# Patient Record
Sex: MALE | Race: WHITE | HISPANIC OR LATINO | Employment: OTHER | URBAN - METROPOLITAN AREA
[De-identification: names, ages, dates, MRNs, and addresses within clinical notes are randomized per-mention and may not be internally consistent; named-entity substitution may affect disease eponyms.]

---

## 2022-08-08 LAB — HBA1C MFR BLD HPLC: 14 %

## 2022-09-02 ENCOUNTER — CONSULT (OUTPATIENT)
Dept: UROLOGY | Facility: CLINIC | Age: 67
End: 2022-09-02
Payer: MEDICARE

## 2022-09-02 VITALS
HEIGHT: 72 IN | DIASTOLIC BLOOD PRESSURE: 80 MMHG | WEIGHT: 220 LBS | SYSTOLIC BLOOD PRESSURE: 140 MMHG | BODY MASS INDEX: 29.8 KG/M2

## 2022-09-02 DIAGNOSIS — N39.44 NOCTURNAL ENURESIS: ICD-10-CM

## 2022-09-02 DIAGNOSIS — N48.1 BALANITIS: ICD-10-CM

## 2022-09-02 DIAGNOSIS — R39.14 FEELING OF INCOMPLETE BLADDER EMPTYING: ICD-10-CM

## 2022-09-02 DIAGNOSIS — N39.41 URGE INCONTINENCE OF URINE: ICD-10-CM

## 2022-09-02 DIAGNOSIS — R31.29 MICROSCOPIC HEMATURIA: ICD-10-CM

## 2022-09-02 DIAGNOSIS — R35.0 URINARY FREQUENCY: Primary | ICD-10-CM

## 2022-09-02 DIAGNOSIS — Z12.5 PROSTATE CANCER SCREENING: ICD-10-CM

## 2022-09-02 PROBLEM — E11.9 TYPE 2 DIABETES MELLITUS (HCC): Status: ACTIVE | Noted: 2022-09-02

## 2022-09-02 LAB
BACTERIA UR QL AUTO: ABNORMAL /HPF
BILIRUB UR QL STRIP: NEGATIVE
CLARITY UR: CLEAR
COLOR UR: ABNORMAL
GLUCOSE UR STRIP-MCNC: ABNORMAL MG/DL
HGB UR QL STRIP.AUTO: ABNORMAL
KETONES UR STRIP-MCNC: ABNORMAL MG/DL
LEUKOCYTE ESTERASE UR QL STRIP: ABNORMAL
NITRITE UR QL STRIP: NEGATIVE
NON-SQ EPI CELLS URNS QL MICRO: ABNORMAL /HPF
PH UR STRIP.AUTO: 5.5 [PH]
PROT UR STRIP-MCNC: NEGATIVE MG/DL
RBC #/AREA URNS AUTO: ABNORMAL /HPF
SL AMB  POCT GLUCOSE, UA: ABNORMAL
SL AMB LEUKOCYTE ESTERASE,UA: ABNORMAL
SL AMB POCT BILIRUBIN,UA: ABNORMAL
SL AMB POCT BLOOD,UA: ABNORMAL
SL AMB POCT CLARITY,UA: CLEAR
SL AMB POCT COLOR,UA: YELLOW
SL AMB POCT KETONES,UA: ABNORMAL
SL AMB POCT NITRITE,UA: ABNORMAL
SL AMB POCT PH,UA: 5
SL AMB POCT SPECIFIC GRAVITY,UA: 1.02
SL AMB POCT URINE PROTEIN: ABNORMAL
SL AMB POCT UROBILINOGEN: ABNORMAL
SP GR UR STRIP.AUTO: 1.02 (ref 1–1.03)
UROBILINOGEN UR STRIP-ACNC: <2 MG/DL
WBC #/AREA URNS AUTO: ABNORMAL /HPF

## 2022-09-02 PROCEDURE — 87186 SC STD MICRODIL/AGAR DIL: CPT | Performed by: NURSE PRACTITIONER

## 2022-09-02 PROCEDURE — 81002 URINALYSIS NONAUTO W/O SCOPE: CPT | Performed by: NURSE PRACTITIONER

## 2022-09-02 PROCEDURE — 81001 URINALYSIS AUTO W/SCOPE: CPT | Performed by: NURSE PRACTITIONER

## 2022-09-02 PROCEDURE — 99204 OFFICE O/P NEW MOD 45 MIN: CPT | Performed by: NURSE PRACTITIONER

## 2022-09-02 PROCEDURE — 87086 URINE CULTURE/COLONY COUNT: CPT | Performed by: NURSE PRACTITIONER

## 2022-09-02 RX ORDER — UNDERPADS 23" X 36"
EACH MISCELLANEOUS EVERY 4 HOURS PRN
Qty: 36 EACH | Refills: 12 | Status: SHIPPED | OUTPATIENT
Start: 2022-09-02

## 2022-09-02 RX ORDER — CLOTRIMAZOLE AND BETAMETHASONE DIPROPIONATE 10; .64 MG/G; MG/G
CREAM TOPICAL 2 TIMES DAILY
Qty: 30 G | Refills: 3 | Status: SHIPPED | OUTPATIENT
Start: 2022-09-02 | End: 2022-09-02

## 2022-09-02 RX ORDER — CLOTRIMAZOLE AND BETAMETHASONE DIPROPIONATE 10; .64 MG/G; MG/G
CREAM TOPICAL 2 TIMES DAILY
Qty: 30 G | Refills: 3 | Status: SHIPPED | OUTPATIENT
Start: 2022-09-02 | End: 2022-09-20

## 2022-09-02 RX ORDER — INSULIN LISPRO 100 [IU]/ML
INJECTION, SOLUTION SUBCUTANEOUS
COMMUNITY
End: 2022-09-20

## 2022-09-02 RX ORDER — NYSTATIN 100000 [USP'U]/G
POWDER TOPICAL 3 TIMES DAILY
Qty: 15 G | Refills: 3 | Status: SHIPPED | OUTPATIENT
Start: 2022-09-02 | End: 2022-09-20

## 2022-09-02 NOTE — PATIENT INSTRUCTIONS
BLADDER HEALTH    WHAT IS CONSIDERED NORMAL? The average bladder can hold about 2 cups of urine before it needs to be emptied  The normal range of voiding urine is 6 to 8 times during a 24 hour period  As we get older, our bladder capacity can get smaller and we may need to pass urine more frequently but usually not more than every 2 hours  Urine should flow easily without discomfort in a good, steady stream until the bladder is empty  No pushing or straining is necessary to empty the bladder  An urge is a signal that you feel as the bladder stretches to fill with urine  Urges can be felt even if the bladder is not full  Urges are not commands to go to the toilet, merely a signal and can be controlled  WHAT ARE GOOD BLADDER HABITS? Take your time when emptying your bladder  Dont strain or push to empty your bladder  Make sure you empty your bladder completely each time you pass urine  Do not rush the process  Consistently ignoring the urge to go (waiting more than 4 hours between toileting) or urinating too infrequently may be convenient but not healthy for your bladder  Avoid going to the toilet just in case or more often than every 2 hours  It is usually not necessary to go when you feel the first urge  Try to go only when your bladder is full  Urgency and frequency of urination can be improved by retraining the bladder and spacing your fluid intake throughout the day  Practice good toilet habits  Dont let your bladder control your life  TIPS TO MAINTAIN GOOD BLADDER HABITS  Maintain a good fluid intake  Depending on your body size and environment, drink 6 -8 cups (8 oz each) of fluid per day unless otherwise advised by your doctor  Not enough fluid creates a foul odor and dark color of the urine  Limit the amount of caffeine (coffee, cola, chocolate or tea) and citrus foods that you consume as these foods can be associated with increased sensation of urinary urgency and frequency  Limit the amount of alcohol you drink  Alcohol increases urine production and makes it difficult for the brain to coordinate bladder control  Avoid constipation by maintaining a balanced diet of dietary fiber  Cigarette smoking is also irritating to the bladder surface and is associated with bladder cancer  In addition, the coughing associated with smoking may lead to increased incontinent episodes because of the increased pressure  HOW DIET CAN AFFECT YOUR BLADDER  Although there is no particular "diet" that can cure bladder control, there are certain dietary suggestions you can use to help control the problem  There are 2 points to consider when evaluating how your habits and diet may affect your bladder:    Foods and fluids can irritate the bladder  Some foods and beverages are thought to contribute to bladder leakage and irritability  However their effect on the bladder is not completely understood and you may want to see if eliminating one or all of these items improves your bladder control  If you are unable to give them up completely, it is recommended that you use the following items in moderation:  Acidic beverages and foods (orange juice, grapefruit juice, lemonade etc)  Alcoholic beverages  Vinegar  Coffee (regular and decaf)  Tea (regular and decaf)  Caffeinated beverages  Carbonated beverages          Drinking enough and the right kinds of fluids  Many people with bladder control issues decrease their intake of liquids in hope that they will need to urinate less frequently or have less urinary leakage  You should not restrict fluids to control your bladder  While a decrease in liquid intake does result in a decrease in the volume of urine, the smaller amount of urine may be more highly concentrated  Highly concentrated, dark yellow urine is irritating to the bladder surface and may actually cause you to go to the bathroom more frequently        It also encourages the growth of bacteria, which may lead to infections resulting in incontinence  Substitutions for Bladder Irritants: water is always the best beverage choice  Grape and apple juice are thirst quenchers are good selections and are not as irritating to the bladder  Low acid fruits:  Pears, apricots, papaya, watermelon  For coffee drinkers: KAVA®, Postum®, Ollie®, Kaffree Stacie®  For tea drinkers:  non-citrus or herbal and sun brewed tea  Enlarged Prostate (BPH)   WHAT YOU NEED TO KNOW:   What do I need to know about an enlarged prostate (BPH)? An enlarged prostate (BPH) is a common condition in older adults  BPH develops because the number of prostate cells increases (hyperplasia) or the cells get bigger (hypertrophy)  The prostate wraps around the urethra  An enlarged prostate can press on the urethra  This may cause problems with storing urine or emptying your bladder completely  What are the signs and symptoms of BPH? Urinating 8 or more times each day    A feeling of not fully emptying your bladder when you urinate    An urgent need to urinate that you could not put off, or urinating again within 2 hours    Being woken from sleep because you needed to urinate    Trouble starting your urine flow, or a need to push or strain to get it to start    Urine that stops and starts several times when you urinate    A weak urine stream, or dribbling after you urinate    How is BPH diagnosed? A digital rectal exam  is used to check the size of your prostate  Your healthcare provider will insert a gloved finger into your rectum  The provider will be able to feel your prostate  The size of your prostate may be checked with ultrasound pictures  Urine tests  may show infection, or strength and amount of urine flow  You may need to record how often and how much you urinate for 24 hours  Blood tests  may show kidney problems or your PSA level  PSA is a substance produced by your prostate   PSA levels increase when you have BPH  A postvoid residual volume test  is used to measure the amount of urine left in your bladder after you urinate  How is BPH treated? Watchful waiting  means you do not receive treatment right away  Your signs and symptoms will be monitored over time to see if they get worse  You may be asked to keep a record and bring it to follow-up visits  This record may include when you urinate, how easy or difficult it was, and any changes in urination  Medicines  may be used to relax the muscles in your prostate and bladder  This may help you urinate more easily  You may also need medicine that helps shrink, or slow the growth of your prostate  A procedure  may be used to place a stent into your urethra to hold it open  A stent is a short, tiny mesh tube  A prostatic urethral lift, or UroLift, may be used to hold the prostate away from the urethra  This makes the urethra wider so urine can pass through more easily  Surgery  may be used to relieve your symptoms if other treatments do not work  Extra tissue that is causing your symptoms may be destroyed  All or part of your prostate may be removed during another type of surgery  What can I do to manage my symptoms? Urinate on a regular schedule  This will train your bladder to hold urine longer  A larger amount of urine may make it easier to urinate  Drink less liquid during the day  Do not have liquid for several hours before you go to bed at night  Do not drink large amounts of any liquid at one time  Limit alcohol and caffeine  These can irritate your bladder and make your symptoms worse  Eat less salt  Salt can cause fluid buildup and make it harder to urinate  Examples of salty foods are chips, cured meats, and canned soups  Do not use table salt  Elevate your legs if you have swelling  Elevate (raise) your legs above the level of your heart  This can relieve swelling caused by fluid buildup   You may not have to get up in the night to urinate  Exercise regularly  Exercise can help improve your symptoms  Ask your healthcare provider what a healthy weight for you is  Aim to get at least 30 minutes of exercise on most days of the week  When should I call my doctor? You see blood in your urine  You are not able to urinate  Your bladder feels very full and painful  You have new or worsening symptoms  You have a fever  You have questions or concerns about your condition or care  CARE AGREEMENT:   You have the right to help plan your care  Learn about your health condition and how it may be treated  Discuss treatment options with your healthcare providers to decide what care you want to receive  You always have the right to refuse treatment  The above information is an  only  It is not intended as medical advice for individual conditions or treatments  Talk to your doctor, nurse or pharmacist before following any medical regimen to see if it is safe and effective for you  © Copyright [employer] 2022 Information is for End User's use only and may not be sold, redistributed or otherwise used for commercial purposes   All illustrations and images included in CareNotes® are the copyrighted property of A D A M [employer]  or [address] [place] [address]

## 2022-09-02 NOTE — PROGRESS NOTES
Assessment and plan:     Urinary frequency  · Likely secondary to uncontrolled diabetes, A1c > 14  · AUA 26  · Positive ketones in urine  · Recommend improved glucose control, has follow-up with endocrinology scheduled    Prostate cancer screening  · Denies family hx of prostate cancer  · PSA 2 18  · Follow up 1 year    Microscopic hematuria  · 3-10 rbc/hpf  · Schedule us of kidney and bladder  · Refuses cystoscopy unless under anesthesia    Urge incontinence of urine  · Referral to pelvic floor physical therapy  · Avoid bladder irritants  · Recheck 4 weeks    Balanitis  · Reviewed proper cleaning techniques with plain water and patting dry  · Apply Lotrisone cream b i d  x5 days and p r n  · Nystatin powder ordered for scrotal sac due to irritation  · Recheck 4 weeks    Feeling of incomplete bladder emptying  · Avoid bladder irritants  · Schedule ultrasound kidney and bladder with [facility]  · Follow-up 4 weeks for recheck    Nocturnal enuresis  · Likely secondary to uncontrolled diabetes with A1c > 14  · Incontinence supplies, change large brief 6 times per day          LEENANA Piedra    History of Present Illness     Oracio Richardson Sylvia Urbina  is a 75 y o  new patient who presents for urinary frequency, intermittent urinary stream, urgency, weak stream, straining to urinate, nocturia x5 or more for over year  He feels symptoms started after weight loss  He lost 180 lbs, no on purpose  He was told this could be caused by his diabetes  He has not taken medication for over a  Year  He reports that his diabetic medications caused him to "fall on the floor"  He was told that he lost muscle mass and that is the cause of his current falls and inability to get up  He reports urinary incontinence for 8 months at night  During the day he has post void dribbling  He starts to leak and will go back in the bathroom and "force it out"  He drinks about 2 gallons of water a day      He saw his pcp 2 weeks ago but he is in [facility], he plans to transfer care to this area  PSA 2 18  A1c > 14    Urine microscopic with 3-10 RBCs/hpf, denies gross hematuria, non smoking history, exposure to asbestos but no chemical exposures  Denies pelvic radiation  Denies family history of kidney cancer bladder cancer  CREAT 0 91    Laboratory     No results found for: BUN, CREATININE    No components found for: GFR    No results found for: GLUCOSE, CALCIUM, NA, K, CO2, CL    No results found for: WBC, HGB, HCT, MCV, PLT    No results found for: PSA    No results found for this or any previous visit (from the past 1 hour(s))  @RESULT(URINEMICROSCOPIC)@    @RESULT(URINECULTURE)@    Radiology       Review of Systems     Review of Systems   Constitutional: Positive for unexpected weight change  Negative for activity change, appetite change, chills, fatigue and fever  Loss of muscle mass   HENT: Negative for facial swelling  Eyes: Negative for discharge  Respiratory: Negative  Negative for cough and shortness of breath  Cardiovascular: Positive for leg swelling  Negative for chest pain  Gastrointestinal: Negative  Negative for abdominal distention, abdominal pain, constipation, diarrhea, nausea and vomiting  Endocrine: Positive for polyphagia  Genitourinary: Positive for difficulty urinating, enuresis, frequency and urgency  Negative for decreased urine volume, dysuria, flank pain, genital sores, hematuria, scrotal swelling and testicular pain  Sensation of incomplete bladder emptying,intermittent stream,nocturia x 5, straining to urinate, weak stream   Musculoskeletal: Negative for back pain and myalgias  Skin: Negative for pallor and rash  Allergic/Immunologic: Negative  Negative for immunocompromised state  Neurological: Negative for facial asymmetry and speech difficulty  Psychiatric/Behavioral: Negative for agitation and confusion           AUA SYMPTOM SCORE    Flowsheet Row Most Recent Value   AUA SYMPTOM SCORE How often have you had a sensation of not emptying your bladder completely after you finished urinating? 0   How often have you had to urinate again less than two hours after you finished urinating? 5   How often have you found you stopped and started again several times when you urinate? 5   How often have you found it difficult to postpone urination? 5   How often have you had a weak urinary stream? 3   How often have you had to push or strain to begin urination? 3   How many times did you most typically get up to urinate from the time you went to bed at night until the time you got up in the morning? 5   Quality of Life: If you were to spend the rest of your life with your urinary condition just the way it is now, how would you feel about that? 4   AUA SYMPTOM SCORE 26                Allergies     No Known Allergies    Physical Exam     Physical Exam  Vitals reviewed  Constitutional:       General: He is not in acute distress  Appearance: Normal appearance  He is obese  He is not ill-appearing, toxic-appearing or diaphoretic  HENT:      Head: Normocephalic and atraumatic  Eyes:      General: No scleral icterus  Cardiovascular:      Rate and Rhythm: Normal rate  Pulmonary:      Effort: Pulmonary effort is normal  No respiratory distress  Abdominal:      General: Abdomen is flat  There is no distension  Palpations: Abdomen is soft  Tenderness: There is no abdominal tenderness  There is no right CVA tenderness, left CVA tenderness, guarding or rebound  Genitourinary:     Penis: Circumcised  Erythema present  No hypospadias, tenderness, discharge, swelling or lesions  Testes:         Right: Mass, tenderness or swelling not present  Right testis is descended  Left: Mass, tenderness or swelling not present  Left testis is descended  Epididymis:      Right: Not inflamed  No tenderness  Left: Not inflamed  No tenderness            Comments: Prostate smooth nontender without appreciable nodules or indurations  Musculoskeletal:         General: No swelling  Cervical back: Normal range of motion  Right lower leg: Edema present  Left lower leg: Edema present  Skin:     General: Skin is warm and dry  Coloration: Skin is not jaundiced or pale  Findings: No rash  Neurological:      General: No focal deficit present  Mental Status: He is alert and oriented to person, place, and time  Gait: Gait normal    Psychiatric:         Mood and Affect: Mood normal          Behavior: Behavior normal          Thought Content: Thought content normal          Judgment: Judgment normal          Vital Signs     Vitals:    09/02/22 1011   BP: 140/80   Weight: 99 8 kg (220 lb)   Height: 6' (1 829 m)       Current Medications       Current Outpatient Medications:     clotrimazole-betamethasone (LOTRISONE) 1-0 05 % cream, Apply topically 2 (two) times a day Apply twice a day for 5 days then as needed    Cleanse head of penis with water and pat try twice a day, Disp: 30 g, Rfl: 3    Incontinence Supply Disposable (Incontinence Brief Large) MISC, Use every 4 (four) hours as needed (Urinary incontinence), Disp: 36 each, Rfl: 12    Insulin Glargine (BASAGLAR KWIKPEN SC), Inject under the skin, Disp: , Rfl:     insulin lispro (HumaLOG Felix KwikPen) 100 units/mL injection pen, Inject under the skin 3 (three) times a day with meals, Disp: , Rfl:     nystatin (MYCOSTATIN) powder, Apply topically 3 (three) times a day Apply to testicles as needed for redness, Disp: 15 g, Rfl: 3    Active Problems     Patient Active Problem List   Diagnosis    Urinary frequency    Type 2 diabetes mellitus ([facility] )    Prostate cancer screening    Microscopic hematuria    Urge incontinence of urine    Nocturnal enuresis    Feeling of incomplete bladder emptying    Balanitis       Past Medical History     Past Medical History:   Diagnosis Date    [facility]        Surgical History     Past Surgical History:   Procedure Laterality Date    KNEE CARTILAGE SURGERY         Family History     Family History   Problem Relation Age of Onset    Diabetes Father     Diabetes Mother        Social History     Social History     Social History     Tobacco Use   Smoking Status Never Smoker   Smokeless Tobacco Never Used       Past Surgical History:   Procedure Laterality Date    KNEE CARTILAGE SURGERY           The following portions of the patient's history were reviewed and updated as appropriate: allergies, current medications, past family history, past medical history, past social history, past surgical history and problem list    Please note :  Voice dictation software has been used to create this document  There may be inadvertent transcription errors      [id number] [address]

## 2022-09-02 NOTE — ASSESSMENT & PLAN NOTE
· Avoid bladder irritants  · Schedule ultrasound kidney and bladder with [facility]  · Follow-up 4 weeks for recheck

## 2022-09-02 NOTE — ASSESSMENT & PLAN NOTE
· Reviewed proper cleaning techniques with plain water and patting dry  · Apply Lotrisone cream b i d  x5 days and p r n    · Nystatin powder ordered for scrotal sac due to irritation  · Recheck 4 weeks

## 2022-09-02 NOTE — ASSESSMENT & PLAN NOTE
· Likely secondary to uncontrolled diabetes, A1c > 14  · AUA 26  · Positive ketones in urine  · Recommend improved glucose control, has follow-up with endocrinology scheduled

## 2022-09-04 ENCOUNTER — APPOINTMENT (INPATIENT)
Dept: CT IMAGING | Facility: HOSPITAL | Age: 67
DRG: 871 | End: 2022-09-04
Payer: MEDICARE

## 2022-09-04 ENCOUNTER — HOSPITAL ENCOUNTER (INPATIENT)
Facility: HOSPITAL | Age: 67
LOS: 6 days | DRG: 871 | End: 2022-09-10
Attending: EMERGENCY MEDICINE | Admitting: ANESTHESIOLOGY
Payer: MEDICARE

## 2022-09-04 ENCOUNTER — APPOINTMENT (OUTPATIENT)
Dept: ULTRASOUND IMAGING | Facility: HOSPITAL | Age: 67
DRG: 871 | End: 2022-09-04
Payer: MEDICARE

## 2022-09-04 DIAGNOSIS — K85.90 PANCREATITIS: ICD-10-CM

## 2022-09-04 DIAGNOSIS — R41.82 ALTERED MENTAL STATUS, UNSPECIFIED ALTERED MENTAL STATUS TYPE: ICD-10-CM

## 2022-09-04 DIAGNOSIS — N20.9 URINARY CALCULUS: ICD-10-CM

## 2022-09-04 DIAGNOSIS — E11.10 DKA (DIABETIC KETOACIDOSIS) (HCC): Primary | ICD-10-CM

## 2022-09-04 DIAGNOSIS — E11.9 TYPE 2 DIABETES MELLITUS (HCC): ICD-10-CM

## 2022-09-04 DIAGNOSIS — A41.9 SEPSIS (HCC): ICD-10-CM

## 2022-09-04 PROBLEM — E87.2 LACTIC ACIDOSIS: Status: ACTIVE | Noted: 2022-09-04

## 2022-09-04 PROBLEM — N17.9 AKI (ACUTE KIDNEY INJURY) (HCC): Status: ACTIVE | Noted: 2022-09-04

## 2022-09-04 PROBLEM — R74.8 ELEVATED LIPASE: Status: ACTIVE | Noted: 2022-09-04

## 2022-09-04 PROBLEM — E87.1 HYPONATREMIA: Status: ACTIVE | Noted: 2022-09-04

## 2022-09-04 PROBLEM — T68.XXXA HYPOTHERMIA: Status: ACTIVE | Noted: 2022-09-04

## 2022-09-04 PROBLEM — G93.41 ACUTE METABOLIC ENCEPHALOPATHY: Status: ACTIVE | Noted: 2022-09-04

## 2022-09-04 PROBLEM — R74.01 TRANSAMINITIS: Status: ACTIVE | Noted: 2022-09-04

## 2022-09-04 PROBLEM — I48.91 NEW ONSET ATRIAL FIBRILLATION (HCC): Status: ACTIVE | Noted: 2022-09-04

## 2022-09-04 PROBLEM — E87.29 INCREASED ANION GAP METABOLIC ACIDOSIS: Status: ACTIVE | Noted: 2022-09-04

## 2022-09-04 PROBLEM — E87.2 INCREASED ANION GAP METABOLIC ACIDOSIS: Status: ACTIVE | Noted: 2022-09-04

## 2022-09-04 PROBLEM — E87.20 LACTIC ACIDOSIS: Status: ACTIVE | Noted: 2022-09-04

## 2022-09-04 LAB
2HR DELTA HS TROPONIN: -1 NG/L
4HR DELTA HS TROPONIN: 0 NG/L
ALBUMIN SERPL BCP-MCNC: 3.4 G/DL (ref 3.5–5)
ALP SERPL-CCNC: 167 U/L (ref 46–116)
ALT SERPL W P-5'-P-CCNC: 24 U/L (ref 12–78)
ANION GAP SERPL CALCULATED.3IONS-SCNC: 15 MMOL/L (ref 4–13)
ANION GAP SERPL CALCULATED.3IONS-SCNC: 21 MMOL/L (ref 4–13)
ANION GAP SERPL CALCULATED.3IONS-SCNC: 32 MMOL/L (ref 4–13)
ANION GAP SERPL CALCULATED.3IONS-SCNC: 33 MMOL/L (ref 4–13)
ANION GAP SERPL CALCULATED.3IONS-SCNC: 35 MMOL/L (ref 4–13)
ARTERIAL PATENCY WRIST A: NO
AST SERPL W P-5'-P-CCNC: 20 U/L (ref 5–45)
ATRIAL RATE: 101 BPM
ATRIAL RATE: 102 BPM
BACTERIA UR CULT: ABNORMAL
BACTERIA UR QL AUTO: ABNORMAL /HPF
BASE EX.OXY STD BLDV CALC-SCNC: 71 % (ref 60–80)
BASE EX.OXY STD BLDV CALC-SCNC: 74.5 % (ref 60–80)
BASE EX.OXY STD BLDV CALC-SCNC: 90.5 % (ref 60–80)
BASE EXCESS BLDA CALC-SCNC: -5.2 MMOL/L
BASE EXCESS BLDA CALC-SCNC: -5.7 MMOL/L
BASE EXCESS BLDV CALC-SCNC: -18.3 MMOL/L
BASE EXCESS BLDV CALC-SCNC: -24.3 MMOL/L
BASE EXCESS BLDV CALC-SCNC: -29.4 MMOL/L
BETA-HYDROXYBUTYRATE: 5.2 MMOL/L
BILIRUB SERPL-MCNC: 0.39 MG/DL (ref 0.2–1)
BILIRUB UR QL STRIP: NEGATIVE
BODY TEMPERATURE: 98.6 DEGREES FEHRENHEIT
BUN SERPL-MCNC: 31 MG/DL (ref 5–25)
BUN SERPL-MCNC: 33 MG/DL (ref 5–25)
BUN SERPL-MCNC: 37 MG/DL (ref 5–25)
BUN SERPL-MCNC: 38 MG/DL (ref 5–25)
BUN SERPL-MCNC: 39 MG/DL (ref 5–25)
BUN SERPL-MCNC: 41 MG/DL (ref 5–25)
CALCIUM ALBUM COR SERPL-MCNC: 9.9 MG/DL (ref 8.3–10.1)
CALCIUM SERPL-MCNC: 7.8 MG/DL (ref 8.3–10.1)
CALCIUM SERPL-MCNC: 8.4 MG/DL (ref 8.3–10.1)
CALCIUM SERPL-MCNC: 8.5 MG/DL (ref 8.3–10.1)
CALCIUM SERPL-MCNC: 8.8 MG/DL (ref 8.3–10.1)
CALCIUM SERPL-MCNC: 9.4 MG/DL (ref 8.3–10.1)
CALCIUM SERPL-MCNC: 9.4 MG/DL (ref 8.3–10.1)
CARDIAC TROPONIN I PNL SERPL HS: 11 NG/L
CARDIAC TROPONIN I PNL SERPL HS: 12 NG/L
CARDIAC TROPONIN I PNL SERPL HS: 12 NG/L
CHLORIDE SERPL-SCNC: 102 MMOL/L (ref 96–108)
CHLORIDE SERPL-SCNC: 107 MMOL/L (ref 96–108)
CHLORIDE SERPL-SCNC: 88 MMOL/L (ref 96–108)
CHLORIDE SERPL-SCNC: 89 MMOL/L (ref 96–108)
CHLORIDE SERPL-SCNC: 94 MMOL/L (ref 96–108)
CHLORIDE SERPL-SCNC: 95 MMOL/L (ref 96–108)
CHOLEST SERPL-MCNC: 153 MG/DL
CLARITY UR: CLEAR
CO2 SERPL-SCNC: 13 MMOL/L (ref 21–32)
CO2 SERPL-SCNC: 19 MMOL/L (ref 21–32)
CO2 SERPL-SCNC: 5 MMOL/L (ref 21–32)
CO2 SERPL-SCNC: 6 MMOL/L (ref 21–32)
CO2 SERPL-SCNC: 9 MMOL/L (ref 21–32)
CO2 SERPL-SCNC: <5 MMOL/L (ref 21–32)
COLOR UR: YELLOW
CREAT SERPL-MCNC: 1.34 MG/DL (ref 0.6–1.3)
CREAT SERPL-MCNC: 1.48 MG/DL (ref 0.6–1.3)
CREAT SERPL-MCNC: 1.75 MG/DL (ref 0.6–1.3)
CREAT SERPL-MCNC: 1.81 MG/DL (ref 0.6–1.3)
CREAT SERPL-MCNC: 1.85 MG/DL (ref 0.6–1.3)
CREAT SERPL-MCNC: 2.29 MG/DL (ref 0.6–1.3)
ERYTHROCYTE [DISTWIDTH] IN BLOOD BY AUTOMATED COUNT: 12.8 % (ref 11.6–15.1)
GFR SERPL CREATININE-BSD FRML MDRD: 28 ML/MIN/1.73SQ M
GFR SERPL CREATININE-BSD FRML MDRD: 36 ML/MIN/1.73SQ M
GFR SERPL CREATININE-BSD FRML MDRD: 37 ML/MIN/1.73SQ M
GFR SERPL CREATININE-BSD FRML MDRD: 39 ML/MIN/1.73SQ M
GFR SERPL CREATININE-BSD FRML MDRD: 47 ML/MIN/1.73SQ M
GFR SERPL CREATININE-BSD FRML MDRD: 54 ML/MIN/1.73SQ M
GLUCOSE SERPL-MCNC: 118 MG/DL (ref 65–140)
GLUCOSE SERPL-MCNC: 121 MG/DL (ref 65–140)
GLUCOSE SERPL-MCNC: 151 MG/DL (ref 65–140)
GLUCOSE SERPL-MCNC: 165 MG/DL (ref 65–140)
GLUCOSE SERPL-MCNC: 179 MG/DL (ref 65–140)
GLUCOSE SERPL-MCNC: 187 MG/DL (ref 65–140)
GLUCOSE SERPL-MCNC: 192 MG/DL (ref 65–140)
GLUCOSE SERPL-MCNC: 207 MG/DL (ref 65–140)
GLUCOSE SERPL-MCNC: 222 MG/DL (ref 65–140)
GLUCOSE SERPL-MCNC: 240 MG/DL (ref 65–140)
GLUCOSE SERPL-MCNC: 302 MG/DL (ref 65–140)
GLUCOSE SERPL-MCNC: 315 MG/DL (ref 65–140)
GLUCOSE SERPL-MCNC: 322 MG/DL (ref 65–140)
GLUCOSE SERPL-MCNC: 346 MG/DL (ref 65–140)
GLUCOSE SERPL-MCNC: 374 MG/DL (ref 65–140)
GLUCOSE SERPL-MCNC: 394 MG/DL (ref 65–140)
GLUCOSE SERPL-MCNC: 396 MG/DL (ref 65–140)
GLUCOSE SERPL-MCNC: 417 MG/DL (ref 65–140)
GLUCOSE SERPL-MCNC: 504 MG/DL (ref 65–140)
GLUCOSE SERPL-MCNC: 504 MG/DL (ref 65–140)
GLUCOSE SERPL-MCNC: 571 MG/DL (ref 65–140)
GLUCOSE SERPL-MCNC: 626 MG/DL (ref 65–140)
GLUCOSE SERPL-MCNC: 727 MG/DL (ref 65–140)
GLUCOSE SERPL-MCNC: 800 MG/DL (ref 65–140)
GLUCOSE SERPL-MCNC: >500 MG/DL (ref 65–140)
GLUCOSE UR STRIP-MCNC: ABNORMAL MG/DL
HCO3 BLDA-SCNC: 18.4 MMOL/L (ref 22–28)
HCO3 BLDA-SCNC: 19.1 MMOL/L (ref 22–28)
HCO3 BLDV-SCNC: 3.5 MMOL/L (ref 24–30)
HCO3 BLDV-SCNC: 5.6 MMOL/L (ref 24–30)
HCO3 BLDV-SCNC: 9 MMOL/L (ref 24–30)
HCT VFR BLD AUTO: 49.8 % (ref 36.5–49.3)
HDLC SERPL-MCNC: 54 MG/DL
HGB BLD-MCNC: 15.3 G/DL (ref 12–17)
HGB UR QL STRIP.AUTO: ABNORMAL
KETONES UR STRIP-MCNC: ABNORMAL MG/DL
LACTATE SERPL-SCNC: 0.8 MMOL/L (ref 0.5–2)
LACTATE SERPL-SCNC: 2.8 MMOL/L (ref 0.5–2)
LACTATE SERPL-SCNC: 4.5 MMOL/L (ref 0.5–2)
LDLC SERPL CALC-MCNC: 81 MG/DL (ref 0–100)
LEUKOCYTE ESTERASE UR QL STRIP: ABNORMAL
LIPASE SERPL-CCNC: 1091 U/L (ref 73–393)
MAGNESIUM SERPL-MCNC: 1.9 MG/DL (ref 1.6–2.6)
MAGNESIUM SERPL-MCNC: 2.1 MG/DL (ref 1.6–2.6)
MAGNESIUM SERPL-MCNC: 2.1 MG/DL (ref 1.6–2.6)
MAGNESIUM SERPL-MCNC: 2.3 MG/DL (ref 1.6–2.6)
MAGNESIUM SERPL-MCNC: 2.4 MG/DL (ref 1.6–2.6)
MCH RBC QN AUTO: 29.3 PG (ref 26.8–34.3)
MCHC RBC AUTO-ENTMCNC: 30.7 G/DL (ref 31.4–37.4)
MCV RBC AUTO: 95 FL (ref 82–98)
NITRITE UR QL STRIP: NEGATIVE
NON VENT ROOM AIR: 24 %
NON-SQ EPI CELLS URNS QL MICRO: ABNORMAL /HPF
O2 CT BLDA-SCNC: 19.8 ML/DL (ref 16–23)
O2 CT BLDA-SCNC: 20.3 ML/DL (ref 16–23)
O2 CT BLDV-SCNC: 16 ML/DL
O2 CT BLDV-SCNC: 16.5 ML/DL
O2 CT BLDV-SCNC: 20.8 ML/DL
OXYHGB MFR BLDA: 97.3 % (ref 94–97)
OXYHGB MFR BLDA: 97.3 % (ref 94–97)
P AXIS: 69 DEGREES
PCO2 BLDA: 32.3 MM HG (ref 36–44)
PCO2 BLDA: 33.7 MM HG (ref 36–44)
PCO2 BLDV: 19.9 MM HG (ref 42–50)
PCO2 BLDV: 23 MM HG (ref 42–50)
PCO2 BLDV: 26.9 MM HG (ref 42–50)
PH BLDA: 7.37 [PH] (ref 7.35–7.45)
PH BLDA: 7.37 [PH] (ref 7.35–7.45)
PH BLDV: 6.86 [PH] (ref 7.3–7.4)
PH BLDV: 7 [PH] (ref 7.3–7.4)
PH BLDV: 7.14 [PH] (ref 7.3–7.4)
PH UR STRIP.AUTO: 5.5 [PH]
PHOSPHATE SERPL-MCNC: 1.1 MG/DL (ref 2.3–4.1)
PHOSPHATE SERPL-MCNC: 1.3 MG/DL (ref 2.3–4.1)
PHOSPHATE SERPL-MCNC: 3.8 MG/DL (ref 2.3–4.1)
PHOSPHATE SERPL-MCNC: 4.4 MG/DL (ref 2.3–4.1)
PHOSPHATE SERPL-MCNC: 6.4 MG/DL (ref 2.3–4.1)
PLATELET # BLD AUTO: 372 THOUSANDS/UL (ref 149–390)
PMV BLD AUTO: 11.6 FL (ref 8.9–12.7)
PO2 BLDA: 89.4 MM HG (ref 75–129)
PO2 BLDA: 90.5 MM HG (ref 75–129)
PO2 BLDV: 38.6 MM HG (ref 35–45)
PO2 BLDV: 39.2 MM HG (ref 35–45)
PO2 BLDV: 81.9 MM HG (ref 35–45)
POTASSIUM SERPL-SCNC: 3.3 MMOL/L (ref 3.5–5.3)
POTASSIUM SERPL-SCNC: 3.5 MMOL/L (ref 3.5–5.3)
POTASSIUM SERPL-SCNC: 4 MMOL/L (ref 3.5–5.3)
POTASSIUM SERPL-SCNC: 4.6 MMOL/L (ref 3.5–5.3)
POTASSIUM SERPL-SCNC: 5.6 MMOL/L (ref 3.5–5.3)
POTASSIUM SERPL-SCNC: 5.9 MMOL/L (ref 3.5–5.3)
PR INTERVAL: 170 MS
PROCALCITONIN SERPL-MCNC: 0.16 NG/ML
PROT SERPL-MCNC: 9.1 G/DL (ref 6.4–8.4)
PROT UR STRIP-MCNC: ABNORMAL MG/DL
QRS AXIS: -62 DEGREES
QRS AXIS: -66 DEGREES
QRSD INTERVAL: 112 MS
QRSD INTERVAL: 98 MS
QT INTERVAL: 382 MS
QT INTERVAL: 434 MS
QTC INTERVAL: 539 MS
QTC INTERVAL: 565 MS
RBC # BLD AUTO: 5.23 MILLION/UL (ref 3.88–5.62)
RBC #/AREA URNS AUTO: ABNORMAL /HPF
SODIUM SERPL-SCNC: 127 MMOL/L (ref 135–147)
SODIUM SERPL-SCNC: 128 MMOL/L (ref 135–147)
SODIUM SERPL-SCNC: 134 MMOL/L (ref 135–147)
SODIUM SERPL-SCNC: 135 MMOL/L (ref 135–147)
SODIUM SERPL-SCNC: 136 MMOL/L (ref 135–147)
SODIUM SERPL-SCNC: 141 MMOL/L (ref 135–147)
SP GR UR STRIP.AUTO: 1.02 (ref 1–1.03)
SPECIMEN SOURCE: ABNORMAL
SPECIMEN SOURCE: ABNORMAL
T WAVE AXIS: 106 DEGREES
T WAVE AXIS: 21 DEGREES
TRIGL SERPL-MCNC: 92 MG/DL
UROBILINOGEN UR QL STRIP.AUTO: 0.2 E.U./DL
VENTRICULAR RATE: 102 BPM
VENTRICULAR RATE: 120 BPM
WBC # BLD AUTO: 19.78 THOUSAND/UL (ref 4.31–10.16)
WBC #/AREA URNS AUTO: ABNORMAL /HPF

## 2022-09-04 PROCEDURE — 82805 BLOOD GASES W/O2 SATURATION: CPT | Performed by: SURGERY

## 2022-09-04 PROCEDURE — C9113 INJ PANTOPRAZOLE SODIUM, VIA: HCPCS

## 2022-09-04 PROCEDURE — 83690 ASSAY OF LIPASE: CPT | Performed by: SURGERY

## 2022-09-04 PROCEDURE — 80061 LIPID PANEL: CPT

## 2022-09-04 PROCEDURE — 83605 ASSAY OF LACTIC ACID: CPT

## 2022-09-04 PROCEDURE — 81001 URINALYSIS AUTO W/SCOPE: CPT

## 2022-09-04 PROCEDURE — 84100 ASSAY OF PHOSPHORUS: CPT

## 2022-09-04 PROCEDURE — 96365 THER/PROPH/DIAG IV INF INIT: CPT

## 2022-09-04 PROCEDURE — 74176 CT ABD & PELVIS W/O CONTRAST: CPT

## 2022-09-04 PROCEDURE — 99285 EMERGENCY DEPT VISIT HI MDM: CPT

## 2022-09-04 PROCEDURE — 80053 COMPREHEN METABOLIC PANEL: CPT | Performed by: SURGERY

## 2022-09-04 PROCEDURE — 82010 KETONE BODYS QUAN: CPT | Performed by: SURGERY

## 2022-09-04 PROCEDURE — 82947 ASSAY GLUCOSE BLOOD QUANT: CPT

## 2022-09-04 PROCEDURE — NC001 PR NO CHARGE

## 2022-09-04 PROCEDURE — 99285 EMERGENCY DEPT VISIT HI MDM: CPT | Performed by: SURGERY

## 2022-09-04 PROCEDURE — 84484 ASSAY OF TROPONIN QUANT: CPT | Performed by: SURGERY

## 2022-09-04 PROCEDURE — 36415 COLL VENOUS BLD VENIPUNCTURE: CPT | Performed by: SURGERY

## 2022-09-04 PROCEDURE — 93005 ELECTROCARDIOGRAM TRACING: CPT

## 2022-09-04 PROCEDURE — 36620 INSERTION CATHETER ARTERY: CPT

## 2022-09-04 PROCEDURE — 99291 CRITICAL CARE FIRST HOUR: CPT

## 2022-09-04 PROCEDURE — 83735 ASSAY OF MAGNESIUM: CPT

## 2022-09-04 PROCEDURE — 85027 COMPLETE CBC AUTOMATED: CPT | Performed by: SURGERY

## 2022-09-04 PROCEDURE — 96360 HYDRATION IV INFUSION INIT: CPT

## 2022-09-04 PROCEDURE — G1004 CDSM NDSC: HCPCS

## 2022-09-04 PROCEDURE — 76705 ECHO EXAM OF ABDOMEN: CPT

## 2022-09-04 PROCEDURE — 99222 1ST HOSP IP/OBS MODERATE 55: CPT | Performed by: PHYSICIAN ASSISTANT

## 2022-09-04 PROCEDURE — 83605 ASSAY OF LACTIC ACID: CPT | Performed by: SURGERY

## 2022-09-04 PROCEDURE — 87040 BLOOD CULTURE FOR BACTERIA: CPT | Performed by: SURGERY

## 2022-09-04 PROCEDURE — 93010 ELECTROCARDIOGRAM REPORT: CPT | Performed by: INTERNAL MEDICINE

## 2022-09-04 PROCEDURE — 99222 1ST HOSP IP/OBS MODERATE 55: CPT | Performed by: NURSE PRACTITIONER

## 2022-09-04 PROCEDURE — 82948 REAGENT STRIP/BLOOD GLUCOSE: CPT

## 2022-09-04 PROCEDURE — 80048 BASIC METABOLIC PNL TOTAL CA: CPT

## 2022-09-04 PROCEDURE — 84145 PROCALCITONIN (PCT): CPT | Performed by: SURGERY

## 2022-09-04 PROCEDURE — 84484 ASSAY OF TROPONIN QUANT: CPT

## 2022-09-04 PROCEDURE — 82805 BLOOD GASES W/O2 SATURATION: CPT

## 2022-09-04 RX ORDER — PANTOPRAZOLE SODIUM 40 MG/10ML
40 INJECTION, POWDER, LYOPHILIZED, FOR SOLUTION INTRAVENOUS
Status: DISCONTINUED | OUTPATIENT
Start: 2022-09-04 | End: 2022-09-10 | Stop reason: HOSPADM

## 2022-09-04 RX ORDER — SODIUM CHLORIDE, SODIUM GLUCONATE, SODIUM ACETATE, POTASSIUM CHLORIDE, MAGNESIUM CHLORIDE, SODIUM PHOSPHATE, DIBASIC, AND POTASSIUM PHOSPHATE .53; .5; .37; .037; .03; .012; .00082 G/100ML; G/100ML; G/100ML; G/100ML; G/100ML; G/100ML; G/100ML
250 INJECTION, SOLUTION INTRAVENOUS CONTINUOUS
Status: DISPENSED | OUTPATIENT
Start: 2022-09-04 | End: 2022-09-04

## 2022-09-04 RX ORDER — CEFTRIAXONE 1 G/50ML
1000 INJECTION, SOLUTION INTRAVENOUS EVERY 24 HOURS
Status: DISCONTINUED | OUTPATIENT
Start: 2022-09-05 | End: 2022-09-06

## 2022-09-04 RX ORDER — SODIUM CHLORIDE, SODIUM GLUCONATE, SODIUM ACETATE, POTASSIUM CHLORIDE, MAGNESIUM CHLORIDE, SODIUM PHOSPHATE, DIBASIC, AND POTASSIUM PHOSPHATE .53; .5; .37; .037; .03; .012; .00082 G/100ML; G/100ML; G/100ML; G/100ML; G/100ML; G/100ML; G/100ML
1000 INJECTION, SOLUTION INTRAVENOUS ONCE
Status: COMPLETED | OUTPATIENT
Start: 2022-09-04 | End: 2022-09-04

## 2022-09-04 RX ORDER — CEFTRIAXONE 2 G/50ML
2000 INJECTION, SOLUTION INTRAVENOUS ONCE
Status: COMPLETED | OUTPATIENT
Start: 2022-09-04 | End: 2022-09-04

## 2022-09-04 RX ORDER — SODIUM CHLORIDE 9 MG/ML
3 INJECTION INTRAVENOUS
Status: DISCONTINUED | OUTPATIENT
Start: 2022-09-04 | End: 2022-09-10 | Stop reason: HOSPADM

## 2022-09-04 RX ORDER — POTASSIUM CHLORIDE 14.9 MG/ML
20 INJECTION INTRAVENOUS ONCE
Status: COMPLETED | OUTPATIENT
Start: 2022-09-04 | End: 2022-09-04

## 2022-09-04 RX ORDER — POTASSIUM CHLORIDE 14.9 MG/ML
20 INJECTION INTRAVENOUS
Status: COMPLETED | OUTPATIENT
Start: 2022-09-04 | End: 2022-09-05

## 2022-09-04 RX ORDER — MAGNESIUM SULFATE HEPTAHYDRATE 40 MG/ML
2 INJECTION, SOLUTION INTRAVENOUS ONCE
Status: COMPLETED | OUTPATIENT
Start: 2022-09-04 | End: 2022-09-05

## 2022-09-04 RX ORDER — DEXTROSE, SODIUM CHLORIDE, SODIUM LACTATE, POTASSIUM CHLORIDE, AND CALCIUM CHLORIDE 5; .6; .31; .03; .02 G/100ML; G/100ML; G/100ML; G/100ML; G/100ML
75 INJECTION, SOLUTION INTRAVENOUS CONTINUOUS
Status: DISCONTINUED | OUTPATIENT
Start: 2022-09-04 | End: 2022-09-07

## 2022-09-04 RX ORDER — HEPARIN SODIUM 5000 [USP'U]/ML
5000 INJECTION, SOLUTION INTRAVENOUS; SUBCUTANEOUS EVERY 8 HOURS SCHEDULED
Status: DISCONTINUED | OUTPATIENT
Start: 2022-09-04 | End: 2022-09-10 | Stop reason: HOSPADM

## 2022-09-04 RX ORDER — SODIUM CHLORIDE, SODIUM GLUCONATE, SODIUM ACETATE, POTASSIUM CHLORIDE, MAGNESIUM CHLORIDE, SODIUM PHOSPHATE, DIBASIC, AND POTASSIUM PHOSPHATE .53; .5; .37; .037; .03; .012; .00082 G/100ML; G/100ML; G/100ML; G/100ML; G/100ML; G/100ML; G/100ML
500 INJECTION, SOLUTION INTRAVENOUS CONTINUOUS
Status: DISPENSED | OUTPATIENT
Start: 2022-09-04 | End: 2022-09-04

## 2022-09-04 RX ORDER — LIDOCAINE HYDROCHLORIDE 10 MG/ML
1 INJECTION, SOLUTION EPIDURAL; INFILTRATION; INTRACAUDAL; PERINEURAL ONCE
Status: COMPLETED | OUTPATIENT
Start: 2022-09-04 | End: 2022-09-04

## 2022-09-04 RX ADMIN — HEPARIN SODIUM 5000 UNITS: 5000 INJECTION INTRAVENOUS; SUBCUTANEOUS at 22:02

## 2022-09-04 RX ADMIN — SODIUM CHLORIDE 20 UNITS/HR: 9 INJECTION, SOLUTION INTRAVENOUS at 22:30

## 2022-09-04 RX ADMIN — SODIUM CHLORIDE 10 UNITS/HR: 9 INJECTION, SOLUTION INTRAVENOUS at 08:37

## 2022-09-04 RX ADMIN — PANTOPRAZOLE SODIUM 40 MG: 40 INJECTION, POWDER, FOR SOLUTION INTRAVENOUS at 11:20

## 2022-09-04 RX ADMIN — SODIUM BICARBONATE 100 ML/HR: 84 INJECTION, SOLUTION INTRAVENOUS at 08:51

## 2022-09-04 RX ADMIN — POTASSIUM CHLORIDE 20 MEQ: 14.9 INJECTION, SOLUTION INTRAVENOUS at 17:52

## 2022-09-04 RX ADMIN — SODIUM CHLORIDE 20 UNITS/HR: 9 INJECTION, SOLUTION INTRAVENOUS at 17:49

## 2022-09-04 RX ADMIN — INSULIN HUMAN 10 UNITS: 100 INJECTION, SOLUTION PARENTERAL at 08:32

## 2022-09-04 RX ADMIN — POTASSIUM PHOSPHATE, MONOBASIC AND POTASSIUM PHOSPHATE, DIBASIC 30 MMOL: 224; 236 INJECTION, SOLUTION, CONCENTRATE INTRAVENOUS at 19:17

## 2022-09-04 RX ADMIN — DEXTROSE, SODIUM CHLORIDE, SODIUM LACTATE, POTASSIUM CHLORIDE, AND CALCIUM CHLORIDE 250 ML/HR: 5; .6; .31; .03; .02 INJECTION, SOLUTION INTRAVENOUS at 18:21

## 2022-09-04 RX ADMIN — CEFTRIAXONE 2000 MG: 2 INJECTION, SOLUTION INTRAVENOUS at 06:14

## 2022-09-04 RX ADMIN — SODIUM CHLORIDE, SODIUM GLUCONATE, SODIUM ACETATE, POTASSIUM CHLORIDE, MAGNESIUM CHLORIDE, SODIUM PHOSPHATE, DIBASIC, AND POTASSIUM PHOSPHATE 250 ML/HR: .53; .5; .37; .037; .03; .012; .00082 INJECTION, SOLUTION INTRAVENOUS at 13:03

## 2022-09-04 RX ADMIN — SODIUM CHLORIDE 1000 ML: 0.9 INJECTION, SOLUTION INTRAVENOUS at 05:12

## 2022-09-04 RX ADMIN — SODIUM CHLORIDE, SODIUM GLUCONATE, SODIUM ACETATE, POTASSIUM CHLORIDE, MAGNESIUM CHLORIDE, SODIUM PHOSPHATE, DIBASIC, AND POTASSIUM PHOSPHATE 250 ML/HR: .53; .5; .37; .037; .03; .012; .00082 INJECTION, SOLUTION INTRAVENOUS at 17:45

## 2022-09-04 RX ADMIN — HEPARIN SODIUM 5000 UNITS: 5000 INJECTION INTRAVENOUS; SUBCUTANEOUS at 07:42

## 2022-09-04 RX ADMIN — SODIUM BICARBONATE 50 MEQ: 84 INJECTION INTRAVENOUS at 07:41

## 2022-09-04 RX ADMIN — LIDOCAINE HYDROCHLORIDE 1 ML: 10 INJECTION, SOLUTION EPIDURAL; INFILTRATION; INTRACAUDAL; PERINEURAL at 12:30

## 2022-09-04 RX ADMIN — SODIUM CHLORIDE, SODIUM GLUCONATE, SODIUM ACETATE, POTASSIUM CHLORIDE, MAGNESIUM CHLORIDE, SODIUM PHOSPHATE, DIBASIC, AND POTASSIUM PHOSPHATE 500 ML/HR: .53; .5; .37; .037; .03; .012; .00082 INJECTION, SOLUTION INTRAVENOUS at 11:25

## 2022-09-04 RX ADMIN — MAGNESIUM SULFATE HEPTAHYDRATE 2 G: 40 INJECTION, SOLUTION INTRAVENOUS at 22:02

## 2022-09-04 RX ADMIN — HEPARIN SODIUM 5000 UNITS: 5000 INJECTION INTRAVENOUS; SUBCUTANEOUS at 14:05

## 2022-09-04 RX ADMIN — SODIUM CHLORIDE, SODIUM GLUCONATE, SODIUM ACETATE, POTASSIUM CHLORIDE, MAGNESIUM CHLORIDE, SODIUM PHOSPHATE, DIBASIC, AND POTASSIUM PHOSPHATE 1000 ML: .53; .5; .37; .037; .03; .012; .00082 INJECTION, SOLUTION INTRAVENOUS at 07:21

## 2022-09-04 RX ADMIN — POTASSIUM CHLORIDE 20 MEQ: 14.9 INJECTION, SOLUTION INTRAVENOUS at 23:36

## 2022-09-04 RX ADMIN — POTASSIUM CHLORIDE 20 MEQ: 14.9 INJECTION, SOLUTION INTRAVENOUS at 22:02

## 2022-09-04 RX ADMIN — DEXTROSE, SODIUM CHLORIDE, SODIUM LACTATE, POTASSIUM CHLORIDE, AND CALCIUM CHLORIDE 250 ML/HR: 5; .6; .31; .03; .02 INJECTION, SOLUTION INTRAVENOUS at 22:30

## 2022-09-04 RX ADMIN — SODIUM CHLORIDE, SODIUM GLUCONATE, SODIUM ACETATE, POTASSIUM CHLORIDE, MAGNESIUM CHLORIDE, SODIUM PHOSPHATE, DIBASIC, AND POTASSIUM PHOSPHATE 500 ML/HR: .53; .5; .37; .037; .03; .012; .00082 INJECTION, SOLUTION INTRAVENOUS at 09:03

## 2022-09-04 NOTE — PROCEDURES
Arterial Line Insertion    Date/Time: 9/4/2022 12:30 PM  Performed by: LEEANNA Brennan  Authorized by: LEEANNA Brennan     Patient location:  Bedside  Other Assisting Provider: No    Consent:     Consent obtained:  Written    Consent given by:  Patient    Risks discussed:  Bleeding, ischemia, repeat procedure, infection and pain  Universal protocol:     Procedure explained and questions answered to patient or proxy's satisfaction: yes      Site/side marked: yes      Immediately prior to procedure a time out was called: yes      Patient identity confirmed:  Arm band  Indications:     Indications: frequent labs / infusion    Pre-procedure details:     Skin preparation:  Chlorhexidine    Preparation: Patient was prepped and draped in sterile fashion    Anesthesia (see MAR for exact dosages): Anesthesia method:  Local infiltration    Local anesthetic:  Lidocaine 1% w/o epi  Procedure details:     Location / Tip of Catheter:  Radial    Laterality:  Right    Allan's test performed: yes      Allan's test abnormal: no      Needle gauge:  20 G    Placement technique:  Seldinger    Number of attempts:  1    Successful placement: yes      Transducer: waveform confirmed    Post-procedure details:     Post-procedure:  Sutured and sterile dressing applied    CMS:  Normal and unchanged    Patient tolerance of procedure:   Tolerated well, no immediate complications

## 2022-09-04 NOTE — H&P
[address] [facility]  H&P- [address] Marcio Reza  1955, 75 y o  male MRN: [id number]  Unit/Bed#:  Encounter: [id number]  Primary Care Provider: Maris Loza MD   Date and time admitted to hospital: 9/4/2022  4:43 AM    * DKA (diabetic ketoacidosis) [facility])  Assessment & Plan  Lab Results   Component Value Date    HGBA1C 14 08/08/2022       Recent Labs     09/04/22  0448 09/04/22  0829   POCGLU >500* >500*       Blood Sugar Average: Last 72 hrs:  · Patient has not been taking insulin for several months after losing weight  · Hgb A1C 14  · Glucose 800, anion gap >35, pH 6 858, base deficit 29 4, beta hydroxybutyrate 5 2  · Give insulin bolus followed by DKA insulin gtt  · Accucheck Q1h  · Fluids per DKA protocol  · Monitor BMP, mag, phos, VBG Q4h  · Replete electrolytes as needed  · NPO  · Consult endocrinology    Pancreatitis  Assessment & Plan  · Lipase elevated on arrival 1091  · CT revealed "Mild peripancreatic inflammatory stranding with associated  thickening of the left anterior pararenal fascia, correlate with the lipase levels for pancreatitis   No acute fluid collection seen "  · Patient denies abdominal pain now or PTA  · Abdominal exam benign, monitor   · Fluids per DKA protocol  · NPO  · Monitor off antibitoics  · Consult GI  · Check lipid panel  · Check RUQ ultrasound     Sepsis ([facility] [address] )  Assessment & Plan  · Met sepsis criteria in the ED as evidenced by tachycardia, tachypnea, and leukocytosis  · Unclear if true sepsis vs sirs criteria in the setting of DKA and multiple metabolic derangements  · Recent UA from 9/2 showed large leukocytes and occasional bacteria, culture pending  · Send repeat UA today  · Received rocephin in the ED  · Continue rocpehin Q24h  · Trend procalcitonin  · Follow up on BC and urine culture results  · Trend WBC/temperature curve    New onset atrial fibrillation ([address] )  Assessment & Plan  · Noted to be in atrial fibrillation with RVR on ED EKG  · Patient spontaneously converted to sinus tachycardia prior to arrival to ICU  · Likely 2/2 severe metabolic derangements  · Monitor telemetry    Hyponatremia  Assessment & Plan  · Corrected sodium 139  · Monitor BMP    CARLTON (acute kidney injury) ([address] )  Assessment & Plan  · CARLTON POA with initial creatinine of 2 28  · Patient previously followed with health system in [place], now resides here but do not have old records to determine baseline  · Insert lemons  · Monitor I&O  · Avoid nephrotoxins  · Monitor renal indices on Q4h BMP    Hypothermia  Assessment & Plan  · Patient's temperature 93 3 on arrival  · Unclear etiology  · Continue silvia hugger  · Insert temperature lemons  · Monitor temperature curve    Transaminitis  Assessment & Plan  · Alk phos elevated at 167 on arrival  · AST/ALT WNL  · Unclear etiology  · Abdominal exam benign  · Check RUQ ultrasound    Acute metabolic encephalopathy  Assessment & Plan  · Patient presents with altered mental status  · Currently patient is lethargic but easily arouses by voice  · Likely will improve with improvement in metabolic derangements  · Monitor CAM ICU  · Monitor neuro exams    Increased anion gap metabolic acidosis  Assessment & Plan  · Secondary to DKA +/- Lactic acidosis  · Monitor BMP Q4h  · DKA protocol as above    Lactic acidosis  Assessment & Plan  · Lactate 4 5 on arrival  · Trend until cleared  · Fluids per DKA protocol    Nocturnal enuresis  Assessment & Plan  · Patient had recent OP appointment with urology with compliant of nocturnal incontinence for about 8 months  · Also reports dribbling after voiding during the day  · Likely due to uncontrolled DM  · Lemons in place  · Monitor I&O    Type 2 diabetes mellitus [facility])  Assessment & Plan  Lab Results   Component Value Date    HGBA1C 14 08/08/2022       Recent Labs     09/04/22  0448 09/04/22  0829 09/04/22  0933 09/04/22  1107   POCGLU >500* >500* >500* >500*       Blood Sugar Average: Last 72 hrs:  · See plan under DKA  · Patient has been without insulin for several months after self discontinuing     -------------------------------------------------------------------------------------------------------------  Chief Complaint: altered mental status, nausea, vomiting, generalized weakness    History of Present Illness   HX and PE limited by: Altered mental status  Oracio Narayan  is a 75 y o  male with PMH DM2 who presents with altered mental status as well as nausea, vomiting and generalized weakness  Per patient's spouse she states she was at work until 6 pm last night and when she arrived home she found the patient to be altered with nausea and vomiting  Also reports generalized weakness recently as well as polyuria  Patient is a poor historian so unclear of exact period of time he was in this condition  Per spouse the patient had lost weight several months ago and subsequently had stopped taking his insulin as he felt he no longer needed it  On arrival patient's glucose was 800 on chemistry, anion gap >35, pH 6 858, base deficit of 29 4, beta hydroxybutyrate 5 2, creatinine 2 29, lactate 4 5 and lipase 1091  Patient and spouse recently moved from [place] to the local area about 7 months ago and have not yet established care in this area  Patient did recently see [facility] u[facility] for dysuria including incontinence and polyuria  Hemoglobin A1C drawn on 8/8/2022 was 14  Patient is being admitted to critical care for further evaluation and management  History obtained from spouse and chart review  -------------------------------------------------------------------------------------------------------------  Dispo: Admit to Critical Care     Code Status: Level 1 - Full Code  --------------------------------------------------------------------------------------------------------------  Review of Systems   Unable to perform ROS: Other (patient lethargic and poor historian)   Genitourinary: Positive for urgency   Negative for dysuria  Nocturnal incontinence       Review of systems was unable to be performed secondary to encephalopathy  Physical Exam  Vitals reviewed  Constitutional:       Appearance: He is overweight  He is ill-appearing  HENT:      Head: Normocephalic and atraumatic  Eyes:      Pupils: Pupils are equal, round, and reactive to light  Cardiovascular:      Rate and Rhythm: Regular rhythm  Tachycardia present  Pulses: Normal pulses  Heart sounds: Normal heart sounds  Pulmonary:      Effort: Tachypnea present  No respiratory distress  Breath sounds: Normal breath sounds  Abdominal:      General: Bowel sounds are decreased  There is no distension  Palpations: Abdomen is soft  Tenderness: There is no abdominal tenderness  There is no guarding or rebound  Musculoskeletal:      Cervical back: Normal range of motion  Right lower leg: No edema  Left lower leg: No edema  Neurological:      General: No focal deficit present  Mental Status: He is oriented to person, place, and time and easily aroused  He is lethargic  GCS: GCS eye subscore is 3  GCS verbal subscore is 5  GCS motor subscore is 6        --------------------------------------------------------------------------------------------------------------  Vitals:   Vitals:    09/04/22 0900 09/04/22 0930 09/04/22 0956 09/04/22 1500   BP: 119/67 117/67  (!) 158/43   BP Location:    Right arm   Pulse: (!) 116 (!) 120 98 91   Resp: 22  (!) 37 (!) 26   Temp: (!) 95 2 °F (35 1 °C) (!) 95 54 °F (35 3 °C) (!) 95 54 °F (35 3 °C) 98 4 °F (36 9 °C)   TempSrc:   Bladder Bladder   SpO2: 99% 99% 100% 99%   Weight:   97 9 kg (215 lb 13 3 oz)    Height:   6' (1 829 m)      Temp  Min: 93 3 °F (34 1 °C)  Max: 98 4 °F (36 9 °C)  IBW (Ideal Body Weight): 77 6 kg  Height: 6' (182 9 cm)  Body mass index is 29 27 kg/m²      Laboratory and Diagnostics:  Results from last 7 days   Lab Units 09/04/22  0502   WBC Thousand/uL 19 78* HEMOGLOBIN g/dL 15 3   HEMATOCRIT % 49 8*   PLATELETS Thousands/uL 372     Results from last 7 days   Lab Units 09/04/22  1231 09/04/22  1110 09/04/22  0950 09/04/22  0850 09/04/22  0502   SODIUM mmol/L 135  --  134* 127* 128*   POTASSIUM mmol/L 4 6  --  4 0 5 9* 5 6*   CHLORIDE mmol/L 94*  --  95* 89* 88*   CO2 mmol/L 9*  --  6* <5* 5*   ANION GAP mmol/L 32*  --  33*  --  35*   BUN mg/dL 39*  --  37* 41* 38*   CREATININE mg/dL 1 81*  --  1 75* 1 85* 2 29*   CALCIUM mg/dL 9 4  --  7 8* 8 8 9 4   GLUCOSE RANDOM mg/dL 504*  504* 187* 571*  626* 727* 800*   ALT U/L  --   --   --   --  24   AST U/L  --   --   --   --  20   ALK PHOS U/L  --   --   --   --  167*   ALBUMIN g/dL  --   --   --   --  3 4*   TOTAL BILIRUBIN mg/dL  --   --   --   --  0 39     Results from last 7 days   Lab Units 09/04/22  1231 09/04/22  0950 09/04/22  0850   MAGNESIUM mg/dL 2 3 2 1 2 4   PHOSPHORUS mg/dL 3 8 4 4* 6 4*               Results from last 7 days   Lab Units 09/04/22  1110 09/04/22  0850 09/04/22  0502   LACTIC ACID mmol/L 0 8 2 8* 4 5*     ABG:    VBG:  Results from last 7 days   Lab Units 09/04/22  1231   PH NAJMA  7 144*   PCO2 NAJMA mm Hg 26 9*   PO2 NAJMA mm Hg 38 6   HCO3 NAJMA mmol/L 9 0*   BASE EXC NAJMA mmol/L -18 3     Results from last 7 days   Lab Units 09/04/22  0604   PROCALCITONIN ng/ml 0 16       Micro:  Results from last 7 days   Lab Units 09/04/22  0604   BLOOD CULTURE  Received in Microbiology Lab  Culture in Progress  Received in Microbiology Lab  Culture in Progress  EKG: Atrial fibrillation with RVR, rate 120  Imaging: I have personally reviewed pertinent reports          Historical Information   Past Medical History:   Diagnosis Date    Diabetes mellitus ([facility] [address] )      Past Surgical History:   Procedure Laterality Date    KNEE CARTILAGE SURGERY       Social History   Social History     Substance and Sexual Activity   Alcohol Use Never     Social History     Substance and Sexual Activity   Drug Use Never Social History     Tobacco Use   Smoking Status Never Smoker   Smokeless Tobacco Never Used     Exercise History: Unknown  Family History:   Family History   Problem Relation Age of Onset    Diabetes Father     Diabetes Mother      I have reviewed this patient's family history and commented on sigificant items within the HPI      Medications:  Current Facility-Administered Medications   Medication Dose Route Frequency    [START ON 9/5/2022] cefTRIAXone (ROCEPHIN) IVPB (premix in dextrose) 1,000 mg 50 mL  1,000 mg Intravenous Q24H    dextrose 5 % in lactated Ringer's infusion  250 mL/hr Intravenous Continuous    heparin (porcine) subcutaneous injection 5,000 Units  5,000 Units Subcutaneous Q8H [address]    insulin regular (HumuLIN R,NovoLIN R) 1 Units/mL in sodium chloride 0 9 % 100 mL infusion  0 1-30 Units/hr Intravenous Continuous    multi-electrolyte (PLASMALYTE-A/ISOLYTE-S PH 7 4) IV solution  250 mL/hr Intravenous Continuous    pantoprazole (PROTONIX) injection 40 mg  40 mg Intravenous Q24H [address]    sodium chloride (PF) 0 9 % injection 3 mL  3 mL Intravenous Q1H PRN     Home medications:  Prior to Admission Medications   Prescriptions Last Dose Informant Patient Reported? Taking? Incontinence Supply Disposable (Incontinence Brief Large) MISC   No No   Sig: Use every 4 (four) hours as needed (Urinary incontinence)   Insulin Glargine (BASAGLAR KWIKPEN SC)   Yes No   Sig: Inject under the skin   clotrimazole-betamethasone (LOTRISONE) 1-0 05 % cream   No No   Sig: Apply topically 2 (two) times a day Apply twice a day for 5 days then as needed    Cleanse head of penis with water and pat try twice a day   insulin lispro (HumaLOG Felix KwikPen) 100 units/mL injection pen   Yes No   Sig: Inject under the skin 3 (three) times a day with meals   nystatin (MYCOSTATIN) powder   No No   Sig: Apply topically 3 (three) times a day Apply to testicles as needed for redness      Facility-Administered Medications: None Allergies:  No Known Allergies    ------------------------------------------------------------------------------------------------------------  Advance Directive and Living Will:      Power of :    POLST:    ------------------------------------------------------------------------------------------------------------  Anticipated Length of Stay is > 2 midnights    Care Time Delivered:   Upon my evaluation, this patient had a high probability of imminent or life-threatening deterioration due to DKA, pancreatitis, sepsis, CARLTON, and encephalopathy, which required my direct attention, intervention, and personal management  I have personally provided 31 minutes (0620 to 0830) of critical care time, exclusive of procedures, teaching, family meetings, and any prior time recorded by providers other than myself  LEEANNA Velazquez        Portions of the record may have been created with voice recognition software  Occasional wrong word or "sound a like" substitutions may have occurred due to the inherent limitations of voice recognition software    Read the chart carefully and recognize, using context, where substitutions have occurred

## 2022-09-04 NOTE — ASSESSMENT & PLAN NOTE
· Alk phos elevated at 167 on arrival  · AST/ALT WNL  · Unclear etiology  · Abdominal exam benign  · Check RUQ ultrasound

## 2022-09-04 NOTE — ED PROVIDER NOTES
History  Chief Complaint   Patient presents with    Altered Mental Status     Pt arrives via EMS altered, hx of diabetes, QU=[id number]     Oracio Kelley  is a 75 y o  male with a pertinent past medical history of diabetes mellitus for which he was prescribed insulin  Patient wife at bedside reports that the patient has not been taking insulin for the past few months  Beginning a few hours ago, the patient began to experience nausea, malaise at and severe thirst prompting the ED visit  He is alert and oriented x4  Blood glucose was found to be 507  Patient with no complaints of chest pain, abdominal pain  Mild shortness of breath  Patient with no numbness/tingling/weakness in the extremities  No further complaints at this time  Prior to Admission Medications   Prescriptions Last Dose Informant Patient Reported? Taking? Incontinence Supply Disposable (Incontinence Brief Large) MISC   No No   Sig: Use every 4 (four) hours as needed (Urinary incontinence)   Insulin Glargine (BASAGLAR KWIKPEN SC)   Yes No   Sig: Inject under the skin   clotrimazole-betamethasone (LOTRISONE) 1-0 05 % cream   No No   Sig: Apply topically 2 (two) times a day Apply twice a day for 5 days then as needed  Cleanse head of penis with water and pat try twice a day   insulin lispro (HumaLOG Felix KwikPen) 100 units/mL injection pen   Yes No   Sig: Inject under the skin 3 (three) times a day with meals   nystatin (MYCOSTATIN) powder   No No   Sig: Apply topically 3 (three) times a day Apply to testicles as needed for redness      Facility-Administered Medications: None       Past Medical History:   Diagnosis Date    Diabetes mellitus ([facility] [address] )        Past Surgical History:   Procedure Laterality Date    KNEE CARTILAGE SURGERY         Family History   Problem Relation Age of Onset    Diabetes Father     Diabetes Mother      I have reviewed and agree with the history as documented      E-Cigarette/Vaping     E-Cigarette/Vaping Substances     Social History     Tobacco Use    Smoking status: Never Smoker    Smokeless tobacco: Never Used   Substance Use Topics    Alcohol use: Never    Drug use: Never       Review of Systems    Physical Exam  Physical Exam    Vital Signs  ED Triage Vitals [09/04/22 0445]   Temp Pulse Respirations Blood Pressure SpO2   -- (!) 111 (!) 30 155/87 99 %      Temp src Heart Rate Source Patient Position - Orthostatic VS BP Location FiO2 (%)   -- Monitor Lying Right arm --      Pain Score       --           Vitals:    09/04/22 0445   BP: 155/87   Pulse: (!) 111   Patient Position - Orthostatic VS: Lying         Visual Acuity      ED Medications  Medications   sodium chloride (PF) 0 9 % injection 3 mL (has no administration in time range)   sodium chloride 0 9 % bolus 1,000 mL (has no administration in time range)       Diagnostic Studies  Results Reviewed     Procedure Component Value Units Date/Time    CBC [[id number]]     Lab Status: No result Specimen: Blood     Comprehensive metabolic panel [[id number]]     Lab Status: No result Specimen: Blood     Beta Hydroxybutyrate [[id number]]     Lab Status: No result Specimen: Blood     Blood gas, venous [[id number]]     Lab Status: No result Specimen: Blood     Lipase [[id number]]     Lab Status: No result Specimen: Blood     Lactic acid, plasma [[id number]]     Lab Status: No result Specimen: Blood     HS Troponin 0hr (reflex protocol) [[id number]]     Lab Status: No result Specimen: Blood     UA w Reflex to Microscopic w Reflex to Culture [[id number]]     Lab Status: No result Specimen: Urine, Clean Catch     Fingerstick Glucose (POCT) [[id number]]  (Abnormal) Collected: 09/04/22 0448    Lab Status: Final result Updated: 09/04/22 0449     POC Glucose >500 mg/dl                  No orders to display              Procedures  Procedures         ED Course  ED Course as of 09/04/22 0635   [facility] Sep 04, 2022   0533 Noted patient temperature to be 93 3 F, instructed nursing staff to place the patient with a bear hugger and change to warm saline  8572 TT to ICU doctor  They report that they will evaluate the patient  6741 Call to lab to check ETA of CMP results   0617 Lab reports that they are checking the status of the labwork and will call me back  5511 Lab reports that the labwork requires a "glucose dilution" and will result shortly  SBIRT 22yo+    Flowsheet Row Most Recent Value   SBIRT (25 yo +)    In order to provide better care to our patients, we are screening all of our patients for alcohol and drug use  Would it be okay to ask you these screening questions? Yes Filed at: 09/04/2022 0447   Initial Alcohol Screen: US AUDIT-C     1  How often do you have a drink containing alcohol? 0 Filed at: 09/04/2022 0447   2  How many drinks containing alcohol do you have on a typical day you are drinking? 0 Filed at: 09/04/2022 0447   3b  FEMALE Any Age, or MALE 65+: How often do you have 4 or more drinks on one occassion? 0 Filed at: 09/04/2022 0447   Audit-C Score 0 Filed at: 09/04/2022 3464   RANDOLPH: How many times in the past year have you    Used an illegal drug or used a prescription medication for non-medical reasons? Never Filed at: 09/04/2022 0447                    MDM  Number of Diagnoses or Management Options  Diagnosis management comments: 75year old male w pertinent PMHx of T2DM, recently evaluated by urology with UTI symptoms on 9/2/2022, not placed on any abx presenting today with malaise, SOB, nausea  In new onset atrial fibrillation  BG as per ems 507  pH here is 6 858, white count 19 78, lactic of 4 5  Temperature of 93 3F, tachycardic at 111 and respirations of 30  99% on RA with no respiratory distress  Awaiting potassium to start insulin drip  Patient with bear hugger and receiving warm fluids    Awaiting potassium to determine if patient should receive supplemental potassium with insulin   Discussion with ICU who will evaluate the patient  Disposition  Final diagnoses:   None     ED Disposition     None      Follow-up Information    None         Patient's Medications   Discharge Prescriptions    No medications on file       No discharge procedures on file      PDMP Review     None          ED Provider  Electronically Signed by Absolute 13 90 Thousands/µL      Immature Grans Absolute 0 09 Thousand/uL      Lymphocytes Absolute 1 28 Thousands/µL      Monocytes Absolute 1 10 Thousand/µL      Eosinophils Absolute 0 00 Thousand/µL      Basophils Absolute 0 01 Thousands/µL     Comprehensive metabolic panel [[id number]]  (Abnormal) Collected: 09/05/22 0438    Lab Status: Final result Specimen: Blood from Arm, Left Updated: 09/05/22 0551     Sodium 141 mmol/L      Potassium 3 8 mmol/L      Chloride 108 mmol/L      CO2 22 mmol/L      ANION GAP 11 mmol/L      BUN 28 mg/dL      Creatinine 1 19 mg/dL      Glucose 96 mg/dL      Calcium 8 7 mg/dL      Corrected Calcium 9 8 mg/dL      AST 20 U/L      ALT 16 U/L      Alkaline Phosphatase 109 U/L      Total Protein 6 9 g/dL      Albumin 2 6 g/dL      Total Bilirubin 0 19 mg/dL      eGFR 62 ml/min/1 73sq m     Narrative:      [place] guidelines for Chronic Kidney Disease (CKD):     Stage 1 with normal or high GFR (GFR > 90 mL/min/1 73 square meters)    Stage 2 Mild CKD (GFR = 60-89 mL/min/1 73 square meters)    Stage 3A Moderate CKD (GFR = 45-59 mL/min/1 73 square meters)    Stage 3B Moderate CKD (GFR = 30-44 mL/min/1 73 square meters)    Stage 4 Severe CKD (GFR = 15-29 mL/min/1 73 square meters)    Stage 5 End Stage CKD (GFR <15 mL/min/1 73 square meters)  Note: GFR calculation is accurate only with a steady state creatinine    Phosphorus [[id number]]  (Abnormal) Collected: 09/05/22 0438    Lab Status: Final result Specimen: Blood from Arm, Left Updated: 09/05/22 0551     Phosphorus 2 2 mg/dL     Magnesium [[id number]]  (Normal) Collected: 09/05/22 0438    Lab Status: Final result Specimen: Blood from Arm, Left Updated: 09/05/22 0551     Magnesium 2 3 mg/dL     Procalcitonin [[id number]]  (Abnormal) Collected: 09/05/22 0438    Lab Status: Final result Specimen: Blood from Arm, Left Updated: 09/05/22 0531     Procalcitonin 0 37 ng/ml     Calcium, ionized [[id number]]  (Normal) Collected: 09/05/22 0438    Lab Status: Final result Specimen: Blood from Arm, Left Updated: 09/05/22 0506     Calcium, Ionized 1 19 mmol/L     Blood gas, venous [[id number]]  (Abnormal) Collected: 09/04/22 1231    Lab Status: Final result Specimen: Blood from Arm, Left Updated: 09/04/22 1241     pH, Obie 7 144     pCO2, Obie 26 9 mm Hg      pO2, Obie 38 6 mm Hg      HCO3, Obie 9 0 mmol/L      Base Excess, Obie -18 3 mmol/L      O2 Content, Obie 16 5 ml/dL      O2 HGB, VENOUS 74 5 %     HS Troponin I 4hr [[id number]]  (Normal) Collected: 09/04/22 0850    Lab Status: Final result Specimen: Blood from Arm, Left Updated: 09/04/22 1034     hs TnI 4hr 12 ng/L      Delta 4hr hsTnI 0 ng/L     Glucose, random [[id number]]  (Abnormal) Collected: 09/04/22 0950    Lab Status: Final result Specimen: Blood from Arm, Left Updated: 09/04/22 1028     Glucose 626 mg/dL     Blood gas, venous [[id number]]  (Abnormal) Collected: 09/04/22 0950    Lab Status: Final result Specimen: Blood from Arm, Left Updated: 09/04/22 0956     pH, Obie 7 005     pCO2, Obie 23 0 mm Hg      pO2, Obie 39 2 mm Hg      HCO3, Obie 5 6 mmol/L      Base Excess, Obie -24 3 mmol/L      O2 Content, Obie 16 0 ml/dL      O2 HGB, VENOUS 71 0 %     Lactic acid 2 Hours [[id number]]  (Abnormal) Collected: 09/04/22 0850    Lab Status: Final result Specimen: Blood from Arm, Left Updated: 09/04/22 0951     LACTIC ACID 2 8 mmol/L     Narrative:      Result may be elevated if tourniquet was used during collection      Basic metabolic panel [[id number]]  (Abnormal) Collected: 09/04/22 0850    Lab Status: Final result Specimen: Blood from Arm, Left Updated: 09/04/22 0932     Sodium 127 mmol/L      Potassium 5 9 mmol/L      Chloride 89 mmol/L      CO2 <5 mmol/L      ANION GAP --     BUN 41 mg/dL      Creatinine 1 85 mg/dL      Glucose 727 mg/dL      Calcium 8 8 mg/dL      eGFR 36 ml/min/1 73sq m     Narrative:      [place] guidelines for Chronic Kidney Disease (CKD):   Stage 1 with normal or high GFR (GFR > 90 mL/min/1 73 square meters)    Stage 2 Mild CKD (GFR = 60-89 mL/min/1 73 square meters)    Stage 3A Moderate CKD (GFR = 45-59 mL/min/1 73 square meters)    Stage 3B Moderate CKD (GFR = 30-44 mL/min/1 73 square meters)    Stage 4 Severe CKD (GFR = 15-29 mL/min/1 73 square meters)    Stage 5 End Stage CKD (GFR <15 mL/min/1 73 square meters)  Note: GFR calculation is accurate only with a steady state creatinine    Magnesium [[id number]]  (Normal) Collected: 09/04/22 0850    Lab Status: Final result Specimen: Blood from Arm, Left Updated: 09/04/22 0927     Magnesium 2 4 mg/dL     Phosphorus [[id number]]  (Abnormal) Collected: 09/04/22 0850    Lab Status: Final result Specimen: Blood from Arm, Left Updated: 09/04/22 0927     Phosphorus 6 4 mg/dL     Urine Microscopic [[id number]]  (Abnormal) Collected: 09/04/22 0715    Lab Status: Final result Specimen: Urine, Other Updated: 09/04/22 0846     RBC, UA 2-4 /hpf      WBC, UA 4-10 /hpf      Epithelial Cells Occasional /hpf      Bacteria, UA Occasional /hpf     Fingerstick Glucose (POCT) [[id number]]  (Abnormal) Collected: 09/04/22 0829    Lab Status: Final result Updated: 09/04/22 0830     POC Glucose >500 mg/dl     UA w Reflex to Microscopic w Reflex to Culture [[id number]]  (Abnormal) Collected: 09/04/22 0715    Lab Status: Final result Specimen: Urine, Other Updated: 09/04/22 0818     Color, UA Yellow     Clarity, UA Clear     Specific [place], UA 1 020     pH, UA 5 5     Leukocytes, UA Trace     Nitrite, UA Negative     Protein, UA 30 (1+) mg/dl      Glucose, UA >=1000 (1%) mg/dl      Ketones, UA >=80 (3+) mg/dl      Urobilinogen, UA 0 2 E U /dl      Bilirubin, UA Negative     Occult Blood, UA Moderate    HS Troponin I 2hr [[id number]]  (Normal) Collected: 09/04/22 0715    Lab Status: Final result Specimen: Blood from Arm, Left Updated: 09/04/22 0802     hs TnI 2hr 11 ng/L      Delta 2hr hsTnI -1 ng/L     Beta Hydroxybutyrate [[id number]]  (Abnormal) Collected: 09/04/22 0502    Lab Status: Final result Specimen: Blood from Arm, Left Updated: 09/04/22 0705     BETA-HYDROXYBUTYRATE 5 2 mmol/L     Procalcitonin [[id number]]  (Normal) Collected: 09/04/22 0604    Lab Status: Final result Specimen: Blood from Arm, Right Updated: 09/04/22 0704     Procalcitonin 0 16 ng/ml     Comprehensive metabolic panel [[id number]]  (Abnormal) Collected: 09/04/22 0502    Lab Status: Final result Specimen: Blood from Arm, Left Updated: 09/04/22 0655     Sodium 128 mmol/L      Potassium 5 6 mmol/L      Chloride 88 mmol/L      CO2 5 mmol/L      ANION GAP 35 mmol/L      BUN 38 mg/dL      Creatinine 2 29 mg/dL      Glucose 800 mg/dL      Calcium 9 4 mg/dL      Corrected Calcium 9 9 mg/dL      AST 20 U/L      ALT 24 U/L      Alkaline Phosphatase 167 U/L      Total Protein 9 1 g/dL      Albumin 3 4 g/dL      Total Bilirubin 0 39 mg/dL      eGFR 28 ml/min/1 73sq m     Narrative:      [employer] guidelines for Chronic Kidney Disease (CKD):     Stage 1 with normal or high GFR (GFR > 90 mL/min/1 73 square meters)    Stage 2 Mild CKD (GFR = 60-89 mL/min/1 73 square meters)    Stage 3A Moderate CKD (GFR = 45-59 mL/min/1 73 square meters)    Stage 3B Moderate CKD (GFR = 30-44 mL/min/1 73 square meters)    Stage 4 Severe CKD (GFR = 15-29 mL/min/1 73 square meters)    Stage 5 End Stage CKD (GFR <15 mL/min/1 73 square meters)  Note: GFR calculation is accurate only with a steady state creatinine    Lipase [[id number]]  (Abnormal) Collected: 09/04/22 0502    Lab Status: Final result Specimen: Blood from Arm, Left Updated: 09/04/22 0647     Lipase 1,091 u/L     Lactic acid, plasma [[id number]]  (Abnormal) Collected: 09/04/22 0502    Lab Status: Final result Specimen: Blood from Arm, Left Updated: 09/04/22 0559     LACTIC ACID 4 5 mmol/L     Narrative:      Result may be elevated if tourniquet was used during collection      HS Troponin 0hr (reflex protocol) [[id number]]  (Normal) Collected: 09/04/22 0502    Lab Status: Final result Specimen: Blood from Arm, Left Updated: 09/04/22 0543     hs TnI 0hr 12 ng/L     CBC [[id number]]  (Abnormal) Collected: 09/04/22 0502    Lab Status: Final result Specimen: Blood from Arm, Left Updated: 09/04/22 0521     WBC 19 78 Thousand/uL      RBC 5 23 Million/uL      Hemoglobin 15 3 g/dL      Hematocrit 49 8 %      MCV 95 fL      MCH 29 3 pg      MCHC 30 7 g/dL      RDW 12 8 %      Platelets 312 Thousands/uL      MPV 11 6 fL     Blood gas, venous [[id number]]  (Abnormal) Collected: 09/04/22 0502    Lab Status: Final result Specimen: Blood from Arm, Left Updated: 09/04/22 0514     pH, Obie 6 858     pCO2, Obie 19 9 mm Hg      pO2, Obie 81 9 mm Hg      HCO3, Obie 3 5 mmol/L      Base Excess, Obie -29 4 mmol/L      O2 Content, Obie 20 8 ml/dL      O2 HGB, VENOUS 90 5 %     Fingerstick Glucose (POCT) [[id number]]  (Abnormal) Collected: 09/04/22 0448    Lab Status: Final result Updated: 09/04/22 0449     POC Glucose >500 mg/dl                  US kidney and bladder   Final Result by Funmi Zavaleta MD (09/06 1118)      1  Status post bladder decompression with improved bilateral hydronephrosis  2   Stable right upper pole calculus  Workstation performed: [id number]         US right upper quadrant   Final Result by Rachelle Bingham MD (09/04 1448)      1  Findings of reported acute pancreatitis are not well appreciated on the current study  2   Nonobstructing right renal calculus           Workstation performed: [id number]         CT abdomen pelvis wo contrast   Final Result by Wanda Freed MD (09/04 8188)      Mild peripancreatic inflammatory stranding with associated  thickening of the left anterior pararenal fascia, correlate with the lipase levels for pancreatitis         No acute fluid collection seen      Markedly distended stomach, correlate clinically , may be due to gastroparesis      Periampullary diverticulum along with a diverticulum in relation to the 3rd part of the duodenum      Bilateral mild to moderate hydronephrosis and hydroureter with dilated ureter extending to the level of the distended urinary bladder , correlate to exclude urinary retention/ bladder outlet obstruction      No biliary dilation seen      Nonobstructing the mid right renal calculus measuring 8 mm   A periumbilical hernia containing fat   The study was marked in EPIC for immediate notification  Workstation performed: [id number]                    Procedures  Procedures         ED Course  ED Course as of 10/01/22 0709   [facility] Sep 04, 2022   9790 Noted patient temperature to be 93 3 F, instructed nursing staff to place the patient with a bear hugger and change to warm saline  3123 TT to ICU doctor  They report that they will evaluate the patient  2934 Call to lab to check ETA of CMP results   0617 Lab reports that they are checking the status of the labwork and will call me back  2854 Lab reports that the labwork requires a "glucose dilution" and will result shortly  1590 Call to lab again to obtain potassium  They report that it will be another 15-20 minutes for results of potassium/CMP     7710 Discussed with ICU who will accept the patient for admission  Initial Sepsis Screening     Row Name 10/01/22 5034                Is the patient's history suggestive of a new or worsening infection? No  Elevated lactate was due to DKA  Sirs criteria likely from DKA    -DC        Suspected source of infection --        Are two or more of the following signs & symptoms of infection both present and new to the patient? --        Indicate SIRS criteria --        If the answer is yes to both questions, suspicion of sepsis is present --        If severe sepsis is present AND tissue hypoperfusion perists in the hour after fluid resuscitation or lactate > 4, the patient meets criteria for SEPTIC SHOCK -- Are any of the following organ dysfunction criteria present within 6 hours of suspected infection and SIRS criteria that are NOT considered to be chronic conditions? --        Organ dysfunction --        Date of presentation of severe sepsis --        Time of presentation of severe sepsis --        Tissue hypoperfusion persists in the hour after crystalloid fluid administration, evidenced, by either: --        Was hypotension present within one hour of the conclusion of crystalloid fluid administration? --        Date of presentation of septic shock --        Time of presentation of septic shock --              User Key  (r) = Recorded By, (t) = Taken By, (c) = Cosigned By    [address] Name Provider Via Mavis Randle PA-C Physician Assistant                SBIRT 22yo+    Sylvester Winn Most Recent Value   SBIRT (25 yo +)    In order to provide better care to our patients, we are screening all of our patients for alcohol and drug use  Would it be okay to ask you these screening questions? Yes Filed at: 09/04/2022 0447   Initial Alcohol Screen: US AUDIT-C     1  How often do you have a drink containing alcohol? 0 Filed at: 09/04/2022 0447   2  How many drinks containing alcohol do you have on a typical day you are drinking? 0 Filed at: 09/04/2022 0447   3b  FEMALE Any Age, or MALE 65+: How often do you have 4 or more drinks on one occassion? 0 Filed at: 09/04/2022 0447   Audit-C Score 0 Filed at: 09/04/2022 7984   RANDOLPH: How many times in the past year have you    Used an illegal drug or used a prescription medication for non-medical reasons?  Never Filed at: 09/04/2022 0447                    MDM  Number of Diagnoses or Management Options  Altered mental status, unspecified altered mental status type  DKA (diabetic ketoacidosis) ([facility] [address] )  Type 2 diabetes mellitus ([facility] [address] )  Diagnosis management comments: 75year old male w pertinent PMHx of T2DM, recently evaluated by urology with UTI symptoms on 9/2/2022, not placed on any abx presenting today with malaise, SOB, nausea  In new onset atrial fibrillation  BG as per ems 507  pH here is 6 858, white count 19 78, lactic of 4 5  Temperature of 93 3F, tachycardic at 111 and respirations of 30  99% on RA with no respiratory distress  Awaiting potassium to start insulin drip  Patient with bear hugger and receiving warm fluids    Awaiting potassium to determine if patient should receive supplemental potassium with insulin  Discussion with ICU who will evaluate the patient  Patient's SIRS criteria/lactate is secondary to DKA  Sepsis at time of evaluation was not present  Adequate fluid resuscitation was given for DKA          Amount and/or Complexity of Data Reviewed  Clinical lab tests: ordered and reviewed  Tests in the radiology section of CPT®: ordered and reviewed  Tests in the medicine section of CPT®: ordered and reviewed  Review and summarize past medical records: yes  Discuss the patient with other providers: yes  Independent visualization of images, tracings, or specimens: yes    Risk of Complications, Morbidity, and/or Mortality  Presenting problems: high  Diagnostic procedures: moderate  Management options: moderate    Patient Progress  Patient progress: stable      Disposition  Final diagnoses:   DKA (diabetic ketoacidosis) ([place] [address] )   Type 2 diabetes mellitus ([place] [address] )   Altered mental status, unspecified altered mental status type     Time reflects when diagnosis was documented in both MDM as applicable and the Disposition within this note     Time User Action Codes Description Comment    9/4/2022  6:54 AM Steve Sarah Add [E11 10] DKA (diabetic ketoacidosis) ([place] [address] )     9/4/2022  6:54 AM Steve Sarah Add [E11 9] Type 2 diabetes mellitus ([place] [address] )     9/4/2022  6:54 AM Steve Bejou Add [R41 82] Altered mental status, unspecified altered mental status type     9/4/2022  9:49 AM Luca Pile Add [K85 90] Pancreatitis     9/4/2022  9:55 AM Luca Andujar Add [N20 9] Urinary calculus     9/7/2022  7:11 AM Lou Naranjo Add [A41 9] Sepsis [facility])       ED Disposition     ED Disposition   Admit    Condition   Stable    Date/Time   Sun Sep 4, 2022  6:54 AM    Comment   Case was discussed with Abimael Eduardo and the patient's admission status was agreed to be Admission Status: inpatient status to the service of Dr Ady Navarro MD Documentation    [address] Name, [address] by (Company and Unit #) [address] Name, [address] (Company and Unit #) [address] [facility]      Follow-up Information    None         Discharge Medication List as of 9/10/2022  1:13 PM      START taking these medications    Details   sulfamethoxazole-trimethoprim (BACTRIM DS) 800-160 mg per tablet Take 1 tablet by mouth every 12 (twelve) hours for 5 days, Starting Tue 9/6/2022, Until Sun 9/11/2022, Normal         CONTINUE these medications which have NOT CHANGED    Details   Incontinence Supply Disposable (Incontinence Brief Large) MISC Use every 4 (four) hours as needed (Urinary incontinence), Starting Fri 9/2/2022, Normal      clotrimazole-betamethasone (LOTRISONE) 1-0 05 % cream Apply topically 2 (two) times a day Apply twice a day for 5 days then as needed  Cleanse head of penis with water and pat try twice a day, Starting Fri 9/2/2022, Normal      Insulin Glargine (BASAGLAR KWIKPEN SC) Inject under the skin, Historical Med      insulin lispro (HumaLOG Felix KwikPen) 100 units/mL injection pen Inject under the skin 3 (three) times a day with meals, Historical Med      nystatin (MYCOSTATIN) powder Apply topically 3 (three) times a day Apply to testicles as needed for redness, Starting Fri 9/2/2022, Normal             No discharge procedures on file      PDMP Review       Value Time User    PDMP Reviewed  Yes 9/19/2022 11:43 AM Ramon Alvares PA-C          ED Provider  Electronically Signed by           Burgess Taqueria PA-C  10/01/22 502 Keke Reza PA-C  10/01/22 6300

## 2022-09-04 NOTE — ASSESSMENT & PLAN NOTE
· Noted to be in atrial fibrillation with RVR on ED EKG  · Patient spontaneously converted to sinus tachycardia prior to arrival to ICU  · Likely 2/2 severe metabolic derangements  · Monitor telemetry

## 2022-09-04 NOTE — ASSESSMENT & PLAN NOTE
· Patient had recent OP appointment with urology with compliant of nocturnal incontinence for about 8 months  · Also reports dribbling after voiding during the day  · Likely due to uncontrolled DM  · Lane in place  · Monitor I&O

## 2022-09-04 NOTE — ASSESSMENT & PLAN NOTE
· Patient presents with altered mental status  · Currently patient is lethargic but easily arouses by voice  · Likely will improve with improvement in metabolic derangements  · Monitor CAM ICU  · Monitor neuro exams

## 2022-09-04 NOTE — ASSESSMENT & PLAN NOTE
· Met sepsis criteria in the ED as evidenced by tachycardia, tachypnea, and leukocytosis  · Unclear if true sepsis vs sirs criteria in the setting of DKA and multiple metabolic derangements  · Recent UA from 9/2 showed large leukocytes and occasional bacteria, culture pending  · Send repeat UA today  · Received rocephin in the ED  · Continue rocpehin Q24h  · Trend procalcitonin  · Follow up on BC and urine culture results  · Trend WBC/temperature curve

## 2022-09-04 NOTE — ASSESSMENT & PLAN NOTE
Lab Results   Component Value Date    HGBA1C 14 08/08/2022       Recent Labs     09/04/22  0448 09/04/22  0829   POCGLU >500* >500*       Blood Sugar Average: Last 72 hrs:  · Patient has not been taking insulin for several months after losing weight  · Hgb A1C 14  · Glucose 800, anion gap >35, pH 6 858, base deficit 29 4, beta hydroxybutyrate 5 2  · Give insulin bolus followed by DKA insulin gtt  · Accucheck Q1h  · Fluids per DKA protocol  · Monitor BMP, mag, phos, VBG Q4h  · Replete electrolytes as needed  · NPO  · Consult endocrinology

## 2022-09-04 NOTE — PLAN OF CARE
Problem: Prexisting or High Potential for Compromised Skin Integrity  Goal: Skin integrity is maintained or improved  Description: INTERVENTIONS:  - Identify patients at risk for skin breakdown  - Assess and monitor skin integrity  - Assess and monitor nutrition and hydration status  - Monitor labs   - Assess for incontinence   - Turn and reposition patient  - Assist with mobility/ambulation  - Relieve pressure over bony prominences  - Avoid friction and shearing  - Provide appropriate hygiene as needed including keeping skin clean and dry  - Evaluate need for skin moisturizer/barrier cream  - Collaborate with interdisciplinary team   - Patient teaching  Outcome: Progressing

## 2022-09-04 NOTE — ASSESSMENT & PLAN NOTE
· Patient's temperature 93 3 on arrival  · Unclear etiology  · Continue silvia hugger  · Insert temperature lemons  · Monitor temperature curve

## 2022-09-04 NOTE — ASSESSMENT & PLAN NOTE
Lab Results   Component Value Date    HGBA1C 14 08/08/2022       Recent Labs     09/04/22  0448 09/04/22  0829 09/04/22  0933 09/04/22  1107   POCGLU >500* >500* >500* >500*       Blood Sugar Average: Last 72 hrs:  · See plan under DKA  · Patient has been without insulin for several months after self discontinuing

## 2022-09-04 NOTE — CONSULTS
Consultation - [facility]  Javed Adamlaura  75 y o  male MRN: [id number]  Unit/Bed#:  Encounter: [id number]        Consults    Reason for Consult / Principal Problem: Pancreatitis, Gastric Distention    HPI: Colletta Byes is a 76 yo M with a PMH of DM2, presenting due to AMS, nausea and vomiting  His wife came home from work and found him in this state  He apparently stopped taking his insulin several months ago  Currently he is drowsy but oriented  He denies any current abdominal pain and denies abdominal pain prior to admission  He denies alcohol use or prior history of pancreatitis  He denies any hematemesis, melena, or hematochezia recently to his knowledge  Denies NSAID use or peptic symptoms prior to admission      REVIEW OF SYSTEMS: Negative except for as stated above    Historical Information   Past Medical History:   Diagnosis Date    [facility]      Past Surgical History:   Procedure Laterality Date    KNEE CARTILAGE SURGERY       Social History   Social History     Substance and Sexual Activity   Alcohol Use Never     Social History     Substance and Sexual Activity   Drug Use Never     Social History     Tobacco Use   Smoking Status Never Smoker   Smokeless Tobacco Never Used     Family History   Problem Relation Age of Onset    Diabetes Father     Diabetes Mother        Meds/Allergies     Medications Prior to Admission   Medication    clotrimazole-betamethasone (LOTRISONE) 1-0 05 % cream    Incontinence Supply Disposable (Incontinence Brief Large) MISC    Insulin Glargine (BASAGLAR KWIKPEN SC)    insulin lispro (HumaLOG Felix KwikPen) 100 units/mL injection pen    nystatin (MYCOSTATIN) powder     Current Facility-Administered Medications   Medication Dose Route Frequency    [START ON 9/5/2022] cefTRIAXone (ROCEPHIN) IVPB (premix in dextrose) 1,000 mg 50 mL  1,000 mg Intravenous Q24H    dextrose 5 % in lactated Ringer's infusion  250 mL/hr Intravenous Continuous    heparin (porcine) subcutaneous injection 5,000 Units  5,000 Units Subcutaneous Q8H [facility]    insulin regular (HumuLIN R,NovoLIN R) 1 Units/mL in sodium chloride 0 9 % 100 mL infusion  0 1-30 Units/hr Intravenous Continuous    multi-electrolyte (PLASMALYTE-A/ISOLYTE-S PH 7 4) IV solution  500 mL/hr Intravenous Continuous    Followed by   Kelvin Harrison multi-electrolyte (PLASMALYTE-A/ISOLYTE-S PH 7 4) IV solution  250 mL/hr Intravenous Continuous    pantoprazole (PROTONIX) injection 40 mg  40 mg Intravenous Q24H ILYA    sodium chloride (PF) 0 9 % injection 3 mL  3 mL Intravenous Q1H PRN       No Known Allergies        Objective     Blood pressure 117/67, pulse 98, temperature (!) 95 54 °F (35 3 °C), temperature source Bladder, resp  rate (!) 37, height 6' (1 829 m), weight 97 9 kg (215 lb 13 3 oz), SpO2 100 %  Intake/Output Summary (Last 24 hours) at 9/4/2022 1130  Last data filed at 9/4/2022 1000  Gross per 24 hour   Intake 2050 ml   Output 3550 ml   Net -1500 ml         PHYSICAL EXAM:      General Appearance:   Alert, oriented x 3   HEENT:   Normocephalic, atraumatic, anicteric      Neck:  Supple, symmetrical, trachea midline   Lungs:   Clear to auscultation bilaterally; no rales, rhonchi or wheezing; respirations unlabored    Heart[de-identified]   S1 and S2 normal; regular rate and rhythm; no murmur, rub, or gallop     Abdomen:   Soft, non-tender, non-distended; normal bowel sounds; no masses, no organomegaly    Rectal:   Deferred    Extremities:  No cyanosis, clubbing or edema    Pulses:  2+ and symmetric all extremities    Skin:  Skin color, texture, turgor normal, no rashes or lesions      Lab Results:   Results from last 7 days   Lab Units 09/04/22  0502   WBC Thousand/uL 19 78*   HEMOGLOBIN g/dL 15 3   HEMATOCRIT % 49 8*   PLATELETS Thousands/uL 372     Results from last 7 days   Lab Units 09/04/22  0950 09/04/22  0850 09/04/22  0502   POTASSIUM mmol/L 4 0   < > 5 6*   CHLORIDE mmol/L 95*   < > 88*   CO2 mmol/L 6* < > 5*   BUN mg/dL 37*   < > 38*   CREATININE mg/dL 1 75*   < > 2 29*   CALCIUM mg/dL 7 8*   < > 9 4   ALK PHOS U/L  --   --  167*   ALT U/L  --   --  24   AST U/L  --   --  20    < > = values in this interval not displayed  Results from last 7 days   Lab Units 09/04/22  0502   LIPASE u/L 1,091*       Imaging Studies: I have personally reviewed pertinent imaging studies  CT abdomen pelvis wo contrast  Result Date: 9/4/2022  Impression: Mild peripancreatic inflammatory stranding with associated  thickening of the left anterior pararenal fascia, correlate with the lipase levels for pancreatitis No acute fluid collection seen Markedly distended stomach, correlate clinically , may be due to gastroparesis Periampullary diverticulum along with a diverticulum in relation to the 3rd part of the duodenum Bilateral mild to moderate hydronephrosis and hydroureter with dilated ureter extending to the level of the distended urinary bladder , correlate to exclude urinary retention/ bladder outlet obstruction No biliary dilation seen Nonobstructing the mid right renal calculus measuring 8 mm A periumbilical hernia containing fat The study was marked in EPIC for immediate notification   Workstation performed: [id number]       ASSESSMENT and PLAN:      Elevated Lipase  Peripancreatic Stranding  - He presented with AMS and N/V, found to be in DKA and with CT findings of marked stomach distention, mild peripancreatic stranding, hydronephrosis with R renal calculus  - Lipase elevated to 1091 with mild peripancreatic stranding on admission could qualify for pancreatitis criteria but abdominal exam is completely benign so other etiologies of lipase elevation and peripancreatic stranding includes duodenal ulceration  - TG normal, pt denies alcohol use; agree with RUQ US to evaluate for biliary abnormalities  - Recommend starting PPI IV daily  - Serial abdominal exams  - IVF preferably with LR at 250 ml/hr, treatment of DKA per ICU  - NPO  - DVT prophylaxis and incentive spirometry    Gastric Distention  - CT on admission showed markedly distended stomach which could be related to diabetic gastroparesis v possible duodenal ulcer  - Agree with NG tube for now  - Consideration for EGD at some point when is more medically stable if concern remains for duodenal ulcer or gastric outlet obstruction pending course      The patient will be seen by Dr Chon Cano

## 2022-09-04 NOTE — ASSESSMENT & PLAN NOTE
· CARLTON POA with initial creatinine of 2 28  · Patient previously followed with health system in [place], now resides here but do not have old records to determine baseline  · Insert lemons  · Monitor I&O  · Avoid nephrotoxins  · Monitor renal indices on Q4h BMP

## 2022-09-04 NOTE — ASSESSMENT & PLAN NOTE
· Lipase elevated on arrival 1091  · CT revealed "Mild peripancreatic inflammatory stranding with associated  thickening of the left anterior pararenal fascia, correlate with the lipase levels for pancreatitis   No acute fluid collection seen "  · Patient denies abdominal pain now or PTA  · Abdominal exam benign, monitor   · Fluids per DKA protocol  · NPO  · Monitor off antibitoics  · Consult GI  · Check lipid panel  · Check RUQ ultrasound

## 2022-09-05 PROBLEM — E87.20 LACTIC ACIDOSIS: Status: RESOLVED | Noted: 2022-09-04 | Resolved: 2022-09-05

## 2022-09-05 PROBLEM — E87.2 INCREASED ANION GAP METABOLIC ACIDOSIS: Status: RESOLVED | Noted: 2022-09-04 | Resolved: 2022-09-05

## 2022-09-05 PROBLEM — R74.01 TRANSAMINITIS: Status: RESOLVED | Noted: 2022-09-04 | Resolved: 2022-09-05

## 2022-09-05 PROBLEM — E87.2 LACTIC ACIDOSIS: Status: RESOLVED | Noted: 2022-09-04 | Resolved: 2022-09-05

## 2022-09-05 PROBLEM — E87.1 HYPONATREMIA: Status: RESOLVED | Noted: 2022-09-04 | Resolved: 2022-09-05

## 2022-09-05 PROBLEM — E87.29 INCREASED ANION GAP METABOLIC ACIDOSIS: Status: RESOLVED | Noted: 2022-09-04 | Resolved: 2022-09-05

## 2022-09-05 PROBLEM — T68.XXXA HYPOTHERMIA: Status: RESOLVED | Noted: 2022-09-04 | Resolved: 2022-09-05

## 2022-09-05 LAB
ALBUMIN SERPL BCP-MCNC: 2.6 G/DL (ref 3.5–5)
ALP SERPL-CCNC: 109 U/L (ref 46–116)
ALT SERPL W P-5'-P-CCNC: 16 U/L (ref 12–78)
ANION GAP SERPL CALCULATED.3IONS-SCNC: 11 MMOL/L (ref 4–13)
ANION GAP SERPL CALCULATED.3IONS-SCNC: 11 MMOL/L (ref 4–13)
ANION GAP SERPL CALCULATED.3IONS-SCNC: 15 MMOL/L (ref 4–13)
ARTERIAL PATENCY WRIST A: NO
AST SERPL W P-5'-P-CCNC: 20 U/L (ref 5–45)
BACTERIA UR CULT: ABNORMAL
BASE EX.OXY STD BLDV CALC-SCNC: 84 % (ref 60–80)
BASE EX.OXY STD BLDV CALC-SCNC: 85.9 % (ref 60–80)
BASE EX.OXY STD BLDV CALC-SCNC: 87.2 % (ref 60–80)
BASE EXCESS BLDV CALC-SCNC: -18.2 MMOL/L
BASE EXCESS BLDV CALC-SCNC: -3.3 MMOL/L
BASE EXCESS BLDV CALC-SCNC: -4 MMOL/L
BASOPHILS # BLD AUTO: 0.01 THOUSANDS/ΜL (ref 0–0.1)
BASOPHILS NFR BLD AUTO: 0 % (ref 0–1)
BILIRUB SERPL-MCNC: 0.19 MG/DL (ref 0.2–1)
BODY TEMPERATURE: 98.1 DEGREES FEHRENHEIT
BUN SERPL-MCNC: 25 MG/DL (ref 5–25)
BUN SERPL-MCNC: 28 MG/DL (ref 5–25)
BUN SERPL-MCNC: 30 MG/DL (ref 5–25)
CA-I BLD-SCNC: 1.19 MMOL/L (ref 1.12–1.32)
CALCIUM ALBUM COR SERPL-MCNC: 9.8 MG/DL (ref 8.3–10.1)
CALCIUM SERPL-MCNC: 8.6 MG/DL (ref 8.3–10.1)
CALCIUM SERPL-MCNC: 8.7 MG/DL (ref 8.3–10.1)
CALCIUM SERPL-MCNC: 9.2 MG/DL (ref 8.3–10.1)
CHLORIDE SERPL-SCNC: 108 MMOL/L (ref 96–108)
CHLORIDE SERPL-SCNC: 108 MMOL/L (ref 96–108)
CHLORIDE SERPL-SCNC: 109 MMOL/L (ref 96–108)
CO2 SERPL-SCNC: 20 MMOL/L (ref 21–32)
CO2 SERPL-SCNC: 21 MMOL/L (ref 21–32)
CO2 SERPL-SCNC: 22 MMOL/L (ref 21–32)
CREAT SERPL-MCNC: 1.04 MG/DL (ref 0.6–1.3)
CREAT SERPL-MCNC: 1.19 MG/DL (ref 0.6–1.3)
CREAT SERPL-MCNC: 1.31 MG/DL (ref 0.6–1.3)
EOSINOPHIL # BLD AUTO: 0 THOUSAND/ΜL (ref 0–0.61)
EOSINOPHIL NFR BLD AUTO: 0 % (ref 0–6)
ERYTHROCYTE [DISTWIDTH] IN BLOOD BY AUTOMATED COUNT: 12.9 % (ref 11.6–15.1)
EST. AVERAGE GLUCOSE BLD GHB EST-MCNC: >355 MG/DL
GFR SERPL CREATININE-BSD FRML MDRD: 55 ML/MIN/1.73SQ M
GFR SERPL CREATININE-BSD FRML MDRD: 62 ML/MIN/1.73SQ M
GFR SERPL CREATININE-BSD FRML MDRD: 73 ML/MIN/1.73SQ M
GLUCOSE SERPL-MCNC: 107 MG/DL (ref 65–140)
GLUCOSE SERPL-MCNC: 108 MG/DL (ref 65–140)
GLUCOSE SERPL-MCNC: 109 MG/DL (ref 65–140)
GLUCOSE SERPL-MCNC: 110 MG/DL (ref 65–140)
GLUCOSE SERPL-MCNC: 111 MG/DL (ref 65–140)
GLUCOSE SERPL-MCNC: 117 MG/DL (ref 65–140)
GLUCOSE SERPL-MCNC: 121 MG/DL (ref 65–140)
GLUCOSE SERPL-MCNC: 122 MG/DL (ref 65–140)
GLUCOSE SERPL-MCNC: 130 MG/DL (ref 65–140)
GLUCOSE SERPL-MCNC: 139 MG/DL (ref 65–140)
GLUCOSE SERPL-MCNC: 146 MG/DL (ref 65–140)
GLUCOSE SERPL-MCNC: 147 MG/DL (ref 65–140)
GLUCOSE SERPL-MCNC: 169 MG/DL (ref 65–140)
GLUCOSE SERPL-MCNC: 93 MG/DL (ref 65–140)
GLUCOSE SERPL-MCNC: 96 MG/DL (ref 65–140)
GLUCOSE SERPL-MCNC: 99 MG/DL (ref 65–140)
HBA1C MFR BLD: >14 %
HCO3 BLDV-SCNC: 13.5 MMOL/L (ref 24–30)
HCO3 BLDV-SCNC: 21.3 MMOL/L (ref 24–30)
HCO3 BLDV-SCNC: 21.4 MMOL/L (ref 24–30)
HCT VFR BLD AUTO: 40.6 % (ref 36.5–49.3)
HGB BLD-MCNC: 14.1 G/DL (ref 12–17)
IMM GRANULOCYTES # BLD AUTO: 0.09 THOUSAND/UL (ref 0–0.2)
IMM GRANULOCYTES NFR BLD AUTO: 1 % (ref 0–2)
LYMPHOCYTES # BLD AUTO: 1.28 THOUSANDS/ΜL (ref 0.6–4.47)
LYMPHOCYTES NFR BLD AUTO: 8 % (ref 14–44)
MAGNESIUM SERPL-MCNC: 2.2 MG/DL (ref 1.6–2.6)
MAGNESIUM SERPL-MCNC: 2.3 MG/DL (ref 1.6–2.6)
MAGNESIUM SERPL-MCNC: 2.6 MG/DL (ref 1.6–2.6)
MCH RBC QN AUTO: 29.7 PG (ref 26.8–34.3)
MCHC RBC AUTO-ENTMCNC: 34.7 G/DL (ref 31.4–37.4)
MCV RBC AUTO: 86 FL (ref 82–98)
MONOCYTES # BLD AUTO: 1.1 THOUSAND/ΜL (ref 0.17–1.22)
MONOCYTES NFR BLD AUTO: 7 % (ref 4–12)
NEUTROPHILS # BLD AUTO: 13.9 THOUSANDS/ΜL (ref 1.85–7.62)
NEUTS SEG NFR BLD AUTO: 84 % (ref 43–75)
NON VENT ROOM AIR: 24 %
NRBC BLD AUTO-RTO: 0 /100 WBCS
O2 CT BLDV-SCNC: 14.4 ML/DL
O2 CT BLDV-SCNC: 17.7 ML/DL
O2 CT BLDV-SCNC: 18.1 ML/DL
PCO2 BLDV: 37.4 MM HG (ref 42–50)
PCO2 BLDV: 39.8 MM HG (ref 42–50)
PCO2 BLDV: 59.3 MM HG (ref 42–50)
PH BLDV: 6.98 [PH] (ref 7.3–7.4)
PH BLDV: 7.35 [PH] (ref 7.3–7.4)
PH BLDV: 7.38 [PH] (ref 7.3–7.4)
PHOSPHATE SERPL-MCNC: 1.9 MG/DL (ref 2.3–4.1)
PHOSPHATE SERPL-MCNC: 2.2 MG/DL (ref 2.3–4.1)
PHOSPHATE SERPL-MCNC: 2.7 MG/DL (ref 2.3–4.1)
PLATELET # BLD AUTO: 261 THOUSANDS/UL (ref 149–390)
PMV BLD AUTO: 11.2 FL (ref 8.9–12.7)
PO2 BLDV: 48.7 MM HG (ref 35–45)
PO2 BLDV: 49.6 MM HG (ref 35–45)
PO2 BLDV: 58.7 MM HG (ref 35–45)
POTASSIUM SERPL-SCNC: 3.7 MMOL/L (ref 3.5–5.3)
POTASSIUM SERPL-SCNC: 3.8 MMOL/L (ref 3.5–5.3)
POTASSIUM SERPL-SCNC: 3.9 MMOL/L (ref 3.5–5.3)
PROCALCITONIN SERPL-MCNC: 0.37 NG/ML
PROT SERPL-MCNC: 6.9 G/DL (ref 6.4–8.4)
RBC # BLD AUTO: 4.74 MILLION/UL (ref 3.88–5.62)
SODIUM SERPL-SCNC: 141 MMOL/L (ref 135–147)
SODIUM SERPL-SCNC: 141 MMOL/L (ref 135–147)
SODIUM SERPL-SCNC: 143 MMOL/L (ref 135–147)
WBC # BLD AUTO: 16.38 THOUSAND/UL (ref 4.31–10.16)

## 2022-09-05 PROCEDURE — 80048 BASIC METABOLIC PNL TOTAL CA: CPT

## 2022-09-05 PROCEDURE — 80053 COMPREHEN METABOLIC PANEL: CPT

## 2022-09-05 PROCEDURE — 83036 HEMOGLOBIN GLYCOSYLATED A1C: CPT

## 2022-09-05 PROCEDURE — 83735 ASSAY OF MAGNESIUM: CPT

## 2022-09-05 PROCEDURE — 85025 COMPLETE CBC W/AUTO DIFF WBC: CPT

## 2022-09-05 PROCEDURE — 99233 SBSQ HOSP IP/OBS HIGH 50: CPT | Performed by: STUDENT IN AN ORGANIZED HEALTH CARE EDUCATION/TRAINING PROGRAM

## 2022-09-05 PROCEDURE — 82948 REAGENT STRIP/BLOOD GLUCOSE: CPT

## 2022-09-05 PROCEDURE — C9113 INJ PANTOPRAZOLE SODIUM, VIA: HCPCS

## 2022-09-05 PROCEDURE — 84145 PROCALCITONIN (PCT): CPT

## 2022-09-05 PROCEDURE — 82805 BLOOD GASES W/O2 SATURATION: CPT | Performed by: NURSE PRACTITIONER

## 2022-09-05 PROCEDURE — 84100 ASSAY OF PHOSPHORUS: CPT

## 2022-09-05 PROCEDURE — 82330 ASSAY OF CALCIUM: CPT

## 2022-09-05 RX ORDER — MAGNESIUM SULFATE HEPTAHYDRATE 40 MG/ML
2 INJECTION, SOLUTION INTRAVENOUS ONCE
Status: COMPLETED | OUTPATIENT
Start: 2022-09-05 | End: 2022-09-05

## 2022-09-05 RX ORDER — POTASSIUM CHLORIDE 14.9 MG/ML
20 INJECTION INTRAVENOUS ONCE
Status: COMPLETED | OUTPATIENT
Start: 2022-09-05 | End: 2022-09-05

## 2022-09-05 RX ORDER — POTASSIUM CHLORIDE 14.9 MG/ML
20 INJECTION INTRAVENOUS
Status: COMPLETED | OUTPATIENT
Start: 2022-09-05 | End: 2022-09-05

## 2022-09-05 RX ADMIN — HEPARIN SODIUM 5000 UNITS: 5000 INJECTION INTRAVENOUS; SUBCUTANEOUS at 05:53

## 2022-09-05 RX ADMIN — POTASSIUM CHLORIDE 20 MEQ: 14.9 INJECTION, SOLUTION INTRAVENOUS at 00:08

## 2022-09-05 RX ADMIN — DEXTROSE, SODIUM CHLORIDE, SODIUM LACTATE, POTASSIUM CHLORIDE, AND CALCIUM CHLORIDE 250 ML/HR: 5; .6; .31; .03; .02 INJECTION, SOLUTION INTRAVENOUS at 02:42

## 2022-09-05 RX ADMIN — SODIUM PHOSPHATE, MONOBASIC, MONOHYDRATE 21 MMOL: 276; 142 INJECTION, SOLUTION INTRAVENOUS at 05:53

## 2022-09-05 RX ADMIN — MAGNESIUM SULFATE HEPTAHYDRATE 2 G: 40 INJECTION, SOLUTION INTRAVENOUS at 20:00

## 2022-09-05 RX ADMIN — DEXTROSE, SODIUM CHLORIDE, SODIUM LACTATE, POTASSIUM CHLORIDE, AND CALCIUM CHLORIDE 125 ML/HR: 5; .6; .31; .03; .02 INJECTION, SOLUTION INTRAVENOUS at 12:22

## 2022-09-05 RX ADMIN — HEPARIN SODIUM 5000 UNITS: 5000 INJECTION INTRAVENOUS; SUBCUTANEOUS at 22:05

## 2022-09-05 RX ADMIN — POTASSIUM CHLORIDE 20 MEQ: 14.9 INJECTION, SOLUTION INTRAVENOUS at 12:12

## 2022-09-05 RX ADMIN — PANTOPRAZOLE SODIUM 40 MG: 40 INJECTION, POWDER, FOR SOLUTION INTRAVENOUS at 08:40

## 2022-09-05 RX ADMIN — CEFTRIAXONE 1000 MG: 1 INJECTION, SOLUTION INTRAVENOUS at 05:51

## 2022-09-05 RX ADMIN — DEXTROSE, SODIUM CHLORIDE, SODIUM LACTATE, POTASSIUM CHLORIDE, AND CALCIUM CHLORIDE 125 ML/HR: 5; .6; .31; .03; .02 INJECTION, SOLUTION INTRAVENOUS at 20:15

## 2022-09-05 RX ADMIN — POTASSIUM CHLORIDE 20 MEQ: 14.9 INJECTION, SOLUTION INTRAVENOUS at 02:43

## 2022-09-05 RX ADMIN — DEXTROSE, SODIUM CHLORIDE, SODIUM LACTATE, POTASSIUM CHLORIDE, AND CALCIUM CHLORIDE 250 ML/HR: 5; .6; .31; .03; .02 INJECTION, SOLUTION INTRAVENOUS at 06:33

## 2022-09-05 RX ADMIN — SODIUM PHOSPHATE, MONOBASIC, MONOHYDRATE 30 MMOL: 276; 142 INJECTION, SOLUTION INTRAVENOUS at 01:34

## 2022-09-05 RX ADMIN — HEPARIN SODIUM 5000 UNITS: 5000 INJECTION INTRAVENOUS; SUBCUTANEOUS at 16:04

## 2022-09-05 RX ADMIN — POTASSIUM CHLORIDE 20 MEQ: 14.9 INJECTION, SOLUTION INTRAVENOUS at 04:03

## 2022-09-05 RX ADMIN — SODIUM CHLORIDE 4 UNITS/HR: 9 INJECTION, SOLUTION INTRAVENOUS at 12:13

## 2022-09-05 RX ADMIN — SODIUM CHLORIDE 10 UNITS/HR: 9 INJECTION, SOLUTION INTRAVENOUS at 05:54

## 2022-09-05 RX ADMIN — SODIUM CHLORIDE 4 UNITS/HR: 9 INJECTION, SOLUTION INTRAVENOUS at 20:14

## 2022-09-05 NOTE — ASSESSMENT & PLAN NOTE
· Noted to be in atrial fibrillation with RVR on ED EKG  · Patient spontaneously converted to sinus tachycardia prior to arrival to ICU  · Likely 2/2 severe metabolic derangements  · Has maintained SR since  · Monitor telemetry

## 2022-09-05 NOTE — ASSESSMENT & PLAN NOTE
· CARLTON POA with initial creatinine of 2 28  · Patient previously followed with health system in [place], now resides here but do not have old records to determine baseline  · Continue lemons  · Monitor I&O  · Avoid nephrotoxins  · Monitor renal indices

## 2022-09-05 NOTE — CONSULTS
CONSULT    Patient Name: Bárbara Cadena  Patient MRN: [id number]  Date of Service: 9/5/2022   Date / Time Note Created: 9/5/2022 9:27 AM   Referring Provider: Carolin Conklin,Chasidy    Provider Creating Note: LEEANNA Barrientos    PCP: Marian Lozano  Attending Provider:  Carolin Conklin,*    Reason for Consult: Bladder Outlet Obstruction & Hydronephrosis     HPI:  Oracio Torres  is a 58-year-old male with uncontrolled diabetes and hemoglobin A1c exceeding 14, seen in our office by my colleague Evelyne Spurling, [facility] on 09/02/2022 for chief urologic complaint of severe lower urinary tract symptoms including urinary urgency, urinary frequency, urge incontinence, nocturia and balanitis  He has a history of microscopic hematuria for which patient has declined cystoscopic examination unless with anesthesia  Ultrasound of kidneys and bladder were pending as well as referral to pelvic floor therapy  Unfortunately, patient has demonstrated non adherence to medical regimen and self discontinued insulin  He presents to [facility], directly admitted to the ICU for sepsis related to pancreatitis, DKA with admission glucose exceeding 800, new onset atrial fibrillation, hyponatremia, lactic and metabolic acidosis, in addition to acute kidney injury  Renal function has since improved  Urine culture showed low levels of coag-negative staph  Blood cultures pending  Our service was consulted against background of incidental finding on CT of mild to moderate bilateral hydronephrosis extending to the level of distal ureters and distended urinary bladder without evidence of obstructing nephroureteral lithiasis, perinephric stranding, perinephric bleeding, abscess, phlegmon or discrete fluid collection, bladder calculi, bladder hematoma  There is mild prostatomegaly with prostate calcification is of little clinical significance  Of note, there was 1 8 mm right intrarenal calculus         Active Problems: Patient Active Problem List   Diagnosis    Urinary frequency    Type 2 diabetes mellitus ([facility])    Prostate cancer screening    Microscopic hematuria    Urge incontinence of urine    Nocturnal enuresis    Feeling of incomplete bladder emptying    Balanitis    DKA [facility]diabetic ketoacidosis) ([facility])    Lactic acidosis    Increased anion gap metabolic acidosis    Pancreatitis    Acute metabolic encephalopathy    Transaminitis    Hypothermia    Sepsis ([place] [address] )    CARLTON (acute kidney injury) ([place] [address] )    Hyponatremia    New onset atrial fibrillation ([place] [address] )             Impressions  · Uncontrolled diabetes--underlying source for multiple medical problems including 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  · High volume urinary retention--1 3 L urine output at time of catheter insertion  · Bilateral hydronephrosis--secondary to reflux  · Right intrarenal calculus--unrelated to current pathology  · Lower urinary tract symptoms--secondary to incomplete bladder emptying and chronic hyperglycemia  · Acute kidney injury--mixed etiology  · History of balanitis--likely yeast due to elevated blood sugar  Recommendations  1  Continue medical optimization and treatment for multiple serious medical concerns  2  Maintain Lane catheter to straight drainage  Do not remove  3  Wash genitalia b i d    4  Follow-up with non urgent repeat renal ultrasound to re-evaluate hydronephrosis Tuesday or Wednesday  5  Patient will likely require Lane catheter for an interval     6  There is high suspicion for diabetic vesicopathy and resultant neurogenic bladder  7  He will require Lane catheter at time of discharge to proceed with urodynamic studies which will evaluate detrusor function        Past Medical History:   Diagnosis Date    Diabetes mellitus ([place] [address] )        Past Surgical History:   Procedure Laterality Date    KNEE CARTILAGE SURGERY         Family History   Problem Relation Age of Onset    Diabetes Father     Diabetes Mother        Social History     Socioeconomic History    Marital status: /Civil Union     Spouse name: Not on file    Number of children: Not on file    Years of education: Not on file    Highest education level: Not on file   Occupational History    Not on file   Tobacco Use    Smoking status: Never Smoker    Smokeless tobacco: Never Used   Substance and Sexual Activity    Alcohol use: Never    Drug use: Never    Sexual activity: Not on file   Other Topics Concern    Not on file   Social History Narrative    Not on file     Social Determinants of Health     Financial Resource Strain: Not on file   Food Insecurity: Not on file   Transportation Needs: Not on file   Physical Activity: Not on file   Stress: Not on file   Social Connections: Not on file   Intimate Partner Violence: Not on file   Housing Stability: Not on file       No Known Allergies    Review of Systems  10 point review of systems negative except as noted in HPI  Constitutional:   negative for - chills or fever    Positive for fatigue   Hematological and Lymphatic:   negative  Cardiovascular:   no chest pain or dyspnea on exertion  Gastrointestinal:   positive for - nausea/vomiting  Genito-Urinary:   positive for - change in urinary stream, incontinence, nocturia and urinary frequency/urgency  negative for - dysuria, hematuria, pelvic pain or scrotal mass/pain  Neurological:   no TIA or stroke symptoms     Chart Review   Allergies, current medications, history, problem list    Vital Signs  /59 (BP Location: Right arm)   Pulse 96   Temp 98 4 °F (36 9 °C) (Bladder)   Resp 20   Ht 6' (1 829 m)   Wt 97 9 kg (215 lb 13 3 oz)   SpO2 97%   BMI 29 27 kg/m²     Physical Exam  General appearance: alert and oriented, in no acute distress, appears stated age, cooperative and no distress  Head: Normocephalic, without obvious abnormality, atraumatic  Neck: no adenopathy, no carotid bruit, no JVD, supple, symmetrical, trachea midline and thyroid not enlarged, symmetric, no tenderness/mass/nodules  Lungs: diminished breath sounds  Heart: regular rate and rhythm, S1, S2 normal, no murmur, click, rub or gallop  Abdomen: abnormal findings:  mild tenderness in the upper abdomen and NG tube  Extremities: extremities normal, warm and well-perfused; no cyanosis, clubbing, or edema  Pulses: 2+ and symmetric  Neurologic: Grossly normal  Lane--cloudy yellow     Laboratory Studies  Lab Results   Component Value Date    HGBA1C 14 08/08/2022    K 3 9 09/05/2022     (H) 09/05/2022    CO2 21 09/05/2022    CREATININE 1 04 09/05/2022    BUN 25 09/05/2022    MG 2 2 09/05/2022    PHOS 2 7 09/05/2022     Lab Results   Component Value Date    WBC 16 38 (H) 09/05/2022    RBC 4 74 09/05/2022    HGB 14 1 09/05/2022    HCT 40 6 09/05/2022    MCV 86 09/05/2022    MCH 29 7 09/05/2022    RDW 12 9 09/05/2022     09/05/2022       Imaging and Other Studies  )  CT abdomen pelvis wo contrast    Result Date: 9/4/2022  Narrative: CT ABDOMEN AND PELVIS WITHOUT IV CONTRAST INDICATION:   Pancreatitis suspected elevated lipase  COMPARISON:  None  TECHNIQUE:  CT examination of the abdomen and pelvis was performed without intravenous contrast  Axial, sagittal, and coronal 2D reformatted images were created from the source data and submitted for interpretation  Radiation dose length product (DLP) for this visit:  1083 mGy-cm   This examination, like all CT scans performed in the [facility], was performed utilizing techniques to minimize radiation dose exposure, including the use of iterative reconstruction and automated exposure control  Enteric contrast was administered  FINDINGS: ABDOMEN LOWER CHEST:  No clinically significant abnormality identified in the visualized lower chest  LIVER/BILIARY TREE:  Unremarkable  GALLBLADDER:  No calcified gallstones  No pericholecystic inflammatory change  The common bile duct measures 4 5 mm SPLEEN:  Unremarkable  PANCREAS:  Peripancreatic inflammatory stranding seen  There is thickening of the left anterior pararenal fascia ADRENAL GLANDS:  Unremarkable  KIDNEYS/URETERS:  Dilation of the both renal pelvises and ureter seen extending to the level of the urinary bladder  MID right renal calculus seen measuring about 8 mm STOMACH AND BOWEL:  Moderate amount of fecal debris in the rectum seen No abnormal dilation of the small bowel loops seen The stomach is markedly distended A periampullary diverticulum seen  A duodenum diverticulum in relation of the 3rd part of the duodenum also noted APPENDIX:  No findings to suggest appendicitis  ABDOMINOPELVIC CAVITY:  No ascites  No pneumoperitoneum  No lymphadenopathy  VESSELS:  No abdominal aortic aneurysm seen PELVIS REPRODUCTIVE ORGANS:  Prostate calcification seen within the prostate measures 3 8 x 6 x 4 2 cm URINARY BLADDER:  There is marked distention of the urinary bladder ABDOMINAL WALL/INGUINAL REGIONS:  Umbilical hernia containing fat seen OSSEOUS STRUCTURES:  No acute compression collapse of the vertebra Degenerative changes seen within the lumbar spine     Impression: Mild peripancreatic inflammatory stranding with associated  thickening of the left anterior pararenal fascia, correlate with the lipase levels for pancreatitis No acute fluid collection seen Markedly distended stomach, correlate clinically , may be due to gastroparesis Periampullary diverticulum along with a diverticulum in relation to the 3rd part of the duodenum Bilateral mild to moderate hydronephrosis and hydroureter with dilated ureter extending to the level of the distended urinary bladder , correlate to exclude urinary retention/ bladder outlet obstruction No biliary dilation seen Nonobstructing the mid right renal calculus measuring 8 mm A periumbilical hernia containing fat The study was marked in EPIC for immediate notification   Workstation performed: [id number]     US right upper quadrant    Result Date: 9/4/2022  Narrative: RIGHT UPPER QUADRANT ULTRASOUND INDICATION:     pancreatitis  COMPARISON:  CT abdomen pelvis 9/4/2022 TECHNIQUE:   Real-time ultrasound of the right upper quadrant was performed with a curvilinear transducer with both volumetric sweeps and still imaging techniques  FINDINGS: PANCREAS:  Visualized portions of the pancreas are unremarkable  Findings of reported acute pancreatitis are not well appreciated on the current study  AORTA AND IVC:  Visualized portions are normal for patient age  LIVER: Size:  Within normal range  The liver measures 16 4 cm in the midclavicular line  Contour:  Surface contour is smooth  Parenchyma:  Echogenicity and echotexture are within normal limits  No liver mass identified  Limited imaging of the main portal vein shows it to be patent and hepatopetal   BILIARY: The gallbladder is normal in caliber  No wall thickening or pericholecystic fluid  No stones or sludge identified  No sonographic Hidalgo's sign  No intrahepatic biliary dilatation  CBD measures 5 0 mm  No choledocholithiasis  KIDNEY: Right kidney measures 11 1 x 6 2 x 7 0 cm  Volume 252 0 mL Mild fullness of the right renal pelvis  Shadowing echogenic focus in the upper pole measuring approximately 5 mm with twinkle artifact in keeping with nonobstructing calculus  ASCITES:   None  Impression: 1  Findings of reported acute pancreatitis are not well appreciated on the current study  2   Nonobstructing right renal calculus   Workstation performed: [id number]       Medications   Current Facility-Administered Medications   Medication Dose Route Frequency Provider Last Rate    cefTRIAXone  1,000 mg Intravenous Q24H LEEANNA Mcconnell 1,000 mg (09/05/22 5274)    dextrose 5% lactated ringer's  250 mL/hr Intravenous Continuous LEEANNA Mcconnell 250 mL/hr (09/05/22 6170)    heparin (porcine)  5,000 Units Subcutaneous [facility] LEEANNA Mcconnell      insulin regular (HumuLIN R,NovoLIN R) infusion  0 1-30 Units/hr Intravenous Continuous Ihsan LEEANNA Schafer 10 Units/hr (09/05/22 0554)    pantoprazole  40 mg Intravenous Q24H [facility] Ihsan LEEANNA Schafer      potassium-sodium phosphateS  2 tablet Oral Once Sb Soriano MD      sodium phosphate  21 mmol Intravenous Once Sb Soriano MD 21 mmol (09/05/22 0553)    Followed by   Rachele Vuong potassium-sodium phosphateS  2 tablet Oral Once Sb Soriano MD      sodium chloride (PF)  3 mL Intravenous Q1H PRN Ihsan LEEANNA Schafer, [address]

## 2022-09-05 NOTE — ASSESSMENT & PLAN NOTE
· Patient's temperature 93 3 on arrival  · Unclear etiology  · Rickie hugger overnight  · Now normothermic

## 2022-09-05 NOTE — ASSESSMENT & PLAN NOTE
· Met sepsis criteria in the ED as evidenced by tachycardia, tachypnea, and leukocytosis  · Unclear if true sepsis vs sirs criteria in the setting of DKA and multiple metabolic derangements  · Recent UA from 9/2 showed large leukocytes and occasional bacteria, culture pending  · Urine culture from 9/2 + coag negative staph  · Received rocephin in the ED  · Continue rocpehin Q24h  · Trend procalcitonin  · Follow up on BC and urine culture results  · Trend WBC/temperature curve

## 2022-09-05 NOTE — PLAN OF CARE
Problem: Potential for Falls  Goal: Patient will remain free of falls  Description: INTERVENTIONS:  - Educate patient/family on patient safety including physical limitations  - Instruct patient to call for assistance with activity   - Consult OT/PT to assist with strengthening/mobility   - Keep Call bell within reach  - Keep bed low and locked with side rails adjusted as appropriate  - Keep care items and personal belongings within reach  - Initiate and maintain comfort rounds  - Make Fall Risk Sign visible to staff  Outcome: Progressing     Problem: Prexisting or High Potential for Compromised Skin Integrity  Goal: Skin integrity is maintained or improved  Description: INTERVENTIONS:  - Identify patients at risk for skin breakdown  - Assess and monitor skin integrity  - Assess and monitor nutrition and hydration status  - Monitor labs   - Assess for incontinence   - Turn and reposition patient  - Relieve pressure over bony prominences  - Avoid friction and shearing  - Provide appropriate hygiene as needed including keeping skin clean and dry  Outcome: Progressing     Problem: PAIN - ADULT  Goal: Verbalizes/displays adequate comfort level or baseline comfort level  Description: Interventions:  - Encourage patient to monitor pain and request assistance  - Assess pain using appropriate pain scale  - Administer analgesics based on type and severity of pain and evaluate response  - Implement non-pharmacological measures as appropriate and evaluate response  - Notify physician/advanced practitioner if interventions unsuccessful or patient reports new pain  Outcome: Progressing     Problem: INFECTION - ADULT  Goal: Absence or prevention of progression during hospitalization  Description: INTERVENTIONS:  - Assess and monitor for signs and symptoms of infection  - Monitor lab/diagnostic results  - [place] appropriate cooling/warming therapies per order  - Administer medications as ordered  Outcome: Progressing Problem: SAFETY ADULT  Goal: Patient will remain free of falls  Description: INTERVENTIONS:  - Educate patient/family on patient safety including physical limitations  - Instruct patient to call for assistance with activity   - Consult OT/PT to assist with strengthening/mobility   - Keep Call bell within reach  - Keep bed low and locked with side rails adjusted as appropriate  - Keep care items and personal belongings within reach  - Initiate and maintain comfort rounds  - Make Fall Risk Sign visible to staff  - Apply yellow socks and bracelet for high fall risk patients  - Consider moving patient to room near nurses station  Outcome: Progressing     Problem: NEUROSENSORY - ADULT  Goal: Achieves stable or improved neurological status  Description: INTERVENTIONS  - Monitor and report changes in neurological status  - Monitor vital signs such as temperature, blood pressure, glucose, and any other labs ordered   Outcome: Progressing     Problem: CARDIOVASCULAR - ADULT  Goal: Maintains optimal cardiac output and hemodynamic stability  Description: INTERVENTIONS:  - Monitor I/O, vital signs and rhythm  - Monitor for S/S and trends of decreased cardiac output  - Assess quality of pulses, skin color and temperature  - Assess for signs of decreased coronary artery perfusion  - Instruct patient to report change in severity of symptoms  Outcome: Progressing  Goal: Absence of cardiac dysrhythmias or at baseline rhythm  Description: INTERVENTIONS:  - Continuous cardiac monitoring, vital signs, obtain 12 lead EKG if ordered  - Administer antiarrhythmic and heart rate control medications as ordered  - Monitor electrolytes and administer replacement therapy as ordered  Outcome: Progressing     Problem: GASTROINTESTINAL - ADULT  Goal: Oral mucous membranes remain intact  Description: INTERVENTIONS  - Assess oral mucosa and hygiene practices  - Implement preventative oral hygiene regimen  - Initiate Nutrition services referral as needed  Outcome: Progressing     Problem: GENITOURINARY - ADULT  Goal: Urinary catheter remains patent  Description: INTERVENTIONS:  - Assess patency of urinary catheter  Outcome: Progressing     Problem: METABOLIC, FLUID AND ELECTROLYTES - ADULT  Goal: Fluid balance maintained  Description: INTERVENTIONS:  - Monitor labs   - Monitor I/O and WT  - Instruct patient on fluid and nutrition as appropriate  - Assess for signs & symptoms of volume excess or deficit  Outcome: Progressing  Goal: Glucose maintained within target range  Description: INTERVENTIONS:  - Monitor Blood Glucose as ordered  - Assess for signs and symptoms of hyperglycemia and hypoglycemia  - Administer ordered medications to maintain glucose within target range  - Assess nutritional intake and initiate nutrition service referral as needed  Outcome: Progressing     Problem: SKIN/TISSUE INTEGRITY - ADULT  Goal: Skin Integrity remains intact(Skin Breakdown Prevention)  Description: Assess:  -Inspect skin when repositioning, toileting, and assisting with ADLS  -Assess extremities for adequate circulation and sensation     Bed Management:  -Have minimal linens on bed & keep smooth, unwrinkled  -Change linens as needed when moist or perspiring    Activity:  -Encourage or provide ROM exercises   -Use appropriate equipment to lift or move patient in bed    Skin Care:  -Avoid use of baby powder, tape, friction and shearing, hot water or constrictive clothing  Outcome: Progressing

## 2022-09-05 NOTE — ASSESSMENT & PLAN NOTE
· Patient presents with altered mental status  · Currently patient is lethargic but easily arouses by voice  · Improved  · Monitor CAM ICU  · Monitor neuro exams

## 2022-09-05 NOTE — PROGRESS NOTES
[address]  Progress Note - [address]  1955, 75 y o  male MRN: [id number]  Unit/Bed#:  Encounter: [id number]  Primary Care Provider: Yuko Parekh MD   Date and time admitted to hospital: 9/4/2022  4:43 AM    * DKA (diabetic ketoacidosis) [facility])  Assessment & Plan  Lab Results   Component Value Date    HGBA1C >14 0 (H) 09/05/2022       Recent Labs     09/05/22  0904 09/05/22  1039 09/05/22  1215 09/05/22  1410   POCGLU 108 117 130 146*       Blood Sugar Average: Last 72 hrs:  · Patient has not been taking insulin for several months after losing weight  · Hgb A1C 14  · Glucose 800, anion gap >35, pH 6 858, base deficit 29 4, beta hydroxybutyrate 5 2  · Received insulin bolus followed by DKA insulin gtt  · Change to non DKA insulin gtt  · Continue D5LR while NPO  · Replete electrolytes as needed  · NPO  · Consult endocrinology    Pancreatitis  Assessment & Plan  · Lipase elevated on arrival 1091  · CT revealed "Mild peripancreatic inflammatory stranding with associated  thickening of the left anterior pararenal fascia, correlate with the lipase levels for pancreatitis   No acute fluid collection seen "  · Lipid panel and RUQ benign  · Wife and daughter deny patient abuses alcohol  · Patient denies abdominal pain now or PTA  · Abdominal exam benign, monitor   · Fluids  · NPO  · Monitor off antibitoics  · Consult GI    Sepsis ([facility] [address] )  Assessment & Plan  · Met sepsis criteria in the ED as evidenced by tachycardia, tachypnea, and leukocytosis  · Unclear if true sepsis vs sirs criteria in the setting of DKA and multiple metabolic derangements  · Recent UA from 9/2 showed large leukocytes and occasional bacteria, culture pending  · Urine culture from 9/2 + coag negative staph  · Received rocephin in the ED  · Continue rocpehin Q24h  · Trend procalcitonin  · Follow up on BC and urine culture results  · Trend WBC/temperature curve    New onset atrial fibrillation [facility])  Assessment & Plan  · Noted to be in atrial fibrillation with RVR on ED EKG  · Patient spontaneously converted to sinus tachycardia prior to arrival to ICU  · Likely 2/2 severe metabolic derangements  · Has maintained SR since  · Monitor telemetry    CARLTON (acute kidney injury) ([address] )  Assessment & Plan  · CARLTON POA with initial creatinine of 2 28  · Patient previously followed with health system in [place], now resides here but do not have old records to determine baseline  · Continue lemons  · Monitor I&O  · Avoid nephrotoxins  · Monitor renal indices    Hypothermia  Assessment & Plan  · Patient's temperature 93 3 on arrival  · Unclear etiology  · Rickie hugger overnight  · Now normothermic    Acute metabolic encephalopathy  Assessment & Plan  · Patient presents with altered mental status  · Currently patient is lethargic but easily arouses by voice  · Improved  · Monitor CAM ICU  · Monitor neuro exams    Nocturnal enuresis  Assessment & Plan  · Patient had recent OP appointment with urology with compliant of nocturnal incontinence for about 8 months  · Also reports dribbling after voiding during the day  · Likely due to uncontrolled DM  · Lemons in place  · Monitor I&O    Type 2 diabetes mellitus [facility])  Assessment & Plan  Lab Results   Component Value Date    HGBA1C 14 08/08/2022       Recent Labs     09/05/22  0819 09/05/22  0904 09/05/22  1039 09/05/22  1215   POCGLU 111 108 117 130       Blood Sugar Average: Last 72 hrs:  · (P) 203  48See plan under DKA  · Patient has been without insulin for almost a year after self discontinuing     Hyponatremia-resolved as of 9/5/2022  Assessment & Plan  · Corrected sodium 139  · Resolved    Transaminitis-resolved as of 9/5/2022  Assessment & Plan  · Alk phos elevated at 167 on arrival  · AST/ALT WNL  · Unclear etiology  · Abdominal exam benign  · RUQ ultrasound without evidence of steatosis  · Now normalized    Increased anion gap metabolic acidosis-resolved as of 9/5/2022  Assessment & Plan  · Secondary to DKA +/- Lactic acidosis  · Now resolved    Lactic acidosis-resolved as of 2022  Assessment & Plan  · Lactate 4 5 on arrival  · Now resolved      ----------------------------------------------------------------------------------------  HPI/24hr events: Patient admitted to critical care for DKA protocol  Patient's anion gap has now cleared and he is on non critical care insulin gtt  Will attempt clamp trial and possibly discontinue NGT  Patient appropriate for transfer out of the ICU today?: No  Disposition: Transfer to Stepdown Level 1   Code Status: Level 1 - Full Code  ---------------------------------------------------------------------------------------  SUBJECTIVE  "I'm okay "    Review of Systems   Constitutional: Positive for fatigue  Respiratory: Negative for shortness of breath  Gastrointestinal: Negative for abdominal pain, nausea and vomiting  Review of systems was reviewed and negative unless stated above in HPI/24-hour events   ---------------------------------------------------------------------------------------  OBJECTIVE    Vitals   Vitals:    22 0400 22 0600 22 0830 22 0831   BP: 115/60   113/59   BP Location: Right arm   Right arm   Pulse: 88   96   Resp:    20   Temp: 98 42 °F (36 9 °C)   98 4 °F (36 9 °C)   TempSrc: Bladder  Bladder Bladder   SpO2: 98%   97%   Weight:  97 9 kg (215 lb 13 3 oz)     Height:         Temp (24hrs), Av 4 °F (36 9 °C), Min:98 24 °F (36 8 °C), Max:98 6 °F (37 °C)  Current: Temperature: 98 4 °F (36 9 °C)  Arterial Line BP: 107/51  Arterial Line MAP (mmHg): 68 mmHg    Respiratory:  SpO2: SpO2: 97 %       Invasive/non-invasive ventilation settings   Respiratory  Report   Lab Data (Last 4 hours)    None         O2/Vent Data (Last 4 hours)    None                Physical Exam  Vitals reviewed  Constitutional:       Appearance: He is overweight        Comments: drowsy   HENT:      Head: Normocephalic and atraumatic  Cardiovascular:      Rate and Rhythm: Normal rate and regular rhythm  Pulses: Normal pulses  Heart sounds: Normal heart sounds  Pulmonary:      Effort: Pulmonary effort is normal  No respiratory distress  Breath sounds: Normal breath sounds  Abdominal:      General: Bowel sounds are decreased  There is no distension  Palpations: Abdomen is soft  Tenderness: There is no abdominal tenderness  There is no guarding  Musculoskeletal:      Right lower leg: No edema  Left lower leg: No edema  Skin:     General: Skin is warm and dry  Neurological:      General: No focal deficit present  Mental Status: He is oriented to person, place, and time and easily aroused               Laboratory and Diagnostics:  Results from last 7 days   Lab Units 09/05/22  0438 09/04/22  0502   WBC Thousand/uL 16 38* 19 78*   HEMOGLOBIN g/dL 14 1 15 3   HEMATOCRIT % 40 6 49 8*   PLATELETS Thousands/uL 261 372   NEUTROS PCT % 84*  --    MONOS PCT % 7  --      Results from last 7 days   Lab Units 09/05/22  0828 09/05/22  0438 09/05/22  0009 09/04/22  2034 09/04/22  1626 09/04/22  1231 09/04/22  1110 09/04/22  0950 09/04/22  0850 09/04/22  0502   SODIUM mmol/L 141 141 143 141 136 135  --  134*   < > 128*   POTASSIUM mmol/L 3 9 3 8 3 7 3 3* 3 5 4 6  --  4 0   < > 5 6*   CHLORIDE mmol/L 109* 108 108 107 102 94*  --  95*   < > 88*   CO2 mmol/L 21 22 20* 19* 13* 9*  --  6*   < > 5*   ANION GAP mmol/L 11 11 15* 15* 21* 32*  --  33*  --  35*   BUN mg/dL 25 28* 30* 31* 33* 39*  --  37*   < > 38*   CREATININE mg/dL 1 04 1 19 1 31* 1 34* 1 48* 1 81*  --  1 75*   < > 2 29*   CALCIUM mg/dL 8 6 8 7 9 2 8 5 8 4 9 4  --  7 8*   < > 9 4   GLUCOSE RANDOM mg/dL 99 96 122 179* 302* 504*  504* 187* 571*  626*   < > 800*   ALT U/L  --  16  --   --   --   --   --   --   --  24   AST U/L  --  20  --   --   --   --   --   --   --  20   ALK PHOS U/L  --  109  --   --   --   --   --   --   --  167*   ALBUMIN g/dL --  2 6*  --   --   --   --   --   --   --  3 4*   TOTAL BILIRUBIN mg/dL  --  0 19*  --   --   --   --   --   --   --  0 39    < > = values in this interval not displayed  Results from last 7 days   Lab Units 09/05/22  0828 09/05/22  0438 09/05/22  0009 09/04/22  2034 09/04/22  1626 09/04/22  1231 09/04/22  0950   MAGNESIUM mg/dL 2 2 2 3 2 6 1 9 2 1 2 3 2 1   PHOSPHORUS mg/dL 2 7 2 2* 1 9* 1 1* 1 3* 3 8 4 4*               Results from last 7 days   Lab Units 09/04/22  1110 09/04/22  0850 09/04/22  0502   LACTIC ACID mmol/L 0 8 2 8* 4 5*     ABG:  Results from last 7 days   Lab Units 09/04/22 2034   PH ART  7 373   PCO2 ART mm Hg 32 3*   PO2 ART mm Hg 90 5   HCO3 ART mmol/L 18 4*   BASE EXC ART mmol/L -5 7   ABG SOURCE  Line, Arterial     VBG:  Results from last 7 days   Lab Units 09/05/22  0828 09/05/22  0044 09/04/22 2034   PH NAJMA  7 375   < >  --    PCO2 NAJMA mm Hg 37 4*   < >  --    PO2 NAJMA mm Hg 48 7*   < >  --    HCO3 NAJMA mmol/L 21 4*   < >  --    BASE EXC NAJMA mmol/L -3 3   < >  --    ABG SOURCE   --   --  Line, Arterial    < > = values in this interval not displayed  Results from last 7 days   Lab Units 09/05/22  0438 09/04/22  0604   PROCALCITONIN ng/ml 0 37* 0 16       Micro  Results from last 7 days   Lab Units 09/04/22  0604 09/02/22  1055   BLOOD CULTURE  No Growth at 24 hrs  No Growth at 24 hrs   --    URINE CULTURE   --  20,000-29,000 cfu/ml Staphylococcus coagulase negative*       EKG: Sinus rhythm in the 90s on tele  Imaging: I have personally reviewed pertinent reports        Intake and Output  I/O       09/03 0701 09/04 0700 09/04 0701 09/05 0700 09/05 0701 09/06 0700    I V  (mL/kg)  7701 8 (78 7)     IV Piggyback 1050 850     Total Intake(mL/kg) 1050 8551 8 (87 4)     Urine (mL/kg/hr)  6300 (2 7)     Emesis/NG output  450     Stool  0     Total Output  6750     Net +1050 +1801 8            Unmeasured Stool Occurrence  1 x           Height and Weights   Height: 6' (182 9 cm)  IBW (Ideal Body Weight): 77 6 kg  Body mass index is 29 27 kg/m²  Weight (last 2 days)     Date/Time Weight    09/05/22 0600 97 9 (215 83)    09/04/22 0956 97 9 (215 83)            Nutrition       Diet Orders   (From admission, onward)             Start     Ordered    09/05/22 1103  Diet NPO; Sips of clear liquids  Diet effective now        References:    Nutrtion Support Algorithm Enteral vs  Parenteral   Question Answer Comment   Diet Type NPO    NPO Except: Sips of clear liquids    RD to adjust diet per protocol?  No        09/05/22 1102                  Active Medications  Scheduled Meds:  Current Facility-Administered Medications   Medication Dose Route Frequency Provider Last Rate    cefTRIAXone  1,000 mg Intravenous Q24H Alphonsus Carter, CRNP 1,000 mg (09/05/22 0551)    dextrose 5% lactated ringer's  125 mL/hr Intravenous Continuous Alphonsus Carter, CRNP 125 mL/hr (09/05/22 1222)    heparin (porcine)  5,000 Units Subcutaneous [facility] Alphonsus Carter, CRNP      insulin regular (HumuLIN R,NovoLIN R) infusion  0 3-21 Units/hr Intravenous Titrated Alphonsus Carter, CRNP 8 Units/hr (09/05/22 1412)    pantoprazole  40 mg Intravenous Q24H [address] Alphonsus Carter, CRNP      sodium chloride (PF)  3 mL Intravenous Q1H PRN Alphonsus Carter, CRNP       Continuous Infusions:  dextrose 5% lactated ringer's, 125 mL/hr, Last Rate: 125 mL/hr (09/05/22 1222)  insulin regular (HumuLIN R,NovoLIN R) infusion, 0 3-21 Units/hr, Last Rate: 8 Units/hr (09/05/22 1412)      PRN Meds:   sodium chloride (PF), 3 mL, Q1H PRN        Invasive Devices Review  Invasive Devices  Report    Peripheral Intravenous Line  Duration           Peripheral IV 09/04/22 Left Antecubital 1 day    Peripheral IV 09/04/22 Right Antecubital 1 day    Peripheral IV 09/04/22 Right Wrist 1 day          Drain  Duration           NG/OG/Enteral Tube Nasogastric 16 Fr Right nare 1 day    Urethral Catheter 16 Fr  1 day                Rationale for remaining devices: NGT: bowel decompression, clamp trial  Lane: accurate I&O  ---------------------------------------------------------------------------------------  Advance Directive and Living Will:      Power of :    POLST:    ---------------------------------------------------------------------------------------  Care Time Delivered:   No Critical Care time spent       LEEANNA Velazquez      Portions of the record may have been created with voice recognition software  Occasional wrong word or "sound a like" substitutions may have occurred due to the inherent limitations of voice recognition software    Read the chart carefully and recognize, using context, where substitutions have occurred

## 2022-09-05 NOTE — ASSESSMENT & PLAN NOTE
Lab Results   Component Value Date    HGBA1C >14 0 (H) 09/05/2022       Recent Labs     09/05/22  0904 09/05/22  1039 09/05/22  1215 09/05/22  1410   POCGLU 108 117 130 146*       Blood Sugar Average: Last 72 hrs:  · Patient has not been taking insulin for several months after losing weight  · Hgb A1C 14  · Glucose 800, anion gap >35, pH 6 858, base deficit 29 4, beta hydroxybutyrate 5 2  · Received insulin bolus followed by DKA insulin gtt  · Change to non DKA insulin gtt  · Continue D5LR while NPO  · Replete electrolytes as needed  · NPO  · Consult endocrinology

## 2022-09-05 NOTE — ASSESSMENT & PLAN NOTE
· Lipase elevated on arrival 1091  · CT revealed "Mild peripancreatic inflammatory stranding with associated  thickening of the left anterior pararenal fascia, correlate with the lipase levels for pancreatitis   No acute fluid collection seen "  · Lipid panel and RUQ benign  · Wife and daughter deny patient abuses alcohol  · Patient denies abdominal pain now or PTA  · Abdominal exam benign, monitor   · Fluids  · NPO  · Monitor off antibitoics  · Consult GI

## 2022-09-05 NOTE — MALNUTRITION/BMI
This medical record reflects one or more clinical indicators suggestive of malnutrition  Malnutrition Findings:   Adult Malnutrition type: Acute illness  Adult Degree of Malnutrition: Malnutrition of moderate degree  Malnutrition Characteristics: Inadequate energy, Weight loss, Fat loss, Muscle loss                  360 Statement: related to inadequate energy intake as evidenced by consuming < 50% of energy intake compared to estimated needs for 5 days,  2 3% (5#) weight loss in 3 days, moderate orbital hollowing, and clavicle protrusion  Treatment pending diet advancement  See Nutrition note dated 9/5/2022 for additional details  Completed nutrition assessment is viewable in the nutrition documentation

## 2022-09-05 NOTE — ASSESSMENT & PLAN NOTE
Lab Results   Component Value Date    HGBA1C 14 08/08/2022       Recent Labs     09/05/22  0819 09/05/22  0904 09/05/22  1039 09/05/22  1215   POCGLU 111 108 117 130       Blood Sugar Average: Last 72 hrs:  · (P) 203  48See plan under DKA  · Patient has been without insulin for almost a year after self discontinuing

## 2022-09-05 NOTE — ASSESSMENT & PLAN NOTE
· Alk phos elevated at 167 on arrival  · AST/ALT WNL  · Unclear etiology  · Abdominal exam benign  · RUQ ultrasound without evidence of steatosis  · Now normalized

## 2022-09-06 ENCOUNTER — APPOINTMENT (OUTPATIENT)
Dept: ULTRASOUND IMAGING | Facility: HOSPITAL | Age: 67
DRG: 871 | End: 2022-09-06
Payer: MEDICARE

## 2022-09-06 ENCOUNTER — TELEPHONE (OUTPATIENT)
Dept: UROLOGY | Facility: AMBULATORY SURGERY CENTER | Age: 67
End: 2022-09-06

## 2022-09-06 DIAGNOSIS — N30.00 ACUTE CYSTITIS WITHOUT HEMATURIA: Primary | ICD-10-CM

## 2022-09-06 LAB
ALBUMIN SERPL BCP-MCNC: 2.5 G/DL (ref 3.5–5)
ALP SERPL-CCNC: 106 U/L (ref 46–116)
ALT SERPL W P-5'-P-CCNC: 13 U/L (ref 12–78)
ANION GAP SERPL CALCULATED.3IONS-SCNC: 10 MMOL/L (ref 4–13)
ANION GAP SERPL CALCULATED.3IONS-SCNC: 11 MMOL/L (ref 4–13)
AST SERPL W P-5'-P-CCNC: 23 U/L (ref 5–45)
BASOPHILS # BLD AUTO: 0.01 THOUSANDS/ΜL (ref 0–0.1)
BASOPHILS NFR BLD AUTO: 0 % (ref 0–1)
BILIRUB SERPL-MCNC: 0.27 MG/DL (ref 0.2–1)
BUN SERPL-MCNC: 10 MG/DL (ref 5–25)
BUN SERPL-MCNC: 14 MG/DL (ref 5–25)
CA-I BLD-SCNC: 1.18 MMOL/L (ref 1.12–1.32)
CALCIUM ALBUM COR SERPL-MCNC: 9.9 MG/DL (ref 8.3–10.1)
CALCIUM SERPL-MCNC: 8.4 MG/DL (ref 8.3–10.1)
CALCIUM SERPL-MCNC: 8.7 MG/DL (ref 8.3–10.1)
CHLORIDE SERPL-SCNC: 110 MMOL/L (ref 96–108)
CHLORIDE SERPL-SCNC: 110 MMOL/L (ref 96–108)
CO2 SERPL-SCNC: 23 MMOL/L (ref 21–32)
CO2 SERPL-SCNC: 24 MMOL/L (ref 21–32)
CREAT SERPL-MCNC: 0.73 MG/DL (ref 0.6–1.3)
CREAT SERPL-MCNC: 0.87 MG/DL (ref 0.6–1.3)
EOSINOPHIL # BLD AUTO: 0 THOUSAND/ΜL (ref 0–0.61)
EOSINOPHIL NFR BLD AUTO: 0 % (ref 0–6)
ERYTHROCYTE [DISTWIDTH] IN BLOOD BY AUTOMATED COUNT: 13.5 % (ref 11.6–15.1)
GFR SERPL CREATININE-BSD FRML MDRD: 88 ML/MIN/1.73SQ M
GFR SERPL CREATININE-BSD FRML MDRD: 95 ML/MIN/1.73SQ M
GLUCOSE SERPL-MCNC: 105 MG/DL (ref 65–140)
GLUCOSE SERPL-MCNC: 114 MG/DL (ref 65–140)
GLUCOSE SERPL-MCNC: 122 MG/DL (ref 65–140)
GLUCOSE SERPL-MCNC: 132 MG/DL (ref 65–140)
GLUCOSE SERPL-MCNC: 141 MG/DL (ref 65–140)
GLUCOSE SERPL-MCNC: 146 MG/DL (ref 65–140)
GLUCOSE SERPL-MCNC: 154 MG/DL (ref 65–140)
GLUCOSE SERPL-MCNC: 156 MG/DL (ref 65–140)
GLUCOSE SERPL-MCNC: 161 MG/DL (ref 65–140)
GLUCOSE SERPL-MCNC: 187 MG/DL (ref 65–140)
GLUCOSE SERPL-MCNC: 196 MG/DL (ref 65–140)
GLUCOSE SERPL-MCNC: 199 MG/DL (ref 65–140)
GLUCOSE SERPL-MCNC: 205 MG/DL (ref 65–140)
GLUCOSE SERPL-MCNC: 215 MG/DL (ref 65–140)
HCT VFR BLD AUTO: 32.5 % (ref 36.5–49.3)
HGB BLD-MCNC: 11 G/DL (ref 12–17)
IMM GRANULOCYTES # BLD AUTO: 0.04 THOUSAND/UL (ref 0–0.2)
IMM GRANULOCYTES NFR BLD AUTO: 0 % (ref 0–2)
LIPASE SERPL-CCNC: 233 U/L (ref 73–393)
LYMPHOCYTES # BLD AUTO: 1.63 THOUSANDS/ΜL (ref 0.6–4.47)
LYMPHOCYTES NFR BLD AUTO: 15 % (ref 14–44)
MAGNESIUM SERPL-MCNC: 2.1 MG/DL (ref 1.6–2.6)
MAGNESIUM SERPL-MCNC: 2.3 MG/DL (ref 1.6–2.6)
MCH RBC QN AUTO: 29.7 PG (ref 26.8–34.3)
MCHC RBC AUTO-ENTMCNC: 33.8 G/DL (ref 31.4–37.4)
MCV RBC AUTO: 88 FL (ref 82–98)
MONOCYTES # BLD AUTO: 0.56 THOUSAND/ΜL (ref 0.17–1.22)
MONOCYTES NFR BLD AUTO: 5 % (ref 4–12)
NEUTROPHILS # BLD AUTO: 8.68 THOUSANDS/ΜL (ref 1.85–7.62)
NEUTS SEG NFR BLD AUTO: 80 % (ref 43–75)
NRBC BLD AUTO-RTO: 0 /100 WBCS
PHOSPHATE SERPL-MCNC: 1.7 MG/DL (ref 2.3–4.1)
PLATELET # BLD AUTO: 203 THOUSANDS/UL (ref 149–390)
PMV BLD AUTO: 10.9 FL (ref 8.9–12.7)
POTASSIUM SERPL-SCNC: 2.7 MMOL/L (ref 3.5–5.3)
POTASSIUM SERPL-SCNC: 3.1 MMOL/L (ref 3.5–5.3)
PROT SERPL-MCNC: 6.4 G/DL (ref 6.4–8.4)
RBC # BLD AUTO: 3.7 MILLION/UL (ref 3.88–5.62)
SODIUM SERPL-SCNC: 143 MMOL/L (ref 135–147)
SODIUM SERPL-SCNC: 145 MMOL/L (ref 135–147)
WBC # BLD AUTO: 10.92 THOUSAND/UL (ref 4.31–10.16)

## 2022-09-06 PROCEDURE — 80048 BASIC METABOLIC PNL TOTAL CA: CPT | Performed by: NURSE PRACTITIONER

## 2022-09-06 PROCEDURE — 76770 US EXAM ABDO BACK WALL COMP: CPT

## 2022-09-06 PROCEDURE — 80053 COMPREHEN METABOLIC PANEL: CPT

## 2022-09-06 PROCEDURE — 85025 COMPLETE CBC W/AUTO DIFF WBC: CPT

## 2022-09-06 PROCEDURE — 99232 SBSQ HOSP IP/OBS MODERATE 35: CPT | Performed by: NURSE PRACTITIONER

## 2022-09-06 PROCEDURE — 99222 1ST HOSP IP/OBS MODERATE 55: CPT | Performed by: INTERNAL MEDICINE

## 2022-09-06 PROCEDURE — 84100 ASSAY OF PHOSPHORUS: CPT

## 2022-09-06 PROCEDURE — 82948 REAGENT STRIP/BLOOD GLUCOSE: CPT

## 2022-09-06 PROCEDURE — 83735 ASSAY OF MAGNESIUM: CPT

## 2022-09-06 PROCEDURE — C9113 INJ PANTOPRAZOLE SODIUM, VIA: HCPCS

## 2022-09-06 PROCEDURE — 99232 SBSQ HOSP IP/OBS MODERATE 35: CPT | Performed by: INTERNAL MEDICINE

## 2022-09-06 PROCEDURE — 99233 SBSQ HOSP IP/OBS HIGH 50: CPT | Performed by: STUDENT IN AN ORGANIZED HEALTH CARE EDUCATION/TRAINING PROGRAM

## 2022-09-06 PROCEDURE — 83690 ASSAY OF LIPASE: CPT | Performed by: NURSE PRACTITIONER

## 2022-09-06 PROCEDURE — 83735 ASSAY OF MAGNESIUM: CPT | Performed by: NURSE PRACTITIONER

## 2022-09-06 PROCEDURE — 82330 ASSAY OF CALCIUM: CPT

## 2022-09-06 RX ORDER — SULFAMETHOXAZOLE AND TRIMETHOPRIM 800; 160 MG/1; MG/1
1 TABLET ORAL EVERY 12 HOURS SCHEDULED
Qty: 10 TABLET | Refills: 0 | Status: SHIPPED | OUTPATIENT
Start: 2022-09-06 | End: 2022-09-20

## 2022-09-06 RX ORDER — INSULIN GLARGINE 100 [IU]/ML
40 INJECTION, SOLUTION SUBCUTANEOUS EVERY MORNING
Status: DISCONTINUED | OUTPATIENT
Start: 2022-09-06 | End: 2022-09-08

## 2022-09-06 RX ORDER — INSULIN LISPRO 100 [IU]/ML
2-12 INJECTION, SOLUTION INTRAVENOUS; SUBCUTANEOUS
Status: DISCONTINUED | OUTPATIENT
Start: 2022-09-06 | End: 2022-09-10 | Stop reason: HOSPADM

## 2022-09-06 RX ORDER — MAGNESIUM SULFATE HEPTAHYDRATE 40 MG/ML
2 INJECTION, SOLUTION INTRAVENOUS ONCE
Status: COMPLETED | OUTPATIENT
Start: 2022-09-06 | End: 2022-09-06

## 2022-09-06 RX ORDER — INSULIN LISPRO 100 [IU]/ML
8 INJECTION, SOLUTION INTRAVENOUS; SUBCUTANEOUS
Status: DISCONTINUED | OUTPATIENT
Start: 2022-09-06 | End: 2022-09-08

## 2022-09-06 RX ORDER — POTASSIUM CHLORIDE 14.9 MG/ML
20 INJECTION INTRAVENOUS
Status: COMPLETED | OUTPATIENT
Start: 2022-09-06 | End: 2022-09-06

## 2022-09-06 RX ORDER — POTASSIUM CHLORIDE 14.9 MG/ML
20 INJECTION INTRAVENOUS ONCE
Status: COMPLETED | OUTPATIENT
Start: 2022-09-06 | End: 2022-09-07

## 2022-09-06 RX ORDER — INSULIN LISPRO 100 [IU]/ML
1-5 INJECTION, SOLUTION INTRAVENOUS; SUBCUTANEOUS
Status: DISCONTINUED | OUTPATIENT
Start: 2022-09-06 | End: 2022-09-10 | Stop reason: HOSPADM

## 2022-09-06 RX ADMIN — DEXTROSE, SODIUM CHLORIDE, SODIUM LACTATE, POTASSIUM CHLORIDE, AND CALCIUM CHLORIDE 125 ML/HR: 5; .6; .31; .03; .02 INJECTION, SOLUTION INTRAVENOUS at 03:58

## 2022-09-06 RX ADMIN — HEPARIN SODIUM 5000 UNITS: 5000 INJECTION INTRAVENOUS; SUBCUTANEOUS at 21:22

## 2022-09-06 RX ADMIN — POTASSIUM CHLORIDE 20 MEQ: 14.9 INJECTION, SOLUTION INTRAVENOUS at 09:37

## 2022-09-06 RX ADMIN — Medication 1500 MG: at 21:13

## 2022-09-06 RX ADMIN — INSULIN GLARGINE 40 UNITS: 100 INJECTION, SOLUTION SUBCUTANEOUS at 14:26

## 2022-09-06 RX ADMIN — DEXTROSE, SODIUM CHLORIDE, SODIUM LACTATE, POTASSIUM CHLORIDE, AND CALCIUM CHLORIDE 75 ML/HR: 5; .6; .31; .03; .02 INJECTION, SOLUTION INTRAVENOUS at 14:26

## 2022-09-06 RX ADMIN — INSULIN LISPRO 8 UNITS: 100 INJECTION, SOLUTION INTRAVENOUS; SUBCUTANEOUS at 17:54

## 2022-09-06 RX ADMIN — PANTOPRAZOLE SODIUM 40 MG: 40 INJECTION, POWDER, FOR SOLUTION INTRAVENOUS at 08:06

## 2022-09-06 RX ADMIN — INSULIN LISPRO 1 UNITS: 100 INJECTION, SOLUTION INTRAVENOUS; SUBCUTANEOUS at 21:27

## 2022-09-06 RX ADMIN — CEFTRIAXONE 1000 MG: 1 INJECTION, SOLUTION INTRAVENOUS at 06:10

## 2022-09-06 RX ADMIN — HEPARIN SODIUM 5000 UNITS: 5000 INJECTION INTRAVENOUS; SUBCUTANEOUS at 06:20

## 2022-09-06 RX ADMIN — MAGNESIUM SULFATE HEPTAHYDRATE 2 G: 40 INJECTION, SOLUTION INTRAVENOUS at 08:41

## 2022-09-06 RX ADMIN — POTASSIUM CHLORIDE 20 MEQ: 14.9 INJECTION, SOLUTION INTRAVENOUS at 08:06

## 2022-09-06 RX ADMIN — Medication 1500 MG: at 12:20

## 2022-09-06 RX ADMIN — HEPARIN SODIUM 5000 UNITS: 5000 INJECTION INTRAVENOUS; SUBCUTANEOUS at 14:26

## 2022-09-06 RX ADMIN — POTASSIUM CHLORIDE 20 MEQ: 14.9 INJECTION, SOLUTION INTRAVENOUS at 22:32

## 2022-09-06 RX ADMIN — POTASSIUM CHLORIDE 20 MEQ: 14.9 INJECTION, SOLUTION INTRAVENOUS at 11:27

## 2022-09-06 NOTE — ASSESSMENT & PLAN NOTE
Lab Results   Component Value Date    HGBA1C >14 0 (H) 09/05/2022       Recent Labs     09/05/22  1410 09/05/22  1605 09/05/22  1811 09/05/22 2012   POCGLU 146* 147* 121 139       Blood Sugar Average: Last 72 hrs:  · (P) 194 [id number] plan under DKA  · Patient has been without insulin for almost a year after self discontinuing

## 2022-09-06 NOTE — PROGRESS NOTES
[address] [facility]  Progress Note - [address]  1955, 75 y o  male MRN: [id number]  Unit/Bed#:  Encounter: [id number]  Primary Care Provider: Jennie Powell MD   Date and time admitted to hospital: 9/4/2022  4:43 AM    * DKA (diabetic ketoacidosis) [facility])  Assessment & Plan  Lab Results   Component Value Date    HGBA1C >14 0 (H) 09/05/2022       Recent Labs     09/05/22  1410 09/05/22  1605 09/05/22  1811 09/05/22 2012   POCGLU 146* 147* 121 139       Blood Sugar Average: Last 72 hrs:  · Patient has not been taking insulin for several months after losing weight  · Hgb A1C 14  · Glucose 800, anion gap >35, pH 6 858, base deficit 29 4, beta hydroxybutyrate 5 2  · Received insulin bolus followed by DKA insulin gtt  · Gap now closed and glucose controlled  · Changed to non DKA insulin gtt  · Continue D5LR while NPO  · Replete electrolytes as needed  · NPO  · Consult endocrinology    Pancreatitis  Assessment & Plan  · Lipase elevated on arrival 1091  · CT revealed "Mild peripancreatic inflammatory stranding with associated  thickening of the left anterior pararenal fascia, correlate with the lipase levels for pancreatitis   No acute fluid collection seen "  · Lipid panel and RUQ benign  · Wife and daughter deny patient abuses alcohol  · Patient denies abdominal pain now or PTA  · Abdominal exam benign, monitor   · Fluids  · NPO  · Monitor off antibitoics  · Consult GI    Sepsis ([address] )  Assessment & Plan  · Met sepsis criteria in the ED as evidenced by tachycardia, tachypnea, and leukocytosis  · Unclear if true sepsis vs sirs criteria in the setting of DKA and multiple metabolic derangements  · Recent UA from 9/2 showed large leukocytes and occasional bacteria, culture pending  · Urine culture from 9/2 + coag negative staph  · Received rocephin in the ED  · Continue rocpehin Q24h  · Trend procalcitonin  · Follow up on BC and urine culture results  · Trend WBC/temperature curve    New onset atrial fibrillation (HCC)  Assessment & Plan  · Noted to be in atrial fibrillation with RVR on ED EKG  · Patient spontaneously converted to sinus tachycardia prior to arrival to ICU  · Likely 2/2 severe metabolic derangements  · Has maintained SR since  · Monitor telemetry    CARLTON (acute kidney injury) ([address] )  Assessment & Plan  · CARLTON POA with initial creatinine of 2 28  · Patient previously followed with health system in [place], now resides here but do not have old records to determine baseline  · Continue lemons  · Monitor I&O  · Avoid nephrotoxins  · Monitor renal indices    Acute metabolic encephalopathy  Assessment & Plan  · Patient presents with altered mental status  · Improved to baseline  · Monitor CAM ICU  · Monitor neuro exams    Nocturnal enuresis  Assessment & Plan  · Patient had recent OP appointment with urology with compliant of nocturnal incontinence for about 8 months  · Also reports dribbling after voiding during the day  · Likely due to uncontrolled DM  · Lemons in place  · Monitor I&O  · Urology following    Type 2 diabetes mellitus [facility])  Assessment & Plan  Lab Results   Component Value Date    HGBA1C >14 0 (H) 09/05/2022       Recent Labs     09/05/22  1410 09/05/22  1605 09/05/22  1811 09/05/22 2012   POCGLU 146* 147* 121 139       Blood Sugar Average: Last 72 hrs:  · (P) 194 [id number] plan under DKA  · Patient has been without insulin for almost a year after self discontinuing     ----------------------------------------------------------------------------------------  HPI/24hr events:  Hemodynamically stable overnight  Continues to be nauseous, NG tube remains to low wall suction with bilious drainage      Patient appropriate for transfer out of the ICU today?: No  Disposition: Continue Stepdown Level 1 level of care   Code Status: Level 1 - Full Code  ---------------------------------------------------------------------------------------  SUBJECTIVE  No complaints offered overnight    Review of Systems  Review of systems was reviewed and negative unless stated above in HPI/24-hour events   ---------------------------------------------------------------------------------------  OBJECTIVE    Vitals   Vitals:    22 1900 22 22022 0000   BP: 122/57 125/68 121/65 116/67   BP Location:  Right arm     Pulse: 93 97 92 94   Resp:  16     Temp: 98 96 °F (37 2 °C) 98 96 °F (37 2 °C) 99 °F (37 2 °C) 98 78 °F (37 1 °C)   TempSrc:  Bladder     SpO2: 99% 99% 98% 99%   Weight:       Height:         Temp (24hrs), Av 8 °F (37 1 °C), Min:98 4 °F (36 9 °C), Max:99 °F (37 2 °C)  Current: Temperature: 98 78 °F (37 1 °C)  Arterial Line BP: 107/51  Arterial Line MAP (mmHg): 68 mmHg    Respiratory:  SpO2: SpO2: 99 %       Invasive/non-invasive ventilation settings   Respiratory  Report   Lab Data (Last 4 hours)    None         O2/Vent Data (Last 4 hours)    None                Physical Exam  GEN:  Ill-appearing  HEENT:  Sclera anicteric, mucous membranes pink and moist, conjunctiva pink, no santos/rhinorrhea  Neck:  No lymphadenopathy, normal ROM, supple, no bruits  CV :  S1S2, no murmurs, rubs or gallops  Intact distal pulses  No JVD  Resp:  Lungs CTA =B/L  No subq air or crepitus  Symmetrical expansion  No cough noted    GI :  Abd soft, nontender, no guarding/rebound, mildly distended, hypoactive bowel sounds X4 quads  Neuro:  CN II-XII grossly intact, nonfocal exam, speech clear, GCS 15        Laboratory and Diagnostics:  Results from last 7 days   Lab Units 22  0438 22  0502   WBC Thousand/uL 16 38* 19 78*   HEMOGLOBIN g/dL 14 1 15 3   HEMATOCRIT % 40 6 49 8*   PLATELETS Thousands/uL 261 372   NEUTROS PCT % 84*  --    MONOS PCT % 7  --      Results from last 7 days   Lab Units 22  0828 22  0438 22  0009 22  2034 22  1626 22  1231 22  1110 22  0950 22  0850 22  0502   SODIUM mmol/L 141 141 143 141 136 135  --  134*   < > 128*   POTASSIUM mmol/L 3 9 3 8 3 7 3 3* 3 5 4 6  --  4 0   < > 5 6*   CHLORIDE mmol/L 109* 108 108 107 102 94*  --  95*   < > 88*   CO2 mmol/L 21 22 20* 19* 13* 9*  --  6*   < > 5*   ANION GAP mmol/L 11 11 15* 15* 21* 32*  --  33*  --  35*   BUN mg/dL 25 28* 30* 31* 33* 39*  --  37*   < > 38*   CREATININE mg/dL 1 04 1 19 1 31* 1 34* 1 48* 1 81*  --  1 75*   < > 2 29*   CALCIUM mg/dL 8 6 8 7 9 2 8 5 8 4 9 4  --  7 8*   < > 9 4   GLUCOSE RANDOM mg/dL 99 96 122 179* 302* 504*  504* 187* 571*  626*   < > 800*   ALT U/L  --  16  --   --   --   --   --   --   --  24   AST U/L  --  20  --   --   --   --   --   --   --  20   ALK PHOS U/L  --  109  --   --   --   --   --   --   --  167*   ALBUMIN g/dL  --  2 6*  --   --   --   --   --   --   --  3 4*   TOTAL BILIRUBIN mg/dL  --  0 19*  --   --   --   --   --   --   --  0 39    < > = values in this interval not displayed  Results from last 7 days   Lab Units 09/05/22  0828 09/05/22  0438 09/05/22  0009 09/04/22 2034 09/04/22  1626 09/04/22  1231 09/04/22  0950   MAGNESIUM mg/dL 2 2 2 3 2 6 1 9 2 1 2 3 2 1   PHOSPHORUS mg/dL 2 7 2 2* 1 9* 1 1* 1 3* 3 8 4 4*               Results from last 7 days   Lab Units 09/04/22  1110 09/04/22  0850 09/04/22  0502   LACTIC ACID mmol/L 0 8 2 8* 4 5*     ABG:  Results from last 7 days   Lab Units 09/04/22 2034   PH ART  7 373   PCO2 ART mm Hg 32 3*   PO2 ART mm Hg 90 5   HCO3 ART mmol/L 18 4*   BASE EXC ART mmol/L -5 7   ABG SOURCE  Line, Arterial     VBG:  Results from last 7 days   Lab Units 09/05/22  0828 09/05/22  0044 09/04/22 2034   PH NAJMA  7 375   < >  --    PCO2 NAJMA mm Hg 37 4*   < >  --    PO2 NAJMA mm Hg 48 7*   < >  --    HCO3 NAJMA mmol/L 21 4*   < >  --    BASE EXC NAJMA mmol/L -3 3   < >  --    ABG SOURCE   --   --  Line, Arterial    < > = values in this interval not displayed       Results from last 7 days   Lab Units 09/05/22  0438 09/04/22  0604   PROCALCITONIN ng/ml 0 37* 0 16       Micro  Results from last 7 days   Lab Units 09/04/22  0604 09/02/22  1055   BLOOD CULTURE  No Growth at 24 hrs  No Growth at 24 hrs   --    URINE CULTURE   --  20,000-29,000 cfu/ml Staphylococcus coagulase negative*       EKG:  Sinus rhythm  Imaging: I have personally reviewed pertinent reports  and I have personally reviewed pertinent films in PACS    Intake and Output  I/O       09/04 0701 09/05 0700 09/05 0701 09/06 0700    P  O   0    I V  (mL/kg) 7701 8 (78 7) 3091 6 (31 6)    IV Piggyback 850 150    Total Intake(mL/kg) 8551 8 (87 4) 3241 6 (33 1)    Urine (mL/kg/hr) 6300 (2 7) 1975 (0 8)    Emesis/NG output 450 300    Stool 0 0    Total Output 6750 2275    Net +1801 8 +966 6          Unmeasured Stool Occurrence 1 x 1 x          Height and Weights   Height: 6' (182 9 cm)  IBW (Ideal Body Weight): 77 6 kg  Body mass index is 29 27 kg/m²  Weight (last 2 days)     Date/Time Weight    09/05/22 0600 97 9 (215 83)    09/04/22 0956 97 9 (215 83)            Nutrition       Diet Orders   (From admission, onward)             Start     Ordered    09/05/22 1814  Diet NPO  Diet effective now        References:    Nutrtion Support Algorithm Enteral vs  Parenteral   Question Answer Comment   Diet Type NPO    RD to adjust diet per protocol?  No        09/05/22 1813                  Active Medications  Scheduled Meds:  Current Facility-Administered Medications   Medication Dose Route Frequency Provider Last Rate    cefTRIAXone  1,000 mg Intravenous Q24H Basil Oas, CRNP 1,000 mg (09/05/22 0551)    dextrose 5% lactated ringer's  125 mL/hr Intravenous Continuous Basil Oas, CRNP 125 mL/hr (09/06/22 0358)    heparin (porcine)  5,000 Units Subcutaneous [facility] Basil Oas, CRNP      insulin regular (HumuLIN R,NovoLIN R) infusion  0 3-21 Units/hr Intravenous Titrated Basil Oas, CRNP 6 Units/hr (09/06/22 0356)    pantoprazole  40 mg Intravenous Q24H [address] Basil Oas, CRNP      sodium chloride (PF)  3 mL Intravenous Q1H PRN LEEANNA Otero       Continuous Infusions:  dextrose 5% lactated ringer's, 125 mL/hr, Last Rate: 125 mL/hr (09/06/22 0358)  insulin regular (HumuLIN R,NovoLIN R) infusion, 0 3-21 Units/hr, Last Rate: 6 Units/hr (09/06/22 0356)      PRN Meds:   sodium chloride (PF), 3 mL, Q1H PRN        Invasive Devices Review  Invasive Devices  Report    Peripheral Intravenous Line  Duration           Peripheral IV 09/04/22 Left Antecubital 2 days    Peripheral IV 09/04/22 Right Antecubital 2 days    Peripheral IV 09/04/22 Right Wrist 2 days          Drain  Duration           NG/OG/Enteral Tube Nasogastric 16 Fr Right nare 1 day    Urethral Catheter 16 Fr  1 day                Rationale for remaining devices:  Pancreatitis  ---------------------------------------------------------------------------------------  Advance Directive and Living Will:      Power of :    POLST:    ---------------------------------------------------------------------------------------  Care Time Delivered:   No Critical Care time spent       [facility]      Portions of the record may have been created with voice recognition software  Occasional wrong word or "sound a like" substitutions may have occurred due to the inherent limitations of voice recognition software    Read the chart carefully and recognize, using context, where substitutions have occurred

## 2022-09-06 NOTE — OCCUPATIONAL THERAPY NOTE
Occupational Therapy Cancellation Note        Patient Name: Wojciech Pulliam    Today's Date: 9/6/2022

## 2022-09-06 NOTE — PLAN OF CARE
Problem: Potential for Falls  Goal: Patient will remain free of falls  Description: INTERVENTIONS:  - Educate patient/family on patient safety including physical limitations  - Instruct patient to call for assistance with activity   - Consult OT/PT to assist with strengthening/mobility   - Keep Call bell within reach  - Keep bed low and locked with side rails adjusted as appropriate  - Keep care items and personal belongings within reach  - Initiate and maintain comfort rounds  - Make Fall Risk Sign visible to staff  - Offer Toileting every 2 Hours, in advance of need  - Initiate/Maintain bed alarm  - Apply yellow socks and bracelet for high fall risk patients  - Consider moving patient to room near nurses station  Outcome: Progressing     Problem: MOBILITY - ADULT  Goal: Maintain or return to baseline ADL function  Description: INTERVENTIONS:  -  Assess patient's ability to carry out ADLs; assess patient's baseline for ADL function and identify physical deficits which impact ability to perform ADLs (bathing, care of mouth/teeth, toileting, grooming, dressing, etc )  - Assess/evaluate cause of self-care deficits   - Assess range of motion  - Assess patient's mobility; develop plan if impaired  - Assess patient's need for assistive devices and provide as appropriate  - Encourage maximum independence but intervene and supervise when necessary  - Involve family in performance of ADLs  - Assess for home care needs following discharge   - Consider OT consult to assist with ADL evaluation and planning for discharge  - Provide patient education as appropriate  Outcome: Progressing     Problem: Prexisting or High Potential for Compromised Skin Integrity  Goal: Skin integrity is maintained or improved  Description: INTERVENTIONS:  - Identify patients at risk for skin breakdown  - Assess and monitor skin integrity  - Assess and monitor nutrition and hydration status  - Monitor labs   - Assess for incontinence   - Turn and reposition patient  - Assist with mobility/ambulation  - Relieve pressure over bony prominences  - Avoid friction and shearing  - Provide appropriate hygiene as needed including keeping skin clean and dry  - Evaluate need for skin moisturizer/barrier cream  - Collaborate with interdisciplinary team   - Patient/family teaching  - Consider wound care consult   Outcome: Progressing     Problem: PAIN - ADULT  Goal: Verbalizes/displays adequate comfort level or baseline comfort level  Description: Interventions:  - Encourage patient to monitor pain and request assistance  - Assess pain using appropriate pain scale  - Administer analgesics based on type and severity of pain and evaluate response  - Implement non-pharmacological measures as appropriate and evaluate response  - Consider cultural and social influences on pain and pain management  - Notify physician/advanced practitioner if interventions unsuccessful or patient reports new pain  Outcome: Progressing     Problem: INFECTION - ADULT  Goal: Absence or prevention of progression during hospitalization  Description: INTERVENTIONS:  - Assess and monitor for signs and symptoms of infection  - Monitor lab/diagnostic results  - Monitor all insertion sites, i e  indwelling lines, tubes, and drains  - Monitor endotracheal if appropriate and nasal secretions for changes in amount and color  - Coleman appropriate cooling/warming therapies per order  - Administer medications as ordered  - Instruct and encourage patient and family to use good hand hygiene technique  - Identify and instruct in appropriate isolation precautions for identified infection/condition  Outcome: Progressing     Problem: SAFETY ADULT  Goal: Patient will remain free of falls  Description: INTERVENTIONS:  - Educate patient/family on patient safety including physical limitations  - Instruct patient to call for assistance with activity   - Consult OT/PT to assist with strengthening/mobility   - Keep Call bell within reach  - Keep bed low and locked with side rails adjusted as appropriate  - Keep care items and personal belongings within reach  - Initiate and maintain comfort rounds  - Make Fall Risk Sign visible to staff  - Offer Toileting every 2 Hours, in advance of need  - Initiate/Maintain bed alarm  - Apply yellow socks and bracelet for high fall risk patients  - Consider moving patient to room near nurses station  Outcome: Progressing     Problem: DISCHARGE PLANNING  Goal: Discharge to home or other facility with appropriate resources  Description: INTERVENTIONS:  - Identify barriers to discharge w/patient and caregiver  - Arrange for needed discharge resources and transportation as appropriate  - Identify discharge learning needs (meds, wound care, etc )  - Arrange for interpretive services to assist at discharge as needed  - Refer to Case Management Department for coordinating discharge planning if the patient needs post-hospital services based on physician/advanced practitioner order or complex needs related to functional status, cognitive ability, or social support system  Outcome: Progressing     Problem: Knowledge Deficit  Goal: Patient/family/caregiver demonstrates understanding of disease process, treatment plan, medications, and discharge instructions  Description: Complete learning assessment and assess knowledge base    Interventions:  - Provide teaching at level of understanding  - Provide teaching via preferred learning methods  Outcome: Progressing     Problem: NEUROSENSORY - ADULT  Goal: Achieves stable or improved neurological status  Description: INTERVENTIONS  - Monitor and report changes in neurological status  - Monitor vital signs such as temperature, blood pressure, glucose, and any other labs ordered   - Initiate measures to prevent increased intracranial pressure  - Monitor for seizure activity and implement precautions if appropriate      Outcome: Progressing     Problem: CARDIOVASCULAR - ADULT  Goal: Maintains optimal cardiac output and hemodynamic stability  Description: INTERVENTIONS:  - Monitor I/O, vital signs and rhythm  - Monitor for S/S and trends of decreased cardiac output  - Administer and titrate ordered vasoactive medications to optimize hemodynamic stability  - Assess quality of pulses, skin color and temperature  - Assess for signs of decreased coronary artery perfusion  - Instruct patient to report change in severity of symptoms  Outcome: Progressing  Goal: Absence of cardiac dysrhythmias or at baseline rhythm  Description: INTERVENTIONS:  - Continuous cardiac monitoring, vital signs, obtain 12 lead EKG if ordered  - Administer antiarrhythmic and heart rate control medications as ordered  - Monitor electrolytes and administer replacement therapy as ordered  Outcome: Progressing     Problem: GASTROINTESTINAL - ADULT  Goal: Minimal or absence of nausea and/or vomiting  Description: INTERVENTIONS:  - Administer IV fluids if ordered to ensure adequate hydration  - Maintain NPO status until nausea and vomiting are resolved  - Nasogastric tube if ordered  - Administer ordered antiemetic medications as needed  - Provide nonpharmacologic comfort measures as appropriate  - Advance diet as tolerated, if ordered  - Consider nutrition services referral to assist patient with adequate nutrition and appropriate food choices  Outcome: Progressing  Goal: Maintains or returns to baseline bowel function  Description: INTERVENTIONS:  - Assess bowel function  - Encourage oral fluids to ensure adequate hydration  - Administer IV fluids if ordered to ensure adequate hydration  - Administer ordered medications as needed  - Encourage mobilization and activity  - Consider nutritional services referral to assist patient with adequate nutrition and appropriate food choices  Outcome: Progressing  Goal: Oral mucous membranes remain intact  Description: INTERVENTIONS  - Assess oral mucosa and hygiene practices  - Implement preventative oral hygiene regimen  - Implement oral medicated treatments as ordered  - Initiate Nutrition services referral as needed  Outcome: Progressing     Problem: GENITOURINARY - ADULT  Goal: Urinary catheter remains patent  Description: INTERVENTIONS:  - Assess patency of urinary catheter  - If patient has a chronic lemons, consider changing catheter if non-functioning  - Follow guidelines for intermittent irrigation of non-functioning urinary catheter  Outcome: Progressing     Problem: METABOLIC, FLUID AND ELECTROLYTES - ADULT  Goal: Electrolytes maintained within normal limits  Description: INTERVENTIONS:  - Monitor labs and assess patient for signs and symptoms of electrolyte imbalances  - Administer electrolyte replacement as ordered  - Monitor response to electrolyte replacements, including repeat lab results as appropriate  - Instruct patient on fluid and nutrition as appropriate  Outcome: Progressing  Goal: Fluid balance maintained  Description: INTERVENTIONS:  - Monitor labs   - Monitor I/O and WT  - Instruct patient on fluid and nutrition as appropriate  - Assess for signs & symptoms of volume excess or deficit  Outcome: Progressing  Goal: Glucose maintained within target range  Description: INTERVENTIONS:  - Monitor Blood Glucose as ordered  - Assess for signs and symptoms of hyperglycemia and hypoglycemia  - Administer ordered medications to maintain glucose within target range  - Assess nutritional intake and initiate nutrition service referral as needed  Outcome: Progressing     Problem: Nutrition/Hydration-ADULT  Goal: Nutrient/Hydration intake appropriate for improving, restoring or maintaining nutritional needs  Description: Monitor and assess patient's nutrition/hydration status for malnutrition  Collaborate with interdisciplinary team and initiate plan and interventions as ordered  Monitor patient's weight and dietary intake as ordered or per policy   Utilize nutrition screening tool and intervene as necessary  Determine patient's food preferences and provide high-protein, high-caloric foods as appropriate       INTERVENTIONS:  - Monitor oral intake, urinary output, labs, and treatment plans  - Assess nutrition and hydration status and recommend course of action  - Evaluate amount of meals eaten  - Assist patient with eating if necessary   - Allow adequate time for meals  - Recommend/ encourage appropriate diets, oral nutritional supplements, and vitamin/mineral supplements  - Order, calculate, and assess calorie counts as needed  - Recommend, monitor, and adjust tube feedings and TPN/PPN based on assessed needs  - Assess need for intravenous fluids  - Provide specific nutrition/hydration education as appropriate  - Include patient/family/caregiver in decisions related to nutrition  Outcome: Progressing

## 2022-09-06 NOTE — CONSULTS
Consultation - Olu Mark  75 y o  male MRN: [id number]    Unit/Bed#:  Encounter: [id number]    REQUIRED DOCUMENTATION:     1  This service was provided via Telemedicine  2  Provider located at [address] [place] [address]   3  TeleMed provider: Spencer Kitchen MD   4  Identify all parties in room with patient during tele consult:  NA   5  Patient was then informed that this was a Telemedicine visit and that the exam was being conducted confidentially over secure lines  My office door was closed  No one else was in the room  Patient acknowledged consent and understanding of privacy and security of the Telemedicine visit, and gave us permission to have the assistant stay in the room in order to assist with the history and to conduct the exam   I informed the patient that I have reviewed their record in Epic and presented the opportunity for them to ask any questions regarding the visit today  The patient agreed to participate  Assessment/Plan     Assessment: This is a 75y o -year-old male with type 1 diabetes presented with diabetic ketoacidosis and pancreatitis  Plan:  -anion gap is closed, as per most recent blood work from September 6, 2022, will switch to basal bolus insulin regimen   -start Lantus 40 units now, continue Lantus daily in the morning    After giving Lantus stop insulin infusion 2 hours later  -start Humalog 8 units with meals, hold if patient is not eating or NPO  -start Humalog sliding scale with meals and at bedtime   -will order nutrition consult while patient is inpatient   -patient will need basal bolus insulin regimen for discharge, discussed explain to patient and importance of follow-up with endocrinology on outpatient basis, as he is interested in continues glucose monitor sensor as well as insulin pump   -patient will need insulin pen teaching, prescription for insulin pens, glucometer and supplies on discharge      Upon discharge, if Lantus is not the preferred basal insulin for the patient's insurance company, we can use Tresiba, Basaglar, Levemir, Toujeo at the same dose instead      Upon discharge, if Humalog is not the preferred mealtime insulin for the patient's insurance, we can use NovoLog, Fiasp, or Apidra at the same dose instead      If Novolin 70/30 is more affordable given work and insurance/affordability concerns, total daily dose of insulin would be the same split into twice a day with breakfast and dinner    2  Elevated lipase/peripancreatic stranding-Lab is elevated up to 1091, was evaluated by Gastroenterology, patient was started on NG tube, for gastric distention  GI following    Thank you for consulting Endocrinology  Please do not hesitate to call if he any questions    CC: Diabetes Consult    History of Present Illness     HPI: Isaiah Mallory  is a 75y o  year old male with type 1 diabetes, insulin dependent was admitted to the hospital for severe urology complaints such as urinary urgency, frequency urge incontinence, nocturia and balanitis  On admission he was found to have elevated blood sugar in 800 range, with anion gap of 36, bicarb 5, and lipase in 1000 range, suggestive of diabetic ketoacidosis  Patient was started on insulin infusion, as per DKA protocol, currently patient is getting 3-6 units/hour of insulin infusion  Blood sugars have improved in 200 mg/dL range  Patient gives history of diabetes for last 3-4 years and was started on insulin however for past 1 year he has not been taking any insulin as suggested  He has not seen PCP for follow-up  He complains of excess urination excessive thirst, weight loss/  Is currently NPO    CT scan of abdomen on September 4 showed peripancreatic inflammatory stranding seen    There is thickening of left anterior pararenal facia      Inpatient consult to Endocrinology  Consult performed by: Solo Kennedy MD  Consult ordered by: Nolvia Lyons LEEANNA          Review of Systems   Constitutional: Positive for activity change and fatigue  Negative for diaphoresis, fever and unexpected weight change  HENT: Negative  Eyes: Negative for visual disturbance  Respiratory: Negative for cough, chest tightness and shortness of breath  Cardiovascular: Negative for chest pain, palpitations and leg swelling  Gastrointestinal: Positive for abdominal pain  Negative for blood in stool, constipation, diarrhea, nausea and vomiting  Endocrine: Positive for polydipsia, polyphagia and polyuria  Negative for cold intolerance and heat intolerance  Genitourinary: Positive for enuresis, frequency and urgency  Negative for dysuria  Musculoskeletal: Negative for arthralgias and myalgias  Skin: Negative for pallor, rash and wound  Allergic/Immunologic: Negative  Neurological: Negative for dizziness, tremors, weakness and numbness  Hematological: Negative  Psychiatric/Behavioral: The patient is hyperactive          Historical Information   Past Medical History:   Diagnosis Date    Diabetes mellitus ([facility] [address] )      Past Surgical History:   Procedure Laterality Date    KNEE CARTILAGE SURGERY       Social History   Social History     Substance and Sexual Activity   Alcohol Use Never     Social History     Substance and Sexual Activity   Drug Use Never     Social History     Tobacco Use   Smoking Status Never Smoker   Smokeless Tobacco Never Used     Family History:   Family History   Problem Relation Age of Onset    Diabetes Father     Diabetes Mother        Meds/Allergies   Current Facility-Administered Medications   Medication Dose Route Frequency Provider Last Rate Last Admin    dextrose 5 % in lactated Ringer's infusion  75 mL/hr Intravenous Continuous LEEANNA Benito 75 mL/hr at 09/06/22 1044 75 mL/hr at 09/06/22 1044    heparin (porcine) subcutaneous injection 5,000 Units  5,000 Units Subcutaneous [facility] LEEANNA Benito   5,000 Units at 09/06/22 0620    insulin glargine (LANTUS) subcutaneous injection 40 Units 0 4 mL  40 Units Subcutaneous QAM Ynes Connell MD        insulin lispro (HumaLOG) 100 units/mL subcutaneous injection 1-5 Units  1-5 Units Subcutaneous HS Ynes Connell MD        insulin lispro (HumaLOG) 100 units/mL subcutaneous injection 2-12 Units  2-12 Units Subcutaneous TID AC Ynes Connell MD        insulin lispro (HumaLOG) 100 units/mL subcutaneous injection 8 Units  8 Units Subcutaneous TID With Meals Ynes Connell MD        insulin regular (HumuLIN R,NovoLIN R) 1 Units/mL in sodium chloride 0 9 % 100 mL infusion  0 3-21 Units/hr Intravenous Titrated Ynes Connell MD        pantoprazole (PROTONIX) injection 40 mg  40 mg Intravenous Q24H [facility] Susan Like, CRNP   40 mg at 09/06/22 0806    potassium chloride 20 mEq IVPB (premix)  20 mEq Intravenous Q2H Susan Like, CRNP 50 mL/hr at 09/06/22 1127 20 mEq at 09/06/22 1127    sodium chloride (PF) 0 9 % injection 3 mL  3 mL Intravenous Q1H PRN Susan Like, CRNP        vancomycin (VANCOCIN) 1500 mg in sodium chloride 0 9% 250 mL IVPB  15 mg/kg Intravenous Q12H Susan Like, CRNP   1,500 mg at 09/06/22 1220     No Known Allergies    Objective   Vitals: Blood pressure 136/80, pulse 97, temperature 98 6 °F (37 °C), resp  rate 16, height 6' (1 829 m), weight 102 kg (225 lb 12 oz), SpO2 98 %  Intake/Output Summary (Last 24 hours) at 9/6/2022 1252  Last data filed at 9/6/2022 1048  Gross per 24 hour   Intake 2648 51 ml   Output 2695 ml   Net -46 49 ml     Invasive Devices  Report    Peripheral Intravenous Line  Duration           Peripheral IV 09/04/22 Right Wrist 2 days    Peripheral IV 09/06/22 Left;Ventral (anterior) Forearm <1 day          Drain  Duration           NG/OG/Enteral Tube Nasogastric 16 Fr Right nare 2 days    Urethral Catheter 16 Fr  2 days                Physical Exam  Constitutional:       General: He is not in acute distress  Appearance: Normal appearance  He is not ill-appearing  HENT:      Head: Normocephalic and atraumatic  Nose: Nose normal    Eyes:      Extraocular Movements: Extraocular movements intact  Conjunctiva/sclera: Conjunctivae normal    Pulmonary:      Effort: No respiratory distress  Musculoskeletal:      Cervical back: Normal range of motion  Neurological:      General: No focal deficit present  Mental Status: He is alert and oriented to person, place, and time  Psychiatric:         Mood and Affect: Mood normal          Behavior: Behavior normal          The history was obtained from the review of the chart, patient  Lab Results:   Results from last 7 days   Lab Units 09/05/22  0828   HEMOGLOBIN A1C % >14 0*     Lab Results   Component Value Date    WBC 10 92 (H) 09/06/2022    HGB 11 0 (L) 09/06/2022    HCT 32 5 (L) 09/06/2022    MCV 88 09/06/2022     09/06/2022     Lab Results   Component Value Date/Time    BUN 14 09/06/2022 06:45 AM    K 2 7 (LL) 09/06/2022 06:45 AM     (H) 09/06/2022 06:45 AM    CO2 24 09/06/2022 06:45 AM    CREATININE 0 87 09/06/2022 06:45 AM    AST 23 09/06/2022 06:45 AM    ALT 13 09/06/2022 06:45 AM    ALB 2 5 (L) 09/06/2022 06:45 AM     Recent Labs     09/04/22  0950   HDL 54   TRIG 92     No results found for: MICROALBUR, [id number]  POC Glucose (mg/dl)   Date Value   09/06/2022 132   09/06/2022 122   09/06/2022 161 (H)   09/06/2022 196 (H)   09/06/2022 156 (H)   09/06/2022 114   09/06/2022 105   09/06/2022 146 (H)   09/05/2022 169 (H)   09/05/2022 139       Imaging Studies: I have personally reviewed pertinent reports  Portions of the record may have been created with voice recognition software

## 2022-09-06 NOTE — PHYSICAL THERAPY NOTE
Physical Therapy Cancellation Note    PT order received  Chart review performed  At this time, PT evaluation cancelled secondary to patient with critically low potassium, 2 7  PT will follow and evaluate as appropriate  09/06/22 1115   PT Last Visit   PT Visit Date 09/06/22   Note Type   Note type Evaluation; Cancelled Session   Cancel Reasons Medical status     Margarette Bumpers, PT, DPT

## 2022-09-06 NOTE — PLAN OF CARE
Problem: Potential for Falls  Goal: Patient will remain free of falls  Description: INTERVENTIONS:  - Educate patient/family on patient safety including physical limitations  - Instruct patient to call for assistance with activity   - Consult OT/PT to assist with strengthening/mobility   - Keep Call bell within reach  - Keep bed low and locked with side rails adjusted as appropriate  - Keep care items and personal belongings within reach  - Initiate and maintain comfort rounds  - Make Fall Risk Sign visible to staff    - Apply yellow socks and bracelet for high fall risk patients  - Consider moving patient to room near nurses station  Outcome: Progressing     Problem: MOBILITY - ADULT  Goal: Maintain or return to baseline ADL function  Description: INTERVENTIONS:  -  Assess patient's ability to carry out ADLs; assess patient's baseline for ADL function and identify physical deficits which impact ability to perform ADLs (bathing, care of mouth/teeth, toileting, grooming, dressing, etc )  - Assess/evaluate cause of self-care deficits   - Assess range of motion  - Assess patient's mobility; develop plan if impaired  - Assess patient's need for assistive devices and provide as appropriate  - Encourage maximum independence but intervene and supervise when necessary  - Involve family in performance of ADLs  - Assess for home care needs following discharge   - Consider OT consult to assist with ADL evaluation and planning for discharge  - Provide patient education as appropriate  Outcome: Progressing  Goal: Maintains/Returns to pre admission functional level  Description: INTERVENTIONS:  - Perform BMAT or MOVE assessment daily    - Set and communicate daily mobility goal to care team and patient/family/caregiver     - Collaborate with rehabilitation services on mobility goals if consulted  - - Out of bed for toileting  - Record patient progress and toleration of activity level   Outcome: Progressing     Problem: Prexisting or High Potential for Compromised Skin Integrity  Goal: Skin integrity is maintained or improved  Description: INTERVENTIONS:  - Identify patients at risk for skin breakdown  - Assess and monitor skin integrity  - Assess and monitor nutrition and hydration status  - Monitor labs   - Assess for incontinence   - Turn and reposition patient  - Assist with mobility/ambulation  - Relieve pressure over bony prominences  - Avoid friction and shearing  - Provide appropriate hygiene as needed including keeping skin clean and dry  - Evaluate need for skin moisturizer/barrier cream  - Collaborate with interdisciplinary team   - Patient/family teaching  - Consider wound care consult   Outcome: Progressing     Problem: PAIN - ADULT  Goal: Verbalizes/displays adequate comfort level or baseline comfort level  Description: Interventions:  - Encourage patient to monitor pain and request assistance  - Assess pain using appropriate pain scale  - Administer analgesics based on type and severity of pain and evaluate response  - Implement non-pharmacological measures as appropriate and evaluate response  - Consider cultural and social influences on pain and pain management  - Notify physician/advanced practitioner if interventions unsuccessful or patient reports new pain  Outcome: Progressing     Problem: INFECTION - ADULT  Goal: Absence or prevention of progression during hospitalization  Description: INTERVENTIONS:  - Assess and monitor for signs and symptoms of infection  - Monitor lab/diagnostic results  - Monitor all insertion sites, i e  indwelling lines, tubes, and drains  - Monitor endotracheal if appropriate and nasal secretions for changes in amount and color  - [place] appropriate cooling/warming therapies per order  - Administer medications as ordered  - Instruct and encourage patient and family to use good hand hygiene technique  - Identify and instruct in appropriate isolation precautions for identified infection/condition  Outcome: Progressing     Problem: DISCHARGE PLANNING  Goal: Discharge to home or other facility with appropriate resources  Description: INTERVENTIONS:  - Identify barriers to discharge w/patient and caregiver  - Arrange for needed discharge resources and transportation as appropriate  - Identify discharge learning needs (meds, wound care, etc )  - Arrange for interpretive services to assist at discharge as needed  - Refer to Case Management Department for coordinating discharge planning if the patient needs post-hospital services based on physician/advanced practitioner order or complex needs related to functional status, cognitive ability, or social support system  Outcome: Progressing     Problem: SAFETY ADULT  Goal: Patient will remain free of falls  Description: INTERVENTIONS:  - Educate patient/family on patient safety including physical limitations  - Instruct patient to call for assistance with activity   - Consult OT/PT to assist with strengthening/mobility   - Keep Call bell within reach  - Keep bed low and locked with side rails adjusted as appropriate  - Keep care items and personal belongings within reach  - Initiate and maintain comfort rounds  - Make Fall Risk Sign visible to staff  - Offer Toileting every 4 Hours, in advance of need  -- Apply yellow socks and bracelet for high fall risk patients  - Consider moving patient to room near nurses station  Outcome: Progressing  Goal: Maintain or return to baseline ADL function  Description: INTERVENTIONS:  -  Assess patient's ability to carry out ADLs; assess patient's baseline for ADL function and identify physical deficits which impact ability to perform ADLs (bathing, care of mouth/teeth, toileting, grooming, dressing, etc )  - Assess/evaluate cause of self-care deficits   - Assess range of motion  - Assess patient's mobility; develop plan if impaired  - Assess patient's need for assistive devices and provide as appropriate  - Encourage maximum independence but intervene and supervise when necessary  - Involve family in performance of ADLs  - Assess for home care needs following discharge   - Consider OT consult to assist with ADL evaluation and planning for discharge  - Provide patient education as appropriate  Outcome: Progressing  Goal: Maintains/Returns to pre admission functional level  Description: INTERVENTIONS:  - Perform BMAT or MOVE assessment daily    - Set and communicate daily mobility goal to care team and patient/family/caregiver  - Collaborate with rehabilitation services on mobility goals if consulted  - Record patient progress and toleration of activity level   Outcome: Progressing     Problem: Knowledge Deficit  Goal: Patient/family/caregiver demonstrates understanding of disease process, treatment plan, medications, and discharge instructions  Description: Complete learning assessment and assess knowledge base    Interventions:  - Provide teaching at level of understanding  - Provide teaching via preferred learning methods  Outcome: Progressing     Problem: NEUROSENSORY - ADULT  Goal: Achieves stable or improved neurological status  Description: INTERVENTIONS  - Monitor and report changes in neurological status  - Monitor vital signs such as temperature, blood pressure, glucose, and any other labs ordered   - Initiate measures to prevent increased intracranial pressure  - Monitor for seizure activity and implement precautions if appropriate      Outcome: Progressing     Problem: SKIN/TISSUE INTEGRITY - ADULT  Goal: Skin Integrity remains intact(Skin Breakdown Prevention)  Description: Assess:  -Inspect skin when repositioning, toileting, and assisting with ADLS  -Assess extremities for adequate circulation and sensation     Bed Management:  -Have minimal linens on bed & keep smooth, unwrinkled  -Change linens as needed when moist or perspiring  -Avoid sitting or lying in one position for more than 2 hours while in bed  -Keep HOB at 45 degrees     Toileting:  -Offer bedside commode      ASkin Care:  -Avoid use of baby powder, tape, friction and shearing, hot water or constrictive clothing  -Relieve pressure over bony prominences using alevyn  -Do not massage red bony areas

## 2022-09-06 NOTE — ASSESSMENT & PLAN NOTE
· Patient had recent OP appointment with urology with compliant of nocturnal incontinence for about 8 months  · Also reports dribbling after voiding during the day  · Likely due to uncontrolled DM  · Lane in place  · Monitor I&O  · Urology following

## 2022-09-06 NOTE — ASSESSMENT & PLAN NOTE
· Patient presents with altered mental status  · Improved to baseline  · Monitor CAM ICU  · Monitor neuro exams

## 2022-09-06 NOTE — TELEPHONE ENCOUNTER
----- Message from [address] [place] [address] sent at 9/6/2022 12:41 PM EDT -----  Please let patient know his urine culture came back positive    I sent Bactrim to his pharmacy

## 2022-09-06 NOTE — QUICK NOTE
Clinical update given to patient and daughter at the bedside  All questions answered to their satisfaction

## 2022-09-06 NOTE — PROGRESS NOTES
Progress Note - Oracio Richardson Jr  75 y o  male MRN: [id number]    Unit/Bed#:  Encounter: [id number]        Subjective:     NG tube in place, but being clamped today  He has small amount of bilious drainage in the canister  Patient reports he has no abdominal pain at this time  Patient reports that he regrets stopping his diabetes medication prior to being admitted to the hospital     ROS: As noted in the HPI, otherwise all others negative  Objective:     Vitals: Blood pressure 136/80, pulse 97, temperature 98 6 °F (37 °C), resp  rate 16, height 6' (1 829 m), weight 102 kg (225 lb 12 oz), SpO2 98 %  ,Body mass index is 30 62 kg/m²  Intake/Output Summary (Last 24 hours) at 9/6/2022 1221  Last data filed at 9/6/2022 1048  Gross per 24 hour   Intake 2648 51 ml   Output 2695 ml   Net -46 49 ml       Physical Exam:     General Appearance: Alert and oriented x 3   In no respiratory distress  Lungs: Clear to auscultation bilaterally, no rales or rhonchi  Heart: Regular rate and rhythm, S1, S2 normal, no murmur, click, rub or gallop  Abdomen: Soft, non-tender, non-distended; bowel sounds normal; no masses or no organomegaly  Extremities: No cyanosis, edema    Invasive Devices  Report    Peripheral Intravenous Line  Duration           Peripheral IV 09/04/22 Right Wrist 2 days    Peripheral IV 09/06/22 Left;Ventral (anterior) Forearm <1 day          Drain  Duration           NG/OG/Enteral Tube Nasogastric 16 Fr Right nare 2 days    Urethral Catheter 16 Fr  2 days                Lab Results:  Results from last 7 days   Lab Units 09/06/22  0645   WBC Thousand/uL 10 92*   HEMOGLOBIN g/dL 11 0*   HEMATOCRIT % 32 5*   PLATELETS Thousands/uL 203   NEUTROS PCT % 80*   LYMPHS PCT % 15   MONOS PCT % 5   EOS PCT % 0     Results from last 7 days   Lab Units 09/06/22  0645   POTASSIUM mmol/L 2 7*   CHLORIDE mmol/L 110*   CO2 mmol/L 24   BUN mg/dL 14   CREATININE mg/dL 0 87   CALCIUM mg/dL 8 7   ALK PHOS U/L 106   ALT U/L 13   AST U/L 23         Results from last 7 days   Lab Units 09/06/22  0645   LIPASE u/L 233       Imaging Studies: I have personally reviewed pertinent imaging studies  CT abdomen pelvis wo contrast    Result Date: 9/4/2022  Impression: Mild peripancreatic inflammatory stranding with associated  thickening of the left anterior pararenal fascia, correlate with the lipase levels for pancreatitis No acute fluid collection seen Markedly distended stomach, correlate clinically , may be due to gastroparesis Periampullary diverticulum along with a diverticulum in relation to the 3rd part of the duodenum Bilateral mild to moderate hydronephrosis and hydroureter with dilated ureter extending to the level of the distended urinary bladder , correlate to exclude urinary retention/ bladder outlet obstruction No biliary dilation seen Nonobstructing the mid right renal calculus measuring 8 mm A periumbilical hernia containing fat The study was marked in EPIC for immediate notification  Workstation performed: [id number]     US kidney and bladder    Result Date: 9/6/2022  Impression: 1  Status post bladder decompression with improved bilateral hydronephrosis  2   Stable right upper pole calculus  Workstation performed: [id number]     US right upper quadrant    Result Date: 9/4/2022  Impression: 1  Findings of reported acute pancreatitis are not well appreciated on the current study  2   Nonobstructing right renal calculus  Workstation performed: [id number]         Assessment and Plan:     1) Nausea and vomiting complicated by gastric distension and peripancreatic stranding on CT - Patient presented to the emergency room on 09/04 with altered mental status, nausea and vomiting  Lipase a 1091 on admission, which can be elevated in both DKA and pancreatitis  Patient denies alcohol or tobacco abuse  Triglycerides drawn during admission is 92  CT revealing mild peripancreatic inflammatory stranding    Markedly distended stomach noted as well  There is a periampullary diverticulum near the 3rd portion of the duodenum  Right upper quadrant ultrasound did not show cholelithiasis or acute cholecystitis  His abdominal exam is benign   - Continue supportive care with antiemetics  - NG tube is being clamped today  - Depending on clinical course, patient will require an EGD   - Will discuss possible MRI/MRCP with attending   - Myself and the patient discussed the possibility of patient having underlying gastroparesis    2) DKA - Patient reports that he stopped taking his insulin several months ago  Appreciate ICU recommendations  He continues on insulin drip

## 2022-09-06 NOTE — PROGRESS NOTES
Progress Note - Oracio Richardson Jr  75 y o  male MRN: [id number]    Unit/Bed#:  Encounter: [id number]      Assessment:  Oracio Narayan  is a 74 y/o medically complex male with history of poorly controlled DM, self discontinuation of insulin presenting with DKA with metabolic derangements, known to our service for recent request to establish urologic care against complaints for LUTs  He was pending additional OP testing when he presented to [facility] ER for the aforementioned and admitted to ICU with glucose of 800mg/dL  CT of the A/P was obtained and showed large distended bladder with presumed reflux hydronephrosis  He is s/p lemons catheter insertion  Urine output I the past 24 hours exceeded 2 5L    Afebrile, tachycardic  But is in no apparent distress  Remains in ICU on insulin infusion  Urine culture demonstrated low levels of coag negative Staph  Blood cultures are negative  Plan:  · Continue medical stabilization  · Maintain lemons catheter to straight drainage  Do not remove  · Obtain US to re-evaluate hydronephrosis non urgently today or tomorrow contingent timing of other pending procedures  · Outpatient it is prudent to evaluate diabetic vesicopathy and rule out for potential resultant neurogenic bladder given severe chronic hyperglycemia with urodynamic studies  Subjective:   "I wanna know about this!" (points to NGT)    Objective:     Vitals: Blood pressure 136/80, pulse 89, temperature 98 78 °F (37 1 °C), resp  rate 16, height 6' (1 829 m), weight 102 kg (225 lb 12 oz), SpO2 98 %  ,Body mass index is 30 62 kg/m²        Intake/Output Summary (Last 24 hours) at 9/6/2022 0815  Last data filed at 9/6/2022 0700  Gross per 24 hour   Intake 3698 51 ml   Output 2620 ml   Net 1078 51 ml       Physical Exam: General appearance: alert and oriented, in no acute distress, appears stated age, cooperative, no distress and mildly obese  Head: Normocephalic, without obvious abnormality, atraumatic  Neck: no adenopathy, no carotid bruit, no JVD, supple, symmetrical, trachea midline and thyroid not enlarged, symmetric, no tenderness/mass/nodules  Lungs: diminished breath sounds  Heart: regular rate and rhythm, S1, S2 normal, no murmur, click, rub or gallop  Abdomen: soft, non-tender; bowel sounds normal; no masses,  no organomegaly and NGT  Extremities: extremities normal, warm and well-perfused; no cyanosis, clubbing, or edema  Pulses: 2+ and symmetric  Neurologic: Grossly normal  Lane--clear yellow/loida urine     Invasive Devices  Report    Peripheral Intravenous Line  Duration           Peripheral IV 09/04/22 Right Wrist 2 days    Peripheral IV 09/06/22 Left;Ventral (anterior) Forearm <1 day          Drain  Duration           NG/OG/Enteral Tube Nasogastric 16 Fr Right nare 1 day    Urethral Catheter 16 Fr  1 day              Lab Results   Component Value Date    WBC 10 92 (H) 09/06/2022    HGB 11 0 (L) 09/06/2022    HCT 32 5 (L) 09/06/2022    MCV 88 09/06/2022     09/06/2022     Lab Results   Component Value Date    SODIUM 145 09/06/2022    K 2 7 (LL) 09/06/2022     (H) 09/06/2022    CO2 24 09/06/2022    BUN 14 09/06/2022    CREATININE 0 87 09/06/2022    GLUC 187 (H) 09/06/2022    CALCIUM 8 7 09/06/2022       Lab, Imaging and other studies: I have personally reviewed pertinent reports

## 2022-09-06 NOTE — ASSESSMENT & PLAN NOTE
Lab Results   Component Value Date    HGBA1C >14 0 (H) 09/05/2022       Recent Labs     09/05/22  1410 09/05/22  1605 09/05/22  1811 09/05/22 2012   POCGLU 146* 147* 121 139       Blood Sugar Average: Last 72 hrs:  · Patient has not been taking insulin for several months after losing weight  · Hgb A1C 14  · Glucose 800, anion gap >35, pH 6 858, base deficit 29 4, beta hydroxybutyrate 5 2  · Received insulin bolus followed by DKA insulin gtt  · Gap now closed and glucose controlled  · Changed to non DKA insulin gtt  · Continue D5LR while NPO  · Replete electrolytes as needed  · NPO  · Consult endocrinology

## 2022-09-06 NOTE — OCCUPATIONAL THERAPY NOTE
Occupational Therapy Cancellation Note        Patient Name: Tree Call  Today's Date: 9/6/2022 09/06/22 1403   OT Last Visit   OT Visit Date 09/06/22   Note Type   Note type Evaluation; Cancelled Session   Cancel Reasons Medical status   Additional Comments At this time, OT evaluation cancelled secondary to patient with critically low potassium, 2 7  OT will follow and evaluate as appropriate

## 2022-09-07 ENCOUNTER — TELEPHONE (OUTPATIENT)
Dept: OTHER | Facility: HOSPITAL | Age: 67
End: 2022-09-07

## 2022-09-07 ENCOUNTER — APPOINTMENT (INPATIENT)
Dept: NON INVASIVE DIAGNOSTICS | Facility: HOSPITAL | Age: 67
DRG: 871 | End: 2022-09-07
Payer: MEDICARE

## 2022-09-07 PROBLEM — N17.9 AKI (ACUTE KIDNEY INJURY) (HCC): Status: RESOLVED | Noted: 2022-09-04 | Resolved: 2022-09-07

## 2022-09-07 PROBLEM — R33.9 URINARY RETENTION: Status: ACTIVE | Noted: 2022-09-07

## 2022-09-07 PROBLEM — G93.41 ACUTE METABOLIC ENCEPHALOPATHY: Status: RESOLVED | Noted: 2022-09-04 | Resolved: 2022-09-07

## 2022-09-07 LAB
ANION GAP SERPL CALCULATED.3IONS-SCNC: 10 MMOL/L (ref 4–13)
AORTIC ROOT: 3.5 CM
APICAL FOUR CHAMBER EJECTION FRACTION: 58 %
ASCENDING AORTA: 3.5 CM
BASE EXCESS BLDA CALC-SCNC: -1 MMOL/L (ref -2–3)
BASOPHILS # BLD AUTO: 0.01 THOUSANDS/ΜL (ref 0–0.1)
BASOPHILS NFR BLD AUTO: 0 % (ref 0–1)
BUN SERPL-MCNC: 9 MG/DL (ref 5–25)
CA-I BLD-SCNC: 1.17 MMOL/L (ref 1.12–1.32)
CALCIUM SERPL-MCNC: 8.6 MG/DL (ref 8.3–10.1)
CHLORIDE SERPL-SCNC: 108 MMOL/L (ref 96–108)
CO2 SERPL-SCNC: 23 MMOL/L (ref 21–32)
CREAT SERPL-MCNC: 0.77 MG/DL (ref 0.6–1.3)
E WAVE DECELERATION TIME: 246 MS
EOSINOPHIL # BLD AUTO: 0.04 THOUSAND/ΜL (ref 0–0.61)
EOSINOPHIL NFR BLD AUTO: 1 % (ref 0–6)
ERYTHROCYTE [DISTWIDTH] IN BLOOD BY AUTOMATED COUNT: 13.6 % (ref 11.6–15.1)
FRACTIONAL SHORTENING: 37 % (ref 28–44)
GFR SERPL CREATININE-BSD FRML MDRD: 93 ML/MIN/1.73SQ M
GLUCOSE SERPL-MCNC: 162 MG/DL (ref 65–140)
GLUCOSE SERPL-MCNC: 168 MG/DL (ref 65–140)
GLUCOSE SERPL-MCNC: 238 MG/DL (ref 65–140)
GLUCOSE SERPL-MCNC: 241 MG/DL (ref 65–140)
GLUCOSE SERPL-MCNC: 267 MG/DL (ref 65–140)
GLUCOSE SERPL-MCNC: 316 MG/DL (ref 65–140)
HCO3 BLDA-SCNC: 24 MMOL/L (ref 22–28)
HCT VFR BLD AUTO: 39.3 % (ref 36.5–49.3)
HCT VFR BLD CALC: 37 % (ref 36.5–49.3)
HGB BLD-MCNC: 13.1 G/DL (ref 12–17)
HGB BLDA-MCNC: 12.6 G/DL (ref 12–17)
IMM GRANULOCYTES # BLD AUTO: 0.02 THOUSAND/UL (ref 0–0.2)
IMM GRANULOCYTES NFR BLD AUTO: 0 % (ref 0–2)
INTERVENTRICULAR SEPTUM IN DIASTOLE (PARASTERNAL SHORT AXIS VIEW): 1 CM
INTERVENTRICULAR SEPTUM: 1 CM (ref 0.6–1.1)
LAAS-AP2: 25.6 CM2
LAAS-AP4: 18.4 CM2
LEFT ATRIUM AREA SYSTOLE SINGLE PLANE A4C: 18.1 CM2
LEFT ATRIUM SIZE: 3.4 CM
LEFT INTERNAL DIMENSION IN SYSTOLE: 3.2 CM (ref 2.1–4)
LEFT VENTRICULAR INTERNAL DIMENSION IN DIASTOLE: 5.1 CM (ref 3.5–6)
LEFT VENTRICULAR POSTERIOR WALL IN END DIASTOLE: 1 CM
LEFT VENTRICULAR STROKE VOLUME: 85 ML
LVSV (TEICH): 85 ML
LYMPHOCYTES # BLD AUTO: 1.49 THOUSANDS/ΜL (ref 0.6–4.47)
LYMPHOCYTES NFR BLD AUTO: 23 % (ref 14–44)
MAGNESIUM SERPL-MCNC: 2.1 MG/DL (ref 1.6–2.6)
MCH RBC QN AUTO: 29.1 PG (ref 26.8–34.3)
MCHC RBC AUTO-ENTMCNC: 33.3 G/DL (ref 31.4–37.4)
MCV RBC AUTO: 87 FL (ref 82–98)
MONOCYTES # BLD AUTO: 0.41 THOUSAND/ΜL (ref 0.17–1.22)
MONOCYTES NFR BLD AUTO: 6 % (ref 4–12)
MV E'TISSUE VEL-SEP: 11 CM/S
MV PEAK A VEL: 0.7 M/S
MV PEAK E VEL: 75 CM/S
MV STENOSIS PRESSURE HALF TIME: 71 MS
MV VALVE AREA P 1/2 METHOD: 3.1 CM2
NEUTROPHILS # BLD AUTO: 4.57 THOUSANDS/ΜL (ref 1.85–7.62)
NEUTS SEG NFR BLD AUTO: 70 % (ref 43–75)
NRBC BLD AUTO-RTO: 0 /100 WBCS
PCO2 BLD: 25 MMOL/L (ref 21–32)
PCO2 BLD: 41.8 MM HG (ref 36–44)
PH BLD: 7.37 [PH] (ref 7.35–7.45)
PLATELET # BLD AUTO: 158 THOUSANDS/UL (ref 149–390)
PMV BLD AUTO: 10.7 FL (ref 8.9–12.7)
PO2 BLD: 53 MM HG (ref 75–129)
POTASSIUM BLD-SCNC: 3.4 MMOL/L (ref 3.5–5.3)
POTASSIUM SERPL-SCNC: 3.3 MMOL/L (ref 3.5–5.3)
RBC # BLD AUTO: 4.5 MILLION/UL (ref 3.88–5.62)
RIGHT ATRIUM AREA SYSTOLE A4C: 16.1 CM2
RIGHT VENTRICLE ID DIMENSION: 3.9 CM
SAO2 % BLD FROM PO2: 86 % (ref 60–85)
SL CV LEFT ATRIUM LENGTH A2C: 6.4 CM
SL CV LV EF: 60
SL CV PED ECHO LEFT VENTRICLE DIASTOLIC VOLUME (MOD BIPLANE) 2D: 125 ML
SL CV PED ECHO LEFT VENTRICLE SYSTOLIC VOLUME (MOD BIPLANE) 2D: 40 ML
SODIUM BLD-SCNC: 144 MMOL/L (ref 136–145)
SODIUM SERPL-SCNC: 141 MMOL/L (ref 135–147)
SPECIMEN SOURCE: ABNORMAL
WBC # BLD AUTO: 6.54 THOUSAND/UL (ref 4.31–10.16)

## 2022-09-07 PROCEDURE — 83735 ASSAY OF MAGNESIUM: CPT | Performed by: NURSE PRACTITIONER

## 2022-09-07 PROCEDURE — 82948 REAGENT STRIP/BLOOD GLUCOSE: CPT

## 2022-09-07 PROCEDURE — 85025 COMPLETE CBC W/AUTO DIFF WBC: CPT | Performed by: NURSE PRACTITIONER

## 2022-09-07 PROCEDURE — C9113 INJ PANTOPRAZOLE SODIUM, VIA: HCPCS | Performed by: NURSE PRACTITIONER

## 2022-09-07 PROCEDURE — 97163 PT EVAL HIGH COMPLEX 45 MIN: CPT

## 2022-09-07 PROCEDURE — 99232 SBSQ HOSP IP/OBS MODERATE 35: CPT | Performed by: UROLOGY

## 2022-09-07 PROCEDURE — 82947 ASSAY GLUCOSE BLOOD QUANT: CPT

## 2022-09-07 PROCEDURE — 99232 SBSQ HOSP IP/OBS MODERATE 35: CPT | Performed by: INTERNAL MEDICINE

## 2022-09-07 PROCEDURE — 93306 TTE W/DOPPLER COMPLETE: CPT | Performed by: INTERNAL MEDICINE

## 2022-09-07 PROCEDURE — 80048 BASIC METABOLIC PNL TOTAL CA: CPT | Performed by: NURSE PRACTITIONER

## 2022-09-07 PROCEDURE — 99233 SBSQ HOSP IP/OBS HIGH 50: CPT | Performed by: STUDENT IN AN ORGANIZED HEALTH CARE EDUCATION/TRAINING PROGRAM

## 2022-09-07 PROCEDURE — C8929 TTE W OR WO FOL WCON,DOPPLER: HCPCS

## 2022-09-07 PROCEDURE — 85014 HEMATOCRIT: CPT

## 2022-09-07 PROCEDURE — 97110 THERAPEUTIC EXERCISES: CPT

## 2022-09-07 PROCEDURE — 82803 BLOOD GASES ANY COMBINATION: CPT

## 2022-09-07 PROCEDURE — 82330 ASSAY OF CALCIUM: CPT

## 2022-09-07 PROCEDURE — 97167 OT EVAL HIGH COMPLEX 60 MIN: CPT

## 2022-09-07 PROCEDURE — 84132 ASSAY OF SERUM POTASSIUM: CPT

## 2022-09-07 PROCEDURE — 84295 ASSAY OF SERUM SODIUM: CPT

## 2022-09-07 RX ORDER — POTASSIUM CHLORIDE 20 MEQ/1
40 TABLET, EXTENDED RELEASE ORAL ONCE
Status: COMPLETED | OUTPATIENT
Start: 2022-09-07 | End: 2022-09-07

## 2022-09-07 RX ORDER — POTASSIUM CHLORIDE 20 MEQ/1
40 TABLET, EXTENDED RELEASE ORAL
Status: DISCONTINUED | OUTPATIENT
Start: 2022-09-07 | End: 2022-09-08

## 2022-09-07 RX ADMIN — HEPARIN SODIUM 5000 UNITS: 5000 INJECTION INTRAVENOUS; SUBCUTANEOUS at 05:09

## 2022-09-07 RX ADMIN — HEPARIN SODIUM 5000 UNITS: 5000 INJECTION INTRAVENOUS; SUBCUTANEOUS at 21:16

## 2022-09-07 RX ADMIN — POTASSIUM CHLORIDE 40 MEQ: 1500 TABLET, EXTENDED RELEASE ORAL at 17:10

## 2022-09-07 RX ADMIN — Medication 1500 MG: at 11:13

## 2022-09-07 RX ADMIN — INSULIN GLARGINE 40 UNITS: 100 INJECTION, SOLUTION SUBCUTANEOUS at 08:32

## 2022-09-07 RX ADMIN — INSULIN LISPRO 8 UNITS: 100 INJECTION, SOLUTION INTRAVENOUS; SUBCUTANEOUS at 11:13

## 2022-09-07 RX ADMIN — PERFLUTREN 0.6 ML/MIN: 6.52 INJECTION, SUSPENSION INTRAVENOUS at 09:00

## 2022-09-07 RX ADMIN — INSULIN LISPRO 8 UNITS: 100 INJECTION, SOLUTION INTRAVENOUS; SUBCUTANEOUS at 08:28

## 2022-09-07 RX ADMIN — Medication 1500 MG: at 21:16

## 2022-09-07 RX ADMIN — HEPARIN SODIUM 5000 UNITS: 5000 INJECTION INTRAVENOUS; SUBCUTANEOUS at 13:42

## 2022-09-07 RX ADMIN — INSULIN LISPRO 2 UNITS: 100 INJECTION, SOLUTION INTRAVENOUS; SUBCUTANEOUS at 17:11

## 2022-09-07 RX ADMIN — PANTOPRAZOLE SODIUM 40 MG: 40 INJECTION, POWDER, FOR SOLUTION INTRAVENOUS at 08:32

## 2022-09-07 RX ADMIN — INSULIN LISPRO 1 UNITS: 100 INJECTION, SOLUTION INTRAVENOUS; SUBCUTANEOUS at 21:16

## 2022-09-07 RX ADMIN — INSULIN LISPRO 8 UNITS: 100 INJECTION, SOLUTION INTRAVENOUS; SUBCUTANEOUS at 17:12

## 2022-09-07 RX ADMIN — POTASSIUM CHLORIDE 40 MEQ: 1500 TABLET, EXTENDED RELEASE ORAL at 12:35

## 2022-09-07 RX ADMIN — INSULIN LISPRO 6 UNITS: 100 INJECTION, SOLUTION INTRAVENOUS; SUBCUTANEOUS at 08:27

## 2022-09-07 RX ADMIN — DEXTROSE, SODIUM CHLORIDE, SODIUM LACTATE, POTASSIUM CHLORIDE, AND CALCIUM CHLORIDE 75 ML/HR: 5; .6; .31; .03; .02 INJECTION, SOLUTION INTRAVENOUS at 02:43

## 2022-09-07 RX ADMIN — POTASSIUM CHLORIDE 40 MEQ: 1500 TABLET, EXTENDED RELEASE ORAL at 11:13

## 2022-09-07 RX ADMIN — INSULIN LISPRO 8 UNITS: 100 INJECTION, SOLUTION INTRAVENOUS; SUBCUTANEOUS at 12:36

## 2022-09-07 NOTE — ASSESSMENT & PLAN NOTE
· Likely in setting of neurogenic bladder from uncontrolled diabetes  · Lane inserted, patient had bilateral hydronephrosis from retention, now improving after decompression, noted on US  · Monitor urine output, patient will likely need outpatient urology follow up

## 2022-09-07 NOTE — PLAN OF CARE
Problem: PHYSICAL THERAPY ADULT  Goal: Performs mobility at highest level of function for planned discharge setting  See evaluation for individualized goals  Description: Treatment/Interventions: Functional transfer training, LE strengthening/ROM, Therapeutic exercise, Endurance training, Patient/family training, Bed mobility, Gait training, Spoke to nursing, OT          See flowsheet documentation for full assessment, interventions and recommendations  Note: Prognosis: Good  Problem List: Decreased strength, Decreased endurance, Impaired balance, Decreased mobility, Pain  Assessment: Pt is 75year old male seen for PT evaluation s/p admit to [facility] on 9/4/2022 with DKA (diabetic ketoacidosis) ([facility] [address] )  PT consulted to assess pt's functional mobility and d/c needs  Order placed for PT eval and tx, with up with assist order  Comorbidities affecting pt's physical performance at time of assessment include type 2 DM, nocturnal enuresis, pancreatitis, sepsis, new onset atrial fibrillation, and urinary retention  PTA, pt was independent with all functional mobility without an AD for household distances and uses a cane for community distances  Pt resides with his spouse in a multi-level house with two steps to enter and a flight of stairs between the levels  Personal factors affecting pt at time of IE include lives in a multi-level house, ambulating with an assistive device, stairs to enter home, inability to ambulate household distances, inability to navigate community distances, inability to navigate level surfaces without external assistance, unable to perform dynamic tasks in community, limited home support, positive fall history, inability to perform IADLs, and difficulty performing ADLs   Please find objective findings from PT assessment regarding body systems outlined above with impairments and limitations including weakness, impaired balance, decreased endurance, gait deviations, pain, decreased activity tolerance, decreased functional mobility tolerance, and fall risk  The following objective measures performed on IE also reveal limitations: Barthel Index: 30/100, Modified Lott: 4 (moderate/severe disability) and AM-PAC 6-Clicks: 3/18  Pt's clinical presentation is currently unstable/unpredictable seen in pt's presentation of need for ongoing medical management/monitoring, pt is a fall risk, pt is currently requiring use of RW for safety with short ambulation distances, and pt requires cues and assist of two for safety with functional mobility  Pt to benefit from continued PT tx to address deficits as defined above and maximize level of functional independent mobility and consistency  From PT/mobility standpoint, recommendation at time of d/c would be STR pending progress in order to facilitate return to PLOF  Barriers to Discharge: Inaccessible home environment, Decreased caregiver support     PT Discharge Recommendation: Post acute rehabilitation services    See flowsheet documentation for full assessment

## 2022-09-07 NOTE — PROGRESS NOTES
[address] [facility]  Progress Note - [address]  1955, 75 y o  male MRN: [id number]  Unit/Bed#:  Encounter: [id number]  Primary Care Provider: Craig Keen MD   Date and time admitted to hospital: 9/4/2022  4:43 AM    * DKA (diabetic ketoacidosis) [facility])  Assessment & Plan  Lab Results   Component Value Date    HGBA1C >14 0 (H) 09/05/2022       Recent Labs     09/06/22  1707 09/06/22 1959 09/06/22 2124 09/07/22  0734   POCGLU 141* 199* 205* 267*       Blood Sugar Average: Last 72 hrs:  (P) 183 [id number]   · Present on admission  · Status post insulin drip, now transitioned to basal insulin with sliding scale  · Escalated diet to diabetic diet  · Monitor glucose levels  · Titrate insulin accordingly  · DKA resolved  · Monitor lytes, replete as needed    CARLTON (acute kidney injury) (HCC)-resolved as of 9/7/2022  Assessment & Plan  · Likely pre renal in setting of hypovolemia from polyuria  · Now resolved     Urinary retention  Assessment & Plan  · Likely in setting of neurogenic bladder from uncontrolled diabetes  · Lane inserted, patient had bilateral hydronephrosis from retention, now improving after decompression, noted on US  · Monitor urine output, patient will likely need outpatient urology follow up    New onset atrial fibrillation ([facility] [address] )  Assessment & Plan  · Noted to be in atrial fibrillation with RVR on ED EKG  · Patient spontaneously converted to sinus tachycardia prior to arrival to ICU  · Likely 2/2 severe metabolic derangements  · Has maintained SR since  · Monitor telemetry    Pancreatitis  Assessment & Plan  · Incidental finding on CT imaging  · Tolerating diet, pain controled  · Stable     Acute metabolic encephalopathy-resolved as of 9/7/2022  Assessment & Plan  · Likely in setting of DKA with multiple electrolyte derangements  · Now resolved, back to baseline     Sepsis ([facility] [address] )  Assessment & Plan  · Met sepsis criteria in the ED as evidenced by tachycardia, tachypnea, and leukocytosis  · Unclear if true sepsis vs sirs criteria in the setting of DKA and multiple metabolic derangements  · Recent UA from  showed large leukocytes and occasional bacteria  · Urine culture from  + coag negative staph  · Received rocephin in the ED  · Continue rocpehin x 5 day course, to be completed today         VTE Pharmacologic Prophylaxis: VTE Score: 3 Moderate Risk (Score 3-4) - Pharmacological DVT Prophylaxis Ordered: heparin  Patient Centered Rounds: I performed bedside rounds with nursing staff today  Discussions with Specialists or Other Care Team Provider: kamini    Education and Discussions with Family / Patient: Patient declined call to   Time Spent for Care: 30 minutes  More than 50% of total time spent on counseling and coordination of care as described above  Current Length of Stay: 3 day(s)  Current Patient Status: Inpatient   Certification Statement: The patient will continue to require additional inpatient hospital stay due to electrolye derangements, urinary retention plan   Discharge Plan: Anticipate discharge in 48 hrs to home  Code Status: Level 1 - Full Code    Subjective:   Patient feels better today  Objective:     Vitals:   Temp (24hrs), Av 1 °F (37 3 °C), Min:98 6 °F (37 °C), Max:99 5 °F (37 5 °C)    Temp:  [98 6 °F (37 °C)-99 5 °F (37 5 °C)] 99 °F (37 2 °C)  HR:  [81-97] 81  Resp:  [12-17] 12  BP: (112-136)/(70-80) 114/74  SpO2:  [98 %-99 %] 99 %  Body mass index is 30 77 kg/m²  Input and Output Summary (last 24 hours): Intake/Output Summary (Last 24 hours) at 2022 0948  Last data filed at 2022 0835  Gross per 24 hour   Intake 2894 02 ml   Output 2261 ml   Net 633 02 ml       Physical Exam:   Physical Exam  Constitutional:       General: He is not in acute distress  Appearance: Normal appearance  He is not toxic-appearing  Cardiovascular:      Rate and Rhythm: Normal rate and regular rhythm        Heart sounds: Normal heart sounds  No murmur heard  Pulmonary:      Effort: Pulmonary effort is normal  No respiratory distress  Breath sounds: Normal breath sounds  No wheezing  Abdominal:      General: Abdomen is flat  There is no distension  Palpations: Abdomen is soft  Tenderness: There is no abdominal tenderness  Neurological:      General: No focal deficit present  Mental Status: He is alert and oriented to person, place, and time  Mental status is at baseline  Motor: No weakness  Additional Data:     Labs:  Results from last 7 days   Lab Units 09/07/22  0533 09/07/22  0504   WBC Thousand/uL  --  6 54   HEMOGLOBIN g/dL  --  13 1   I STAT HEMOGLOBIN g/dl 12 6  --    HEMATOCRIT %  --  39 3   HEMATOCRIT, ISTAT % 37  --    PLATELETS Thousands/uL  --  158   NEUTROS PCT %  --  70   LYMPHS PCT %  --  23   MONOS PCT %  --  6   EOS PCT %  --  1     Results from last 7 days   Lab Units 09/07/22  0533 09/07/22  0504 09/06/22  1946 09/06/22  0645   SODIUM mmol/L  --  141   < > 145   POTASSIUM mmol/L  --  3 3*   < > 2 7*   CHLORIDE mmol/L  --  108   < > 110*   CO2 mmol/L  --  23   < > 24   CO2, I-STAT mmol/L 25  --   --   --    BUN mg/dL  --  9   < > 14   CREATININE mg/dL  --  0 77   < > 0 87   ANION GAP mmol/L  --  10   < > 11   CALCIUM mg/dL  --  8 6   < > 8 7   ALBUMIN g/dL  --   --   --  2 5*   TOTAL BILIRUBIN mg/dL  --   --   --  0 27   ALK PHOS U/L  --   --   --  106   ALT U/L  --   --   --  13   AST U/L  --   --   --  23   GLUCOSE RANDOM mg/dL  --  238*   < > 187*    < > = values in this interval not displayed           Results from last 7 days   Lab Units 09/07/22  0734 09/06/22  2124 09/06/22  1959 09/06/22  1707 09/06/22  1427 09/06/22  1222 09/06/22  1043 09/06/22  8936 09/06/22  0557 09/06/22  0355 09/06/22  0302 09/06/22  0151   POC GLUCOSE mg/dl 267* 205* 199* 141* 154* 132 122 161* 196* 156* 114 105     Results from last 7 days   Lab Units 09/05/22  0828   HEMOGLOBIN A1C % >14 0*     Results from last 7 days   Lab Units 09/05/22  0438 09/04/22  1110 09/04/22  0850 09/04/22  0604 09/04/22  0502   LACTIC ACID mmol/L  --  0 8 2 8*  --  4 5*   PROCALCITONIN ng/ml 0 37*  --   --  0 16  --        Lines/Drains:  Invasive Devices  Report    Peripheral Intravenous Line  Duration           Peripheral IV 09/04/22 Right Wrist 3 days    Peripheral IV 09/06/22 Left;Ventral (anterior) Forearm 1 day          Drain  Duration           Urethral Catheter 16 Fr  3 days              Urinary Catheter:  Goal for removal: N/A- Discharging with Lane               Imaging: No pertinent imaging reviewed  Recent Cultures (last 7 days):   Results from last 7 days   Lab Units 09/04/22  0604 09/02/22  1055   BLOOD CULTURE  No Growth at 48 hrs  No Growth at 48 hrs  --    URINE CULTURE   --  20,000-29,000 cfu/ml Staphylococcus coagulase negative*       Last 24 Hours Medication List:   Current Facility-Administered Medications   Medication Dose Route Frequency Provider Last Rate    heparin (porcine)  5,000 Units Subcutaneous [facility] LEEANNA Kaplan      insulin glargine  40 Units Subcutaneous QAM LEEANNA Kaplan      insulin lispro  1-5 Units Subcutaneous HS LEEANNA Kaplan      insulin lispro  2-12 Units Subcutaneous TID AC [place]      insulin lispro  8 Units Subcutaneous TID With Meals LEEANNA Kaplan      pantoprazole  40 mg Intravenous Q24H [address] [place]      potassium chloride  40 mEq Oral TID With Meals Sigrid Damon MD      potassium chloride  40 mEq Oral Once Sigrid Damon MD      sodium chloride (PF)  3 mL Intravenous Q1H PRN LEEANNA Kaplan      vancomycin  15 mg/kg Intravenous Q12H Fabio Kaplan (09/06/22 3514)        Today, Patient Was Seen By: Henry Zhang MD    **Please Note: This note may have been constructed using a voice recognition system  **

## 2022-09-07 NOTE — TELEPHONE ENCOUNTER
76 y/o with urinary retention and resultant bilateral hydro  Lane inserted  Uriah improved  No CARLTON  Inpatient voiding trial will be attempted prior to patient's anticipated discharge  If fails will need outpatient voiding trial  Regardless will need visit to discuss possible work-up for diabetic cystopathy/neurogenic bladder  Please schedule next available  May need sooner if outpatient voidiing trial is needed

## 2022-09-07 NOTE — PLAN OF CARE
Problem: Potential for Falls  Goal: Patient will remain free of falls  Description: INTERVENTIONS:  - Educate patient/family on patient safety including physical limitations  - Instruct patient to call for assistance with activity   - Consult OT/PT to assist with strengthening/mobility   - Keep Call bell within reach  - Keep bed low and locked with side rails adjusted as appropriate  - Keep care items and personal belongings within reach  - Initiate and maintain comfort rounds  - Make Fall Risk Sign visible to staff  - Offer Toileting every 2 Hours, in advance of need  - Initiate/Maintain bed alarm  - Apply yellow socks and bracelet for high fall risk patients  - Consider moving patient to room near nurses station  Outcome: Progressing     Problem: Prexisting or High Potential for Compromised Skin Integrity  Goal: Skin integrity is maintained or improved  Description: INTERVENTIONS:  - Identify patients at risk for skin breakdown  - Assess and monitor skin integrity  - Assess and monitor nutrition and hydration status  - Monitor labs   - Assess for incontinence   - Turn and reposition patient  - Assist with mobility/ambulation  - Relieve pressure over bony prominences  - Avoid friction and shearing  - Provide appropriate hygiene as needed including keeping skin clean and dry  - Evaluate need for skin moisturizer/barrier cream  - Collaborate with interdisciplinary team   - Patient/family teaching  - Consider wound care consult   Outcome: Progressing     Problem: PAIN - ADULT  Goal: Verbalizes/displays adequate comfort level or baseline comfort level  Description: Interventions:  - Encourage patient to monitor pain and request assistance  - Assess pain using appropriate pain scale  - Administer analgesics based on type and severity of pain and evaluate response  - Implement non-pharmacological measures as appropriate and evaluate response  - Consider cultural and social influences on pain and pain management  - Notify physician/advanced practitioner if interventions unsuccessful or patient reports new pain  Outcome: Progressing     Problem: INFECTION - ADULT  Goal: Absence or prevention of progression during hospitalization  Description: INTERVENTIONS:  - Assess and monitor for signs and symptoms of infection  - Monitor lab/diagnostic results  - Monitor all insertion sites, i e  indwelling lines, tubes, and drains  - Monitor endotracheal if appropriate and nasal secretions for changes in amount and color  - [place] appropriate cooling/warming therapies per order  - Administer medications as ordered  - Instruct and encourage patient and family to use good hand hygiene technique  - Identify and instruct in appropriate isolation precautions for identified infection/condition  Outcome: Progressing     Problem: DISCHARGE PLANNING  Goal: Discharge to home or other facility with appropriate resources  Description: INTERVENTIONS:  - Identify barriers to discharge w/patient and caregiver  - Arrange for needed discharge resources and transportation as appropriate  - Identify discharge learning needs (meds, wound care, etc )  - Arrange for interpretive services to assist at discharge as needed  - Refer to Case Management Department for coordinating discharge planning if the patient needs post-hospital services based on physician/advanced practitioner order or complex needs related to functional status, cognitive ability, or social support system  Outcome: Progressing     Problem: Knowledge Deficit  Goal: Patient/family/caregiver demonstrates understanding of disease process, treatment plan, medications, and discharge instructions  Description: Complete learning assessment and assess knowledge base    Interventions:  - Provide teaching at level of understanding  - Provide teaching via preferred learning methods  Outcome: Progressing     Problem: NEUROSENSORY - ADULT  Goal: Achieves stable or improved neurological status  Description: INTERVENTIONS  - Monitor and report changes in neurological status  - Monitor vital signs such as temperature, blood pressure, glucose, and any other labs ordered   - Initiate measures to prevent increased intracranial pressure  - Monitor for seizure activity and implement precautions if appropriate      Outcome: Progressing     Problem: CARDIOVASCULAR - ADULT  Goal: Maintains optimal cardiac output and hemodynamic stability  Description: INTERVENTIONS:  - Monitor I/O, vital signs and rhythm  - Monitor for S/S and trends of decreased cardiac output  - Administer and titrate ordered vasoactive medications to optimize hemodynamic stability  - Assess quality of pulses, skin color and temperature  - Assess for signs of decreased coronary artery perfusion  - Instruct patient to report change in severity of symptoms  Outcome: Progressing  Goal: Absence of cardiac dysrhythmias or at baseline rhythm  Description: INTERVENTIONS:  - Continuous cardiac monitoring, vital signs, obtain 12 lead EKG if ordered  - Administer antiarrhythmic and heart rate control medications as ordered  - Monitor electrolytes and administer replacement therapy as ordered  Outcome: Progressing     Problem: GASTROINTESTINAL - ADULT  Goal: Minimal or absence of nausea and/or vomiting  Description: INTERVENTIONS:  - Administer IV fluids if ordered to ensure adequate hydration  - Maintain NPO status until nausea and vomiting are resolved  - Nasogastric tube if ordered  - Administer ordered antiemetic medications as needed  - Provide nonpharmacologic comfort measures as appropriate  - Advance diet as tolerated, if ordered  - Consider nutrition services referral to assist patient with adequate nutrition and appropriate food choices  Outcome: Progressing  Goal: Maintains or returns to baseline bowel function  Description: INTERVENTIONS:  - Assess bowel function  - Encourage oral fluids to ensure adequate hydration  - Administer IV fluids if ordered to ensure adequate hydration  - Administer ordered medications as needed  - Encourage mobilization and activity  - Consider nutritional services referral to assist patient with adequate nutrition and appropriate food choices  Outcome: Progressing  Goal: Oral mucous membranes remain intact  Description: INTERVENTIONS  - Assess oral mucosa and hygiene practices  - Implement preventative oral hygiene regimen  - Implement oral medicated treatments as ordered  - Initiate Nutrition services referral as needed  Outcome: Progressing     Problem: GENITOURINARY - ADULT  Goal: Urinary catheter remains patent  Description: INTERVENTIONS:  - Assess patency of urinary catheter  - If patient has a chronic lemons, consider changing catheter if non-functioning  - Follow guidelines for intermittent irrigation of non-functioning urinary catheter  Outcome: Progressing     Problem: METABOLIC, FLUID AND ELECTROLYTES - ADULT  Goal: Electrolytes maintained within normal limits  Description: INTERVENTIONS:  - Monitor labs and assess patient for signs and symptoms of electrolyte imbalances  - Administer electrolyte replacement as ordered  - Monitor response to electrolyte replacements, including repeat lab results as appropriate  - Instruct patient on fluid and nutrition as appropriate  Outcome: Progressing  Goal: Fluid balance maintained  Description: INTERVENTIONS:  - Monitor labs   - Monitor I/O and WT  - Instruct patient on fluid and nutrition as appropriate  - Assess for signs & symptoms of volume excess or deficit  Outcome: Progressing  Goal: Glucose maintained within target range  Description: INTERVENTIONS:  - Monitor Blood Glucose as ordered  - Assess for signs and symptoms of hyperglycemia and hypoglycemia  - Administer ordered medications to maintain glucose within target range  - Assess nutritional intake and initiate nutrition service referral as needed  Outcome: Progressing     Problem: Nutrition/Hydration-ADULT  Goal: Nutrient/Hydration intake appropriate for improving, restoring or maintaining nutritional needs  Description: Monitor and assess patient's nutrition/hydration status for malnutrition  Collaborate with interdisciplinary team and initiate plan and interventions as ordered  Monitor patient's weight and dietary intake as ordered or per policy  Utilize nutrition screening tool and intervene as necessary  Determine patient's food preferences and provide high-protein, high-caloric foods as appropriate       INTERVENTIONS:  - Monitor oral intake, urinary output, labs, and treatment plans  - Assess nutrition and hydration status and recommend course of action  - Evaluate amount of meals eaten  - Assist patient with eating if necessary   - Allow adequate time for meals  - Recommend/ encourage appropriate diets, oral nutritional supplements, and vitamin/mineral supplements  - Order, calculate, and assess calorie counts as needed  - Recommend, monitor, and adjust tube feedings and TPN/PPN based on assessed needs  - Assess need for intravenous fluids  - Provide specific nutrition/hydration education as appropriate  - Include patient/family/caregiver in decisions related to nutrition  Outcome: Progressing

## 2022-09-07 NOTE — PROGRESS NOTES
Progress Note - Urology  Oracio Colon Deborrah Cea  1955, 75 y o  male MRN: [id number]    Unit/Bed#:  Encounter: [id number]      Assessment & Plan:  1  Urine retention with reflux hydronephrosis s/p lemons catheter placement  - Continue medical optimization  - Maintain lemons   - Renal US yesterday - improved bilateral hydronephrosis  - Can attempt inpatient voiding trial at the end of patient's hospitalization with serial PVRs  If catheter reinsertion is necessary, discharge with lemons and recommend outpatient voiding trial    - Will need outpatient work-up to evaluate for diabetic vesiculpathy/neurogenic bladder with UDS  - Will sign off at this time  If reinsertion of lemons is needed once inpatient voiding trial is initiated, urology can be contacted to arrange for outpatient voiding trial if needed       Subjective:   HPI: No acute events overnight  Afebrile and VSS  No CARLTON  Leukocytosis resolved  No complaints other than some discomfort with the lemons  Review of Systems   Constitutional: Negative for chills and fever  Respiratory: Negative for shortness of breath  Cardiovascular: Negative for chest pain  Gastrointestinal: Negative for abdominal pain  Genitourinary: Negative for dysuria, flank pain and hematuria  Neurological: Negative for dizziness  Objective:  Vitals: Blood pressure 114/74, pulse 81, temperature 99 °F (37 2 °C), temperature source Bladder, resp  rate 12, height 6' (1 829 m), weight 103 kg (226 lb 13 7 oz), SpO2 99 %  ,Body mass index is 30 77 kg/m²  Physical Exam  Constitutional:       Appearance: Normal appearance  HENT:      Head: Normocephalic and atraumatic  Right Ear: External ear normal       Left Ear: External ear normal    Eyes:      General: No scleral icterus  Conjunctiva/sclera: Conjunctivae normal    Cardiovascular:      Pulses: Normal pulses     Pulmonary:      Effort: Pulmonary effort is normal    Genitourinary:     Comments: Lemons patent for clear yellow urine  Musculoskeletal:      Cervical back: Normal range of motion  Skin:     General: Skin is warm and dry  Neurological:      General: No focal deficit present  Mental Status: He is alert and oriented to person, place, and time  Psychiatric:         Mood and Affect: Mood normal          Behavior: Behavior normal          Thought Content:  Thought content normal          Judgment: Judgment normal          Labs:  Recent Labs     09/05/22  0438 09/06/22  0645 09/07/22  0504   WBC 16 38* 10 92* 6 54     Recent Labs     09/05/22  0438 09/06/22  0645 09/07/22  0504 09/07/22  0533   HGB 14 1 11 0* 13 1 12 6     Recent Labs     09/05/22  0438 09/06/22  0645 09/07/22  0504 09/07/22  0533   HCT 40 6 32 5* 39 3 37     Recent Labs     09/04/22  0950 09/04/22  1231 09/04/22  1626 09/04/22  2034 09/05/22  0009 09/05/22  0438 09/05/22  0828 09/06/22  0645 09/06/22  1946 09/07/22  0504   CREATININE 1 75* 1 81* 1 48* 1 34* 1 31* 1 19 1 04 0 87 0 73 0 77       History:  Past Medical History:   Diagnosis Date    Diabetes mellitus ([facility] )      Social History     Socioeconomic History    Marital status: /Civil Union     Spouse name: None    Number of children: None    Years of education: None    Highest education level: None   Occupational History    None   Tobacco Use    Smoking status: Never Smoker    Smokeless tobacco: Never Used   Substance and Sexual Activity    Alcohol use: Never    Drug use: Never    Sexual activity: None   Other Topics Concern    None   Social History Narrative    None     Social Determinants of Health     Financial Resource Strain: Not on file   Food Insecurity: Not on file   Transportation Needs: Not on file   Physical Activity: Not on file   Stress: Not on file   Social Connections: Not on file   Intimate Partner Violence: Not on file   Housing Stability: Not on file     Past Surgical History:   Procedure Laterality Date    [address] History   Problem Relation Age of Onset    Diabetes Father     Diabetes Mother        Kim Harris  Date: 9/7/2022 Time: 8:03 AM

## 2022-09-07 NOTE — ASSESSMENT & PLAN NOTE
Lab Results   Component Value Date    HGBA1C >14 0 (H) 09/05/2022       Recent Labs     09/06/22  1707 09/06/22 1959 09/06/22 2124 09/07/22  0734   POCGLU 141* 199* 205* 267*       Blood Sugar Average: Last 72 hrs:  (P) 183 [id number]   · Present on admission  · Status post insulin drip, now transitioned to basal insulin with sliding scale  · Escalated diet to diabetic diet  · Monitor glucose levels  · Titrate insulin accordingly  · DKA resolved  · Monitor lytes, replete as needed

## 2022-09-07 NOTE — PLAN OF CARE
Problem: OCCUPATIONAL THERAPY ADULT  Goal: Performs self-care activities at highest level of function for planned discharge setting  See evaluation for individualized goals  Description: Treatment Interventions: ADL retraining, Functional transfer training, UE strengthening/ROM, Endurance training, Patient/family training, Equipment evaluation/education, Compensatory technique education, Continued evaluation, Energy conservation, Activityengagement          See flowsheet documentation for full assessment, interventions and recommendations  Note: Limitation: Decreased ADL status, Decreased Safe judgement during ADL, Decreased endurance, Decreased self-care trans, Decreased high-level ADLs  Prognosis: Good  Assessment: Patient is a 75 y o  male seen for OT evaluation s/p admit to [id number] [facility] on 9/4/2022 w/DKA (diabetic ketoacidosis) ([facility] [address] )  Commorbidities affecting patient's functional performance at time of assessment include:Pancreatitis, Sepsis, new onset of atrial fibrillation,   Hyponatremia, CARLTON, Acute metabolic encephalopathy, Type 2 DM,patient resented to ED withaltered mental status, nausea, vomiting, generalized weakness  Orders placed for OT evaluation and treatment  Performed at least two patient identifiers during session including name and wristband  Prior to admission, Patient reported ambulatory with [facility] as needed  Patient lives in a multi level house, 2 [address]; pt reports "there will be a railing"; flight of stairs between levels  Patient was independent with ADLs/ IADLs, lives with spouse and family  Personal factors affecting patient at time of initial evaluation include: limited caregiver support, steps to enter, limited insight into deficits, decreased initiation and engagement, difficulty performing ADLs and difficulty performing IADLs  Upon evaluation, patient requires moderate assist for UB ADLs, maximal assist for LB ADLs    Occupational performance is affected by the following deficits: anxiety, degenerative arthritic joint changes, impaired gross motor coordination, dynamic sit/ stand balance deficit with poor standing tolerance time for self care and functional mobility, decreased activity tolerance, decreased safety awareness, increased pain and postural control and postural alignment deficit, requiring external assistance to complete transitional movements  Patient to benefit from continued Occupational Therapy treatment while in the hospital to address deficits as defined above and maximize level of functional independence with ADLs and functional mobility  Occupational Performance areas to address include: grooming , bathing/ shower, dressing, toilet hygiene, transfer to all surfaces, functional mobility, emergency response, health maintenance, IADLs: safety procedures and Leisure Participation  From OT standpoint, recommendation at time of d/c would be Short Term Rehab       OT Discharge Recommendation: Post acute rehabilitation services

## 2022-09-07 NOTE — PROGRESS NOTES
Vancomycin IV Pharmacy-to-Dose Consultation    Yue Lawrence  is a 75 y o  male who is currently receiving Vancomycin IV with management by the Pharmacy Consult service  Assessment/Plan:  The patient was reviewed  Renal function is stable and no signs or symptoms of nephrotoxicity and/or infusion reactions were documented in the chart  Based on todays assessment, continue current vancomycin (day # 2) dosing of vancomycin 1500 mg IV q12h, with a plan for trough to be drawn on 9/8/22 at 0915  We will continue to follow the patients culture results and clinical progress daily      Julissa Steele, Pharmacist

## 2022-09-07 NOTE — PHYSICAL THERAPY NOTE
Physical Therapy Evaluation     Patient's Name: Vani Sheehan  Admitting Diagnosis  Altered mental status [R41 82]  DKA (diabetic ketoacidosis) ([place] [address] ) [E11 10]  Type 2 diabetes mellitus ([place] [address] ) [E11 9]  Altered mental status, unspecified altered mental status type [R41 82]    Problem List  Patient Active Problem List   Diagnosis    Urinary frequency    Type 2 diabetes mellitus ([place] [address] )    Prostate cancer screening    Microscopic hematuria    Urge incontinence of urine    Nocturnal enuresis    Feeling of incomplete bladder emptying    Balanitis    DKA (diabetic ketoacidosis) ([place] [address] )    Pancreatitis    Sepsis ([place] [address] )    New onset atrial fibrillation [facility])    Urinary retention     Past Medical History  Past Medical History:   Diagnosis Date    Acute metabolic encephalopathy 9/0/7794    CARLTON (acute kidney injury) ([place] [address] ) 9/4/2022    Diabetes mellitus ([place] [address] )      Past Surgical History  Past Surgical History:   Procedure Laterality Date    KNEE CARTILAGE SURGERY        09/07/22 0915   PT Last Visit   PT Visit Date 09/07/22   Note Type   Note type Evaluation and Treatment   Pain Assessment   Pain Assessment Tool 0-10   Pain Score 8   Pain Location/Orientation Location: Groin  (site of catheter)   Hospital Pain Intervention(s)   (RN aware)   Restrictions/Precautions   Weight Bearing Precautions Per Order No   Other Precautions Chair Alarm; Bed Alarm;Multiple lines;Telemetry; Fall Risk;Pain   Home Living   Type of Home House   Home Layout Multi-level; Able to live on main level with bedroom/bathroom;Stairs to enter without rails  (2 JIMI; pt reports "there will be a railing"; flight of stairs between levels)   Bathroom Shower/Tub Walk-in shower   Bathroom Toilet Raised   Bathroom Equipment Other (Comment)  (none per patient)   [address]   Additional Comments Pt ambulates household distanecs without an AD; uses a cane for community distances     Prior Function   Level of [place] Independent with ADLs and functional mobility   Lives With Spouse   Receives Help From Family   ADL Assistance Independent   IADLs Independent   Falls in the last 6 months 1 to 4  (1 fall)   Vocational Retired   General   Family/Caregiver Present No   Cognition   Overall Cognitive Status WFL   Arousal/Participation Alert   Attention Within functional limits   Orientation Level Oriented X4   Memory Within functional limits   Following Commands Follows all commands and directions without difficulty   Comments Pt agreeable to PT  Subjective   Subjective "I'll move "   RUE Assessment   RUE Assessment   (defer to OT assessment)   LUE Assessment   LUE Assessment   (defer to OT assessment)   RLE Assessment   RLE Assessment X   Strength RLE   RLE Overall Strength 3+/5   LLE Assessment   LLE Assessment X   Strength LLE   LLE Overall Strength 3+/5   Light Touch   RLE Light Touch Grossly intact   LLE Light Touch Grossly intact   Bed Mobility   Supine to Sit 3  Moderate assistance   Additional items Assist x 2;HOB elevated; Bedrails; Increased time required;Verbal cues;LE management   Transfers   Sit to Stand 2  Maximal assistance   Additional items Assist x 2; Increased time required;Verbal cues   Stand to Sit 2  Maximal assistance   Additional items Assist x 2;Armrests; Increased time required;Verbal cues   Ambulation/Elevation   Gait pattern Decreased toe off;Decreased heel strike;Decreased hip extension; Excessively slow; Step to;Short stride; Shuffling   Gait Assistance 3  Moderate assist   Additional items Assist x 2;Verbal cues   Assistive Device Rolling walker   Distance 3 feet, bed to chair   Stair Management Assistance Not tested   Balance   Static Sitting Fair +   Dynamic Sitting Fair   Static Standing Poor   Dynamic Standing Poor -   Ambulatory Poor -   Endurance Deficit   Endurance Deficit Yes   Endurance Deficit Description decreased activity tolerance   Activity Tolerance   Activity Tolerance Patient tolerated treatment well;Patient limited by fatigue   Medical Staff Made Aware POOL [place]   Nurse Made Aware Discussed case with RN Lynette Castano   Assessment   Prognosis Good   Problem List Decreased strength;Decreased endurance; Impaired balance;Decreased mobility;Pain   Assessment Pt is 75year old male seen for PT evaluation s/p admit to [facility] on 9/4/2022 with DKA (diabetic ketoacidosis) ([address] )  PT consulted to assess pt's functional mobility and d/c needs  Order placed for PT eval and tx, with up with assist order  Comorbidities affecting pt's physical performance at time of assessment include type 2 DM, nocturnal enuresis, pancreatitis, sepsis, new onset atrial fibrillation, and urinary retention  PTA, pt was independent with all functional mobility without an AD for household distances and uses a cane for community distances  Pt resides with his spouse in a multi-level house with two steps to enter and a flight of stairs between the levels  Personal factors affecting pt at time of IE include lives in a multi-level house, ambulating with an assistive device, stairs to enter home, inability to ambulate household distances, inability to navigate community distances, inability to navigate level surfaces without external assistance, unable to perform dynamic tasks in community, limited home support, positive fall history, inability to perform IADLs, and difficulty performing ADLs  Please find objective findings from PT assessment regarding body systems outlined above with impairments and limitations including weakness, impaired balance, decreased endurance, gait deviations, pain, decreased activity tolerance, decreased functional mobility tolerance, and fall risk  The following objective measures performed on IE also reveal limitations: Barthel Index: 30/100, Modified [place]: 4 (moderate/severe disability) and AM-PAC 6-Clicks: 5/44   Pt's clinical presentation is currently unstable/unpredictable seen in pt's presentation of need for ongoing medical management/monitoring, pt is a fall risk, pt is currently requiring use of RW for safety with short ambulation distances, and pt requires cues and assist of two for safety with functional mobility  Pt to benefit from continued PT tx to address deficits as defined above and maximize level of functional independent mobility and consistency  From PT/mobility standpoint, recommendation at time of d/c would be STR pending progress in order to facilitate return to PLOF  Barriers to Discharge Inaccessible home environment;Decreased caregiver support   Goals   STG Expiration Date 09/17/22   Short Term Goal #1 In 10 days: Increase bilateral LE strength 1/2 grade to facilitate independent mobility, Perform all bed mobility tasks with min A of 1 to decrease caregiver burden, Perform all transfers with min A of 1 to improve independence, Ambulate > 50 ft  with RW with min A of 1 w/o LOB and w/ normalized gait pattern 100% of the time, Increase all balance 1/2 grade to decrease risk for falls and PT to see and establish goals for stairs when appropriate   Plan   Treatment/Interventions Functional transfer training;LE strengthening/ROM; Therapeutic exercise; Endurance training;Patient/family training;Bed mobility;Gait training;Spoke to nursing;OT   PT Frequency 3-5x/wk   Recommendation   PT Discharge Recommendation Post acute rehabilitation services   AM-PAC Basic Mobility Inpatient   Turning in Bed Without Bedrails 2   Lying on Back to Sitting on Edge of Flat Bed 1   Moving Bed to Chair 1   Standing Up From Chair 1   Walk in Room 1   Climb 3-5 Stairs 1   Basic Mobility Inpatient Raw Score 7   Turning Head Towards Sound 4   Follow Simple Instructions 4   Low Function Basic Mobility Raw Score 15   Low Function Basic Mobility Standardized Score 23 9   Highest Level Of Mobility   -HLM Goal 2: Bed activities/Dependent transfer   -HL Achieved 4: Move to chair/commode   Modified [place] Scale   Modified [place] Scale 4   Barthel Index   Feeding 10   Bathing 0   Grooming Score 0   Dressing Score 5   Bladder Score 0   Bowels Score 5   Toilet Use Score 5   Transfers (Bed/Chair) Score 5   Mobility (Level Surface) Score 0   Stairs Score 0   Barthel Index Score 30   Additional Treatment Session   Start Time 8851   End Time 4581   Treatment Assessment Pt agreeable to PT treatment session following PT evaluation  Pt performed seated therapeutic exercise as indicated below  Pt required verbal cues for correct technique and form  Pt tolerated therapeutic exercise well without complaints of pain  Pt continues to exhibit decreased lower extremity strength, impaired balance, decreased endurance, gait deviations, and decreased functional mobility  PT to continue to recommend STR  PT to continue to follow and treat as appropriate  Exercises   Hip Flexion Sitting;20 reps;AROM; Bilateral   Hip Abduction Sitting;10 reps;AROM; Bilateral   Hip Adduction Sitting;20 reps;AROM; Bilateral   Knee AROM Long Arc Quad Sitting;20 reps;AROM; Bilateral   Ankle Pumps Sitting;20 reps;AROM; Bilateral   End of Consult   Patient Position at End of Consult Bedside chair;Bed/Chair alarm activated; All needs within reach  (RN aware)     PT Evaluation Time: 5570-5874    PT Treatment Time: 0493-3978  11 minutes    Quique Zavaleta, PT, DPT

## 2022-09-07 NOTE — OCCUPATIONAL THERAPY NOTE
Occupational Therapy Evaluation        Patient Name: Satnam Upton  Today's Date: 9/7/2022 09/07/22 0946   OT Last Visit   OT Visit Date 09/07/22   Note Type   Note type Evaluation   Restrictions/Precautions   Weight Bearing Precautions Per Order No   Other Precautions Chair Alarm; Bed Alarm;Multiple lines;Telemetry; Fall Risk;Pain   Pain Assessment   Pain Assessment Tool 0-10   Pain Score 8   Pain Location/Orientation Location: Groin  (catheter site)   Home Living   Type of Home House   Home Layout Multi-level; Able to live on main level with bedroom/bathroom;Stairs to enter without rails  (2 JIMI; pt reports "there will be a railing"; flight of stairs between levels)   Bathroom Shower/Tub Walk-in shower   Bathroom Toilet Raised   Bathroom Equipment Other (Comment)  (none reported)   [address] [place] [address]   Additional Comments Pt ambulates household distanecs without an AD; uses a cane for community distances  Prior Function   Level of [place] Independent with ADLs and functional mobility   Lives With Spouse   Receives Help From Family   ADL Assistance Independent   IADLs Independent   Falls in the last 6 months 1 to 4  (1 fall)   Vocational Retired   Comments Patient drives   Lifestyle   Autonomy Patient reported ambulatory with SPC as needed  Patient lives in a multi level house, 2 JIMI; pt reports "there will be a railing"; flight of stairs between levels  Patient was independent with ADLs/ IADLs, lives with spouse and family     Reciprocal Relationships Supportive Family   Psychosocial   Psychosocial (WDL) X   Patient Behaviors/Mood Anxious;Irritable   Ability to Express Feelings Able to express   Ability to Express Needs Able to express   Ability to Express Thoughts Able to express   Ability to Understand Others Understands   ADL   Eating Assistance 7  Independent   Grooming Assistance 5  Supervision/Setup   UB Bathing Assistance 3  Moderate Assistance LB Bathing Assistance 2  Maximal Assistance   LB Bathing Deficit Other (Comment)  (total assist for lower leg and foot)   UB Dressing Assistance 3  Moderate Assistance   LB Dressing Assistance 2  Maximal Assistance   Toileting Assistance  2  Maximal Assistance   Functional Assistance 2  Maximal Assistance   Bed Mobility   Supine to Sit 3  Moderate assistance   Additional items Assist x 2; Increased time required;Verbal cues;LE management   Transfers   Sit to Stand 2  Maximal assistance   Additional items Assist x 2; Increased time required;Verbal cues   Stand to Sit 2  Maximal assistance   Additional items Assist x 2; Increased time required;Verbal cues   Functional Mobility   Functional Mobility 2  Maximal assistance   Additional Comments assist of 2   Additional items Rolling walker   Balance   Static Sitting Fair +   Dynamic Sitting Fair   Static Standing Fair -   Dynamic Standing Poor +   Activity Tolerance   Activity Tolerance Patient limited by fatigue;Patient limited by pain   Nurse Made Aware RN made aware of therapy outcome   RUE Assessment   RUE Assessment WFL   LUE Assessment   LUE Assessment WFL   Hand Function   Gross Motor Coordination Impaired   Fine Motor Coordination Impaired   Sensation   Light Touch No apparent deficits  (BUEs)   Vision-Basic Assessment   Current Vision Does not wear glasses   Cognition   Overall Cognitive Status WFL   Arousal/Participation Alert; Responsive; Cooperative   Attention Within functional limits   Orientation Level Oriented X4   Memory Within functional limits   Following Commands Follows all commands and directions without difficulty   Assessment   Limitation Decreased ADL status; Decreased Safe judgement during ADL;Decreased endurance;Decreased self-care trans;Decreased high-level ADLs   Prognosis Good   Assessment Patient is a 75 y o  male seen for OT evaluation s/p admit to [address] [facility] on 9/4/2022 w/DKA (diabetic ketoacidosis) ([facility] [address] )   Commorbidities affecting patient's functional performance at time of assessment include:Pancreatitis, Sepsis, new onset of atrial fibrillation,   Hyponatremia, CARLTON, Acute metabolic encephalopathy, Type 2 DM,patient resented to ED withaltered mental status, nausea, vomiting, generalized weakness  Orders placed for OT evaluation and treatment  Performed at least two patient identifiers during session including name and wristband  Prior to admission, Patient reported ambulatory with [facility] as needed  Patient lives in a multi level house, 2 JIMI; pt reports "there will be a railing"; flight of stairs between levels  Patient was independent with ADLs/ IADLs, lives with spouse and family  Personal factors affecting patient at time of initial evaluation include: limited caregiver support, steps to enter, limited insight into deficits, decreased initiation and engagement, difficulty performing ADLs and difficulty performing IADLs  Upon evaluation, patient requires moderate assist for UB ADLs, maximal assist for LB ADLs  Occupational performance is affected by the following deficits: anxiety, degenerative arthritic joint changes, impaired gross motor coordination, dynamic sit/ stand balance deficit with poor standing tolerance time for self care and functional mobility, decreased activity tolerance, decreased safety awareness, increased pain and postural control and postural alignment deficit, requiring external assistance to complete transitional movements  Patient to benefit from continued Occupational Therapy treatment while in the hospital to address deficits as defined above and maximize level of functional independence with ADLs and functional mobility  Occupational Performance areas to address include: grooming , bathing/ shower, dressing, toilet hygiene, transfer to all surfaces, functional mobility, emergency response, health maintenance, IADLs: safety procedures and Leisure Participation   From OT standpoint, recommendation at time of d/c would be Short Term Rehab  Plan   Treatment Interventions ADL retraining;Functional transfer training;UE strengthening/ROM; Endurance training;Patient/family training;Equipment evaluation/education; Compensatory technique education;Continued evaluation; Energy conservation; Activityengagement   Goal Expiration Date 09/21/22   OT Frequency 3-5x/wk   Recommendation   OT Discharge Recommendation Post acute rehabilitation services   AM-PAC Daily Activity Inpatient   Lower Body Dressing 1   Bathing 2   Toileting 2   Upper Body Dressing 2   Grooming 3   Eating 4   Daily Activity Raw Score 14   Daily Activity Standardized Score (Calc for Raw Score >=11) 33 39   AM-PAC Applied Cognition Inpatient   Following a Speech/Presentation 4   Understanding Ordinary Conversation 4   Taking Medications 3   Remembering Where Things Are Placed or Put Away 4   Remembering List of 4-5 Errands 4   Taking Care of Complicated Tasks 4   Applied Cognition Raw Score 23   Applied Cognition Standardized Score 53 08   Barthel Index   Feeding 10   Bathing 0   Grooming Score 0   Dressing Score 0   Bladder Score 0   Bowels Score 5   Toilet Use Score 0   Transfers (Bed/Chair) Score 5   Mobility (Level Surface) Score 0   Stairs Score 0   Barthel Index Score 20         1 - Patient will verbalize and demonstrate use of energy conservation/ deep breathing technique and work simplification skills during functional activity with no verbal cues  2 - Patient will verbalize and demonstrate good body mechanics and joint protection techniques during  ADLs/ IADLs with no verbal cues  3 - Patient will increase OOB/ sitting tolerance to 2-4 hours per day for increased participation in self care and leisure tasks with no s/s of exertion  4 - Patient will increase standing tolerance time to 5  minutes with unilateral UE support to complete sink level ADLs@ mod I level      5 - Patient will increase sitting tolerance at edge of bed to 20 minutes to complete UB ADLs @ set up assist level  6 - Patient will transfer bed to Chair / toilet at Set up assist level with AD as indicated  7 - Patient will complete UB ADLs with set up assist      8 - Patient will complete LB ADLs with min assist with the use of adaptive equipment       9 - Patient will complete toileting hygiene with set up assist/ supervision for thoroughness    10 - Patient/ Family  will demonstrate competency with UE Home Exercise Program

## 2022-09-07 NOTE — PROGRESS NOTES
Progress Note - Oracio Richardson Jr  75 y o  male MRN: [id number]    Unit/Bed#:  Encounter: [id number]        Subjective:     NGT is out, and patient was able to tolerate some clear liquids this AM  He is working with PT  He continues to deny abdominal pain  ROS: As noted in the HPI, otherwise all others negative  Objective:     Vitals: Blood pressure 102/66, pulse 90, temperature 99 1 °F (37 3 °C), temperature source Bladder, resp  rate 20, height 6' (1 829 m), weight 103 kg (226 lb), SpO2 99 %  ,Body mass index is 30 65 kg/m²  Intake/Output Summary (Last 24 hours) at 9/7/2022 1213  Last data filed at 9/7/2022 1113  Gross per 24 hour   Intake 3285 27 ml   Output 2161 ml   Net 1124 27 ml       Physical Exam:     General Appearance: Alert and oriented x 3   In no respiratory distress  Lungs: Clear to auscultation bilaterally, no rales or rhonchi  Heart: Regular rate and rhythm, S1, S2 normal, no murmur, click, rub or gallop  Abdomen: Soft, non-tender, non-distended; bowel sounds normal; no masses or no organomegaly  Extremities: No cyanosis, edema    Invasive Devices  Report    Peripheral Intravenous Line  Duration           Peripheral IV 09/04/22 Right Wrist 3 days    Peripheral IV 09/06/22 Left;Ventral (anterior) Forearm 1 day          Drain  Duration           Urethral Catheter 16 Fr  3 days                Lab Results:  Results from last 7 days   Lab Units 09/07/22  0533 09/07/22  0504   WBC Thousand/uL  --  6 54   HEMOGLOBIN g/dL  --  13 1   I STAT HEMOGLOBIN g/dl 12 6  --    HEMATOCRIT %  --  39 3   HEMATOCRIT, ISTAT % 37  --    PLATELETS Thousands/uL  --  158   NEUTROS PCT %  --  70   LYMPHS PCT %  --  23   MONOS PCT %  --  6   EOS PCT %  --  1     Results from last 7 days   Lab Units 09/07/22  0533 09/07/22  0504 09/06/22  1946 09/06/22  0645   POTASSIUM mmol/L  --  3 3*   < > 2 7*   CHLORIDE mmol/L  --  108   < > 110*   CO2 mmol/L  --  23   < > 24   CO2, I-STAT mmol/L 25  --   --   --    BUN mg/dL  --  9   < > 14   CREATININE mg/dL  --  0 77   < > 0 87   CALCIUM mg/dL  --  8 6   < > 8 7   ALK PHOS U/L  --   --   --  106   ALT U/L  --   --   --  13   AST U/L  --   --   --  23   GLUCOSE, ISTAT mg/dl 241*  --   --   --     < > = values in this interval not displayed  Results from last 7 days   Lab Units 09/06/22  0645   LIPASE u/L 233       Imaging Studies: I have personally reviewed pertinent imaging studies  CT abdomen pelvis wo contrast    Result Date: 9/4/2022  Impression: Mild peripancreatic inflammatory stranding with associated  thickening of the left anterior pararenal fascia, correlate with the lipase levels for pancreatitis No acute fluid collection seen Markedly distended stomach, correlate clinically , may be due to gastroparesis Periampullary diverticulum along with a diverticulum in relation to the 3rd part of the duodenum Bilateral mild to moderate hydronephrosis and hydroureter with dilated ureter extending to the level of the distended urinary bladder , correlate to exclude urinary retention/ bladder outlet obstruction No biliary dilation seen Nonobstructing the mid right renal calculus measuring 8 mm A periumbilical hernia containing fat The study was marked in EPIC for immediate notification  Workstation performed: [id number]     US kidney and bladder    Result Date: 9/6/2022  Impression: 1  Status post bladder decompression with improved bilateral hydronephrosis  2   Stable right upper pole calculus  Workstation performed: [id number]     US right upper quadrant    Result Date: 9/4/2022  Impression: 1  Findings of reported acute pancreatitis are not well appreciated on the current study  2   Nonobstructing right renal calculus   Workstation performed: [id number]         Assessment and Plan:     1) Nausea and vomiting complicated by gastric distension and peripancreatic stranding on CT - Patient presented to the emergency room on 09/04 with altered mental status, nausea and vomiting  Lipase a 1091 on admission, which can be elevated in both DKA and pancreatitis  Patient denies alcohol or tobacco abuse  Triglycerides drawn during admission is 92  CT revealing mild peripancreatic inflammatory stranding  Markedly distended stomach noted as well  There is a periampullary diverticulum near the 3rd portion of the duodenum  Right upper quadrant ultrasound did not show cholelithiasis or acute cholecystitis  His abdominal exam is benign   - Continue supportive care with antiemetics  - CLD as tolerated   - Depending on clinical course, patient will require an EGD if he begins to have nausea and vomiting reoccurrence   - Will discuss possible MRI/MRCP with attending   - Myself and the patient discussed the possibility of patient having underlying gastroparesis     2) DKA - Patient reports that he stopped taking his insulin several months ago  Appreciate ICU recommendations    He continues on insulin drip

## 2022-09-07 NOTE — PLAN OF CARE
Problem: Potential for Falls  Goal: Patient will remain free of falls  Description: INTERVENTIONS:  - Educate patient/family on patient safety including physical limitations  - Instruct patient to call for assistance with activity   - Consult OT/PT to assist with strengthening/mobility   - Keep Call bell within reach  - Keep bed low and locked with side rails adjusted as appropriate  - Keep care items and personal belongings within reach  - Initiate and maintain comfort rounds  - Make Fall Risk Sign visible to staff  - - Apply yellow socks and bracelet for high fall risk patients  - Consider moving patient to room near nurses station  Outcome: Progressing     Problem: MOBILITY - ADULT  Goal: Maintain or return to baseline ADL function  Description: INTERVENTIONS:  -  Assess patient's ability to carry out ADLs; assess patient's baseline for ADL function and identify physical deficits which impact ability to perform ADLs (bathing, care of mouth/teeth, toileting, grooming, dressing, etc )  - Assess/evaluate cause of self-care deficits   - Assess range of motion  - Assess patient's mobility; develop plan if impaired  - Assess patient's need for assistive devices and provide as appropriate  - Encourage maximum independence but intervene and supervise when necessary  - Involve family in performance of ADLs  - Assess for home care needs following discharge   - Consider OT consult to assist with ADL evaluation and planning for discharge  - Provide patient education as appropriate  Outcome: Progressing  Goal: Maintains/Returns to pre admission functional level  Description: INTERVENTIONS:  - Perform BMAT or MOVE assessment daily    - Set and communicate daily mobility goal to care team and patient/family/caregiver     - Collaborate with rehabilitation services on mobility goals if consulted  -- Out of bed for toileting  - Record patient progress and toleration of activity level   Outcome: Progressing     Problem: Prexisting or High Potential for Compromised Skin Integrity  Goal: Skin integrity is maintained or improved  Description: INTERVENTIONS:  - Identify patients at risk for skin breakdown  - Assess and monitor skin integrity  - Assess and monitor nutrition and hydration status  - Monitor labs   - Assess for incontinence   - Turn and reposition patient  - Assist with mobility/ambulation  - Relieve pressure over bony prominences  - Avoid friction and shearing  - Provide appropriate hygiene as needed including keeping skin clean and dry  - Evaluate need for skin moisturizer/barrier cream  - Collaborate with interdisciplinary team   - Patient/family teaching  - Consider wound care consult   Outcome: Progressing     Problem: PAIN - ADULT  Goal: Verbalizes/displays adequate comfort level or baseline comfort level  Description: Interventions:  - Encourage patient to monitor pain and request assistance  - Assess pain using appropriate pain scale  - Administer analgesics based on type and severity of pain and evaluate response  - Implement non-pharmacological measures as appropriate and evaluate response  - Consider cultural and social influences on pain and pain management  - Notify physician/advanced practitioner if interventions unsuccessful or patient reports new pain  Outcome: Progressing     Problem: INFECTION - ADULT  Goal: Absence or prevention of progression during hospitalization  Description: INTERVENTIONS:  - Assess and monitor for signs and symptoms of infection  - Monitor lab/diagnostic results  - Monitor all insertion sites, i e  indwelling lines, tubes, and drains  - Monitor endotracheal if appropriate and nasal secretions for changes in amount and color  - [place] appropriate cooling/warming therapies per order  - Administer medications as ordered  - Instruct and encourage patient and family to use good hand hygiene technique  - Identify and instruct in appropriate isolation precautions for identified infection/condition  Outcome: Progressing     Problem: SAFETY ADULT  Goal: Patient will remain free of falls  Description: INTERVENTIONS:  - Educate patient/family on patient safety including physical limitations  - Instruct patient to call for assistance with activity   - Consult OT/PT to assist with strengthening/mobility   - Keep Call bell within reach  - Keep bed low and locked with side rails adjusted as appropriate  - Keep care items and personal belongings within reach  - Initiate and maintain comfort rounds  - Make Fall Risk Sign visible to staff  - Apply yellow socks and bracelet for high fall risk patients  - Consider moving patient to room near nurses station  Outcome: Progressing  Goal: Maintain or return to baseline ADL function  Description: INTERVENTIONS:  -  Assess patient's ability to carry out ADLs; assess patient's baseline for ADL function and identify physical deficits which impact ability to perform ADLs (bathing, care of mouth/teeth, toileting, grooming, dressing, etc )  - Assess/evaluate cause of self-care deficits   - Assess range of motion  - Assess patient's mobility; develop plan if impaired  - Assess patient's need for assistive devices and provide as appropriate  - Encourage maximum independence but intervene and supervise when necessary  - Involve family in performance of ADLs  - Assess for home care needs following discharge   - Consider OT consult to assist with ADL evaluation and planning for discharge  - Provide patient education as appropriate  Outcome: Progressing  Goal: Maintains/Returns to pre admission functional level  Description: INTERVENTIONS:  - Perform BMAT or MOVE assessment daily    - Set and communicate daily mobility goal to care team and patient/family/caregiver     - Collaborate with rehabilitation services on mobility goals if consulted  -- Out of bed for toileting  - Record patient progress and toleration of activity level   Outcome: Progressing     Problem: DISCHARGE PLANNING  Goal: Discharge to home or other facility with appropriate resources  Description: INTERVENTIONS:  - Identify barriers to discharge w/patient and caregiver  - Arrange for needed discharge resources and transportation as appropriate  - Identify discharge learning needs (meds, wound care, etc )  - Arrange for interpretive services to assist at discharge as needed  - Refer to Case Management Department for coordinating discharge planning if the patient needs post-hospital services based on physician/advanced practitioner order or complex needs related to functional status, cognitive ability, or social support system  Outcome: Progressing     Problem: Knowledge Deficit  Goal: Patient/family/caregiver demonstrates understanding of disease process, treatment plan, medications, and discharge instructions  Description: Complete learning assessment and assess knowledge base    Interventions:  - Provide teaching at level of understanding  - Provide teaching via preferred learning methods  Outcome: Progressing     Problem: NEUROSENSORY - ADULT  Goal: Achieves stable or improved neurological status  Description: INTERVENTIONS  - Monitor and report changes in neurological status  - Monitor vital signs such as temperature, blood pressure, glucose, and any other labs ordered   - Initiate measures to prevent increased intracranial pressure  - Monitor for seizure activity and implement precautions if appropriate      Outcome: Progressing

## 2022-09-07 NOTE — ASSESSMENT & PLAN NOTE
· Met sepsis criteria in the ED as evidenced by tachycardia, tachypnea, and leukocytosis  · Unclear if true sepsis vs sirs criteria in the setting of DKA and multiple metabolic derangements  · Recent UA from 9/2 showed large leukocytes and occasional bacteria  · Urine culture from 9/2 + coag negative staph  · Received rocephin in the ED  · Continue rocpehin x 5 day course, to be completed today

## 2022-09-08 PROBLEM — K85.90 PANCREATITIS: Status: RESOLVED | Noted: 2022-09-04 | Resolved: 2022-09-08

## 2022-09-08 LAB
ANION GAP SERPL CALCULATED.3IONS-SCNC: 11 MMOL/L (ref 4–13)
BASOPHILS # BLD AUTO: 0.01 THOUSANDS/ΜL (ref 0–0.1)
BASOPHILS NFR BLD AUTO: 0 % (ref 0–1)
BUN SERPL-MCNC: 9 MG/DL (ref 5–25)
CALCIUM SERPL-MCNC: 8.6 MG/DL (ref 8.3–10.1)
CHLORIDE SERPL-SCNC: 105 MMOL/L (ref 96–108)
CO2 SERPL-SCNC: 24 MMOL/L (ref 21–32)
CREAT SERPL-MCNC: 0.82 MG/DL (ref 0.6–1.3)
EOSINOPHIL # BLD AUTO: 0.15 THOUSAND/ΜL (ref 0–0.61)
EOSINOPHIL NFR BLD AUTO: 2 % (ref 0–6)
ERYTHROCYTE [DISTWIDTH] IN BLOOD BY AUTOMATED COUNT: 13.5 % (ref 11.6–15.1)
GFR SERPL CREATININE-BSD FRML MDRD: 90 ML/MIN/1.73SQ M
GLUCOSE SERPL-MCNC: 167 MG/DL (ref 65–140)
GLUCOSE SERPL-MCNC: 193 MG/DL (ref 65–140)
GLUCOSE SERPL-MCNC: 215 MG/DL (ref 65–140)
GLUCOSE SERPL-MCNC: 245 MG/DL (ref 65–140)
GLUCOSE SERPL-MCNC: 283 MG/DL (ref 65–140)
HCT VFR BLD AUTO: 36.1 % (ref 36.5–49.3)
HGB BLD-MCNC: 12.3 G/DL (ref 12–17)
IMM GRANULOCYTES # BLD AUTO: 0.03 THOUSAND/UL (ref 0–0.2)
IMM GRANULOCYTES NFR BLD AUTO: 0 % (ref 0–2)
LYMPHOCYTES # BLD AUTO: 2.13 THOUSANDS/ΜL (ref 0.6–4.47)
LYMPHOCYTES NFR BLD AUTO: 29 % (ref 14–44)
MCH RBC QN AUTO: 29.6 PG (ref 26.8–34.3)
MCHC RBC AUTO-ENTMCNC: 34.1 G/DL (ref 31.4–37.4)
MCV RBC AUTO: 87 FL (ref 82–98)
MONOCYTES # BLD AUTO: 0.54 THOUSAND/ΜL (ref 0.17–1.22)
MONOCYTES NFR BLD AUTO: 7 % (ref 4–12)
NEUTROPHILS # BLD AUTO: 4.46 THOUSANDS/ΜL (ref 1.85–7.62)
NEUTS SEG NFR BLD AUTO: 62 % (ref 43–75)
NRBC BLD AUTO-RTO: 0 /100 WBCS
PLATELET # BLD AUTO: 166 THOUSANDS/UL (ref 149–390)
PMV BLD AUTO: 10.9 FL (ref 8.9–12.7)
POTASSIUM SERPL-SCNC: 4.2 MMOL/L (ref 3.5–5.3)
RBC # BLD AUTO: 4.16 MILLION/UL (ref 3.88–5.62)
SODIUM SERPL-SCNC: 140 MMOL/L (ref 135–147)
VANCOMYCIN TROUGH SERPL-MCNC: 14.8 UG/ML (ref 10–20)
WBC # BLD AUTO: 7.32 THOUSAND/UL (ref 4.31–10.16)

## 2022-09-08 PROCEDURE — 80048 BASIC METABOLIC PNL TOTAL CA: CPT

## 2022-09-08 PROCEDURE — NC001 PR NO CHARGE: Performed by: INTERNAL MEDICINE

## 2022-09-08 PROCEDURE — C9113 INJ PANTOPRAZOLE SODIUM, VIA: HCPCS | Performed by: NURSE PRACTITIONER

## 2022-09-08 PROCEDURE — 80202 ASSAY OF VANCOMYCIN: CPT | Performed by: NURSE PRACTITIONER

## 2022-09-08 PROCEDURE — 82948 REAGENT STRIP/BLOOD GLUCOSE: CPT

## 2022-09-08 PROCEDURE — 85025 COMPLETE CBC W/AUTO DIFF WBC: CPT

## 2022-09-08 PROCEDURE — 99233 SBSQ HOSP IP/OBS HIGH 50: CPT | Performed by: STUDENT IN AN ORGANIZED HEALTH CARE EDUCATION/TRAINING PROGRAM

## 2022-09-08 RX ORDER — INSULIN LISPRO 100 [IU]/ML
10 INJECTION, SOLUTION INTRAVENOUS; SUBCUTANEOUS
Status: DISCONTINUED | OUTPATIENT
Start: 2022-09-08 | End: 2022-09-09

## 2022-09-08 RX ORDER — INSULIN GLARGINE 100 [IU]/ML
44 INJECTION, SOLUTION SUBCUTANEOUS EVERY MORNING
Status: DISCONTINUED | OUTPATIENT
Start: 2022-09-09 | End: 2022-09-10 | Stop reason: HOSPADM

## 2022-09-08 RX ORDER — GABAPENTIN 100 MG/1
100 CAPSULE ORAL 3 TIMES DAILY
Status: DISCONTINUED | OUTPATIENT
Start: 2022-09-08 | End: 2022-09-10 | Stop reason: HOSPADM

## 2022-09-08 RX ORDER — NYSTATIN 100000 [USP'U]/G
POWDER TOPICAL 2 TIMES DAILY
Status: DISCONTINUED | OUTPATIENT
Start: 2022-09-08 | End: 2022-09-10 | Stop reason: HOSPADM

## 2022-09-08 RX ADMIN — INSULIN LISPRO 4 UNITS: 100 INJECTION, SOLUTION INTRAVENOUS; SUBCUTANEOUS at 17:09

## 2022-09-08 RX ADMIN — POTASSIUM CHLORIDE 40 MEQ: 1500 TABLET, EXTENDED RELEASE ORAL at 08:30

## 2022-09-08 RX ADMIN — VANCOMYCIN HYDROCHLORIDE 1750 MG: 1 INJECTION, POWDER, LYOPHILIZED, FOR SOLUTION INTRAVENOUS at 22:30

## 2022-09-08 RX ADMIN — INSULIN LISPRO 10 UNITS: 100 INJECTION, SOLUTION INTRAVENOUS; SUBCUTANEOUS at 17:58

## 2022-09-08 RX ADMIN — INSULIN LISPRO 8 UNITS: 100 INJECTION, SOLUTION INTRAVENOUS; SUBCUTANEOUS at 08:31

## 2022-09-08 RX ADMIN — VANCOMYCIN HYDROCHLORIDE 1750 MG: 1 INJECTION, POWDER, LYOPHILIZED, FOR SOLUTION INTRAVENOUS at 11:10

## 2022-09-08 RX ADMIN — HEPARIN SODIUM 5000 UNITS: 5000 INJECTION INTRAVENOUS; SUBCUTANEOUS at 22:29

## 2022-09-08 RX ADMIN — INSULIN LISPRO 2 UNITS: 100 INJECTION, SOLUTION INTRAVENOUS; SUBCUTANEOUS at 22:29

## 2022-09-08 RX ADMIN — HEPARIN SODIUM 5000 UNITS: 5000 INJECTION INTRAVENOUS; SUBCUTANEOUS at 17:09

## 2022-09-08 RX ADMIN — INSULIN LISPRO 8 UNITS: 100 INJECTION, SOLUTION INTRAVENOUS; SUBCUTANEOUS at 12:18

## 2022-09-08 RX ADMIN — PANTOPRAZOLE SODIUM 40 MG: 40 INJECTION, POWDER, FOR SOLUTION INTRAVENOUS at 08:30

## 2022-09-08 RX ADMIN — INSULIN LISPRO 2 UNITS: 100 INJECTION, SOLUTION INTRAVENOUS; SUBCUTANEOUS at 08:30

## 2022-09-08 RX ADMIN — GABAPENTIN 100 MG: 100 CAPSULE ORAL at 11:10

## 2022-09-08 RX ADMIN — HEPARIN SODIUM 5000 UNITS: 5000 INJECTION INTRAVENOUS; SUBCUTANEOUS at 06:32

## 2022-09-08 RX ADMIN — GABAPENTIN 100 MG: 100 CAPSULE ORAL at 22:28

## 2022-09-08 RX ADMIN — INSULIN LISPRO 6 UNITS: 100 INJECTION, SOLUTION INTRAVENOUS; SUBCUTANEOUS at 12:18

## 2022-09-08 RX ADMIN — NYSTATIN: 100000 POWDER TOPICAL at 10:00

## 2022-09-08 RX ADMIN — NYSTATIN: 100000 POWDER TOPICAL at 17:11

## 2022-09-08 RX ADMIN — GABAPENTIN 100 MG: 100 CAPSULE ORAL at 17:09

## 2022-09-08 RX ADMIN — INSULIN GLARGINE 40 UNITS: 100 INJECTION, SOLUTION SUBCUTANEOUS at 08:30

## 2022-09-08 NOTE — CASE MANAGEMENT
Case Management Discharge Planning Note    Patient name Jessica Pod  Location / MRN [id number]  : 1955 Date 2022       Current Admission Date: 2022  Current Admission Diagnosis:DKA (diabetic ketoacidosis) [facility])   Patient Active Problem List    Diagnosis Date Noted    Urinary retention 2022    DKA (diabetic ketoacidosis) ([facility] [address] ) 2022    S[facility] 2022    New onset atrial fibrillation [facility] ) 2022    Urinary frequency 2022    Type 2 diabetes mellitus ([facility] ) 2022    Prostate cancer screening 2022    Microscopic hematuria 2022    Urge incontinence of urine 2022    Nocturnal enuresis 2022    Feeling of incomplete bladder emptying 2022    Balanitis 2022      LOS (days): 4  Geometric Mean LOS (GMLOS) (days): 4 80  Days to GMLOS:0 5     OBJECTIVE:  Risk of Unplanned Readmission Score: 9 47         Current admission status: Inpatient   Preferred Pharmacy:   [id number]- [place], [place] - [address]  Phone: [phone] Fax: [phone]    Primary Care Provider: Craig Keen MD    Primary Insurance: MEDICARE  Secondary Insurance: [address] [place] [address],[address] DETAILS:    Discharge planning discussed with[de-identified] Patient  Freedom of Choice: Yes  Comments - Freedom of Choice: CM met with patient at bedside to review accepting facilities  Patient reported that he prefers [facility] vs  [facility]  CM sat with patient at bedside as he spoke to Gt Nazario, liaison from [facility]   Gt Nazario informed both CM and patient that she will review with the team   CM contacted family/caregiver?: No- see comments (Patient declined phone call )  Were Treatment Team discharge recommendations reviewed with patient/caregiver?: Yes  Did patient/caregiver verbalize understanding of patient care needs?: Yes  Were patient/caregiver advised of the risks associated with not following Treatment Team discharge recommendations?: Yes    [address]         Is the patient interested in [facility] at discharge?: No    Would you like to participate in our [facility] service program?  : No - Declined    Treatment Team Recommendation: Short Term Rehab  Discharge Destination Plan[de-identified] Short Term Rehab

## 2022-09-08 NOTE — DISCHARGE INSTR - OTHER ORDERS
Skin care plans:  1-Calazime to coccyx and intergluteal crease three times a day and as needed  2-Hydraguard to bilateral heels and buttock twice a day and as needed  3-Elevate heels to offload pressure  4-Waffle cushion to chair  5-Turn/repoisiton every two hours for pressure re-distribution on skin  6-Moisturize skin daily with skin nourishing cream  7- Scrotum and bilateral medial thighs- Cleanse skin with soap and water, pat dry  Apply dusting of nystatin powder over affected areas so that a thin layer of powder is applied to skin  Complete twice a day and with chayo-care

## 2022-09-08 NOTE — PROGRESS NOTES
Vancomycin Assessment    Oracio Vital  is a 75 y o  male who is currently receiving vancomycin 1500mg IV q12h for MRSA confirmed     Relevant clinical data and objective history reviewed:  Creatinine   Date Value Ref Range Status   09/08/2022 0 82 0 60 - 1 30 mg/dL Final     Comment:     Standardized to IDMS reference method   09/07/2022 0 77 0 60 - 1 30 mg/dL Final     Comment:     Standardized to IDMS reference method   09/06/2022 0 73 0 60 - 1 30 mg/dL Final     Comment:     Standardized to IDMS reference method     /59 (BP Location: Left arm)   Pulse 76   Temp 98 4 °F (36 9 °C) (Bladder)   Resp 12   Ht 6' (1 829 m)   Wt 104 kg (229 lb 15 oz)   SpO2 100%   BMI 31 19 kg/m²   I/O last 3 completed shifts: In: 2583 8 [P O :240; I V :1493 8; IV Piggyback:850]  Out: 7349 [Urine:2480; Stool:1]  Lab Results   Component Value Date/Time    BUN 9 09/08/2022 06:03 AM    WBC 7 32 09/08/2022 06:03 AM    HGB 12 3 09/08/2022 06:03 AM    HCT 36 1 (L) 09/08/2022 06:03 AM    MCV 87 09/08/2022 06:03 AM     09/08/2022 06:03 AM     Temp Readings from Last 3 Encounters:   09/08/22 98 4 °F (36 9 °C) (Bladder)     Vancomycin Days of Therapy: 3    Assessment/Plan  The patient is currently on vancomycin utilizing scheduled dosing  Baseline risks associated with therapy include: advanced age and dehydration  The patient is receiving 1500mg IV q12h with the most recent vancomycin level being at steady-state and sub-therapeutic based on a goal of 15-20 (appropriate for most indications) ; therefore, after clinical evaluation will be changed to 1750mg IV q12h   Pharmacy will continue to follow closely for s/sx of nephrotoxicity, infusion reactions, and appropriateness of therapy  BMP and CBC will be ordered per protocol  Plan for trough as patient approaches steady state, prior to the 5th  dose at approximately 1100 on 9/10/22   Pharmacy will continue to follow the patients culture results and clinical progress daily      Dionte Magaña, Pharmacist

## 2022-09-08 NOTE — PROGRESS NOTES
Reviewed blood sugars, blood sugars are improved in 150-220 mg/dL range  Patient is on carb controlled diet    Will increase Lantus to 44 units in the morning and Humalog 10 units with meals  Patient will need basal bolus insulin regimen on discharge  He will need insulin pen teaching, prescription for insulin pens, glucometer and supplies, as well as pen needles     Gely Goel MD

## 2022-09-08 NOTE — PLAN OF CARE
Problem: Potential for Falls  Goal: Patient will remain free of falls  Description: INTERVENTIONS:  - Educate patient/family on patient safety including physical limitations  - Instruct patient to call for assistance with activity   - Consult OT/PT to assist with strengthening/mobility   - Keep Call bell within reach  - Keep bed low and locked with side rails adjusted as appropriate  - Keep care items and personal belongings within reach  - Initiate and maintain comfort rounds  - Make Fall Risk Sign visible to staff  - - Apply yellow socks and bracelet for high fall risk patients  - Consider moving patient to room near nurses station  Outcome: Progressing     Problem: MOBILITY - ADULT  Goal: Maintain or return to baseline ADL function  Description: INTERVENTIONS:  -  Assess patient's ability to carry out ADLs; assess patient's baseline for ADL function and identify physical deficits which impact ability to perform ADLs (bathing, care of mouth/teeth, toileting, grooming, dressing, etc )  - Assess/evaluate cause of self-care deficits   - Assess range of motion  - Assess patient's mobility; develop plan if impaired  - Assess patient's need for assistive devices and provide as appropriate  - Encourage maximum independence but intervene and supervise when necessary  - Involve family in performance of ADLs  - Assess for home care needs following discharge   - Consider OT consult to assist with ADL evaluation and planning for discharge  - Provide patient education as appropriate  Outcome: Progressing  Goal: Maintains/Returns to pre admission functional level  Description: INTERVENTIONS:  - Perform BMAT or MOVE assessment daily    - Set and communicate daily mobility goal to care team and patient/family/caregiver     - Collaborate with rehabilitation services on mobility goals if consulted  -   - Out of bed for toileting  - Record patient progress and toleration of activity level   Outcome: Progressing     Problem: Prexisting or High Potential for Compromised Skin Integrity  Goal: Skin integrity is maintained or improved  Description: INTERVENTIONS:  - Identify patients at risk for skin breakdown  - Assess and monitor skin integrity  - Assess and monitor nutrition and hydration status  - Monitor labs   - Assess for incontinence   - Turn and reposition patient  - Assist with mobility/ambulation  - Relieve pressure over bony prominences  - Avoid friction and shearing  - Provide appropriate hygiene as needed including keeping skin clean and dry  - Evaluate need for skin moisturizer/barrier cream  - Collaborate with interdisciplinary team   - Patient/family teaching  - Consider wound care consult   Outcome: Progressing     Problem: PAIN - ADULT  Goal: Verbalizes/displays adequate comfort level or baseline comfort level  Description: Interventions:  - Encourage patient to monitor pain and request assistance  - Assess pain using appropriate pain scale  - Administer analgesics based on type and severity of pain and evaluate response  - Implement non-pharmacological measures as appropriate and evaluate response  - Consider cultural and social influences on pain and pain management  - Notify physician/advanced practitioner if interventions unsuccessful or patient reports new pain  Outcome: Progressing     Problem: INFECTION - ADULT  Goal: Absence or prevention of progression during hospitalization  Description: INTERVENTIONS:  - Assess and monitor for signs and symptoms of infection  - Monitor lab/diagnostic results  - Monitor all insertion sites, i e  indwelling lines, tubes, and drains  - Monitor endotracheal if appropriate and nasal secretions for changes in amount and color  - [place] appropriate cooling/warming therapies per order  - Administer medications as ordered  - Instruct and encourage patient and family to use good hand hygiene technique  - Identify and instruct in appropriate isolation precautions for identified infection/condition  Outcome: Progressing     Problem: SAFETY ADULT  Goal: Patient will remain free of falls  Description: INTERVENTIONS:  - Educate patient/family on patient safety including physical limitations  - Instruct patient to call for assistance with activity   - Consult OT/PT to assist with strengthening/mobility   - Keep Call bell within reach  - Keep bed low and locked with side rails adjusted as appropriate  - Keep care items and personal belongings within reach  - Initiate and maintain comfort rounds  - Make Fall Risk Sign visible to staff  - - Apply yellow socks and bracelet for high fall risk patients  - Consider moving patient to room near nurses station  Outcome: Progressing  Goal: Maintain or return to baseline ADL function  Description: INTERVENTIONS:  -  Assess patient's ability to carry out ADLs; assess patient's baseline for ADL function and identify physical deficits which impact ability to perform ADLs (bathing, care of mouth/teeth, toileting, grooming, dressing, etc )  - Assess/evaluate cause of self-care deficits   - Assess range of motion  - Assess patient's mobility; develop plan if impaired  - Assess patient's need for assistive devices and provide as appropriate  - Encourage maximum independence but intervene and supervise when necessary  - Involve family in performance of ADLs  - Assess for home care needs following discharge   - Consider OT consult to assist with ADL evaluation and planning for discharge  - Provide patient education as appropriate  Outcome: Progressing  Goal: Maintains/Returns to pre admission functional level  Description: INTERVENTIONS:  - Perform BMAT or MOVE assessment daily    - Set and communicate daily mobility goal to care team and patient/family/caregiver     - Collaborate with rehabilitation services on mobility goals if consulted  - Out of bed for toileting  - Record patient progress and toleration of activity level   Outcome: Progressing     Problem: DISCHARGE PLANNING  Goal: Discharge to home or other facility with appropriate resources  Description: INTERVENTIONS:  - Identify barriers to discharge w/patient and caregiver  - Arrange for needed discharge resources and transportation as appropriate  - Identify discharge learning needs (meds, wound care, etc )  - Arrange for interpretive services to assist at discharge as needed  - Refer to Case Management Department for coordinating discharge planning if the patient needs post-hospital services based on physician/advanced practitioner order or complex needs related to functional status, cognitive ability, or social support system  Outcome: Progressing     Problem: Knowledge Deficit  Goal: Patient/family/caregiver demonstrates understanding of disease process, treatment plan, medications, and discharge instructions  Description: Complete learning assessment and assess knowledge base    Interventions:  - Provide teaching at level of understanding  - Provide teaching via preferred learning methods  Outcome: Progressing     Problem: CARDIOVASCULAR - ADULT  Goal: Maintains optimal cardiac output and hemodynamic stability  Description: INTERVENTIONS:  - Monitor I/O, vital signs and rhythm  - Monitor for S/S and trends of decreased cardiac output  - Administer and titrate ordered vasoactive medications to optimize hemodynamic stability  - Assess quality of pulses, skin color and temperature  - Assess for signs of decreased coronary artery perfusion  - Instruct patient to report change in severity of symptoms  Outcome: Progressing  Goal: Absence of cardiac dysrhythmias or at baseline rhythm  Description: INTERVENTIONS:  - Continuous cardiac monitoring, vital signs, obtain 12 lead EKG if ordered  - Administer antiarrhythmic and heart rate control medications as ordered  - Monitor electrolytes and administer replacement therapy as ordered  Outcome: Progressing     Problem: NEUROSENSORY - ADULT  Goal: Achieves stable or improved neurological status  Description: INTERVENTIONS  - Monitor and report changes in neurological status  - Monitor vital signs such as temperature, blood pressure, glucose, and any other labs ordered   - Initiate measures to prevent increased intracranial pressure  - Monitor for seizure activity and implement precautions if appropriate      Outcome: Progressing     Problem: GASTROINTESTINAL - ADULT  Goal: Minimal or absence of nausea and/or vomiting  Description: INTERVENTIONS:  - Administer IV fluids if ordered to ensure adequate hydration  - Maintain NPO status until nausea and vomiting are resolved  - Nasogastric tube if ordered  - Administer ordered antiemetic medications as needed  - Provide nonpharmacologic comfort measures as appropriate  - Advance diet as tolerated, if ordered  - Consider nutrition services referral to assist patient with adequate nutrition and appropriate food choices  Outcome: Progressing  Goal: Maintains or returns to baseline bowel function  Description: INTERVENTIONS:  - Assess bowel function  - Encourage oral fluids to ensure adequate hydration  - Administer IV fluids if ordered to ensure adequate hydration  - Administer ordered medications as needed  - Encourage mobilization and activity  - Consider nutritional services referral to assist patient with adequate nutrition and appropriate food choices  Outcome: Progressing  Goal: Oral mucous membranes remain intact  Description: INTERVENTIONS  - Assess oral mucosa and hygiene practices  - Implement preventative oral hygiene regimen  - Implement oral medicated treatments as ordered  - Initiate Nutrition services referral as needed  Outcome: Progressing     Problem: GENITOURINARY - ADULT  Goal: Urinary catheter remains patent  Description: INTERVENTIONS:  - Assess patency of urinary catheter  - If patient has a chronic lemons, consider changing catheter if non-functioning  - Follow guidelines for intermittent irrigation of non-functioning urinary catheter  Outcome: Progressing     Problem: METABOLIC, FLUID AND ELECTROLYTES - ADULT  Goal: Electrolytes maintained within normal limits  Description: INTERVENTIONS:  - Monitor labs and assess patient for signs and symptoms of electrolyte imbalances  - Administer electrolyte replacement as ordered  - Monitor response to electrolyte replacements, including repeat lab results as appropriate  - Instruct patient on fluid and nutrition as appropriate  Outcome: Progressing  Goal: Fluid balance maintained  Description: INTERVENTIONS:  - Monitor labs   - Monitor I/O and WT  - Instruct patient on fluid and nutrition as appropriate  - Assess for signs & symptoms of volume excess or deficit  Outcome: Progressing  Goal: Glucose maintained within target range  Description: INTERVENTIONS:  - Monitor Blood Glucose as ordered  - Assess for signs and symptoms of hyperglycemia and hypoglycemia  - Administer ordered medications to maintain glucose within target range  - Assess nutritional intake and initiate nutrition service referral as needed  Outcome: Progressing     Problem: SKIN/TISSUE INTEGRITY - ADULT  Goal: Skin Integrity remains intact(Skin Breakdown Prevention)  Description: Assess:  -Perform José Miguel assessment every   -Clean and moisturize skin every   -Inspect skin when repositioning, toileting, and assisting with ADLS  -Assess extremities for adequate circulation and sensation     Bed Management:  -Have minimal linens on bed & keep smooth, unwrinkled  -Change linens as needed when moist or perspiring  Toileting:  -Offer bedside commode  -Assess for incon    Activity:  -Skin Care:  -Avoid use of baby powder, tape, friction and shearing, hot water or constrictive clothing  -Relieve pressure over bony prominences using   -Do not massage red bony areas    Next Steps:  -Teach patient strategies to minimize risks such    Problem: Nutrition/Hydration-ADULT  Goal: Nutrient/Hydration intake appropriate for improving, restoring or maintaining nutritional needs  Description: Monitor and assess patient's nutrition/hydration status for malnutrition  Collaborate with interdisciplinary team and initiate plan and interventions as ordered  Monitor patient's weight and dietary intake as ordered or per policy  Utilize nutrition screening tool and intervene as necessary  Determine patient's food preferences and provide high-protein, high-caloric foods as appropriate       INTERVENTIONS:  - Monitor oral intake, urinary output, labs, and treatment plans  - Assess nutrition and hydration status and recommend course of action  - Evaluate amount of meals eaten  - Assist patient with eating if necessary   - Allow adequate time for meals  - Recommend/ encourage appropriate diets, oral nutritional supplements, and vitamin/mineral supplements  - Order, calculate, and assess calorie counts as needed  - Recommend, monitor, and adjust tube feedings and TPN/PPN based on assessed needs  - Assess need for intravenous fluids  - Provide specific nutrition/hydration education as appropriate  - Include patient/family/caregiver in decisions related to nutrition  Outcome: Progressing

## 2022-09-08 NOTE — PROGRESS NOTES
[facility]  Progress Note - Tree Call  1955, 75 y o  male MRN: [id number]  Unit/Bed#:  Encounter: [id number]  Primary Care Provider: Sis James MD   Date and time admitted to hospital: 9/4/2022  4:43 AM    * DKA (diabetic ketoacidosis) [facility])  Assessment & Plan  Lab Results   Component Value Date    HGBA1C >14 0 (H) 09/05/2022       Recent Labs     09/07/22  1110 09/07/22  1711 09/07/22 2053 09/08/22  0709   POCGLU 316* 162* 168* 193*       Blood Sugar Average: Last 72 hrs:  (P) 148 9375   · Present on admission  · Status post insulin drip, now transitioned to basal insulin with sliding scale  · Escalated diet to diabetic diet  · DKA resolved  · Glucose levels adequately controlled  · Monitor   · Patient is experiencing peripheral neuropathy with difficulty with ambulation, open to rehab, coordinate with care manager    Urinary retention  Assessment & Plan  · Likely in setting of neurogenic bladder from uncontrolled diabetes  · Lane inserted, patient had bilateral hydronephrosis from retention, now improving after decompression, noted on US  · Monitor urine output, patient will need outpatient urology follow up    New onset atrial fibrillation ([address] )  Assessment & Plan  · Noted to be in atrial fibrillation with RVR on ED EKG  · Patient spontaneously converted to sinus tachycardia prior to arrival to ICU  · Likely 2/2 severe metabolic derangements  · Has maintained SR since  · Monitor telemetry    Pancreatitis-resolved as of 9/8/2022  Assessment & Plan  · Incidental finding on CT imaging  · Tolerating diet, pain controled  · Resolved           VTE Pharmacologic Prophylaxis: VTE Score: 3 Moderate Risk (Score 3-4) - Pharmacological DVT Prophylaxis Ordered: heparin  Patient Centered Rounds: I performed bedside rounds with nursing staff today    Discussions with Specialists or Other Care Team Provider: kamini    Education and Discussions with Family / Patient: Patient declined call to   Time Spent for Care: 30 minutes  More than 50% of total time spent on counseling and coordination of care as described above  Current Length of Stay: 4 day(s)  Current Patient Status: Inpatient   Certification Statement: The patient will continue to require additional inpatient hospital stay due to placement to rehab  Discharge Plan: day to day pending rehab placement    Code Status: Level 1 - Full Code    Subjective:   Patient feels well today     Objective:     Vitals:   Temp (24hrs), Av 3 °F (37 4 °C), Min:98 4 °F (36 9 °C), Max:99 9 °F (37 7 °C)    Temp:  [98 4 °F (36 9 °C)-99 9 °F (37 7 °C)] 98 4 °F (36 9 °C)  HR:  [76-93] 76  Resp:  [12-20] 12  BP: (102-106)/(59-66) 105/59  SpO2:  [98 %-100 %] 100 %  Body mass index is 31 19 kg/m²  Input and Output Summary (last 24 hours): Intake/Output Summary (Last 24 hours) at 2022 0943  Last data filed at 2022 0200  Gross per 24 hour   Intake 921 25 ml   Output 1245 ml   Net -323 75 ml       Physical Exam:   Physical Exam  Constitutional:       General: He is not in acute distress  Appearance: Normal appearance  He is not toxic-appearing  Cardiovascular:      Rate and Rhythm: Normal rate and regular rhythm  Heart sounds: Normal heart sounds  No murmur heard  Pulmonary:      [place]: Pulmonary effort is normal  No respiratory distress  Breath sounds: Normal breath sounds  No wheezing  Abdominal:      General: Abdomen is flat  There is no distension  Palpations: Abdomen is soft  Tenderness: There is no abdominal tenderness  Neurological:      General: No focal deficit present  Mental Status: He is alert and oriented to person, place, and time  Mental status is at baseline  Motor: No weakness            Additional Data:     Labs:  Results from last 7 days   Lab Units 22  0603   WBC Thousand/uL 7 32   HEMOGLOBIN g/dL 12 3   HEMATOCRIT % 36 1*   PLATELETS Thousands/uL 166   NEUTROS PCT % 62   LYMPHS PCT % 29   MONOS PCT % 7   EOS PCT % 2     Results from last 7 days   Lab Units 09/08/22  0603 09/06/22  1946 09/06/22  0645   SODIUM mmol/L 140   < > 145   POTASSIUM mmol/L 4 2   < > 2 7*   CHLORIDE mmol/L 105   < > 110*   CO2 mmol/L 24   < > 24   CO2, I-STAT   --    < >  --    BUN mg/dL 9   < > 14   CREATININE mg/dL 0 82   < > 0 87   ANION GAP mmol/L 11   < > 11   CALCIUM mg/dL 8 6   < > 8 7   ALBUMIN g/dL  --   --  2 5*   TOTAL BILIRUBIN mg/dL  --   --  0 27   ALK PHOS U/L  --   --  106   ALT U/L  --   --  13   AST U/L  --   --  23   GLUCOSE RANDOM mg/dL 167*   < > 187*    < > = values in this interval not displayed  Results from last 7 days   Lab Units 09/08/22  0709 09/07/22  2053 09/07/22  1711 09/07/22  1110 09/07/22  0734 09/06/22  2124 09/06/22  1959 09/06/22  1707 09/06/22  1427 09/06/22  1222 09/06/22  1043 09/06/22  0838   POC GLUCOSE mg/dl 193* 168* 162* 316* 267* 205* 199* 141* 154* 132 122 161*     Results from last 7 days   Lab Units 09/05/22  0828   HEMOGLOBIN A1C % >14 0*     Results from last 7 days   Lab Units 09/05/22  0438 09/04/22  1110 09/04/22  0850 09/04/22  0604 09/04/22  0502   LACTIC ACID mmol/L  --  0 8 2 8*  --  4 5*   PROCALCITONIN ng/ml 0 37*  --   --  0 16  --        Lines/Drains:  Invasive Devices  Report    Peripheral Intravenous Line  Duration           Peripheral IV 09/06/22 Left;Ventral (anterior) Forearm 2 days          Drain  Duration           Urethral Catheter 16 Fr  4 days              Urinary Catheter:  Goal for removal: N/A - Chronic Lane               Imaging: No pertinent imaging reviewed  Recent Cultures (last 7 days):   Results from last 7 days   Lab Units 09/04/22  0604 09/02/22  1055   BLOOD CULTURE  No Growth at 72 hrs    No Growth at 72 hrs   --    URINE CULTURE   --  20,000-29,000 cfu/ml Staphylococcus coagulase negative*       Last 24 Hours Medication List:   Current Facility-Administered Medications   Medication Dose Route Frequency Provider Last Rate    gabapentin  100 mg Oral TID Susan Nolen MD      heparin (porcine)  5,000 Units Subcutaneous [facility] [address] [place], [address]      insulin glargine  40 Units Subcutaneous QAM Lane Lawanda, LEEANNA      insulin lispro  1-5 Units Subcutaneous HS Lane Congress, LEEANNA      insulin lispro  2-12 Units Subcutaneous TID AC Lane Lawanda, LEEANNA      insulin lispro  8 Units Subcutaneous TID With Meals LaneLEEANNA Orosco      nystatin   Topical BID Susan Nolen MD      pantoprazole  40 mg Intravenous Q24H [address] [place], LEEANNA      sodium chloride (PF)  3 mL Intravenous Q1H PRN Lane Congress, CRNP      vancomycin  15 mg/kg Intravenous Q12H Lane Congress, LEEANNA Stopped (09/08/22 0200)        Today, Patient Was Seen By: Shelli Combs MD    **Please Note: This note may have been constructed using a voice recognition system  **

## 2022-09-08 NOTE — CASE MANAGEMENT
Case Management Discharge Planning Note    Patient name Vani Sheehan  Location / MRN [id number]  : 1955 Date 2022       Current Admission Date: 2022  Current Admission Diagnosis:DKA (diabetic ketoacidosis) [facility])   Patient Active Problem List    Diagnosis Date Noted    Urinary retention 2022    D[facility]di[facility]osis) [facility] ) 2022    S[facility] ) 2022    New onset atrial fibrillation ([facility] ) 2022    Urinary frequency 2022    Type 2 diabetes mellitus ([facility] ) 2022    Prostate cancer screening 2022    Microscopic hematuria 2022    Urge incontinence of urine 2022    Nocturnal enuresis 2022    Feeling of incomplete bladder emptying 2022    Balanitis 2022      LOS (days): 4  Geometric Mean LOS (GMLOS) (days): 4 80  Days to GMLOS:0 7     OBJECTIVE:  Risk of Unplanned Readmission Score: 9 34         Current admission status: Inpatient   Preferred Pharmacy:   [id number]- [place], [place] - [address]  Phone: [phone] Fax: [phone]    Primary Care Provider: Jessica Masters MD    Primary Insurance: MEDICARE  Secondary Insurance: [address]:    Discharge planning discussed with[de-identified] Patient  Freedom of Choice: Yes  Comments - Freedom of Choice: CM met with patient at bedside to discuss STR recommendation  Patient reported that he originally refused rehab yesterday, but he is now agreeable to going and feels as though it is in his best interest  Patient denied any preferences as to where he wishes to go for STR and was agreeable with CM sending a blanket referral to both acute and subacute facilities  Patient confirmed that he has four doses of the Covid vaccine, and he does not remember the date of the last vaccine, but his wife does have his vaccination card at home    CM contacted family/caregiver?: No- see comments (Patient declined phone call )  Were Treatment Team discharge recommendations reviewed with patient/caregiver?: Yes  Did patient/caregiver verbalize understanding of patient care needs?: Yes  Were patient/caregiver advised of the risks associated with not following Treatment Team discharge recommendations?: Yes    [address] [place] [address]         Is the patient interested in [place] [address] at discharge?: No    Other Referral/Resources/Interventions Provided:  Interventions: Acute Rehab, Short Term Rehab  Referral Comments: CM sent a blanket referral to both acute and subacute facilities via [employer]  Responses pending at this time  Would you like to participate in our [address] service program?  : No - Declined    Treatment Team Recommendation: Short Term Rehab  Discharge Destination Plan[de-identified] Short Term Rehab, Acute Rehab  Transport at Discharge : Family  Dispatcher Contacted:  No

## 2022-09-08 NOTE — ARC ADMISSION
Referral received for Deon Sims and [employer]  Pending review with [facility] physicians

## 2022-09-08 NOTE — ASSESSMENT & PLAN NOTE
· Likely in setting of neurogenic bladder from uncontrolled diabetes  · Lane inserted, patient had bilateral hydronephrosis from retention, now improving after decompression, noted on US  · Monitor urine output, patient will need outpatient urology follow up

## 2022-09-08 NOTE — ASSESSMENT & PLAN NOTE
Lab Results   Component Value Date    HGBA1C >14 0 (H) 09/05/2022       Recent Labs     09/07/22  1110 09/07/22  1711 09/07/22 2053 09/08/22  0709   POCGLU 316* 162* 168* 193*       Blood Sugar Average: Last 72 hrs:  (P) 148 9375   · Present on admission  · Status post insulin drip, now transitioned to basal insulin with sliding scale  · Escalated diet to diabetic diet  · DKA resolved  · Glucose levels adequately controlled  · Monitor   · Patient is experiencing peripheral neuropathy with difficulty with ambulation, open to rehab, coordinate with care manager

## 2022-09-08 NOTE — PLAN OF CARE
Problem: Potential for Falls  Goal: Patient will remain free of falls  Description: INTERVENTIONS:  - Educate patient/family on patient safety including physical limitations  - Instruct patient to call for assistance with activity   - Consult OT/PT to assist with strengthening/mobility   - Keep Call bell within reach  - Keep bed low and locked with side rails adjusted as appropriate  - Keep care items and personal belongings within reach  - Initiate and maintain comfort rounds  - Make Fall Risk Sign visible to staff  - Offer Toileting every 2 Hours, in advance of need  - Initiate/Maintain bed and chair alarm  - Obtain necessary fall risk management equipment: yes   - Apply yellow socks and bracelet for high fall risk patients  - Consider moving patient to room near nurses station  Outcome: Progressing     Problem: MOBILITY - ADULT  Goal: Maintain or return to baseline ADL function  Description: INTERVENTIONS:  -  Assess patient's ability to carry out ADLs; assess patient's baseline for ADL function and identify physical deficits which impact ability to perform ADLs (bathing, care of mouth/teeth, toileting, grooming, dressing, etc )  - Assess/evaluate cause of self-care deficits   - Assess range of motion  - Assess patient's mobility; develop plan if impaired  - Assess patient's need for assistive devices and provide as appropriate  - Encourage maximum independence but intervene and supervise when necessary  - Involve family in performance of ADLs  - Assess for home care needs following discharge   - Consider OT consult to assist with ADL evaluation and planning for discharge  - Provide patient education as appropriate  Outcome: Progressing     Problem: Prexisting or High Potential for Compromised Skin Integrity  Goal: Skin integrity is maintained or improved  Description: INTERVENTIONS:  - Identify patients at risk for skin breakdown  - Assess and monitor skin integrity  - Assess and monitor nutrition and hydration status  - Monitor labs   - Assess for incontinence   - Turn and reposition patient  - Assist with mobility/ambulation  - Relieve pressure over bony prominences  - Avoid friction and shearing  - Provide appropriate hygiene as needed including keeping skin clean and dry  - Evaluate need for skin moisturizer/barrier cream  - Collaborate with interdisciplinary team   - Patient/family teaching  - Consider wound care consult   Outcome: Progressing     Problem: PAIN - ADULT  Goal: Verbalizes/displays adequate comfort level or baseline comfort level  Description: Interventions:  - Encourage patient to monitor pain and request assistance  - Assess pain using appropriate pain scale  - Administer analgesics based on type and severity of pain and evaluate response  - Implement non-pharmacological measures as appropriate and evaluate response  - Consider cultural and social influences on pain and pain management  - Notify physician/advanced practitioner if interventions unsuccessful or patient reports new pain  Outcome: Progressing     Problem: INFECTION - ADULT  Goal: Absence or prevention of progression during hospitalization  Description: INTERVENTIONS:  - Assess and monitor for signs and symptoms of infection  - Monitor lab/diagnostic results  - Monitor all insertion sites, i e  indwelling lines, tubes, and drains  - Monitor endotracheal if appropriate and nasal secretions for changes in amount and color  - [place] appropriate cooling/warming therapies per order  - Administer medications as ordered  - Instruct and encourage patient and family to use good hand hygiene technique  - Identify and instruct in appropriate isolation precautions for identified infection/condition  Outcome: Progressing     Problem: DISCHARGE PLANNING  Goal: Discharge to home or other facility with appropriate resources  Description: INTERVENTIONS:  - Identify barriers to discharge w/patient and caregiver  - Arrange for needed discharge resources and transportation as appropriate  - Identify discharge learning needs (meds, wound care, etc )  - Arrange for interpretive services to assist at discharge as needed  - Refer to Case Management Department for coordinating discharge planning if the patient needs post-hospital services based on physician/advanced practitioner order or complex needs related to functional status, cognitive ability, or social support system  Outcome: Progressing     Problem: Knowledge Deficit  Goal: Patient/family/caregiver demonstrates understanding of disease process, treatment plan, medications, and discharge instructions  Description: Complete learning assessment and assess knowledge base    Interventions:  - Provide teaching at level of understanding  - Provide teaching via preferred learning methods  Outcome: Progressing     Problem: NEUROSENSORY - ADULT  Goal: Achieves stable or improved neurological status  Description: INTERVENTIONS  - Monitor and report changes in neurological status  - Monitor vital signs such as temperature, blood pressure, glucose, and any other labs ordered   - Initiate measures to prevent increased intracranial pressure  - Monitor for seizure activity and implement precautions if appropriate      Outcome: Progressing     Problem: CARDIOVASCULAR - ADULT  Goal: Maintains optimal cardiac output and hemodynamic stability  Description: INTERVENTIONS:  - Monitor I/O, vital signs and rhythm  - Monitor for S/S and trends of decreased cardiac output  - Administer and titrate ordered vasoactive medications to optimize hemodynamic stability  - Assess quality of pulses, skin color and temperature  - Assess for signs of decreased coronary artery perfusion  - Instruct patient to report change in severity of symptoms  Outcome: Progressing  Goal: Absence of cardiac dysrhythmias or at baseline rhythm  Description: INTERVENTIONS:  - Continuous cardiac monitoring, vital signs, obtain 12 lead EKG if ordered  - Administer antiarrhythmic and heart rate control medications as ordered  - Monitor electrolytes and administer replacement therapy as ordered  Outcome: Progressing     Problem: GASTROINTESTINAL - ADULT  Goal: Minimal or absence of nausea and/or vomiting  Description: INTERVENTIONS:  - Administer IV fluids if ordered to ensure adequate hydration  - Maintain NPO status until nausea and vomiting are resolved  - Nasogastric tube if ordered  - Administer ordered antiemetic medications as needed  - Provide nonpharmacologic comfort measures as appropriate  - Advance diet as tolerated, if ordered  - Consider nutrition services referral to assist patient with adequate nutrition and appropriate food choices  Outcome: Progressing  Goal: Maintains or returns to baseline bowel function  Description: INTERVENTIONS:  - Assess bowel function  - Encourage oral fluids to ensure adequate hydration  - Administer IV fluids if ordered to ensure adequate hydration  - Administer ordered medications as needed  - Encourage mobilization and activity  - Consider nutritional services referral to assist patient with adequate nutrition and appropriate food choices  Outcome: Progressing  Goal: Oral mucous membranes remain intact  Description: INTERVENTIONS  - Assess oral mucosa and hygiene practices  - Implement preventative oral hygiene regimen  - Implement oral medicated treatments as ordered  - Initiate Nutrition services referral as needed  Outcome: Progressing     Problem: GENITOURINARY - ADULT  Goal: Urinary catheter remains patent  Description: INTERVENTIONS:  - Assess patency of urinary catheter  - If patient has a chronic lemons, consider changing catheter if non-functioning  - Follow guidelines for intermittent irrigation of non-functioning urinary catheter  Outcome: Progressing     Problem: METABOLIC, FLUID AND ELECTROLYTES - ADULT  Goal: Electrolytes maintained within normal limits  Description: INTERVENTIONS:  - Monitor labs and assess patient for signs and symptoms of electrolyte imbalances  - Administer electrolyte replacement as ordered  - Monitor response to electrolyte replacements, including repeat lab results as appropriate  - Instruct patient on fluid and nutrition as appropriate  Outcome: Progressing  Goal: Fluid balance maintained  Description: INTERVENTIONS:  - Monitor labs   - Monitor I/O and WT  - Instruct patient on fluid and nutrition as appropriate  - Assess for signs & symptoms of volume excess or deficit  Outcome: Progressing  Goal: Glucose maintained within target range  Description: INTERVENTIONS:  - Monitor Blood Glucose as ordered  - Assess for signs and symptoms of hyperglycemia and hypoglycemia  - Administer ordered medications to maintain glucose within target range  - Assess nutritional intake and initiate nutrition service referral as needed  Outcome: Progressing     Problem: Nutrition/Hydration-ADULT  Goal: Nutrient/Hydration intake appropriate for improving, restoring or maintaining nutritional needs  Description: Monitor and assess patient's nutrition/hydration status for malnutrition  Collaborate with interdisciplinary team and initiate plan and interventions as ordered  Monitor patient's weight and dietary intake as ordered or per policy  Utilize nutrition screening tool and intervene as necessary  Determine patient's food preferences and provide high-protein, high-caloric foods as appropriate       INTERVENTIONS:  - Monitor oral intake, urinary output, labs, and treatment plans  - Assess nutrition and hydration status and recommend course of action  - Evaluate amount of meals eaten  - Assist patient with eating if necessary   - Allow adequate time for meals  - Recommend/ encourage appropriate diets, oral nutritional supplements, and vitamin/mineral supplements  - Order, calculate, and assess calorie counts as needed  - Recommend, monitor, and adjust tube feedings and TPN/PPN based on assessed needs  - Assess need for intravenous fluids  - Provide specific nutrition/hydration education as appropriate  - Include patient/family/caregiver in decisions related to nutrition  Outcome: Progressing

## 2022-09-08 NOTE — WOUND OSTOMY CARE
Progress Note - Wound   Oracio Colon Jr  75 y o  male MRN: [id number]  Unit/Bed#:  Encounter: [id number]      Assessment:   Patient admitted due tp DKA  History of diabetes  Wound care consulted for scrotal fungal rash and skin breakdown  Patient agreeable to assessment, alert and oriented x4, lemons catheter in place for urine management, can be incontinent of bowel at times due to urgency, independently turns for assessment, heels elevated off of mattress, is currently in ICU on a critical care low air loss mattress, is a light assist with care  Primary RN made aware of assessment findings  1  MASD mid coccyx- Linear slit noted with partial thickness skin breakdown that is 100% blanchable pink tissue, with scant amount of serous drainage noted  [place]-wound is slightly macerated skin and blanchable pink skin  2  Bilateral buttock- skin is dry, intact, blanchable pink  3  Scrotal and bilateral medial thigh fungal rash- No skin breakdown noted on assessment  Skin is intact, erythematous, with defined borders and satellite lesions and malodor suggesting candida rash  Recommend treatment with Nystatin powder at this time  4  Right medial and lateral ankle noted with blanchable purple discoloration that patient states has been present for about a year, patient also states this discoloration began after having a clot in the right leg in the past     5  Bilateral heels- skin is dry, intact, blanchable  Educated patient on importance of frequent offloading of pressure via turning, repositioning, and weight-shifting  Verbalized understanding of plan of care  No induration, fluctuance, odor, warmth, redness, or purulence noted to the above noted wound  Patient tolerated well, reports tenderness to the coccyx wound  Primary nurse aware of plan of care  See flow sheets for more detailed assessment findings  Will follow along      Skin care plans:  1-Calazime to coccyx and intergluteal crease TID and PRN  2-Hydraguard to bilateral heels and buttock BID and PRN  3-Elevate heels to offload pressure  4-Ehob cushion when out of bed  5-Turn/repoisiton q2h for pressure re-distribution on skin  6-Moisturize skin daily with skin nourishing cream  7- Scrotum and bilateral medial thighs- Cleanse skin with soap and water, pat dry  Apply dusting of nystatin powder over affected areas so that a thin layer of powder is applied to skin  Complete BID and with chayo-care  Wound 09/08/22 MASD Coccyx (Active)   Wound Image   09/08/22 1040   Wound Description Pink; Other (Comment) 09/08/22 1040   Chayo-wound Assessment Intact; Maceration;Pink 09/08/22 1040   Wound Length (cm) 1 cm 09/08/22 1040   Wound Width (cm) 0 5 cm 09/08/22 1040   Wound Depth (cm) 0 1 cm 09/08/22 1040   Wound Surface Area (cm^2) 0 5 cm^2 09/08/22 1040   Wound Volume (cm^3) 0 05 cm^3 09/08/22 1040   Calculated Wound Volume (cm^3) 0 05 cm^3 09/08/22 1040   Tunneling 0 cm 09/08/22 1040   Undermining 0 09/08/22 1040   Drainage Amount Scant 09/08/22 1040   Drainage Description Serous 09/08/22 1040   Non-staged Wound Description Partial thickness 09/08/22 1040   Treatments Cleansed;Site care 09/08/22 1040   Dressing Protective barrier 09/08/22 1040   Wound packed?  No 09/08/22 1040   Dressing Changed New 09/08/22 1040   Patient Tolerance Tolerated well 09/08/22 1040       Call or tigertext with any questions  Wound Care will continue to follow    Cisco Fink BSN RN CWON  Wound and Ostomy care

## 2022-09-09 PROBLEM — B36.9 FUNGAL INFECTION OF SKIN: Status: ACTIVE | Noted: 2022-09-09

## 2022-09-09 PROBLEM — A41.9 SEPSIS (HCC): Status: RESOLVED | Noted: 2022-09-04 | Resolved: 2022-09-09

## 2022-09-09 LAB
BACTERIA BLD CULT: NORMAL
BACTERIA BLD CULT: NORMAL
FLUAV RNA RESP QL NAA+PROBE: NEGATIVE
FLUBV RNA RESP QL NAA+PROBE: NEGATIVE
GLUCOSE SERPL-MCNC: 141 MG/DL (ref 65–140)
GLUCOSE SERPL-MCNC: 164 MG/DL (ref 65–140)
GLUCOSE SERPL-MCNC: 235 MG/DL (ref 65–140)
GLUCOSE SERPL-MCNC: 331 MG/DL (ref 65–140)
RSV RNA RESP QL NAA+PROBE: NEGATIVE
SARS-COV-2 RNA RESP QL NAA+PROBE: NEGATIVE

## 2022-09-09 PROCEDURE — 99233 SBSQ HOSP IP/OBS HIGH 50: CPT | Performed by: STUDENT IN AN ORGANIZED HEALTH CARE EDUCATION/TRAINING PROGRAM

## 2022-09-09 PROCEDURE — NC001 PR NO CHARGE

## 2022-09-09 PROCEDURE — 82948 REAGENT STRIP/BLOOD GLUCOSE: CPT

## 2022-09-09 PROCEDURE — C9113 INJ PANTOPRAZOLE SODIUM, VIA: HCPCS | Performed by: NURSE PRACTITIONER

## 2022-09-09 PROCEDURE — 97116 GAIT TRAINING THERAPY: CPT

## 2022-09-09 PROCEDURE — 97110 THERAPEUTIC EXERCISES: CPT

## 2022-09-09 PROCEDURE — 97535 SELF CARE MNGMENT TRAINING: CPT

## 2022-09-09 PROCEDURE — 0241U HB NFCT DS VIR RESP RNA 4 TRGT: CPT | Performed by: STUDENT IN AN ORGANIZED HEALTH CARE EDUCATION/TRAINING PROGRAM

## 2022-09-09 RX ORDER — INSULIN LISPRO 100 [IU]/ML
2-12 INJECTION, SOLUTION INTRAVENOUS; SUBCUTANEOUS
Status: CANCELLED | OUTPATIENT
Start: 2022-09-09

## 2022-09-09 RX ORDER — GABAPENTIN 100 MG/1
100 CAPSULE ORAL 3 TIMES DAILY
Status: CANCELLED | OUTPATIENT
Start: 2022-09-09

## 2022-09-09 RX ORDER — NYSTATIN 100000 [USP'U]/G
POWDER TOPICAL 2 TIMES DAILY
Status: CANCELLED | OUTPATIENT
Start: 2022-09-09

## 2022-09-09 RX ORDER — INSULIN LISPRO 100 [IU]/ML
14 INJECTION, SOLUTION INTRAVENOUS; SUBCUTANEOUS
Status: DISCONTINUED | OUTPATIENT
Start: 2022-09-09 | End: 2022-09-10 | Stop reason: HOSPADM

## 2022-09-09 RX ORDER — SODIUM CHLORIDE 9 MG/ML
3 INJECTION INTRAVENOUS
Status: CANCELLED | OUTPATIENT
Start: 2022-09-09

## 2022-09-09 RX ORDER — HEPARIN SODIUM 5000 [USP'U]/ML
5000 INJECTION, SOLUTION INTRAVENOUS; SUBCUTANEOUS EVERY 8 HOURS SCHEDULED
Status: CANCELLED | OUTPATIENT
Start: 2022-09-09

## 2022-09-09 RX ORDER — INSULIN LISPRO 100 [IU]/ML
1-5 INJECTION, SOLUTION INTRAVENOUS; SUBCUTANEOUS
Status: CANCELLED | OUTPATIENT
Start: 2022-09-09

## 2022-09-09 RX ORDER — INSULIN GLARGINE 100 [IU]/ML
44 INJECTION, SOLUTION SUBCUTANEOUS EVERY MORNING
Status: CANCELLED | OUTPATIENT
Start: 2022-09-10

## 2022-09-09 RX ORDER — INSULIN LISPRO 100 [IU]/ML
10 INJECTION, SOLUTION INTRAVENOUS; SUBCUTANEOUS
Status: CANCELLED | OUTPATIENT
Start: 2022-09-09

## 2022-09-09 RX ORDER — PANTOPRAZOLE SODIUM 40 MG/10ML
40 INJECTION, POWDER, LYOPHILIZED, FOR SOLUTION INTRAVENOUS
Status: CANCELLED | OUTPATIENT
Start: 2022-09-10

## 2022-09-09 RX ADMIN — HEPARIN SODIUM 5000 UNITS: 5000 INJECTION INTRAVENOUS; SUBCUTANEOUS at 16:00

## 2022-09-09 RX ADMIN — INSULIN LISPRO 2 UNITS: 100 INJECTION, SOLUTION INTRAVENOUS; SUBCUTANEOUS at 17:31

## 2022-09-09 RX ADMIN — INSULIN LISPRO 10 UNITS: 100 INJECTION, SOLUTION INTRAVENOUS; SUBCUTANEOUS at 12:11

## 2022-09-09 RX ADMIN — HEPARIN SODIUM 5000 UNITS: 5000 INJECTION INTRAVENOUS; SUBCUTANEOUS at 22:04

## 2022-09-09 RX ADMIN — NYSTATIN 1 APPLICATION: 100000 POWDER TOPICAL at 09:09

## 2022-09-09 RX ADMIN — HEPARIN SODIUM 5000 UNITS: 5000 INJECTION INTRAVENOUS; SUBCUTANEOUS at 05:40

## 2022-09-09 RX ADMIN — NYSTATIN: 100000 POWDER TOPICAL at 16:03

## 2022-09-09 RX ADMIN — GABAPENTIN 100 MG: 100 CAPSULE ORAL at 09:08

## 2022-09-09 RX ADMIN — INSULIN LISPRO 10 UNITS: 100 INJECTION, SOLUTION INTRAVENOUS; SUBCUTANEOUS at 09:06

## 2022-09-09 RX ADMIN — INSULIN LISPRO 8 UNITS: 100 INJECTION, SOLUTION INTRAVENOUS; SUBCUTANEOUS at 12:11

## 2022-09-09 RX ADMIN — INSULIN GLARGINE 44 UNITS: 100 INJECTION, SOLUTION SUBCUTANEOUS at 09:05

## 2022-09-09 RX ADMIN — GABAPENTIN 100 MG: 100 CAPSULE ORAL at 16:00

## 2022-09-09 RX ADMIN — GABAPENTIN 100 MG: 100 CAPSULE ORAL at 22:03

## 2022-09-09 RX ADMIN — INSULIN LISPRO 14 UNITS: 100 INJECTION, SOLUTION INTRAVENOUS; SUBCUTANEOUS at 17:32

## 2022-09-09 RX ADMIN — PANTOPRAZOLE SODIUM 40 MG: 40 INJECTION, POWDER, FOR SOLUTION INTRAVENOUS at 09:08

## 2022-09-09 RX ADMIN — INSULIN LISPRO 4 UNITS: 100 INJECTION, SOLUTION INTRAVENOUS; SUBCUTANEOUS at 09:06

## 2022-09-09 NOTE — PLAN OF CARE
Problem: OCCUPATIONAL THERAPY ADULT  Goal: Performs self-care activities at highest level of function for planned discharge setting  See evaluation for individualized goals  Description: Treatment Interventions: ADL retraining, Functional transfer training, UE strengthening/ROM, Endurance training, Patient/family training          See flowsheet documentation for full assessment, interventions and recommendations  Outcome: Progressing  Note: Limitation: Decreased ADL status, Decreased Safe judgement during ADL, Decreased endurance, Decreased self-care trans, Decreased high-level ADLs  Prognosis: Good  Assessment: Pt seen this date for skilled OT session focused on ADLs, functional transfers and mobility, safety education  The patient was received supine in bed, NAD, on RA, PIV access, tele, lemons catheter  He participated in functional mobility and transfers, functional ambulation in room, BUE exercises, edema reduction techniques, and ADL UB/LB dressing simulation this date  Pt required at least Minimal Assistance x1 with RW for functional ambulation and transfers, Moderate Assistance for LB dressing, and Minimal Assistance for UB dressing this date  Pt expressed understanding of edema reduction techniques for RUE, and demonstrated BUE exercises  Overall, the patient has demonstrated good functional progress towards therapy goals  At end of session the patient was located seated in bed side chair with call bell in reach and all needs met  Chair alarm activated  Overall the patient remains below his functional baseline, and is primarily limited at this time due to generalized deconditioning, impaired balance, decreased safety awareness, and decreased activity tolerance  OT will continue to follow while acute to address POC   At this time, recommend inpatient rehab upon d/c      OT Discharge Recommendation: Post acute rehabilitation services        Isaac Early OTR/L

## 2022-09-09 NOTE — OCCUPATIONAL THERAPY NOTE
Occupational Therapy Progress Note     Patient Name: Constance Hoang  Today's Date: 9/9/2022    Problem List  Principal Problem:    DKA (diabetic ketoacidosis) ([address] )  Active Problems:    Type 2 diabetes mellitus (HCC)    Nocturnal enuresis    New onset atrial fibrillation [facility])    Urinary retention    Fungal infection of skin        09/09/22 0856   OT Last Visit   OT Visit Date 09/09/22   Note Type   Note Type Treatment   Restrictions/Precautions   Weight Bearing Precautions Per Order No   Other Precautions Chair Alarm; Bed Alarm;Multiple lines;Telemetry; Fall Risk   Pain Assessment   Pain Assessment Tool 0-10   Pain Score 8   Pain Location/Orientation Location: Groin  (pt reports 2/2 lemons catheter)   Hospital Pain Intervention(s) Repositioned; Ambulation/increased activity   ADL   Eating Assistance 7  Independent   Eating Deficit Setup   Grooming Assistance 5  Supervision/Setup   Grooming Deficit Setup; Increased time to complete;Wash/dry face   UB Bathing Assistance 5  Supervision/Setup   UB Bathing Deficit Setup;Supervision/safety; Increased time to complete   LB Bathing Assistance 3  Moderate Assistance   LB Bathing Deficit Setup; Increased time to complete;Right lower leg including foot; Left lower leg including foot   UB Dressing Assistance 4  Minimal Assistance   UB Dressing Deficit Setup;Supervision/safety; Increased time to complete   LB Dressing Assistance 3  Moderate Assistance   LB Dressing Deficit Setup;Supervision/safety; Increased time to complete; Don/doff R sock; Don/doff L sock  (pt unable to reach feet at this time 2/2 discomfort at lemons site and limited R knee ROM)   Toileting Assistance  2  Maximal Assistance   Toileting Deficit   (+ lemons catheter, decreased ability to perform thorough chayo hygiene)   Functional Standing Tolerance   Time ~5 minutes   Activity static standing and functional ambulation in room   Comments Min A x1 with RW   Bed Mobility   Supine to Sit 4  Minimal assistance Additional items Assist x 1;HOB elevated; Bedrails; Increased time required  (significantly slow)   Transfers   Sit to Stand 3  Moderate assistance   Additional items Assist x 1; Increased time required;Verbal cues  (with RW)   Stand to Sit 4  Minimal assistance   Additional items Assist x 1; Armrests; Increased time required   Stand pivot 4  Minimal assistance   Additional items Assist x 1;Verbal cues; Increased time required  (with RW)   Toilet transfer   (pt declined to perform this date)   Functional Mobility   Functional Mobility 4  Minimal assistance   Additional Comments assist x1   Additional items Rolling walker   Therapeutic Excerise-Strength   UE Strength Yes   Right Upper Extremity- Strength   R Shoulder Flexion; Horizontal ABduction   R Elbow Elbow flexion;Elbow extension   R Wrist Wrist flexion;Wrist extension   R Hand   (composite  and extension)   R Position Seated   R Weight/Reps/Sets 10 reps x1 set   RUE Strength Comment B shoulder rolls   Left Upper Extremity-Strength   L Shoulder Flexion; Horizontal ABduction   L Elbow Elbow flexion;Elbow extension   L Wrist Wrist flexion;Wrist extension   L Hand   (composite  and extension)   L Position Seated   L Weights/Reps/Sets 10 reps x1 set   LUE Strength Comment B shoulder rolls   Cognition   Overall Cognitive Status WFL   Arousal/Participation Alert; Cooperative   Attention Attends with cues to redirect   Orientation Level Oriented X4   Memory Within functional limits   Following Commands Follows all commands and directions without difficulty   Comments Pt pleasant and cooperative  Distractible, but attends with cues and redirection  Motivated to participate with therapy and regain independence  Poor safety awareness noted at times   Additional Activities   Additional Activities   (Discussed POC and appropriate level of activity and activity progression   Discussed expectations of inpatient rehab after d/c)   Additional Activities Comments Pt expressed understanding   Activity Tolerance   Activity Tolerance Patient tolerated treatment well   Medical Staff Made Aware Matilda PT, Nikkie RN  Portion of treatment occurred with Physical Therapist due to pt's medical complexity, functional limitations, limited activity tolerance, and previous requirement of 2 person assist for basic mobility  Remainder of treatment occurred with only this writer given pt demonstrated one person assist this date  Assessment   Assessment Pt seen this date for skilled OT session focused on ADLs, functional transfers and mobility, safety education  The patient was received supine in bed, NAD, on RA, PIV access, tele, lemons catheter  He participated in functional mobility and transfers, functional ambulation in room, BUE exercises, edema reduction techniques, and ADL UB/LB dressing simulation this date  Pt required at least Minimal Assistance x1 with RW for functional ambulation and transfers, Moderate Assistance for LB dressing, and Minimal Assistance for UB dressing this date  Pt expressed understanding of edema reduction techniques for RUE, and demonstrated BUE exercises  Overall, the patient has demonstrated good functional progress towards therapy goals  At end of session the patient was located seated in bed side chair with call bell in reach and all needs met  Chair alarm activated  Overall the patient remains below his functional baseline, and is primarily limited at this time due to generalized deconditioning, impaired balance, decreased safety awareness, and decreased activity tolerance  OT will continue to follow while acute to address POC  At this time, recommend inpatient rehab upon d/c    Plan   Treatment Interventions ADL retraining;Functional transfer training;UE strengthening/ROM; Endurance training;Patient/family training   Goal Expiration Date 09/21/22   OT Treatment Day 1   OT Frequency 3-5x/wk   Recommendation   OT Discharge Recommendation Post acute rehabilitation services Additional Comments  The patient's raw score on the AM-PAC Daily Activity inpatient short form is 16, standardized score is 35 96, less than 39 4  Patients at this level are likely to benefit from discharge to post-acute rehabilitation services  Please refer to the recommendation of the Occupational Therapist for safe discharge planning     AM-PAC Daily Activity Inpatient   Lower Body Dressing 2   Bathing 2   Toileting 2   Upper Body Dressing 3   Grooming 3   Eating 4   Daily Activity Raw Score 16   Daily Activity Standardized Score (Calc for Raw Score >=11) 35 96   AM-[facility] Applied Cognition Inpatient   Following a Speech/Presentation 4   Understanding Ordinary Conversation 4   Taking Medications 3   Remembering Where Things Are Placed or Put Away 4   Remembering List of 4-5 Errands 4   Taking Care of Complicated Tasks 3   Applied Cognition Raw Score 22   Applied Cognition Standardized Score 47 83       Vaibhav Coffman, OTR/L

## 2022-09-09 NOTE — PLAN OF CARE
Problem: Potential for Falls  Goal: Patient will remain free of falls  Description: INTERVENTIONS:  - Educate patient/family on patient safety including physical limitations  - Instruct patient to call for assistance with activity   - Consult OT/PT to assist with strengthening/mobility   - Keep Call bell within reach  - Keep bed low and locked with side rails adjusted as appropriate  - Keep care items and personal belongings within reach  - Initiate and maintain comfort rounds  - Make Fall Risk Sign visible to staff  - Offer Toileting every  Hours, in advance of need  - Initiate/Maintainalarm  - Obtain necessary fall risk management equipment:   - Apply yellow socks and bracelet for high fall risk patients  - Consider moving patient to room near nurses station  Outcome: Progressing     Problem: MOBILITY - ADULT  Goal: Maintain or return to baseline ADL function  Description: INTERVENTIONS:  -  Assess patient's ability to carry out ADLs; assess patient's baseline for ADL function and identify physical deficits which impact ability to perform ADLs (bathing, care of mouth/teeth, toileting, grooming, dressing, etc )  - Assess/evaluate cause of self-care deficits   - Assess range of motion  - Assess patient's mobility; develop plan if impaired  - Assess patient's need for assistive devices and provide as appropriate  - Encourage maximum independence but intervene and supervise when necessary  - Involve family in performance of ADLs  - Assess for home care needs following discharge   - Consider OT consult to assist with ADL evaluation and planning for discharge  - Provide patient education as appropriate  Outcome: Progressing  Goal: Maintains/Returns to pre admission functional level  Description: INTERVENTIONS:  - Perform BMAT or MOVE assessment daily    - Set and communicate daily mobility goal to care team and patient/family/caregiver     - Collaborate with rehabilitation services on mobility goals if consulted  - Perform Range of Motion  times a day  - Reposition patient every  hours    - Dangle patient  times a day  - Stand patient  times a day  - Ambulate patient  times a day  - Out of bed to chair  times a day   - Out of bed for meals  times a day  - Out of bed for toileting  - Record patient progress and toleration of activity level   Outcome: Progressing     Problem: Prexisting or High Potential for Compromised Skin Integrity  Goal: Skin integrity is maintained or improved  Description: INTERVENTIONS:  - Identify patients at risk for skin breakdown  - Assess and monitor skin integrity  - Assess and monitor nutrition and hydration status  - Monitor labs   - Assess for incontinence   - Turn and reposition patient  - Assist with mobility/ambulation  - Relieve pressure over bony prominences  - Avoid friction and shearing  - Provide appropriate hygiene as needed including keeping skin clean and dry  - Evaluate need for skin moisturizer/barrier cream  - Collaborate with interdisciplinary team   - Patient/family teaching  - Consider wound care consult   Outcome: Progressing     Problem: PAIN - ADULT  Goal: Verbalizes/displays adequate comfort level or baseline comfort level  Description: Interventions:  - Encourage patient to monitor pain and request assistance  - Assess pain using appropriate pain scale  - Administer analgesics based on type and severity of pain and evaluate response  - Implement non-pharmacological measures as appropriate and evaluate response  - Consider cultural and social influences on pain and pain management  - Notify physician/advanced practitioner if interventions unsuccessful or patient reports new pain  Outcome: Progressing     Problem: INFECTION - ADULT  Goal: Absence or prevention of progression during hospitalization  Description: INTERVENTIONS:  - Assess and monitor for signs and symptoms of infection  - Monitor lab/diagnostic results  - Monitor all insertion sites, i e  indwelling lines, tubes, and drains  - Monitor endotracheal if appropriate and nasal secretions for changes in amount and color  - [place] appropriate cooling/warming therapies per order  - Administer medications as ordered  - Instruct and encourage patient and family to use good hand hygiene technique  - Identify and instruct in appropriate isolation precautions for identified infection/condition  Outcome: Progressing     Problem: SAFETY ADULT  Goal: Patient will remain free of falls  Description: INTERVENTIONS:  - Educate patient/family on patient safety including physical limitations  - Instruct patient to call for assistance with activity   - Consult OT/PT to assist with strengthening/mobility   - Keep Call bell within reach  - Keep bed low and locked with side rails adjusted as appropriate  - Keep care items and personal belongings within reach  - Initiate and maintain comfort rounds  - Make Fall Risk Sign visible to staff  - Offer Toileting every  Hours, in advance of need  - Initiate/Maintain alarm  - Obtain necessary fall risk management equipment:   - Apply yellow socks and bracelet for high fall risk patients  - Consider moving patient to room near nurses station  Outcome: Progressing  Goal: Maintain or return to baseline ADL function  Description: INTERVENTIONS:  -  Assess patient's ability to carry out ADLs; assess patient's baseline for ADL function and identify physical deficits which impact ability to perform ADLs (bathing, care of mouth/teeth, toileting, grooming, dressing, etc )  - Assess/evaluate cause of self-care deficits   - Assess range of motion  - Assess patient's mobility; develop plan if impaired  - Assess patient's need for assistive devices and provide as appropriate  - Encourage maximum independence but intervene and supervise when necessary  - Involve family in performance of ADLs  - Assess for home care needs following discharge   - Consider OT consult to assist with ADL evaluation and planning for discharge  - Provide patient education as appropriate  Outcome: Progressing  Goal: Maintains/Returns to pre admission functional level  Description: INTERVENTIONS:  - Perform BMAT or MOVE assessment daily    - Set and communicate daily mobility goal to care team and patient/family/caregiver  - Collaborate with rehabilitation services on mobility goals if consulted  - Perform Range of Motion  times a day  - Reposition patient every  hours  - Dangle patient  times a day  - Stand patient times a day  - Ambulate patient  times a day  - Out of bed to chair times a day   - Out of bed for meals  times a day  - Out of bed for toileting  - Record patient progress and toleration of activity level   Outcome: Progressing     Problem: DISCHARGE PLANNING  Goal: Discharge to home or other facility with appropriate resources  Description: INTERVENTIONS:  - Identify barriers to discharge w/patient and caregiver  - Arrange for needed discharge resources and transportation as appropriate  - Identify discharge learning needs (meds, wound care, etc )  - Arrange for interpretive services to assist at discharge as needed  - Refer to Case Management Department for coordinating discharge planning if the patient needs post-hospital services based on physician/advanced practitioner order or complex needs related to functional status, cognitive ability, or social support system  Outcome: Progressing     Problem: Knowledge Deficit  Goal: Patient/family/caregiver demonstrates understanding of disease process, treatment plan, medications, and discharge instructions  Description: Complete learning assessment and assess knowledge base    Interventions:  - Provide teaching at level of understanding  - Provide teaching via preferred learning methods  Outcome: Progressing     Problem: NEUROSENSORY - ADULT  Goal: Achieves stable or improved neurological status  Description: INTERVENTIONS  - Monitor and report changes in neurological status  - Monitor vital signs such as temperature, blood pressure, glucose, and any other labs ordered   - Initiate measures to prevent increased intracranial pressure  - Monitor for seizure activity and implement precautions if appropriate      Outcome: Progressing     Problem: CARDIOVASCULAR - ADULT  Goal: Maintains optimal cardiac output and hemodynamic stability  Description: INTERVENTIONS:  - Monitor I/O, vital signs and rhythm  - Monitor for S/S and trends of decreased cardiac output  - Administer and titrate ordered vasoactive medications to optimize hemodynamic stability  - Assess quality of pulses, skin color and temperature  - Assess for signs of decreased coronary artery perfusion  - Instruct patient to report change in severity of symptoms  Outcome: Progressing  Goal: Absence of cardiac dysrhythmias or at baseline rhythm  Description: INTERVENTIONS:  - Continuous cardiac monitoring, vital signs, obtain 12 lead EKG if ordered  - Administer antiarrhythmic and heart rate control medications as ordered  - Monitor electrolytes and administer replacement therapy as ordered  Outcome: Progressing     Problem: GASTROINTESTINAL - ADULT  Goal: Minimal or absence of nausea and/or vomiting  Description: INTERVENTIONS:  - Administer IV fluids if ordered to ensure adequate hydration  - Maintain NPO status until nausea and vomiting are resolved  - Nasogastric tube if ordered  - Administer ordered antiemetic medications as needed  - Provide nonpharmacologic comfort measures as appropriate  - Advance diet as tolerated, if ordered  - Consider nutrition services referral to assist patient with adequate nutrition and appropriate food choices  Outcome: Progressing  Goal: Maintains or returns to baseline bowel function  Description: INTERVENTIONS:  - Assess bowel function  - Encourage oral fluids to ensure adequate hydration  - Administer IV fluids if ordered to ensure adequate hydration  - Administer ordered medications as needed  - Encourage mobilization and activity  - Consider nutritional services referral to assist patient with adequate nutrition and appropriate food choices  Outcome: Progressing  Goal: Oral mucous membranes remain intact  Description: INTERVENTIONS  - Assess oral mucosa and hygiene practices  - Implement preventative oral hygiene regimen  - Implement oral medicated treatments as ordered  - Initiate Nutrition services referral as needed  Outcome: Progressing     Problem: GENITOURINARY - ADULT  Goal: Urinary catheter remains patent  Description: INTERVENTIONS:  - Assess patency of urinary catheter  - If patient has a chronic lemons, consider changing catheter if non-functioning  - Follow guidelines for intermittent irrigation of non-functioning urinary catheter  Outcome: Progressing     Problem: METABOLIC, FLUID AND ELECTROLYTES - ADULT  Goal: Electrolytes maintained within normal limits  Description: INTERVENTIONS:  - Monitor labs and assess patient for signs and symptoms of electrolyte imbalances  - Administer electrolyte replacement as ordered  - Monitor response to electrolyte replacements, including repeat lab results as appropriate  - Instruct patient on fluid and nutrition as appropriate  Outcome: Progressing  Goal: Fluid balance maintained  Description: INTERVENTIONS:  - Monitor labs   - Monitor I/O and WT  - Instruct patient on fluid and nutrition as appropriate  - Assess for signs & symptoms of volume excess or deficit  Outcome: Progressing  Goal: Glucose maintained within target range  Description: INTERVENTIONS:  - Monitor Blood Glucose as ordered  - Assess for signs and symptoms of hyperglycemia and hypoglycemia  - Administer ordered medications to maintain glucose within target range  - Assess nutritional intake and initiate nutrition service referral as needed  Outcome: Progressing     Problem: SKIN/TISSUE INTEGRITY - ADULT  Goal: Skin Integrity remains intact(Skin Breakdown Prevention)  Description: Assess:  -Perform José Miguel assessment every  -Clean and moisturize skin every   -Inspect skin when repositioning, toileting, and assisting with ADLS  -Assess under medical devices such as  every   -Assess extremities for adequate circulation and sensation     Bed Management:  -Have minimal linens on bed & keep smooth, unwrinkled  -Change linens as needed when moist or perspiring  -Avoid sitting or lying in one position for more than  hours while in bed  -Keep HOB at degrees     Toileting:  -Offer bedside commode  -Assess for incontinence every   -Use incontinent care products after each incontinent episode such as     Activity:  -Mobilize patient  times a day  -Encourage activity and walks on unit  -Encourage or provide ROM exercises   -Turn and reposition patient every  Hours  -Use appropriate equipment to lift or move patient in bed  -Instruct/ Assist with weight shifting every  when out of bed in chair  -Consider limitation of chair time  hour intervals    Skin Care:  -Avoid use of baby powder, tape, friction and shearing, hot water or constrictive clothing  -Relieve pressure over bony prominences using   -Do not massage red bony areas    Next Steps:  -Teach patient strategies to minimize risks such as    -Consider consults to  interdisciplinary teams such as  Outcome: Progressing     Problem: Nutrition/Hydration-ADULT  Goal: Nutrient/Hydration intake appropriate for improving, restoring or maintaining nutritional needs  Description: Monitor and assess patient's nutrition/hydration status for malnutrition  Collaborate with interdisciplinary team and initiate plan and interventions as ordered  Monitor patient's weight and dietary intake as ordered or per policy  Utilize nutrition screening tool and intervene as necessary  Determine patient's food preferences and provide high-protein, high-caloric foods as appropriate       INTERVENTIONS:  - Monitor oral intake, urinary output, labs, and treatment plans  - Assess nutrition and hydration status and recommend course of action  - Evaluate amount of meals eaten  - Assist patient with eating if necessary   - Allow adequate time for meals  - Recommend/ encourage appropriate diets, oral nutritional supplements, and vitamin/mineral supplements  - Order, calculate, and assess calorie counts as needed  - Recommend, monitor, and adjust tube feedings and TPN/PPN based on assessed needs  - Assess need for intravenous fluids  - Provide specific nutrition/hydration education as appropriate  - Include patient/family/caregiver in decisions related to nutrition  Outcome: Progressing

## 2022-09-09 NOTE — PLAN OF CARE
Problem: PHYSICAL THERAPY ADULT  Goal: Performs mobility at highest level of function for planned discharge setting  See evaluation for individualized goals  Description: Treatment/Interventions: Functional transfer training, LE strengthening/ROM, Therapeutic exercise, Endurance training, Patient/family training, Bed mobility, Gait training, Spoke to nursing, OT          See flowsheet documentation for full assessment, interventions and recommendations  Outcome: Progressing  Note: Prognosis: Good  Problem List: Decreased strength, Decreased endurance, Impaired balance, Decreased mobility, Pain  Assessment: Chart reviewed  Patient was received supine in bed in NAD and agreeable to PT session  Today's PT treatment session consisted of therapeutic activity for facilitation of transitional movements and safe performance of correct technique for bed mobility and sit to stand transfers and gait training to promote safe and functional ambulation on level surfaces  In comparison to the previous session the patient has made progress as evident by requiring decreased assistance with all functional mobility  Pt able to perform all functional mobility with assist of one  Pt able to ambulate an increased distance with use of walker  When ambulating pt exhibits decreased fanny, decreased stride length, and decreased heel strike  Overall, patient tolerated today's session well and continues to be making progress towards achieving his STG's  Patient's prognosis for achieving their STG's is good as evident by pt's motivation  Pt's STG's were updated this session  PT intervention continues to be appropriate as the patient continues to be limited by pain, decreased lower extremity strength, impaired balance, decreased endurance, gait deviations, and decreased functional mobility  Continue to recommend STR   PT to continue to see patient in order to address the deficits listed above and provide interventions consistent with the POC in order to achieve STG's and optimize the patient's independence with functional mobility  Barriers to Discharge: Inaccessible home environment     PT Discharge Recommendation: Post acute rehabilitation services    See flowsheet documentation for full assessment

## 2022-09-09 NOTE — CASE MANAGEMENT
Case Management Discharge Planning Note    Patient name Alec Gardner  Location / MRN [id number]  : 1955 Date 2022       Current Admission Date: 2022  Current Admission Diagnosis:DKA (diabetic ketoacidosis) [facility])   Patient Active Problem List    Diagnosis Date Noted    Fungal infection of skin 2022    Urinary retention 2022    DKA (diabetic ketoacidosis) ([facility] [address] ) 2022    New onset atrial fibrillation ([facility] ) 2022    Urinary frequency 2022    Type 2 diabetes mellitus ([facility] ) 2022    Prostate cancer screening 2022    Microscopic hematuria 2022    Urge incontinence of urine 2022    Nocturnal enuresis 2022    Feeling of incomplete bladder emptying 2022    Balanitis 2022      LOS (days): 5  Geometric Mean LOS (GMLOS) (days): 4 80  Days to GMLOS:-0 4     OBJECTIVE:  Risk of Unplanned Readmission Score: 8 7         Current admission status: Inpatient   Preferred Pharmacy:   [id number]- [place], [place] - [address] [facility] [address]  Phone: [phone] Fax: [phone]    Primary Care Provider: Meghan Phillips MD    Primary Insurance: MEDICARE  Secondary Insurance: [address] [place] [address],[address] DETAILS:    Discharge planning discussed with[de-identified] Patient  Freedom of Choice: Yes  Comments - Freedom of Choice: CM met with patient at bedside to report that [facility] has accepted  Patient agreeable with this placement  CM contacted family/caregiver?: Yes (CM attempted to call patient's wife, but she did not answer   CM left a message at [phone] )  Were Treatment Team discharge recommendations reviewed with patient/caregiver?: Yes  Did patient/caregiver verbalize understanding of patient care needs?: Yes  Were patient/caregiver advised of the risks associated with not following Treatment Team discharge recommendations?: Yes    [address] [place] [address]         Is the patient interested in HHC at discharge?: No    Other Referral/Resources/Interventions Provided:  Interventions: Acute Rehab  Referral Comments: CM received a TT from Gema Aguilera, [facility] liaison, reporting that patient has been accepted, but he will need a Covid test and to be on site by 4:00 PM today  Would you like to participate in our [address] service program?  : No - Declined    Treatment Team Recommendation: Acute Rehab  Discharge Destination Plan[de-identified] Acute Rehab  Transport at Discharge : Wheelchair van  Dispatcher Contacted: Yes  Number/Name of Dispatcher: CM sent a referral to [facility] requesting wheelchair [facility] transport for 12:15 PM  CM noted that patient will need to be on site no later than 4:00 PM  Round trip to schedule and contact CM with confirmed transportation time  IMM Given (Date):: 09/09/22  IMM Given to[de-identified] Patient    Additional Comments: CM reviewed IMM with patient at bedside  Patient reported understanding of these rights and is agreeable with discharge determination  CM provided patient a copy of the IMM and placed the original copy in medical records

## 2022-09-09 NOTE — PHYSICAL THERAPY NOTE
Physical Therapy Treatment Note    Patient Name: Leidy Monsivais  Diagnosis: Altered mental status [R41 82]  DKA (diabetic ketoacidosis) ([facility] ) [E11 10]  Type 2 diabetes mellitus ([facility] [address] ) [E11 9]  Altered mental status, unspecified altered mental status type [R41 82]     09/09/22 0824   PT Last Visit   PT Visit Date 09/09/22   Note Type   Note Type Treatment   Pain Assessment   Pain Assessment Tool 0-10   Pain Score 8   Pain Location/Orientation Location: Groin  (site of catheter)   Hospital Pain Intervention(s) Repositioned; Ambulation/increased activity   Multiple Pain Sites Yes   Pain 2   Pain Score 2 Worst Possible Pain   Pain Location/Orientation 2 Orientation: Left; Location: Leg   Hospital Pain Intervention(s) 2 Repositioned; Ambulation/increased activity   Restrictions/Precautions   Weight Bearing Precautions Per Order No   Other Precautions Chair Alarm; Bed Alarm;Multiple lines;Telemetry; Fall Risk;Pain   General   Chart Reviewed Yes   Response to Previous Treatment Patient with no complaints from previous session  Family/Caregiver Present No   Cognition   Overall Cognitive Status WFL   Arousal/Participation Alert; Responsive; Cooperative   Attention Attends with cues to redirect   Orientation Level Oriented X4   Memory Within functional limits   Following Commands Follows all commands and directions without difficulty   Comments Pt agreeable to PT  Subjective   Subjective "I've been doing my exercises "   Bed Mobility   Rolling R 4  Minimal assistance   Additional items Assist x 1;Bedrails; Increased time required;Verbal cues;HOB elevated   Supine to Sit 4  Minimal assistance   Additional items Assist x 1;HOB elevated; Bedrails; Increased time required;Verbal cues;LE management   Transfers   Sit to Stand 4  Minimal assistance   Additional items Assist x 1; Increased time required;Verbal cues   Stand to Sit 4  Minimal assistance   Additional items Assist x 1; Increased time required;Verbal cues;Armrests Ambulation/Elevation   Gait pattern Decreased toe off;Decreased heel strike;Decreased hip extension; Short stride; Shuffling   Gait Assistance 4  Minimal assist   Additional items Assist x 1;Verbal cues   Assistive Device Standard walker   Distance 15 feet   Stair Management Assistance Not tested   Balance   Static Sitting Fair +   Dynamic Sitting Fair   Static Standing Fair -   Dynamic Standing Poor +   Ambulatory Poor +   Endurance Deficit   Endurance Deficit Yes   Endurance Deficit Description decreased activity tolerance   Activity Tolerance   Activity Tolerance Patient tolerated treatment well   Nurse Made Aware Discussed case with MANASA Lundy   Assessment   Prognosis Good   Problem List Decreased strength;Decreased endurance; Impaired balance;Decreased mobility;Pain   Assessment Chart reviewed  Patient was received supine in bed in NAD and agreeable to PT session  Today's PT treatment session consisted of therapeutic activity for facilitation of transitional movements and safe performance of correct technique for bed mobility and sit to stand transfers and gait training to promote safe and functional ambulation on level surfaces  In comparison to the previous session the patient has made progress as evident by requiring decreased assistance with all functional mobility  Pt able to perform all functional mobility with assist of one  Pt able to ambulate an increased distance with use of walker  When ambulating pt exhibits decreased fanny, decreased stride length, and decreased heel strike  Overall, patient tolerated today's session well and continues to be making progress towards achieving his STG's  Patient's prognosis for achieving their STG's is good as evident by pt's motivation  Pt's STG's were updated this session   PT intervention continues to be appropriate as the patient continues to be limited by pain, decreased lower extremity strength, impaired balance, decreased endurance, gait deviations, and decreased functional mobility  Continue to recommend STR  PT to continue to see patient in order to address the deficits listed above and provide interventions consistent with the POC in order to achieve STG's and optimize the patient's independence with functional mobility  Barriers to Discharge Inaccessible home environment   Goals   STG Expiration Date 09/19/22   Short Term Goal #1 In 10 days: Increase bilateral LE strength 1/2 grade to facilitate independent mobility, Perform all bed mobility tasks modified independent to decrease caregiver burden, Perform all transfers modified independent to improve independence, Ambulate > 150 ft  with least restrictive assistive device modified independent w/o LOB and w/ normalized gait pattern 100% of the time, Navigate 12 stairs modified independent with unilateral handrail to facilitate return to previous living environment and Increase all balance 1/2 grade to decrease risk for falls   Plan   Treatment/Interventions Functional transfer training;LE strengthening/ROM; Elevations; Therapeutic exercise; Endurance training;Patient/family training;Bed mobility;Gait training;Spoke to nursing;OT   Progress Progressing toward goals   PT Frequency 3-5x/wk   Recommendation   PT Discharge Recommendation Post acute rehabilitation services   AM-PAC Basic Mobility Inpatient   Turning in Bed Without Bedrails 3   Lying on Back to Sitting on Edge of Flat Bed 3   Moving Bed to Chair 3   Standing Up From Chair 3   Walk in Room 3   Climb 3-5 Stairs 2   Basic Mobility Inpatient Raw Score 17   Basic Mobility Standardized Score 39 67   Highest Level Of Mobility   JH-HLM Goal 5: Stand one or more mins   JH-HLM Achieved 6: Walk 10 steps or more   Education   Education Provided Mobility training;Home exercise program;Assistive device   Patient Reinforcement needed;Demonstrates acceptance/verbal understanding   End of Consult   Patient Position at End of Consult Bedside chair;Bed/Chair alarm activated; All needs within reach     Martin Pittman PT, DPT    Time of PT treatment session: [id number]  16 minutes

## 2022-09-09 NOTE — ASSESSMENT & PLAN NOTE
Lab Results   Component Value Date    HGBA1C >14 0 (H) 09/05/2022       Recent Labs     09/08/22  0709 09/08/22  1128 09/08/22  1621 09/08/22  2104   POCGLU 193* 283* 215* 245*       Blood Sugar Average: Last 72 hrs:  (P) 184   · Present on admission  · Status post insulin drip, now transitioned to basal insulin with sliding scale  · On diabetic diet, endocrine following   · Glucose levels adequately controlled for outpatient follow up   · Patient is experiencing peripheral neuropathy with difficulty with ambulation, open to rehab, coordinate with care manager  · DKA resolved, monitor lytes

## 2022-09-09 NOTE — PROGRESS NOTES
[facility]  Progress Note - [address]  1955, 75 y o  male MRN: [id number]  Unit/Bed#:  Encounter: [id number]  Primary Care Provider: Pankaj Bassett MD   Date and time admitted to hospital: 9/4/2022  4:43 AM    * DKA (diabetic ketoacidosis) [facility])  Assessment & Plan  Lab Results   Component Value Date    HGBA1C >14 0 (H) 09/05/2022       Recent Labs     09/08/22  0709 09/08/22  1128 09/08/22  1621 09/08/22  2104   POCGLU 193* 283* 215* 245*       Blood Sugar Average: Last 72 hrs:  (P) 184   · Present on admission  · Status post insulin drip, now transitioned to basal insulin with sliding scale  · On diabetic diet, endocrine following   · Glucose levels adequately controlled for outpatient follow up   · Patient is experiencing peripheral neuropathy with difficulty with ambulation, open to rehab, coordinate with care manager  · DKA resolved, monitor lytes    Fungal infection of skin  Assessment & Plan  · Noted to have fungal rash in groin and scrotum  · Wound care consulted, see recommendations  · Continue supportive care with nystatin powder, lotrisone    Urinary retention  Assessment & Plan  · Likely in setting of neurogenic bladder from uncontrolled diabetes  · Lane inserted, patient had bilateral hydronephrosis from retention, now improving after decompression, noted on US  · Monitor urine output, patient will need outpatient urology follow up    New onset atrial fibrillation [facility])  Assessment & Plan  · Noted to be in atrial fibrillation with RVR on ED EKG  · Patient spontaneously converted to sinus tachycardia prior to arrival to ICU  · Likely 2/2 severe metabolic derangements  · Has maintained SR since  · Monitor telemetry    Sepsis (HCC)-resolved as of 9/9/2022  Assessment & Plan  · Met sepsis criteria in the ED as evidenced by tachycardia, tachypnea, and leukocytosis  · Unclear if true sepsis vs sirs criteria in the setting of DKA and multiple metabolic derangements  · Recent UA from  showed large leukocytes and occasional bacteria  · Urine culture from  + coag negative staph  · Received rocephin in the ED  · Completed 5 day course of antibiotics  · Sepsis resolved          VTE Pharmacologic Prophylaxis: VTE Score: 3 Moderate Risk (Score 3-4) - Pharmacological DVT Prophylaxis Ordered: heparin  Patient Centered Rounds: I performed bedside rounds with nursing staff today  Discussions with Specialists or Other Care Team Provider: kamini    Education and Discussions with Family / Patient: Updated  (daughter) via phone  Time Spent for Care: 30 minutes  More than 50% of total time spent on counseling and coordination of care as described above  Current Length of Stay: 5 day(s)  Current Patient Status: Inpatient   Certification Statement: The patient will continue to require additional inpatient hospital stay due to placement to rehab  Discharge Plan: day to day pending placement    Code Status: Level 1 - Full Code    Subjective:   Patient feels well today     Objective:     Vitals:   Temp (24hrs), Av 8 °F (37 1 °C), Min:98 24 °F (36 8 °C), Max:99 7 °F (37 6 °C)    Temp:  [98 24 °F (36 8 °C)-99 7 °F (37 6 °C)] 98 24 °F (36 8 °C)  HR:  [65-83] 65  Resp:  [16-20] 16  BP: (107-112)/(64-66) 111/66  SpO2:  [96 %-99 %] 96 %  Body mass index is 31 19 kg/m²  Input and Output Summary (last 24 hours): Intake/Output Summary (Last 24 hours) at 2022 0906  Last data filed at 2022 0400  Gross per 24 hour   Intake 960 ml   Output 3301 ml   Net -2341 ml       Physical Exam:   Physical Exam  Constitutional:       General: He is not in acute distress  Appearance: Normal appearance  He is not toxic-appearing  Cardiovascular:      Rate and Rhythm: Normal rate and regular rhythm  Heart sounds: Normal heart sounds  No murmur heard  Pulmonary:      [place]ffort: Pulmonary effort is normal  No respiratory distress        Breath sounds: Normal breath sounds  No wheezing  Abdominal:      General: Abdomen is flat  There is no distension  Palpations: Abdomen is soft  Tenderness: There is no abdominal tenderness  Neurological:      General: No focal deficit present  Mental Status: He is alert and oriented to person, place, and time  Mental status is at baseline  Motor: No weakness  Additional Data:     Labs:  Results from last 7 days   Lab Units 09/08/22  0603   WBC Thousand/uL 7 32   HEMOGLOBIN g/dL 12 3   HEMATOCRIT % 36 1*   PLATELETS Thousands/uL 166   NEUTROS PCT % 62   LYMPHS PCT % 29   MONOS PCT % 7   EOS PCT % 2     Results from last 7 days   Lab Units 09/08/22  0603 09/06/22  1946 09/06/22  0645   SODIUM mmol/L 140   < > 145   POTASSIUM mmol/L 4 2   < > 2 7*   CHLORIDE mmol/L 105   < > 110*   CO2 mmol/L 24   < > 24   CO2, I-STAT   --    < >  --    BUN mg/dL 9   < > 14   CREATININE mg/dL 0 82   < > 0 87   ANION GAP mmol/L 11   < > 11   CALCIUM mg/dL 8 6   < > 8 7   ALBUMIN g/dL  --   --  2 5*   TOTAL BILIRUBIN mg/dL  --   --  0 27   ALK PHOS U/L  --   --  106   ALT U/L  --   --  13   AST U/L  --   --  23   GLUCOSE RANDOM mg/dL 167*   < > 187*    < > = values in this interval not displayed           Results from last 7 days   Lab Units 09/08/22  2104 09/08/22  1621 09/08/22  1128 09/08/22  0709 09/07/22  2053 09/07/22  1711 09/07/22  1110 09/07/22  0734 09/06/22  2124 09/06/22  1959 09/06/22  1707 09/06/22  1427   POC GLUCOSE mg/dl 245* 215* 283* 193* 168* 162* 316* 267* 205* 199* 141* 154*     Results from last 7 days   Lab Units 09/05/22  0828   HEMOGLOBIN A1C % >14 0*     Results from last 7 days   Lab Units 09/05/22  0438 09/04/22  1110 09/04/22  0850 09/04/22  0604 09/04/22  0502   LACTIC ACID mmol/L  --  0 8 2 8*  --  4 5*   PROCALCITONIN ng/ml 0 37*  --   --  0 16  --        Lines/Drains:  Invasive Devices  Report    Peripheral Intravenous Line  Duration           Peripheral IV 09/08/22 Left;Proximal;Ventral (anterior) Forearm <1 day          Drain  Duration           Urethral Catheter 16 Fr  5 days              Urinary Catheter:  Goal for removal: N/A- Discharging with Lane               Imaging: No pertinent imaging reviewed  Recent Cultures (last 7 days):   Results from last 7 days   Lab Units 09/04/22  0604 09/02/22  1055   BLOOD CULTURE  No Growth After 4 Days  No Growth After 4 Days  --    URINE CULTURE   --  20,000-29,000 cfu/ml Staphylococcus coagulase negative*       Last 24 Hours Medication List:   Current Facility-Administered Medications   Medication Dose Route Frequency Provider Last Rate    gabapentin  100 mg Oral TID Jaylene Eduardo MD      heparin (porcine)  5,000 Units Subcutaneous [facility] LEEANNA Rice      insulin glargine  44 Units Subcutaneous QAM Bryan Kulkarni MD      insulin lispro  1-5 Units Subcutaneous HS LEEANNA Rice      insulin lispro  10 Units Subcutaneous TID With Meals Bryan Kulkarni MD      insulin lispro  2-12 Units Subcutaneous TID AC LEEANNA Rice      nystatin   Topical BID Jaylene Eduardo MD      pantoprazole  40 mg Intravenous Q24H LEEANNA Milligan      sodium chloride (PF)  3 mL Intravenous Q1H PRN LEEANNA Rice          Today, Patient Was Seen By: Mike Crespo MD    **Please Note: This note may have been constructed using a voice recognition system  **

## 2022-09-09 NOTE — CASE MANAGEMENT
Case Management Discharge Planning Note    Patient name Bárbara Cadena  Location / MRN [id number]  : 1955 Date 2022       Current Admission Date: 2022  Current Admission Diagnosis:DKA (diabetic ketoacidosis) [facility])   Patient Active Problem List    Diagnosis Date Noted    Fungal infection of skin 2022    Urinary retention 2022    DKA (diabetic ketoacidosis) ([facility] [address] ) 2022    New onset atrial fibrillation ([facility] ) 2022    Urinary frequency 2022    Type 2 diabetes mellitus ([facility] ) 2022    Prostate cancer screening 2022    Microscopic hematuria 2022    Urge incontinence of urine 2022    Nocturnal enuresis 2022    Feeling of incomplete bladder emptying 2022    Balanitis 2022      LOS (days): 5  Geometric Mean LOS (GMLOS) (days): 4 80  Days to GMLOS:-0 5     OBJECTIVE:  Risk of Unplanned Readmission Score: 8 62         Current admission status: Inpatient   Preferred Pharmacy:   [id number] [place], [place] - [address]  Phone: [phone] Fax: [phone]    Primary Care Provider: Marian Lozano MD    Primary Insurance: MEDICARE  Secondary Insurance: [address] [place] [address],[address] DETAILS:    Other Referral/Resources/Interventions Provided:  Interventions: Transportation  Referral Comments: CM recceived a TT from [employer] reporting that patient will be transported at 2:30 PM today by [facility] and [facility] parties were made aware of this time  Would you like to participate in our [address] service program?  : No - Declined    Treatment Team Recommendation: Acute Rehab  Discharge Destination Plan[de-identified] Acute Rehab  Transport at Discharge : Wheelchair Matt Schaffer  Dispatcher Contacted: Yes  Number/Name of Dispatcher: [facility] [employer]  Transported by [employer] and Unit #):  [employer] and [address] (Date): 22  ETA of Transport (Time): Claribel 28 Name, Catherine 41 : [address]  Receiving Facility/Agency Phone Number: [phone]  Facility/Agency Fax Number: [phone]

## 2022-09-09 NOTE — PROGRESS NOTES
PHYSICAL MEDICINE AND REHABILITATION   PREADMISSION ASSESSMENT     Projected [facility] Diagnoses:  Impairment of mobility, safety and Activities of Daily Living (ADLs) due to Debility:  16  Debility (Non-cardiac/Non-pulmonary)  Etiologic: Diabetic Ketoacidosis  Date of Onset: 9/4/22   Date of surgery: n/a    PATIENT INFORMATION  Name: Lindsay Emerson  Phone #: [phone] (home)   Address: [address] [place] [address]  YOB: 1955 Age: 75 y o  SS#   Marital Status: /Civil Union  Ethnicity: White  Employment Status: retired  Extended Emergency Contact Information  Primary Emergency Contact: Vinspi Phone: [phone]  Relation: Spouse  Secondary Emergency Contact: desi bingham  Mobile Phone: [phone]  Relation: Brother  Advance Directive: Level 1 Full Code (no ACP docs)    INSURANCE/COVERAGE:     Primary Payor: MEDICARE / Plan: MEDICARE A AND B / Product Type: Medicare A & B Fee for Service /   Secondary Payer: [place] [address]  #[id number]    [facility] Payor: Jodee Argueta #[id number]   Payer Contact:  Payer Contact:   Contact Phone:  Contact Phone:     Authorization #: n/a  Coverage Dates: n/a  LCD: n/a  MEDICARE #: [id number]  Medicare Days: 60/30/60  Medical Record #: [id number]    REFERRAL SOURCE:   Referring provider: Laura Ng MD  Referring facility: [address] [place] [address]  Room: /  PCP: Laxmi Rodriguez MD PCP phone number: [phone]    MEDICAL INFORMATION  HPI: This is a 70-year-old male who presented to [facility] on 9/4/22 with altered mental status, nausea, vomiting and generalized weakness  Per patient's spouse, she was at work until 6 pm on 9/3 and when she arrived home she found the patient to be altered with nausea and vomiting  She also reported that he has had generalized weakness and polyuria recently    Per spouse, the patient had lost weight several months ago and subsequently stopped taking his insulin as he felt he no longer needed it  On arrival patient's glucose was 800  Patient diagnosed with DKA and initiated on an insulin gtt  DKA now resolved  Suspect episode of DKA likely precipitated by mild pancreatitis in setting of poor baseline control; Hgb A1c of 14  Endocrinology was consulted and recommended Lantus 44 units every morning, sliding scale insulin and 10 units Humalog TID with meals  He was also incidentally found to have pancreatitis  GI was consulted and recommended fluids/aggressive hydration and to keep him NPO  NG tube was placed and has now been removed  Diet was advanced and he is now tolerating a SHREYA/CHO diet  Patient also met SIRS criteria, however, it was unclear how much of presentation symptoms were related to DKA vs  pancreatitis vs infection  He was on antibiotics but they have now been discontinued  Patient noted to be in a-fib with RVR on ED EKG  He spontaneously converted to sinus tachycardia prior to arrival to ICU  A-fib likely secondary to metabolic derangements  He has maintained SR since conversion  Patient with urinary retention for which urology was consulted  Per urology consult, retention is likely in the setting of neurologic bladder from uncontrolled diabetes  Lane was placed and will be maintained until patient can follow up as an outpatient  Patient also diagnosed with CARLTON, hypothermia (temp 93 3-etiology unknown), hyponatremia, transaminitis and acute metabolic encephalopathy during his hospitalization  He worked with PT and OT and was recommended rehab following his hospital stay  He has demonstrated that he can tolerate three hours of therapy, five days a week and is now medically stable for discharge to the [facility]  Patient was independent with his ADLs PTA  He ambulated without an AD in his home and used a SPC when in the community  +     He is now functioning below his baseline, requiring supervision to maximum assistance for his ADLs  With PT, he is requiring min x 1 assistance for rolling right and supine to sit bed mobility and min x 1 assistance for his sit to stand and stand to sit transfers  He ambulated 15 feet with standard walker and min x 1 assistance  Gait pattern: decreased toe off, decreased heel strike, decreased hip extension, short stride and shuffling  Past Medical History:   Past Surgical History:    Allergies:     Past Medical History:   Diagnosis Date    Acute metabolic encephalopathy 6/9/6134    CARLTON (acute kidney injury) ([place] [address] ) 9/4/2022    Diabetes mellitus ([place] [address] )     Pancreatitis 9/4/2022    Sepsis ([place] [address] ) 9/4/2022    Past Surgical History:   Procedure Laterality Date    KNEE CARTILAGE SURGERY       No Known Allergies      Medical/functional conditions requiring inpatient rehabilitation:   DKA, nausea, vomiting, pancreatitis, sepsis, a-fib, hyponatremia, CARLTON, hypothermia (temp 93 3-etiology unknown), transaminitis, acute metabolic encephalopathy, increased anion gap, lactic acidosis, nocturnal enuresis, urinary retention with hydronephrosis, type 2 DM, decreased ADLs, ambulatory dysfunction    Risk for medical/clinical complications: risk for falls and injury related to falls, risk for uncontrolled blood sugars, risk for skin breakdown, risk for complications related to lemons, risk for UTI     Comorbidities/Surgeries in the last 100 days: Debility/Physical Deconditioning and Diabetes: Insulin-dependent diabetes mellitus (IDDM)     CURRENT VITAL SIGNS:   Temp:  [98 24 °F (36 8 °C)-99 7 °F (37 6 °C)] 98 24 °F (36 8 °C)  HR:  [65-83] 65  Resp:  [16-20] 16  BP: (107-118)/(64-69) 118/69   Intake/Output Summary (Last 24 hours) at 9/9/2022 1255  Last data filed at 9/9/2022 1221  Gross per 24 hour   Intake 460 ml   Output 3400 ml   Net -2940 ml        LABORATORY RESULTS:      Lab Results   Component Value Date    HGB 12 3 09/08/2022    HCT 36 1 (L) 09/08/2022    WBC 7 32 09/08/2022     Lab Results Component Value Date    BUN 9 09/08/2022    K 4 2 09/08/2022     09/08/2022    GLUCOSE 241 (H) 09/07/2022    CREATININE 0 82 09/08/2022     No results found for: PROTIME, INR     DIAGNOSTIC STUDIES:  CT abdomen pelvis wo contrast    Result Date: 9/4/2022  Impression: Mild peripancreatic inflammatory stranding with associated  thickening of the left anterior pararenal fascia, correlate with the lipase levels for pancreatitis No acute fluid collection seen Markedly distended stomach, correlate clinically , may be due to gastroparesis Periampullary diverticulum along with a diverticulum in relation to the 3rd part of the duodenum Bilateral mild to moderate hydronephrosis and hydroureter with dilated ureter extending to the level of the distended urinary bladder , correlate to exclude urinary retention/ bladder outlet obstruction No biliary dilation seen Nonobstructing the mid right renal calculus measuring 8 mm A periumbilical hernia containing fat The study was marked in EPIC for immediate notification  Workstation performed: [id number]     US kidney and bladder    Result Date: 9/6/2022  Impression: 1  Status post bladder decompression with improved bilateral hydronephrosis  2   Stable right upper pole calculus  Workstation performed: [id number]     US right upper quadrant    Result Date: 9/4/2022  Impression: 1  Findings of reported acute pancreatitis are not well appreciated on the current study  2   Nonobstructing right renal calculus   Workstation performed: [id number]       PRECAUTIONS/SPECIAL NEEDS:  Blood Sugar Management: as per MD orders, Pain Management, Bowel Incontinence: # of accidents 6, Dietary Restrictions: SHREYA/CHO diet, Language Preference: English and fall risk, lemons care    MEDICATIONS:     Current Facility-Administered Medications:     gabapentin (NEURONTIN) capsule 100 mg, 100 mg, Oral, TID, Laura Ng MD, 100 mg at 09/09/22 0908    heparin (porcine) subcutaneous injection 5,000 Units, 5,000 Units, Subcutaneous, Q8H [address], LEEANNA Mcleod, 5,000 Units at 09/09/22 0540    insulin glargine (LANTUS) subcutaneous injection 44 Units 0 44 mL, 44 Units, Subcutaneous, QAM, Narcisa Dolan MD, 44 Units at 09/09/22 0905    insulin lispro (HumaLOG) 100 units/mL subcutaneous injection 1-5 Units, 1-5 Units, Subcutaneous, HS, LEEANNA Mcleod, 2 Units at 09/08/22 2229    insulin lispro (HumaLOG) 100 units/mL subcutaneous injection 10 Units, 10 Units, Subcutaneous, TID With Meals, Rivera Yan MD, 10 Units at 09/09/22 1211    insulin lispro (HumaLOG) 100 units/mL subcutaneous injection 2-12 Units, 2-12 Units, Subcutaneous, TID AC, 8 Units at 09/09/22 1211 **AND** Fingerstick Glucose (POCT), , , TID AC, LEEANNA Mcleod    nystatin (MYCOSTATIN) powder, , Topical, BID, Marsha Lenz MD, 1 application at 38/13/22 0909    pantoprazole (PROTONIX) injection 40 mg, 40 mg, Intravenous, Q24H [address], LEEANNA Mcleod, 40 mg at 09/09/22 0908    Insert peripheral IV, , , Once **AND** sodium chloride (PF) 0 9 % injection 3 mL, 3 mL, Intravenous, Q1H PRN, LEEANNA Mcleod    SKIN INTEGRITY:   MASD on coccyx  Scrotal/groin fungal rash    PRIOR LEVEL OF FUNCTION:  He lives in [facility] single family home  [address]   [address]  and lives with their spouse  Self Care: Independent, Indoor Mobility: ambulated independently with an AD in his home and used a SPC when ambulating in the community and Stairs (in/outdoor): Independent    FALLS IN THE LAST 6 MONTHS: 1    HOME ENVIRONMENT:  The living area: Patient lives in a 2 story home and can have a 1st floor set up  There are 2 steps to enter the home  The patient will not have 24 hour supervision/physical assistance available upon discharge      PREVIOUS DME:  Equipment in home (previous DME): Single [employer], raised toilet seat    FUNCTIONAL STATUS:  Physical Therapy Occupational Therapy Speech Therapy   09/09/22 9832    PT Last Visit   PT Visit Date 09/09/22   Note Type   Note Type Treatment   Pain Assessment   Pain Assessment Tool 0-10   Pain Score 8   Pain Location/Orientation Location: Groin  (site of catheter)   Hospital Pain Intervention(s) Repositioned; Ambulation/increased activity   Multiple Pain Sites Yes   Pain 2   Pain Score 2 Worst Possible Pain   Pain Location/Orientation 2 Orientation: Left; Location: Leg   Hospital Pain Intervention(s) 2 Repositioned; Ambulation/increased activity   Restrictions/Precautions   Weight Bearing Precautions Per Order No   Other Precautions Chair Alarm; Bed Alarm;Multiple lines;Telemetry; Fall Risk;Pain   General   Chart Reviewed Yes   Response to Previous Treatment Patient with no complaints from previous session  Family/Caregiver Present No   Cognition   Overall Cognitive Status WFL   Arousal/Participation Alert; Responsive; Cooperative   Attention Attends with cues to redirect   Orientation Level Oriented X4   Memory Within functional limits   Following Commands Follows all commands and directions without difficulty   Comments Pt agreeable to PT  Subjective   Subjective "I've been doing my exercises "   Bed Mobility   Rolling R 4  Minimal assistance   Additional items Assist x 1;Bedrails; Increased time required;Verbal cues;HOB elevated   Supine to Sit 4  Minimal assistance   Additional items Assist x 1;HOB elevated; Bedrails; Increased time required;Verbal cues;LE management   Transfers   Sit to Stand 4  Minimal assistance   Additional items Assist x 1; Increased time required;Verbal cues   Stand to Sit 4  Minimal assistance   Additional items Assist x 1; Increased time required;Verbal cues;Armrests   Ambulation/Elevation   Gait pattern Decreased toe off;Decreased heel strike;Decreased hip extension; Short stride; Shuffling   Gait Assistance 4  Minimal assist   Additional items Assist x 1;Verbal cues   Assistive Device Standard walker   Distance 15 feet   Stair Management Assistance Not tested   Balance   Static Sitting Fair +   Dynamic Sitting Fair   Static Standing Fair -   Dynamic Standing Poor +   Ambulatory Poor +   Endurance Deficit   Endurance Deficit Yes   Endurance Deficit Description decreased activity tolerance   Activity Tolerance   Activity Tolerance Patient tolerated treatment well   Nurse Made Aware Discussed case with MANASA Lundy   Assessment   Prognosis Good   Problem List Decreased strength;Decreased endurance; Impaired balance;Decreased mobility;Pain   Assessment Chart reviewed  Patient was received supine in bed in NAD and agreeable to PT session  Today's PT treatment session consisted of therapeutic activity for facilitation of transitional movements and safe performance of correct technique for bed mobility and sit to stand transfers and gait training to promote safe and functional ambulation on level surfaces  In comparison to the previous session the patient has made progress as evident by requiring decreased assistance with all functional mobility  Pt able to perform all functional mobility with assist of one  Pt able to ambulate an increased distance with use of walker  When ambulating pt exhibits decreased fanny, decreased stride length, and decreased heel strike  Overall, patient tolerated today's session well and continues to be making progress towards achieving his STG's  Patient's prognosis for achieving their STG's is good as evident by pt's motivation  Pt's STG's were updated this session  PT intervention continues to be appropriate as the patient continues to be limited by pain, decreased lower extremity strength, impaired balance, decreased endurance, gait deviations, and decreased functional mobility  Continue to recommend STR  PT to continue to see patient in order to address the deficits listed above and provide interventions consistent with the POC in order to achieve STG's and optimize the patient's independence with functional mobility      09/09/22 0856    OT Last Visit   OT Visit Date 09/09/22   Note Type   Note Type Treatment   Restrictions/Precautions   Weight Bearing Precautions Per Order No   Other Precautions Chair Alarm; Bed Alarm;Multiple lines;Telemetry; Fall Risk   Pain Assessment   Pain Assessment Tool 0-10   Pain Score 8   Pain Location/Orientation Location: Groin  (pt reports 2/2 lemons catheter)   Hospital Pain Intervention(s) Repositioned; Ambulation/increased activity   ADL   Eating Assistance 7  Independent   Eating Deficit Setup   Grooming Assistance 5  Supervision/Setup   Grooming Deficit Setup; Increased time to complete;Wash/dry face   UB Bathing Assistance 5  Supervision/Setup   UB Bathing Deficit Setup;Supervision/safety; Increased time to complete   LB Bathing Assistance 3  Moderate Assistance   LB Bathing Deficit Setup; Increased time to complete;Right lower leg including foot; Left lower leg including foot   UB Dressing Assistance 4  Minimal Assistance   UB Dressing Deficit Setup;Supervision/safety; Increased time to complete   LB Dressing Assistance 3  Moderate Assistance   LB Dressing Deficit Setup;Supervision/safety; Increased time to complete; Don/doff R sock; Don/doff L sock  (pt unable to reach feet at this time 2/2 discomfort at lemons site and limited R knee ROM)   Toileting Assistance  2  Maximal Assistance   Toileting Deficit    (+ lemons catheter, decreased ability to perform thorough chayo hygiene)   Functional Standing Tolerance   Time ~5 minutes   Activity static standing and functional ambulation in room   Comments Min A x1 with RW   Bed Mobility   Supine to Sit 4  Minimal assistance   Additional items Assist x 1;HOB elevated; Bedrails; Increased time required  (significantly slow)   Transfers   Sit to Stand 3  Moderate assistance   Additional items Assist x 1; Increased time required;Verbal cues  (with RW)   Stand to Sit 4  Minimal assistance   Additional items Assist x 1; Armrests; Increased time required   Stand pivot 4  Minimal assistance Additional items Assist x 1;Verbal cues; Increased time required  (with RW)   Toilet transfer    (pt declined to perform this date)   Functional Mobility   Functional Mobility 4  Minimal assistance   Additional Comments assist x1   Additional items Rolling walker   Therapeutic Excerise-Strength   UE Strength Yes   Right Upper Extremity- Strength   R Shoulder Flexion; Horizontal ABduction   R Elbow Elbow flexion;Elbow extension   R Wrist Wrist flexion;Wrist extension   R Hand    (composite  and extension)   R Position Seated   R Weight/Reps/Sets 10 reps x1 set   RUE Strength Comment B shoulder rolls   Left Upper Extremity-Strength   L Shoulder Flexion; Horizontal ABduction   L Elbow Elbow flexion;Elbow extension   L Wrist Wrist flexion;Wrist extension   L Hand    (composite  and extension)   L Position Seated   L Weights/Reps/Sets 10 reps x1 set   LUE Strength Comment B shoulder rolls   Cognition   Overall Cognitive Status WFL   Arousal/Participation Alert; Cooperative   Attention Attends with cues to redirect   Orientation Level Oriented X4   Memory Within functional limits   Following Commands Follows all commands and directions without difficulty   Comments Pt pleasant and cooperative  Distractible, but attends with cues and redirection  Motivated to participate with therapy and regain independence  Poor safety awareness noted at times   Additional Activities   Additional Activities    (Discussed POC and appropriate level of activity and activity progression  Discussed expectations of inpatient rehab after d/c)   Additional Activities Comments Pt expressed understanding   Activity Tolerance   Activity Tolerance Patient tolerated treatment well   Medical Staff Made Aware Matilda PT, Nikkie RN  Portion of treatment occurred with Physical Therapist due to pt's medical complexity, functional limitations, limited activity tolerance, and previous requirement of 2 person assist for basic mobility   Remainder of treatment occurred with only this writer given pt demonstrated one person assist this date  Assessment   Assessment Pt seen this date for skilled OT session focused on ADLs, functional transfers and mobility, safety education  The patient was received supine in bed, NAD, on RA, PIV access, tele, lemons catheter  He participated in functional mobility and transfers, functional ambulation in room, BUE exercises, edema reduction techniques, and ADL UB/LB dressing simulation this date  Pt required at least Minimal Assistance x1 with RW for functional ambulation and transfers, Moderate Assistance for LB dressing, and Minimal Assistance for UB dressing this date  Pt expressed understanding of edema reduction techniques for RUE, and demonstrated BUE exercises  Overall, the patient has demonstrated good functional progress towards therapy goals  At end of session the patient was located seated in bed side chair with call bell in reach and all needs met  Chair alarm activated  Overall the patient remains below his functional baseline, and is primarily limited at this time due to generalized deconditioning, impaired balance, decreased safety awareness, and decreased activity tolerance  OT will continue to follow while acute to address POC  At this time, recommend inpatient rehab upon d/c  CARE SCORES:  Self Care:  Eatin: Independent  Oral hygiene: 04: Supervision or touching  assistance  Toilet hygiene: 02: Substantial/maximal assistance  Shower/bathing self: 03: Partial/moderate assistance  Upper body dressin: Partial/moderate assistance  Lower body dressin: Partial/moderate assistance  Putting on/taking off footwear: 03: Partial/moderate assistance  Transfers:  Roll left and right: 03: Partial/moderate assistance  Sit to lyin: Not applicable  Lying to sitting on side of bed: 03: Partial/moderate assistance  Sit to stand: 03: Partial/moderate assistance  Chair/bed to chair transfer: 09:  Not applicable  Toilet transfer: 09: Not applicable  Mobility:  Walk 10 ft: 03: Partial/moderate assistance  Walk 50 ft with two turns: 09: Not applicable  Walk [id number]: 09: Not applicable    CURRENT GAP IN FUNCTION  Prior to Admission: Functional Status: Patient was independent with his ADLs PTA  He ambulated without an AD in his home and used a SPC when in the community  +     Estimated length of stay: 7 to 10 days    Anticipated Post-Discharge Disposition/Treatment  Disposition: Return to previous home/apartment  Outpatient Services: Physical Therapy (PT) and Occupational Therapy (OT)    BARRIERS TO DISCHARGE  Home Accessibility and Caregiver Accessibility,  Decreased strength, Decreased endurance, Impaired balance, Decreased mobility, Pain, Decreased ADL status, Decreased Safe judgement during ADLs, Decreased self-care trans, Decreased high-level ADLs    INTERVENTIONS FOR DISCHARGE  ADL retraining, Functional transfer training, UE strengthening/ROM, Endurance training, Patient/family training, Equipment evaluation/education, Compensatory technique education, Continued evaluation, Energy conservation, Activity engagement, LE strengthening/ROM, Elevations, Therapeutic exercise, Bed mobility, Gait training    REQUIRED THERAPY:  Patient will require PT and OT 90 minutes each per day, five days per week to achieve rehab goals  REQUIRED FUNCTIONAL AND MEDICAL MANAGEMENT FOR INPATIENT REHABILITATION:  Skin:  Patient requires close monitoring of skin for any signs of further breakdown secondary to decreased mobility and/or bowel incontinence    He also requires close monitoring of MASD on coccyx and scrotal/groin rash for worsening, Pain Management: Overall pain is moderately controlled, Deep Vein Thrombosis (DVT) Prophylaxis:  heparin, Diabetes Management: continue sliding scale insulin, patient to do finger sticks as ordered, SLIM to continue to manage diabetes, and other medical conditions, PT and OT interventions, any necessary labs, consults, imaging studies and medication adjustments needed to manage patient's case while on ARC    RECOMMENDED LEVEL OF CARE:   This is a 58-year-old male who presented to [facility] ER on 9/4/22 with altered mental status, nausea, vomiting and generalized weakness  Per patient's spouse, she was at work until 6 pm on 9/3 and when she arrived home she found the patient to be altered with nausea and vomiting  She also reported that he has had generalized weakness and polyuria recently  Per spouse, the patient had lost weight several months ago and subsequently stopped taking his insulin as he felt he no longer needed it  On arrival patient's glucose was 800  Patient diagnosed with DKA and initiated on an insulin gtt  DKA now resolved  Suspect episode of DKA likely precipitated by mild pancreatitis in setting of poor baseline control; Hgb A1c of 14  Endocrinology was consulted and recommended Lantus 44 units every morning, sliding scale insulin and 10 units Humalog TID with meals  He was also incidentally found to have pancreatitis  GI was consulted and recommended fluids/aggressive hydration and to keep him NPO  NG tube was placed and has now been removed  Diet was advanced and he is now tolerating a SHREYA/CHO diet  Patient also met SIRS criteria, however, it was unclear how much of presentation symptoms were related to DKA vs  pancreatitis vs infection  He was on antibiotics but they have now been discontinued  Patient noted to be in a-fib with RVR on ED EKG  He spontaneously converted to sinus tachycardia prior to arrival to ICU  A-fib likely secondary to metabolic derangements  He has maintained SR since conversion  Patient with urinary retention for which urology was consulted  Per urology consult, retention is likely in the setting of neurologic bladder from uncontrolled diabetes    Lane was placed and will be maintained until patient can follow up as an outpatient  Patient also diagnosed with CARLTON, hypothermia (temp 93 3-etiology unknown), hyponatremia, transaminitis and acute metabolic encephalopathy during his hospitalization  He worked with PT and OT and was recommended rehab following his hospital stay  He has demonstrated that he can tolerate three hours of therapy, five days a week and is now medically stable for discharge to the [facility]  Patient was independent with his ADLs PTA  He ambulated without an AD in his home and used a SPC when in the community  +   He is now functioning below his baseline, requiring supervision to maximum assistance for his ADLs  With PT, he is requiring min x 1 assistance for rolling right and supine to sit bed mobility and min x 1 assistance for his sit to stand and stand to sit transfers  He ambulated 15 feet with standard walker and min x 1 assistance  Gait pattern: decreased toe off, decreased heel strike, decreased hip extension, short stride and shuffling  Patient requires close oversight by a physician, PM&R management, 24/7 rehabilitation nursing care and specialized interdisciplinary therapy services in preparation for discharge which can only be provided in the inpatient acute rehabilitation setting  Patient requires lemons catheter care and close monitoring of his VS, I/Os, skin, pain level, blood sugars and his overall condition  He will benefit from a bowel schedule to help reduce episodes on incontinence and education regarding insulin and diabetes  Inpatient acute rehabilitation is recommended for this patient to maximize his overall strength, endurance, self care and mobility upon discharge home with the support of his wife

## 2022-09-09 NOTE — ASSESSMENT & PLAN NOTE
· Met sepsis criteria in the ED as evidenced by tachycardia, tachypnea, and leukocytosis  · Unclear if true sepsis vs sirs criteria in the setting of DKA and multiple metabolic derangements  · Recent UA from 9/2 showed large leukocytes and occasional bacteria  · Urine culture from 9/2 + coag negative staph  · Received rocephin in the ED  · Completed 5 day course of antibiotics  · Sepsis resolved

## 2022-09-09 NOTE — CASE MANAGEMENT
Case Management Discharge Planning Note    Patient name Vani Sheehan  Location / MRN [id number]  : 1955 Date 2022       Current Admission Date: 2022  Current Admission Diagnosis:DKA (diabetic ketoacidosis) [facility])   Patient Active Problem List    Diagnosis Date Noted    Fungal infection of skin 2022    Urinary retention 2022    [facility]    New onset atrial fibrillation ([facility] ) 2022    Urinary frequency 2022    Type 2 diabetes mellitus ([place] [address] ) 2022    Prostate cancer screening 2022    Microscopic hematuria 2022    Urge incontinence of urine 2022    Nocturnal enuresis 2022    Feeling of incomplete bladder emptying 2022    Balanitis 2022      LOS (days): 5  Geometric Mean LOS (GMLOS) (days): 4 80  Days to GMLOS:-0 6     OBJECTIVE:  Risk of Unplanned Readmission Score: 8 62         Current admission status: Inpatient   Preferred Pharmacy:   [facility]/pharmacy #[id number]- [place], [place] - [address]  Phone: [phone] Fax: [phone]    Primary Care Provider: Jessica Masters MD    Primary Insurance: MEDICARE  Secondary Insurance: [employer]    DISCHARGE DETAILS:    Other Referral/Resources/Interventions Provided:  Interventions: Transportation  Referral Comments: CM received a TT from the RN reporting that patient was never picked up at 2:30 today  CM called [employer] and spoke to Kee Sweet reported that she will cancel the request for today and asked CM to submit for tomorrow  Kee also reported that she is going to reach out to the company and see what happened with the transport

## 2022-09-10 ENCOUNTER — HOSPITAL ENCOUNTER (INPATIENT)
Facility: HOSPITAL | Age: 67
LOS: 10 days | Discharge: HOME HEALTH | DRG: 949 | End: 2022-09-20
Attending: FAMILY MEDICINE | Admitting: FAMILY MEDICINE
Payer: MEDICARE

## 2022-09-10 VITALS
DIASTOLIC BLOOD PRESSURE: 69 MMHG | SYSTOLIC BLOOD PRESSURE: 119 MMHG | WEIGHT: 229.94 LBS | OXYGEN SATURATION: 98 % | HEIGHT: 72 IN | RESPIRATION RATE: 18 BRPM | TEMPERATURE: 98 F | BODY MASS INDEX: 31.14 KG/M2 | HEART RATE: 71 BPM

## 2022-09-10 DIAGNOSIS — F32.A DEPRESSED: ICD-10-CM

## 2022-09-10 DIAGNOSIS — N13.30 HYDROURETERONEPHROSIS: ICD-10-CM

## 2022-09-10 DIAGNOSIS — E11.10 DKA (DIABETIC KETOACIDOSIS) (HCC): ICD-10-CM

## 2022-09-10 DIAGNOSIS — R33.9 URINARY RETENTION: Primary | ICD-10-CM

## 2022-09-10 DIAGNOSIS — K46.9 ABDOMINAL HERNIA: ICD-10-CM

## 2022-09-10 DIAGNOSIS — M79.605 PAIN IN BOTH LOWER EXTREMITIES: ICD-10-CM

## 2022-09-10 DIAGNOSIS — M79.604 PAIN IN BOTH LOWER EXTREMITIES: ICD-10-CM

## 2022-09-10 DIAGNOSIS — I48.91 NEW ONSET ATRIAL FIBRILLATION (HCC): ICD-10-CM

## 2022-09-10 DIAGNOSIS — R35.89 POLYURIA: ICD-10-CM

## 2022-09-10 DIAGNOSIS — R45.89 DEPRESSED MOOD: ICD-10-CM

## 2022-09-10 LAB
GLUCOSE SERPL-MCNC: 109 MG/DL (ref 65–140)
GLUCOSE SERPL-MCNC: 126 MG/DL (ref 65–140)
GLUCOSE SERPL-MCNC: 277 MG/DL (ref 65–140)
GLUCOSE SERPL-MCNC: 346 MG/DL (ref 65–140)

## 2022-09-10 PROCEDURE — 82948 REAGENT STRIP/BLOOD GLUCOSE: CPT

## 2022-09-10 PROCEDURE — 99239 HOSP IP/OBS DSCHRG MGMT >30: CPT | Performed by: STUDENT IN AN ORGANIZED HEALTH CARE EDUCATION/TRAINING PROGRAM

## 2022-09-10 RX ORDER — POLYETHYLENE GLYCOL 3350 17 G/17G
17 POWDER, FOR SOLUTION ORAL DAILY PRN
Status: DISCONTINUED | OUTPATIENT
Start: 2022-09-10 | End: 2022-09-20 | Stop reason: HOSPADM

## 2022-09-10 RX ORDER — INSULIN LISPRO 100 [IU]/ML
14 INJECTION, SOLUTION INTRAVENOUS; SUBCUTANEOUS
Status: DISCONTINUED | OUTPATIENT
Start: 2022-09-10 | End: 2022-09-16

## 2022-09-10 RX ORDER — PANTOPRAZOLE SODIUM 40 MG/1
40 TABLET, DELAYED RELEASE ORAL
Status: DISCONTINUED | OUTPATIENT
Start: 2022-09-11 | End: 2022-09-20 | Stop reason: HOSPADM

## 2022-09-10 RX ORDER — HEPARIN SODIUM 5000 [USP'U]/ML
5000 INJECTION, SOLUTION INTRAVENOUS; SUBCUTANEOUS EVERY 8 HOURS SCHEDULED
Status: DISCONTINUED | OUTPATIENT
Start: 2022-09-10 | End: 2022-09-20 | Stop reason: HOSPADM

## 2022-09-10 RX ORDER — NYSTATIN 100000 [USP'U]/G
POWDER TOPICAL 2 TIMES DAILY
Status: DISCONTINUED | OUTPATIENT
Start: 2022-09-10 | End: 2022-09-13

## 2022-09-10 RX ORDER — ACETAMINOPHEN 325 MG/1
650 TABLET ORAL EVERY 6 HOURS PRN
Status: DISCONTINUED | OUTPATIENT
Start: 2022-09-10 | End: 2022-09-20 | Stop reason: HOSPADM

## 2022-09-10 RX ORDER — INSULIN LISPRO 100 [IU]/ML
14 INJECTION, SOLUTION INTRAVENOUS; SUBCUTANEOUS
Status: CANCELLED | OUTPATIENT
Start: 2022-09-10

## 2022-09-10 RX ORDER — INSULIN GLARGINE 100 [IU]/ML
44 INJECTION, SOLUTION SUBCUTANEOUS EVERY MORNING
Status: CANCELLED | OUTPATIENT
Start: 2022-09-11

## 2022-09-10 RX ORDER — INSULIN LISPRO 100 [IU]/ML
1-5 INJECTION, SOLUTION INTRAVENOUS; SUBCUTANEOUS
Status: DISCONTINUED | OUTPATIENT
Start: 2022-09-10 | End: 2022-09-20 | Stop reason: HOSPADM

## 2022-09-10 RX ORDER — INSULIN LISPRO 100 [IU]/ML
2-12 INJECTION, SOLUTION INTRAVENOUS; SUBCUTANEOUS
Status: DISCONTINUED | OUTPATIENT
Start: 2022-09-10 | End: 2022-09-20 | Stop reason: HOSPADM

## 2022-09-10 RX ORDER — INSULIN GLARGINE 100 [IU]/ML
44 INJECTION, SOLUTION SUBCUTANEOUS EVERY MORNING
Status: DISCONTINUED | OUTPATIENT
Start: 2022-09-11 | End: 2022-09-14

## 2022-09-10 RX ORDER — SENNOSIDES 8.6 MG
2 TABLET ORAL DAILY
Status: DISCONTINUED | OUTPATIENT
Start: 2022-09-10 | End: 2022-09-20 | Stop reason: HOSPADM

## 2022-09-10 RX ORDER — ONDANSETRON 4 MG/1
4 TABLET, ORALLY DISINTEGRATING ORAL EVERY 6 HOURS PRN
Status: DISCONTINUED | OUTPATIENT
Start: 2022-09-10 | End: 2022-09-20 | Stop reason: HOSPADM

## 2022-09-10 RX ORDER — GABAPENTIN 100 MG/1
100 CAPSULE ORAL 3 TIMES DAILY
Status: DISCONTINUED | OUTPATIENT
Start: 2022-09-10 | End: 2022-09-20 | Stop reason: HOSPADM

## 2022-09-10 RX ADMIN — NYSTATIN: 100000 POWDER TOPICAL at 17:00

## 2022-09-10 RX ADMIN — INSULIN LISPRO 14 UNITS: 100 INJECTION, SOLUTION INTRAVENOUS; SUBCUTANEOUS at 16:50

## 2022-09-10 RX ADMIN — HEPARIN SODIUM 5000 UNITS: 5000 INJECTION INTRAVENOUS; SUBCUTANEOUS at 21:12

## 2022-09-10 RX ADMIN — HEPARIN SODIUM 5000 UNITS: 5000 INJECTION INTRAVENOUS; SUBCUTANEOUS at 06:22

## 2022-09-10 RX ADMIN — NYSTATIN: 100000 POWDER TOPICAL at 11:05

## 2022-09-10 RX ADMIN — HEPARIN SODIUM 5000 UNITS: 5000 INJECTION INTRAVENOUS; SUBCUTANEOUS at 15:29

## 2022-09-10 RX ADMIN — INSULIN LISPRO 8 UNITS: 100 INJECTION, SOLUTION INTRAVENOUS; SUBCUTANEOUS at 11:33

## 2022-09-10 RX ADMIN — GABAPENTIN 100 MG: 100 CAPSULE ORAL at 16:49

## 2022-09-10 RX ADMIN — INSULIN GLARGINE 44 UNITS: 100 INJECTION, SOLUTION SUBCUTANEOUS at 12:27

## 2022-09-10 RX ADMIN — INSULIN LISPRO 14 UNITS: 100 INJECTION, SOLUTION INTRAVENOUS; SUBCUTANEOUS at 12:28

## 2022-09-10 RX ADMIN — GABAPENTIN 100 MG: 100 CAPSULE ORAL at 21:12

## 2022-09-10 RX ADMIN — SENNOSIDES 17.2 MG: 8.6 TABLET, FILM COATED ORAL at 15:29

## 2022-09-10 RX ADMIN — INSULIN LISPRO 6 UNITS: 100 INJECTION, SOLUTION INTRAVENOUS; SUBCUTANEOUS at 16:49

## 2022-09-10 RX ADMIN — GABAPENTIN 100 MG: 100 CAPSULE ORAL at 11:03

## 2022-09-10 NOTE — CASE MANAGEMENT
Case Management Discharge Planning Note    Patient name Madi Mejia  Location / MRN [id number]  : 1955 Date 9/10/2022       Current Admission Date: 2022  Current Admission Diagnosis:DKA (diabetic ketoacidosis) [facility])   Patient Active Problem List    Diagnosis Date Noted    Fungal infection of skin 2022    Urinary retention 2022    [facility]acidosis) [facility] ) 2022    [facility] ) 2022    Urinary frequency 2022    Type 2 diabetes mellitus ([facility] ) 2022    Prostate cancer screening 2022    Microscopic hematuria 2022    Urge incontinence of urine 2022    Nocturnal enuresis 2022    Feeling of incomplete bladder emptying 2022    Balanitis 2022      LOS (days): 6  Geometric Mean LOS (GMLOS) (days): 4 80  Days to GMLOS:-1 4     OBJECTIVE:  Risk of Unplanned Readmission Score: 8 74         Current admission status: Inpatient   Preferred Pharmacy:   [facility]/pharmacy #[id number]- [place], [place] - [address] [employer]  Phone: [phone] Fax: [phone]    Primary Care Provider: Melinda Limon MD    Primary Insurance: MEDICARE  Secondary Insurance: [address] [place] [address],[address] DETAILS:                                          Other Referral/Resources/Interventions Provided:  Interventions: Acute Rehab  Referral Comments: CM contacted [facility] and was informed that a w/c Dominik Milligan was arranged via [place] [facility] at 13:00  Nursing and Dr Julio Nieves informed  CM contacted Ger Voss from [facility] who oks this transport time  Would you like to participate in our [address] service program?  : No - Declined    Treatment Team Recommendation: Acute Rehab  Discharge Destination Plan[de-identified] Acute Rehab  Transport at Discharge : Wheelchair van  Dispatcher Contacted: Yes     Transported by [place] and Unit #):  [place]  ETA of Transport (Date): 22  ETA of Transport (Time): 1300

## 2022-09-10 NOTE — ASSESSMENT & PLAN NOTE
· Noted to have fungal rash in groin and scrotum  · Wound care consulted, see recommendations  · Continue supportive care with nystatin powder, lotrisone

## 2022-09-10 NOTE — ASSESSMENT & PLAN NOTE
Lab Results   Component Value Date    HGBA1C >14 0 (H) 09/05/2022       Recent Labs     09/09/22  1602 09/09/22  2102 09/10/22  0810 09/10/22  1124   POCGLU 164* 141* 126 346*       Blood Sugar Average: Last 72 hrs:  (P) 228   · Present on admission  · Status post insulin drip, now transitioned to basal insulin with sliding scale  · On diabetic diet, endocrine following   · Glucose levels adequately controlled for outpatient follow up   · Patient is experiencing peripheral neuropathy with difficulty with ambulation, open to rehab, coordinate with care manager  · DKA resolved, monitor lytes

## 2022-09-10 NOTE — ASSESSMENT & PLAN NOTE
· Noted to be in atrial fibrillation with RVR on ED EKG  · Patient spontaneously converted to sinus tachycardia prior to arrival to ICU  · Likely 2/2 severe metabolic derangements  · Has maintained SR since  · Monitor as at rehab

## 2022-09-10 NOTE — PLAN OF CARE
Problem: Potential for Falls  Goal: Patient will remain free of falls  Outcome: Adequate for Discharge     Problem: MOBILITY - ADULT  Goal: Maintain or return to baseline ADL function  Outcome: Adequate for Discharge  Goal: Maintains/Returns to pre admission functional level  Outcome: Adequate for Discharge     Problem: Prexisting or High Potential for Compromised Skin Integrity  Goal: Skin integrity is maintained or improved  Outcome: Adequate for Discharge     Problem: PAIN - ADULT  Goal: Verbalizes/displays adequate comfort level or baseline comfort level  Outcome: Adequate for Discharge     Problem: INFECTION - ADULT  Goal: Absence or prevention of progression during hospitalization  Outcome: Adequate for Discharge     Problem: SAFETY ADULT  Goal: Patient will remain free of falls  Outcome: Adequate for Discharge  Goal: Maintain or return to baseline ADL function  Outcome: Adequate for Discharge  Goal: Maintains/Returns to pre admission functional level  Outcome: Adequate for Discharge     Problem: DISCHARGE PLANNING  Goal: Discharge to home or other facility with appropriate resources  Outcome: Adequate for Discharge     Problem: Knowledge Deficit  Goal: Patient/family/caregiver demonstrates understanding of disease process, treatment plan, medications, and discharge instructions  Outcome: Adequate for Discharge     Problem: NEUROSENSORY - ADULT  Goal: Achieves stable or improved neurological status  Outcome: Adequate for Discharge     Problem: CARDIOVASCULAR - ADULT  Goal: Maintains optimal cardiac output and hemodynamic stability  Outcome: Adequate for Discharge  Goal: Absence of cardiac dysrhythmias or at baseline rhythm  Outcome: Adequate for Discharge     Problem: GASTROINTESTINAL - ADULT  Goal: Minimal or absence of nausea and/or vomiting  Outcome: Adequate for Discharge  Goal: Maintains or returns to baseline bowel function  Outcome: Adequate for Discharge  Goal: Oral mucous membranes remain intact  Outcome: Adequate for Discharge     Problem: GENITOURINARY - ADULT  Goal: Urinary catheter remains patent  Outcome: Adequate for Discharge     Problem: METABOLIC, FLUID AND ELECTROLYTES - ADULT  Goal: Electrolytes maintained within normal limits  Outcome: Adequate for Discharge  Goal: Fluid balance maintained  Outcome: Adequate for Discharge  Goal: Glucose maintained within target range  Outcome: Adequate for Discharge     Problem: SKIN/TISSUE INTEGRITY - ADULT  Goal: Skin Integrity remains intact(Skin Breakdown Prevention)  Outcome: Adequate for Discharge     Problem: Nutrition/Hydration-ADULT  Goal: Nutrient/Hydration intake appropriate for improving, restoring or maintaining nutritional needs  Outcome: Adequate for Discharge

## 2022-09-10 NOTE — PLAN OF CARE
Problem: CARDIOVASCULAR - ADULT  Goal: Maintains optimal cardiac output and hemodynamic stability  Description: INTERVENTIONS:  - Monitor I/O, vital signs and rhythm  - Monitor for S/S and trends of decreased cardiac output  - Administer and titrate ordered vasoactive medications to optimize hemodynamic stability  - Assess quality of pulses, skin color and temperature  - Assess for signs of decreased coronary artery perfusion  - Instruct patient to report change in severity of symptoms  Outcome: Progressing  Goal: Absence of cardiac dysrhythmias or at baseline rhythm  Description: INTERVENTIONS:  - Continuous cardiac monitoring, vital signs, obtain 12 lead EKG if ordered  - Administer antiarrhythmic and heart rate control medications as ordered  - Monitor electrolytes and administer replacement therapy as ordered  Outcome: Progressing     Problem: NEUROSENSORY - ADULT  Goal: Achieves stable or improved neurological status  Description: INTERVENTIONS  - Monitor and report changes in neurological status  - Monitor vital signs such as temperature, blood pressure, glucose, and any other labs ordered   - Initiate measures to prevent increased intracranial pressure  - Monitor for seizure activity and implement precautions if appropriate      Outcome: Progressing     Problem: METABOLIC, FLUID AND ELECTROLYTES - ADULT  Goal: Electrolytes maintained within normal limits  Description: INTERVENTIONS:  - Monitor labs and assess patient for signs and symptoms of electrolyte imbalances  - Administer electrolyte replacement as ordered  - Monitor response to electrolyte replacements, including repeat lab results as appropriate  - Instruct patient on fluid and nutrition as appropriate  Outcome: Progressing  Goal: Fluid balance maintained  Description: INTERVENTIONS:  - Monitor labs   - Monitor I/O and WT  - Instruct patient on fluid and nutrition as appropriate  - Assess for signs & symptoms of volume excess or deficit  Outcome: Progressing  Goal: Glucose maintained within target range  Description: INTERVENTIONS:  - Monitor Blood Glucose as ordered  - Assess for signs and symptoms of hyperglycemia and hypoglycemia  - Administer ordered medications to maintain glucose within target range  - Assess nutritional intake and initiate nutrition service referral as needed  Outcome: Progressing     Problem: Nutrition/Hydration-ADULT  Goal: Nutrient/Hydration intake appropriate for improving, restoring or maintaining nutritional needs  Description: Monitor and assess patient's nutrition/hydration status for malnutrition  Collaborate with interdisciplinary team and initiate plan and interventions as ordered  Monitor patient's weight and dietary intake as ordered or per policy  Utilize nutrition screening tool and intervene as necessary  Determine patient's food preferences and provide high-protein, high-caloric foods as appropriate       INTERVENTIONS:  - Monitor oral intake, urinary output, labs, and treatment plans  - Assess nutrition and hydration status and recommend course of action  - Evaluate amount of meals eaten  - Assist patient with eating if necessary   - Allow adequate time for meals  - Recommend/ encourage appropriate diets, oral nutritional supplements, and vitamin/mineral supplements  - Order, calculate, and assess calorie counts as needed  - Recommend, monitor, and adjust tube feedings and TPN/PPN based on assessed needs  - Assess need for intravenous fluids  - Provide specific nutrition/hydration education as appropriate  - Include patient/family/caregiver in decisions related to nutrition  Outcome: Progressing

## 2022-09-10 NOTE — DISCHARGE SUMMARY
[address] [facility]  [facility]  1955, 75 y o  male MRN: [id number]  Unit/Bed#:  Encounter: [id number]  Primary Care Provider: Arleth Madrid MD   Date and time admitted to hospital: 9/4/2022  4:43 AM    * DKA (diabetic ketoacidosis) [facility])  Assessment & Plan  Lab Results   Component Value Date    HGBA1C >14 0 (H) 09/05/2022       Recent Labs     09/09/22  1602 09/09/22  2102 09/10/22  0810 09/10/22  1124   POCGLU 164* 141* 126 346*       Blood Sugar Average: Last 72 hrs:  (P) 228   · Present on admission  · Status post insulin drip, now transitioned to basal insulin with sliding scale  · On diabetic diet, endocrine following   · Glucose levels adequately controlled for outpatient follow up   · Patient is experiencing peripheral neuropathy with difficulty with ambulation, open to rehab, coordinate with care manager  · DKA resolved, monitor lytes    CARLTON (acute kidney injury) (HCC)-resolved as of 9/7/2022  Assessment & Plan  · Likely pre renal in setting of hypovolemia from polyuria  · Now resolved     Fungal infection of skin  Assessment & Plan  · Noted to have fungal rash in groin and scrotum  · Wound care consulted, see recommendations  · Continue supportive care with nystatin powder, lotrisone    Urinary retention  Assessment & Plan  · Likely in setting of neurogenic bladder from uncontrolled diabetes  · Lane inserted, patient had bilateral hydronephrosis from retention, now improving after decompression, noted on US  · Monitor urine output, patient will need outpatient urology follow up    New onset atrial fibrillation [facility])  Assessment & Plan  · Noted to be in atrial fibrillation with RVR on ED EKG  · Patient spontaneously converted to sinus tachycardia prior to arrival to ICU  · Likely 2/2 severe metabolic derangements  · Has maintained SR since  · Monitor as at rehab    Acute metabolic encephalopathy-resolved as of 9/7/2022  Assessment & Plan  · Likely in setting of DKA with multiple electrolyte derangements  · Now resolved, back to baseline         Medical Problems             Resolved Problems  Date Reviewed: 9/6/2022          Resolved    Lactic acidosis 9/5/2022     Resolved by  LEEANNA Fowler    Increased anion gap metabolic acidosis 7/0/0711     Resolved by  LEEANNA Fowler    Pancreatitis 9/8/2022     Resolved by  Rock Bismark MD    Acute metabolic encephalopathy 9/5/6472     Resolved by  Rock Bismark MD    Transaminitis 9/5/2022     Resolved by  LEEANNA Fowler    Hypothermia 9/5/2022     Resolved by  LEEANNA Fowler    Sepsis ([facility] ) 9/9/2022     Resolved by  Rock Bismark MD    CARLTON (acute kidney injury) ([facility] ) 9/7/2022     Resolved by  Rock Bismark MD    Hyponatremia 9/5/2022     Resolved by  Reji Gonzalez, Tolu Rush              Discharging Physician / Practitioner: Brandon Mckeon MD  PCP: Shereen Arevalo MD  Admission Date:   Admission Orders (From admission, onward)     Ordered        09/04/22 0654  INPATIENT ADMISSION  Once                      Discharge Date: 09/10/22    Consultations During Hospital Stay:  IP CONSULT TO CASE MANAGEMENT  IP CONSULT TO ENDOCRINOLOGY  IP CONSULT TO GASTROENTEROLOGY  IP CONSULT TO UROLOGY  IP CONSULT TO NUTRITION SERVICES  · IP CONSULT TO PHARMACY  ·     Procedures Performed:   · imaging    Significant Findings / Test Results:   Principal Problem:    DKA (diabetic ketoacidosis) ([facility] )  Active Problems:    New onset atrial fibrillation ([facility] )    Urinary retention    Fungal infection of skin    Type 2 diabetes mellitus ([facility] )    Nocturnal enuresis  Resolved Problems:    CARLTON (acute kidney injury) ([facility] )    Pancreatitis    Acute metabolic encephalopathy    Lactic acidosis    Increased anion gap metabolic acidosis    Transaminitis    Hypothermia    Sepsis ([facility] )    Hyponatremia  ·   ·     Incidental Findings:   · See above      Test Results Pending at Discharge (will require follow up): · Na      Outpatient Tests Requested:  · Follow up with PCP, endocrine, urology     Complications:  See above     Reason for Admission: altered mental status     Hospital Course:   Leonardo Yoon  is a 75 y o  male patient who originally presented to the hospital on 9/4/2022 due to altered mental status secondary to uncontrolled diabetes  Patient eventually stabilized, now being discharged to acute rehab  Condition at Discharge: good    Discharge Day Visit / Exam:   * Please refer to separate progress note for these details *    Discussion with Family: Patient declined call to   Discharge instructions/Information to patient and family:   See after visit summary for information provided to patient and family  Provisions for Follow-Up Care:  See after visit summary for information related to follow-up care and any pertinent home health orders  Disposition:   Other: acute rehab    Planned Readmission: acute rehab     Discharge Statement:  I spent 30+ minutes discharging the patient  This time was spent on the day of discharge  I had direct contact with the patient on the day of discharge  Greater than 50% of the total time was spent examining patient, answering all patient questions, arranging and discussing plan of care with patient as well as directly providing post-discharge instructions  Additional time then spent on discharge activities  Discharge Medications:  See after visit summary for reconciled discharge medications provided to patient and/or family        **Please Note: This note may have been constructed using a voice recognition system**

## 2022-09-10 NOTE — PLAN OF CARE
Problem: PAIN - ADULT  Goal: Verbalizes/displays adequate comfort level or baseline comfort level  Description: Interventions:  - Encourage patient to monitor pain and request assistance  - Assess pain using appropriate pain scale  - Administer analgesics based on type and severity of pain and evaluate response  - Implement non-pharmacological measures as appropriate and evaluate response  - Consider cultural and social influences on pain and pain management  - Notify physician/advanced practitioner if interventions unsuccessful or patient reports new pain  Outcome: Progressing     Problem: INFECTION - ADULT  Goal: Absence or prevention of progression during hospitalization  Description: INTERVENTIONS:  - Assess and monitor for signs and symptoms of infection  - Monitor lab/diagnostic results  - Monitor all insertion sites, i e  indwelling lines, tubes, and drains  - Monitor endotracheal if appropriate and nasal secretions for changes in amount and color  - [place] appropriate cooling/warming therapies per order  - Administer medications as ordered  - Instruct and encourage patient and family to use good hand hygiene technique  - Identify and instruct in appropriate isolation precautions for identified infection/condition  Outcome: Progressing

## 2022-09-10 NOTE — NURSING NOTE
Patient admitted to our unit via wheelchair from [facility]  Patient pleasant and cooperative  Noted with periods of forgetfulness at times  Likes to talk to staff and some ideas scattered  Before patient sat in wheelchair he wanted to take walk in hallways  Patient ambulated up and down hallway twice with contact guard with walker  Voices no pain accept in penis due to catheter  Patient with urinary retention and hydronephrosis  Patient states he had problems urinating prior to hospitalization  Urinary consult ordered  Urine in back clear and yellow  Fungal rash noted to groin area  Full skin assessment completed and waffle mattress applied to wheelchair for comfort  Per patient he states he feels down at times  Did get in report from prior hospital patient stopped taking insulin bc he gave up on himself  Patient states his family support at home is not the best  He states his wife has called him "fat" and "pig" in the past prior to him losing weight  Patient agrees he is unsure if talking to someone will help him  Mo Oyster PA made aware of patients thoughts and will assess need for psych consult when she sees him  Patient oriented to unit, call bell usage, and therapy schedule  Siting in wheelchair at this time  Call bell in reach

## 2022-09-11 PROBLEM — M79.605 PAIN IN BOTH LOWER EXTREMITIES: Status: ACTIVE | Noted: 2022-09-11

## 2022-09-11 PROBLEM — R45.89 DEPRESSED MOOD: Status: ACTIVE | Noted: 2022-09-11

## 2022-09-11 PROBLEM — M79.604 PAIN IN BOTH LOWER EXTREMITIES: Status: ACTIVE | Noted: 2022-09-11

## 2022-09-11 LAB
ALBUMIN SERPL BCP-MCNC: 2.8 G/DL (ref 3.5–5)
ALP SERPL-CCNC: 92 U/L (ref 34–104)
ALT SERPL W P-5'-P-CCNC: 11 U/L (ref 7–52)
ANION GAP SERPL CALCULATED.3IONS-SCNC: 9 MMOL/L (ref 4–13)
AST SERPL W P-5'-P-CCNC: 22 U/L (ref 13–39)
BASOPHILS # BLD AUTO: 0.01 THOUSANDS/ΜL (ref 0–0.1)
BASOPHILS NFR BLD AUTO: 0 % (ref 0–1)
BILIRUB SERPL-MCNC: 0.55 MG/DL (ref 0.2–1)
BUN SERPL-MCNC: 11 MG/DL (ref 5–25)
CALCIUM ALBUM COR SERPL-MCNC: 9.5 MG/DL (ref 8.3–10.1)
CALCIUM SERPL-MCNC: 8.5 MG/DL (ref 8.4–10.2)
CHLORIDE SERPL-SCNC: 101 MMOL/L (ref 96–108)
CO2 SERPL-SCNC: 28 MMOL/L (ref 21–32)
CREAT SERPL-MCNC: 0.75 MG/DL (ref 0.6–1.3)
EOSINOPHIL # BLD AUTO: 0.16 THOUSAND/ΜL (ref 0–0.61)
EOSINOPHIL NFR BLD AUTO: 3 % (ref 0–6)
ERYTHROCYTE [DISTWIDTH] IN BLOOD BY AUTOMATED COUNT: 13 % (ref 11.6–15.1)
GFR SERPL CREATININE-BSD FRML MDRD: 94 ML/MIN/1.73SQ M
GLUCOSE P FAST SERPL-MCNC: 103 MG/DL (ref 65–99)
GLUCOSE SERPL-MCNC: 103 MG/DL (ref 65–140)
GLUCOSE SERPL-MCNC: 108 MG/DL (ref 65–140)
GLUCOSE SERPL-MCNC: 128 MG/DL (ref 65–140)
GLUCOSE SERPL-MCNC: 140 MG/DL (ref 65–140)
GLUCOSE SERPL-MCNC: 192 MG/DL (ref 65–140)
HCT VFR BLD AUTO: 33.7 % (ref 36.5–49.3)
HGB BLD-MCNC: 10.9 G/DL (ref 12–17)
IMM GRANULOCYTES # BLD AUTO: 0.02 THOUSAND/UL (ref 0–0.2)
IMM GRANULOCYTES NFR BLD AUTO: 0 % (ref 0–2)
LYMPHOCYTES # BLD AUTO: 2.37 THOUSANDS/ΜL (ref 0.6–4.47)
LYMPHOCYTES NFR BLD AUTO: 44 % (ref 14–44)
MCH RBC QN AUTO: 29.4 PG (ref 26.8–34.3)
MCHC RBC AUTO-ENTMCNC: 32.3 G/DL (ref 31.4–37.4)
MCV RBC AUTO: 91 FL (ref 82–98)
MONOCYTES # BLD AUTO: 0.73 THOUSAND/ΜL (ref 0.17–1.22)
MONOCYTES NFR BLD AUTO: 13 % (ref 4–12)
NEUTROPHILS # BLD AUTO: 2.16 THOUSANDS/ΜL (ref 1.85–7.62)
NEUTS SEG NFR BLD AUTO: 40 % (ref 43–75)
NRBC BLD AUTO-RTO: 0 /100 WBCS
PLATELET # BLD AUTO: 169 THOUSANDS/UL (ref 149–390)
PMV BLD AUTO: 11.2 FL (ref 8.9–12.7)
POTASSIUM SERPL-SCNC: 3.6 MMOL/L (ref 3.5–5.3)
PROT SERPL-MCNC: 5.7 G/DL (ref 6.4–8.4)
RBC # BLD AUTO: 3.71 MILLION/UL (ref 3.88–5.62)
SODIUM SERPL-SCNC: 138 MMOL/L (ref 135–147)
WBC # BLD AUTO: 5.45 THOUSAND/UL (ref 4.31–10.16)

## 2022-09-11 PROCEDURE — 82948 REAGENT STRIP/BLOOD GLUCOSE: CPT

## 2022-09-11 PROCEDURE — 99223 1ST HOSP IP/OBS HIGH 75: CPT | Performed by: PHYSICAL MEDICINE & REHABILITATION

## 2022-09-11 PROCEDURE — 80053 COMPREHEN METABOLIC PANEL: CPT | Performed by: PHYSICAL MEDICINE & REHABILITATION

## 2022-09-11 PROCEDURE — 85025 COMPLETE CBC W/AUTO DIFF WBC: CPT | Performed by: PHYSICAL MEDICINE & REHABILITATION

## 2022-09-11 PROCEDURE — 97110 THERAPEUTIC EXERCISES: CPT

## 2022-09-11 PROCEDURE — 97530 THERAPEUTIC ACTIVITIES: CPT

## 2022-09-11 PROCEDURE — 97535 SELF CARE MNGMENT TRAINING: CPT

## 2022-09-11 PROCEDURE — 97167 OT EVAL HIGH COMPLEX 60 MIN: CPT

## 2022-09-11 RX ORDER — TAMSULOSIN HYDROCHLORIDE 0.4 MG/1
0.4 CAPSULE ORAL
Status: DISCONTINUED | OUTPATIENT
Start: 2022-09-11 | End: 2022-09-20 | Stop reason: HOSPADM

## 2022-09-11 RX ADMIN — INSULIN GLARGINE 44 UNITS: 100 INJECTION, SOLUTION SUBCUTANEOUS at 08:53

## 2022-09-11 RX ADMIN — SENNOSIDES 17.2 MG: 8.6 TABLET, FILM COATED ORAL at 08:53

## 2022-09-11 RX ADMIN — INSULIN LISPRO 14 UNITS: 100 INJECTION, SOLUTION INTRAVENOUS; SUBCUTANEOUS at 12:20

## 2022-09-11 RX ADMIN — DICLOFENAC SODIUM 2 G: 10 GEL TOPICAL at 17:01

## 2022-09-11 RX ADMIN — HEPARIN SODIUM 5000 UNITS: 5000 INJECTION INTRAVENOUS; SUBCUTANEOUS at 05:34

## 2022-09-11 RX ADMIN — DICLOFENAC SODIUM 2 G: 10 GEL TOPICAL at 13:28

## 2022-09-11 RX ADMIN — HEPARIN SODIUM 5000 UNITS: 5000 INJECTION INTRAVENOUS; SUBCUTANEOUS at 21:05

## 2022-09-11 RX ADMIN — NYSTATIN: 100000 POWDER TOPICAL at 17:01

## 2022-09-11 RX ADMIN — TAMSULOSIN HYDROCHLORIDE 0.4 MG: 0.4 CAPSULE ORAL at 16:58

## 2022-09-11 RX ADMIN — INSULIN LISPRO 2 UNITS: 100 INJECTION, SOLUTION INTRAVENOUS; SUBCUTANEOUS at 12:20

## 2022-09-11 RX ADMIN — GABAPENTIN 100 MG: 100 CAPSULE ORAL at 16:58

## 2022-09-11 RX ADMIN — GABAPENTIN 100 MG: 100 CAPSULE ORAL at 21:05

## 2022-09-11 RX ADMIN — INSULIN LISPRO 14 UNITS: 100 INJECTION, SOLUTION INTRAVENOUS; SUBCUTANEOUS at 08:20

## 2022-09-11 RX ADMIN — GABAPENTIN 100 MG: 100 CAPSULE ORAL at 08:53

## 2022-09-11 RX ADMIN — HEPARIN SODIUM 5000 UNITS: 5000 INJECTION INTRAVENOUS; SUBCUTANEOUS at 13:26

## 2022-09-11 RX ADMIN — INSULIN LISPRO 14 UNITS: 100 INJECTION, SOLUTION INTRAVENOUS; SUBCUTANEOUS at 16:58

## 2022-09-11 RX ADMIN — NYSTATIN: 100000 POWDER TOPICAL at 08:54

## 2022-09-11 RX ADMIN — PANTOPRAZOLE SODIUM 40 MG: 40 TABLET, DELAYED RELEASE ORAL at 05:34

## 2022-09-11 NOTE — ASSESSMENT & PLAN NOTE
Lab Results   Component Value Date    HGBA1C >14 0 (H) 09/05/2022       Recent Labs     09/19/22  1615 09/19/22 2033 09/20/22  0720 09/20/22  1140   POCGLU 134 107 120 93       Blood Sugar Average: Last 72 hrs:  (P) 106 25   · S/p insulin drip, endocrinology consulted and transitioned to basal insulin and SSI  · Continue Lantus 40 units in AM, Humalog 10 units TID with meals, SSI   · Goals for basal/bolus regimen at discharge   Patient provided diabetic education while in [facility]  · insulin pens ordered, glucometer, supplies and pen needles ordered at discharge  · Ambulatory referrals made to endocrinology and diabetic educator   · New onset neuropathy to BLE- Continue gabapentin  · Acute chomprehensive interdisciplinary inpatient rehabilitation to include intensive skilled therapies as outlines with oversight and management by a rehabilitation Physician Assistant overseen by rehabilitation physician as well as inpatient rehabilitation nursing, case management and weekly interdisciplinary team meeting

## 2022-09-11 NOTE — ASSESSMENT & PLAN NOTE
· Francois cream to groin BID  · To continue at home  · [facility]  · Calazime to coccyx and intergluteal crease TID/PRN

## 2022-09-11 NOTE — NURSING NOTE
Patient contact guard supervision to move in room and ambulate  Patient ambulated up and down hallway twice and then around entire unit contact guard with walker with this nurse after family left  Patient pleasant today  No signs of depression noted  Appears highly motivated to get better and follow diet to help with his DM  Complains of pain in penis area but does not wish to take anything for it  Patient with hopes lemons will be removed for a trial soon  Started on flomax this shift  Lemons care completed and urine draining clear yellow  Siting in recliner at this time  Will continue to monitor and follow plan of care

## 2022-09-11 NOTE — H&P
PHYSICAL MEDICINE AND REHABILITATION H&P/ADMISSION NOTE  Oracio Richardson Jr  75 y o  male MRN: [id number]  Unit/Bed#: [facility] 212-01 Encounter: [id number]     Rehab Diagnosis: Impairment of mobility, safety and Activities of Daily Living (ADLs) due to Debility:  16  Debility (Non-cardiac/Non-pulmonary)   Etiologic: DKA    History of Present Illness:   Oracio Amato  is a 75 y o  male with a PMH significant for T2DM,  who presented to the [address] [place] [address] with altered mental status, N/V and generalized weakness  Patients spouse found patient this way after work and reported patient had lost weight several months ago resulting in patient not taking insulin due to feeling he no longer needed it  Glucose was elevated to 800 upon arrival  He recently moved here from [address] [place] [address] and does not have established care in the area  He was seen by [address] [place] [address] Urology for dysuria on 9/2 however  He was noted to have severe anion gap metabolic acidosis and lactatemia due to acute pancreatitis, DKA, obstructive uropathy and non-obstructing 8 mm right renal calculus  Also with new onset Afib and CARLTON  IV insulin given for DKA which resolved and patient was transitioned to basal/bolus regimen  Endocrinology consulted and adjusted regimen to Lantus/Humalog  GI consulted for acute pancreatitis and initiated on IV fluids and NG tube  Once resolved, NG tube d/c on 9/6 and pt has been tolerating solids/liquids since  Urology consulted for urinary retention and a lemons catheter was placed with plans for urodynamic studies once lemons removed  Also initiated on Ceftriaxone with urine culture positive for coag neg staph  Urology does not recommend removal while inpatient  Wound care nursing consulted for scrotal/groin fungal rash requiring topical antifungal treatment  Evaluated by PT/OT and deemed appropriate for post-acute rehabilitation services  He was accepted to [facility] and arrived on 9/10/22         Plan: Rehabilitation   Functional deficits: impaired mobility, self care, RUE weakness, BLE weakness, Neuropathy to BLE   Begin PT/OT/SLP  Rehabilitation goals are to achieve a modified independent level with mobility and self care  Prognosis is good  ELOS is 10-14 days  Estimated discharge is home  DVT prophylaxis   Heparin 5000 units SQ Q8H [facility]    Pain   Neurontin 100 mg TID for neuroapthy   Tylenol 650 mg Q6H PRN for mild pain    Bladder plan   Urinary retention   Lane catheter in place   Monitor I&O Qshift    Initiated Flomax 0 4 mg daily per Urology request   Urology consult placed     Bowel plan   Continent   Senna Daily   Miralax PRN daily     Skin care  · Calazime to coccyx and intergluteal creased TID/PRN  · Hydraguard bilateral heels/buttock BID/PRN  · Elevate heels to offload pressure  · EHOB cushion when OOB  · Turn/reposition q2h for pressure re-distribution on skin  · Moisturize skin daily with skin nourishing cream  · Scrotum/bilateral medial thighs- cleanse skin with soap and water, pat dry, apply dusting of nystatin powder over affected areas so that a thin layer of powder is applied to skin   BID and with chayo-care    Code Status   Full Code- confirmed with patient      Pain in both lower extremities  Assessment & Plan  · Tenderness to palpation of left calf  · Edematous BLE  · Non-erythematous or hot to touch  · Order doppler BLE to rule out DVT   · Voltaren gel to bilateral knee 4x daily    Depressed mood  Assessment & Plan  · Reported at time of admission by nursing  · Patient recently lost son to OD and commented on wife with negative comments towards patients appearance  · Pt request to speak with Psych  · Consult placed, call to [address] [place] [address] to be made to set virtual appointment     Fungal infection of skin  Assessment & Plan  · Calazime to coccyx/intergluteal crease TID/PRN  · Wound care to follow    Urinary retention  Assessment & Plan  · Thought to be due to neurogenic bladder in setting of uncontrolled diabetes  · Lane in place  · Had bilateral hydronephrosis from retention, improvement noted after decompression on US  · Monitor urine OP  · Urology consulted to follow in [facility]  · Flomax 0 4 mg daily with dinner initiated per urology request      New onset atrial fibrillation ([address] )  Assessment & Plan  · Noted with RVR in ED on EKG  · Spontaneously converted to sinus tachycardia prior to arrival to ICU  · Thought to be d/t severe metabolic derangements  · Monitor   · SR since        * DKA (diabetic ketoacidosis) [facility])  Assessment & Plan  Lab Results   Component Value Date    HGBA1C >14 0 (H) 09/05/2022       Recent Labs     09/10/22  1559 09/10/22  2055 09/11/22  0727 09/11/22  1107   POCGLU 277* 109 128 192*       Blood Sugar Average: Last 72 hrs:  (P) 176 5   · Arrived with glucose of 800  · S/p insulin drip, endocrinology consulted and transitioned to basal insulin and SSI  · Continue Lantus 44 units in AM, Humalog 14 units TID with meals, SSI  · Goals for basal/bolus regimen at discharge  · Will need pen teaching, insulin pens ordered, glucometer, supplies and pen needles at discharge  · Monitor accu cheks  · IM to follow  · With peripheral neuropathy causing difficulty with ambulation- PT/OT  · Acute chomprehensive interdisciplinary inpatient rehabilitation to include intensive skilled therapies as outlines with oversight and management by a rehabilitation Physician Assistant overseen by rehabilitation physician as well as inpatient rehabilitation nursing, case management and weekly interdisciplinary team meeting              Subjective: This is a pleasant 75year old male presenting s/p DKA and IV insulin treatment  Patient is motivated to get better and requested education for dietary lifestyle modifications   Patient reports weakness to BLE and determined to build strength stating, "I don't want to have a fall at home and be unable to get back up without assistance " He reports neuropathy to BLE making ambulation difficult as well  The neuropathy started with recent admission to [facility] and DKA diagnosis  He reports RUE weakness  RUE with IV site inflammation, enlarged lymph node to antecubital region and medial wrist  Tenderness to palpation noted  He is agreeable to try heat to the area  Patient reported pain to bilateral knees R>L and is agreeable to try voltaren gel  He would like PT to help assist with stretching to help loosen the Right knee  He admits to previous surgery to the right knee and has decreased ROM/mobility to the RLE due to this surgery and not properly rehabilitation after surgery to the right knee  Patient also reports depressed mood at time due to weight and comments spouse has made previously in regards to his weight  Nursing also reported patient lost a son to overdose recently which has been causing a depressed mood  He is hoping to speak with someone about coping mechanisms and is agreeable to a psych consult as he has has suicidal thoughts in the past   Patient is extremely motivated to improve multiple aspects of his life and is looking forward to working with therapist        Review of Systems   Constitutional: Negative  Negative for appetite change, fatigue and fever  HENT: Negative  Negative for trouble swallowing  Eyes: Negative  Negative for visual disturbance  Respiratory: Negative  Negative for shortness of breath  Cardiovascular: Negative  Negative for chest pain  Gastrointestinal: Negative  Negative for abdominal pain, constipation and diarrhea  Endocrine: Negative  Genitourinary: Positive for difficulty urinating  Lemons in place; discomfort reported at lemons site   Musculoskeletal: Positive for arthralgias and joint swelling  Bilateral knee pain; R >L  BLE swelling; new since admission to [facility]   Neurological: Positive for weakness and numbness          BLE weakness, RUE weakness   Neuropathy to BLE Psychiatric/Behavioral: Negative for suicidal ideas  Depressed mood at times; no current suicidal ideations but has had them in the past          Function:  Prior level of function and living situation:  Patient resides in a 2-story home  Able to have 1st floor set up  2 JIMI home  Will NOT have 24 hour supervision/physical assistance available upon discharge     DME: Single point cane, raised toilet seat         Current level of function:  Physical Therapy: Bed mobility, transfers and ambulation min assist   Occupational Therapy:Eating independent, UB bathing and grooming supervision s/u, LB bathing/dressing mod assist, UB dressing min assist, toileting assistance max assist, functional mobility min assist  Speech Therapy: N/a    Physical Exam:  /55 (BP Location: Left arm)   Pulse 74   Temp 98 8 °F (37 1 °C) (Temporal)   Resp 15   Ht 6' (1 829 m)   Wt 103 kg (227 lb 4 7 oz)   SpO2 95%   BMI 30 83 kg/m²        Intake/Output Summary (Last 24 hours) at 9/11/2022 1235  Last data filed at 9/11/2022 1009  Gross per 24 hour   Intake 840 ml   Output 2350 ml   Net -1510 ml       Body mass index is 30 83 kg/m²  Physical Exam  Vitals and nursing note reviewed  Constitutional:       General: He is not in acute distress  Appearance: He is not ill-appearing  HENT:      Head: Normocephalic and atraumatic  Mouth/Throat:      Mouth: Mucous membranes are moist       Pharynx: Oropharynx is clear  Eyes:      Extraocular Movements: Extraocular movements intact  Pupils: Pupils are equal, round, and reactive to light  Cardiovascular:      Rate and Rhythm: Normal rate and regular rhythm  Pulses: Normal pulses  Heart sounds: Normal heart sounds  No murmur heard  Pulmonary:      Effort: Pulmonary effort is normal  No respiratory distress  Breath sounds: Normal breath sounds  Abdominal:      General: Bowel sounds are normal  There is no distension        Palpations: Abdomen is soft       Tenderness: There is no abdominal tenderness  Musculoskeletal:         General: Tenderness present  Right lower leg: Edema present  Left lower leg: Edema present  Comments: Decreased ROM to RLE  Tender right forearm with areas of enlarged lymph notes to antecubital and wrist   Tenderness to Left calf if squeezed    Skin:     General: Skin is warm and dry  Neurological:      General: No focal deficit present  Mental Status: He is alert and oriented to person, place, and time  Motor: Weakness present  Psychiatric:         Mood and Affect: Mood normal          Behavior: Behavior normal             Labs, medications, and imaging personally reviewed      Laboratory:    Lab Results   Component Value Date    SODIUM 138 09/11/2022    K 3 6 09/11/2022     09/11/2022    CO2 28 09/11/2022    BUN 11 09/11/2022    CREATININE 0 75 09/11/2022    GLUC 103 09/11/2022    CALCIUM 8 5 09/11/2022     Lab Results   Component Value Date    WBC 5 45 09/11/2022    HGB 10 9 (L) 09/11/2022    HCT 33 7 (L) 09/11/2022    MCV 91 09/11/2022     09/11/2022     No results found for: INR, PROTIME      Current Facility-Administered Medications:     acetaminophen (TYLENOL) tablet 650 mg, 650 mg, Oral, Q6H PRN, Agnes Nagel PA-C    Diclofenac Sodium (VOLTAREN) 1 % topical gel 2 g, 2 g, Topical, 4x Daily, Amy Sapp PA-C    gabapentin (NEURONTIN) capsule 100 mg, 100 mg, Oral, TID, Agnes Nagel PA-C, 100 mg at 09/11/22 0853    heparin (porcine) subcutaneous injection 5,000 Units, 5,000 Units, Subcutaneous, Q8H [address], Amy Sapp PA-C, 5,000 Units at 09/11/22 0534    insulin glargine (LANTUS) subcutaneous injection 44 Units 0 44 mL, 44 Units, Subcutaneous, QAM, Amy Sapp PA-C, 44 Units at 09/11/22 0853    insulin lispro (HumaLOG) 100 units/mL subcutaneous injection 1-5 Units, 1-5 Units, Subcutaneous, HS, Amy Sapp PA-C    insulin lispro (HumaLOG) 100 units/mL subcutaneous injection 14 Units, 14 Units, Subcutaneous, TID With Meals, Maria A Valdivia PA-C, 14 Units at 09/11/22 1220    insulin lispro (HumaLOG) 100 units/mL subcutaneous injection 2-12 Units, 2-12 Units, Subcutaneous, TID AC, 2 Units at 09/11/22 1220 **AND** Fingerstick Glucose (POCT), , , TID AC, Maria A Valdivia PA-C    nystatin (MYCOSTATIN) powder, , Topical, BID, Maria A Valdivia PA-C, Given at 09/11/22 0854    ondansetron (ZOFRAN-ODT) dispersible tablet 4 mg, 4 mg, Oral, Q6H PRN, Maria A Valdivia PA-C    pantoprazole (PROTONIX) EC tablet 40 mg, 40 mg, Oral, Early Morning, Amy Sapp PA-C, 40 mg at 09/11/22 0534    polyethylene glycol (MIRALAX) packet 17 g, 17 g, Oral, Daily PRN, Maria A Valdivia PA-C    senna (SENOKOT) tablet 17 2 mg, 2 tablet, Oral, Daily, Amy Sapp PA-C, 17 2 mg at 09/11/22 0853    tamsulosin (FLOMAX) capsule 0 4 mg, 0 4 mg, Oral, Daily With Gearldmaru Hunter PA-C  No Known Allergies   Patient Active Problem List    Diagnosis Date Noted    Depressed mood 09/11/2022    Pain in both lower extremities 09/11/2022    Fungal infection of skin 09/09/2022    Urinary retention 09/07/2022    DKA (diabetic ketoacidosis) ([facility] ) 09/04/2022    New onset atrial fibrillation ([facility] ) 09/04/2022    Urinary frequency 09/02/2022    Type [facility] diabetes mellitus [facility] ) 09/02/2022    Prostate cancer screening 09/02/2022    Microscopic hematuria 09/02/2022    Urge incontinence of urine 09/02/2022    Nocturnal enuresis 09/02/2022    Feeling of incomplete bladder emptying 09/02/2022    Balanitis 09/02/2022     Past Medical History:   Diagnosis Date    A-fib [facility])     Acute metabolic encephalopathy 96/19/6761    CARLTON (acute kidney injury) ([facility] ) 09/04/2022    Diabetes mellitus ([place] [address] )     Pancreatitis 09/04/2022    Sepsis ([place] [address] ) 09/04/2022     Past Surgical History:   Procedure Laterality Date    KNEE CARTILAGE SURGERY      KNEE SURGERY Right      Social History Socioeconomic History    Marital status: /Civil Union     Spouse name: Not on file    Number of children: Not on file    Years of education: Not on file    Highest education level: Not on file   Occupational History    Not on file   Tobacco Use    Smoking status: Never Smoker    Smokeless tobacco: Never Used   Substance and Sexual Activity    Alcohol use: Never    Drug use: Never    Sexual activity: Not on file   Other Topics Concern    Not on file   Social History Narrative    Not on file     Social Determinants of Health     Financial Resource Strain: Not on file   Food Insecurity: No Food Insecurity    Worried About Running Out of Food in the Last Year: Never true    [address] in the Last Year: Never true   Transportation Needs: No Transportation Needs    Lack of Transportation (Medical): No    Lack of Transportation (Non-Medical): No   Physical Activity: Not on file   Stress: Not on file   Social Connections: Not on file   Intimate Partner Violence: Not on file   Housing Stability: Low Risk     Unable to Pay for Housing in the Last Year: No    Number of Places Lived in the Last Year: 1    Unstable Housing in the Last Year: No     Social History     Tobacco Use   Smoking Status Never Smoker   Smokeless Tobacco Never Used     Social History     Substance and Sexual Activity   Alcohol Use Never     Family History   Problem Relation Age of Onset    Diabetes Father     Diabetes Mother          Medical Necessity Criteria for [facility] Admission: Bowel/Bladder Management, Diabetes requiring close blood glucose monitoring, Incision/Wound care and Urinary retention  In addition, the preadmission screen, post-admission physical evaluation, overall plan of care and admissions order demonstrate a reasonable expectation that the following criteria were met at the time of admission to the [facility]    1  The patient requires active and ongoing therapeutic intervention of multiple therapy disciplines (physical therapy, occupational therapy, speech-language pathology, or prosthetics/orthotics), one of which is physical or occupational therapy  2  Patient requires an intensive rehabilitation therapy program, as defined in Chapter 1, section 110 2 2 of the CMS Medicare Policy Manual  This intensive rehabilitation therapy program will consist of at least 3 hours of therapy per day at least 5 days per week or at least 15 hours of intensive rehabilitation therapy within a 7 consecutive day period, beginning with the date of admission to the [facility]  3  The patient is reasonably expected to actively participate in, and benefit significantly from, the intensive rehabilitation therapy program as defined in Chapter 1, section 110 2 2 of the CMS Medicare Policy Manual at this time of admission to the [facility]  He can reasonably be expected to make measurable improvement (that will be of practical value to improve the patients functional capacity or adaptation to impairments) as a result of the rehabilitation treatment, as defined in section 110 3, and such improvement can be expected to be made within the prescribed period of time  As noted in the CMS Medicare Policy Manual, the patient need not be expected to achieve complete independence in the domain of self-care nor be expected to return to his or her prior level of functioning in order to meet this standard  4  The patient must require physician supervision by a rehabilitation physician  As such, a rehabilitation physician will conduct face-to-face visits with the patient at least 3 days per week throughout the patients stay in the [facility] to assess the patient both medically and functionally, as well as to modify the course of treatment as needed to maximize the patients capacity to benefit from the rehabilitation process    5  The patient requires an intensive and coordinated interdisciplinary approach to providing rehabilitation, as defined in Chapter 1, section 110 2 5 of the CMS Medicare Policy Manual  This will be achieved through periodic team conferences, conducted at least once in a 7-day period, and comprising of an interdisciplinary team of medical professionals consisting of: a rehabilitation physician, registered nurse,  and/or , and a licensed/certified therapist from each therapy discipline involved in treating the patient  Changes Since Pre-admission Assessment: None -This patient's participation in rehab continues to be reasonable, necessary and appropriate  CMS Required Post-Admission Physician Evaluation Elements  History and Physical, including medical history, functional history and active comorbidities as in above text  Post-Admission Physician Evaluation:  The patient has the potential to make improvement and is in need of physical, occupational, and/or therapy services  The patient may also need nutritional services  Given the patient's complex medical condition and risk of further medical complications, rehabilitative services cannot be safely provided at a lower level of care, such as a skilled nursing facility  I have reviewed the patient's functional and medical status at the time of the preadmission screening and they are the same as on the day of this admission  I acknowledge that I have personally performed a full physical examination on this patient within 24 hours of admission  The patient and/or family demonstrated understanding the rehabilitation program and the discharge process after we discussed them  Agree in entirety: yes  Minor adaptions: none    Major changes: none    Elijah Madden PA-C    ** Please Note: Fluency Direct voice to text software may have been used in the creation of this document  **      Total time spent:  75 minutes with more than 50% spent counseling/coordinating care    Counseling includes extended discussion with patient (+/- family/relevant historian)  re: history, symptoms, medications, functional issues, mood, medical and rehabilitation management plan, and disposition  Additional time spent with thorough chart review in EMR, reviewing recent medications, labs, imaging, and management plan  This was followed by formulating a comprehensive inpatient medical/rehabilitation management plan, and coordinating with pertinent specialists as indicated

## 2022-09-11 NOTE — PROGRESS NOTES
09/11/22 0710   Patient Data   Rehab Impairment Debility, encephalopathy   Etiologic Diagnosis DKA (diabetic ketoacidosis, CARLTON, urinary retention, encephalopathy   Date of Onset 09/03/22   Support System   Name Inna Chapman Wife   Home Setup   Type of Home Multi Level   Method of Entry Stairs   Number of Stairs 3   First Floor Bathroom Full   First Floor Bathroom Accessibility Raised toilet seat; Shower chair   First Floor Setup Available Yes  (Currently stays on the first floor)   Available Equipment Single [facility]   Baseline Information   Transportation    Prior Device(s) Used Single [employer]  (outdoors)   Prior IADL Participation   Money Management Identify Money;Estimate Costs;Estimate Change;Combine Bills;Manage Checkbook   Meal Preparation Partial Participation  (shares with wife)   Laundry Partial Participation  (shares with wife)   Home Cleaning Partial Participation  (shares with wife)   Prior Level of Function   Self-Care 3  Independent - Patient completed the activities by him/herself, with or without an assistive device, with no assistance from a helper  Functional Cognition 3  Independent - Patient completed the activities by him/herself, with or without an assistive device, with no assistance from a helper  Prior Device Used Z  None of the above  (SPC outdoors)   Falls in the Last Year   Number of falls in the past 12 months 1   Type of Injury Associated with Fall No injury  (reason for current admission)   Patient Preference   Nickname (Patient Preference) AC   Restrictions/Precautions   Precautions Fall Risk; [place];Pain   Weight Bearing Restrictions No   ROM Restrictions No   Pain Assessment   Pain Assessment Tool 0-10   Pain Score 8   Pain Location/Orientation Location: Groin  (lemons site)   Eating Assessment   Food To Mouth Yes   Able To Cut Yes   Positioning Upright;Out of Bed   Meal Assessed Breakfast   Opens Packages Yes   Type of Assistance Needed Independent   Eating CARE Score 6   Oral Hygiene   Type of Assistance Needed Set-up / clean-up   Oral Hygiene CARE Score 5   Grooming   Able To Initiate Tasks; Wash/Dry Hands; Wash/Dry Face   Limitation Noted In Strength   Findings setup seated   Tub/Shower Transfer   Reason Not Assessed Sponge Bath   Shower/Bathe Self   Type of Assistance Needed Physical assistance   Physical Assistance Level 26%-50%   Shower/Bathe Self CARE Score 3   Bathing   Assessed Bath Style Sponge Bath   Anticipated D/C Bath Style Shower;Sponge Bath   Able to [place] No   Able to [employer] Temperature No   Able to Wash/Rinse/Dry (body part) Left Arm;Right Arm;L Upper Leg;R Upper Leg;Chest;Abdomen;Perineal Area   Limitations Noted in Balance; Endurance;Problem Solving;ROM;Safety;Strength   Positioning Seated;Standing   Findings  lemons completed by patient with directions for correct process   Dressing/Undressing Clothing   Remove UB Clothes Pullover Shirt   Don UB Clothes Pullover Shirt   Type of Assistance Needed Supervision   Upper Body Dressing CARE Score 4   Remove LB [address] Lab[address]   Comment delcines donning pants/ underwear due to catheter   Limitations Noted In Balance; Endurance;Problem Solving; Safety;Strength;ROM   Positioning Sit Edge Of Bed   Putting On/Taking Off Footwear   Type of Assistance Needed Physical assistance   Physical Assistance Level 76% or more   Putting On/Taking Off Footwear CARE Score 2   Toileting Hygiene   Type of Assistance Needed Incidental touching   Toileting Hygiene CARE Score 4   Toilet Transfer   Surface Assessed Raised Toilet   Transfer Technique Standard   Limitations Noted In Balance; Endurance;Problem Solving;ROM;Safety;LE Strength   Adaptive Equipment Walker;Grab Bar   Type of Assistance Needed Incidental touching   Toilet Transfer CARE Score 4   Toileting   Able to Pull Clothing down yes, up yes  Manage Hygiene Bladder; Bowel   Limitations Noted In Balance;Problem Solving;ROM;Safety;LE Strength   Adaptive Equipment Grab Bar   Transfer Bed/Chair/Wheelchair   Type of Assistance Needed Incidental touching   Chair/Bed-to-Chair Transfer CARE Score 4   Lying to Sitting on Side of Bed   Type of Assistance Needed Supervision   Lying to Sitting on Side of Bed CARE Score 4   Sit to Stand   Type of Assistance Needed Incidental touching   Sit to Stand CARE Score 4   Picking Up Object   Reason if not Attempted Safety concerns   Picking Up Object CARE Score 88   Comprehension   Comprehension (FIM) 6 - Understands complex/abstract but requires more time   Expression   Expression (FIM) 6 - Expresses complex/abstract but requires:  more time   Social Interaction   Social Interaction (FIM) 6 - Interacts appropriately with others BUT requires extra  time   Problem Solving   Problem solving (FIM) 5 - Solves basic problems 90% of time   Memory   Memory (FIM) 6 - Recognizes with extra time   RUE Assessment   RUE Assessment WFL  (MMT 4-/5 grossly sh to hand)   LUE Assessment   LUE Assessment WFL  (MMT 4-/5 grossly sh to hand)   Coordination   Movements are Fluid and Coordinated 1  (edema noted RUE from multiple blood draw sticks)   Sensation   Light Touch No apparent deficits   Propioception No apparent deficits   Cognition   Overall Cognitive Status WFL   Arousal/Participation Alert; Responsive; Cooperative   Attention Within functional limits   Orientation Level Oriented X4   Memory Within functional limits   Following Commands Follows all commands and directions without difficulty   Discharge Information   Vocational Plan Retired/not working  (staying busy aorund the house repairing things)   Patient's Discharge Plan home with wife   Patient's Rehab Expectations "I want to be 110% of well being and not depend on anyone "   Impressions Pt presents following hospitalization due to DKA (diabetic ketoacidosis, CARLTON, urinary retention, encephalopathy with debility and decreased strength   Pt requires assist due to decreased balance, safety, endurance and generalized strength/ ROM  Pt will benefit from skilled OT services to increase independence with daily tasks     OT Therapy Minutes   OT Time In 0710   OT Time Out 0818   OT Total Time (minutes) 68   OT Mode of treatment - Individual (minutes) 68

## 2022-09-11 NOTE — PLAN OF CARE
Problem: PAIN - ADULT  Goal: Verbalizes/displays adequate comfort level or baseline comfort level  Description: Interventions:  - Encourage patient to monitor pain and request assistance  - Assess pain using appropriate pain scale  - Administer analgesics based on type and severity of pain and evaluate response  - Implement non-pharmacological measures as appropriate and evaluate response  - Consider cultural and social influences on pain and pain management  - Notify physician/advanced practitioner if interventions unsuccessful or patient reports new pain  Outcome: Progressing     Problem: INFECTION - ADULT  Goal: Absence or prevention of progression during hospitalization  Description: INTERVENTIONS:  - Assess and monitor for signs and symptoms of infection  - Monitor lab/diagnostic results  - Monitor all insertion sites, i e  indwelling lines, tubes, and drains  - Monitor endotracheal if appropriate and nasal secretions for changes in amount and color  - [place] appropriate cooling/warming therapies per order  - Administer medications as ordered  - Instruct and encourage patient and family to use good hand hygiene technique  - Identify and instruct in appropriate isolation precautions for identified infection/condition  Outcome: Progressing     Problem: DISCHARGE PLANNING  Goal: Discharge to home or other facility with appropriate resources  Description: INTERVENTIONS:  - Identify barriers to discharge w/patient and caregiver  - Arrange for needed discharge resources and transportation as appropriate  - Identify discharge learning needs (meds, wound care, etc )  - Arrange for interpretive services to assist at discharge as needed  - Refer to Case Management Department for coordinating discharge planning if the patient needs post-hospital services based on physician/advanced practitioner order or complex needs related to functional status, cognitive ability, or social support system  Outcome: Progressing Problem: Knowledge Deficit  Goal: Patient/family/caregiver demonstrates understanding of disease process, treatment plan, medications, and discharge instructions  Description: Complete learning assessment and assess knowledge base    Interventions:  - Provide teaching at level of understanding  - Provide teaching via preferred learning methods  Outcome: Progressing

## 2022-09-11 NOTE — PROGRESS NOTES
09/11/22 0910   Pain Assessment   Pain Assessment Tool 0-10   Pain Score 8   Pain Location/Orientation Location: Groin   Restrictions/Precautions   Precautions Fall Risk; Lane;Pain   Weight Bearing Restrictions No   ROM Restrictions No   Sit to Stand   Type of Assistance Needed Incidental touching   Sit to Stand CARE Score 4   Bed-Chair Transfer   Type of Assistance Needed Incidental touching   Chair/Bed-to-Chair Transfer CARE Score 4   Toileting Hygiene   Type of Assistance Needed Incidental touching   Toileting Hygiene CARE Score 4   Toileting   Limitations Noted In Balance;Problem Solving;ROM;Safety;LE Strength   Adaptive Equipment Grab Bar   Findings to get into the BR with nursing to assist out   Toilet Transfer   Type of Assistance Needed Incidental touching   Toilet Transfer CARE Score 4   Toilet Transfer   Surface Assessed Raised Toilet   Transfer Technique Standard   Limitations Noted In Balance; Endurance;Problem Solving;ROM;Safety;LE Strength   Adaptive Equipment Grab Bar;Walker   Exercise Tools   Exercise Tools Yes   UE Ergometer 5 minutes forward and backwards BUE moderate resistance   Hand Gripper 7# digiflex 2 sets 15 B hands   Other Exercise Tool 1 yellow therabar 2 sets 15 up and down BUE   Other Comments   Assessment Pt participates in 25 minutes concurrent treatment focusing on BUE therex with similar goals as another to increase strength and activity tolerance   Assessment   Treatment Assessment Pt presents sitting in armchair agreeable to OT session including transfers/ mobility and BUE therex  Pt reports feeling more strain to RUE with therex however does have increased edema throughout RUE  Pt requires assist and supervision due to decreased balance, safety, endurance and generalized strength/ ROM  Pt will benefit from continued skilled OT servcies to increase independence with daily tasks  Problem List Decreased strength;Decreased range of motion;Decreased endurance; Impaired balance;Decreased safety awareness;Pain   Plan   Treatment/Interventions ADL retraining;Functional transfer training; Therapeutic exercise; Endurance training;Patient/family training;Equipment eval/education; Compensatory technique education   Progress Progressing toward goals   OT Therapy Minutes   OT Time In 0910   OT Time Out 0940   OT Total Time (minutes) 30   OT Mode of treatment - Individual (minutes) 5   OT Mode of treatment - Concurrent (minutes) 25   Therapy Time missed   Time missed?  No

## 2022-09-11 NOTE — ASSESSMENT & PLAN NOTE
· Reported at time of admission by nursing  · Patient recently lost son to OD and commented on wife with negative comments towards patients appearance  · Psych consult appreciated  · Lamictal-discontinued in [facility]  · Pastoral care consult completed 9/13/22  · OP f/u with Psychiatry; ambulatory referral made at time of discharge

## 2022-09-11 NOTE — ASSESSMENT & PLAN NOTE
· Noted with RVR in ED on EKG  · Spontaneously converted to sinus tachycardia prior to arrival to ICU  · Thought to be d/t severe metabolic derangements  · Converted to Normal sinus while in acute care  · No complications in [facility]  · Ambulatory referral made to Cardiology at time of discharge; discussed with patient

## 2022-09-11 NOTE — TREATMENT PLAN
Individualized Plan of [address]  75 y o  male MRN: [id number]  Unit/Bed#: -01 Encounter: [id number]     PATIENT INFORMATION  ADMISSION DATE: 9/10/2022  2:26 PM ADOLPH CATEGORY:Debility:  15  Debility (Non-cardiac/Non-pulmonary)   ADMISSION DIAGNOSIS: Debility [R53 81]  EXPECTED LOS: 10-14 days     MEDICAL/FUNCTIONAL PROGNOSIS  Based on my assessment of the patient's medical conditions and current functional status, the prognosis for attaining medical and functional goals or the IRF stay is:  Good      Cardiopulmonary function: Ensure cardiopulmonary stability and optimize cardiopulmonary function not only at rest but with activity as patient's activity level significantly increases in acute rehab compared with prior to transfer in preparation for safe discharge from [facility]  Must closely and frequently monitor blood pressure and HR to ensure adequate cardiac output during ADLs and ambulation as patient is at increased risk for orthostatic hypotension/syncope and potential injury if not monitored for and managed adequately  Blood pressure management:    Frequent monitoring of blood pressure with appropriate adjustments in blood pressure medication management to optimize blood pressure control and prevent/limit renal complications  Monitoring impact of blood pressure and side-effects of blood pressure medications at rest and with activity  DM: Patient will improve/maintain blood sugar control to ensure optimal healing and decrease risks of complications associated with DM through medication monitoring, adjustments as indicated, and optimal dietary intake and education    Pain management:  Pain will improve with frequent evaluation of pain, careful adjustments in medications, frequent re-evaluation of patient's pain and medical/neurologic status to ensure optimal pain control, avoidance of potential serious and even life-threatening side-effects and drug interactions, as well as weaning pain medications as soon as possible to decrease risk of short and long-term use  Inpatient rehabilitation education/teaching: To be provided to patient and typically family/caregiver (if able to be identified) by all skilled therapists, rehab nursing, case management, and rehab specialized physician to ensure optimal recovery and decrease risks of complications in both acute rehabilitation setting as well as after discharge  Anxiety (and/or Depression): Patient's mood and it's impact on therapy participation and functional recovery will improve during course with supportive counseling, relaxation/breathing techniques and if necessary medication management  Requires frequent re-assessment and close management to ensure anxiety/depression management during acute rehab course with planning for appropriate outpatient management to ensure optimal mental health and functional recovery  Acute indwelling lemons management: Appropriate urinary retention management and voiding protocols which include close monitoring and evaluation of bladder scans/post-void residuals once lemons removed, toileting program, and monitor voided urinary output with goal of expeditious but safe and appropriate removal of indwelling lemons catheter, improved urinary retention and risks associated with retention some of which include infection, bladder stretch injury, and kidney injury  Adjustments in medications are common and will be provided if indicated as well  Obesity:  Close monitoring of nutrition status, nutrition specialist with adjustments in diet   on appropriate short and long term nutrition and activity  Obesity increases complexity of patient's overall condition and causes unique challenges during this part of patient's recovery process    Supervise and if necessary make adjustments in rehab nursing care and skilled therapy care to ensure appropriate toileting, bed mobility, other ADLs, and ambulation to decrease risk of falls/injuries, VTE, skin breakdown/ulceration and optimize functional recovery  Skin wounds: Appropriate skin checks for wound/skin evaluation including evaluation of healing, worsening of wounds, or signs of infection  Wound care management from rehab nursing, wound care nursing, physicians  Ensure frequent appropriate turning, positioning in bed, in chair, when mobilizing, and when appropriate with use of appropriate devices to optimize healing and decrease risk of worsening or new skin breakdown  Medical Goals: Patient will be medically stable for discharge to [facility] upon completion of rehab program and Patient will be able to manage medical conditions and comorbid conditions with medications and follow up upon completion of rehab program    [address]: Home - Assistance      ANTICIPATED FOLLOW-UP SERVICE:   Outpatient Therapy Services: PT and OT      Home Health Services: PT and OT      DISCIPLINE SPECIFIC PLANS:  Required Disciplines & Services: Rehabillitation Nursing, Case Management, Dietay/Nutrition and Diabetes Education    REQUIRED THERAPY:  Therapy Hours per Day Days per Week Total Days   Physical Therapy 1 5 5 5   Occupational Therapy 1 5 5 5   NOTE: Additional therapy time(s) may be added as appropriate to meet patient needs and to achieve functional goals      Patient will either participate in above therapy regimen or participate in 900 minutes of therapy within 7 day week consisting of PT and OT due to the following medical procedure/condition:Debility:  15  Debility (Non-cardiac/Non-pulmonary)    ANTICIPATED FUNCTIONAL OUTCOMES:  ADL:  Will return to premorbid level    Bladder/Bowel: Patient will return to premorbid level for bladder/bowel management upon completion of rehab program   Transfers:   Will return to premorbid level    Locomotion:   Will return to premorbid level    Cognitive: Independent      DISCHARGE PLANNING NEEDS  Equipment needs: Discharge needs to be reviewed with team      REHAB ANTICIPATED PARTICIPATION RESTRICTIONS:  Assist with Bathing Shower, Assist with Mobility, Assist with Tub Shower Transfer, Rquires Assist with ADLS, Requires Assit with Homemaking, Requires Assit with Steps, Requires modified diet and Weight Bearing as tolerated

## 2022-09-11 NOTE — ASSESSMENT & PLAN NOTE
· Tenderness to palpation of left calf- reported at time of initial evaluation   · Doppler BLE to rule out DVT ; negative for acute DVT 9/12/22  · Voltaren gel to bilateral knee 4x daily- discontinued as patient does not wish to have at home  · Ortho surgery ambulatory referral made at discharge

## 2022-09-11 NOTE — ASSESSMENT & PLAN NOTE
· Thought to be due to neurogenic bladder in setting of uncontrolled diabetes  · Had bilateral hydronephrosis from retention, improvement noted after decompression on US  · Flomax 0 4 mg daily with dinner   · Strict I&Os  · Lane catheter in place; training performed by nursing   · OP FU with Urology  · Ambulatory referral made at time of discharge

## 2022-09-11 NOTE — NURSING NOTE
Unable to get a hold of vascular department today at [facility] to schedule for doppler study  Will need to call tomorrow to schedule  No psych doctor on call in [facility] for consult  Did not schedule with Chriss for today due to patient having family in room visiting from [place] for extended period of time  Patient will need Chriss called to schedule consult tomorrow  Theo VU made aware of same and said its no emergency for psych consult

## 2022-09-11 NOTE — NURSING NOTE
Dr Jose Rafael Sewell notified of urology consult via [email or web address] connect  He stated he reviewed his case and patient should start flomax  Stated to have rehab start that med unless there is a medical reason not to  He also stated he would be in this week to see patient and if not he will call him  Message forward to [address] in regards to Dr Leidy Gaxiola request to start flomax if she felt it could be

## 2022-09-11 NOTE — NURSING NOTE
Pt resting comfortable in bed, pt slept well during the night  Voices no complaints of pain  Lane draining to gravity clear urine  Fungal rash remains same in groin/genital area, education provided regarding cleansing and powder  SPT CG with RW  Cont bowel, used BR 2x during shift  Pt very motivated  Labs obtained  Will continue to monitor and follow plan of care, all items within reach and bed alarm intact for safety  maximum assist (25% patients effort)

## 2022-09-12 ENCOUNTER — HOSPITAL ENCOUNTER (OUTPATIENT)
Dept: NON INVASIVE DIAGNOSTICS | Facility: HOSPITAL | Age: 67
Discharge: HOME | End: 2022-09-12
Payer: MEDICARE

## 2022-09-12 LAB
GLUCOSE SERPL-MCNC: 141 MG/DL (ref 65–140)
GLUCOSE SERPL-MCNC: 150 MG/DL (ref 65–140)
GLUCOSE SERPL-MCNC: 242 MG/DL (ref 65–140)
GLUCOSE SERPL-MCNC: 88 MG/DL (ref 65–140)

## 2022-09-12 PROCEDURE — 93970 EXTREMITY STUDY: CPT | Performed by: SURGERY

## 2022-09-12 PROCEDURE — 99233 SBSQ HOSP IP/OBS HIGH 50: CPT | Performed by: PHYSICAL MEDICINE & REHABILITATION

## 2022-09-12 PROCEDURE — G0425 INPT/ED TELECONSULT30: HCPCS | Performed by: PSYCHIATRY & NEUROLOGY

## 2022-09-12 PROCEDURE — 97535 SELF CARE MNGMENT TRAINING: CPT

## 2022-09-12 PROCEDURE — 97110 THERAPEUTIC EXERCISES: CPT

## 2022-09-12 PROCEDURE — 93970 EXTREMITY STUDY: CPT

## 2022-09-12 PROCEDURE — 97530 THERAPEUTIC ACTIVITIES: CPT | Performed by: PHYSICAL THERAPIST

## 2022-09-12 PROCEDURE — 82948 REAGENT STRIP/BLOOD GLUCOSE: CPT

## 2022-09-12 PROCEDURE — 97162 PT EVAL MOD COMPLEX 30 MIN: CPT | Performed by: PHYSICAL THERAPIST

## 2022-09-12 PROCEDURE — 97110 THERAPEUTIC EXERCISES: CPT | Performed by: PHYSICAL THERAPIST

## 2022-09-12 PROCEDURE — 97116 GAIT TRAINING THERAPY: CPT | Performed by: PHYSICAL THERAPIST

## 2022-09-12 RX ORDER — LIDOCAINE HYDROCHLORIDE 20 MG/ML
1 JELLY TOPICAL DAILY PRN
Status: DISCONTINUED | OUTPATIENT
Start: 2022-09-12 | End: 2022-09-16

## 2022-09-12 RX ORDER — LAMOTRIGINE 25 MG/1
25 TABLET ORAL 2 TIMES DAILY
Status: DISCONTINUED | OUTPATIENT
Start: 2022-09-12 | End: 2022-09-15

## 2022-09-12 RX ADMIN — DICLOFENAC SODIUM 2 G: 10 GEL TOPICAL at 17:22

## 2022-09-12 RX ADMIN — INSULIN LISPRO 2 UNITS: 100 INJECTION, SOLUTION INTRAVENOUS; SUBCUTANEOUS at 08:38

## 2022-09-12 RX ADMIN — NYSTATIN: 100000 POWDER TOPICAL at 08:39

## 2022-09-12 RX ADMIN — PANTOPRAZOLE SODIUM 40 MG: 40 TABLET, DELAYED RELEASE ORAL at 06:03

## 2022-09-12 RX ADMIN — HEPARIN SODIUM 5000 UNITS: 5000 INJECTION INTRAVENOUS; SUBCUTANEOUS at 06:03

## 2022-09-12 RX ADMIN — HEPARIN SODIUM 5000 UNITS: 5000 INJECTION INTRAVENOUS; SUBCUTANEOUS at 17:22

## 2022-09-12 RX ADMIN — DICLOFENAC SODIUM 2 G: 10 GEL TOPICAL at 21:06

## 2022-09-12 RX ADMIN — TAMSULOSIN HYDROCHLORIDE 0.4 MG: 0.4 CAPSULE ORAL at 17:22

## 2022-09-12 RX ADMIN — INSULIN GLARGINE 44 UNITS: 100 INJECTION, SOLUTION SUBCUTANEOUS at 08:37

## 2022-09-12 RX ADMIN — INSULIN LISPRO 14 UNITS: 100 INJECTION, SOLUTION INTRAVENOUS; SUBCUTANEOUS at 08:37

## 2022-09-12 RX ADMIN — DICLOFENAC SODIUM 2 G: 10 GEL TOPICAL at 13:00

## 2022-09-12 RX ADMIN — GABAPENTIN 100 MG: 100 CAPSULE ORAL at 21:08

## 2022-09-12 RX ADMIN — GABAPENTIN 100 MG: 100 CAPSULE ORAL at 17:22

## 2022-09-12 RX ADMIN — GABAPENTIN 100 MG: 100 CAPSULE ORAL at 08:37

## 2022-09-12 RX ADMIN — SENNOSIDES 17.2 MG: 8.6 TABLET, FILM COATED ORAL at 08:37

## 2022-09-12 RX ADMIN — DICLOFENAC SODIUM 2 G: 10 GEL TOPICAL at 08:39

## 2022-09-12 RX ADMIN — INSULIN LISPRO 2 UNITS: 100 INJECTION, SOLUTION INTRAVENOUS; SUBCUTANEOUS at 21:06

## 2022-09-12 RX ADMIN — INSULIN LISPRO 14 UNITS: 100 INJECTION, SOLUTION INTRAVENOUS; SUBCUTANEOUS at 13:25

## 2022-09-12 RX ADMIN — HEPARIN SODIUM 5000 UNITS: 5000 INJECTION INTRAVENOUS; SUBCUTANEOUS at 21:07

## 2022-09-12 RX ADMIN — LAMOTRIGINE 25 MG: 25 TABLET ORAL at 17:22

## 2022-09-12 RX ADMIN — NYSTATIN: 100000 POWDER TOPICAL at 17:30

## 2022-09-12 RX ADMIN — LIDOCAINE HYDROCHLORIDE 1 APPLICATION: 20 JELLY TOPICAL at 17:25

## 2022-09-12 NOTE — PROGRESS NOTES
09/12/22 1230   Pain Assessment   Pain Assessment Tool 0-10   Pain Score No Pain   Restrictions/Precautions   Precautions Bed/chair alarms; Fall Risk; Lane;Pain   Eating   Type of Assistance Needed Independent   Physical Assistance Level No physical assistance   Eating CARE Score 6   Sit to Stand   Type of Assistance Needed Supervision   Physical Assistance Level No physical assistance   Sit to Stand CARE Score 4   Toileting Hygiene   Type of Assistance Needed Supervision   Physical Assistance Level No physical assistance   Toileting Hygiene CARE Score 4   Toileting   Able to [address] down yes, up yes  Manage Hygiene Bowel   Toilet Transfer   Type of Assistance Needed Supervision   Physical Assistance Level No physical assistance   Toilet Transfer CARE Score 4   Toilet Transfer   Surface Assessed Raised Toilet   Limitations Noted In Balance;LE Strength   Exercise Tools   Other Exercise Tool 1 7# dowel 3x15 bi/tri, tricep curls,chest press, shld raises, lateral rotations   Cognition   Overall Cognitive Status WFL   Orientation Level Oriented X4   Additional Activities   Additional Activities Comments fxnl mobility RW to and from BR S, good transition with foely   Activity Tolerance   Activity Tolerance Patient tolerated treatment well   Assessment   Treatment Assessment Pt second session addressed B UE TE for generalized strength and endurance for functional activity performance and all aspects of toileting  Pt tolerated well  Pt conversing throughout session about his past and very engaged in session  Plan is to continue with skilled OT to prepare pt for d/c to home  Prognosis Good   Problem List Decreased strength; Impaired balance;Decreased mobility; Decreased endurance;Decreased safety awareness   Plan   Treatment/Interventions ADL retraining;Functional transfer training; Therapeutic exercise; Endurance training;Patient/family training;Gait training;Equipment eval/education;OT   Progress Progressing toward goals   OT Therapy Minutes   OT Time In 1230   OT Time Out 1330   OT Total Time (minutes) 60   OT Mode of treatment - Individual (minutes) 60   OT Mode of treatment - Concurrent (minutes) 0   OT Mode of treatment - Group (minutes) 0   OT Mode of treatment - Co-treat (minutes) 0   OT Mode of Treatment - Total time(minutes) 60 minutes   OT Cumulative Minutes 120   Therapy Time missed   Time missed?  No

## 2022-09-12 NOTE — NUTRITION
22 1417   Biochemical Data,Medical Tests, and Procedures   Biochemical Data/Medical Tests/Procedures Lab values reviewed; Meds reviewed   Labs (Comment)  B, pro:5 7, alb:2 8, H&H:10  7   A1c:>14 0%   Meds (Comment) neurontin, heparin, lantus, humalog, zofran, protonix, senna   Nutrition-Focused Physical Exam   Nutrition-Focused Physical Exam Findings RN skin assessment reviewed  (maceration coccyx per documentation)   Nutrition-Focused Physical Exam Findings nonpitting BLE edema; LBM ; indwelling catheter present; depression   Medical-Related Concerns Afib, acute metabolic encephalopathy, CARLTON, DM, pancreatitis, sepsis   Adequacy of Intake   Nutrition Modality PO   Feeding Route   PO Independent   Current PO Intake   Current Diet Order CCD 2 diet, double vegetables   Current Meal Intake %   Estimated calorie intake compared to estimated need Nutrient needs met  PES Statement   Problem Clinical   Biochemical (2) Altered nutrition-related laboratory values (specify) NC-2 2  (A1c%)   Related to Other (Comment)  (medication noncompliance)   As evidenced by: Abnormal lab (Specify)  ( A1c:>14 0%)   Recommendations/Interventions   Malnutrition/BMI Present No  (does not meet criteria)   Summary Consult: DM education  RN wound care  CCD 2 diet, double vegetable thin liquids  Meal completions 100%  Patient leaving for appointment during visit, limited nutrition history obtained  Patient reports his appetite is good  He states he resides with his wife at home  He states he cooks  He states prior to admission he was not eating  Per previous RD noted, wife reported patient was following a healthy diet and limiting sweets  Per review of chart patient was not taking his insulin at home  #; #; no additional weight history available  Cannot validate any significant weight changes at this time  Nonpitting BLE edema  Maceration coccyx   Patient requesting additional protein with meals; double protein in appropriate at this time  Diet education to be provided  Interventions/Recommendations Adjust diet order   Intervention Comments add double protein with meals   Education Assessment   Education Education initiated/ completed  (CHO counting for People with DM and Using Nutrition Facts Label: CHO left at bedside  To be reviewed with patient at a later date )   Patient Nutrition Goals   Goal Improve to healthful diet; Comprehend education   Goal Status Initiated   Timeframe to complete goal by next f/u   Nutrition Complexity Risk   Nutrition complexity level Moderate risk   Nutrition review: 09/13/22  (review diet education)   Follow up date 09/19/22

## 2022-09-12 NOTE — NURSING NOTE
Patient resting comfortably in bed at this time  No signs of distress noted  Patient with lemons catheter draining yellow urine in place for urinary retention  Patient encouraged to reposition frequently to promote skin integrity  Bed alarm in place to promote patient safety  Call bell within reach  Will continue to monitor patient and follow plan of care

## 2022-09-12 NOTE — PROGRESS NOTES
09/12/22 1100   Pain Assessment   Pain Assessment Tool 0-10   Pain Score No Pain   Restrictions/Precautions   Precautions Bed/chair alarms; Fall Risk; Lane;Pain   Oral Hygiene   Type of Assistance Needed Set-up / clean-up   Oral Hygiene CARE Score 5   Grooming   Able To Wash/Dry Face;Comb/Brush Hair;Brush/Clean Teeth;Wash/Dry Hands; Initiate Tasks   Findings stance at sink   Shower/Bathe Self   Type of Assistance Needed Supervision   Comment pt adamantly declined seated for shower, he stood and utilized grab bar;pt lathered and rinsed 5x   Shower/Bathe Self CARE Score 4   Bathing   Assessed Bath Style Shower   Able to Wash/Rinse/Dry (body part) Left Arm;Right Arm;L Upper Leg;R Upper Leg;L Lower Leg/Foot;R Lower Leg/Foot;Chest;Abdomen;Perineal Area; Buttocks   Limitations Noted in Strength; Safety   Tub/Shower Transfer   Limitations Noted In Balance; Safety   Assessed Shower   Findings CS enter/exit   Upper Body Dressing   Type of Assistance Needed Set-up / clean-up   Upper Body Dressing CARE Score 5   Lower Body Dressing   Type of Assistance Needed Supervision   Comment educ to sit for task due to pt doffing shorts in stance which is a safety risk   Lower Body Dressing CARE Score 4   Putting On/Taking Off Footwear   Type of Assistance Needed Supervision;Verbal cues; Adaptive equipment   Comment initiated educ in use of sock aide and reacher and stressed need to sit for task   Putting On/Taking Off Footwear CARE Score 4   Picking Up Object   Type of Assistance Needed Supervision   Picking Up Object CARE Score 4   Dressing/Undressing Clothing   Remove UB Clothes [address]; Shorts   Don LB Clothes Shorts;Socks   Limitations Noted In Balance; Safety;Strength   Lying to Sitting on Side of Bed   Type of Assistance Needed Independent   Physical Assistance Level No physical assistance   Lying to Sitting on Side of Bed CARE Score 6   Sit to Stand   Type of Assistance Needed Supervision   Physical Assistance Level No physical assistance   Sit to Stand CARE Score 4   Cognition   Overall Cognitive Status WFL   Arousal/Participation Alert   Attention Within functional limits   Orientation Level Oriented X4   Memory Decreased recall of precautions   Following Commands Follows one step commands without difficulty   Additional Activities   Additional Activities Comments fxnl mobility to and from shower room with Sup with RW   Activity Tolerance   Activity Tolerance Patient tolerated treatment well   Assessment   Treatment Assessment OT tx addressed ADL via shower level with focus on safe techniques and compensatory strategies as noted in respectvie sections of note  Pt does need cueing throughout session due to recall impairments  Pt did manage lemons with VCs for placement  Initiated education in AE for LB dsg  Plan is to continue with skilled OT to address noted deficits  Problem List Decreased strength;Decreased range of motion;Decreased endurance; Impaired balance;Decreased mobility; Decreased safety awareness; Impaired judgement;Pain   Plan   Treatment/Interventions ADL retraining;Functional transfer training;Patient/family training; Compensatory technique education;Equipment eval/education;Spoke to nursing;OT   Progress Progressing toward goals   OT Therapy Minutes   OT Time In 1100   OT Time Out 1200   OT Total Time (minutes) 60   OT Mode of treatment - Individual (minutes) 60   OT Mode of treatment - Concurrent (minutes) 0   OT Mode of treatment - Group (minutes) 0   OT Mode of treatment - Co-treat (minutes) 0   OT Mode of Treatment - Total time(minutes) 60 minutes   OT Cumulative Minutes 60   Therapy Time missed   Time missed?  No

## 2022-09-12 NOTE — CASE MANAGEMENT
Initial assessment & orientation to [facility] with Pt, who expressed understanding & agreement  Pt declined for this worker to call his wife, stating that she is not in the home for 5 days a week due to working in the city & he will primarily be alone  He did agree for this worker to contact his daughter, Margaerth Ortiz, at [phone]; Discussed role of team members & reviewed [address] with Pt & Pt's daughter, who expressed understanding & agreement  Pt resides in a multi-story home, 3 steps in, FF set up available, but the shower is on the second floor & Pt's daughter is hopeful he will be able to ambulate stairs safely  He has a SPC but was independent PTA  He administers his own Insulin  SW will continue to monitor & assist as needed with [address]  IMM reviewed, signed & submitted for scanning
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
## 2022-09-12 NOTE — NURSING NOTE
Urology and psych consult done  Pt started on lamictal  Pt transported to [place] for vascular study  Returned, no s/s of distress  Pt maintains lemons, draining clear, yellow urine, c/o pain in urethra from lemons, lidocaine ordered, lemons care completed  Zguard applied to sacral slit, nystatin applied to groin area  Pt currently resting in bed  Call bell in reach   Will continue to monitor

## 2022-09-12 NOTE — PROGRESS NOTES
09/12/22 0644   Patient Data   Rehab Impairment Rehab Diagnosis: Impairment of mobility, safety and Activities of Daily Living (ADLs) due to Debility:  16  Debility (Non-cardiac/Non-pulmonary)  (Etiologic: DKA)   Etiologic Diagnosis Per EMR:  Mr Vani Sheehan  is a 79-year-old male with uncontrolled diabetes and hemoglobin A1c exceeding 14  Pt recently discontinued insulin at his own behest who presents with a severe anion gap metabolic acidosis and lactemia secondary to acute pancreatitis, DKA, obstructive uropathy and a non-obstructing 8 mm right renal calculus  Pt initially seen 09/02/2022 for chief urologic complaint of severe lower urinary tract symptoms including urinary urgency, urinary frequency, urge incontinence, nocturia and balanitis  He presented to [facility], directly admitted to the ICU for sepsis related to pancreatitis, DKA with admission glucose exceeding 800, new onset atrial fibrillation, hyponatremia, lactic and metabolic acidosis, in addition to acute kidney injury  Renal function has since improved  Urine culture showed low levels of coag-negative staph  During hospital stay, incidental finding on CT of mild to moderate bilateral hydronephrosis extending to the level of distal ureters and distended urinary bladder without evidence of obstructing nephroureteral lithiasis, perinephric stranding, perinephric bleeding, abscess, phlegmon or discrete fluid collection, bladder calculi, bladder hematoma  There is mild prostatomegaly with prostate calcification is of little clinical significance  Of note, there was 1 8 mm right intrarenal calculus  Support System   Name Morgan Latin   Relationship Wife   Prior Level of Function   Indoor-Mobility (Ambulation) 3  Independent - Patient completed the activities by him/herself, with or without an assistive device, with no assistance from a helper  Stairs 3   Independent - Patient completed the activities by him/herself, with or without an assistive device, with no assistance from a helper  Prior Device Used Z  None of the above  (SPC or walking stick for outdoors)   Falls in the Last Year   Number of falls in the past 12 months 3  (Two outdoors on the rocks)   Type of Injury Associated with Fall No injury   Patient Preference   Nickname (Patient Preference) AC   Restrictions/Precautions   Precautions Bed/chair alarms; Fall Risk; Lemons;Pain   Pain Assessment   Pain Assessment Tool 0-10   Pain Score No Pain  (Feet tingling)   Transfer Bed/Chair/Wheelchair   Limitations Noted In Endurance;LE Strength   Adaptive Equipment Roller Walker   Type of Assistance Needed Incidental touching   Physical Assistance Level No physical assistance   Comment CGA, RW, cues for awareness of lemons cath   Chair/Bed-to-Chair Transfer CARE Score 4   Roll Left and Right   Type of Assistance Needed Independent   Physical Assistance Level No physical assistance   Roll Left and Right CARE Score 6   Lying to Sitting on Side of Bed   Type of Assistance Needed Supervision   Lying to Sitting on Side of Bed CARE Score 4   Sit to Stand   Type of Assistance Needed Verbal cues; Supervision   Physical Assistance Level No physical assistance   Comment Cues for hand placement, moving COG forward to ALEA   Sit to Stand CARE Score 4   Picking Up Object   Reason if not Attempted Safety concerns   Picking Up Object CARE Score 88   Car Transfer   Reason if not Attempted Environmental limitations   Car Transfer CARE Score 10   Ambulation   Primary Mode of Locomotion Prior to Admission Walk   Distance Walked (feet) 78 ft   Assist Device Roller Walker   Gait Pattern Antalgic;Decreased foot clearance; Wide ALEA   Limitations Noted In Endurance;Strength   Walk 10 Feet   Type of Assistance Needed Supervision;Verbal cues   Physical Assistance Level No physical assistance   Comment RW, min A, 12 ft   Walk 10 Feet CARE Score 4   Walk 50 Feet with Two Turns   Type of Assistance Needed Incidental touching;Verbal cues   Physical Assistance Level No physical assistance   Comment CGA, RW, 78 feet x 2   Walk 50 Feet with Two Turns CARE Score 4   Walk 150 Feet   Reason if not Attempted Safety concerns   Walk 150 Feet CARE Score 88   Walking 10 Feet on Uneven Surfaces   Comment LE fatigue   Reason if not Attempted Safety concerns   Walking 10 Feet on Uneven Surfaces CARE Score 88   Wheel 50 Feet with Two Turns   Reason if not Attempted Activity not applicable   Wheel 50 Feet with Two Turns CARE Score 9   Wheel 150 Feet   Reason if not Attempted Activity not applicable   Wheel 904 Feet CARE Score 9   Curb or Single Stair   Comment LE fatigue, right knee pain   Reason if not Attempted Safety concerns   1 Step (Curb) CARE Score 88   4 Steps   Reason if not Attempted Safety concerns   4 Steps CARE Score 88   12 Steps   Reason if not Attempted Safety concerns   12 Steps CARE Score 88   RLE Assessment   RLE Assessment X   Strength RLE   R Hip Flexion 3/5   R Hip Extension 3/5   R Hip ABduction 3/5   R Hip ADduction 3/5   R Knee Flexion 3+/5   R Knee Extension 3+/5   R Ankle Dorsiflexion 4/5   R Ankle Plantar Flexion 4/5   LLE Assessment   LLE Assessment X   Strength LLE   L Hip Flexion 3+/5   L Hip Extension 3+/5   L Hip ABduction 3+/5   L Hip ADduction 3+/5   L Knee Flexion 4/5   L Knee Extension 4/5   L Ankle Dorsiflexion 4/5   L Ankle Plantar Flexion 4/5   Coordination   Movements are Fluid and Coordinated 0   Sensation   Light Touch Partial deficits in the RLE;Partial deficits in the LLE  (Numbness/tingling bilateral feet to level of ankles)   Sharp/Dull Partial deficits in the RLE;Partial deficits in the LLE   Propioception No apparent deficits   Cognition   Overall Cognitive Status WFL   Therapeutic Exercise   Therapeutic Exercise/Activity Seated and standing LE TE   Discharge Information   Vocational Plan Retired/not working   Patient's Discharge Plan To return home to wife   Patient's Rehab Expectations I want to be able to do everything, not be a bother to anyone  Impressions Pt is 75year old male seen for PT evaluation on 9/12/2022 s/p admit to [address] on 9/9/2022 w/ DKA  Orders placed at admission  Performed at least 2 patient identifiers during session: Name and wristband  Comorbidities affecting pt's physical performance at time of assessment include: Type 2 diabetes mellitus, Microscopic hematuria, Urge incontinence of urine, Nocturnal enuresis, Balanitis, DKA (diabetic ketoacidosis), Lactic acidosis, Increased anion gap metabolic acidosis, Pancreatitis, Acute metabolic encephalopathy, Transaminitis, Hypothermia, Sepsis, CARLTON (acute kidney injury), Hyponatremia, New onset atrial fibrillation  LOF prior to admission was independent with [facility] for outdoors  Personal factors affecting pt at time of IE include: weakness, endurance, balance, inability to rise from floor without assistance, gait dsfunction  Please find objective findings from PT assessment regarding body systems outlined above with impairments and limitations including weakness, impaired balance, decreased endurance, pain, decreased activity tolerance and fall risk, as well as mobility assessment  Pt's clinical presentation warrants participation in multidisciplinary acute rehab program at 3 or greater hours of therapy per day  Pt to benefit from continued PT tx to address deficits as defined above and maximize level of functional independent mobility and consistency  PT for improved safe return home with maximized functional mobility     PT Therapy Minutes   PT Time In 0644   PT Time Out 0816   PT Total Time (minutes) 92   PT Mode of treatment - Individual (minutes) 92   PT Mode of treatment - Concurrent (minutes) 0   PT Mode of treatment - Group (minutes) 0   PT Mode of treatment - Co-treat (minutes) 0   PT Mode of Treatment - Total time(minutes) 92 minutes   PT Cumulative Minutes 92   Cumulative Minutes   Cumulative therapy minutes 92 Seated LE TE:  3x10, B/L LEs, 2#  March  Hip ADd - marietta  Hip ABd - green  LAQ    Standing LE TE:  3x10, B/L LEs, 2#  SLR -flex  Mini Squats  HR  TR  Requires UE support    Patient remains OOB in chair, all needs in reach  Alarm in place and activated  Encouraged use of call bell, patient verbalizes understanding

## 2022-09-12 NOTE — PROGRESS NOTES
PM&R PROGRESS NOTE:  Oracio Richardson Jr  75 y o  male MRN: [id number]  Unit/Bed#: -01 Encounter: [id number]        Rehabilitation Diagnosis: Impairment of mobility, safety and Activities of Daily Living (ADLs) due to Debility:  16  Debility (Non-cardiac/Non-pulmonary)    HPI: Wojciech Pulliam  is a 75 y o  male with a PMH significant for T2DM,  who presented to the [facility] with altered mental status, N/V and generalized weakness  Patients spouse found patient this way after work and reported patient had lost weight several months ago resulting in patient not taking insulin due to feeling he no longer needed it  Glucose was elevated to 800 upon arrival  He recently moved here from [place] and does not have established care in the area  He was seen by [address] [place] [address] Urology for dysuria on 9/2 however  He was noted to have severe anion gap metabolic acidosis and lactatemia due to acute pancreatitis, DKA, obstructive uropathy and non-obstructing 8 mm right renal calculus  Also with new onset Afib and CARLTON  IV insulin given for DKA which resolved and patient was transitioned to basal/bolus regimen  Endocrinology consulted and adjusted regimen to Lantus/Humalog  GI consulted for acute pancreatitis and initiated on IV fluids and NG tube  Once resolved, NG tube d/c on 9/6 and pt has been tolerating solids/liquids since  Urology consulted for urinary retention and a lemons catheter was placed with plans for urodynamic studies once lemons removed  Also initiated on Ceftriaxone with urine culture positive for coag neg staph  Urology does not recommend removal while inpatient  Wound care nursing consulted for scrotal/groin fungal rash requiring topical antifungal treatment  Evaluated by PT/OT and deemed appropriate for post-acute rehabilitation services  He was accepted to [facility] and arrived on 9/10/22         SUBJECTIVE: Patient seen face to face  Complaining of 8/10 discomfort at lemons site  Contacted Dr Mario Trivedi, patient was evaluated and recommended for voiding trial Thursday since Flomax was just initiated yesterday  Patient also requesting double portions of vegetables, reached out to Dietician to see if this is appropriate  Dietary to see patient today but is ok with double portion of vegetables  Pt would like to see the dietician as well to work on dietary changes for at home  He states his knee pain feels about the same with voltaren gel  He did note improvement with ROM to RLE after therapy session today  ASSESSMENT: Stable, progressing      PLAN:    Rehabilitation   Functional deficits:  Impaired mobility, self care, RUE weakness, BLE weakness and Neuropathy   Continue current rehabilitation plan of care to maximize function   Functional update:   o PT:  Roll L-R: independent  Lying to sitting on side of bed: supervision  Sit-stand: supervision   Bed-chair transfer: CGA  Ambulation: CGA  o OT:  Oral Hygiene: s/u c/u  Grooming: s/u  Showering/bathing: min assist  UB dressing: supervision  Putting on/taking off footwear: max assist     Estimated Discharge:  To be discussed this week      Pain   Neurontin 100 mg TID    Tylenol 650 mg Q6H PRN for mild pain    DVT prophylaxis   Heparin 5000 Units SQ Q8H [address]    Bladder plan   Lane Catheter in place   Planned voiding trial for Thursday 9/15 per Urology   Lidocaine Gel for tip of penis for discomfort     Bowel plan   Continent   Last  9/12    Skin care  · Calazime to coccyx and intergluteal crease TID/PRN  · Hydra-guard bilateral heels/buttocks BID/PRN  · Elevate heels to offload pressure  · EHOB cusion when OOB  · Turn/reposiiton Q2H for pressure re-distribution of skin  · Moisturize skin daily with skin nourishing cream  · Scrotum/bilateral medial thighs- cleanse skin with soap/water, pat dry, apply dusting of nystatin powder over affected areas- for thin layer of powder is applied to skin- BID and with chayo-care    Pain in both lower extremities  Assessment & Plan  · Tenderness to palpation of left calf  · Edematous BLE  · Non-erythematous or hot to touch  · Order doppler BLE to rule out DVT ; to be done today at [address] [place] [address]  · Voltaren gel to bilateral knee 4x daily    Depressed mood  Assessment & Plan  · Reported at time of admission by nursing  · Patient recently lost son to OD and commented on wife with negative comments towards patients appearance  · Pt request to speak with Psych  · Chriss consulted and pending isaac time    Fungal infection of skin  Assessment & Plan  · Calazime to coccyx/intergluteal crease TID/PRN  · Wound care to follow    Urinary retention  Assessment & Plan  · Thought to be due to neurogenic bladder in setting of uncontrolled diabetes  · Lane in place  · Had bilateral hydronephrosis from retention, improvement noted after decompression on US  · Monitor urine OP  · Flomax 0 4 mg daily with dinner   · Urology evaluated; voiding trial scheduled for Thursday unless pain worsens  · Lidocaine gel to tip of penis for discomfort       New onset atrial fibrillation ([address] )  Assessment & Plan  · Noted with RVR in ED on EKG  · Spontaneously converted to sinus tachycardia prior to arrival to ICU  · Thought to be d/t severe metabolic derangements  · Monitor   · SR since        * DKA (diabetic ketoacidosis) [facility])  Assessment & Plan  Lab Results   Component Value Date    HGBA1C >14 0 (H) 09/05/2022       Recent Labs     09/11/22  1619 09/11/22 2003 09/12/22  0725 09/12/22  1112   POCGLU 108 140 150* 141*       Blood Sugar Average: Last 72 hrs:  (P) 155 625   · Arrived with glucose of 800 at [address]   · S/p insulin drip, endocrinology consulted and transitioned to basal insulin and SSI  · Continue Lantus 44 units in AM, Humalog 14 units TID with meals, SSI  · Goals for basal/bolus regimen at discharge    · Will need pen teaching, insulin pens ordered, glucometer, supplies and pen needles at discharge  · Monitor accu cheks  · IM to follow  · New onset neuropathy to BLE- Continue gabapentin, consider titration if needed  · Acute chomprehensive interdisciplinary inpatient rehabilitation to include intensive skilled therapies as outlines with oversight and management by a rehabilitation Physician Assistant overseen by rehabilitation physician as well as inpatient rehabilitation nursing, case management and weekly interdisciplinary team meeting          Appreciate IM consultants medical co-management  Labs, medications, and imaging personally reviewed  ROS:  A ten point review of systems was completed and pertinent positives are listed in subjective section  All other systems reviewed were negative  Review of Systems   Constitutional: Negative  HENT: Negative  Negative for trouble swallowing  Eyes: Negative  Respiratory: Negative  Negative for shortness of breath  Cardiovascular: Negative  Negative for chest pain  Gastrointestinal: Negative  Negative for abdominal pain, constipation and diarrhea  Genitourinary: Positive for difficulty urinating  Lemons in place  Discomfort at lemons site   Musculoskeletal: Positive for arthralgias and joint swelling  Bilateral knee pain R >L  BLE edema new since admission    Neurological: Negative  Psychiatric/Behavioral: Negative  OBJECTIVE:   /63 (BP Location: Left arm)   Pulse 75   Temp 97 8 °F (36 6 °C) (Temporal)   Resp 18   Ht 6' (1 829 m)   Wt 102 kg (225 lb 1 4 oz)   SpO2 96%   BMI 30 53 kg/m²     Physical Exam  Vitals reviewed  Constitutional:       General: He is not in acute distress  Appearance: He is not ill-appearing  HENT:      Head: Normocephalic and atraumatic  Eyes:      Pupils: Pupils are equal, round, and reactive to light  Cardiovascular:      Rate and Rhythm: Normal rate and regular rhythm  Pulses: Normal pulses  Heart sounds: Normal heart sounds  No murmur heard    Pulmonary:      Effort: Pulmonary effort is normal  No respiratory distress  Breath sounds: Normal breath sounds  Abdominal:      General: Bowel sounds are normal  There is no distension  Palpations: Abdomen is soft  Musculoskeletal:      Right lower leg: Edema present  Left lower leg: Edema present  Skin:     General: Skin is warm and dry  Neurological:      Mental Status: He is alert and oriented to person, place, and time     Psychiatric:         Mood and Affect: Mood normal          Behavior: Behavior normal           Lab Results   Component Value Date    WBC 5 45 09/11/2022    HGB 10 9 (L) 09/11/2022    HCT 33 7 (L) 09/11/2022    MCV 91 09/11/2022     09/11/2022     Lab Results   Component Value Date    SODIUM 138 09/11/2022    K 3 6 09/11/2022     09/11/2022    CO2 28 09/11/2022    BUN 11 09/11/2022    CREATININE 0 75 09/11/2022    GLUC 103 09/11/2022    CALCIUM 8 5 09/11/2022     No results found for: INR, PROTIME        Current Facility-Administered Medications:     acetaminophen (TYLENOL) tablet 650 mg, 650 mg, Oral, Q6H PRN, Thom Mesa PA-C    Diclofenac Sodium (VOLTAREN) 1 % topical gel 2 g, 2 g, Topical, 4x Daily, Amy Sapp PA-C, 2 g at 09/12/22 0839    gabapentin (NEURONTIN) capsule 100 mg, 100 mg, Oral, TID, Thom Mesa PA-C, 100 mg at 09/12/22 0837    heparin (porcine) subcutaneous injection 5,000 Units, 5,000 Units, Subcutaneous, Q8H [address], Thom Mesa PA-C, 5,000 Units at 09/12/22 0603    insulin glargine (LANTUS) subcutaneous injection 44 Units 0 44 mL, 44 Units, Subcutaneous, QAMAmy PA-C, 44 Units at 09/12/22 0837    insulin lispro (HumaLOG) 100 units/mL subcutaneous injection 1-5 Units, 1-5 Units, Subcutaneous, HSAmy PA-C    insulin lispro (HumaLOG) 100 units/mL subcutaneous injection 14 Units, 14 Units, Subcutaneous, TID With Meals, Thom Mesa PA-C, 14 Units at 09/12/22 0837    insulin lispro (HumaLOG) 100 units/mL subcutaneous injection 2-12 Units, 2-12 Units, Subcutaneous, TID AC, 2 Units at 09/12/22 0838 **AND** Fingerstick Glucose (POCT), , , TID AC, Amy Sapp PA-C    lidocaine (URO-JET) 2 % urethral/mucosal gel 1 application, 1 application, Urethral, Daily PRN, Yasmin Carrero PA-C    nystatin (MYCOSTATIN) powder, , Topical, BID, Yasmin Carrero PA-C, Given at 09/12/22 0839    ondansetron (ZOFRAN-ODT) dispersible tablet 4 mg, 4 mg, Oral, Q6H PRN, Yasmin Carrero PA-C    pantoprazole (PROTONIX) EC tablet 40 mg, 40 mg, Oral, Early Morning, Amy Sapp PA-C, 40 mg at 09/12/22 0603    polyethylene glycol (MIRALAX) packet 17 g, 17 g, Oral, Daily PRN, Yasmin Carrero PA-C    senna (SENOKOT) tablet 17 2 mg, 2 tablet, Oral, Daily, Amy Sapp PA-C, 17 2 mg at 09/12/22 0837    tamsulosin (FLOMAX) capsule 0 4 mg, 0 4 mg, Oral, Daily With Nj Reed PA-C, 0 4 mg at 09/11/22 1658    Past Medical History:   Diagnosis Date    A-fib [facility])     Acute metabolic encephalopathy 16/08/0072    CARLTON (acute kidney injury) ([facility] ) 09/04/2022    Diabetes mellitus ([facility] )     Pancreatitis 09/04/2022    Sepsis ([facility] ) 09/04/2022       Patient Active Problem List    Diagnosis Date Noted    Depressed mood 09/11/2022    Pain in both lower extremities 09/11/2022    Fungal infection of skin 09/09/2022    Urinary retention 09/07/2022    DKA [facility]diabetic k[facility]acidosis) ([facility] ) 09/04/2022    New onset atrial fibrillation ([facility] ) 09/04/2022    Urinary frequency 09/02/2022    Type 2 diabetes mellitus ([facility] ) 09/02/2022    Prostate cancer screening 09/02/2022    Microscopic hematuria 09/02/2022    Urge incontinence of urine 09/02/2022    Nocturnal enuresis 09/02/2022    Feeling of incomplete bladder emptying 09/02/2022    Balanitis 09/02/2022          Yasmin Carrero PA-C    Total time spent:  35 minutes with more than 50% spent counseling/coordinating care   Counseling includes discussion with patient re: progress and discussion with patient of his/her current medical/functional state/information  Coordination of patient's care was performed in conjunction with consulting services  Time invested included review of patient's labs, vitals, and management of their comorbidities with continued monitoring  The care of the patient was extensively discussed and appropriate treatment plan was formulated unique for this patient  ** Please Note:  voice to text software may have been used in the creation of this document   Although proof errors in transcription or interpretation are a potential of such software**

## 2022-09-12 NOTE — PLAN OF CARE
Problem: Knowledge Deficit  Goal: Patient/family/caregiver demonstrates understanding of disease process, treatment plan, medications, and discharge instructions  Description: Complete learning assessment and assess knowledge base    Interventions:  - Provide teaching at level of understanding  - Provide teaching via preferred learning methods  Outcome: Progressing     Problem: Prexisting or High Potential for Compromised Skin Integrity  Goal: Skin integrity is maintained or improved  Description: INTERVENTIONS:  - Identify patients at risk for skin breakdown  - Assess and monitor skin integrity  - Assess and monitor nutrition and hydration status  - Monitor labs   - Assess for incontinence   - Turn and reposition patient  - Assist with mobility/ambulation  - Relieve pressure over bony prominences  - Avoid friction and shearing  - Provide appropriate hygiene as needed including keeping skin clean and dry  - Evaluate need for skin moisturizer/barrier cream  - Collaborate with interdisciplinary team   - Patient/family teaching  - Consider wound care consult   Outcome: Progressing

## 2022-09-12 NOTE — CONSULTS
Consultation - Urology   Oracio Richardson Jr  75 y o  male MRN: [id number]  Unit/Bed#: [facility] 210-01 Encounter: [id number]      Assessment/Plan      Assessment:  Urinary retention  Prior CARLTON and hydronephrosis improved  Secondary to BPH and/or diabetic cystopathy  Plan:  Tamsulosin started last night  Since the Lane is uncomfortable to the patient, he requests voiding trial as soon as feasible  Will arrange voiding trial after 3 days of tamsulosin  Possibility of continued retention requiring reinsertion of the Lane discussed  He showed follow up with his previous urologist at [facility] upon discharge  History of Present Illness   Attending: Jhon Wilkes MD  Reason for Consult / Principal Problem:  Urinary retention  HPI: Oksana Bassett  is a 75y o  year old male who presents with uncontrolled diabetes with DKA as well as significant urinary retention with elevated creatinine and hydronephrosis  Lane catheter was placed at [facility] for approximately 1 3 L of residual   He was seen by Urology there  Plan was to continue the Lane catheter with possible voiding trial prior to discharge  They plan outpatient lower urinary tract evaluation to rule out bladder dysfunction secondary to longstanding uncontrolled diabetes  The patient has since [facility]  He is tolerating the Lane catheter with some complaints of discomfort at the meatus  Tamsulosin was started yesterday  The patient states that he was having increasing urinary symptoms for about 1 month which consisted of frequency about every 2 hours as well as nocturia every 1-2 hours  He needed to strain to void for a couple of days prior to admission  He denies any dysuria or hematuria  Follow-up renal ultrasound showed improvement of the hydronephrosis with Lane catheter drainage      Inpatient consult to Urology  Consult performed by: Shayy Cintron MD  Consult ordered by: Omaira Clarke PA-C          Review of Systems   Gastrointestinal: Negative for abdominal distention and abdominal pain  Genitourinary: Negative for flank pain and hematuria         Historical Information   Past Medical History:   Diagnosis Date    A-fib [facility])     Acute metabolic encephalopathy 19/06/4251    [facility]/2022    Diabetes mellitus ([facility] )     Pancreatitis 09/04/2022    Sepsis ([facility] ) 09/04/2022     Past Surgical History:   Procedure Laterality Date    KNEE CARTILAGE SURGERY      KNEE SURGERY Right      Social History   Social History     Substance and Sexual Activity   Alcohol Use Never     @DRUGHX  E-Cigarette/Vaping     E-Cigarette/Vaping Substances     Social History     Tobacco Use   Smoking Status Never Smoker   Smokeless Tobacco Never Used     Family History: non-contributory    Meds/Allergies   current meds:   Current Facility-Administered Medications   Medication Dose Route Frequency    acetaminophen (TYLENOL) tablet 650 mg  650 mg Oral Q6H PRN    Diclofenac Sodium (VOLTAREN) 1 % topical gel 2 g  2 g Topical 4x Daily    gabapentin (NEURONTIN) capsule 100 mg  100 mg Oral TID    heparin (porcine) subcutaneous injection 5,000 Units  5,000 Units Subcutaneous Q8H [address]    insulin glargine (LANTUS) subcutaneous injection 44 Units 0 44 mL  44 Units Subcutaneous QAM    insulin lispro (HumaLOG) 100 units/mL subcutaneous injection 1-5 Units  1-5 Units Subcutaneous HS    insulin lispro (HumaLOG) 100 units/mL subcutaneous injection 14 Units  14 Units Subcutaneous TID With Meals    insulin lispro (HumaLOG) 100 units/mL subcutaneous injection 2-12 Units  2-12 Units Subcutaneous TID AC    lidocaine (URO-JET) 2 % urethral/mucosal gel 1 application  1 application Urethral Daily PRN    nystatin (MYCOSTATIN) powder   Topical BID    ondansetron (ZOFRAN-ODT) dispersible tablet 4 mg  4 mg Oral Q6H PRN    pantoprazole (PROTONIX) EC tablet 40 mg  40 mg Oral Early Morning  polyethylene glycol (MIRALAX) packet 17 g  17 g Oral Daily PRN    senna (SENOKOT) tablet 17 2 mg  2 tablet Oral Daily    tamsulosin (FLOMAX) capsule 0 4 mg  0 4 mg Oral Daily With Dinner     No Known Allergies    Objective   Vitals: Blood pressure 101/63, pulse 75, temperature 97 8 °F (36 6 °C), temperature source Temporal, resp  rate 18, height 6' (1 829 m), weight 102 kg (225 lb 1 4 oz), SpO2 96 %  I/O last 24 hours: In: 1280 [P O :1280]  Out: 3725 [LLSBX:7605]    Invasive Devices  Report    Drain  Duration           Urethral Catheter 16 Fr  8 days                Physical Exam  Abdominal:      General: There is no distension  Palpations: Abdomen is soft  Tenderness: There is no abdominal tenderness  There is no right CVA tenderness or left CVA tenderness  Genitourinary:     Penis: Normal        Testes: Normal      NICOLAS with moderately enlarged prostate which is firm and smooth without nodularity or tenderness  Lane catheter in place draining clear yellow urine    Lab Results: CBC: No results found for: WBC, HGB, HCT, MCV, PLT, ADJUSTEDWBC, MCH, MCHC, RDW, MPV, NRBC  CMP: No results found for: SODIUM, CL, CO2, ANIONGAP, BUN, CREATININE, GLUCOSE, CALCIUM, AST, ALT, ALKPHOS, PROT, BILITOT, EGFR  Urinalysis: No results found for: [place], SPECGRAV, PHUR, LEUKOCYTESUR, NITRITE, PROTEINUA, GLUCOSEU, KETONESU, BILIRUBINUR, BLOODU  Urine Culture: No results found for: URINECX  Imaging Studies: I have personally reviewed pertinent reports  EKG, Pathology, and Other Studies: I have personally reviewed pertinent reports  VTE Prophylaxis: Sequential compression device (Venodyne)     Code Status: Level 1 - Full Code  Advance Directive and Living Will:      Power of :    POLST:      Counseling / Coordination of Care  Total floor / unit time spent today 30 minutes  Greater than 50% of total time was spent with the patient and / or family counseling and / or coordination of care   A description of the counseling / coordination of care:  I have discussed the case with Ger Smith

## 2022-09-12 NOTE — PCC CARE MANAGEMENT
Initial assessment & orientation to ARC with Pt, who expressed understanding & agreement  Pt declined for this worker to call his wife, stating that she is not in the home for 5 days a week due to working in the city & he will primarily be alone  He did agree for this worker to contact his daughter, Bassam Hart, at [phone]; Discussed role of team members & reviewed [address] with Pt & Pt's daughter, who expressed understanding & agreement  Pt resides in a multi-story home, 3 steps in, FF set up available, but the shower is on the second floor & Pt's daughter is hopeful he will be able to ambulate stairs safely  He has a SPC but was independent PTA  He administers his own Insulin  SW will continue to monitor & assist as needed with [address]  IMM reviewed, signed & submitted for scanning  9/14/22 - Tx team recommendations reviewed with patient & Pt's daughter, who expressed understanding & agreement  Target DC date is Tuesday, 9/20 with [facility] (PT, OT, Nsg); a list of providers was provided to Pt & a referral will be made based on Pt preference  Pt's daughter expressed that a weekend DC would be preferable for family transporting him home, and would like to re-discuss this on Friday, to determine if a Saturday DC is appropriate  SW will continue to monitor & assist as needed with Tx & DC planning

## 2022-09-12 NOTE — PROGRESS NOTES
09/12/22 0945   Pain Assessment   Pain Assessment Tool 0-10   Pain Score No Pain   Restrictions/Precautions   Precautions Bed/chair alarms; Fall Risk; Lemons;Pain   Cognition   Overall Cognitive Status WFL   Subjective   Subjective Patient reports he is doing okay, tired from a m  session   Sit to Stand   Type of Assistance Needed Supervision;Verbal cues   Physical Assistance Level No physical assistance   Sit to Stand CARE Score 4   Bed-Chair Transfer   Type of Assistance Needed Supervision;Verbal cues   Physical Assistance Level No physical assistance   Comment Cues for awareness of lemons cath   Chair/Bed-to-Chair Transfer CARE Score 4   Car Transfer   Reason if not Attempted Environmental limitations   Car Transfer CARE Score 10   Walk 10 Feet   Type of Assistance Needed Supervision;Verbal cues   Physical Assistance Level No physical assistance   Walk 10 Feet CARE Score 4   Walk 50 Feet with Two Turns   Type of Assistance Needed Supervision;Verbal cues   Physical Assistance Level No physical assistance   Comment CGA, RW   Walk 50 Feet with Two Turns CARE Score 4   Walk 150 Feet   Type of Assistance Needed Verbal cues; Supervision   Physical Assistance Level No physical assistance   Comment CGA, RW, 157 ft   Walk 150 Feet CARE Score 4   Walking 10 Feet on Uneven Surfaces   Type of Assistance Needed Physical assistance   Physical Assistance Level 25% or less   Comment RW, green foam, min A, obstacle course   Walking 10 Feet on Uneven Surfaces CARE Score 3   Ambulation   Does the patient walk? 2  Yes   Primary Mode of Locomotion Prior to Admission Walk   Distance Walked (feet) 128 ft   Assist Device Roller [employer] 50 Feet with Two Turns   Reason if not Attempted Activity not applicable   Wheel 50 Feet with Two Turns CARE Score 9   Wheel 150 Feet   Reason if not Attempted Activity not applicable   Wheel 714 Feet CARE Score 9   Wheelchair mobility   Does the patient use a wheelchair? 0   No   Curb or Single Stair Style negotiated Single stair   Type of Assistance Needed Physical assistance   Physical Assistance Level 25% or less   Comment min A, bilateral HRs   1 Step (Curb) CARE Score 3   4 Steps   Type of Assistance Needed Physical assistance   Physical Assistance Level 25% or less   Comment 7 steps, min A, bilateral HRs   4 Steps CARE Score 3   12 Steps   Reason if not Attempted Safety concerns   12 Steps CARE Score 88   Picking Up Object   Reason if not Attempted Safety concerns   Picking Up Object CARE Score 88   Toilet Transfer   Comment Did not need to use bathroom   Reason if not Attempted Activity not applicable   Toilet Transfer CARE Score 9   Therapeutic Interventions   Balance Obstacle course   Assessment   Treatment Assessment Patient presents seated OOB in recliner, agreeable to PT  Patient responded well to right LE self stretching  Overall progressing steadily with mobility despite knee discomfort  Patient does remain at increased fall risk  In need of ongoing interventions to maximize return to PLOF  Problem List Decreased strength;Decreased range of motion;Decreased endurance; Impaired balance;Decreased safety awareness;Pain   PT Barriers   Physical Impairment Decreased strength;Decreased range of motion;Decreased endurance; Impaired balance;Decreased mobility; Decreased safety awareness;Pain   Functional Limitation Car transfers; Ramp negotiation;Stair negotiation;Standing;Walking;Transfers; Wheelchair management   Plan   Treatment/Interventions Functional transfer training;LE strengthening/ROM; Therapeutic exercise;Elevations; Endurance training;Patient/family training;Equipment eval/education; Bed mobility;Gait training   Progress Progressing toward goals   PT Therapy Minutes   PT Time In 0945   PT Time Out 1030   PT Total Time (minutes) 45   PT Mode of treatment - Individual (minutes) 45   PT Mode of treatment - Concurrent (minutes) 0   PT Mode of treatment - Group (minutes) 0   PT Mode of treatment - Co-treat (minutes) 0   PT Mode of Treatment - Total time(minutes) 45 minutes   PT Cumulative Minutes 137     Seated flexibility:  60 seconds each  HC stretch  HS stretch  Sup/Inf knee mobs  Med/Lat knee mobs    Patient remains OOB in chair, all needs in reach  Alarm in place and activated  Encouraged use of call bell, patient verbalizes understanding

## 2022-09-12 NOTE — CONSULTS
Consultation - Internal Medicine    Oracio Richardson Jr  75 y o  male MRN: [id number]  Unit/Bed#: [facility] 210-01 Encounter: [id number]      A/P:  1  Peripheral Vascular Disease: will be following with Vascular today; await recommendations and further testing  2  Neuropathy: continue neurontin and increase as needed  3  Diabetes Mellitus with DKA: appears improving as patient was previously not taking prescribed therapy; encourage compliance and education   4  Depression: following with Psychiatry; dealing with grief after the loss of his son; continue medication therapy   5  Urinary frequency/dysuria/incontinence: following with Urology, currently using flomax and requiring lemons catheter          Thank you for allowing us to participate in the care of your patient  Please feel free to contact us regarding the care of this patient, or any other questions/concerns that may be applicable  Patient Active Problem List   Diagnosis    Urinary frequency    Type 2 diabetes mellitus (HCC)    Prostate cancer screening    Microscopic hematuria    Urge incontinence of urine    Nocturnal enuresis    Feeling of incomplete bladder emptying    Balanitis    DKA (diabetic ketoacidosis) ([address]    New onset atrial fibrillation ([address] )    Urinary retention    Fungal infection of skin    Depressed mood    Pain in both lower extremities       History of Present Illness   Physician Requesting Consult: John Camacho MD  Reason for Consult / Principal Problem: Medical Management  Hx and PE limited by:   HPI: Bárbara Cadena  is a 75y o  year old male who presented to the ED after being found in a state of altered mental status by his wife at home  Reportedly patient had stopped taking his insulin for an extended period of time, experienced weight loss, urinary incontinence, nausea and generalized weakness  He had a glucose level of 800 in the ED  He was admitted and was stabilized  Of note his A1c was >14   He has been accepted at the [facility] for [address]  He will also be followed by Urology, Vascular and Psychiatry  History obtained from chart review and the patient    Constitutional ROS- Denies fatigue, fever, chills, night sweats, weight changes  HEENT ROS- Denies history of eye surgeries, glaucoma, headaches or history of trauma  Endocrine ROS-  No  thyroid disease  Positive DM   Cardiovascular ROS- Denies chest pain, palpitation, dyspnea exertion, orthopnea, claudication  Pulmonary ROS- Denies history of COPD, asthma  Denies cough, hemoptysis, shortness of breath  GI ROS- Denies abdominal pain, diarrhea, nausea, swallowing problems, vomiting, constipation, blood in stools, fecal incontinence  Hematological ROS- Denies history of easy bruising, blood clots, bleeding or blood transfusions  Genitourinary ROS- Denies recent hematuria, pyuria, flank pain, change in urinary stream, decreased urinary output, increased urinary frequency, nocturia, foamy urine, Positive h/o urinary incontinence no with lemons catheter   Lymphatic ROS- Denies lymphadenopathy  Musculoskeletal ROS- Denies history of gout, muscle weakness, joint pain  Dermatological ROS- Denies rash, wounds, ulcers, itching, jaundice  Psychiatric ROS- Denies anxiety,hallucinations, disorientation  Positive depression   Neurological ROS- No stroke or TIA symptoms   Denies dizziness, paresthesias, history of stroke Positive neuropathy of lower extremities     Historical Information   Past Medical History:   Diagnosis Date    A-fib [facility])     Acute metabolic encephalopathy 44/59/4147    [facility]    Diabetes mellitus ([facility] )     Pancreatitis 09/04/2022    Sepsis ([facility] ) 09/04/2022     Past Surgical History:   Procedure Laterality Date    KNEE CARTILAGE SURGERY      KNEE SURGERY Right      Social History   Social History     Substance and Sexual Activity   Alcohol Use Never     Social History     Substance and Sexual Activity   Drug Use Never     Social History     Tobacco Use   Smoking Status Never Smoker   Smokeless Tobacco Never Used     Family History   Problem Relation Age of Onset    Diabetes Father     Diabetes Mother        Meds/Allergies   all current active meds have been reviewed, current meds:   Current Facility-Administered Medications   Medication Dose Route Frequency    acetaminophen (TYLENOL) tablet 650 mg  650 mg Oral Q6H PRN    Diclofenac Sodium (VOLTAREN) 1 % topical gel 2 g  2 g Topical 4x Daily    gabapentin (NEURONTIN) capsule 100 mg  100 mg Oral TID    heparin (porcine) subcutaneous injection 5,000 Units  5,000 Units Subcutaneous Q8H [address]    insulin glargine (LANTUS) subcutaneous injection 44 Units 0 44 mL  44 Units Subcutaneous QAM    insulin lispro (HumaLOG) 100 units/mL subcutaneous injection 1-5 Units  1-5 Units Subcutaneous HS    insulin lispro (HumaLOG) 100 units/mL subcutaneous injection 14 Units  14 Units Subcutaneous TID With Meals    insulin lispro (HumaLOG) 100 units/mL subcutaneous injection 2-12 Units  2-12 Units Subcutaneous TID AC    lamoTRIgine (LaMICtal) tablet 25 mg  25 mg Oral BID    lidocaine (URO-JET) 2 % urethral/mucosal gel 1 application  1 application Urethral Daily PRN    nystatin (MYCOSTATIN) powder   Topical BID    ondansetron (ZOFRAN-ODT) dispersible tablet 4 mg  4 mg Oral Q6H PRN    pantoprazole (PROTONIX) EC tablet 40 mg  40 mg Oral Early Morning    polyethylene glycol (MIRALAX) packet 17 g  17 g Oral Daily PRN    senna (SENOKOT) tablet 17 2 mg  2 tablet Oral Daily    tamsulosin (FLOMAX) capsule 0 4 mg  0 4 mg Oral Daily With Dinner    and PTA meds:    Medications Prior to Admission   Medication    clotrimazole-betamethasone (LOTRISONE) 1-0 05 % cream    Incontinence Supply Disposable (Incontinence Brief Large) MISC    Insulin Glargine (BASAGLAR KWIKPEN SC)    insulin lispro (HumaLOG Felix KwikPen) 100 units/mL injection pen    nystatin (MYCOSTATIN) powder    [] sulfamethoxazole-trimethoprim (BACTRIM DS) 800-160 mg per tablet         No Known Allergies    Objective     Intake/Output Summary (Last 24 hours) at 9/12/2022 1503  Last data filed at 9/12/2022 1300  Gross per 24 hour   Intake 1920 ml   Output 3375 ml   Net -1455 ml       Invasive Devices:   Urethral Catheter 16 Fr  (Active)   Amt returned on insertion(mL) 1300 mL 09/04/22 0831   Reasons to continue Urinary Catheter  Acute urinary retention/obstruction failing urinary retention protocol 09/12/22 0601   Goal for Removal Will consult urology 09/12/22 0601   Site Assessment Skin intact 09/12/22 0601   Lane Care Done 09/12/22 0900   Collection Container Standard drainage bag 09/12/22 0601   Securement Method Securing device (Describe) 09/12/22 0601   Output (mL) 1800 mL 09/12/22 1375       Physical Exam  Vitals and nursing note reviewed  Constitutional:       General: He is not in acute distress  Appearance: He is obese  He is not ill-appearing  HENT:      Head: Normocephalic and atraumatic  Right Ear: External ear normal       Left Ear: External ear normal       Nose: Nose normal  No congestion or rhinorrhea  Mouth/Throat:      Mouth: Mucous membranes are moist       Pharynx: Oropharynx is clear  No oropharyngeal exudate or posterior oropharyngeal erythema  Eyes:      Extraocular Movements: Extraocular movements intact  Conjunctiva/sclera: Conjunctivae normal       Pupils: Pupils are equal, round, and reactive to light  Cardiovascular:      Rate and Rhythm: Normal rate and regular rhythm  Pulses: Normal pulses  Heart sounds: Normal heart sounds  Pulmonary:      Effort: Pulmonary effort is normal       Breath sounds: No stridor  No wheezing or rhonchi  Abdominal:      General: Bowel sounds are normal  There is no distension  Palpations: Abdomen is soft  Tenderness: There is no abdominal tenderness  Musculoskeletal:         General: Swelling present  No tenderness  Normal range of motion  Cervical back: Normal range of motion and neck supple  No rigidity  Right lower leg: Edema present  Left lower leg: Edema present  Skin:     General: Skin is warm and dry  Neurological:      General: No focal deficit present  Mental Status: He is alert and oriented to person, place, and time  Psychiatric:      Comments: Positive depression            I/O last 3 completed shifts: In: 320 [P O :320]  Out: 4650 [Urine:4650]    Vitals:    09/12/22 0722   BP: 101/63   Pulse: 75   Resp: 18   Temp: 97 8 °F (36 6 °C)   SpO2: 96%         Current Weight: Weight - Scale: 102 kg (225 lb 1 4 oz)  First Weight: Weight - Scale: 103 kg (227 lb 4 7 oz)    Lab Results:  I have personally reviewed pertinent labs      CBC: No results found for: WBC, HGB, HCT, MCV, PLT, ADJUSTEDWBC, MCH, MCHC, RDW, MPV, NRBC  CMP: No results found for: NA, K, CL, CO2, ANIONGAP, BUN, CREATININE, GLUCOSE, CALCIUM, AST, ALT, ALKPHOS, PROT, BILITOT, EGFR  Phosphorus: No results found for: PHOS  Magnesium: No results found for: MG  Urinalysis: No results found for: COLORU, CLARITYU, SPECGRAV, PHUR, LEUKOCYTESUR, NITRITE, PROTEINUA, GLUCOSEU, KETONESU, BILIRUBINUR, BLOODU  Ionized Calcium: No results found for: CAION  Coagulation: No results found for: PT, INR, APTT  Troponin: No results found for: TROPONINI  ABG: No results found for: PHART, RTH9GSL, PO2ART, GOH6QKS, C2MWZNLJ, BEART, SOURCE    Results from last 7 days   Lab Units 09/11/22  0548 09/08/22  0603 09/07/22  0533 09/07/22  0504 09/06/22  1946 09/06/22  0645   POTASSIUM mmol/L 3 6 4 2  --  3 3*   < > 2 7*   CHLORIDE mmol/L 101 105  --  108   < > 110*   CO2 mmol/L 28 24  --  23   < > 24   CO2, I-STAT mmol/L  --   --  25  --   --   --    BUN mg/dL 11 9  --  9   < > 14   CREATININE mg/dL 0 75 0 82  --  0 77   < > 0 87   CALCIUM mg/dL 8 5 8 6  --  8 6   < > 8 7   ALK PHOS U/L 92  --   --   --   --  106   ALT U/L 11  --   --   --   --  13   AST U/L 22 --   --   --   --  23   GLUCOSE, ISTAT mg/dl  --   --  241*  --   --   --     < > = values in this interval not displayed  Radiology review:  No results found  EKG, Pathology, and Other Studies: I have reviewed pertinent studies       Counseling / Coordination of Care  Total ADDITIONAL floor / unit time spent today 35  minutes  Greater than 50% of total time was spent with the patient and / or family counseling and / or coordination of care  [place]      This consultation note was produced in part using a dictation device which may document imprecise wording from author's original intent

## 2022-09-13 LAB
GLUCOSE SERPL-MCNC: 127 MG/DL (ref 65–140)
GLUCOSE SERPL-MCNC: 134 MG/DL (ref 65–140)
GLUCOSE SERPL-MCNC: 162 MG/DL (ref 65–140)
GLUCOSE SERPL-MCNC: 89 MG/DL (ref 65–140)

## 2022-09-13 PROCEDURE — 97116 GAIT TRAINING THERAPY: CPT

## 2022-09-13 PROCEDURE — 97110 THERAPEUTIC EXERCISES: CPT

## 2022-09-13 PROCEDURE — 97530 THERAPEUTIC ACTIVITIES: CPT

## 2022-09-13 PROCEDURE — 82948 REAGENT STRIP/BLOOD GLUCOSE: CPT

## 2022-09-13 PROCEDURE — 97537 COMMUNITY/WORK REINTEGRATION: CPT

## 2022-09-13 PROCEDURE — 99233 SBSQ HOSP IP/OBS HIGH 50: CPT | Performed by: PHYSICAL MEDICINE & REHABILITATION

## 2022-09-13 PROCEDURE — 97535 SELF CARE MNGMENT TRAINING: CPT

## 2022-09-13 RX ORDER — MICONAZOLE NITRATE 20 MG/G
CREAM TOPICAL 2 TIMES DAILY
Status: DISCONTINUED | OUTPATIENT
Start: 2022-09-13 | End: 2022-09-20 | Stop reason: HOSPADM

## 2022-09-13 RX ADMIN — LAMOTRIGINE 25 MG: 25 TABLET ORAL at 08:27

## 2022-09-13 RX ADMIN — INSULIN LISPRO 14 UNITS: 100 INJECTION, SOLUTION INTRAVENOUS; SUBCUTANEOUS at 17:05

## 2022-09-13 RX ADMIN — GABAPENTIN 100 MG: 100 CAPSULE ORAL at 16:25

## 2022-09-13 RX ADMIN — GABAPENTIN 100 MG: 100 CAPSULE ORAL at 20:33

## 2022-09-13 RX ADMIN — INSULIN GLARGINE 44 UNITS: 100 INJECTION, SOLUTION SUBCUTANEOUS at 08:25

## 2022-09-13 RX ADMIN — INSULIN LISPRO 14 UNITS: 100 INJECTION, SOLUTION INTRAVENOUS; SUBCUTANEOUS at 14:28

## 2022-09-13 RX ADMIN — INSULIN LISPRO 14 UNITS: 100 INJECTION, SOLUTION INTRAVENOUS; SUBCUTANEOUS at 08:25

## 2022-09-13 RX ADMIN — PANTOPRAZOLE SODIUM 40 MG: 40 TABLET, DELAYED RELEASE ORAL at 05:51

## 2022-09-13 RX ADMIN — HEPARIN SODIUM 5000 UNITS: 5000 INJECTION INTRAVENOUS; SUBCUTANEOUS at 14:25

## 2022-09-13 RX ADMIN — DICLOFENAC SODIUM 2 G: 10 GEL TOPICAL at 17:06

## 2022-09-13 RX ADMIN — HEPARIN SODIUM 5000 UNITS: 5000 INJECTION INTRAVENOUS; SUBCUTANEOUS at 21:09

## 2022-09-13 RX ADMIN — MICONAZOLE NITRATE: 20 CREAM TOPICAL at 17:06

## 2022-09-13 RX ADMIN — GABAPENTIN 100 MG: 100 CAPSULE ORAL at 08:27

## 2022-09-13 RX ADMIN — LAMOTRIGINE 25 MG: 25 TABLET ORAL at 17:08

## 2022-09-13 RX ADMIN — TAMSULOSIN HYDROCHLORIDE 0.4 MG: 0.4 CAPSULE ORAL at 16:25

## 2022-09-13 RX ADMIN — DICLOFENAC SODIUM 2 G: 10 GEL TOPICAL at 21:09

## 2022-09-13 RX ADMIN — NYSTATIN 1 APPLICATION: 100000 POWDER TOPICAL at 08:30

## 2022-09-13 RX ADMIN — INSULIN LISPRO 2 UNITS: 100 INJECTION, SOLUTION INTRAVENOUS; SUBCUTANEOUS at 08:26

## 2022-09-13 RX ADMIN — HEPARIN SODIUM 5000 UNITS: 5000 INJECTION INTRAVENOUS; SUBCUTANEOUS at 05:51

## 2022-09-13 RX ADMIN — DICLOFENAC SODIUM 2 G: 10 GEL TOPICAL at 08:29

## 2022-09-13 NOTE — PROGRESS NOTES
09/13/22 0900   Pain Assessment   Pain Assessment Tool 0-10   Pain Score No Pain   Restrictions/Precautions   Precautions Bed/chair alarms; Fall Risk; Lemons;Pain   Oral Hygiene   Type of Assistance Needed Independent   Physical Assistance Level No physical assistance   Oral Hygiene CARE Score 6   Grooming   Able To Comb/Brush Hair;Wash/Dry Face;Brush/Clean Teeth;Wash/Dry Hands   Shower/Bathe Self   Type of Assistance Needed Set-up / clean-up; Adaptive equipment   Comment LHS;stance in shower   Shower/Bathe Self CARE Score 5   Bathing   Assessed Bath Style Shower   Able to Wash/Rinse/Dry (body part) Left Arm;Right Arm;L Upper Leg;R Upper Leg;L Lower Leg/Foot;R Lower Leg/Foot;Chest;Abdomen;Perineal Area; Buttocks   Limitations Noted in Balance;Strength; Safety   Positioning Standing   Tub/Shower Transfer   Limitations Noted In Balance; Safety   Adaptive Equipment Grab Bars   Assessed Shower   Findings Sup   Upper Body Dressing   Type of Assistance Needed Independent   Physical Assistance Level No physical assistance   Upper Body Dressing CARE Score 6   Lower Body Dressing   Type of Assistance Needed Set-up / clean-up   Physical Assistance Level No physical assistance   Comment reminded to sit for task due to pt doffing shorts in stance which is a safety risk;pt able to thread lemons successfully   Lower Body Dressing CARE Score 5   Putting On/Taking Off Footwear   Type of Assistance Needed Set-up / clean-up; Adaptive equipment   Physical Assistance Level No physical assistance   Putting On/Taking Off Footwear CARE Score 5   Picking Up Object   Type of Assistance Needed Supervision; Adaptive equipment   Physical Assistance Level No physical assistance   Picking Up Object CARE Score 4   Dressing/Undressing Clothing   Don UB Clothes Pullover [address] LB Clothes Undergarment;Socks   Limitations Noted In Balance; Safety;Strength   Adaptive Equipment Reacher   Sit to Stand   Type of Assistance Needed Independent   Sit to Stand CARE Score 6   Light Housekeeping   Light Housekeeping pt s/u items in room with DS using RW   Exercise Tools   Other Exercise Tool 1 written HEP provided and reviewed for T band   Cognition   Overall Cognitive Status [facility]   Arousal/Participation Alert; Cooperative   Attention Within functional limits   Orientation Level Oriented X4   Memory Decreased recall of precautions   Following Commands Follows one step commands without difficulty   Additional Activities   Additional Activities Comments fxnl mobility to/from shower to pt room DS with RW, transitioned lemons   Activity Tolerance   Activity Tolerance Patient tolerated treatment well   Assessment   Treatment Assessment OT tx addressed ADLs via shower level with focus on safe techniques and compensatory strategies as noted in respectvie sections of note  Pt has fair recall with techniques  Pt prefers to stand for shower  Pt able to aide in s/u of ADL with use of RW at S/DS level  Fxnl mobility RW DS/S in room  Plan is to conitnue with skilled OT to prepare pt for d/c to home  Prognosis Good   Problem List Decreased strength; Impaired balance;Decreased safety awareness; Impaired judgement;Decreased endurance   Plan   Treatment/Interventions ADL retraining;Functional transfer training;Patient/family training;Equipment eval/education;Gait training; Compensatory technique education;Spoke to nursing;OT   Progress Progressing toward goals   OT Therapy Minutes   OT Time In 0900   OT Time Out 1030   OT Total Time (minutes) 90   OT Mode of treatment - Individual (minutes) 90   OT Mode of treatment - Concurrent (minutes) 0   OT Mode of treatment - Group (minutes) 0   OT Mode of treatment - Co-treat (minutes) 0   OT Mode of Treatment - Total time(minutes) 90 minutes   OT Cumulative Minutes 210   Therapy Time missed   Time missed?  No

## 2022-09-13 NOTE — PROGRESS NOTES
09/13/22 0659   Pain Assessment   Pain Assessment Tool 0-10   Pain Score No Pain   Restrictions/Precautions   Precautions Bed/chair alarms; Fall Risk; Lane   Weight Bearing Restrictions No   ROM Restrictions No   Cognition   Overall Cognitive Status WFL   Arousal/Participation Alert; Responsive; Cooperative   Attention Within functional limits   Orientation Level Oriented X4   Memory Decreased recall of precautions   Following Commands Follows one step commands without difficulty   Comments pt agreeable to PT session, very motivated   Lying to Sitting on Side of Bed   Type of Assistance Needed Independent   Physical Assistance Level No physical assistance   Lying to Sitting on Side of Bed CARE Score 6   Sit to Stand   Type of Assistance Needed Supervision   Physical Assistance Level No physical assistance   Sit to Stand CARE Score 4   Bed-Chair Transfer   Type of Assistance Needed Supervision   Physical Assistance Level No physical assistance   Chair/Bed-to-Chair Transfer CARE Score 4   Transfer Bed/Chair/Wheelchair   Limitations Noted In LE Strength;UE Strength;Balance   Adaptive Equipment Roller Walker   Stand Pivot Supervision   Sit to Stand Supervision   Stand to Sit Supervision   Supine to Sit Independent   Walk 10 Feet   Type of Assistance Needed Supervision   Physical Assistance Level No physical assistance   Walk 10 Feet CARE Score 4   Walk 50 Feet with Two Turns   Type of Assistance Needed Supervision;Verbal cues   Physical Assistance Level No physical assistance   Walk 50 Feet with Two Turns CARE Score 4   Walk 150 Feet   Type of Assistance Needed Verbal cues; Supervision   Physical Assistance Level No physical assistance   Walk 150 Feet CARE Score 4   Ambulation   Does the patient walk? 2  Yes   Primary Mode of Locomotion Prior to Admission Walk   Distance Walked (feet) 159 ft  (159 x1, 100x1)   Assist Device Roller Walker   Gait Pattern Antalgic;Decreased foot clearance; Wide ALEA   Limitations Noted In Endurance;Strength   Walk Assist Level Close Supervision   Wheelchair mobility   Does the patient use a wheelchair? 0  No   Curb or Single Stair   Style negotiated Single stair   Type of Assistance Needed Supervision   Physical Assistance Level No physical assistance   1 Step (Curb) CARE Score 4   4 Steps   Type of Assistance Needed Supervision   Physical Assistance Level No physical assistance   4 Steps CARE Score 4   12 Steps   Type of Assistance Needed Supervision   Physical Assistance Level No physical assistance   Comment 14   12 Steps CARE Score 4   Stairs   Type Stairs   # of Steps 14   Weight Bearing Precautions WBAT   Assist Devices Bilateral Rail   Picking Up Object   Type of Assistance Needed Supervision   Physical Assistance Level No physical assistance   Picking Up Object CARE Score 4   Therapeutic Interventions   Strengthening seated ther ex with 2 5#   Flexibility hamstring, heel cord stretching   Equipment Use   NuStep 11 min, LV1, seat 11   Assessment   Treatment Assessment Pt seen for PT treatment session this date with interventions consisting of gait training w/ emphasis on improving pt's ability to ambulate level surfaces x 159 ftx1, 100 ftx1 with close S provided by therapist with RW, Therapeutic exercise consisting of: AROM 2 sets of 15 reps B LE in sitting and 2 5 #  position, therapeutic activity consisting of training: bed mobility, supine<>sit transfers, sit<>stand transfers and picking up an object off the floor and navigating 14 stairs w/ B handrail with alternating pattern with close S  Pt agreeable to PT treatment session upon arrival, pt found supine in bed w/ HOB elevated, in no apparent distress and responsive  In comparison to previous session, pt with improvements in functional mobility  Post session: pt returned back to recliner, chair alarm engaged, all needs in reach and RN notified of session findings/recommendations   Continue to recommend PT services  in order to maximize pt's functional independence and safety w/ mobility  Pt continues to be functioning below baseline level  PT will continue to see pt during current hospitalization in order to address the deficits listed above and provide interventions consistent w/ POC in effort to achieve STGs  PT Barriers   Physical Impairment Decreased strength;Decreased range of motion;Decreased endurance; Impaired balance;Decreased mobility; Decreased safety awareness;Pain   Functional Limitation Car transfers;Stair negotiation;Transfers; Walking   Plan   Treatment/Interventions Functional transfer training;LE strengthening/ROM; Elevations; Therapeutic exercise; Endurance training;Bed mobility;Gait training; Compensatory technique education   Progress Progressing toward goals   PT Therapy Minutes   PT Time In 0659   PT Time Out 0829   PT Total Time (minutes) 90   PT Mode of treatment - Individual (minutes) 90   PT Mode of treatment - Concurrent (minutes) 0   PT Mode of treatment - Group (minutes) 0   PT Mode of treatment - Co-treat (minutes) 0   PT Mode of Treatment - Total time(minutes) 90 minutes   PT Cumulative Minutes 227   Therapy Time missed   Time missed?  No

## 2022-09-13 NOTE — WOUND OSTOMY CARE
Consult Note - Wound   Oracio Colon Jr  75 y o  male MRN: [id number]  Unit/Bed#: [facility] 210-01 Encounter: [id number]      History and Present Illness:  Patient admitted to acute rehab unit s/p DKA  Wound care consulted for wound to the coccyx  Assessment Findings:   Patient agreeable to assessment, alert and oriented x4, lemons catheter in place for urine management, Patient is not incontinent of stool  Patient OOB in the wheelchair, he is able to stand independently  1  Resolving MASD to the gluteal cleft, dried serous drainage, pink and intact periwound  2  Bilateral heels and buttocks intact   3  Scrotal and bilateral medial thigh fungal rash- No skin breakdown noted on assessment  Skin is intact, erythematous, with defined borders and satellite lesions and malodor suggesting candida rash  Francois cream order requested from provider     Skin and Wound Care Plan:   1-Calazime to coccyx and intergluteal crease TID and PRN  2-Hydraguard to bilateral heels and buttock BID and PRN  3-Elevate heels to offload pressure  4-Ehob cushion when out of bed  5-Turn/repoisiton q2h for pressure re-distribution on skin  6-Moisturize skin daily with skin nourishing cream  7- Scrotum and bilateral medial thighs- Cleanse skin with soap and water, pat dry  Apply Francois cream over affected areas so that a thin layer is applied to skin  Complete BID and with chayo-care      Wounds:  Wound 09/08/22 MASD Coccyx (Active)   Wound Image   09/13/22 1011   Wound Description [place] 09/13/22 1011   Chayo-wound Assessment [place] 09/13/22 1011   Wound Length (cm) 0 5 cm 09/13/22 1011   Wound Width (cm) 0 5 cm 09/13/22 1011   Wound Depth (cm) 0 1 cm 09/13/22 1011   Wound Surface Area (cm^2) 0 25 cm^2 09/13/22 1011   Wound Volume (cm^3) 0 025 cm^3 09/13/22 1011   Calculated Wound Volume (cm^3) 0 03 cm^3 09/13/22 1011   Change in Wound Size % 40 09/13/22 1011   Tunneling 0 cm 09/08/22 1040   Undermining 0 09/08/22 1040   Drainage Amount Scant 09/10/22 0900   Drainage Description Serous 09/10/22 0900   Non-staged Wound Description Partial thickness 09/10/22 1452   Treatments Cleansed 09/13/22 1011   Dressing Protective barrier 09/13/22 1011   Wound packed?  No 09/08/22 1040   Dressing Changed New 09/08/22 1040   Patient Tolerance Tolerated well 09/09/22 0800     Reviewed plan of care with primary RN Pam Loyd  Recommendations written as orders  Wound care team to follow weekly while admitted  Questions or concerns Saurav KHOURYN, RN, [employer]

## 2022-09-13 NOTE — DISCHARGE INSTR - OTHER ORDERS
Skin and Wound Care Plan:   Scrotum and bilateral medial thighs- Cleanse skin with soap and water, pat dry  Apply Francois cream over affected areas so that a thin layer is applied to skin  Complete BID and with chayo-care      Lemons care- cleanse daily- keep lemons bag below level of bladder- empty every 4-6 hours or as needed

## 2022-09-13 NOTE — PROGRESS NOTES
PM&R PROGRESS NOTE:  Oracio Richardson Jr  75 y o  male MRN: [id number]  Unit/Bed#: -01 Encounter: [id number]        Rehabilitation Diagnosis: Impairment of mobility, safety and Activities of Daily Living (ADLs) due to Debility:  16  Debility (Non-cardiac/Non-pulmonary)    HPI: Rose Carey  is a 75 y o  male with a PMH significant for T2DM,  who presented to the [address] [place] [address] with altered mental status, N/V and generalized weakness  Patients spouse found patient this way after work and reported patient had lost weight several months ago resulting in patient not taking insulin due to feeling he no longer needed it  Glucose was elevated to 800 upon arrival  He recently moved here from [place] and does not have established care in the area  He was seen by [address] [place] [address] Urology for dysuria on 9/2 however  He was noted to have severe anion gap metabolic acidosis and lactatemia due to acute pancreatitis, DKA, obstructive uropathy and non-obstructing 8 mm right renal calculus  Also with new onset Afib and CARLTON  IV insulin given for DKA which resolved and patient was transitioned to basal/bolus regimen  Endocrinology consulted and adjusted regimen to Lantus/Humalog  GI consulted for acute pancreatitis and initiated on IV fluids and NG tube  Once resolved, NG tube d/c on 9/6 and pt has been tolerating solids/liquids since  Urology consulted for urinary retention and a lemons catheter was placed with plans for urodynamic studies once lemons removed  Also initiated on Ceftriaxone with urine culture positive for coag neg staph  Urology does not recommend removal while inpatient  Wound care nursing consulted for scrotal/groin fungal rash requiring topical antifungal treatment  Evaluated by PT/OT and deemed appropriate for post-acute rehabilitation services  He was accepted to [facility] and arrived on 9/10/22         SUBJECTIVE: Patient seen and evaluated   States he is unsure about continuing Lamictal as he would like to try counseling/coping mechanisms first  States his goal is to get on his stationary bike at home and to eventually wean down insulin  Understands he needs to be on insulin at this time but states his goal is to make dietary modifications in hopes to control sugar levels better  Also, requesting referrals to Ortho for his B/l knee pain and General surgery for h/o hernia's upon discharge  ASSESSMENT: Stable, progressing      PLAN:    Rehabilitation   Functional deficits:  Impaired mobility, self care, RUE weakness, BLE weakness and Neuropathy   Continue current rehabilitation plan of care to maximize function   Functional update:   o PT:  Lying to sitting on side of bed: independent  Sit-stand: supervision  Bed-chair transfer: supervision   Ambulation: supervision   Wheelchair mobility: supervision   Stairs: supervision   Picking up object: supervision   o OT:  Oral Hygiene: independent  Grooming: s/u  Showering/bathing: s/u c/u  Tub/shower transfer: supervision   UB dressing: independent   Putting on/taking off footwear: s/u c/u  Picking up object: supervision      Estimated Discharge:  To be discussed tomorrow      Pain   Neurontin 100 mg TID    Tylenol 650 mg Q6H PRN for mild pain    DVT prophylaxis   Heparin 5000 Units SQ Q8H [address]    Bladder plan   Lane Catheter in place   Planned voiding trial for Thursday 9/15 per Urology   Lidocaine Gel for tip of penis for discomfort - patient refused this morning states it did not help    Bowel plan   Continent   Last BM 9/12    Skin care  · Calazime to coccyx and intergluteal crease TID/PRN  · Hydra-guard bilateral heels/buttocks BID/PRN  · Elevate heels to offload pressure  · EHOB cusion when OOB  · Turn/reposiiton Q2H for pressure re-distribution of skin  · Moisturize skin daily with skin nourishing cream  · Scrotum/bilateral medial thighs- cleanse skin with soap/water, pat dry, apply dusting of nystatin powder over affected areas- for thin layer of powder is applied to skin- BID and with chayo-care    Pain in both lower extremities  Assessment & Plan  · Tenderness to palpation of left calf- reported at time of initial evaluation   · Edematous BLE- improving   · Doppler BLE to rule out DVT ; negative for acute DVT 9/12/22  · Voltaren gel to bilateral knee 4x daily    Depressed mood  Assessment & Plan  · Reported at time of admission by nursing  · Patient recently lost son to OD and commented on wife with negative comments towards patients appearance  · Psych consult appreciated  · Initiated Lamictal 25 mg BID-pt unsure if he would like to continue   Would prefer to try coping mechanisms and counseling first   · Pastoral care consult placed to be performed today virtually    Fungal infection of skin  Assessment & Plan  · Calazime to coccyx/intergluteal crease TID/PRN  · Wound care following    Urinary retention  Assessment & Plan  · Thought to be due to neurogenic bladder in setting of uncontrolled diabetes  · Lane in place  · Had bilateral hydronephrosis from retention, improvement noted after decompression on US  · Monitor urine OP  · Flomax 0 4 mg daily with dinner   · Urology evaluated; voiding trial scheduled for Thursday unless pain worsens  · Lidocaine gel to tip of penis for discomfort - switch to PRN, patient refused and states it did not help      New onset atrial fibrillation ([facility] )  Assessment & Plan  · Noted with RVR in ED on EKG  · Spontaneously converted to sinus tachycardia prior to arrival to ICU  · Thought to be d/t severe metabolic derangements  · Converted to Normal sinus while in acute care  · Monitor         * DKA (diabetic ketoacidosis) [facility])  Assessment & Plan  Lab Results   Component Value Date    HGBA1C >14 0 (H) 09/05/2022       Recent Labs     09/11/22  1619 09/11/22 2003 09/12/22  0725 09/12/22  1112   POCGLU 108 140 150* 141*       Blood Sugar Average: Last 72 hrs:  (P) 155 625   · S/p insulin drip, endocrinology consulted and transitioned to basal insulin and SSI  · Continue Lantus 44 units in AM, Humalog 14 units TID with meals, SSI  · Goals for basal/bolus regimen at discharge  · Will need pen teaching, insulin pens ordered, glucometer, supplies and pen needles at discharge  · Monitor accu cheks  · Hypoglycemia protocol   · RD to follow for diabetic education and at request of patient who would like help with dietary modifications  · IM to follow  · New onset neuropathy to BLE- Continue gabapentin, consider titration if needed  · Acute chomprehensive interdisciplinary inpatient rehabilitation to include intensive skilled therapies as outlines with oversight and management by a rehabilitation Physician Assistant overseen by rehabilitation physician as well as inpatient rehabilitation nursing, case management and weekly interdisciplinary team meeting          Appreciate IM consultants medical co-management  Labs, medications, and imaging personally reviewed  ROS:  A ten point review of systems was completed and pertinent positives are listed in subjective section  All other systems reviewed were negative  Review of Systems   Constitutional: Negative  HENT: Negative  Negative for trouble swallowing  Eyes: Negative  Respiratory: Negative  Negative for shortness of breath  Cardiovascular: Negative  Negative for chest pain  Gastrointestinal: Negative  Negative for abdominal pain, constipation and diarrhea  Genitourinary: Positive for difficulty urinating  Lemons in place  Discomfort at lemons site; lidocaine gel did not help   Musculoskeletal: Positive for arthralgias and joint swelling  Bilateral knee pain R >L  BLE edema - improving    Neurological: Negative  Psychiatric/Behavioral: Negative           OBJECTIVE:   /69 (BP Location: Left arm)   Pulse 74   Temp 98 1 °F (36 7 °C) (Temporal)   Resp 16   Ht 6' (1 829 m)   Wt 102 kg (225 lb 1 4 oz)   SpO2 98%   BMI 30 53 kg/m² Physical Exam  Vitals and nursing note reviewed  Constitutional:       General: He is not in acute distress  Appearance: He is not ill-appearing  HENT:      Head: Normocephalic and atraumatic  Mouth/Throat:      Mouth: Mucous membranes are moist       Pharynx: Oropharynx is clear  Eyes:      Pupils: Pupils are equal, round, and reactive to light  Cardiovascular:      Rate and Rhythm: Normal rate and regular rhythm  Pulses: Normal pulses  Heart sounds: Normal heart sounds  No murmur heard  Pulmonary:      Effort: Pulmonary effort is normal  No respiratory distress  Breath sounds: Normal breath sounds  Abdominal:      General: Bowel sounds are normal  There is no distension  Palpations: Abdomen is soft  Musculoskeletal:      Right lower leg: Edema present  Left lower leg: Edema present  Comments: Trace edema BLE  Swelling to RUE improving, minimal erythema to medial aspect of wrist   Skin:     General: Skin is warm and dry  Neurological:      Mental Status: He is alert and oriented to person, place, and time     Psychiatric:         Mood and Affect: Mood normal          Behavior: Behavior normal           Lab Results   Component Value Date    WBC 5 45 09/11/2022    HGB 10 9 (L) 09/11/2022    HCT 33 7 (L) 09/11/2022    MCV 91 09/11/2022     09/11/2022     Lab Results   Component Value Date    SODIUM 138 09/11/2022    K 3 6 09/11/2022     09/11/2022    CO2 28 09/11/2022    BUN 11 09/11/2022    CREATININE 0 75 09/11/2022    GLUC 103 09/11/2022    CALCIUM 8 5 09/11/2022     No results found for: INR, PROTIME        Current Facility-Administered Medications:     acetaminophen (TYLENOL) tablet 650 mg, 650 mg, Oral, Q6H PRN, Ezekiel Ivy PA-C    Diclofenac Sodium (VOLTAREN) 1 % topical gel 2 g, 2 g, Topical, 4x Daily, Ezekiel Ivy PA-C, 2 g at 09/13/22 4557    gabapentin (NEURONTIN) capsule 100 mg, 100 mg, Oral, TID, Ezekiel Ivy PA-C, 100 mg at 09/13/22 0827    heparin (porcine) subcutaneous injection 5,000 Units, 5,000 Units, Subcutaneous, Q8H [facility], Marlin Whitley PA-C, 5,000 Units at 09/13/22 0551    insulin glargine (LANTUS) subcutaneous injection 44 Units 0 44 mL, 44 Units, Subcutaneous, QAM, Amy Sapp PA-C, 44 Units at 09/13/22 0825    insulin lispro (HumaLOG) 100 units/mL subcutaneous injection 1-5 Units, 1-5 Units, Subcutaneous, HS, Marlin Whitley PA-C, 2 Units at 09/12/22 2106    insulin lispro (HumaLOG) 100 units/mL subcutaneous injection 14 Units, 14 Units, Subcutaneous, TID With Meals, Marlin Whitley PA-C, 14 Units at 09/13/22 0825    insulin lispro (HumaLOG) 100 units/mL subcutaneous injection 2-12 Units, 2-12 Units, Subcutaneous, TID AC, 2 Units at 09/13/22 0826 **AND** Fingerstick Glucose (POCT), , , TID AC, Marlin Whitley PA-C    lamoTRIgine (LaMICtal) tablet 25 mg, 25 mg, Oral, BID, Amy Sapp PA-C, 25 mg at 09/13/22 0827    lidocaine (URO-JET) 2 % urethral/mucosal gel 1 application, 1 application, Urethral, Daily PRN, Marlin Whitley PA-C, 1 application at 70/13/34 1725    moisture barrier miconazole 2% cream (aka ALEXI MOISTURE BARRIER ANTIFUNGAL CREAM), , Topical, BID, Ricky Crews MD    ondansetron (ZOFRAN-ODT) dispersible tablet 4 mg, 4 mg, Oral, Q6H PRN, Marlin Whitley PA-C    pantoprazole (PROTONIX) EC tablet 40 mg, 40 mg, Oral, Early Morning, Amy Sapp PA-C, 40 mg at 09/13/22 0551    polyethylene glycol (MIRALAX) packet 17 g, 17 g, Oral, Daily PRN, Marlin Whitley PA-C    senna (SENOKOT) tablet 17 2 mg, 2 tablet, Oral, Daily, Amy Sapp PA-C, 17 2 mg at 09/12/22 0837    tamsulosin (FLOMAX) capsule 0 4 mg, 0 4 mg, Oral, Daily With Mando Cheema PA-C, 0 4 mg at 09/12/22 1722    Past Medical History:   Diagnosis Date    A-fib [facility])     Acute metabolic encephalopathy 51/26/1104    CARLTON (acute kidney injury) ([facility] ) 09/04/2022    Diabetes mellitus ([facility] )  Pancreatitis 09/04/2022    Sepsis ([facility] ) 09/04/2022       Patient Active Problem List    Diagnosis Date Noted    Depressed mood 09/11/2022    Pain in both lower extremities 09/11/2022    Fungal infection of skin 09/09/2022    Urinary retention 09/07/2022    DKA (diabetic ketoacidosis) ([place] [address] ) 09/04/2022    New onset atrial fibrillation ([place] [address] ) 09/04/2022    Urinary frequency 09/02/2022    Type 2 diabetes mellitus ([place] [address] ) 09/02/2022    Prostate cancer screening 09/02/2022    Microscopic hematuria 09/02/2022    Urge incontinence of urine 09/02/2022    Nocturnal enuresis 09/02/2022    Feeling of incomplete bladder emptying 09/02/2022    Balanitis 09/02/2022          Edilson Mas PA-C    Total time spent:  35 minutes with more than 50% spent counseling/coordinating care  Counseling includes discussion with patient re: progress and discussion with patient of his/her current medical/functional state/information  Coordination of patient's care was performed in conjunction with consulting services  Time invested included review of patient's labs, vitals, and management of their comorbidities with continued monitoring  The care of the patient was extensively discussed and appropriate treatment plan was formulated unique for this patient  ** Please Note:  voice to text software may have been used in the creation of this document   Although proof errors in transcription or interpretation are a potential of such software**

## 2022-09-13 NOTE — PCC OCCUPATIONAL THERAPY
09/13/22 Pt participating in self care education, safety with functional txfs and mobility, generalized strength and endurance through the use of TE/TA, and d/c planning  LOF noted above  Pt is making gains despite barriers of impaired safety and balance deficits, and generalized weakness  Plan is to continue with skilled OT to prepare pt for d/c to home  09/19/22 Pt participating in self care education, safety with functional txfs and mobility, generalized strength and endurance through the use of TE/TA, home safety and d/c planning  LOF noted above  Pt is making gains despite barriers of impaired safety and balance deficits  Plan is for d/c to home tomorrow

## 2022-09-13 NOTE — NURSING NOTE
Patient resting comfortably in bed at this time  No signs of distress noted  Patient with lemons catheter draining yellow urine in place for urinary retention pt reports discomfort at the insertion site however refuses lidoderm gel  Patient encouraged to reposition frequently to promote skin integrity  Bed alarm in place to promote patient safety  Patient able to ambulate with a walker and one assist for supervision overnight  Call bell within reach  Will continue to monitor patient and follow plan of care

## 2022-09-13 NOTE — PLAN OF CARE
Problem: Potential for Falls  Goal: Patient will remain free of falls  Description: INTERVENTIONS:  - Educate patient/family on patient safety including physical limitations  - Instruct patient to call for assistance with activity   - Consult OT/PT to assist with strengthening/mobility   - Keep Call bell within reach  - Keep bed low and locked with side rails adjusted as appropriate  - Keep care items and personal belongings within reach  - Initiate and maintain comfort rounds  - Initiate/Maintain bed/chair alarm  - Apply yellow socks and bracelet for high fall risk patients  - Consider moving patient to room near nurses station  Outcome: Progressing     Problem: PAIN - ADULT  Goal: Verbalizes/displays adequate comfort level or baseline comfort level  Description: Interventions:  - Encourage patient to monitor pain and request assistance  - Assess pain using appropriate pain scale  - Administer analgesics based on type and severity of pain and evaluate response  - Implement non-pharmacological measures as appropriate and evaluate response  - Consider cultural and social influences on pain and pain management  - Notify physician/advanced practitioner if interventions unsuccessful or patient reports new pain  Outcome: Progressing     Problem: SAFETY ADULT  Goal: Patient will remain free of falls  Description: INTERVENTIONS:  - Educate patient/family on patient safety including physical limitations  - Instruct patient to call for assistance with activity   - Consult OT/PT to assist with strengthening/mobility   - Keep Call bell within reach  - Keep bed low and locked with side rails adjusted as appropriate  - Keep care items and personal belongings within reach  - Initiate and maintain comfort rounds  - Initiate/Maintain bed/chair alarm  - Apply yellow socks and bracelet for high fall risk patients  - Consider moving patient to room near nurses station  Outcome: Progressing     Problem: Prexisting or High Potential for Compromised Skin Integrity  Goal: Skin integrity is maintained or improved  Description: INTERVENTIONS:  - Identify patients at risk for skin breakdown  - Assess and monitor skin integrity  - Assess and monitor nutrition and hydration status  - Monitor labs   - Assess for incontinence   - Turn and reposition patient  - Assist with mobility/ambulation  - Relieve pressure over bony prominences  - Avoid friction and shearing  - Provide appropriate hygiene as needed including keeping skin clean and dry  - Evaluate need for skin moisturizer/barrier cream  - Collaborate with interdisciplinary team   - Patient/family teaching  - Consider wound care consult   Outcome: Progressing

## 2022-09-13 NOTE — PROGRESS NOTES
09/13/22 1320   Pain Assessment   Pain Assessment Tool 0-10   Pain Score No Pain   Restrictions/Precautions   Precautions Fall Risk; Lane   Exercise Tools   Hand Gripper resistance gripper 5 sets of 10   Other Exercise Tool 1 8# dowel 5x10 reps in 6 planes of motion at pt request   Other Exercise Tool 2 green flex bar 5x10 reps in 3 planes   Cognition   Overall Cognitive Status WFL   Orientation Level Oriented X4   Additional Activities   Additional Activities Comments fxnl mobility RW DS to and from OT room   Activity Tolerance   Activity Tolerance Patient tolerated treatment well   Assessment   Treatment Assessment Pt participating in 32 minutes of concurrent tx addressing similar goals with a pt with simialr deficits  Pt completed TE for generalized strength and endurance for functional activity performance  Pt tolerated well  Plan is to continue with skilled OT  Prognosis Good   Problem List Decreased strength;Decreased endurance   Plan   Treatment/Interventions Functional transfer training; Therapeutic exercise; Endurance training;Gait training; Compensatory technique education;OT;Spoke to nursing   Progress Progressing toward goals   OT Therapy Minutes   OT Time In 1320   OT Time Out 1420   OT Total Time (minutes) 60   OT Mode of treatment - Individual (minutes) 28   OT Mode of treatment - Concurrent (minutes) 32   OT Mode of treatment - Group (minutes) 0   OT Mode of treatment - Co-treat (minutes) 0   OT Mode of Treatment - Total time(minutes) 60 minutes   OT Cumulative Minutes 270   Therapy Time missed   Time missed?  No

## 2022-09-13 NOTE — NUTRITION
09/13/22 5029   Recommendations/Interventions   Summary Reviewed nutrition history in further detail  Patient states he was eating excess amount of fruits, at times 6 bananas in one sitting  He also reports drinking gatorade, snapple and juice frequently  He also reports eating plantains, beans and rice often  RD provided extensive DM diet education     Education Assessment   Education Education initiated/ completed  (Discussed foods containing CHO, building a balanced meal, appropriate portion sizes, sugar free foods and beverages, label reading using nutrition facts label for teachback method )

## 2022-09-13 NOTE — PCC NURSING
9/12/22 Patient is a recent admission to [facility] for debility related to hospitalization due to diabetic ketoacidosis  Patient remains alert and oriented  Lungs are clear but diminished at the bases on room air  Trace lower extremity edema noted  Patient with a lemons catheter in place for urinary retention  Urology consult completed patient started on flomax will attempt a voiding trial later this week  Patient with fungal excoriation to the groin nystatin powder applied as ordered  Sacral slit noted z-guard applied as ordered  Blood sugars are being monitored four times a day and insulin administered as required  Patient is able to ambulate contact guard to the bathroom with walker usage  Psych consult completed and patient started on lamictal      9/20/2022 Pt for discharge today  Will be going home with cristo  Has printed material for care of lemons and has been given 3 booklets to better help him understand his DM

## 2022-09-14 LAB
ANION GAP SERPL CALCULATED.3IONS-SCNC: 14 MMOL/L (ref 4–13)
BASOPHILS # BLD AUTO: 0.02 THOUSANDS/ΜL (ref 0–0.1)
BASOPHILS NFR BLD AUTO: 0 % (ref 0–1)
BUN SERPL-MCNC: 12 MG/DL (ref 5–25)
CALCIUM SERPL-MCNC: 9.3 MG/DL (ref 8.4–10.2)
CHLORIDE SERPL-SCNC: 102 MMOL/L (ref 96–108)
CO2 SERPL-SCNC: 21 MMOL/L (ref 21–32)
CREAT SERPL-MCNC: 1.26 MG/DL (ref 0.6–1.3)
EOSINOPHIL # BLD AUTO: 0.17 THOUSAND/ΜL (ref 0–0.61)
EOSINOPHIL NFR BLD AUTO: 2 % (ref 0–6)
ERYTHROCYTE [DISTWIDTH] IN BLOOD BY AUTOMATED COUNT: 13 % (ref 11.6–15.1)
GFR SERPL CREATININE-BSD FRML MDRD: 58 ML/MIN/1.73SQ M
GLUCOSE SERPL-MCNC: 118 MG/DL (ref 65–140)
GLUCOSE SERPL-MCNC: 128 MG/DL (ref 65–140)
GLUCOSE SERPL-MCNC: 77 MG/DL (ref 65–140)
GLUCOSE SERPL-MCNC: 84 MG/DL (ref 65–140)
GLUCOSE SERPL-MCNC: 93 MG/DL (ref 65–140)
HCT VFR BLD AUTO: 36.8 % (ref 36.5–49.3)
HGB BLD-MCNC: 11.9 G/DL (ref 12–17)
IMM GRANULOCYTES # BLD AUTO: 0.02 THOUSAND/UL (ref 0–0.2)
IMM GRANULOCYTES NFR BLD AUTO: 0 % (ref 0–2)
LYMPHOCYTES # BLD AUTO: 3.27 THOUSANDS/ΜL (ref 0.6–4.47)
LYMPHOCYTES NFR BLD AUTO: 42 % (ref 14–44)
MCH RBC QN AUTO: 29.9 PG (ref 26.8–34.3)
MCHC RBC AUTO-ENTMCNC: 32.3 G/DL (ref 31.4–37.4)
MCV RBC AUTO: 93 FL (ref 82–98)
MONOCYTES # BLD AUTO: 0.73 THOUSAND/ΜL (ref 0.17–1.22)
MONOCYTES NFR BLD AUTO: 9 % (ref 4–12)
NEUTROPHILS # BLD AUTO: 3.58 THOUSANDS/ΜL (ref 1.85–7.62)
NEUTS SEG NFR BLD AUTO: 47 % (ref 43–75)
NRBC BLD AUTO-RTO: 0 /100 WBCS
PLATELET # BLD AUTO: 244 THOUSANDS/UL (ref 149–390)
PMV BLD AUTO: 10.8 FL (ref 8.9–12.7)
POTASSIUM SERPL-SCNC: 3.8 MMOL/L (ref 3.5–5.3)
RBC # BLD AUTO: 3.98 MILLION/UL (ref 3.88–5.62)
SODIUM SERPL-SCNC: 137 MMOL/L (ref 135–147)
WBC # BLD AUTO: 7.79 THOUSAND/UL (ref 4.31–10.16)

## 2022-09-14 PROCEDURE — 97535 SELF CARE MNGMENT TRAINING: CPT

## 2022-09-14 PROCEDURE — 97116 GAIT TRAINING THERAPY: CPT

## 2022-09-14 PROCEDURE — 97530 THERAPEUTIC ACTIVITIES: CPT

## 2022-09-14 PROCEDURE — 97110 THERAPEUTIC EXERCISES: CPT

## 2022-09-14 PROCEDURE — 99233 SBSQ HOSP IP/OBS HIGH 50: CPT | Performed by: PHYSICAL MEDICINE & REHABILITATION

## 2022-09-14 PROCEDURE — 80048 BASIC METABOLIC PNL TOTAL CA: CPT | Performed by: PHYSICAL MEDICINE & REHABILITATION

## 2022-09-14 PROCEDURE — 85025 COMPLETE CBC W/AUTO DIFF WBC: CPT | Performed by: PHYSICAL MEDICINE & REHABILITATION

## 2022-09-14 PROCEDURE — 82948 REAGENT STRIP/BLOOD GLUCOSE: CPT

## 2022-09-14 RX ORDER — INSULIN GLARGINE 100 [IU]/ML
40 INJECTION, SOLUTION SUBCUTANEOUS EVERY MORNING
Status: DISCONTINUED | OUTPATIENT
Start: 2022-09-15 | End: 2022-09-20 | Stop reason: HOSPADM

## 2022-09-14 RX ADMIN — TAMSULOSIN HYDROCHLORIDE 0.4 MG: 0.4 CAPSULE ORAL at 17:03

## 2022-09-14 RX ADMIN — GABAPENTIN 100 MG: 100 CAPSULE ORAL at 21:12

## 2022-09-14 RX ADMIN — LAMOTRIGINE 25 MG: 25 TABLET ORAL at 17:03

## 2022-09-14 RX ADMIN — DICLOFENAC SODIUM 2 G: 10 GEL TOPICAL at 08:47

## 2022-09-14 RX ADMIN — INSULIN GLARGINE 44 UNITS: 100 INJECTION, SOLUTION SUBCUTANEOUS at 08:48

## 2022-09-14 RX ADMIN — GABAPENTIN 100 MG: 100 CAPSULE ORAL at 17:03

## 2022-09-14 RX ADMIN — HEPARIN SODIUM 5000 UNITS: 5000 INJECTION INTRAVENOUS; SUBCUTANEOUS at 13:58

## 2022-09-14 RX ADMIN — DICLOFENAC SODIUM 2 G: 10 GEL TOPICAL at 21:12

## 2022-09-14 RX ADMIN — INSULIN LISPRO 14 UNITS: 100 INJECTION, SOLUTION INTRAVENOUS; SUBCUTANEOUS at 12:19

## 2022-09-14 RX ADMIN — INSULIN LISPRO 14 UNITS: 100 INJECTION, SOLUTION INTRAVENOUS; SUBCUTANEOUS at 08:49

## 2022-09-14 RX ADMIN — INSULIN LISPRO 14 UNITS: 100 INJECTION, SOLUTION INTRAVENOUS; SUBCUTANEOUS at 17:04

## 2022-09-14 RX ADMIN — HEPARIN SODIUM 5000 UNITS: 5000 INJECTION INTRAVENOUS; SUBCUTANEOUS at 05:07

## 2022-09-14 RX ADMIN — MICONAZOLE NITRATE: 20 CREAM TOPICAL at 17:06

## 2022-09-14 RX ADMIN — DICLOFENAC SODIUM 2 G: 10 GEL TOPICAL at 17:04

## 2022-09-14 RX ADMIN — GABAPENTIN 100 MG: 100 CAPSULE ORAL at 08:51

## 2022-09-14 RX ADMIN — HEPARIN SODIUM 5000 UNITS: 5000 INJECTION INTRAVENOUS; SUBCUTANEOUS at 21:12

## 2022-09-14 RX ADMIN — MICONAZOLE NITRATE 1 APPLICATION: 20 CREAM TOPICAL at 08:52

## 2022-09-14 RX ADMIN — PANTOPRAZOLE SODIUM 40 MG: 40 TABLET, DELAYED RELEASE ORAL at 05:07

## 2022-09-14 RX ADMIN — LAMOTRIGINE 25 MG: 25 TABLET ORAL at 08:51

## 2022-09-14 RX ADMIN — DICLOFENAC SODIUM 2 G: 10 GEL TOPICAL at 12:21

## 2022-09-14 RX ADMIN — SENNOSIDES 17.2 MG: 8.6 TABLET, FILM COATED ORAL at 08:51

## 2022-09-14 NOTE — PLAN OF CARE
Problem: Potential for Falls  Goal: Patient will remain free of falls  Description: INTERVENTIONS:  - Educate patient/family on patient safety including physical limitations  - Instruct patient to call for assistance with activity   - Consult OT/PT to assist with strengthening/mobility   - Keep Call bell within reach  - Keep bed low and locked with side rails adjusted as appropriate  - Keep care items and personal belongings within reach  - Initiate and maintain comfort rounds  - Make Fall Risk Sign visible to staff  - Offer Toileting every 2 Hours, in advance of need  - Initiate/Maintain tab alarm  - Obtain necessary fall risk management equipment:   - Apply yellow socks and bracelet for high fall risk patients  - Consider moving patient to room near nurses station  Outcome: Progressing     Problem: INFECTION - ADULT  Goal: Absence or prevention of progression during hospitalization  Description: INTERVENTIONS:  - Assess and monitor for signs and symptoms of infection  - Monitor lab/diagnostic results  - Monitor all insertion sites, i e  indwelling lines, tubes, and drains  - Monitor endotracheal if appropriate and nasal secretions for changes in amount and color  - [place] appropriate cooling/warming therapies per order  - Administer medications as ordered  - Instruct and encourage patient and family to use good hand hygiene technique  - Identify and instruct in appropriate isolation precautions for identified infection/condition  Outcome: Progressing

## 2022-09-14 NOTE — PROGRESS NOTES
09/14/22 0900   Pain Assessment   Pain Assessment Tool 0-10   Pain Score 4   Pain Location/Orientation Orientation: Right;Location: Knee   Restrictions/Precautions   Precautions Fall Risk; Lane;Pain   Eating   Type of Assistance Needed Independent   Eating CARE Score 6   Oral Hygiene   Type of Assistance Needed Independent   Physical Assistance Level No physical assistance   Comment completed  in shower   Oral Hygiene CARE Score 6   Grooming   Able To Wash/Dry Hands;Brush/Clean Teeth;Wash/Dry Face;Comb/Brush Hair   Shower/Bathe Self   Type of Assistance Needed Set-up / Dennise Early; Adaptive equipment   Shower/Bathe Self CARE Score 5   Bathing   Assessed Bath Style Shower   Anticipated D/C Bath Style Shower   Able to Wash/Rinse/Dry (body part) Left Arm;Right Arm;L Upper Leg;R Upper Leg;Buttocks; Perineal Area; Chest;L Lower Leg/Foot; Abdomen;R Lower Leg/Foot   Limitations Noted in Balance; Safety;Strength   Findings  increased time due to repetitive washing   Tub/Shower Transfer   Limitations Noted In LE Strength; Safety   Adaptive Equipment Grab Bars;Seat with out Back   Assessed Shower   Findings Sup   Upper Body Dressing   Type of Assistance Needed Independent   Upper Body Dressing CARE Score 6   Lower Body Dressing   Type of Assistance Needed Set-up / clean-up   Lower Body Dressing CARE Score 5   Putting On/Taking Off Footwear   Type of Assistance Needed Set-up / Dennise Early; Adaptive equipment   Putting On/Taking Off Footwear CARE Score 5   Picking Up Object   Type of Assistance Needed Supervision   Picking Up Object CARE Score 4   Dressing/Undressing Clothing   Remove UB Clothes Pullover Shirt   Don UB Clothes Pullover Shirt   Remove LB Clothes Shorts;Socks   Don LB Clothes Socks; Shorts   Sit to Stand   Type of Assistance Needed Supervision   Physical Assistance Level No physical assistance   Sit to Stand CARE Score 4   Cognition   Overall Cognitive Status [facility]   Arousal/Participation Alert; Cooperative   Attention Within functional limits   Orientation Level Oriented X4   Memory Decreased recall of precautions   Following Commands Follows one step commands without difficulty   Additional Activities   Additional Activities Comments fxnl mobility RW to/from shower room S/DS   Activity Tolerance   Activity Tolerance Patient tolerated treatment well   Assessment   Treatment Assessment OT tx addressed ADLs via shower level with focus on safe techniques and compensatory strategies as noted in respectvie sections of note  Pt has overall fair recall with techniques  Lenghty discussion with PTA regarding making pt MI level in room today with RW due to demon ability to manage this and OT reports will assess during session  OT did discuss with PTA that previous day pt had reservations about discharge to home and not sure how he will get up from the floor or how he will manage alone at home because wife is never there and all kids live 100 miles away  Pt very focused on this  During this session OT had pt stand to retrieve clothes from closet and OT brought up being Indep in room and pt immed took a large sigh and then immed following he abruptly sat back into recliner stating "I dont know what just happened, I got really tired quick"  OT notifed nurse and vitals assessed WNLs  Pt prefers to stand for shower  OT emphasized import of sitting due to this episode and pt agreed  Pt able to aide in s/u of ADL with use of RW at level  Fxnl mobility RW DS/S in room  Unable to make pt MI this date due to self limiting behaviors  Plan is to conitnue with skilled OT to prepare pt for d/c to home  Prognosis Good   Problem List Decreased safety awareness;Pain; Impaired balance   Assessment Pt participated in 9 minutes of ocncurent tx addressing similar goals with a pt with similar deficits  Plan   Treatment/Interventions ADL retraining;LE strengthening/ROM; Patient/family training;Equipment eval/education;Gait training; Compensatory technique education;OT;Spoke to nursing   Progress Improving as expected   OT Therapy Minutes   OT Time In 0900   OT Time Out 1039   OT Total Time (minutes) 99   OT Mode of treatment - Individual (minutes) 90   OT Mode of treatment - Concurrent (minutes) 9   OT Mode of treatment - Group (minutes) 0   OT Mode of treatment - Co-treat (minutes) 0   OT Mode of Treatment - Total time(minutes) 99 minutes   OT Cumulative Minutes 369   Therapy Time missed   Time missed?  No

## 2022-09-14 NOTE — PROGRESS NOTES
PM&R PROGRESS NOTE:  Oracio Richardson Jr  75 y o  male MRN: [id number]  Unit/Bed#: -01 Encounter: [id number]        Rehabilitation Diagnosis: Impairment of mobility, safety and Activities of Daily Living (ADLs) due to Debility:  16  Debility (Non-cardiac/Non-pulmonary)    HPI: Oksana Bassett  is a 75 y o  male with a PMH significant for T2DM,  who presented to the [facility] with altered mental status, N/V and generalized weakness  Patients spouse found patient this way after work and reported patient had lost weight several months ago resulting in patient not taking insulin due to feeling he no longer needed it  Glucose was elevated to 800 upon arrival  He recently moved here from [place] and does not have established care in the area  He was seen by [address] [place] [address] Urology for dysuria on 9/2 however  He was noted to have severe anion gap metabolic acidosis and lactatemia due to acute pancreatitis, DKA, obstructive uropathy and non-obstructing 8 mm right renal calculus  Also with new onset Afib and CARLTON  IV insulin given for DKA which resolved and patient was transitioned to basal/bolus regimen  Endocrinology consulted and adjusted regimen to Lantus/Humalog  GI consulted for acute pancreatitis and initiated on IV fluids and NG tube  Once resolved, NG tube d/c on 9/6 and pt has been tolerating solids/liquids since  Urology consulted for urinary retention and a lemons catheter was placed with plans for urodynamic studies once lemons removed  Also initiated on Ceftriaxone with urine culture positive for coag neg staph  Urology does not recommend removal while inpatient  Wound care nursing consulted for scrotal/groin fungal rash requiring topical antifungal treatment  Evaluated by PT/OT and deemed appropriate for post-acute rehabilitation services  He was accepted to [facility] and arrived on 9/10/22         SUBJECTIVE: Patient seen and evaluated   Patient informed of upcoming voiding trial and what to expect if he cannot urinate  Patient reported episode of going into a "dream-like" state where he experiences fatigue and feels he gets weak  Vitals assessed when this occurred this morning with therapy and remains stable  Patient informed discharge plans tentatively for early next week dependent upon levels and how the voiding trial goes  Patient has reported that he would like to stay longer with staff  I feel he is anxious about being home without 24/7 supervision and fears falling and being unable to get up  Discussion in team in regards to looking into [facility] for the patient  Nursing reported needing to drain Lane more frequently  Pt reported drinking approximately 6 cups of coffee daily  Requested for patient to try limiting once a day and we will monitor output to determine if caffeine is the cause of increase in output  ASSESSMENT: Stable, progressing      PLAN:    Rehabilitation   Functional deficits:  Impaired mobility, self care, RUE weakness, BLE weakness and Neuropathy   Continue current rehabilitation plan of care to maximize function       Functional update:   o PT:  Updated below  o OT:  Updated below    Physical therapy Occupational therapy    Weight Bearing Status: Full Weight Bearing  Transfers: Supervision  Bed Mobility: Independent  Amulation Distance (ft): 250 feet  Ambulation: Supervision  Assistive Device for Ambulation: Roller Walker  Number of Stairs: 14  Assistive Device for Stairs: Right Hand Rail  Stair Assistance: Supervision  Discharge Recommendations: Home with:  [address] [facility] [place] with[de-identified] First Floor Setup, Home Physical Therapy, Family Support Eating: Independent  Grooming: Independent  Bathing: Supervision  Bathing: Supervision  Upper Body Dressing: Independent  Lower Body Dressing: Supervision  Toileting: Supervision  Tub/Shower Transfer: Supervision  Toilet Transfer: Supervision  Cognition: Exceptions to WNL  Cognition: Decreased Safety  Orientation: Person, Place, Time, Situation  Discharge Recommendations: Home with:  [address] with[de-identified] Family Support (OPT)    This patient was discussed by the Interdisciplinary Team in weekly case conference today  The care of the patient was extensively discussed with all care providers and an appropriate rehabilitation plan was formulated unique for this patient  Barriers were identified preventing progression of therapy and appropriate interventions were discussed with each discipline  Please see the team note for input from all disciplines regarding barriers, intervention, and discharge planning  Total time spent: 35 Mins, and greater than 50% of this time was spent counseling/coordinating care       Estimated Discharge: Re-discuss Friday morning  Discharge dependent upon medical stability and need for independence with stairs as patient has 14 steps inside the home to access bathroom/shower  Sat vs Monday dependent upon these factors  Pain   Neurontin 100 mg TID    Tylenol 650 mg Q6H PRN for mild pain    DVT prophylaxis   Heparin 5000 Units SQ Q8H [address]    Bladder plan   Lane Catheter in place   Lane to be removed and Voiding trial for tomorrow 9/15    Bowel plan   Continent   Last BM 9/12- patient reports BM today however was struggling to pass  Did confirm he didn't take his stool softener today but wishes to continue with them due to this episode      Skin care  · Calazime to coccyx and intergluteal crease TID/PRN  · Hydra-guard bilateral heels/buttocks BID/PRN  · Elevate heels to offload pressure  · EHOB cusion when OOB  · Turn/reposiiton Q2H for pressure re-distribution of skin  · Moisturize skin daily with skin nourishing cream  · Scrotal and bilateral medial thighs- cleanse skin with soap/water, pat dry, apply Francois cream over affected areas BID and with chayo-care    Pain in both lower extremities  Assessment & Plan  · Tenderness to palpation of left calf- reported at time of initial evaluation   · Edematous BLE- improving · Doppler BLE to rule out DVT ; negative for acute DVT 9/12/22  · Voltaren gel to bilateral knee 4x daily  · Consider OP Ortho follow up    Depressed mood  Assessment & Plan  · Reported at time of admission by nursing  · Patient recently lost son to OD and commented on wife with negative comments towards patients appearance  · Psych consult appreciated  · Initiated Lamictal 25 mg BID-pt unsure if he would like to continue  Would prefer to try coping mechanisms and counseling first   · Pastoral care consult completed 9/13/22    Fungal infection of skin  Assessment & Plan  · Francois cream to groin BID  · Wound care following    Urinary retention  Assessment & Plan  · Thought to be due to neurogenic bladder in setting of uncontrolled diabetes  · Lane in place  · Had bilateral hydronephrosis from retention, improvement noted after decompression on US  · Monitor urine OP  · Flomax 0 4 mg daily with dinner   · Urology evaluated; voiding trial scheduled for Thursday 9/15      New onset atrial fibrillation [facility])  Assessment & Plan  · Noted with RVR in ED on EKG  · Spontaneously converted to sinus tachycardia prior to arrival to ICU  · Thought to be d/t severe metabolic derangements  · Converted to Normal sinus while in acute care  · Monitor   · Consider OP Cardiology follow up         * DKA (diabetic ketoacidosis) [facility])  Assessment & Plan  Lab Results   Component Value Date    HGBA1C >14 0 (H) 09/05/2022       Recent Labs     09/13/22  1133 09/13/22  1559 09/13/22 2022 09/14/22  0741   POCGLU 127 134 89 128       Blood Sugar Average: Last 72 hrs:  (P) 499 [id number]   · S/p insulin drip, endocrinology consulted and transitioned to basal insulin and SSI  · Continue Lantus 44 units in AM, Humalog 14 units TID with meals, SSI  · Low blood glucose last night, snack given  · Patient's goal is to get diet under control for better glucose control   · Goals for basal/bolus regimen at discharge    · Will need insulin pens ordered, glucometer, supplies and pen needles at discharge  · Monitor accu cheks  · Hypoglycemia protocol   · RD following; consider OP diabetic educator follow up   · New onset neuropathy to BLE- Continue gabapentin  · Acute chomprehensive interdisciplinary inpatient rehabilitation to include intensive skilled therapies as outlines with oversight and management by a rehabilitation Physician Assistant overseen by rehabilitation physician as well as inpatient rehabilitation nursing, case management and weekly interdisciplinary team meeting          Appreciate IM consultants medical co-management  Labs, medications, and imaging personally reviewed  ROS:  A ten point review of systems was completed and pertinent positives are listed in subjective section  All other systems reviewed were negative  Review of Systems   Constitutional: Negative  HENT: Negative  Negative for trouble swallowing  Eyes: Negative  Respiratory: Negative  Negative for shortness of breath  Cardiovascular: Negative  Negative for chest pain  Gastrointestinal: Negative  Negative for abdominal pain, constipation and diarrhea  Genitourinary: Positive for difficulty urinating  Lane in place   Musculoskeletal: Positive for arthralgias and joint swelling  Bilateral knee pain R >L   Neurological: Positive for weakness  Reports episode of weakness with therapy today ; states he felt fatigued when this occurred    Psychiatric/Behavioral: Negative  OBJECTIVE:   /66 (BP Location: Left arm)   Pulse 71   Temp 99 °F (37 2 °C) (Temporal)   Resp 16   Ht 6' (1 829 m)   Wt 102 kg (225 lb 1 4 oz)   SpO2 99%   BMI 30 53 kg/m²     Physical Exam  Vitals reviewed  Constitutional:       General: He is not in acute distress  Appearance: He is not ill-appearing  HENT:      Head: Normocephalic and atraumatic  Eyes:      Pupils: Pupils are equal, round, and reactive to light     Cardiovascular: Rate and Rhythm: Normal rate and regular rhythm  Pulses: Normal pulses  Heart sounds: Normal heart sounds  No murmur heard  Pulmonary:      Effort: Pulmonary effort is normal  No respiratory distress  Breath sounds: Normal breath sounds  Abdominal:      General: Bowel sounds are normal  There is no distension  Palpations: Abdomen is soft  Musculoskeletal:      Right lower leg: Edema present  Left lower leg: Edema present  Comments: Trace edema BLE   Skin:     General: Skin is warm and dry  Findings: Erythema present  Comments: Erythema to groin/B/L inner thigh region; satellite lesions    Neurological:      General: No focal deficit present  Mental Status: He is alert and oriented to person, place, and time     Psychiatric:         Mood and Affect: Mood normal           Lab Results   Component Value Date    WBC 5 45 09/11/2022    HGB 10 9 (L) 09/11/2022    HCT 33 7 (L) 09/11/2022    MCV 91 09/11/2022     09/11/2022     Lab Results   Component Value Date    SODIUM 138 09/11/2022    K 3 6 09/11/2022     09/11/2022    CO2 28 09/11/2022    BUN 11 09/11/2022    CREATININE 0 75 09/11/2022    GLUC 103 09/11/2022    CALCIUM 8 5 09/11/2022     No results found for: INR, PROTIME        Current Facility-Administered Medications:     acetaminophen (TYLENOL) tablet 650 mg, 650 mg, Oral, Q6H PRN, Dionte Alejandro PA-C    Diclofenac Sodium (VOLTAREN) 1 % topical gel 2 g, 2 g, Topical, 4x Daily, Amy Sapp PA-C, 2 g at 09/14/22 0847    gabapentin (NEURONTIN) capsule 100 mg, 100 mg, Oral, TID, Dionte Alejandro PA-C, 100 mg at 09/14/22 0851    heparin (porcine) subcutaneous injection 5,000 Units, 5,000 Units, Subcutaneous, Q8H [address], Dionte Alejandro PA-C, 5,000 Units at 09/14/22 0507    insulin glargine (LANTUS) subcutaneous injection 44 Units 0 44 mL, 44 Units, Subcutaneous, QAM, Amy Sapp PA-C, 44 Units at 09/14/22 0848    insulin lispro (HumaLOG) 100 units/mL subcutaneous injection 1-5 Units, 1-5 Units, Subcutaneous, HS, Alanna Hood PA-C, 2 Units at 09/12/22 2106    insulin lispro (HumaLOG) 100 units/mL subcutaneous injection 14 Units, 14 Units, Subcutaneous, TID With Meals, Alanna Hood PA-C, 14 Units at 09/14/22 0849    insulin lispro (HumaLOG) 100 units/mL subcutaneous injection 2-12 Units, 2-12 Units, Subcutaneous, TID AC, 2 Units at 09/13/22 0826 **AND** Fingerstick Glucose (POCT), , , TID AC, Alanna Hood PA-C    lamoTRIgine (LaMICtal) tablet 25 mg, 25 mg, Oral, BID, Amy Sapp PA-C, 25 mg at 09/14/22 0851    lidocaine (URO-JET) 2 % urethral/mucosal gel 1 application, 1 application, Urethral, Daily PRN, Alanna Hood PA-C, 1 application at 39/33/30 1725    moisture barrier miconazole 2% cream (aka ALEXI MOISTURE BARRIER ANTIFUNGAL CREAM), , Topical, BID, Isaac Sánchez MD, 1 application at 39/70/73 0852    ondansetron (ZOFRAN-ODT) dispersible tablet 4 mg, 4 mg, Oral, Q6H PRN, Alanna Hood PA-C    pantoprazole (PROTONIX) EC tablet 40 mg, 40 mg, Oral, Early Morning, Amy Sapp PA-C, 40 mg at 09/14/22 0507    polyethylene glycol (MIRALAX) packet 17 g, 17 g, Oral, Daily PRN, Alanna Hood PA-C    senna (SENOKOT) tablet 17 2 mg, 2 tablet, Oral, Daily, Amy Sapp PA-C, 17 2 mg at 09/14/22 0851    tamsulosin (FLOMAX) capsule 0 4 mg, 0 4 mg, Oral, Daily With Stacie Sapp PA-C, 0 4 mg at 09/13/22 1625    Past Medical History:   Diagnosis Date    A-fib [facility])     Acute metabolic encephalopathy 93/48/5704    CARLTON (acute kidney injury) ([facility] ) 09/04/2022    Diabetes mellitus ([facility] )     Pancreatitis 09/04/2022    Sepsis ([place] [address] ) 09/04/2022       Patient Active Problem List    Diagnosis Date Noted    Depressed mood 09/11/2022    Pain in both lower extremities 09/11/2022    Fungal infection of skin 09/09/2022    Urinary retention 09/07/2022    DKA (diabetic ketoacidosis) ([place] [address] ) 09/04/2022    New onset atrial fibrillation ([facility] ) 09/04/2022    Urinary frequency 09/02/2022    Type 2 diabetes mellitus ([facility] ) 09/02/2022    Prostate cancer screening 09/02/2022    Microscopic hematuria 09/02/2022    Urge incontinence of urine 09/02/2022    Nocturnal enuresis 09/02/2022    Feeling of incomplete bladder emptying 09/02/2022    Balanitis 09/02/2022          Shannan Granados PA-C    Total time spent:  35 minutes with more than 50% spent counseling/coordinating care  Counseling includes discussion with patient re: progress and discussion with patient of his/her current medical/functional state/information  Coordination of patient's care was performed in conjunction with consulting services  Time invested included review of patient's labs, vitals, and management of their comorbidities with continued monitoring  The care of the patient was extensively discussed and appropriate treatment plan was formulated unique for this patient  ** Please Note:  voice to text software may have been used in the creation of this document   Although proof errors in transcription or interpretation are a potential of such software**

## 2022-09-14 NOTE — CASE MANAGEMENT
Tx team recommendations reviewed with patient & Pt's daughter, who expressed understanding & agreement  Target DC date is Tuesday, 9/20 with C (PT, OT, Nsg); a list of providers was provided to Pt & a referral will be made based on Pt preference  Pt's daughter expressed that a weekend DC would be preferable for family transporting him home, and would like to re-discuss this on Friday, to setermine if a Saturday DC is appropriate  SW will continue to monitor & assist as needed with Tx & DC planning  Life Alert information provided to Pt & daughter

## 2022-09-14 NOTE — PROGRESS NOTES
09/14/22 1350   Pain Assessment   Pain Assessment Tool 0-10   Pain Score No Pain   Restrictions/Precautions   Precautions Fall Risk; Lane;Pain   Exercise Tools   Other Exercise Tool 1 10# dowel 5x15 chest press, 3x10 shld raises, 5x15 curls, side to side 3x10, 3x10 tricep curls, 3x10 circles   Cognition   Overall Cognitive Status WFL   Orientation Level Oriented X4   Activity Tolerance   Activity Tolerance Patient tolerated treatment well   Assessment   Treatment Assessment Pt second session addressed TE for generalized strength and endurance  Pt requested to utilized 10# of weight as noted in respective sections of note  Plan is to continue with skilled OT  Prognosis Good   Problem List Decreased strength;Decreased endurance   Plan   Treatment/Interventions Therapeutic exercise; Endurance training   Progress Progressing toward goals   OT Therapy Minutes   OT Time In 1350   OT Time Out 1420   OT Total Time (minutes) 30   OT Mode of treatment - Individual (minutes) 30   OT Mode of treatment - Concurrent (minutes) 0   OT Mode of treatment - Group (minutes) 0   OT Mode of treatment - Co-treat (minutes) 0   OT Mode of Treatment - Total time(minutes) 30 minutes   OT Cumulative Minutes 399   Therapy Time missed   Time missed?  No

## 2022-09-14 NOTE — PROGRESS NOTES
09/14/22 0700   Pain Assessment   Pain Assessment Tool 0-10   Pain Score 4   Pain Location/Orientation Orientation: Right;Location: Knee   Hospital Pain Intervention(s) Ambulation/increased activity;Repositioned   Restrictions/Precautions   Precautions Fall Risk; Lane   Weight Bearing Restrictions No   ROM Restrictions No   Cognition   Overall Cognitive Status WFL   Arousal/Participation Alert; Cooperative   Attention Within functional limits   Orientation Level Oriented X4   Memory Decreased recall of precautions   Following Commands Follows one step commands without difficulty   Sit to Stand   Type of Assistance Needed Adaptive equipment   Physical Assistance Level No physical assistance   Comment RW   Sit to Stand CARE Score 6   Bed-Chair Transfer   Type of Assistance Needed Supervision   Physical Assistance Level No physical assistance   Chair/Bed-to-Chair Transfer CARE Score 4   Transfer Bed/Chair/Wheelchair   Limitations Noted In Balance;LE Strength   Adaptive Equipment Roller Walker   Stand Pivot Supervision   Sit to Stand Modified Independent   Stand to Sit Modified Independent   Walk 10 Feet   Type of Assistance Needed Supervision   Physical Assistance Level No physical assistance   Walk 10 Feet CARE Score 4   Walk 50 Feet with Two Turns   Type of Assistance Needed Supervision   Physical Assistance Level No physical assistance   Walk 50 Feet with Two Turns CARE Score 4   Walk 150 Feet   Type of Assistance Needed Supervision   Physical Assistance Level No physical assistance   Walk 150 Feet CARE Score 4   Walking 10 Feet on Uneven Surfaces   Type of Assistance Needed Supervision   Physical Assistance Level No physical assistance   Comment outside on uneven [id number] ft   Walking 10 Feet on Uneven Surfaces CARE Score 4   Ambulation   Does the patient walk? 2   Yes   Primary Mode of Locomotion Prior to Admission Walk   Distance Walked (feet) 250 ft  (150 ftx1 with RW outside on uneven surface with S) Assist Device Roller Walker   Gait Pattern Decreased foot clearance;R knee garcia; Wide ALEA   Limitations Noted In Endurance;Strength   Walk Assist Level Close Supervision   Findings R knee buckled x1 with no LOB  (ambulation on ramp a10 ft)   Toilet Transfer   Type of Assistance Needed Adaptive equipment  (grab bar)   Physical Assistance Level No physical assistance   Toilet Transfer CARE Score 6   Toilet Transfer   Surface Assessed Standard Toilet   Limitations Noted In Balance; Safety   Adaptive Equipment Grab Bar   Positioning Concerns Safety   Therapeutic Interventions   Strengthening seated ther ex with 2 #   Balance gait transfers   Other ambulated outside on uneven surface   Assessment   Treatment Assessment Pt seen for PT treatment session this date with interventions consisting of gait training w/ emphasis on improving pt's ability to ambulate level surfaces x 250 ftx1 with close S provided by therapist with RW, Therapeutic exercise consisting of: AROM 2 sets of 15 reps B LE in sitting position, therapeutic activity consisting of training: bed mobility, supine<>sit transfers, sit<>stand transfers, stand pivot transfers towards B direction and toilet transfers and ambulationa outside on uneven surfaces and ramp ambulation  Pt agreeable to PT treatment session upon arrival, pt found seated at EOB, in no apparent distress and responsive  In comparison to previous session, pt with improvements in uneven surface ambulation  Post session: all needs in reach and RN notified of session findings/recommendations  Continue to recommend home with outpatient rehabilitation at time of d/c in order to maximize pt's functional independence and safety w/ mobility  Pt continues to be functioning below baseline level  PT will continue to see pt during current hospitalization in order to address the deficits listed above and provide interventions consistent w/ POC in effort to achieve STGs     PT Barriers   Physical Impairment Decreased strength;Decreased range of motion;Decreased endurance; Impaired balance;Decreased mobility; Decreased safety awareness;Pain   Functional Limitation Car transfers; Ramp negotiation;Stair negotiation; Walking   Plan   Treatment/Interventions Functional transfer training;LE strengthening/ROM; Therapeutic exercise; Endurance training;Bed mobility;Gait training; Compensatory technique education   Progress Progressing toward goals   PT Therapy Minutes   PT Time In 0700   PT Time Out 0830   PT Total Time (minutes) 90   PT Mode of treatment - Individual (minutes) 90   PT Mode of treatment - Concurrent (minutes) 0   PT Mode of treatment - Group (minutes) 0   PT Mode of treatment - Co-treat (minutes) 0   PT Mode of Treatment - Total time(minutes) 90 minutes   PT Cumulative Minutes 317   Therapy Time missed   Time missed?  No

## 2022-09-14 NOTE — TEAM CONFERENCE
Acute RehabilitationTeam Conference Note  Date: 9/14/2022   Time: 10:37 AM       Patient Name:  Yue Lawrence  Medical Record Number: [id number]   YOB: 1955  Sex: Male          Room/Bed:  [address] [address]/[facility] 210-01  Payor Info:  Payor: Lamberto Hsu / Plan: MEDICARE A AND B / Product Type: Medicare A & B Fee for Service /      Admitting Diagnosis: Debility [R53 81]   Admit Date/Time:  9/10/2022  2:26 PM  Admission Comments: No comment available     Primary Diagnosis:  DKA (diabetic ketoacidosis) ([facility] )  Principal Problem: DKA (diabetic ketoacidosis) ([facility] )    Patient Active Problem List    Diagnosis Date Noted    Depressed mood 09/11/2022    Pain in both lower extremities 09/11/2022    Fungal infection of skin 09/09/2022    Urinary retention 09/07/2022    DKA (diabetic ketoacidosis) ([facility] ) 09/04/2022    New onset atrial fibrillation ([place] [address] ) 09/04/2022    Urinary frequency 09/02/2022    Type 2 diabetes mellitus ([facility] ) 09/02/2022    Prostate cancer screening 09/02/2022    Microscopic hematuria 09/02/2022    Urge incontinence of urine 09/02/2022    Nocturnal enuresis 09/02/2022    Feeling of incomplete bladder emptying 09/02/2022    Balanitis 09/02/2022       Physical Therapy:    Weight Bearing Status: Full Weight Bearing  Transfers: Supervision  Bed Mobility: Independent  Amulation Distance (ft): 250 feet  Ambulation: Supervision  Assistive Device for Ambulation: Roller Walker  Number of Stairs: 14  Assistive Device for Stairs: Right Hand Rail  Stair Assistance: Supervision  Discharge Recommendations: Home with:  [address] [facility] [place] Rendon with[de-identified] First Floor Setup, Home Physical Therapy, Family Support    9/14/2022:  Patient seen for PT on ARU  Presents following DKA with decreased ROM/strength, decreased balance and safety, decreased endurance, and pain     Patient independent with bed mobility, supervision transfers with RW, supervision ambulation up to 250 feet on level and unlevel surfaces with RW, supervision negotiation of 14 steps with single handrails  Patient would benefit from continued inpatient ARC PT to increase function, safety, and increased independence in prep for safe d/c to home  Occupational Therapy:  Eating: Independent  Grooming: Independent  Bathing: Supervision  Bathing: Supervision  Upper Body Dressing: Independent  Lower Body Dressing: Supervision  Toileting: Supervision  Tub/Shower Transfer: Supervision  Toilet Transfer: Supervision  Cognition: Exceptions to WNL  Cognition: Decreased Safety  Orientation: Person, Place, Time, Situation  Discharge Recommendations: Home with:  [address] Rend[place] with[de-identified] Family Support (OPT)       09/13/22 Pt participating in self care education, safety with functional txfs and mobility, generalized strength and endurance through the use of TE/TA, and d/c planning  LOF noted above  Pt is making gains despite barriers of impaired safety and balance deficits, and generalized weakness  Plan is to continue with skilled OT to prepare pt for d/c to home  Speech Therapy:           No notes on file    Nursing Notes:  Appetite: Good  Diet Type: Diabetic                      Diet Patient/Family Education Complete: Yes    Type of Wound (LDA):  Wound (rash/groin- MASD)                    Type of Wound Patient/Family Education: Yes  Bladder: Catheter  Catheter Type: Lemons  Bladder Patient/Family Education: Yes  Bowel: Continent     Bowel Patient/Family Education: Yes  Pain Location/Orientation: Orientation: Right, Location: Knee  Pain Score: 0  Pain 2  Pain Score 2: 8  Pain Location/Orientation 2: Other (Comment) (urethra- from lemons)                    [facility] Pain Intervention(s): Ambulation/increased activity, Repositioned  Pain Patient/Family Education: Yes  Medication Management/Safety  Injectable: Insulin  Safe Administration: Yes (given self insulin at home- needs continued education on slow vs fast acting insulin)  Medication Patient/Family Education Complete: Yes    9/12/22 Patient is a recent admission to [facility] for debility related to hospitalization due to diabetic ketoacidosis  Patient remains alert and oriented  Lungs are clear but diminished at the bases on room air  Trace lower extremity edema noted  Patient with a lemons catheter in place for urinary retention  Urology consult completed patient started on flomax will attempt a voiding trial later this week  Patient with fungal excoriation to the groin nystatin powder applied as ordered  Sacral slit noted z-guard applied as ordered  Blood sugars are being monitored four times a day and insulin administered as required  Patient is able to ambulate contact guard to the bathroom with walker usage  Psych consult completed and patient started on lamictal        Case Management:     Discharge Planning  Living Arrangements: Lives w/ Spouse/significant other  Support Systems: Spouse/significant other  Assistance Needed: unknown  Type of Current Residence: Private residence  Current [address] : No  Initial assessment & orientation to [facility] with Pt, who expressed understanding & agreement  Pt declined for this worker to call his wife, stating that she is not in the home for 5 days a week due to working in the city & he will primarily be alone  He did agree for this worker to contact his daughter, Placido Guerrero, at [phone]; Discussed role of team members & reviewed [address] with Pt & Pt's daughter, who expressed understanding & agreement  Pt resides in a multi-story home, 3 steps in, FF set up available, but the shower is on the second floor & Pt's daughter is hopeful he will be able to ambulate stairs safely  He has a SPC but was independent PTA  He administers his own Insulin  SW will continue to monitor & assist as needed with [address]  IMM reviewed, signed & submitted for scanning  Is the patient actively participating in therapies?  yes  List any modifications to the treatment plan: na    Barriers Interventions   DM Patient education for insulin   Urinary retention Lane, attempt removal for voiding trial    Rash sacrum and groin Local care   Decreased strength, end Therapeutic exercise, therapeutic activity   Decreased safety Cues, ADL, transfer, gait training     Is the patient making expected progress toward goals? yes  List any update or changes to goals: na    Medical Goals: Patient will be able to manage medical conditions and comorbid conditions with medications and follow up upon completion of rehab program    Weekly Team Goals:   Rehab Team Goals  ADL Team Goal: Patient will be independent with ADLs with least restrictive device upon completion of rehab program  Bowel/Bladder Team Goal: Patient will return to premorbid level for bladder/bowel management upon completion of rehab program  Transfer Team Goal: Patient will be independent with transfers with least restrictive device upon completion of rehab program  Locomotion Team Goal: Patient will be independent with locomotion with least restrictive device upon completion of rehab program  Cognitive Team Goal: Patient will be independent for basic and complex tasks upon completion of rehab program     Mod I self care  Mod I bed mobility, transfers, and mobility    Health and wellness: to return home and complete homemaking tasks    Discussion: Plan for return home with wife with [facility] for PT, OT, and nursing  May benefit from life alert        Anticipated Discharge Date:  Re-discuss at [facility] on Friday, possibly Sat vs Mon dependent on medical status

## 2022-09-14 NOTE — PCC PHYSICAL THERAPY
9/14/2022:  Patient seen for PT on ARU  Presents following DKA with decreased ROM/strength, decreased balance and safety, decreased endurance, and pain  Patient independent with bed mobility, supervision transfers with RW, supervision ambulation up to 250 feet on level and unlevel surfaces with RW, supervision negotiation of 14 steps with single handrails  Patient would benefit from continued inpatient ARC PT to increase function, safety, and increased independence in prep for safe d/c to home  9/20/2022:  Patient seen for PT on ARU  Presents following DKA with decreased ROM/strength, decreased balance and safety, decreased endurance, and pain  Patient independent with bed mobility, independent transfers with RW, mod I ambulation up to 801 feet on level and unlevel surfaces with RW, mod I negotiation of 16 steps with single handrails  Patient would benefit from continued inpatient ARC PT to increase function, safety, and increased independence in prep for safe d/c to home

## 2022-09-15 PROBLEM — R35.89 POLYURIA: Status: ACTIVE | Noted: 2022-09-15

## 2022-09-15 LAB
BACTERIA UR QL AUTO: ABNORMAL /HPF
BILIRUB UR QL STRIP: NEGATIVE
CLARITY UR: CLEAR
COLOR UR: YELLOW
GLUCOSE SERPL-MCNC: 119 MG/DL (ref 65–140)
GLUCOSE SERPL-MCNC: 143 MG/DL (ref 65–140)
GLUCOSE SERPL-MCNC: 93 MG/DL (ref 65–140)
GLUCOSE SERPL-MCNC: 96 MG/DL (ref 65–140)
GLUCOSE UR STRIP-MCNC: NEGATIVE MG/DL
HGB UR QL STRIP.AUTO: NEGATIVE
KETONES UR STRIP-MCNC: NEGATIVE MG/DL
LEUKOCYTE ESTERASE UR QL STRIP: NEGATIVE
NITRITE UR QL STRIP: NEGATIVE
NON-SQ EPI CELLS URNS QL MICRO: ABNORMAL /HPF
PH UR STRIP.AUTO: 6 [PH]
PROT UR STRIP-MCNC: NEGATIVE MG/DL
RBC #/AREA URNS AUTO: ABNORMAL /HPF
SP GR UR STRIP.AUTO: 1.01 (ref 1–1.03)
UROBILINOGEN UR QL STRIP.AUTO: 0.2 E.U./DL
WBC #/AREA URNS AUTO: ABNORMAL /HPF

## 2022-09-15 PROCEDURE — 97535 SELF CARE MNGMENT TRAINING: CPT

## 2022-09-15 PROCEDURE — 97537 COMMUNITY/WORK REINTEGRATION: CPT

## 2022-09-15 PROCEDURE — 97530 THERAPEUTIC ACTIVITIES: CPT

## 2022-09-15 PROCEDURE — 82570 ASSAY OF URINE CREATININE: CPT | Performed by: PHYSICIAN ASSISTANT

## 2022-09-15 PROCEDURE — 99223 1ST HOSP IP/OBS HIGH 75: CPT | Performed by: PHYSICIAN ASSISTANT

## 2022-09-15 PROCEDURE — 99233 SBSQ HOSP IP/OBS HIGH 50: CPT | Performed by: PHYSICAL MEDICINE & REHABILITATION

## 2022-09-15 PROCEDURE — 82948 REAGENT STRIP/BLOOD GLUCOSE: CPT

## 2022-09-15 PROCEDURE — 97110 THERAPEUTIC EXERCISES: CPT

## 2022-09-15 PROCEDURE — 84300 ASSAY OF URINE SODIUM: CPT | Performed by: PHYSICIAN ASSISTANT

## 2022-09-15 PROCEDURE — 81001 URINALYSIS AUTO W/SCOPE: CPT | Performed by: PHYSICAL MEDICINE & REHABILITATION

## 2022-09-15 PROCEDURE — 83935 ASSAY OF URINE OSMOLALITY: CPT | Performed by: PHYSICIAN ASSISTANT

## 2022-09-15 PROCEDURE — 84156 ASSAY OF PROTEIN URINE: CPT | Performed by: PHYSICIAN ASSISTANT

## 2022-09-15 PROCEDURE — 97116 GAIT TRAINING THERAPY: CPT

## 2022-09-15 PROCEDURE — 83930 ASSAY OF BLOOD OSMOLALITY: CPT | Performed by: PHYSICIAN ASSISTANT

## 2022-09-15 RX ADMIN — DICLOFENAC SODIUM 2 G: 10 GEL TOPICAL at 17:12

## 2022-09-15 RX ADMIN — LAMOTRIGINE 25 MG: 25 TABLET ORAL at 08:11

## 2022-09-15 RX ADMIN — GABAPENTIN 100 MG: 100 CAPSULE ORAL at 17:11

## 2022-09-15 RX ADMIN — INSULIN LISPRO 14 UNITS: 100 INJECTION, SOLUTION INTRAVENOUS; SUBCUTANEOUS at 08:11

## 2022-09-15 RX ADMIN — HEPARIN SODIUM 5000 UNITS: 5000 INJECTION INTRAVENOUS; SUBCUTANEOUS at 14:27

## 2022-09-15 RX ADMIN — TAMSULOSIN HYDROCHLORIDE 0.4 MG: 0.4 CAPSULE ORAL at 17:11

## 2022-09-15 RX ADMIN — PANTOPRAZOLE SODIUM 40 MG: 40 TABLET, DELAYED RELEASE ORAL at 06:15

## 2022-09-15 RX ADMIN — MICONAZOLE NITRATE: 20 CREAM TOPICAL at 17:12

## 2022-09-15 RX ADMIN — MICONAZOLE NITRATE: 20 CREAM TOPICAL at 08:10

## 2022-09-15 RX ADMIN — INSULIN LISPRO 14 UNITS: 100 INJECTION, SOLUTION INTRAVENOUS; SUBCUTANEOUS at 17:12

## 2022-09-15 RX ADMIN — HEPARIN SODIUM 5000 UNITS: 5000 INJECTION INTRAVENOUS; SUBCUTANEOUS at 06:16

## 2022-09-15 RX ADMIN — SENNOSIDES 17.2 MG: 8.6 TABLET, FILM COATED ORAL at 08:11

## 2022-09-15 RX ADMIN — INSULIN GLARGINE 40 UNITS: 100 INJECTION, SOLUTION SUBCUTANEOUS at 08:11

## 2022-09-15 RX ADMIN — HEPARIN SODIUM 5000 UNITS: 5000 INJECTION INTRAVENOUS; SUBCUTANEOUS at 21:14

## 2022-09-15 RX ADMIN — GABAPENTIN 100 MG: 100 CAPSULE ORAL at 21:11

## 2022-09-15 RX ADMIN — DICLOFENAC SODIUM 2 G: 10 GEL TOPICAL at 08:10

## 2022-09-15 RX ADMIN — GABAPENTIN 100 MG: 100 CAPSULE ORAL at 08:11

## 2022-09-15 NOTE — ASSESSMENT & PLAN NOTE
· Lemons removed 9/15, voiding trial initiated but unsuccessful   Continues to have abnormal PVRs/Bladder scans, lemons to be placed today 9/19 with plan for OP urology follow up   · Strict I&Os  · Noted increase in urine OP after initiation of Lamictal, unclear etiology, Lamictal d/c  · Nephrology consulted; input appreciated   · Please see under urinary retention   · Urology OP FU

## 2022-09-15 NOTE — PROGRESS NOTES
PM&R PROGRESS NOTE:  Oracio Richardson Jr  75 y o  male MRN: [id number]  Unit/Bed#: -01 Encounter: [id number]        Rehabilitation Diagnosis: Impairment of mobility, safety and Activities of Daily Living (ADLs) due to Debility:  16  Debility (Non-cardiac/Non-pulmonary)    HPI: Alec Gardner  is a 75 y o  male with a PMH significant for T2DM,  who presented to the [facility] with altered mental status, N/V and generalized weakness  Patients spouse found patient this way after work and reported patient had lost weight several months ago resulting in patient not taking insulin due to feeling he no longer needed it  Glucose was elevated to 800 upon arrival  He recently moved here from [address] [place] [address] and does not have established care in the area  He was seen by [address] [place] [address] Urology for dysuria on 9/2 however  He was noted to have severe anion gap metabolic acidosis and lactatemia due to acute pancreatitis, DKA, obstructive uropathy and non-obstructing 8 mm right renal calculus  Also with new onset Afib and CARLTON  IV insulin given for DKA which resolved and patient was transitioned to basal/bolus regimen  Endocrinology consulted and adjusted regimen to Lantus/Humalog  GI consulted for acute pancreatitis and initiated on IV fluids and NG tube  Once resolved, NG tube d/c on 9/6 and pt has been tolerating solids/liquids since  Urology consulted for urinary retention and a lemons catheter was placed with plans for urodynamic studies once lemons removed  Also initiated on Ceftriaxone with urine culture positive for coag neg staph  Urology does not recommend removal while inpatient  Wound care nursing consulted for scrotal/groin fungal rash requiring topical antifungal treatment  Evaluated by PT/OT and deemed appropriate for post-acute rehabilitation services  He was accepted to [facility] and arrived on 9/10/22         SUBJECTIVE: Patient seen and evaluated  Patient reporting episode of fatigue occurring  Potentially due to lower blood glucose levels  Patient requesting a new PCP at discharge as his PCP is in [address] [place] [address]      ASSESSMENT: Stable, progressing      PLAN:    Rehabilitation   Functional deficits:  Impaired mobility, self care, RUE weakness, BLE weakness and Neuropathy   Continue current rehabilitation plan of care to maximize function   Functional update:   o PT:  Updated below  o OT:  Updated below    Physical therapy Occupational therapy    Weight Bearing Status: Full Weight Bearing  Transfers: Supervision  Bed Mobility: Independent  Amulation Distance (ft): 250 feet  Ambulation: Supervision  Assistive Device for Ambulation: Roller Walker  Number of Stairs: 14  Assistive Device for Stairs: Right Hand Rail  Stair Assistance: Supervision  Discharge Recommendations: Home with:  [address] [place] [address] Andrea Rendon with[de-identified] First Floor Setup, Home Physical Therapy, Family Support Eating: Independent  Grooming: Independent  Bathing: Supervision  Bathing: Supervision  Upper Body Dressing: Independent  Lower Body Dressing: Supervision  Toileting: Supervision  Tub/Shower Transfer: Supervision  Toilet Transfer: Supervision  Cognition: Exceptions to WNL  Cognition: Decreased Safety  Orientation: Person, Place, Time, Situation  Discharge Recommendations: Home with:  [address] [place] [address] Andrea Rendon with[de-identified] Family Support (OPT)       Estimated Discharge: Re-discuss Friday morning  Discharge dependent upon medical stability and need for independence with stairs as patient has 14 steps inside the home to access bathroom/shower  Sat vs Monday dependent upon these factors  Pain   Neurontin 100 mg TID    Tylenol 650 mg Q6H PRN for mild pain    DVT prophylaxis   Heparin 5000 Units SQ Q8H [address]    Bladder plan   Lane removed today   Voiding trial Initiated    Q2H PVR measure, bladder scans    Strict I&Os    Urine OP to 3000 yesterday; unclear etiology  Lamictal held, Nephrology consulted for increase in Creatinine       Bowel plan   Continent   Last [facility] 9/14    Skin care  · Calazime to coccyx and intergluteal crease TID/PRN  · Hydra-guard bilateral heels/buttocks BID/PRN  · Elevate heels to offload pressure  · EHOB cusion when OOB  · Turn/reposiiton Q2H for pressure re-distribution of skin  · Moisturize skin daily with skin nourishing cream  · Scrotal and bilateral medial thighs- cleanse skin with soap/water, pat dry, apply Francois cream over affected areas BID and with chayo-care    Polyuria  Assessment & Plan  · Lane removed today 9/15, voiding trial initiated  · Strict I&Os  · Noted increase in urine OP after initiation of Lamictal, unclear etiology, Lamictal held as of now  Discussed with Psych who believe it is unlikely that Lamictal is the cause of polyuria due to timeline and baseline creatinine being within normal limits     · Nephrology consulted for increase in Creatinine from 0 75 to 1 26    Pain in both lower extremities  Assessment & Plan  · Tenderness to palpation of left calf- reported at time of initial evaluation   · Doppler BLE to rule out DVT ; negative for acute DVT 9/12/22  · Voltaren gel to bilateral knee 4x daily  · Consider OP Ortho follow up    Depressed mood  Assessment & Plan  · Reported at time of admission by nursing  · Patient recently lost son to OD and commented on wife with negative comments towards patients appearance  · Psych consult appreciated  · Lamictal 25 mg BID was initiated; noted increase in urine OP afterwards and held 9/15   · Pastoral care consult completed 9/13/22    Fungal infection of skin  Assessment & Plan  · Francois cream to groin BID  · Wound care following  · Calazime to coccyx and intergluteal crease TID/PRN    Urinary retention  Assessment & Plan  · Thought to be due to neurogenic bladder in setting of uncontrolled diabetes  · Had bilateral hydronephrosis from retention, improvement noted after decompression on US  · Flomax 0 4 mg daily with dinner   · Urology following  · Lane removed today 9/15, voiding trial initiated  · Strict I&Os  · Noted increase in urine OP after initiation of Lamictal, unclear etiology, Lamictal held as of now  Discussed with Psych who believe it is unlikely that Lamictal is the cause of polyuria due to timeline and baseline creatinine being within normal limits  · Nephrology consulted for increase in Creatinine from 0 75 to 1 26      New onset atrial fibrillation [facility])  Assessment & Plan  · Noted with RVR in ED on EKG  · Spontaneously converted to sinus tachycardia prior to arrival to ICU  · Thought to be d/t severe metabolic derangements  · Converted to Normal sinus while in acute care  · Monitor   · Consider OP Cardiology follow up         * DKA (diabetic ketoacidosis) [facility])  Assessment & Plan  Lab Results   Component Value Date    HGBA1C >14 0 (H) 09/05/2022       Recent Labs     09/14/22  1112 09/14/22  1614 09/14/22  2036 09/15/22  0744   POCGLU 118 77 93 119       Blood Sugar Average: Last 72 hrs:  (P) 342 [id number]   · S/p insulin drip, endocrinology consulted and transitioned to basal insulin and SSI  · Continue Lantus 44 units in AM, Humalog 14 units TID with meals, SSI   · Goals for basal/bolus regimen at discharge  · Will need insulin pens ordered, glucometer, supplies and pen needles at discharge  · Monitor accu cheks  · Hypoglycemia protocol   · RD following; consider OP diabetic educator follow up   · New onset neuropathy to BLE- Continue gabapentin  · Acute chomprehensive interdisciplinary inpatient rehabilitation to include intensive skilled therapies as outlines with oversight and management by a rehabilitation Physician Assistant overseen by rehabilitation physician as well as inpatient rehabilitation nursing, case management and weekly interdisciplinary team meeting          Appreciate IM consultants medical co-management  Labs, medications, and imaging personally reviewed        ROS:  A ten point review of systems was completed and pertinent positives are listed in subjective section  All other systems reviewed were negative  Review of Systems   Constitutional: Positive for fatigue  Occasional fatigue reported throughout the day thought to be due to lower blood glucose readings   HENT: Negative  Negative for trouble swallowing  Eyes: Negative  Respiratory: Negative  Negative for shortness of breath  Cardiovascular: Negative  Negative for chest pain  Gastrointestinal: Negative  Negative for abdominal pain, constipation and diarrhea  Genitourinary: Negative for difficulty urinating and dysuria  Musculoskeletal: Positive for arthralgias  Bilateral knee pain R >L   Neurological: Negative  Psychiatric/Behavioral: Negative  OBJECTIVE:   /62 (BP Location: Right arm)   Pulse 78   Temp 97 6 °F (36 4 °C) (Temporal)   Resp 16   Ht 6' (1 829 m)   Wt 102 kg (225 lb 1 4 oz)   SpO2 97%   BMI 30 53 kg/m²     Physical Exam  Vitals and nursing note reviewed  Constitutional:       General: He is not in acute distress  Appearance: He is not ill-appearing  HENT:      Head: Normocephalic and atraumatic  Eyes:      Pupils: Pupils are equal, round, and reactive to light  Cardiovascular:      Rate and Rhythm: Normal rate and regular rhythm  Pulses: Normal pulses  Heart sounds: Normal heart sounds  No murmur heard  Pulmonary:      Effort: Pulmonary effort is normal  No respiratory distress  Breath sounds: Normal breath sounds  Abdominal:      General: Bowel sounds are normal  There is no distension  Palpations: Abdomen is soft  Skin:     General: Skin is warm and dry  Neurological:      General: No focal deficit present  Mental Status: He is alert and oriented to person, place, and time     Psychiatric:         Mood and Affect: Mood normal           Lab Results   Component Value Date    WBC 7 79 09/14/2022    HGB 11 9 (L) 09/14/2022    HCT 36 8 09/14/2022    MCV 93 09/14/2022     09/14/2022 Lab Results   Component Value Date    SODIUM 137 09/14/2022    K 3 8 09/14/2022     09/14/2022    CO2 21 09/14/2022    BUN 12 09/14/2022    CREATININE 1 26 09/14/2022    GLUC 84 09/14/2022    CALCIUM 9 3 09/14/2022     No results found for: INR, PROTIME        Current Facility-Administered Medications:     acetaminophen (TYLENOL) tablet 650 mg, 650 mg, Oral, Q6H PRN, Para MIRELLA Guzmán-C    Diclofenac Sodium (VOLTAREN) 1 % topical gel 2 g, 2 g, Topical, 4x Daily, Para Arielle PA-C, 2 g at 09/15/22 0810    gabapentin (NEURONTIN) capsule 100 mg, 100 mg, Oral, TID, Para Guzmán, PA-C, 100 mg at 09/15/22 0811    heparin (porcine) subcutaneous injection 5,000 Units, 5,000 Units, Subcutaneous, Q8H [address], Para Guzmán, PA-C, 5,000 Units at 09/15/22 0616    insulin glargine (LANTUS) subcutaneous injection 40 Units 0 4 mL, 40 Units, Subcutaneous, QAM, [employer], CRNP, 40 Units at 09/15/22 0811    insulin lispro (HumaLOG) 100 units/mL subcutaneous injection 1-5 Units, 1-5 Units, Subcutaneous, HS, Para Guzmán, PA-C, 2 Units at 09/12/22 2106    insulin lispro (HumaLOG) 100 units/mL subcutaneous injection 14 Units, 14 Units, Subcutaneous, TID With Meals, Para Arielle PA-C, 14 Units at 09/15/22 0811    insulin lispro (HumaLOG) 100 units/mL subcutaneous injection 2-12 Units, 2-12 Units, Subcutaneous, TID AC, 2 Units at 09/13/22 0826 **AND** Fingerstick Glucose (POCT), , , TID AC, Amy Sapp PA-C    lidocaine (URO-JET) 2 % urethral/mucosal gel 1 application, 1 application, Urethral, Daily PRN, Para JOHN Guzmán, 1 application at 05/97/95 1725    moisture barrier miconazole 2% cream (aka ALEXI MOISTURE BARRIER ANTIFUNGAL CREAM), , Topical, BID, Felecia Hadley MD, Given at 09/15/22 0810    ondansetron (ZOFRAN-ODT) dispersible tablet 4 mg, 4 mg, Oral, Q6H PRN, Marli Guzmán PA-C    pantoprazole (PROTONIX) EC tablet 40 mg, 40 mg, Oral, Early Morning, Para Guzmán, JOHN, 40 mg at 09/15/22 0615    polyethylene glycol (MIRALAX) packet 17 g, 17 g, Oral, Daily PRN, Mikhail Olson PA-C    senna (SENOKOT) tablet 17 2 mg, 2 tablet, Oral, Daily, Amy Sapp PA-C, 17 2 mg at 09/15/22 0811    tamsulosin (FLOMAX) capsule 0 4 mg, 0 4 mg, Oral, Daily With Isrrael Sapp PA-C, 0 4 mg at 09/14/22 1703    Past Medical History:   Diagnosis Date    A-fib [facility])     Acute metabolic encephalopathy 61/27/5285    [place]LTON (acute kidney injury) ([place] [address] ) 09/04/2022    Diabetes mellitus ([place] [address] )     Pancreatitis 09/04/2022    Sepsis ([facility] ) 09/04/2022       Patient Active Problem List    Diagnosis Date Noted    Polyuria 09/15/2022    Depressed mood 09/11/2022    Pain in both lower extremities 09/11/2022    Fungal infection of skin 09/09/2022    Urinary retention 09/07/2022    DKA (diabetic ketoacidosis) ([facility] ) 09/04/2022    New onset atrial fibrillation ([place] [address] ) 09/04/2022    Urinary frequency 09/02/2022    Type 2 diabetes mellitus ([place] [address] ) 09/02/2022    Prostate cancer screening 09/02/2022    Microscopic hematuria 09/02/2022    Urge incontinence of urine 09/02/2022    Nocturnal enuresis 09/02/2022    Feeling of incomplete bladder emptying 09/02/2022    Balanitis 09/02/2022          Mikhail Olson PA-C    Total time spent:  35 minutes with more than 50% spent counseling/coordinating care  Counseling includes discussion with patient re: progress and discussion with patient of his/her current medical/functional state/information  Coordination of patient's care was performed in conjunction with consulting services  Time invested included review of patient's labs, vitals, and management of their comorbidities with continued monitoring  The care of the patient was extensively discussed and appropriate treatment plan was formulated unique for this patient  ** Please Note:  voice to text software may have been used in the creation of this document   Although proof errors in transcription or interpretation are a potential of such software**

## 2022-09-15 NOTE — CONSULTS
Consultation - Nephrology   Oracio Richardson Jr  75 y o  male MRN: [id number]  Unit/Bed#: [facility] 210-01 Encounter: [id number]      Assessment and Plan    1  Acute kidney injury  With renal function worsened from creatinine 0 75 mg/dL with estimated GFR 94 mL/min on 09/11/2022 that improved to creatinine of 1 26 mg/dL with estimated GFR 58 mL/min on 09/14/2022  Etiology to consider tubulointerstitial nephritis due to lamotrigine versus urinary obstruction  Renal ultrasound 09/06/2022 revealed that bilateral hydronephrosis was improved after bladder decompression  Trend renal function, avoid nephrotoxins, renally dose medications, urinary retention protocol  2  Tubulointerstitial nephritis, possible  Follow with discontinuation of lamotrigine 09/12/2022 through 09/15/2022  3  Polyuria  Consider diabetes insipidus  Check urine studies and serum osmolality  He has reduced his caffeine intake  4  Hypokalemia  Continue to monitor and replete for magnesium goal 2 0 mg/dL and potassium 4 0 millimole per L     5  Diabetes mellitus, type 2  Fingerstick blood glucose and insulin per hospitalist     6  Bilateral hydronephrosis  Place Lane tomorrow renal function not improved  7  Urine retention  Lane catheter has been removed  Strict I&Os and urinary retention protocol  Thank you for allowing us to participate in the care of your patient  Please feel free to contact us regarding the care of this patient, or any other questions/concerns that may be applicable      Patient Active Problem List   Diagnosis    Urinary frequency    Type 2 diabetes mellitus ([address] )    Prostate cancer screening    Microscopic hematuria    Urge incontinence of urine    Nocturnal enuresis    Feeling of incomplete bladder emptying    Balanitis    DKA (diabetic ketoacidosis) ([address] )    New onset atrial fibrillation ([address] )    Urinary retention    Fungal infection of skin    Depressed mood    Pain in both lower extremities    Polyuria       History of Present Illness     Physician Requesting Consult: Temitope De Leon MD    Reason for Consult / Principal Problem:  Acute kidney injury and polyuria    Hx and PE limited by:  None    HPI: Wallace Boas  is a 75y o  year old male with DMT2 who presents to [address] [place] [address] following hospitalization at [address] [place] [address] for 09/04/2022 through 09/10/2022 for diabetic ketoacidosis, obstructive uropathy, new onset atrial fibrillation, acute kidney injury, acute pancreatitis  Nephrology is consulted for evaluation of patient's polyuria  It is noted that patient drinks a large amount of fluids overall  He has had about 4 L urine output 24 hours  His Lane catheter has been removed  He is not previously been evaluated by Nephrology  From a renal standpoint, his acute kidney injury of sCr 2 29 mg/dL and eGFR 29 mL/min on 09/04/2022 is resolved to sCr 0 83 mg/dL and eGFR 90 mL/min on 09/16/2022  He has a history of nonobstructing nephrolithiasis, urinary retention  Upon speaking with the patient, he reports that he acknowledges he has been drinking a lot and he will try to reduce his fluid intake  History obtained from chart review and the patient  Review of Systems    Denies physical complaints  A complete 10 point review of systems was performed and is otherwise negative         Historical Information   Past Medical History:   Diagnosis Date    A-fib [facility])     Acute metabolic encephalopathy 93/07/7102    CARLTON (acute kidney injury) [facility] ) 09/04/2022    Diabetes mellitus ([facility] )     Pancreatitis 09/04/2022    Sepsis ([facility] ) 09/04/2022     Past Surgical History:   Procedure Laterality Date    KNEE CARTILAGE SURGERY      KNEE SURGERY Right      Social History   Social History     Substance and Sexual Activity   Alcohol Use Never     Social History     Substance and Sexual Activity   Drug Use Never     Social History     Tobacco Use   Smoking Status Never Smoker Smokeless Tobacco Never Used     Family History   Problem Relation Age of Onset    Diabetes Father     Diabetes Mother        Meds/Allergies   all current active meds have been reviewed, current meds:   Current Facility-Administered Medications   Medication Dose Route Frequency    acetaminophen (TYLENOL) tablet 650 mg  650 mg Oral Q6H PRN    Diclofenac Sodium (VOLTAREN) 1 % topical gel 2 g  2 g Topical 4x Daily    gabapentin (NEURONTIN) capsule 100 mg  100 mg Oral TID    heparin (porcine) subcutaneous injection 5,000 Units  5,000 Units Subcutaneous Q8H [address]    insulin glargine (LANTUS) subcutaneous injection 40 Units 0 4 mL  40 Units Subcutaneous QAM    insulin lispro (HumaLOG) 100 units/mL subcutaneous injection 1-5 Units  1-5 Units Subcutaneous HS    insulin lispro (HumaLOG) 100 units/mL subcutaneous injection 14 Units  14 Units Subcutaneous TID With Meals    insulin lispro (HumaLOG) 100 units/mL subcutaneous injection 2-12 Units  2-12 Units Subcutaneous TID AC    lidocaine (URO-JET) 2 % urethral/mucosal gel 1 application  1 application Urethral Daily PRN    moisture barrier miconazole 2% cream (aka ALEXI MOISTURE BARRIER ANTIFUNGAL CREAM)   Topical BID    ondansetron (ZOFRAN-ODT) dispersible tablet 4 mg  4 mg Oral Q6H PRN    pantoprazole (PROTONIX) EC tablet 40 mg  40 mg Oral Early Morning    polyethylene glycol (MIRALAX) packet 17 g  17 g Oral Daily PRN    senna (SENOKOT) tablet 17 2 mg  2 tablet Oral Daily    tamsulosin (FLOMAX) capsule 0 4 mg  0 4 mg Oral Daily With Dinner    and PTA meds:    Medications Prior to Admission   Medication    clotrimazole-betamethasone (LOTRISONE) 1-0 05 % cream    Incontinence Supply Disposable (Incontinence Brief Large) MISC    Insulin Glargine (BASAGLAR KWIKPEN SC)    insulin lispro (HumaLOG Felix KwikPen) 100 units/mL injection pen    nystatin (MYCOSTATIN) powder    [] sulfamethoxazole-trimethoprim (BACTRIM DS) 800-160 mg per tablet         No Known Allergies    Objective     Intake/Output Summary (Last 24 hours) at 9/15/2022 1623  Last data filed at 9/15/2022 1401  Gross per 24 hour   Intake 1685 ml   Output 4350 ml   Net -2665 ml       Invasive Devices:        Physical Exam  Vitals and nursing note reviewed  Constitutional:       General: He is awake  He is not in acute distress  Appearance: Normal appearance  He is well-developed and normal weight  He is not ill-appearing, toxic-appearing or diaphoretic  HENT:      Head: Normocephalic and atraumatic  Jaw: There is normal jaw occlusion  Nose: Nose normal       Mouth/Throat:      Mouth: Mucous membranes are moist       Pharynx: Oropharynx is clear  No oropharyngeal exudate or posterior oropharyngeal erythema  Eyes:      General: Lids are normal  Vision grossly intact  Gaze aligned appropriately  No scleral icterus  Right eye: No discharge  Left eye: No discharge  Extraocular Movements: Extraocular movements intact  Conjunctiva/sclera: Conjunctivae normal       Pupils: Pupils are equal, round, and reactive to light  Neck:      Thyroid: No thyroid mass or thyromegaly  Trachea: Trachea and phonation normal    Cardiovascular:      Rate and Rhythm: Normal rate and regular rhythm  Heart sounds: Normal heart sounds, S1 normal and S2 normal  No murmur heard  No friction rub  No gallop  Pulmonary:      Effort: Pulmonary effort is normal  No respiratory distress  Breath sounds: Normal breath sounds  No stridor  No wheezing, rhonchi or rales  Abdominal:      General: Abdomen is flat  Bowel sounds are normal  There is no distension  Palpations: Abdomen is soft  There is no mass  Tenderness: There is no abdominal tenderness  There is no guarding  Hernia: No hernia is present  Musculoskeletal:         General: Normal range of motion  Cervical back: Normal range of motion and neck supple  No rigidity or tenderness        Right lower leg: No edema  Left lower leg: No edema  Lymphadenopathy:      Cervical: No cervical adenopathy  Skin:     General: Skin is warm and dry  Coloration: Skin is not jaundiced  Findings: No bruising  Nails: There is no clubbing  Neurological:      General: No focal deficit present  Mental Status: He is alert and oriented to person, place, and time  Mental status is at baseline  Psychiatric:         Attention and Perception: Attention and perception normal          Mood and Affect: Mood and affect normal          Speech: Speech normal          Behavior: Behavior normal  Behavior is cooperative  Thought Content: Thought content normal          Judgment: Judgment normal            I/O last 3 completed shifts: In: 1660 [P O :1660]  Out: 7800 [Urine:7800]    Vitals:    09/15/22 0729   BP: 109/62   Pulse: 78   Resp: 16   Temp: 97 6 °F (36 4 °C)   SpO2: 97%         Current Weight: Weight - Scale: (S)  (will start daily weights 0600 9/16/22 for accuracy)  First Weight: Weight - Scale: 103 kg (227 lb 4 7 oz)    Lab Results:  I have personally reviewed pertinent labs      CBC: No results found for: WBC, HGB, HCT, MCV, PLT, ADJUSTEDWBC, MCH, MCHC, RDW, MPV, NRBC  CMP: No results found for: NA, K, CL, CO2, ANIONGAP, BUN, CREATININE, GLUCOSE, CALCIUM, AST, ALT, ALKPHOS, PROT, BILITOT, EGFR  Phosphorus: No results found for: PHOS  Magnesium: No results found for: MG  Urinalysis: No results found for: COLORU, CLARITYU, SPECGRAV, PHUR, LEUKOCYTESUR, NITRITE, PROTEINUA, GLUCOSEU, KETONESU, BILIRUBINUR, BLOODU  Ionized Calcium: No results found for: CAION  Coagulation: No results found for: PT, INR, APTT  Troponin: No results found for: TROPONINI  ABG: No results found for: PHART, TGZ5MBO, PO2ART, SQJ9ZXQ, J7XWIPBJ, BEART, SOURCE    Results from last 7 days   Lab Units 09/14/22  1432 09/11/22  0548   POTASSIUM mmol/L 3 8 3 6   CHLORIDE mmol/L 102 101   CO2 mmol/L 21 28   BUN mg/dL 12 11 CREATININE mg/dL 1 26 0 75   CALCIUM mg/dL 9 3 8 5   ALK PHOS U/L  --  92   ALT U/L  --  11   AST U/L  --  22       Radiology review:  No results found  Hue Rosen PA-C      Portions of the record may have been created with voice recognition software  Occasional wrong word or "sound a like" substitutions may have occurred due to the inherent limitations of voice recognition software  Read the chart carefully and recognize, using context, where substitutions have occurred

## 2022-09-15 NOTE — NURSING NOTE
Lane catheter removed without difficulties, pt  Tolerated well  Pt aware need to monitor urine voided, urinal given to pt  I also explained to pt  He would be getting bladder scanned every 6 hours and explained what that was

## 2022-09-15 NOTE — PLAN OF CARE
Problem: PAIN - ADULT  Goal: Verbalizes/displays adequate comfort level or baseline comfort level  Description: Interventions:  - Encourage patient to monitor pain and request assistance  - Assess pain using appropriate pain scale  - Administer analgesics based on type and severity of pain and evaluate response  - Implement non-pharmacological measures as appropriate and evaluate response  - Consider cultural and social influences on pain and pain management  - Notify physician/advanced practitioner if interventions unsuccessful or patient reports new pain  Outcome: Progressing     Problem: DISCHARGE PLANNING  Goal: Discharge to home or other facility with appropriate resources  Description: INTERVENTIONS:  - Identify barriers to discharge w/patient and caregiver  - Arrange for needed discharge resources and transportation as appropriate  - Identify discharge learning needs (meds, wound care, etc )  - Arrange for interpretive services to assist at discharge as needed  - Refer to Case Management Department for coordinating discharge planning if the patient needs post-hospital services based on physician/advanced practitioner order or complex needs related to functional status, cognitive ability, or social support system  Outcome: Progressing     Problem: Prexisting or High Potential for Compromised Skin Integrity  Goal: Skin integrity is maintained or improved  Description: INTERVENTIONS:  - Identify patients at risk for skin breakdown  - Assess and monitor skin integrity  - Assess and monitor nutrition and hydration status  - Monitor labs   - Assess for incontinence   - Turn and reposition patient  - Assist with mobility/ambulation  - Relieve pressure over bony prominences  - Avoid friction and shearing  - Provide appropriate hygiene as needed including keeping skin clean and dry  - Evaluate need for skin moisturizer/barrier cream  - Collaborate with interdisciplinary team   - Patient/family teaching  - Consider wound care consult   Outcome: Progressing

## 2022-09-15 NOTE — PROGRESS NOTES
09/15/22 1130   Pain Assessment   Pain Assessment Tool 0-10   Pain Score 7   Pain Location/Orientation Orientation: Left; Location: Knee   Pain 2   Pain Score 2 3   Pain Location/Orientation 2 Orientation: Right;Location: Knee   Restrictions/Precautions   Precautions Fall Risk;Pain   Exercise Tools   Other Exercise Tool 1 8# dowel 5x10 chest press, shld raises,  curls, side to side , tricep curls, circles   Cognition   Overall Cognitive Status WFL   Orientation Level Oriented X4   Activity Tolerance   Activity Tolerance Patient tolerated treatment well   Assessment   Treatment Assessment Pt second session addressed TE for generalized strength and endurance  Tolerance was good  Plan is to continue with skilled OT  Prognosis Good   Problem List Decreased strength;Decreased endurance   Plan   Treatment/Interventions Therapeutic exercise   Progress Improving as expected   OT Therapy Minutes   OT Time In 1130   OT Time Out 1200   OT Total Time (minutes) 30   OT Mode of treatment - Individual (minutes) 30   OT Mode of treatment - Concurrent (minutes) 0   OT Mode of treatment - Group (minutes) 0   OT Mode of treatment - Co-treat (minutes) 0   OT Mode of Treatment - Total time(minutes) 30 minutes   OT Cumulative Minutes 429   Therapy Time missed   Time missed?  No

## 2022-09-15 NOTE — NURSING NOTE
Pt cont and voiding in urinal, continuing PVRs/Bladder scans and waiting for pt to urinate again to get urine labs  Pt c/o B/LLE pain, refused voltaran at lunch and tylenol  Pt now MOD-I w/ RW  Pt self applied french cream to groin  Pt currently sitting in recliner chair watching TV  No s/s of distress noted  Ill continue to monitor and follow plan of care

## 2022-09-15 NOTE — PROGRESS NOTES
09/15/22 0801   Pain Assessment   Pain Assessment Tool 0-10   Pain Score 4   Pain Location/Orientation Orientation: Right;Location: Knee   Restrictions/Precautions   Precautions Fall Risk;Pain   Weight Bearing Restrictions No   Cognition   Overall Cognitive Status WFL   Arousal/Participation Alert; Cooperative   Attention Attends with cues to redirect   Orientation Level Oriented X4   Memory Decreased recall of precautions   Following Commands Follows one step commands without difficulty   Comments pt agreeable to PT session   Lying to Sitting on Side of Bed   Type of Assistance Needed Independent   Lying to Sitting on Side of Bed CARE Score 6   Sit to Stand   Type of Assistance Needed Supervision   Physical Assistance Level No physical assistance   Sit to Stand CARE Score 4   Bed-Chair Transfer   Type of Assistance Needed Supervision   Physical Assistance Level No physical assistance   Chair/Bed-to-Chair Transfer CARE Score 4   Transfer Bed/Chair/Wheelchair   Stand Pivot Supervision   Sit to Stand Supervision   Stand to Sit Supervision   Sit to Supine Independent   Walk 10 Feet   Type of Assistance Needed Supervision   Physical Assistance Level No physical assistance   Walk 10 Feet CARE Score 4   Walk 50 Feet with Two Turns   Type of Assistance Needed Supervision   Physical Assistance Level No physical assistance   Walk 50 Feet with Two Turns CARE Score 4   Walk 150 Feet   Type of Assistance Needed Supervision   Physical Assistance Level No physical assistance   Walk 150 Feet CARE Score 4   Ambulation   Does the patient walk? 2  Yes   Primary Mode of Locomotion Prior to Admission Walk   Distance Walked (feet) 200 ft  (200 ftx2)   Assist Device Roller Walker   Gait Pattern Slow Gladys; Wide ALEA; Improper weight shift;Decreased foot clearance   Limitations Noted In Endurance; Safety   Walk Assist Level Supervision   Curb or Single Stair   Style negotiated Single stair   Type of Assistance Needed Supervision Physical Assistance Level No physical assistance   1 Step (Curb) CARE Score 4   4 Steps   Type of Assistance Needed Supervision   Physical Assistance Level No physical assistance   4 Steps CARE Score 4   12 Steps   Type of Assistance Needed Supervision   Physical Assistance Level No physical assistance   12 Steps CARE Score 4   Stairs   Type Stairs   # of Steps 21   Weight Bearing Precautions WBAT   Assist Devices Bilateral Rail   Therapeutic Interventions   Strengthening seated TE with 2 5#   Flexibility self Knee flex stretch   Other stair training   Assessment   Treatment Assessment Pt seen for PT treatment session this date with interventions consisting of gait training w/ emphasis on improving pt's ability to ambulate level surfaces x 200 ftx2 with close S provided by therapist with RW, Therapeutic exercise consisting of: AROM 2 sets of 15 reps B LE in sitting position, therapeutic activity consisting of training: bed mobility, supine<>sit transfers, sit<>stand transfers and static standing tolerance for 4 minutes w/ no UE support and navigating 21 stairs w/ B handrail with alternating pattern with close S  Pt agreeable to PT treatment session upon arrival, pt found supine in bed w/ HOB elevated, in no apparent distress and responsive  In comparison to previous session, pt with improvements in balance and endurance  Post session: chair alarm engaged, all needs in reach and RN notified of session findings/recommendations  Continue to recommend home with outpatient rehabilitation at time of d/c in order to maximize pt's functional independence and safety w/ mobility  Pt continues to be functioning below baseline level  PT will continue to see pt during current hospitalization in order to address the deficits listed above and provide interventions consistent w/ POC in effort to achieve STGs  PT Barriers   Physical Impairment Decreased strength;Decreased range of motion;Decreased endurance; Impaired balance;Decreased safety awareness;Pain   Functional Limitation Ramp negotiation;Stair negotiation;Standing;Transfers; Walking   Plan   Treatment/Interventions Functional transfer training;LE strengthening/ROM; Therapeutic exercise; Endurance training;Gait training;Equipment eval/education   Progress Progressing toward goals   PT Therapy Minutes   PT Time In 0701   PT Time Out 0801   PT Total Time (minutes) 60   PT Mode of treatment - Individual (minutes) 60   PT Mode of treatment - Concurrent (minutes) 0   PT Mode of treatment - Group (minutes) 0   PT Mode of treatment - Co-treat (minutes) 0   PT Mode of Treatment - Total time(minutes) 60 minutes   PT Cumulative Minutes 377   Therapy Time missed   Time missed?  No

## 2022-09-15 NOTE — PROGRESS NOTES
09/15/22 1300   Pain Assessment   Pain Assessment Tool 0-10   Pain Score 7   Pain Location/Orientation Orientation: Left; Location: Knee   Pain 2   Pain Score 2 3   Pain Location/Orientation 2 Orientation: Right;Location: Knee   Restrictions/Precautions   Precautions Fall Risk;Pain   Oral Hygiene   Type of Assistance Needed Independent   Physical Assistance Level No physical assistance   Oral Hygiene CARE Score 6   Grooming   Able To Initiate Tasks; Wash/Dry Face;Comb/Brush Hair;Brush/Clean Teeth;Wash/Dry Hands   Shower/Bathe Self   Type of Assistance Needed Set-up / clean-up; Adaptive equipment   Shower/Bathe Self CARE Score 5   Bathing   Assessed Bath Style Shower   Able to [employer] Temperature Yes   Able to Wash/Rinse/Dry (body part) Left Arm;Right Arm;L Upper Leg;R Upper Leg;L Lower Leg/Foot;R Lower Leg/Foot;Chest;Abdomen;Perineal Area; Buttocks   Limitations Noted in Balance; Safety   Positioning Seated;Standing   Findings  Pt agreed to use of shower seat;daughter is purchasing one  Current order for that will be cancelled by pt when he receives the call   Tub/Shower Transfer   Limitations Noted In Balance; Safety   Adaptive Equipment Seat with out Back;Grab Bars   Assessed Shower   Findings Supervision   Upper Body Dressing   Type of Assistance Needed Independent   Upper Body Dressing CARE Score 6   Lower Body Dressing   Type of Assistance Needed Set-up / clean-up   Lower Body Dressing CARE Score 5   Putting On/Taking Off Footwear   Type of Assistance Needed Set-up / clean-up   Putting On/Taking Off Footwear CARE Score 5   Picking Up Object   Type of Assistance Needed Supervision   Picking Up Object CARE Score 4   Dressing/Undressing Clothing   Remove UB Clothes Pullover Shirt   Don UB Clothes   (gown)   Remove LB Clothes Shorts;Socks   [address]; Shorts   Limitations Noted In Balance;Strength   Sit to Stand   Type of Assistance Needed Independent   Sit to Stand CARE Score 6   Additional Activities Additional Activities Comments fxnl mobility in room MI RW   Activity Tolerance   Activity Tolerance Patient tolerated treatment well   Assessment   Treatment Assessment OT tx addressed ADLs via shower level with focus on safe techniques and compensatory strategies assessing consistency with previously instructed tech and strategies  Levels  noted in respectvie sections of note  Pt has overall fair recall with techniques  OT discussed MI level with pt and he reports he feels like he can handle this especially now that cristo morgan  Communicated with staff  D/C planning ongoing  Plan is to continue with skilled OT  Prognosis Good   Problem List Decreased strength; Impaired balance;Decreased safety awareness;Pain   Plan   Treatment/Interventions ADL retraining;Functional transfer training;Equipment eval/education;Patient/family training;Gait training; Compensatory technique education;OT;Spoke to nursing   Progress Improving as expected   OT Therapy Minutes   OT Time In 1300   OT Time Out 1415   OT Total Time (minutes) 75   OT Mode of treatment - Individual (minutes) 75   OT Mode of treatment - Concurrent (minutes) 0   OT Mode of treatment - Group (minutes) 0   OT Mode of treatment - Co-treat (minutes) 0   OT Mode of Treatment - Total time(minutes) 75 minutes   OT Cumulative Minutes 504   Therapy Time missed   Time missed?  No

## 2022-09-15 NOTE — PROGRESS NOTES
09/15/22 1001   Pain Assessment   Pain Assessment Tool 0-10   Pain Score No Pain   Restrictions/Precautions   Precautions Fall Risk;Pain   Weight Bearing Restrictions No   ROM Restrictions No   Cognition   Overall Cognitive Status WFL   Arousal/Participation Alert; Cooperative   Attention Attends with cues to redirect   Orientation Level Oriented X4   Memory Within functional limits   Following Commands Follows one step commands without difficulty   Sit to Stand   Type of Assistance Needed Supervision   Sit to Stand CARE Score 4   Bed-Chair Transfer   Type of Assistance Needed Supervision   Chair/Bed-to-Chair Transfer CARE Score 4   Transfer Bed/Chair/Wheelchair   Limitations Noted In Balance;UE Strength;LE Strength   Adaptive Equipment Roller Walker   Stand Pivot Supervision   Sit to Stand Supervision   Stand to Sit Supervision   Walk 10 Feet   Type of Assistance Needed Supervision   Walk 10 Feet CARE Score 4   Walk 50 Feet with Two Turns   Type of Assistance Needed Supervision   Walk 50 Feet with Two Turns CARE Score 4   Walk 150 Feet   Type of Assistance Needed Supervision   Walk 150 Feet CARE Score 4   Walking 10 Feet on Uneven Surfaces   Type of Assistance Needed Supervision   Physical Assistance Level No physical assistance   Comment green mat, ramp   Walking 10 Feet on Uneven Surfaces CARE Score 4   Ambulation   Does the patient walk? 2  Yes   Primary Mode of Locomotion Prior to Admission Walk   Distance Walked (feet) 200 ft  (200 ftx2 with RW, 100 ftx1 with SPC)   Assist Device Roller Walker;Cane   Gait Pattern Decreased foot clearance; Improper weight shift; Slow Gladys   Limitations Noted In Endurance; Safety;Strength   Walk Assist Level Supervision   Findings amb on uneven surface and on Ramp   Therapeutic Interventions   Strengthening standing ther ex,   Flexibility hams, calf stretching   Other gait and transfers   Equipment Use   NuStep LV2, seat 11, 10 min   Assessment   Treatment Assessment Pt seen for PT treatment session this date with interventions consisting of gait training w/ emphasis on improving pt's ability to ambulate level surfaces x 200 ftx2 with RW, 100 ft with SPC, amb on uneven surface and ramp with close S provided by therapist with RW and SPC, Therapeutic exercise consisting of: AROM 2 sets of 15 reps B LE in standing with B UE support position and educated patient on floor transfers  Pt agreeable to PT treatment session upon arrival, pt found seated OOB in recliner, in no apparent distress and responsive  In comparison to previous session, pt with improvements in balance with ambulation and transfers  Post session: all needs in reach and RN notified of session findings/recommendations  Continue to recommend home with outpatient rehabilitation at time of d/c in order to maximize pt's functional independence and safety w/ mobility  Pt continues to be functioning below baseline level  PT will continue to see pt during current hospitalization in order to address the deficits listed above and provide interventions consistent w/ POC in effort to achieve STGs  PT Barriers   Physical Impairment Decreased strength;Decreased endurance; Impaired balance;Decreased mobility; Impaired judgement;Decreased safety awareness   Functional Limitation Ramp negotiation;Stair negotiation;Standing;Transfers; Walking   Plan   Treatment/Interventions Functional transfer training;LE strengthening/ROM; Therapeutic exercise; Endurance training;Bed mobility;Gait training   Progress Progressing toward goals   PT Therapy Minutes   PT Time In 1001   PT Time Out 1101   PT Total Time (minutes) 60   PT Mode of treatment - Individual (minutes) 60   PT Mode of treatment - Concurrent (minutes) 0   PT Mode of treatment - Group (minutes) 0   PT Mode of treatment - Co-treat (minutes) 0   PT Mode of Treatment - Total time(minutes) 60 minutes   PT Cumulative Minutes 437   Therapy Time missed   Time missed?  No

## 2022-09-16 LAB
ALBUMIN SERPL BCP-MCNC: 3.2 G/DL (ref 3.5–5)
ALP SERPL-CCNC: 84 U/L (ref 34–104)
ALT SERPL W P-5'-P-CCNC: 14 U/L (ref 7–52)
ANION GAP SERPL CALCULATED.3IONS-SCNC: 8 MMOL/L (ref 4–13)
AST SERPL W P-5'-P-CCNC: 33 U/L (ref 13–39)
BASOPHILS # BLD AUTO: 0.01 THOUSANDS/ΜL (ref 0–0.1)
BASOPHILS NFR BLD AUTO: 0 % (ref 0–1)
BILIRUB SERPL-MCNC: 0.44 MG/DL (ref 0.2–1)
BUN SERPL-MCNC: 18 MG/DL (ref 5–25)
CALCIUM ALBUM COR SERPL-MCNC: 9.7 MG/DL (ref 8.3–10.1)
CALCIUM SERPL-MCNC: 9.1 MG/DL (ref 8.4–10.2)
CHLORIDE SERPL-SCNC: 103 MMOL/L (ref 96–108)
CO2 SERPL-SCNC: 26 MMOL/L (ref 21–32)
CREAT SERPL-MCNC: 0.83 MG/DL (ref 0.6–1.3)
CREAT UR-MCNC: 76.7 MG/DL
CREAT UR-MCNC: 76.7 MG/DL
EOSINOPHIL # BLD AUTO: 0.19 THOUSAND/ΜL (ref 0–0.61)
EOSINOPHIL NFR BLD AUTO: 3 % (ref 0–6)
ERYTHROCYTE [DISTWIDTH] IN BLOOD BY AUTOMATED COUNT: 13.2 % (ref 11.6–15.1)
GFR SERPL CREATININE-BSD FRML MDRD: 90 ML/MIN/1.73SQ M
GLUCOSE P FAST SERPL-MCNC: 180 MG/DL (ref 65–99)
GLUCOSE SERPL-MCNC: 120 MG/DL (ref 65–140)
GLUCOSE SERPL-MCNC: 136 MG/DL (ref 65–140)
GLUCOSE SERPL-MCNC: 160 MG/DL (ref 65–140)
GLUCOSE SERPL-MCNC: 180 MG/DL (ref 65–140)
GLUCOSE SERPL-MCNC: 51 MG/DL (ref 65–140)
HCT VFR BLD AUTO: 34.5 % (ref 36.5–49.3)
HGB BLD-MCNC: 11.1 G/DL (ref 12–17)
IMM GRANULOCYTES # BLD AUTO: 0.01 THOUSAND/UL (ref 0–0.2)
IMM GRANULOCYTES NFR BLD AUTO: 0 % (ref 0–2)
LYMPHOCYTES # BLD AUTO: 1.74 THOUSANDS/ΜL (ref 0.6–4.47)
LYMPHOCYTES NFR BLD AUTO: 29 % (ref 14–44)
MAGNESIUM SERPL-MCNC: 2.1 MG/DL (ref 1.9–2.7)
MCH RBC QN AUTO: 29.8 PG (ref 26.8–34.3)
MCHC RBC AUTO-ENTMCNC: 32.2 G/DL (ref 31.4–37.4)
MCV RBC AUTO: 93 FL (ref 82–98)
MONOCYTES # BLD AUTO: 0.49 THOUSAND/ΜL (ref 0.17–1.22)
MONOCYTES NFR BLD AUTO: 8 % (ref 4–12)
NEUTROPHILS # BLD AUTO: 3.48 THOUSANDS/ΜL (ref 1.85–7.62)
NEUTS SEG NFR BLD AUTO: 60 % (ref 43–75)
NRBC BLD AUTO-RTO: 0 /100 WBCS
OSMOLALITY UR/SERPL-RTO: 297 MMOL/KG (ref 282–298)
OSMOLALITY UR: 320 MMOL/KG
PHOSPHATE SERPL-MCNC: 4.4 MG/DL (ref 2.3–4.1)
PLATELET # BLD AUTO: 231 THOUSANDS/UL (ref 149–390)
PMV BLD AUTO: 11 FL (ref 8.9–12.7)
POTASSIUM SERPL-SCNC: 4.3 MMOL/L (ref 3.5–5.3)
PROT SERPL-MCNC: 6.2 G/DL (ref 6.4–8.4)
PROT UR-MCNC: 14 MG/DL
PROT/CREAT UR: 0.18 MG/G{CREAT} (ref 0–0.1)
RBC # BLD AUTO: 3.73 MILLION/UL (ref 3.88–5.62)
SODIUM 24H UR-SCNC: 60 MOL/L
SODIUM SERPL-SCNC: 137 MMOL/L (ref 135–147)
WBC # BLD AUTO: 5.92 THOUSAND/UL (ref 4.31–10.16)

## 2022-09-16 PROCEDURE — 99232 SBSQ HOSP IP/OBS MODERATE 35: CPT | Performed by: INTERNAL MEDICINE

## 2022-09-16 PROCEDURE — 82948 REAGENT STRIP/BLOOD GLUCOSE: CPT

## 2022-09-16 PROCEDURE — 84100 ASSAY OF PHOSPHORUS: CPT | Performed by: PHYSICIAN ASSISTANT

## 2022-09-16 PROCEDURE — 80053 COMPREHEN METABOLIC PANEL: CPT | Performed by: PHYSICIAN ASSISTANT

## 2022-09-16 PROCEDURE — 97110 THERAPEUTIC EXERCISES: CPT

## 2022-09-16 PROCEDURE — 83735 ASSAY OF MAGNESIUM: CPT | Performed by: PHYSICIAN ASSISTANT

## 2022-09-16 PROCEDURE — 97535 SELF CARE MNGMENT TRAINING: CPT

## 2022-09-16 PROCEDURE — 97530 THERAPEUTIC ACTIVITIES: CPT

## 2022-09-16 PROCEDURE — 99232 SBSQ HOSP IP/OBS MODERATE 35: CPT | Performed by: PHYSICAL MEDICINE & REHABILITATION

## 2022-09-16 PROCEDURE — 85025 COMPLETE CBC W/AUTO DIFF WBC: CPT | Performed by: PHYSICIAN ASSISTANT

## 2022-09-16 PROCEDURE — 97116 GAIT TRAINING THERAPY: CPT

## 2022-09-16 RX ORDER — LIDOCAINE HYDROCHLORIDE 20 MG/ML
1 JELLY TOPICAL
Status: DISCONTINUED | OUTPATIENT
Start: 2022-09-16 | End: 2022-09-20 | Stop reason: HOSPADM

## 2022-09-16 RX ORDER — INSULIN LISPRO 100 [IU]/ML
10 INJECTION, SOLUTION INTRAVENOUS; SUBCUTANEOUS
Status: DISCONTINUED | OUTPATIENT
Start: 2022-09-16 | End: 2022-09-20 | Stop reason: HOSPADM

## 2022-09-16 RX ADMIN — DICLOFENAC SODIUM 2 G: 10 GEL TOPICAL at 17:00

## 2022-09-16 RX ADMIN — INSULIN GLARGINE 40 UNITS: 100 INJECTION, SOLUTION SUBCUTANEOUS at 08:26

## 2022-09-16 RX ADMIN — HEPARIN SODIUM 5000 UNITS: 5000 INJECTION INTRAVENOUS; SUBCUTANEOUS at 13:42

## 2022-09-16 RX ADMIN — LIDOCAINE HYDROCHLORIDE 1 APPLICATION: 20 JELLY TOPICAL at 01:54

## 2022-09-16 RX ADMIN — GABAPENTIN 100 MG: 100 CAPSULE ORAL at 16:55

## 2022-09-16 RX ADMIN — PANTOPRAZOLE SODIUM 40 MG: 40 TABLET, DELAYED RELEASE ORAL at 05:32

## 2022-09-16 RX ADMIN — DICLOFENAC SODIUM 2 G: 10 GEL TOPICAL at 12:15

## 2022-09-16 RX ADMIN — LIDOCAINE HYDROCHLORIDE 1 APPLICATION: 20 JELLY TOPICAL at 12:15

## 2022-09-16 RX ADMIN — DICLOFENAC SODIUM 2 G: 10 GEL TOPICAL at 08:28

## 2022-09-16 RX ADMIN — TAMSULOSIN HYDROCHLORIDE 0.4 MG: 0.4 CAPSULE ORAL at 16:55

## 2022-09-16 RX ADMIN — INSULIN LISPRO 14 UNITS: 100 INJECTION, SOLUTION INTRAVENOUS; SUBCUTANEOUS at 09:00

## 2022-09-16 RX ADMIN — HEPARIN SODIUM 5000 UNITS: 5000 INJECTION INTRAVENOUS; SUBCUTANEOUS at 05:32

## 2022-09-16 RX ADMIN — HEPARIN SODIUM 5000 UNITS: 5000 INJECTION INTRAVENOUS; SUBCUTANEOUS at 21:49

## 2022-09-16 RX ADMIN — GABAPENTIN 100 MG: 100 CAPSULE ORAL at 08:27

## 2022-09-16 RX ADMIN — GABAPENTIN 100 MG: 100 CAPSULE ORAL at 21:49

## 2022-09-16 RX ADMIN — INSULIN LISPRO 2 UNITS: 100 INJECTION, SOLUTION INTRAVENOUS; SUBCUTANEOUS at 08:26

## 2022-09-16 RX ADMIN — INSULIN LISPRO 10 UNITS: 100 INJECTION, SOLUTION INTRAVENOUS; SUBCUTANEOUS at 16:57

## 2022-09-16 RX ADMIN — MICONAZOLE NITRATE: 20 CREAM TOPICAL at 09:55

## 2022-09-16 RX ADMIN — SENNOSIDES 17.2 MG: 8.6 TABLET, FILM COATED ORAL at 08:27

## 2022-09-16 RX ADMIN — LIDOCAINE HYDROCHLORIDE 1 APPLICATION: 20 JELLY TOPICAL at 21:49

## 2022-09-16 RX ADMIN — MICONAZOLE NITRATE: 20 CREAM TOPICAL at 17:00

## 2022-09-16 NOTE — CASE MANAGEMENT
Pt's DC will not be tomorrow after speaking with team  Will keep tentative DC date of Tuesday, 9/20  TI notified Pt's daughter, who stated that she will not arrive until 7 PM on Tuesday, but will be available to assist with transport home  She stated that she intends to return the call to [address] [place] [address] today to make copayment arrangements for the DME order, which includes 2 BSCs (confirmed for daughter via order form per her request)  She stated that she may need the items delivered to the home rather than [facility] as she is unsure if there will be sufficient space in the vehicle as she will be bringing her children & another adult as well  TI also notified [facility] of DC on Tuesday; they will start care in the home on 9/21/22  TI will continue to monitor & assist as needed

## 2022-09-16 NOTE — PROGRESS NOTES
PM&R PROGRESS NOTE:  Oracio Richardson Jr  75 y o  male MRN: [id number]  Unit/Bed#: -01 Encounter: [id number]        Rehabilitation Diagnosis: Impairment of mobility, safety and Activities of Daily Living (ADLs) due to Debility:  16  Debility (Non-cardiac/Non-pulmonary)    HPI: Vinod Das  is a 75 y o  male with a PMH significant for T2DM,  who presented to the [facility] with altered mental status, N/V and generalized weakness  Patients spouse found patient this way after work and reported patient had lost weight several months ago resulting in patient not taking insulin due to feeling he no longer needed it  Glucose was elevated to 800 upon arrival  He recently moved here from [place] and does not have established care in the area  He was seen by [address] [place] [address] Urology for dysuria on 9/2 however  He was noted to have severe anion gap metabolic acidosis and lactatemia due to acute pancreatitis, DKA, obstructive uropathy and non-obstructing 8 mm right renal calculus  Also with new onset Afib and CARLTON  IV insulin given for DKA which resolved and patient was transitioned to basal/bolus regimen  Endocrinology consulted and adjusted regimen to Lantus/Humalog  GI consulted for acute pancreatitis and initiated on IV fluids and NG tube  Once resolved, NG tube d/c on 9/6 and pt has been tolerating solids/liquids since  Urology consulted for urinary retention and a lemons catheter was placed with plans for urodynamic studies once lemons removed  Also initiated on Ceftriaxone with urine culture positive for coag neg staph  Urology does not recommend removal while inpatient  Wound care nursing consulted for scrotal/groin fungal rash requiring topical antifungal treatment  Evaluated by PT/OT and deemed appropriate for post-acute rehabilitation services  He was accepted to [facility] and arrived on 9/10/22         SUBJECTIVE: Patient seen and evaluated   Discussed with patient about re-inserting Lemons catheter due to unclear PVR and bladder scan readings  Patient requested to try straight cath's today  Informed patient these will need to be Q4H which he is agreeable too  However, patient is aware if he is still getting abnormal readings the lemons will need to be reinserted over the weekend and will need to remain in place until seen by Urologist as an OP  Patient understands and is in agreement with this plan  Patient may drink fluids however requested to limit fluid intake  No official fluid restriction is required  ASSESSMENT: Stable, progressing      PLAN:    Rehabilitation   Functional deficits:  Impaired mobility, self care, RUE weakness, BLE weakness and Neuropathy   Continue current rehabilitation plan of care to maximize function       Functional update:   o PT:  Roll L-R:  Sit-lying: independent  Lying to sitting on side of bed: independent  Sit-stand: independent   Bed-chair transfer: independent   Ambulation: modified independent   Stairs: supervision   o OT:  Oral Hygiene: independent  Showering/bathing: s/u c/u  Tub/shower transfer: S/MI  UB dressing: s/u c/u  LB dressing: s/u c/u  Putting on/taking off footwear: s/u c/u  Toilet transfer: independent      Estimated Discharge: Re-team next week      Pain   Neurontin 100 mg TID    Tylenol 650 mg Q6H PRN for mild pain    DVT prophylaxis   Heparin 5000 Units SQ Q8H [address]    Bladder plan  · Experiences abnormal bladder scans/PVRs   · Suspect patient is still retaining   · Lemons Catheter to be re-inserted however patient requested to do Straight caths Q4H; if these remain abnormal tomorrow then recommending Lemons to be placed; at this point urology OP follow up will need to setup       Bowel plan   Continent   Last BM 9/15    Skin care  · Calazime to coccyx and intergluteal crease TID/PRN  · Hydra-guard bilateral heels/buttocks BID/PRN  · Elevate heels to offload pressure  · EHOB cusion when OOB  · Turn/reposiiton Q2H for pressure re-distribution of skin  · Moisturize skin daily with skin nourishing cream  · Scrotal and bilateral medial thighs- cleanse skin with soap/water, pat dry, apply Francois cream over affected areas BID and with chayo-care    Polyuria  Assessment & Plan  · Lemons removed 9/15, voiding trial initiated  · Strict I&Os  · Noted increase in urine OP after initiation of Lamictal, unclear etiology, Lamictal held   · Nephrology consulted; input appreciated   · Please see under urinary retention     Pain in both lower extremities  Assessment & Plan  · Tenderness to palpation of left calf- reported at time of initial evaluation   · Doppler BLE to rule out DVT ; negative for acute DVT 9/12/22  · Voltaren gel to bilateral knee 4x daily  · Consider OP Ortho follow up    Depressed mood  Assessment & Plan  · Reported at time of admission by nursing  · Patient recently lost son to OD and commented on wife with negative comments towards patients appearance  · Psych consult appreciated  · Lamictal-continue to hold   · Pastoral care consult completed 9/13/22  · OP f/u with Psychiatry recommended    Fungal infection of skin  Assessment & Plan  · Francois cream to groin BID  · Wound care following  · Calazime to coccyx and intergluteal crease TID/PRN    Urinary retention  Assessment & Plan  · Thought to be due to neurogenic bladder in setting of uncontrolled diabetes  · Had bilateral hydronephrosis from retention, improvement noted after decompression on US  · Flomax 0 4 mg daily with dinner   · Urology following  · Lemons removed 9/15, voiding trial initiated however with unreliable/abnormal bladder scans/PVR, for today, straight cath Q4H, if continues with abnormal readings recommend lemons catheter to be reinserted and to f/u with urology as OP   Recommend restricting fluid intake, may continue to drink fluids however just limit fluids   · Strict I&Os  · Nephrology follow       New onset atrial fibrillation [facility])  Assessment & Plan  · Noted with RVR in ED on EKG  · Spontaneously converted to sinus tachycardia prior to arrival to ICU  · Thought to be d/t severe metabolic derangements  · Converted to Normal sinus while in acute care  · Monitor   · Consider OP Cardiology follow up         * DKA (diabetic ketoacidosis) [facility])  Assessment & Plan  Lab Results   Component Value Date    HGBA1C >14 0 (H) 09/05/2022       Recent Labs     09/15/22  1616 09/15/22  2018 09/16/22  0736 09/16/22  1132   POCGLU 143* 96 160* 51*       Blood Sugar Average: Last 72 hrs:  (P) 113 [id number]   · S/p insulin drip, endocrinology consulted and transitioned to basal insulin and SSI  · Continue Lantus 40 units in AM, Humalog 14 units TID with meals, SSI   · Goals for basal/bolus regimen at discharge  · Will need insulin pens ordered, glucometer, supplies and pen needles at discharge  · Monitor accu cheks  · Hypoglycemia protocol   · RD following; consider OP diabetic educator follow up   · New onset neuropathy to BLE- Continue gabapentin  · Acute chomprehensive interdisciplinary inpatient rehabilitation to include intensive skilled therapies as outlines with oversight and management by a rehabilitation Physician Assistant overseen by rehabilitation physician as well as inpatient rehabilitation nursing, case management and weekly interdisciplinary team meeting          Appreciate IM consultants medical co-management  Labs, medications, and imaging personally reviewed  ROS:  A ten point review of systems was completed and pertinent positives are listed in subjective section  All other systems reviewed were negative  Review of Systems   Constitutional: Negative  HENT: Negative  Negative for trouble swallowing  Eyes: Negative  Respiratory: Negative  Negative for shortness of breath  Cardiovascular: Negative  Negative for chest pain  Gastrointestinal: Negative  Negative for abdominal pain, constipation and diarrhea  Genitourinary: Positive for difficulty urinating  Negative for dysuria  Admits to being unable to empty bladder   Musculoskeletal: Positive for arthralgias  Knee pain   Neurological: Negative  Psychiatric/Behavioral: Negative  OBJECTIVE:   /65 (BP Location: Right arm)   Pulse 75   Temp 98 °F (36 7 °C) (Temporal)   Resp 16   Ht 6' (1 829 m)   Wt 104 kg (229 lb 4 5 oz)   SpO2 95%   BMI 31 10 kg/m²     Physical Exam  Vitals and nursing note reviewed  Constitutional:       General: He is not in acute distress  Appearance: He is not ill-appearing  HENT:      Head: Normocephalic and atraumatic  Eyes:      Pupils: Pupils are equal, round, and reactive to light  Cardiovascular:      Rate and Rhythm: Normal rate and regular rhythm  Pulses: Normal pulses  Heart sounds: Normal heart sounds  No murmur heard  Pulmonary:      Effort: Pulmonary effort is normal  No respiratory distress  Breath sounds: Normal breath sounds  Abdominal:      General: Bowel sounds are normal  There is no distension  Palpations: Abdomen is soft  Skin:     General: Skin is warm and dry  Neurological:      General: No focal deficit present  Mental Status: He is alert and oriented to person, place, and time     Psychiatric:         Mood and Affect: Mood normal           Lab Results   Component Value Date    WBC 5 92 09/16/2022    HGB 11 1 (L) 09/16/2022    HCT 34 5 (L) 09/16/2022    MCV 93 09/16/2022     09/16/2022     Lab Results   Component Value Date    SODIUM 137 09/16/2022    K 4 3 09/16/2022     09/16/2022    CO2 26 09/16/2022    BUN 18 09/16/2022    CREATININE 0 83 09/16/2022    GLUC 180 (H) 09/16/2022    CALCIUM 9 1 09/16/2022     No results found for: INR, PROTIME        Current Facility-Administered Medications:     acetaminophen (TYLENOL) tablet 650 mg, 650 mg, Oral, Q6H PRN, Liz Rock PA-C    Diclofenac Sodium (VOLTAREN) 1 % topical gel 2 g, 2 g, Topical, 4x Daily, Liz Rock PA-C, 2 g at 09/16/22 1215    gabapentin (NEURONTIN) capsule 100 mg, 100 mg, Oral, TID, Agnes Nagel PA-C, 100 mg at 09/16/22 0827    heparin (porcine) subcutaneous injection 5,000 Units, 5,000 Units, Subcutaneous, Q8H [address], Agnes Nagel PA-C, 5,000 Units at 09/16/22 0532    insulin glargine (LANTUS) subcutaneous injection 40 Units 0 4 mL, 40 Units, Subcutaneous, QAM, Veldon Croon, CRNP, 40 Units at 09/16/22 0826    insulin lispro (HumaLOG) 100 units/mL subcutaneous injection 1-5 Units, 1-5 Units, Subcutaneous, HS, Agnes Nagel PA-C, 2 Units at 09/12/22 2106    insulin lispro (HumaLOG) 100 units/mL subcutaneous injection 14 Units, 14 Units, Subcutaneous, TID With Meals, Agnes Nagel PA-C, 14 Units at 09/16/22 0900    insulin lispro (HumaLOG) 100 units/mL subcutaneous injection 2-12 Units, 2-12 Units, Subcutaneous, TID AC, 2 Units at 09/16/22 0826 **AND** Fingerstick Glucose (POCT), , , TID AC, Amy Sapp PA-C    lidocaine (URO-JET) 2 % urethral/mucosal gel 1 application, 1 application, Urethral, X8Z PRN Max 4/day, Darren Tang MD, 1 application at 50/53/15 1215    moisture barrier miconazole 2% cream (aka ALEXI MOISTURE BARRIER ANTIFUNGAL CREAM), , Topical, BID, Darren Tang MD, Given at 09/16/22 0955    ondansetron (ZOFRAN-ODT) dispersible tablet 4 mg, 4 mg, Oral, Q6H PRN, Agnes Nagel PA-C    pantoprazole (PROTONIX) EC tablet 40 mg, 40 mg, Oral, Early Morning, Amy Sapp PA-C, 40 mg at 09/16/22 0532    polyethylene glycol (MIRALAX) packet 17 g, 17 g, Oral, Daily PRN, Agnes Nagel PA-C    senna (SENOKOT) tablet 17 2 mg, 2 tablet, Oral, Daily, Amy Sapp PA-C, 17 2 mg at 09/16/22 0827    tamsulosin (FLOMAX) capsule 0 4 mg, 0 4 mg, Oral, Daily With Lisa Antonio PA-C, 0 4 mg at 09/15/22 1711    Past Medical History:   Diagnosis Date    A-fib [facility])     Acute metabolic encephalopathy 53/15/4471    CARLTON (acute kidney injury) ([facility] ) 09/04/2022    Diabetes mellitus ([place] [address] )     Pancreatitis 09/04/2022    Sepsis ([place] [address] ) 09/04/2022       Patient Active Problem List    Diagnosis Date Noted    Polyuria 09/15/2022    Depressed mood 09/11/2022    Pain in both lower extremities 09/11/2022    Fungal infection of skin 09/09/2022    Urinary retention 09/07/2022    DKA (diabetic ketoacidosis) ([place] [address] ) 09/04/2022    New onset atrial fibrillation ([place] [address] ) 09/04/2022    Urinary frequency 09/02/2022    Type 2 diabetes mellitus ([place] [address] ) 09/02/2022    Prostate cancer screening 09/02/2022    Microscopic hematuria 09/02/2022    Urge incontinence of urine 09/02/2022    Nocturnal enuresis 09/02/2022    Feeling of incomplete bladder emptying 09/02/2022    Balanitis 09/02/2022          Alanna Hood PA-C    Total time spent:  35 minutes with more than 50% spent counseling/coordinating care  Counseling includes discussion with patient re: progress and discussion with patient of his/her current medical/functional state/information  Coordination of patient's care was performed in conjunction with consulting services  Time invested included review of patient's labs, vitals, and management of their comorbidities with continued monitoring  The care of the patient was extensively discussed and appropriate treatment plan was formulated unique for this patient  ** Please Note:  voice to text software may have been used in the creation of this document   Although proof errors in transcription or interpretation are a potential of such software**

## 2022-09-16 NOTE — PROGRESS NOTES
09/16/22 1245   Pain Assessment   Pain Assessment Tool 0-10   Pain Score 7   Pain Location/Orientation Orientation: Right;Location: Knee   Restrictions/Precautions   Precautions Fall Risk;Pain   Weight Bearing Restrictions No   ROM Restrictions No   Sit to Stand   Type of Assistance Needed Independent   Physical Assistance Level No physical assistance   Sit to Stand CARE Score 6   Functional Standing Tolerance   Time approx 15min during exercises   Therapeutic Excerise-Strength   UE Strength Yes   Right Upper Extremity- Strength   RUE Strength Comment 5 or 6 sets of 15 reps with 9# therabar: elbow flexion/extension, protraction/retraction, internal/external rotation, triceps extension, shoulder rotation, horizontal ABD/ADD   Left Upper Extremity-Strength   LUE Strength Comment 5 or 6 sets of 15 reps with 9# therabar: elbow flexion/extension, protraction/retraction, internal/external rotation, triceps extension, shoulder rotation, horizontal ABD/ADD   Cognition   Overall Cognitive Status WFL   Arousal/Participation Alert; Responsive; Cooperative   Attention Attends with cues to redirect   Orientation Level Oriented X4   Memory Within functional limits   Following Commands Follows all commands and directions without difficulty   Assessment   Treatment Assessment Pt seen for OT session this PM focusing on standing tolerance and UE strength exercises  Pt with good tolerance for all activities and took rest breaks as needed  Pt is an eager and active participant in therapy and is very motivated to continue in rehab  Pt would benefit from continued skilled OT services in order to maximize functional mobility/transfers, ROM/strength, and activity tolerance  Prognosis Good   Problem List Decreased strength;Decreased endurance; Impaired balance;Decreased safety awareness;Pain   Plan   Treatment/Interventions ADL retraining;Functional transfer training;LE strengthening/ROM; Therapeutic exercise; Endurance training;Patient/family training;Equipment eval/education; Compensatory technique education;OT   Progress Progressing toward goals   OT Therapy Minutes   OT Time In 1245   OT Time Out 1350   OT Total Time (minutes) 65   OT Mode of treatment - Individual (minutes) 65

## 2022-09-16 NOTE — NURSING NOTE
Pt bladder scanned for 516mL  Pt wanted to wait for next void for a rescan  Will continue to monitor

## 2022-09-16 NOTE — PROGRESS NOTES
09/16/22 1000   Pain Assessment   Pain Assessment Tool   (Simultaneous filing  User may not have seen previous data )   Pain Score   (Simultaneous filing  User may not have seen previous data )   Pain Location/Orientation Orientation: Right;Location: Knee   Pain 2   Pain Score 2 3   Pain Location/Orientation 2 Orientation: Left; Location: Knee   Restrictions/Precautions   Precautions Fall Risk;Pain  (Simultaneous filing  User may not have seen previous data )   ROM Restrictions   (Simultaneous filing  User may not have seen previous data )   Oral Hygiene   Type of Assistance Needed Independent   Physical Assistance Level No physical assistance   Oral Hygiene CARE Score 6   Grooming   Able To Initiate Tasks;Comb/Brush Hair;Wash/Dry Face;Brush/Clean Teeth;Wash/Dry Hands   Shower/Bathe Self   Type of Assistance Needed Set-up / Darryl Pica; Adaptive equipment   Shower/Bathe Self CARE Score 5   Bathing   Assessed Bath Style Shower   Able to [place] Yes   Able to [employer] Temperature Yes   Able to Wash/Rinse/Dry (body part) Left Arm;Right Arm;L Upper Leg;R Upper Leg;L Lower Leg/Foot;R Lower Leg/Foot;Chest;Abdomen   Limitations Noted in Balance; Safety   Adaptive Equipment Longhand Sponge; Shower Seat;Shower [employer]   Limitations Noted In [employer] Strength   Adaptive Equipment Seat with out auctionPAL   Findings S/MI   Upper Body Dressing   Type of Assistance Needed Independent   Upper Body Dressing CARE Score 6   Lower Body Dressing   Type of Assistance Needed Set-up / clean-up   Comment S/MI   Lower Body Dressing CARE Score 5   Putting On/Taking Off Footwear   Type of Assistance Needed Set-up / clean-up   Comment S/MI   Putting On/Taking Off Footwear CARE Score 5   Picking Up Object   Type of Assistance Needed Independent  (Simultaneous filing  User may not have seen previous data )   Picking Up Object CARE Score 6  (Simultaneous filing   User may not have seen previous data )   Dressing/Undressing Clothing   Remove UB Clothes Pullover Shirt   Don UB Clothes Pullover Shirt   Sit to Stand   Type of Assistance Needed Independent  (Simultaneous filing  User may not have seen previous data )   Physical Assistance Level No physical assistance   Sit to Stand CARE Score 6  (Simultaneous filing  User may not have seen previous data )   Transfer Bed/Chair/Wheelchair   Limitations Noted In LE Strength;Balance   Toileting Hygiene   Type of Assistance Needed Independent   Comment use of urinal and other areas with hygiene   Toileting Hygiene CARE Score 6   Toileting   Able to [address] down yes, up yes  Toilet Transfer   Type of Assistance Needed Independent; Adaptive equipment   Physical Assistance Level No physical assistance   Toilet Transfer CARE Score 6   Toilet Transfer   Surface Assessed Raised Toilet   Transfer Technique Standard   Cognition   Overall Cognitive Status  --    Arousal/Participation  --    Attention  --    Orientation Level  --    Memory  --    Following Commands  --    Comments   (Simultaneous filing  User may not have seen previous data )   Additional Activities   Additional Activities Comments fxnl mobility in room with RW MI, in hallway longer distances is S   Activity Tolerance   Activity Tolerance Patient tolerated treatment well   Assessment   Treatment Assessment OT tx addressed ADLs via shower level with focus on safe techniques and compensatory strategies assessing consistency with previously instructed tech and strategies  Levels  noted in respective sections of note  Pt has overall good recall with techniques  Pt managing well at MI level with toileting  Pt managing application of moisture barrier indep  Fxnl mobility in room MI with RW  STS Indep  D/C planning ongoing  Plan is to continue with skilled OT  Prognosis Good   Problem List Decreased strength; Impaired balance;Decreased safety awareness;Pain   Plan   Treatment/Interventions ADL retraining;Functional transfer training;Patient/family training;Equipment eval/education;Gait training; Compensatory technique education;Spoke to nursing;OT   Progress Progressing toward goals   OT Therapy Minutes   OT Time In 1000   OT Time Out 1130   OT Total Time (minutes) 90   OT Mode of treatment - Individual (minutes) 90   OT Mode of treatment - Concurrent (minutes) 0   OT Mode of treatment - Group (minutes) 0   OT Mode of treatment - Co-treat (minutes) 0   OT Mode of Treatment - Total time(minutes) 90 minutes   OT Cumulative Minutes 594   Therapy Time missed   Time missed?  No

## 2022-09-16 NOTE — PROGRESS NOTES
09/16/22 0808   Pain Assessment   Pain Assessment Tool 0-10   Pain Score No Pain   Restrictions/Precautions   Precautions Fall Risk;Pain   Weight Bearing Restrictions No   Cognition   Overall Cognitive Status WFL   Arousal/Participation Alert; Cooperative   Attention Attends with cues to redirect   Orientation Level Oriented X4   Memory Within functional limits   Following Commands Follows one step commands without difficulty   Sit to Lying   Type of Assistance Needed Independent   Sit to Lying CARE Score 6   Lying to Sitting on Side of Bed   Type of Assistance Needed Independent   Lying to Sitting on Side of Bed CARE Score 6   Sit to Stand   Type of Assistance Needed Independent   Sit to Stand CARE Score 6   Bed-Chair Transfer   Type of Assistance Needed Independent   Chair/Bed-to-Chair Transfer CARE Score 6   Transfer Bed/Chair/Wheelchair   Limitations Noted In Confidence   Adaptive Equipment Roller Walker   Stand Pivot Modified Independent   Sit to Stand Independent   Stand to Sit Independent   Supine to Sit Independent   Sit to Supine Independent   Walk 10 Feet   Type of Assistance Needed Adaptive equipment   Comment RW   Walk 10 Feet CARE Score 6   Walk 50 Feet with Two Turns   Type of Assistance Needed Adaptive equipment   Comment RW   Walk 50 Feet with Two Turns CARE Score 6   Walk 150 Feet   Type of Assistance Needed Adaptive equipment   Comment RW   Walk 150 Feet CARE Score 6   Ambulation   Does the patient walk? 2  Yes   Primary Mode of Locomotion Prior to Admission Walk   Distance Walked (feet) 300 ft   Assist Device Roller Walker   Gait Pattern Decreased foot clearance; Wide ALEA   Limitations Noted In Balance; Endurance   Walk Assist Level Modified Independent   Picking Up Object   Type of Assistance Needed Independent   Picking Up Object CARE Score 6   Toilet Transfer   Findings pt stood unsupportedx2 to use urinal bottle   Therapeutic Interventions   Strengthening seated ther ex with 2 5#   Balance stood unsupported to use urinal bottle and brush teeth   Other gait and transfers   Assessment   Treatment Assessment Pt seen for PT treatment session this date with interventions consisting of gait training w/ emphasis on improving pt's ability to ambulate level surfaces x 300 ft with modified independent provided by therapist with RW, Therapeutic exercise consisting of: AROM 2 sets of 15 reps B LE in sitting position and therapeutic activity consisting of training: bed mobility, supine<>sit transfers, sit<>stand transfers, static standing tolerance for 5+ minutes w/ no UE support and vc and tactile cues for static standing posture faciliation  Pt agreeable to PT treatment session upon arrival, pt found supine in bed w/ HOB elevated, in no apparent distress and responsive  In comparison to previous session, pt with improvements in amount of assistance required  Post session: pt returned BTB, all needs in reach and RN notified of session findings/recommendations  Continue to recommend home with outpatient rehabilitation at time of d/c in order to maximize pt's functional independence and safety w/ mobility  Pt continues to be functioning below baseline level  PT will continue to see pt during current hospitalization in order to address the deficits listed above and provide interventions consistent w/ POC in effort to achieve STGs  PT Barriers   Physical Impairment Decreased strength;Decreased range of motion; Impaired balance;Decreased safety awareness   Functional Limitation Stair negotiation;Ramp negotiation   Plan   Treatment/Interventions Functional transfer training;LE strengthening/ROM; Therapeutic exercise; Endurance training;Gait training; Compensatory technique education   Progress Progressing toward goals   PT Therapy Minutes   PT Time In 0700   PT Time Out 0808   PT Total Time (minutes) 68   PT Mode of treatment - Individual (minutes) 68   PT Mode of treatment - Concurrent (minutes) 0   PT Mode of treatment - Group (minutes) 0   PT Mode of treatment - Co-treat (minutes) 0   PT Mode of Treatment - Total time(minutes) 68 minutes   PT Cumulative Minutes 505

## 2022-09-16 NOTE — PROGRESS NOTES
Progress Note - Nephrology   Oracio Richardson Jr  75 y o  male MRN: [id number]  Unit/Bed#: [facility] 210-01 Encounter: [id number]    A/P:  1  Polyuria   Patient appears to have appropriate polyuria due to a significant amount of oral intake of fluids  Urine studies noted even osmolality with serum, as well as a urinalysis with a specific gravity of 1 015  Serum sodium level slightly low, which goes along with slight over drinking of fluids  Patient does not need to be on a fluid restriction, however would recommend not over drinking  2  Acute kidney injury-resolved  3  Urinary retention   Patient appears to have significant urinary retention, it has been treated medically with tamsulosin since September 11th, however, last night, straight catheter produced nearly 1 L of urine  4  Electrolyte abnormalities   Electrolytes are appropriate this time, continue monitor from time to time and at supplement as indicated  5  Possible underlying tubulointerstitial nephritis   With respect to potential diabetes insipidus, or resistance to ADH peripherally, we could ask the patient to not drink fluids for 12 hours, and then check to see if urine osmolality increases appropriately  Verses providing 1 time dose of DDAVP and then checking urine osmolality afterwards  I do not believe this is clinically necessary at this time and instead will continue to monitor      Follow up reason for today's visit:  Polyuria/electrolyte abnormalities    DKA (diabetic ketoacidosis) ([facility] [address] )    Patient Active Problem List   Diagnosis    Urinary frequency    Type 2 diabetes mellitus ([facility] [address] )    Prostate cancer screening    Microscopic hematuria    Urge incontinence of urine    Nocturnal enuresis    Feeling of incomplete bladder emptying    Balanitis    DKA (diabetic ketoacidosis) ([facility] [address] )    New onset atrial fibrillation ([facility] [address] )    Urinary retention    Fungal infection of skin    Depressed mood    Pain in both lower extremities    Polyuria Subjective:   Patient has dry mouth, continues to experience urinary retention as well as urine leakage  Objective:     Vitals: Blood pressure 104/65, pulse 75, temperature 98 °F (36 7 °C), temperature source Temporal, resp  rate 16, height 6' (1 829 m), weight 104 kg (229 lb 4 5 oz), SpO2 95 %  ,Body mass index is 31 1 kg/m²  Weight (last 2 days)     Date/Time Weight    09/16/22 0600 104 (229 28)    09/15/22 1414 --      Weight: will start daily weights 0600 9/16/22 for accuracy at 09/15/22 1414            Intake/Output Summary (Last 24 hours) at 9/16/2022 1038  Last data filed at 9/16/2022 0846  Gross per 24 hour   Intake 960 ml   Output 2000 ml   Net -1040 ml     I/O last 3 completed shifts:   In: 0215 [P O :1445]  Out: 0 [Urine:4600]         Physical Exam: /65 (BP Location: Right arm)   Pulse 75   Temp 98 °F (36 7 °C) (Temporal)   Resp 16   Ht 6' (1 829 m)   Wt 104 kg (229 lb 4 5 oz)   SpO2 95%   BMI 31 10 kg/m²     General Appearance:    Alert, cooperative, no distress, appears stated age   Head:    Normocephalic, without obvious abnormality, atraumatic   Eyes:    Conjunctiva/corneas clear   Ears:    Normal external ears   Nose:   Nares normal, septum midline, mucosa normal, no drainage    or sinus tenderness   Throat:   Lips, mucosa, and tongue normal; teeth and gums normal   Neck:   Supple   Back:     Symmetric, no curvature, ROM normal, no CVA tenderness   Lungs:     Clear to auscultation bilaterally, respirations unlabored   Chest wall:    No tenderness or deformity   Heart:    Regular rate and rhythm, S1 and S2 normal, no murmur, rub   or gallop   Abdomen:     Soft, non-tender, bowel sounds active   Extremities:   Extremities normal, atraumatic, no cyanosis, mild bilateral lower extremity edema   Skin:   Skin color, texture, turgor normal, no rashes or lesions   Lymph nodes:   Cervical normal   Neurologic:   CNII-XII intact            Lab, Imaging and other studies: I have personally reviewed pertinent labs  CBC:   Lab Results   Component Value Date    WBC 5 92 09/16/2022    HGB 11 1 (L) 09/16/2022    HCT 34 5 (L) 09/16/2022    MCV 93 09/16/2022     09/16/2022    MCH 29 8 09/16/2022    MCHC 32 2 09/16/2022    RDW 13 2 09/16/2022    MPV 11 0 09/16/2022    NRBC 0 09/16/2022     CMP:   Lab Results   Component Value Date    K 4 3 09/16/2022     09/16/2022    CO2 26 09/16/2022    BUN 18 09/16/2022    CREATININE 0 83 09/16/2022    CALCIUM 9 1 09/16/2022    AST 33 09/16/2022    ALT 14 09/16/2022    ALKPHOS 84 09/16/2022    EGFR 90 09/16/2022         Results from last 7 days   Lab Units 09/16/22  0532 09/14/22  1432 09/11/22  0548   POTASSIUM mmol/L 4 3 3 8 3 6   CHLORIDE mmol/L 103 102 101   CO2 mmol/L 26 21 28   BUN mg/dL 18 12 11   CREATININE mg/dL 0 83 1 26 0 75   CALCIUM mg/dL 9 1 9 3 8 5   ALK PHOS U/L 84  --  92   ALT U/L 14  --  11   AST U/L 33  --  22         Phosphorus:   Lab Results   Component Value Date    PHOS 4 4 (H) 09/16/2022     Magnesium:   Lab Results   Component Value Date    MG 2 1 09/16/2022     Urinalysis:   Lab Results   Component Value Date    COLORU Yellow 09/15/2022    CLARITYU Clear 09/15/2022    SPECGRAV 1 015 09/15/2022    PHUR 6 0 09/15/2022    LEUKOCYTESUR Negative 09/15/2022    NITRITE Negative 09/15/2022    GLUCOSEU Negative 09/15/2022    KETONESU Negative 09/15/2022    BILIRUBINUR Negative 09/15/2022    BLOODU Negative 09/15/2022     Ionized Calcium: No results found for: CAION  Coagulation: No results found for: PT, INR, APTT  Troponin: No results found for: TROPONINI  ABG: No results found for: PHART, JXP5DGB, PO2ART, WNB3PKO, W5JRDISY, BEART, SOURCE  Radiology review:     IMAGING  No results found      Current Facility-Administered Medications   Medication Dose Route Frequency    acetaminophen (TYLENOL) tablet 650 mg  650 mg Oral Q6H PRN    Diclofenac Sodium (VOLTAREN) 1 % topical gel 2 g  2 g Topical 4x Daily    gabapentin (NEURONTIN) capsule 100 mg  100 mg Oral TID    heparin (porcine) subcutaneous injection 5,000 Units  5,000 Units Subcutaneous Q8H [address]    insulin glargine (LANTUS) subcutaneous injection 40 Units 0 4 mL  40 Units Subcutaneous QAM    insulin lispro (HumaLOG) 100 units/mL subcutaneous injection 1-5 Units  1-5 Units Subcutaneous HS    insulin lispro (HumaLOG) 100 units/mL subcutaneous injection 14 Units  14 Units Subcutaneous TID With Meals    insulin lispro (HumaLOG) 100 units/mL subcutaneous injection 2-12 Units  2-12 Units Subcutaneous TID AC    lidocaine (URO-JET) 2 % urethral/mucosal gel 1 application  1 application Urethral Daily PRN    moisture barrier miconazole 2% cream (aka ALEXI MOISTURE BARRIER ANTIFUNGAL CREAM)   Topical BID    ondansetron (ZOFRAN-ODT) dispersible tablet 4 mg  4 mg Oral Q6H PRN    pantoprazole (PROTONIX) EC tablet 40 mg  40 mg Oral Early Morning    polyethylene glycol (MIRALAX) packet 17 g  17 g Oral Daily PRN    senna (SENOKOT) tablet 17 2 mg  2 tablet Oral Daily    tamsulosin (FLOMAX) capsule 0 4 mg  0 4 mg Oral Daily With Dinner     Medications Discontinued During This Encounter   Medication Reason    nystatin (MYCOSTATIN) powder     insulin glargine (LANTUS) subcutaneous injection 44 Units 0 44 mL     lamoTRIgine (LaMICtal) tablet 25 mg        Jigar Watkins,       This progress note was produced in part using a dictation device which may document imprecise wording from author's original intent

## 2022-09-16 NOTE — NURSING NOTE
Pt bladder scanned for PVR of 591mL  Pt straight catheterized for 900mL of clear yellow urine  Will continue to monitor

## 2022-09-16 NOTE — PROGRESS NOTES
09/16/22 0900   Pain Assessment   Pain Assessment Tool 0-10   Pain Score No Pain   Restrictions/Precautions   Precautions Fall Risk;Pain   Weight Bearing Restrictions No   Cognition   Overall Cognitive Status WFL   Arousal/Participation Alert; Cooperative   Attention Attends with cues to redirect   Orientation Level Oriented X4   Memory Within functional limits   Following Commands Follows one step commands without difficulty   Sit to Lying   Type of Assistance Needed Independent   Sit to Lying CARE Score 6   Lying to Sitting on Side of Bed   Type of Assistance Needed Independent   Lying to Sitting on Side of Bed CARE Score 6   Sit to Stand   Type of Assistance Needed Independent   Sit to Stand CARE Score 6   Transfer Bed/Chair/Wheelchair   Adaptive Equipment Roller Walker   Stand Pivot Modified Independent   Sit to Stand Independent   Stand to Sit Independent   Supine to Sit Independent   Sit to Supine Independent   Walk 10 Feet   Type of Assistance Needed Adaptive equipment   Comment RW   Walk 10 Feet CARE Score 6   Walk 50 Feet with Two Turns   Type of Assistance Needed Adaptive equipment   Comment RW   Walk 50 Feet with Two Turns CARE Score 6   Walk 150 Feet   Type of Assistance Needed Adaptive equipment   Comment RW   Walk 150 Feet CARE Score 6   Ambulation   Does the patient walk? 2  Yes   Primary Mode of Locomotion Prior to Admission Walk   Distance Walked (feet) 300 ft  (300 ftx2, 10 ft rampx3)   Assist Device Roller Walker   Gait Pattern Decreased foot clearance; Wide ALEA   Limitations Noted In Balance; Endurance   Walk Assist Level Modified Independent   Curb or Single Stair   Style negotiated Single stair   Type of Assistance Needed Supervision   1 Step (Curb) CARE Score 4   4 Steps   Type of Assistance Needed Supervision   4 Steps CARE Score 4   12 Steps   Type of Assistance Needed Supervision   12 Steps CARE Score 4   Stairs   Type Stairs; Ramp   # of Steps 28   Weight Bearing Precautions WBAT   Assist Devices Bilateral Rail;Roller Walker   Picking Up Object   Type of Assistance Needed Independent   Picking Up Object CARE Score 6   Therapeutic Interventions   Strengthening Standing ther ex   Other gait and transfers   Equipment Use   NuStep Seat 11, LV4 x15 min   Assessment   Treatment Assessment Pt seen for PT treatment session this date with interventions consisting of gait training w/ emphasis on improving pt's ability to ambulate level surfaces x 300 ftx2, 10 ftx3 with distant S provided by therapist with RW, Therapeutic exercise consisting of: AROM 2 sets of 15 reps B LE in standing with no UE support position and navigating 28 stairs w/ B handrail with alternating pattern with distant S  NUSTEP x15 minutes  Ambulation on Ramp  Pt agreeable to PT treatment session upon arrival, pt found seated OOB in recliner, in no apparent distress and responsive  In comparison to previous session, pt with improvements in amount of assistance required  Post session: pt returned back to recliner, all needs in reach and RN notified of session findings/recommendations  Continue to recommend home with outpatient rehabilitation at time of d/c in order to maximize pt's functional independence and safety w/ mobility  Pt continues to be functioning below baseline level  PT will continue to see pt during current hospitalization in order to address the deficits listed above and provide interventions consistent w/ POC in effort to achieve STGs  PT Barriers   Physical Impairment Decreased strength;Decreased endurance; Impaired balance;Decreased mobility; Decreased safety awareness   Functional Limitation Stair negotiation;Car transfers   Plan   Treatment/Interventions Functional transfer training;LE strengthening/ROM; Therapeutic exercise; Endurance training;Bed mobility;Gait training;Spoke to nursing   Progress Progressing toward goals   PT Therapy Minutes   PT Time In 0900   PT Time Out 1000   PT Total Time (minutes) 60   PT Mode of treatment - Individual (minutes) 60   PT Mode of treatment - Concurrent (minutes) 0   PT Mode of treatment - Group (minutes) 0   PT Mode of treatment - Co-treat (minutes) 0   PT Mode of Treatment - Total time(minutes) 60 minutes   PT Cumulative Minutes 565   Therapy Time missed   Time missed?  No

## 2022-09-16 NOTE — PLAN OF CARE
Problem: Potential for Falls  Goal: Patient will remain free of falls  Description: INTERVENTIONS:  - Educate patient/family on patient safety including physical limitations  - Instruct patient to call for assistance with activity   - Consult OT/PT to assist with strengthening/mobility   - Keep Call bell within reach  - Keep bed low and locked with side rails adjusted as appropriate  - Keep care items and personal belongings within reach  - Initiate and maintain comfort rounds  - Make Fall Risk Sign visible to staff  - Apply yellow socks and bracelet for high fall risk patients  - Consider moving patient to room near nurses station  Outcome: Progressing     Problem: PAIN - ADULT  Goal: Verbalizes/displays adequate comfort level or baseline comfort level  Description: Interventions:  - Encourage patient to monitor pain and request assistance  - Assess pain using appropriate pain scale  - Administer analgesics based on type and severity of pain and evaluate response  - Implement non-pharmacological measures as appropriate and evaluate response  - Consider cultural and social influences on pain and pain management  - Notify physician/advanced practitioner if interventions unsuccessful or patient reports new pain  Outcome: Progressing     Problem: INFECTION - ADULT  Goal: Absence or prevention of progression during hospitalization  Description: INTERVENTIONS:  - Assess and monitor for signs and symptoms of infection  - Monitor lab/diagnostic results  - Thomson appropriate cooling/warming therapies per order  - Administer medications as ordered  - Instruct and encourage patient and family to use good hand hygiene technique  - Identify and instruct in appropriate isolation precautions for identified infection/condition  Outcome: Progressing     Problem: SAFETY ADULT  Goal: Patient will remain free of falls  Description: INTERVENTIONS:  - Educate patient/family on patient safety including physical limitations  - Instruct patient to call for assistance with activity   - Consult OT/PT to assist with strengthening/mobility   - Keep Call bell within reach  - Keep bed low and locked with side rails adjusted as appropriate  - Keep care items and personal belongings within reach  - Initiate and maintain comfort rounds  - Make Fall Risk Sign visible to staff  - Apply yellow socks and bracelet for high fall risk patients  - Consider moving patient to room near nurses station  Outcome: Progressing  Goal: Maintain or return to baseline ADL function  Description: INTERVENTIONS:  -  Assess patient's ability to carry out ADLs; assess patient's baseline for ADL function and identify physical deficits which impact ability to perform ADLs (bathing, care of mouth/teeth, toileting, grooming, dressing, etc )  - Assess/evaluate cause of self-care deficits   - Assess range of motion  - Assess patient's mobility; develop plan if impaired  - Assess patient's need for assistive devices and provide as appropriate  - Encourage maximum independence but intervene and supervise when necessary  - Involve family in performance of ADLs  - Assess for home care needs following discharge   - Consider OT consult to assist with ADL evaluation and planning for discharge  - Provide patient education as appropriate  Outcome: Progressing  Goal: Maintains/Returns to pre admission functional level  Description: INTERVENTIONS:  - Perform BMAT or MOVE assessment daily    - Set and communicate daily mobility goal to care team and patient/family/caregiver     - Collaborate with rehabilitation services on mobility goals if consulted  - Out of bed for toileting  - Record patient progress and toleration of activity level   Outcome: Progressing     Problem: DISCHARGE PLANNING  Goal: Discharge to home or other facility with appropriate resources  Description: INTERVENTIONS:  - Identify barriers to discharge w/patient and caregiver  - Arrange for needed discharge resources and transportation as appropriate  - Identify discharge learning needs (meds, wound care, etc )  - Arrange for interpretive services to assist at discharge as needed  - Refer to Case Management Department for coordinating discharge planning if the patient needs post-hospital services based on physician/advanced practitioner order or complex needs related to functional status, cognitive ability, or social support system  Outcome: Progressing     Problem: Knowledge Deficit  Goal: Patient/family/caregiver demonstrates understanding of disease process, treatment plan, medications, and discharge instructions  Description: Complete learning assessment and assess knowledge base  Interventions:  - Provide teaching at level of understanding  - Provide teaching via preferred learning methods  Outcome: Progressing     Problem: Prexisting or High Potential for Compromised Skin Integrity  Goal: Skin integrity is maintained or improved  Description: INTERVENTIONS:  - Identify patients at risk for skin breakdown  - Assess and monitor skin integrity  - Assess and monitor nutrition and hydration status  - Monitor labs   - Assess for incontinence   - Turn and reposition patient  - Assist with mobility/ambulation  - Relieve pressure over bony prominences  - Avoid friction and shearing  - Provide appropriate hygiene as needed including keeping skin clean and dry  - Evaluate need for skin moisturizer/barrier cream  - Collaborate with interdisciplinary team   - Patient/family teaching  - Consider wound care consult   Outcome: Progressing     Problem: Nutrition/Hydration-ADULT  Goal: Nutrient/Hydration intake appropriate for improving, restoring or maintaining nutritional needs  Description: Monitor and assess patient's nutrition/hydration status for malnutrition  Collaborate with interdisciplinary team and initiate plan and interventions as ordered  Monitor patient's weight and dietary intake as ordered or per policy   Utilize nutrition screening tool and intervene as necessary  Determine patient's food preferences and provide high-protein, high-caloric foods as appropriate       INTERVENTIONS:  - Monitor oral intake, urinary output, labs, and treatment plans  - Assess nutrition and hydration status and recommend course of action  - Evaluate amount of meals eaten  - Assist patient with eating if necessary   - Allow adequate time for meals  - Recommend/ encourage appropriate diets, oral nutritional supplements, and vitamin/mineral supplements  - Order, calculate, and assess calorie counts as needed  - Assess need for intravenous fluids  - Provide specific nutrition/hydration education as appropriate  - Include patient/family/caregiver in decisions related to nutrition  Outcome: Progressing

## 2022-09-16 NOTE — NURSING NOTE
Pt incontinent for a large amount of urine  Pt PVR scanned for 624mL  Pt requesting, "One more hour to empty his bladder " Then RN will straight catheterize pt

## 2022-09-17 LAB
GLUCOSE SERPL-MCNC: 125 MG/DL (ref 65–140)
GLUCOSE SERPL-MCNC: 147 MG/DL (ref 65–140)
GLUCOSE SERPL-MCNC: 64 MG/DL (ref 65–140)
GLUCOSE SERPL-MCNC: 65 MG/DL (ref 65–140)
GLUCOSE SERPL-MCNC: 94 MG/DL (ref 65–140)

## 2022-09-17 PROCEDURE — 82948 REAGENT STRIP/BLOOD GLUCOSE: CPT

## 2022-09-17 PROCEDURE — 97530 THERAPEUTIC ACTIVITIES: CPT

## 2022-09-17 PROCEDURE — 97110 THERAPEUTIC EXERCISES: CPT

## 2022-09-17 PROCEDURE — 97535 SELF CARE MNGMENT TRAINING: CPT

## 2022-09-17 RX ADMIN — GABAPENTIN 100 MG: 100 CAPSULE ORAL at 08:21

## 2022-09-17 RX ADMIN — DICLOFENAC SODIUM 2 G: 10 GEL TOPICAL at 08:21

## 2022-09-17 RX ADMIN — DICLOFENAC SODIUM 2 G: 10 GEL TOPICAL at 17:16

## 2022-09-17 RX ADMIN — HEPARIN SODIUM 5000 UNITS: 5000 INJECTION INTRAVENOUS; SUBCUTANEOUS at 21:05

## 2022-09-17 RX ADMIN — GABAPENTIN 100 MG: 100 CAPSULE ORAL at 17:11

## 2022-09-17 RX ADMIN — INSULIN GLARGINE 40 UNITS: 100 INJECTION, SOLUTION SUBCUTANEOUS at 08:21

## 2022-09-17 RX ADMIN — DICLOFENAC SODIUM 2 G: 10 GEL TOPICAL at 21:05

## 2022-09-17 RX ADMIN — SENNOSIDES 17.2 MG: 8.6 TABLET, FILM COATED ORAL at 08:22

## 2022-09-17 RX ADMIN — HEPARIN SODIUM 5000 UNITS: 5000 INJECTION INTRAVENOUS; SUBCUTANEOUS at 06:00

## 2022-09-17 RX ADMIN — TAMSULOSIN HYDROCHLORIDE 0.4 MG: 0.4 CAPSULE ORAL at 17:11

## 2022-09-17 RX ADMIN — PANTOPRAZOLE SODIUM 40 MG: 40 TABLET, DELAYED RELEASE ORAL at 06:00

## 2022-09-17 RX ADMIN — GABAPENTIN 100 MG: 100 CAPSULE ORAL at 21:05

## 2022-09-17 RX ADMIN — HEPARIN SODIUM 5000 UNITS: 5000 INJECTION INTRAVENOUS; SUBCUTANEOUS at 14:12

## 2022-09-17 RX ADMIN — INSULIN LISPRO 10 UNITS: 100 INJECTION, SOLUTION INTRAVENOUS; SUBCUTANEOUS at 17:12

## 2022-09-17 RX ADMIN — INSULIN LISPRO 10 UNITS: 100 INJECTION, SOLUTION INTRAVENOUS; SUBCUTANEOUS at 08:21

## 2022-09-17 RX ADMIN — MICONAZOLE NITRATE: 20 CREAM TOPICAL at 17:16

## 2022-09-17 RX ADMIN — MICONAZOLE NITRATE 1 APPLICATION: 20 CREAM TOPICAL at 08:26

## 2022-09-17 RX ADMIN — DICLOFENAC SODIUM 2 G: 10 GEL TOPICAL at 12:52

## 2022-09-17 NOTE — QUICK NOTE
PMR Quick Note    Reviewed volumes  Q4hr have been appropriate until 0400, where he was up to 1000 this AM (200 voided, 800 retained)  Can continue to straight cath if patient wants to, would recommend decreasing fluid intake after dinner  Otherwise, can place lemons if he is amenable      Rg Sarabia MD  Physical Medicine and Rehabilitation

## 2022-09-17 NOTE — PROGRESS NOTES
OT Daily Progress       09/17/22 1130   Pain Assessment   Pain Assessment Tool 0-10   Pain Score No Pain   Oral Hygiene   Type of Assistance Needed Independent   Physical Assistance Level No physical assistance   Comment brushes teeth in shower   Oral Hygiene CARE Score 6   Grooming   Able To Initiate Tasks;Comb/Brush Hair;Wash/Dry Face;Brush/Clean Teeth;Wash/Dry Hands   Shower/Bathe Self   Type of Assistance Needed Set-up / clean-up   Physical Assistance Level No physical assistance   Comment completes shower at supervision level   Shower/Bathe Self CARE Score 5   Bathing   Assessed Bath Style Shower   Anticipated D/C Bath Style Shower   Able to [employer] Temperature Yes   Able to Wash/Rinse/Dry (body part) Left Arm;Right Arm;L Upper Leg;R Upper Leg;L Lower Leg/Foot;R Lower Leg/Foot;Chest;Abdomen;Perineal Area; Buttocks   Limitations Noted in Endurance   Positioning Seated;Standing   Adaptive Equipment Longhand Sponge; Shower Seat;Hand [employer]   Limitations Noted In Balance   Adaptive Equipment Grab Bars;Seat with Back   Assessed Shower   Upper Body Dressing   Type of Assistance Needed Independent   Physical Assistance Level No physical assistance   Upper Body Dressing CARE Score 6   Lower Body Dressing   Type of Assistance Needed Set-up / clean-up   Physical Assistance Level No physical assistance   Lower Body Dressing CARE Score 5   Picking Up Object   Type of Assistance Needed Independent   Physical Assistance Level No physical assistance   Picking Up Object CARE Score 6   Dressing/Undressing Clothing   Remove UB Clothes   (tank top)   Don UB Clothes   (tank top)   Remove LB Clothes Shorts   Don LB Clothes Shorts   Limitations Noted In Balance; Endurance   Adaptive Equipment Sock Aide   Positioning Standing;Sit Edge Of Bed   Sit to Stand   Type of Assistance Needed Independent   Sit to Stand CARE Score 6   Cognition   Arousal/Participation Alert; Responsive; Cooperative   Attention Attends with cues to redirect   Orientation Level Oriented X4   Memory Within functional limits   Activity Tolerance   Activity Tolerance Patient tolerated treatment well   OT Therapy Minutes   OT Time In 1030   OT Time Out 1150   OT Total Time (minutes) 80   OT Mode of treatment - Individual (minutes) 80     Pt seen for follow up visit  Pt completing UE therapeutic exercise with 9# weighted dowel  Completes exercises in all planes, 30 reps each  Pt with questions regarding LE exercises - completing with green T-band  Defer to PT session for specifics - in general, appears to be completing successfully for hip abduction with sustained hold  Pt with no concerns regarding UE exercises at present time - more concerned regarding leg strength  Funct mob in hallway with RW mod I  Completes shower - refer to above for specifics regarding bathing and dressing  Returns to room at end of session, Nursing to complete bladder scan at this time  Tolerating well  Overall mod I for transfers, mobility, most self care tasks  Krishna Waldron to return home  Slight decreased act lucie at times, with some decreased safety awareness  Continue to progress treatment as status allows

## 2022-09-18 LAB
GLUCOSE SERPL-MCNC: 151 MG/DL (ref 65–140)
GLUCOSE SERPL-MCNC: 155 MG/DL (ref 65–140)
GLUCOSE SERPL-MCNC: 70 MG/DL (ref 65–140)
GLUCOSE SERPL-MCNC: 75 MG/DL (ref 65–140)

## 2022-09-18 PROCEDURE — 97112 NEUROMUSCULAR REEDUCATION: CPT

## 2022-09-18 PROCEDURE — 97110 THERAPEUTIC EXERCISES: CPT

## 2022-09-18 PROCEDURE — 97537 COMMUNITY/WORK REINTEGRATION: CPT

## 2022-09-18 PROCEDURE — 82948 REAGENT STRIP/BLOOD GLUCOSE: CPT

## 2022-09-18 PROCEDURE — 97116 GAIT TRAINING THERAPY: CPT

## 2022-09-18 RX ADMIN — DICLOFENAC SODIUM 2 G: 10 GEL TOPICAL at 21:24

## 2022-09-18 RX ADMIN — GABAPENTIN 100 MG: 100 CAPSULE ORAL at 09:00

## 2022-09-18 RX ADMIN — TAMSULOSIN HYDROCHLORIDE 0.4 MG: 0.4 CAPSULE ORAL at 17:23

## 2022-09-18 RX ADMIN — HEPARIN SODIUM 5000 UNITS: 5000 INJECTION INTRAVENOUS; SUBCUTANEOUS at 05:15

## 2022-09-18 RX ADMIN — HEPARIN SODIUM 5000 UNITS: 5000 INJECTION INTRAVENOUS; SUBCUTANEOUS at 21:24

## 2022-09-18 RX ADMIN — HEPARIN SODIUM 5000 UNITS: 5000 INJECTION INTRAVENOUS; SUBCUTANEOUS at 13:55

## 2022-09-18 RX ADMIN — DICLOFENAC SODIUM 2 G: 10 GEL TOPICAL at 08:59

## 2022-09-18 RX ADMIN — SENNOSIDES 17.2 MG: 8.6 TABLET, FILM COATED ORAL at 09:00

## 2022-09-18 RX ADMIN — INSULIN LISPRO 2 UNITS: 100 INJECTION, SOLUTION INTRAVENOUS; SUBCUTANEOUS at 08:58

## 2022-09-18 RX ADMIN — DICLOFENAC SODIUM 2 G: 10 GEL TOPICAL at 17:23

## 2022-09-18 RX ADMIN — INSULIN GLARGINE 40 UNITS: 100 INJECTION, SOLUTION SUBCUTANEOUS at 08:59

## 2022-09-18 RX ADMIN — MICONAZOLE NITRATE: 20 CREAM TOPICAL at 17:25

## 2022-09-18 RX ADMIN — PANTOPRAZOLE SODIUM 40 MG: 40 TABLET, DELAYED RELEASE ORAL at 05:15

## 2022-09-18 RX ADMIN — INSULIN LISPRO 10 UNITS: 100 INJECTION, SOLUTION INTRAVENOUS; SUBCUTANEOUS at 12:07

## 2022-09-18 RX ADMIN — INSULIN LISPRO 2 UNITS: 100 INJECTION, SOLUTION INTRAVENOUS; SUBCUTANEOUS at 12:07

## 2022-09-18 RX ADMIN — INSULIN LISPRO 10 UNITS: 100 INJECTION, SOLUTION INTRAVENOUS; SUBCUTANEOUS at 08:58

## 2022-09-18 RX ADMIN — GABAPENTIN 100 MG: 100 CAPSULE ORAL at 21:24

## 2022-09-18 RX ADMIN — GABAPENTIN 100 MG: 100 CAPSULE ORAL at 17:23

## 2022-09-18 RX ADMIN — INSULIN LISPRO 10 UNITS: 100 INJECTION, SOLUTION INTRAVENOUS; SUBCUTANEOUS at 16:50

## 2022-09-18 RX ADMIN — MICONAZOLE NITRATE: 20 CREAM TOPICAL at 09:00

## 2022-09-18 NOTE — NURSING NOTE
Patient resting comfortably in bed at this time  No signs of distress noted  Patient was successfully modified independent in the room overnight  Patient was straight cathed twice overnight for urinary retention per the urinary retention protocol  Patient was encouraged to reposition frequently to promote skin integrity  Call bell within reach  Will continue to monitor patient and follow plan of care

## 2022-09-18 NOTE — PROGRESS NOTES
09/18/22 1030   Pain Assessment   Pain Assessment Tool 0-10   Pain Score 5   Pain Location/Orientation Orientation: Right;Location: Knee   Restrictions/Precautions   Precautions Fall Risk;Pain   Weight Bearing Restrictions No   ROM Restrictions No   Cognition   Overall Cognitive Status WFL   Arousal/Participation Alert; Responsive; Cooperative   Attention Attends with cues to redirect   Orientation Level Oriented X4   Memory Within functional limits   Following Commands Follows all commands and directions without difficulty   Subjective   Subjective Pt  is agreeable to PT session   Sit to Stand   Type of Assistance Needed Independent; Adaptive equipment   Comment mod I   Sit to Stand CARE Score 6   Bed-Chair Transfer   Type of Assistance Needed Independent; Adaptive equipment   Physical Assistance Level No physical assistance   Comment mod I   Chair/Bed-to-Chair Transfer CARE Score 6   Transfer Bed/Chair/Wheelchair   Limitations Noted In Endurance;LE Strength;Balance;UE Strength   Adaptive Equipment Roller Walker   Stand Pivot Modified Independent   Sit to Stand Modified Independent   Stand to Sit Modified Independent   Findings mod I   Car Transfer   Reason if not Attempted Environmental limitations   Car Transfer CARE Score 10   Walk 10 Feet   Type of Assistance Needed Independent; Adaptive equipment   Physical Assistance Level No physical assistance   Comment mod I   Walk 10 Feet CARE Score 6   Walk 50 Feet with Two Turns   Type of Assistance Needed Independent; Adaptive equipment   Physical Assistance Level No physical assistance   Comment mod I   Walk 50 Feet with Two Turns CARE Score 6   Walk 150 Feet   Type of Assistance Needed Independent; Adaptive equipment   Physical Assistance Level No physical assistance   Comment mod I   Walk 150 Feet CARE Score 6   Walking 10 Feet on Uneven Surfaces   Type of Assistance Needed Independent; Adaptive equipment   Physical Assistance Level No physical assistance   Comment mod I Walking 10 Feet on Uneven Surfaces CARE Score 6   Ambulation   Does the patient walk? 2  Yes   Primary Mode of Locomotion Prior to Admission Walk   Distance Walked (feet) 500 ft   Assist Device Roller Walker   Gait Pattern Antalgic; Inconsistant Gladys;Decreased foot clearance; Wide ALEA   Limitations Noted In Balance; Heel Strike;Strength   Walk Assist Level Modified Independent   Findings level and unelvel surfaces   Wheel 50 Feet with Two Turns   Reason if not Attempted Activity not applicable   Wheel 50 Feet with Two Turns CARE Score 9   Wheel 150 Feet   Reason if not Attempted Activity not applicable   Wheel 011 Feet CARE Score 9   Wheelchair mobility   Does the patient use a wheelchair? 0  No   Curb or Single Stair   Style negotiated Single stair   Type of Assistance Needed Supervision   Physical Assistance Level No physical assistance   Comment distant supervision   1 Step (Curb) CARE Score 4   4 Steps   Type of Assistance Needed Supervision   Physical Assistance Level No physical assistance   Comment distant supervision   4 Steps CARE Score 4   12 Steps   Type of Assistance Needed Supervision   Physical Assistance Level No physical assistance   Comment distant supervision   12 Steps CARE Score 4   Stairs   Type Stairs   # of Steps 28   Weight Bearing Precautions Fall Risk   Assist Devices Bilateral Rail   Toilet Transfer   Comment pt  did not have to use BR during session   Reason if not Attempted Activity not applicable   Toilet Transfer CARE Score 9   Therapeutic Interventions   Strengthening standing ther  ex   Flexibility hamstring and gastroc stretch   Balance ambulaiton ,transfers, stairs   Neuromuscular Re-Education EO/EC NBOS/WBOS on foam pad   Equipment Use   NuStep 20mins level6 seat 8   Assessment   Treatment Assessment Pt  Received sitting in recliner chair and agreeable to PT session  He completed sit<>stands and transfers using RW mod I  Pt  Ambualted 500' using RW mod I   Stair training completed for 28 steps using BHRs /c distant supervision  Pt  Compelted training on NuStep x20' on level 5 for LE strengthening and endurance training  He participated in [email or web address] by compelting standing on foam pad EO/EC /c NBOS/WBOS [id number] each /c 1 LOB noted /c NBOS EC  Pt  Completed standing therEx including heel/toe raises, marches, hamstring curls, and mini squats x30 each for general LE strengthening  Pt  Would continue to benefit from skilled ARC PT to improve overall strength and endurance  Problem List Decreased strength; Impaired balance;Decreased endurance;Pain   Barriers to Discharge Inaccessible home environment   PT Barriers   Physical Impairment Decreased strength;Decreased endurance; Impaired balance;Pain   Functional Limitation Stair negotiation;Car transfers   Plan   Treatment/Interventions LE strengthening/ROM; Elevations; Functional transfer training; Therapeutic exercise; Endurance training;Patient/family training;Equipment eval/education; Bed mobility;Gait training; Compensatory technique education   Progress Progressing toward goals   PT Therapy Minutes   PT Time In 1030   PT Time Out 1130   PT Total Time (minutes) 60   PT Mode of treatment - Individual (minutes) 60   PT Mode of treatment - Concurrent (minutes) 0   PT Mode of treatment - Group (minutes) 0   PT Mode of treatment - Co-treat (minutes) 0   PT Mode of Treatment - Total time(minutes) 60 minutes   PT Cumulative Minutes 625   Therapy Time missed   Time missed?  No

## 2022-09-18 NOTE — NURSING NOTE
Patient's blood glucose reading this evening was 64  Per hypoglycemia protocol attempted to administer glucose tablets to the patient however patient declined the glucose tablets at this time  Patient would rather try a snack instead  Patient given juice and a snack at this time  Will recheck blood glucose level and continue to monitor patient

## 2022-09-18 NOTE — NURSING NOTE
Patient's blood glucose reading improved to 94 after juice  Will continue to monitor patient and follow plan of care

## 2022-09-18 NOTE — QUICK NOTE
PMR Quick Note    Appears patient has been scanned and team is doing urinary retention protocol  He is not effectively voiding and his bladder scans are inaccurate  Please continue Q4hr straight cath during the day, can stretch it to Q6hr overnight to allow for sleep  NO NEED FOR BLADDER SCANNING  Need to do this to make sure we keep a close eye on his I/O       Andres Kelly MD  Physical Medicine and Rehabilitation

## 2022-09-19 ENCOUNTER — HOSPITAL ENCOUNTER (OUTPATIENT)
Dept: ULTRASOUND IMAGING | Facility: HOSPITAL | Age: 67
Discharge: HOME | DRG: 949 | End: 2022-09-19
Payer: MEDICARE

## 2022-09-19 LAB
GLUCOSE SERPL-MCNC: 107 MG/DL (ref 65–140)
GLUCOSE SERPL-MCNC: 118 MG/DL (ref 65–140)
GLUCOSE SERPL-MCNC: 123 MG/DL (ref 65–140)
GLUCOSE SERPL-MCNC: 134 MG/DL (ref 65–140)
GLUCOSE SERPL-MCNC: 59 MG/DL (ref 65–140)

## 2022-09-19 PROCEDURE — 99233 SBSQ HOSP IP/OBS HIGH 50: CPT | Performed by: PHYSICAL MEDICINE & REHABILITATION

## 2022-09-19 PROCEDURE — 97110 THERAPEUTIC EXERCISES: CPT

## 2022-09-19 PROCEDURE — 97112 NEUROMUSCULAR REEDUCATION: CPT

## 2022-09-19 PROCEDURE — 97535 SELF CARE MNGMENT TRAINING: CPT

## 2022-09-19 PROCEDURE — 97530 THERAPEUTIC ACTIVITIES: CPT

## 2022-09-19 PROCEDURE — 76770 US EXAM ABDO BACK WALL COMP: CPT

## 2022-09-19 PROCEDURE — 99232 SBSQ HOSP IP/OBS MODERATE 35: CPT | Performed by: PHYSICIAN ASSISTANT

## 2022-09-19 PROCEDURE — 0T9B70Z DRAINAGE OF BLADDER WITH DRAINAGE DEVICE, VIA NATURAL OR ARTIFICIAL OPENING: ICD-10-PCS | Performed by: PHYSICAL MEDICINE & REHABILITATION

## 2022-09-19 PROCEDURE — 82948 REAGENT STRIP/BLOOD GLUCOSE: CPT

## 2022-09-19 PROCEDURE — 97537 COMMUNITY/WORK REINTEGRATION: CPT

## 2022-09-19 PROCEDURE — 97116 GAIT TRAINING THERAPY: CPT

## 2022-09-19 RX ADMIN — ACETAMINOPHEN 650 MG: 325 TABLET ORAL at 04:04

## 2022-09-19 RX ADMIN — INSULIN LISPRO 10 UNITS: 100 INJECTION, SOLUTION INTRAVENOUS; SUBCUTANEOUS at 12:07

## 2022-09-19 RX ADMIN — DICLOFENAC SODIUM 2 G: 10 GEL TOPICAL at 17:12

## 2022-09-19 RX ADMIN — INSULIN LISPRO 10 UNITS: 100 INJECTION, SOLUTION INTRAVENOUS; SUBCUTANEOUS at 17:11

## 2022-09-19 RX ADMIN — DICLOFENAC SODIUM 2 G: 10 GEL TOPICAL at 12:09

## 2022-09-19 RX ADMIN — SENNOSIDES 17.2 MG: 8.6 TABLET, FILM COATED ORAL at 08:29

## 2022-09-19 RX ADMIN — INSULIN GLARGINE 40 UNITS: 100 INJECTION, SOLUTION SUBCUTANEOUS at 08:32

## 2022-09-19 RX ADMIN — INSULIN LISPRO 10 UNITS: 100 INJECTION, SOLUTION INTRAVENOUS; SUBCUTANEOUS at 08:34

## 2022-09-19 RX ADMIN — PANTOPRAZOLE SODIUM 40 MG: 40 TABLET, DELAYED RELEASE ORAL at 06:20

## 2022-09-19 RX ADMIN — MICONAZOLE NITRATE: 20 CREAM TOPICAL at 08:34

## 2022-09-19 RX ADMIN — HEPARIN SODIUM 5000 UNITS: 5000 INJECTION INTRAVENOUS; SUBCUTANEOUS at 14:10

## 2022-09-19 RX ADMIN — MICONAZOLE NITRATE: 20 CREAM TOPICAL at 17:14

## 2022-09-19 RX ADMIN — DICLOFENAC SODIUM 2 G: 10 GEL TOPICAL at 21:07

## 2022-09-19 RX ADMIN — DICLOFENAC SODIUM 2 G: 10 GEL TOPICAL at 08:35

## 2022-09-19 RX ADMIN — TAMSULOSIN HYDROCHLORIDE 0.4 MG: 0.4 CAPSULE ORAL at 17:11

## 2022-09-19 RX ADMIN — GABAPENTIN 100 MG: 100 CAPSULE ORAL at 17:11

## 2022-09-19 RX ADMIN — GABAPENTIN 100 MG: 100 CAPSULE ORAL at 21:07

## 2022-09-19 RX ADMIN — HEPARIN SODIUM 5000 UNITS: 5000 INJECTION INTRAVENOUS; SUBCUTANEOUS at 06:20

## 2022-09-19 RX ADMIN — GABAPENTIN 100 MG: 100 CAPSULE ORAL at 08:29

## 2022-09-19 RX ADMIN — HEPARIN SODIUM 5000 UNITS: 5000 INJECTION INTRAVENOUS; SUBCUTANEOUS at 21:07

## 2022-09-19 NOTE — PLAN OF CARE
Problem: Potential for Falls  Goal: Patient will remain free of falls  Description: INTERVENTIONS:  - Educate patient/family on patient safety including physical limitations  - Instruct patient to call for assistance with activity   - Consult OT/PT to assist with strengthening/mobility   - Keep Call bell within reach  - Keep bed low and locked with side rails adjusted as appropriate  - Keep care items and personal belongings within reach  - Initiate and maintain comfort rounds  - Make Fall Risk Sign visible to staff  - Apply yellow socks and bracelet for high fall risk patients  - Consider moving patient to room near nurses station  Outcome: Progressing     Problem: PAIN - ADULT  Goal: Verbalizes/displays adequate comfort level or baseline comfort level  Description: Interventions:  - Encourage patient to monitor pain and request assistance  - Assess pain using appropriate pain scale  - Administer analgesics based on type and severity of pain and evaluate response  - Implement non-pharmacological measures as appropriate and evaluate response  - Consider cultural and social influences on pain and pain management  - Notify physician/advanced practitioner if interventions unsuccessful or patient reports new pain  Outcome: Progressing     Problem: INFECTION - ADULT  Goal: Absence or prevention of progression during hospitalization  Description: INTERVENTIONS:  - Assess and monitor for signs and symptoms of infection  - Monitor lab/diagnostic results  - Monitor all insertion sites, i e  indwelling lines, tubes, and drains  - Administer medications as ordered  - Instruct and encourage patient and family to use good hand hygiene technique  - Identify and instruct in appropriate isolation precautions for identified infection/condition  Outcome: Progressing     Problem: SAFETY ADULT  Goal: Patient will remain free of falls  Description: INTERVENTIONS:  - Educate patient/family on patient safety including physical limitations  - Instruct patient to call for assistance with activity   - Consult OT/PT to assist with strengthening/mobility   - Keep Call bell within reach  - Keep bed low and locked with side rails adjusted as appropriate  - Keep care items and personal belongings within reach  - Initiate and maintain comfort rounds  - Make Fall Risk Sign visible to staff  - Apply yellow socks and bracelet for high fall risk patients  - Consider moving patient to room near nurses station  Outcome: Progressing  Goal: Maintain or return to baseline ADL function  Description: INTERVENTIONS:  -  Assess patient's ability to carry out ADLs; assess patient's baseline for ADL function and identify physical deficits which impact ability to perform ADLs (bathing, care of mouth/teeth, toileting, grooming, dressing, etc )  - Assess/evaluate cause of self-care deficits   - Assess range of motion  - Assess patient's mobility; develop plan if impaired  - Assess patient's need for assistive devices and provide as appropriate  - Encourage maximum independence but intervene and supervise when necessary  - Involve family in performance of ADLs  - Assess for home care needs following discharge   - Consider OT consult to assist with ADL evaluation and planning for discharge  - Provide patient education as appropriate  Outcome: Progressing  Goal: Maintains/Returns to pre admission functional level  Description: INTERVENTIONS:  - Perform BMAT or MOVE assessment daily    - Set and communicate daily mobility goal to care team and patient/family/caregiver     - Collaborate with rehabilitation services on mobility goals if consulted  - Out of bed for toileting  - Record patient progress and toleration of activity level   Outcome: Progressing     Problem: DISCHARGE PLANNING  Goal: Discharge to home or other facility with appropriate resources  Description: INTERVENTIONS:  - Identify barriers to discharge w/patient and caregiver  - Arrange for needed discharge resources and transportation as appropriate  - Identify discharge learning needs (meds, wound care, etc )  - Arrange for interpretive services to assist at discharge as needed  - Refer to Case Management Department for coordinating discharge planning if the patient needs post-hospital services based on physician/advanced practitioner order or complex needs related to functional status, cognitive ability, or social support system  Outcome: Progressing     Problem: Knowledge Deficit  Goal: Patient/family/caregiver demonstrates understanding of disease process, treatment plan, medications, and discharge instructions  Description: Complete learning assessment and assess knowledge base  Interventions:  - Provide teaching at level of understanding  - Provide teaching via preferred learning methods  Outcome: Progressing     Problem: Prexisting or High Potential for Compromised Skin Integrity  Goal: Skin integrity is maintained or improved  Description: INTERVENTIONS:  - Identify patients at risk for skin breakdown  - Assess and monitor skin integrity  - Assess and monitor nutrition and hydration status  - Monitor labs   - Assess for incontinence   - Turn and reposition patient  - Assist with mobility/ambulation  - Relieve pressure over bony prominences  - Avoid friction and shearing  - Provide appropriate hygiene as needed including keeping skin clean and dry  - Evaluate need for skin moisturizer/barrier cream  - Collaborate with interdisciplinary team   - Patient/family teaching  - Consider wound care consult   Outcome: Progressing     Problem: Nutrition/Hydration-ADULT  Goal: Nutrient/Hydration intake appropriate for improving, restoring or maintaining nutritional needs  Description: Monitor and assess patient's nutrition/hydration status for malnutrition  Collaborate with interdisciplinary team and initiate plan and interventions as ordered  Monitor patient's weight and dietary intake as ordered or per policy   Utilize nutrition screening tool and intervene as necessary  Determine patient's food preferences and provide high-protein, high-caloric foods as appropriate       INTERVENTIONS:  - Monitor oral intake, urinary output, labs, and treatment plans  - Assess nutrition and hydration status and recommend course of action  - Evaluate amount of meals eaten  - Assist patient with eating if necessary   - Allow adequate time for meals  - Recommend/ encourage appropriate diets, oral nutritional supplements, and vitamin/mineral supplements  - Order, calculate, and assess calorie counts as needed  - Assess need for intravenous fluids  - Provide specific nutrition/hydration education as appropriate  - Include patient/family/caregiver in decisions related to nutrition  Outcome: Progressing

## 2022-09-19 NOTE — PROGRESS NOTES
09/19/22 1240   Pain Assessment   Pain Assessment Tool 0-10   Pain Score 8   Pain Location/Orientation Orientation: Right;Location: Knee   Restrictions/Precautions   Precautions Fall Risk;Pain   Cognition   Overall Cognitive Status WFL   Arousal/Participation Alert; Responsive; Cooperative   Attention Within functional limits   Orientation Level Oriented X4   Memory Within functional limits   Following Commands Follows all commands and directions without difficulty   Therapeutic Interventions   Strengthening supine ther ex   Assessment   Treatment Assessment Patient agreeable to therapy session  Pain in R knee 8/10  Completed supine ther ex for general LE strengthening  Patient is excited for d/c tomorrow  PT Barriers   Physical Impairment Decreased strength;Decreased range of motion;Decreased endurance; Impaired balance;Decreased mobility;Orthopedic restrictions   Functional Limitation Walking;Transfers;Standing;Stair negotiation   Plan   Treatment/Interventions LE strengthening/ROM; Therapeutic exercise   Progress Progressing toward goals   PT Therapy Minutes   PT Time In 1240   PT Time Out 1310   PT Total Time (minutes) 30   PT Mode of treatment - Individual (minutes) 30   PT Mode of treatment - Concurrent (minutes) 0   PT Mode of treatment - Group (minutes) 0   PT Mode of treatment - Co-treat (minutes) 0   PT Mode of Treatment - Total time(minutes) 30 minutes   PT Cumulative Minutes 745   Therapy Time missed   Time missed?  No

## 2022-09-19 NOTE — PROGRESS NOTES
09/19/22 1340   Pain Assessment   Pain Assessment Tool 0-10   Pain Score No Pain   Restrictions/Precautions   Precautions Fall Risk   Assessment   Treatment Assessment This session focused on lemons mgmt due to pt now has lemons placed again and will be discharging to home with this  OT had pt enter BR and OT demon how to empty it and then had pt perform task and stressed need for good hygiene with task performance  Pt demon ability to manage this then verbally reported it back to therapist  Pt recog how to transition from surface to surface and is familiar with how to attach to clothing and thread  Discussed with nurse  D/C on target for next day  Plan   Treatment/Interventions ADL retraining   Progress Progressing toward goals   OT Therapy Minutes   OT Time In 1340   OT Time Out 1403   OT Total Time (minutes) 23   OT Mode of treatment - Individual (minutes) 23   OT Mode of treatment - Concurrent (minutes) 0   OT Mode of treatment - Group (minutes) 0   OT Mode of treatment - Co-treat (minutes) 0   OT Mode of Treatment - Total time(minutes) 23 minutes   OT Cumulative Minutes 734   Therapy Time missed   Time missed? No      09/19/22 1340   Pain Assessment   Pain Assessment Tool 0-10   Pain Score No Pain   Restrictions/Precautions   Precautions Fall Risk   Assessment   Treatment Assessment This session focused on lemons mgmt due to pt now has lemons placed again and will be discharging to home with this  OT had pt enter BR and OT demon how to empty it and then had pt perform task and stressed need for good hygiene with task performance  Pt demon ability to manage this then verbally reported it back to therapist  Pt recog how to transition from surface to surface and is familiar with how to attach to clothing and thread  Discussed with nurse  D/C on target for next day     Plan   Treatment/Interventions ADL retraining   Progress Progressing toward goals   OT Therapy Minutes   OT Time In 9539   OT Time Out 1403   OT Total Time (minutes) 23   OT Mode of treatment - Individual (minutes) 23   OT Mode of treatment - Concurrent (minutes) 0   OT Mode of treatment - Group (minutes) 0   OT Mode of treatment - Co-treat (minutes) 0   OT Mode of Treatment - Total time(minutes) 23 minutes   OT Cumulative Minutes 734   Therapy Time missed   Time missed?  No

## 2022-09-19 NOTE — PROGRESS NOTES
09/19/22 0658   Pain Assessment   Pain Assessment Tool 0-10   Pain Score 9   Pain Location/Orientation Orientation: Right;Location: Knee   Pain Onset/Description Descriptor: Aching   Patient's Stated Pain Goal No pain   Hospital Pain Intervention(s) Ambulation/increased activity   Restrictions/Precautions   Precautions Fall Risk;Pain   Weight Bearing Restrictions No   Cognition   Overall Cognitive Status WFL   Arousal/Participation Alert; Cooperative   Attention Attends with cues to redirect   Orientation Level Oriented X4   Memory Within functional limits   Following Commands Follows all commands and directions without difficulty   Comments pt agreeable to PT session   Sit to Lying   Type of Assistance Needed Independent   Sit to Lying CARE Score 6   Sit to Stand   Type of Assistance Needed Adaptive equipment   Physical Assistance Level No physical assistance   Comment RW   Sit to Stand CARE Score 6   Bed-Chair Transfer   Type of Assistance Needed Adaptive equipment   Physical Assistance Level No physical assistance   Comment RW   Chair/Bed-to-Chair Transfer CARE Score 6   Transfer Bed/Chair/Wheelchair   Limitations Noted In Balance; Endurance;LE Strength   Adaptive Equipment Roller Walker   Stand Pivot Modified Independent   Sit to Stand Modified Independent   Stand to Sit Modified Independent   Walk 10 Feet   Type of Assistance Needed Supervision   Physical Assistance Level No physical assistance   Comment RW   Walk 10 Feet CARE Score 4   Walk 50 Feet with Two Turns   Type of Assistance Needed Supervision   Physical Assistance Level No physical assistance   Comment RW   Walk 50 Feet with Two Turns CARE Score 4   Walk 150 Feet   Type of Assistance Needed Supervision   Physical Assistance Level No physical assistance   Comment RW   Walk 150 Feet CARE Score 4   Walking 10 Feet on Uneven Surfaces   Type of Assistance Needed Supervision   Physical Assistance Level No physical assistance   Comment RW   Walking 10 Feet on Uneven Surfaces CARE Score 4   Ambulation   Does the patient walk? 2  Yes   Primary Mode of Locomotion Prior to Admission Walk   Distance Walked (feet) 350 ft  (350 ftx1, 250 ftx1)   Assist Device Roller Walker   Gait Pattern Antalgic; Inconsistant Gladys;Decreased foot clearance; Wide ALEA   Limitations Noted In Balance; Heel Strike   Walk Assist Level Modified Independent   Findings level and unlevel and ramp   Wheelchair mobility   Does the patient use a wheelchair? 0   No   Curb or Single Stair   Style negotiated Single stair   Type of Assistance Needed Supervision   Physical Assistance Level No physical assistance   1 Step (Curb) CARE Score 4   4 Steps   Type of Assistance Needed Supervision   Physical Assistance Level No physical assistance   4 Steps CARE Score 4   12 Steps   Type of Assistance Needed Supervision   12 Steps CARE Score 4   Stairs   Type Stairs   # of Steps 28   Weight Bearing Precautions WBAT   Assist Devices Bilateral Rail   Picking Up Object   Type of Assistance Needed Independent   Physical Assistance Level No physical assistance   Picking Up Object CARE Score 6   Therapeutic Interventions   Strengthening seated and standing ther ex   Balance gait, transfers   Neuromuscular Re-Education multidirectional reaching in standing unsupported   Assessment   Treatment Assessment Pt seen for PT treatment session this date with interventions consisting of gait training w/ emphasis on improving pt's ability to ambulate level surfaces x 350 ftx1, 250 ftx1, ambulation on ramp and uneven surface with distant S provided by therapist with RW, Therapeutic exercise consisting of: AROM 2 sets of 15 reps B LE in sitting and standing with no UE support position, therapeutic activity consisting of training: bed mobility, supine<>sit transfers and sit<>stand transfers, neuromuscular re-education: multidirection reaching in standing unsupported x 0 ft w/ no UE support and navigating 28 stairs w/ B handrail with alternating pattern with distant S  Pt agreeable to PT treatment session upon arrival, pt found seated OOB in recliner, in no apparent distress and responsive  In comparison to previous session, pt with improvements in balance  Post session: pt returned BTB, all needs in reach and RN notified of session findings/recommendations  Continue to recommend home with outpatient rehabilitation at time of d/c in order to maximize pt's functional independence and safety w/ mobility  Pt continues to be functioning below baseline level  PT will continue to see pt during current hospitalization in order to address the deficits listed above and provide interventions consistent w/ POC in effort to achieve STGs  PT Barriers   Physical Impairment Decreased strength;Decreased range of motion; Impaired balance;Pain   Functional Limitation Car transfers   Plan   Treatment/Interventions LE strengthening/ROM; Elevations; Therapeutic exercise; Endurance training;Equipment eval/education;Gait training; Compensatory technique education   Progress Improving as expected   PT Therapy Minutes   PT Time In 0658   PT Time Out 0828   PT Total Time (minutes) 90   PT Mode of treatment - Individual (minutes) 90   PT Mode of treatment - Concurrent (minutes) 0   PT Mode of treatment - Group (minutes) 0   PT Mode of treatment - Co-treat (minutes) 0   PT Mode of Treatment - Total time(minutes) 90 minutes   PT Cumulative Minutes 715   Therapy Time missed   Time missed?  No

## 2022-09-19 NOTE — PROGRESS NOTES
Progress Note - Nephrology   Oracio Richardson Jr  75 y o  male MRN: [id number]  Unit/Bed#: [facility] 210-01 Encounter: [id number]    Assessment and Plan    1  Polyuria  Etiology appropriate polyuria due to high fluid intake given low specific gravity and low serum sodium on workup  Patient admits to continued high fluid intake primarily due to feeling very thirsty and having dry lips  Will try to address this to naturally reduce fluid intake without instituting restriction  2  Urinary retention  Continue to monitor urine output  Urinary retention protocol  3  Electrolyte abnormalities  Update labs tomorrow  4  Tubulointerstitial nephritis, possibly  Consider DDAVP and urine osmolality if there is any clinical worsening  5  Acute kidney injury  Resolved  6  Dry lips  Would benefit that from Vaseline, Bag Balm or any petrolatum-based barrier  Follow up reason for today's visit:     DKA (diabetic ketoacidosis) ([facility] 75 )    Patient Active Problem List   Diagnosis    Urinary frequency    Type 2 diabetes mellitus ([place] [address] )    Prostate cancer screening    Microscopic hematuria    Urge incontinence of urine    Nocturnal enuresis    Feeling of incomplete bladder emptying    Balanitis    DKA (diabetic ketoacidosis) ([facility] )    New onset atrial fibrillation ([place] [address] )    Urinary retention    Fungal infection of skin    Depressed mood    Pain in both lower extremities    Polyuria         Subjective:   Complains of thirstiness  Admits to high fluid intake  A complete 10 point review of systems was performed and is otherwise negative  Objective:     Vitals: Blood pressure 104/65, pulse 78, temperature 98 1 °F (36 7 °C), temperature source Temporal, resp  rate 18, height 6' (1 829 m), weight 105 kg (231 lb 0 7 oz), SpO2 97 %  ,Body mass index is 31 33 kg/m²      Weight (last 2 days)     Date/Time Weight    09/19/22 0534 105 (231 04)    09/19/22 0500 105 (231 04)    09/18/22 0555 106 (233 25)    09/17/22 0538 104 (230 16)            Intake/Output Summary (Last 24 hours) at 9/19/2022 1044  Last data filed at 9/19/2022 0900  Gross per 24 hour   Intake 1310 ml   Output 4450 ml   Net -3140 ml     I/O last 3 completed shifts: In: 1310 [P O :1310]  Out: 4597 [Urine:5450]         Physical Exam: /65 (BP Location: Right arm)   Pulse 78   Temp 98 1 °F (36 7 °C) (Temporal)   Resp 18   Ht 6' (1 829 m)   Wt 105 kg (231 lb 0 7 oz)   SpO2 97%   BMI 31 33 kg/m²     General Appearance:    No acute distress  Cooperative  Appears stated age  Head:    Normocephalic  Atraumatic  Normal jaw occlusion  Eyes:    Lids, conjunctiva normal  No scleral icterus  Ears:    Normal external ears  Nose:   Nares normal  No drainage  Mouth:   Lips, tongue normal  Mucosa normal  Phonation normal    Neck:   Supple  Symmetrical    Back:     Symmetric  No CVA tenderness  Lungs:     Normal respiratory effort  Clear to auscultation bilaterally  Chest wall:    No tenderness or deformity  Heart:    Regular rate and rhythm  Normal S1 and S2  No murmur  No JVD  No edema  Abdomen:     Soft  Non-tender  Bowel sounds active  Genitourinary:   No Lane catheter present  Extremities:   Extremities normal  Atraumatic  No cyanosis  Skin:   Warm and dry  No pallor, jaundice, rash, ecchymoses  Neurologic:   Alert and oriented to person, place, time  No focal deficit  Lab, Imaging and other studies: I have personally reviewed pertinent labs  CBC: No results found for: WBC, HGB, HCT, MCV, PLT, ADJUSTEDWBC, MCH, MCHC, RDW, MPV, NRBC  CMP: No results found for: NA, K, CL, CO2, ANIONGAP, BUN, CREATININE, GLUCOSE, CALCIUM, AST, ALT, ALKPHOS, PROT, BILITOT, EGFR        Results from last 7 days   Lab Units 09/16/22  0532 09/14/22  1432   POTASSIUM mmol/L 4 3 3 8   CHLORIDE mmol/L 103 102   CO2 mmol/L 26 21   BUN mg/dL 18 12   CREATININE mg/dL 0 83 1 26   CALCIUM mg/dL 9 1 9 3   ALK PHOS U/L 84  --    ALT U/L 14  --    AST U/L 33  -- Phosphorus: No results found for: PHOS  Magnesium: No results found for: MG  Urinalysis: No results found for: Dorthey Docker, SPECGRAV, PHUR, LEUKOCYTESUR, NITRITE, PROTEINUA, GLUCOSEU, KETONESU, BILIRUBINUR, BLOODU  Ionized Calcium: No results found for: CAION  Coagulation: No results found for: PT, INR, APTT  Troponin: No results found for: TROPONINI  ABG: No results found for: PHART, DUG5LIH, PO2ART, VAM0BKP, M9KTEPOK, BEART, SOURCE  Radiology review:     IMAGING  No results found      Current Facility-Administered Medications   Medication Dose Route Frequency    acetaminophen (TYLENOL) tablet 650 mg  650 mg Oral Q6H PRN    Diclofenac Sodium (VOLTAREN) 1 % topical gel 2 g  2 g Topical 4x Daily    gabapentin (NEURONTIN) capsule 100 mg  100 mg Oral TID    glucose chewable tablet 16 g  16 g Oral Once    heparin (porcine) subcutaneous injection 5,000 Units  5,000 Units Subcutaneous Q8H [facility]    insulin glargine (LANTUS) subcutaneous injection 40 Units 0 4 mL  40 Units Subcutaneous QAM    insulin lispro (HumaLOG) 100 units/mL subcutaneous injection 1-5 Units  1-5 Units Subcutaneous HS    insulin lispro (HumaLOG) 100 units/mL subcutaneous injection 10 Units  10 Units Subcutaneous TID With Meals    insulin lispro (HumaLOG) 100 units/mL subcutaneous injection 2-12 Units  2-12 Units Subcutaneous TID AC    lidocaine (URO-JET) 2 % urethral/mucosal gel 1 application  1 application Urethral U2P PRN Max 4/day    moisture barrier miconazole 2% cream (aka ALEXI MOISTURE BARRIER ANTIFUNGAL CREAM)   Topical BID    ondansetron (ZOFRAN-ODT) dispersible tablet 4 mg  4 mg Oral Q6H PRN    pantoprazole (PROTONIX) EC tablet 40 mg  40 mg Oral Early Morning    polyethylene glycol (MIRALAX) packet 17 g  17 g Oral Daily PRN    senna (SENOKOT) tablet 17 2 mg  2 tablet Oral Daily    tamsulosin (FLOMAX) capsule 0 4 mg  0 4 mg Oral Daily With Dinner     Medications Discontinued During This Encounter   Medication Reason    nystatin (MYCOSTATIN) powder     insulin glargine (LANTUS) subcutaneous injection 44 Units 0 44 mL     lamoTRIgine (LaMICtal) tablet 25 mg     lidocaine (URO-JET) 2 % urethral/mucosal gel 1 application     insulin lispro (HumaLOG) 100 units/mL subcutaneous injection 14 Units        Cherelle Smith PA-C    Portions of the record may have been created with voice recognition software  Occasional wrong word or "sound a like" substitutions may have occurred due to the inherent limitations of voice recognition software  Read the chart carefully and recognize, using context, where substitutions have occurred

## 2022-09-19 NOTE — DISCHARGE INSTRUCTIONS
DISCHARGE INSTRUCTIONS: Yesenia Waldrop [age]    Bring these instructions with you to your Outpatient Physician appointments so they can order and follow-up any additional lab work or imaging recommended at time of discharge  It  is you or your caregivers responsibility to obtain follow-up MEDICATION REFILLS  As indicated through your Primary Care Physician (PCP) and other outpatient specialty provider(s) after discharge  Please follow-up with your PCP as soon as possible after discharge to set-up follow-up management and when appropriate refills  You remain a fall and injury risk which could be severe  - Your risk of fall has decreased however since admission to acute rehab  Caregiver training has been completed with our staff  - Appropriate supervision +/- assistance as instructed during your rehab course is recommended to decrease risk of fall and injury  If you (or your health care proxy) have any questions or concerns regarding your acute rehabilitation stay including issues with medications, rehabilitation, and follow-up plan, please call:          [facility]'s Acute Rehabilitation Unit at [facility] at [phone]    Should you develop feelings of significant hopelessness, severe depression, severe anxiety, or suicide you should call 911 or obtain transportation to nearest ER  National Suicide Prevention Lifeline is 6-146.461.1505 and is available 24 hrs/day  PHYSICIANS to see:  Please see your doctors listed in the follow up providers section of your discharge paperwork, and take the discharge paperwork with you to your appointments      *Several referrals have been made for you at time of discharge including  Cardiology- for a follow up in regards to when you were in the hospital you had Atrial fibrillation; this resolved spontaneously but I am recommending an outpatient follow up to see if Cardiology would like further workup to ensure complete resolution of this  I will add information about atrial fibrillation below    Endocrinology- to help establish care and for ongoing insulin management  They will be the ones to order more insulin for you  I have ordered a 60 day supply  Diabetic educator- to help with ongoing dietary changes     Orthopedic surgeon- for your bilateral knee pain     General surgery- for abdominal hernias     Urology- you will need to see Urology for ongoing lemons care and management  They initiated you on a medication called Flomax, I added more information on this below  They will be the ones to initiate another voiding trial where the lemons will be removed and you will be assessed for urinary retention AKA holding fluid in the bladder  Psychiatry- You were seen by inpatient psych virtually  They initiated Lamictal however, this was discontinued with ongoing urinary complications due to concerns with elevation in your kidney function  If you feel you would like to discuss further counseling methods please follow up with Psychiatry for ongoing treatment needs  Internal medicine- To help get you established with a PCP here in [address] [place] [address] Road has been ordered for you: Your home health agency should reach out to you or your family soon to arrange follow-up  (If [facility] is your provider their phone number is [phone])    If you are unable to get in touch with home health you may contact your  at [phone]  [facility]FFILIATED WITH [facility]    LAB WORK to recommended after discharge: Follow-up lab work at discretion of your outpatient physicians to be determined at time of your future appointments  Obtain the following lab orders and follow-up management through primary care physician and outpatient specialists       CBC and BMP to monitor hemoglobin, white blood cell, electrolytes and kidney function at next visit     Excessive delay in labs and appropriate follow-up could potentially increase your risk of complications which could be severe and even life-threatening  It is you or your caregiver's responsibility to ensure these tests are ordered by your outpatient providers and followed up with accordingly  Call [facility] to locate the closest open outpatient lab center where you can have your lab work drawn  Contact them at [phone]      SKIN CARE INSTRUCTIONS to follow:  Skin and Wound Care Plan:   1-Calazime to coccyx and intergluteal crease three times a day and as needed   2-Hydraguard to bilateral heels and buttock two times a day and as needed   3-Elevate heels to offload pressure  4-Ehob cushion when out of bed  5-Turn/repoisiton every two hours for pressure re-distribution on skin  6-Moisturize skin daily with skin nourishing cream  7- Scrotum and bilateral medial thighs- Cleanse skin with soap and water, pat dry  Apply Francois cream over affected areas so that a thin layer is applied to skin  Complete BID and with chayo-care  Be sure you stand and walk some every 1-2 hours during day  While seated or lying in bed shift positions and from side to side often  Turn patient (yourself) every 2 hours  WOUND CARE INSTRUCTIONS to follow:  Home health nursing will assist you with wound management  You may contact them with issues as well once this service is set-up  If [facility] is your provider their phone number is [phone]  If you are unable to get in touch with home health you may contact your  at [phone]  MEDICATIONS:  Please see a full list of your medications outlined in the After Visit Summary that is attached to these Discharge Instructions  Please note changes may have been made to your medications please refer to your discharge paperwork for your current medications and take this list with you to all your doctors appointments for your doctors to review    Please do not resume a home medication unless the medication reconciliation sheet indicates to do so, please do not assume that a medication that you were given a prescription for is the same as a medication you have at home based on both medications having the same name as dosages and frequency may have changed  Unless specifically noted in your medication list provided to you in your discharge paper work do not resume prior vitamins, minerals, or supplements you may have been taking prior to your hospitalization unless instructed by an outpatient physician in the future  Sedating Medications with increased risk of complications: This medication was started prior to your acute rehabilitation course as recommended by your prior physicians  It was continued during your acute rehabilitation course  This medication will need to be managed by your outpatient physician(s) after discharge  Follow-up with the appropriate provider as soon as possible to ensure appropriate use  Gabapentin has been used to help mood, anxiety, restlessness, and pain  You tolerated this medication adequately during your recent hospital stay  - Take 100 mg 3 times per day  - Do not stop this medication abruptly as this can cause seizures  Please discuss with your PCP if you wish to discontinue  - Do not take with alcohol (or marijuana/cannabis) while on this medication as this can cause increased confusion, breathing problems, falls, and severe injury  - This medication can increase risk of confusion, fall, and injury although you did tolerate adequately during rehab course  - Follow-up with your primary care physician within 1-2 weeks for additional management of your medical conditions, potential refills, or adjustments of this medication  Each of these sedating medications (Gabapentin) can and have been helpful to manage your symptoms of anxiety and pain BUT can increase your risk of fall and serious injury including risk to your healing foot  Taking them together increasing your risk of fall and injury  If possible try to limit use particularly of oxycodone and hydroxyzine)  You have been discussed these risks and verbalized understanding  **    MEDICAL MANAGEMENT AT HOME specific to you:    Diabetes Management:  Please check your blood sugars 4 times daily before meals and at bedtime and record them to provide to your doctors, contact your family doctor as soon as possible for blood sugars higher than 220 or lower than 100  Follow-up with PCP/family doctor regularly to ensure blood sugars remain adequately controlled  A referral has been made for endocrinology and a diabetic educator for ongoing care after discharge  Urinary retention management:  You recently had your Lemons catheter re-inserted  Urinary retention can lead to significant kidney injury and infection which can be serious  If you do not urinate for 8 hours or more or you have the urge to urinate and are unable to, please obtain transportation to nearest emergency room (or urologist office), for likely placement of lemons  A referral has been made to Urology to follow up within 2 weeks for potential lemons removal and voiding trial  Home health nursing also requested and they will assist with lemons care    Please note a summary of your hospital stay with relevant information for your doctors will try to be sent to them  Please confirm with your doctors at your follow up visits that they have received this summary and have them contact [address] [place] [address] if they have not received them along with any other medical records they may require  A-fib (Atrial Fibrillation)   WHAT YOU NEED TO KNOW:   A-fib may come and go, or it may be a long-term condition  A-fib can cause blood clots, stroke, or heart failure  These conditions may become life-threatening  It is important to treat and manage A-fib to help prevent a blood clot, stroke, or heart failure         DISCHARGE INSTRUCTIONS:   Call your local emergency number (911 in the [address] [facility]) or have someone call if:   You have any of the following signs of a heart attack:      Squeezing, pressure, or pain in your chest    You may  also have any of the following:     Discomfort or pain in your back, neck, jaw, stomach, or arm    Shortness of breath    Nausea or vomiting    Lightheadedness or a sudden cold sweat    You have any of the following signs of a stroke:      Numbness or drooping on one side of your face     Weakness in an arm or leg    Confusion or difficulty speaking    Dizziness, a severe headache, or vision loss    Call your doctor or cardiologist if:   Your arm or leg feels warm, tender, and painful  It may look swollen and red  Your heart rate is more than 110 beats per minute  You have new or worsening swelling in your legs, feet, ankles, or abdomen  You are short of breath, even at rest     You have questions or concerns about your condition or care  Medicines: You may need any of the following:  Heart medicines  help control your heart rate or rhythm  You may need more than one medicine to treat your symptoms  Blood thinners  help prevent blood clots  Clots can cause strokes, heart attacks, and death  The following are general safety guidelines to follow while you are taking a blood thinner:    Watch for bleeding and bruising while you take blood thinners  Watch for bleeding from your gums or nose  Watch for blood in your urine and bowel movements  Use a soft washcloth on your skin, and a soft toothbrush to brush your teeth  This can keep your skin and gums from bleeding  If you shave, use an electric shaver  Do not play contact sports  Tell your dentist and other healthcare providers that you take a blood thinner  Wear a bracelet or necklace that says you take this medicine  Do not start or stop any other medicines unless your healthcare provider tells you to   Many medicines cannot be used with blood thinners  Take your blood thinner exactly as prescribed by your healthcare provider  Do not skip does or take less than prescribed  Tell your provider right away if you forget to take your blood thinner, or if you take too much  Warfarin  is a blood thinner that you may need to take  The following are things you should be aware of if you take warfarin:     Foods and medicines can affect the amount of warfarin in your blood  Do not make major changes to your diet while you take warfarin  Warfarin works best when you eat about the same amount of vitamin K every day  Vitamin K is found in green leafy vegetables and certain other foods  Ask for more information about what to eat when you are taking warfarin  You will need to see your healthcare provider for follow-up visits when you are on warfarin  You will need regular blood tests  These tests are used to decide how much medicine you need  Antiplatelets , such as aspirin, help prevent blood clots  Take your antiplatelet medicine exactly as directed  These medicines make it more likely for you to bleed or bruise  If you are told to take aspirin, do not take acetaminophen or ibuprofen instead  Take your medicine as directed  Contact your healthcare provider if you think your medicine is not helping or if you have side effects  Tell him or her if you are allergic to any medicine  Keep a list of the medicines, vitamins, and herbs you take  Include the amounts, and when and why you take them  Bring the list or the pill bottles to follow-up visits  Carry your medicine list with you in case of an emergency  Manage A-fib:   Know your target heart rate  Learn how to check your pulse and monitor your heart rate  Know the risks if you choose to drink alcohol  Alcohol can increase your risk for A-fib or make A-fib harder to manage  Ask your healthcare provider if it is okay for you to drink any alcohol   He or she can help you set limits for the number of drinks you have in 24 hours and in a week  A drink of alcohol is 12 ounces of beer, 5 ounces of wine, or 1½ ounces of liquor  Do not smoke  Nicotine can cause heart damage and make it more difficult to manage your A-fib  Do not use e-cigarettes or smokeless tobacco in place of cigarettes or to help you quit  They still contain nicotine  Ask your healthcare provider for information if you currently smoke and need help quitting  Eat heart-healthy foods  Heart healthy foods will help keep your cholesterol low  These include fruits, vegetables, whole-grain breads, low-fat dairy products, beans, lean meats, and fish  Replace butter and margarine with heart-healthy oils such as olive oil and canola oil  Maintain a healthy weight  Ask your healthcare provider what a healthy weight is for you  Ask him or her to help you create a safe weight loss plan if you are overweight  Even a small goal of a 10% weight loss can improve your heart health  Get regular physical activity  Physical activity helps improve your heart health  Get at least 150 minutes of moderate aerobic physical activity each week  Your healthcare provider can help you create an activity plan  Manage other health conditions  This includes high blood pressure or cholesterol, sleep apnea, diabetes, and other heart conditions  Take medicine as directed and follow your treatment plan  Your healthcare provider may need to change a medicine you are taking if it is causing your A-fib  Do not  stop taking any medicine unless directed by your provider  Follow up with your doctor or cardiologist as directed: You will need regular blood tests and monitoring  Write down your questions so you remember to ask them during your visits  © [employer] 2022 Information is for End User's use only and may not be sold, redistributed or otherwise used for commercial purposes   All illustrations and images included in CareNotes® are the copyrighted property of A D A M , Inc  or [facility]   The above information is an  only  It is not intended as medical advice for individual conditions or treatments  Talk to your doctor, nurse or pharmacist before following any medical regimen to see if it is safe and effective for you  Tamsulosin (By mouth)   Tamsulosin Hydrochloride (yse-AMX-fqq-sin anahi-droe-KLOR-maria luisa)  Treats benign prostatic hyperplasia (enlarged prostate)  Brand Name(s): Flomax   There may be other brand names for this medicine  When This Medicine Should Not Be Used: This medicine is not right for everyone  Do not use it if you had an allergic reaction to tamsulosin  How to Use This Medicine:   Capsule  Take your medicine as directed  Your dose may need to be changed several times to find what works best for you  Take this medicine 30 minutes after the same meal each day  Swallow the capsule whole  Do not crush, chew, or open it  Read and follow the patient instructions that come with this medicine  Talk to your doctor or pharmacist if you have any questions  Missed dose: Take a dose as soon as you remember  If it is almost time for your next dose, wait until then and take a regular dose  Do not take extra medicine to make up for a missed dose  If you forget to take this medicine for several days in a row, talk to your doctor before you start taking it again  Store the medicine in a closed container at room temperature, away from heat, moisture, and direct light  Drugs and Foods to Avoid:   Ask your doctor or pharmacist before using any other medicine, including over-the-counter medicines, vitamins, and herbal products  Some medicines can affect how tamsulosin works  Tell your doctor if you are using another alpha blocker medicine, cimetidine, erythromycin, ketoconazole, paroxetine, terbinafine, warfarin, or medicine for erectile dysfunction    Warnings While Using This Medicine:   Tell your doctor if you have kidney disease, liver disease, low blood pressure, prostate cancer, or an allergy to sulfa drugs  Tell your doctor if you plan to have cataract or glaucoma surgery  This medicine may cause eye problems during surgery  This medicine may make you dizzy or lightheaded  Do not drive or do anything else that could be dangerous until you know how this medicine affects you  Stand or sit up slowly if you feel dizzy  Your doctor will check your progress and the effects of this medicine at regular visits  Keep all appointments  Keep all medicine out of the reach of children  Never share your medicine with anyone  Possible Side Effects While Using This Medicine:   Call your doctor right away if you notice any of these side effects: Allergic reaction: Itching or hives, swelling in your face or hands, swelling or tingling in your mouth or throat, chest tightness, trouble breathing  Blistering, peeling, red skin rash  Lightheadedness, dizziness, fainting  Painful, prolonged erection of your penis  If you notice these less serious side effects, talk with your doctor:   Headache  Problems with ejaculation  Runny or stuffy nose  If you notice other side effects that you think are caused by this medicine, tell your doctor  Call your doctor for medical advice about side effects  You may report side effects to FDA at 4-832-FDA-6727    © Copyright [employer] 2022 Information is for End User's use only and may not be sold, redistributed or otherwise used for commercial purposes  The above information is an  only  It is not intended as medical advice for individual conditions or treatments  Talk to your doctor, nurse or pharmacist before following any medical regimen to see if it is safe and effective for you  Gabapentin (By mouth)   Gabapentin (edgar-a-PEN-tin)  Treats seizures and pain caused by shingles     Brand Name(s): FusePaq Fanatrex, Neurontin   There may be other brand names for this medicine  When This Medicine Should Not Be Used: This medicine is not right for everyone  Do not use it if you had an allergic reaction to gabapentin  How to Use This Medicine:   Capsule, Liquid, Tablet  Take your medicine as directed  Your dose may need to be changed several times to find what works best for you  If you have epilepsy, do not allow more than 12 hours to pass between doses  Capsule: Swallow the capsule whole with plenty of water  Do not open, crush, or chew it  Gralise® tablet: Swallow the tablet whole   Do not crush, break, or chew it  Neurontin® tablet: If you break a tablet into 2 pieces, use the second half as your next dose  Do not use the half-tablet if the whole tablet has been cut or broken after 28 days  Oral liquid: Measure the oral liquid medicine with a marked measuring spoon, oral syringe, or medicine cup  This medicine should come with a Medication Guide  Ask your pharmacist for a copy if you do not have one  Missed dose: Take a dose as soon as you remember  If it is almost time for your next dose, wait until then and take a regular dose  Do not take extra medicine to make up for a missed dose  Store the medicine in a closed container at room temperature, away from heat, moisture, and direct light  Store the Neurontin® oral liquid in the refrigerator  Do not freeze  Drugs and Foods to Avoid:   Ask your doctor or pharmacist before using any other medicine, including over-the-counter medicines, vitamins, and herbal products  Some medicines can affect how gabapentin works  Tell your doctor if you also using hydrocodone or morphine  If you take an antacid, wait at least 2 hours before you take gabapentin  Do not drink alcohol while you are using this medicine  Tell your doctor if you use anything else that makes you sleepy  Some examples are allergy medicine, narcotic pain medicine, and alcohol   Tell your doctor if you are also using lorazepam, oxycodone, or zolpidem  Warnings While Using This Medicine:   Tell your doctor if you are pregnant or breastfeeding, or if you have kidney problems (including patients receiving dialysis) or lung problems  Tell your doctor if you have a history of depression or mental health problems  This medicine may cause the following problems:  Drug reaction with eosinophilia and systemic symptoms (DRESS) or multiorgan hypersensitivity, which may damage the liver, kidney, blood, heart, or muscles  Changes in mood or behavior, including suicidal thoughts or behavior  Respiratory depression (serious breathing problem that can be life-threatening), when used with narcotic pain medicines  Do not stop using this medicine suddenly  Your doctor will need to slowly decrease your dose before you stop it completely  This medicine may make you dizzy or drowsy  Do not drive or do anything else that could be dangerous until you know how this medicine affects you  Tell any doctor or dentist who treats you that you are using this medicine  This medicine may affect certain medical test results  Your doctor will check your progress and the effects of this medicine at regular visits  Keep all appointments  Keep all medicine out of the reach of children  Never share your medicine with anyone  Possible Side Effects While Using This Medicine:   Call your doctor right away if you notice any of these side effects:   Allergic reaction: Itching or hives, swelling in your face or hands, swelling or tingling in your mouth or throat, chest tightness, trouble breathing  Behavior problems, aggression, restlessness, trouble concentrating, moodiness (especially in children)  Blistering, peeling, red skin rash  Blue lips, fingernails, or skin, chest pain, fast heartbeat, trouble breathing  Change in how much or how often you urinate, bloody or cloudy urine  Dark urine or pale stools, nausea, vomiting, loss of appetite, stomach pain, yellow skin or eyes  Fever, chills, cough, sore throat, body aches  Problems with coordination, shakiness, unsteadiness, unusual eye movement  Rapid weight gain, swelling in your hands, ankles, or feet  Rash, swollen or tender glands in the neck, armpit, or groin  Unusual moods or behaviors, thoughts of hurting yourself, feeling depressed  If you notice these less serious side effects, talk with your doctor:   Dizziness, drowsiness, sleepiness, tiredness  If you notice other side effects that you think are caused by this medicine, tell your doctor  Call your doctor for medical advice about side effects  You may report side effects to FDA at 8-379-TFY-1205    © Copyright [employer] 2022 Information is for End User's use only and may not be sold, redistributed or otherwise used for commercial purposes  The above information is an  only  It is not intended as medical advice for individual conditions or treatments  Talk to your doctor, nurse or pharmacist before following any medical regimen to see if it is safe and effective for you  Diabetic Ketoacidosis   WHAT YOU NEED TO KNOW:   Diabetic ketoacidosis (DKA) is a life-threatening condition caused by dangerously high blood sugar levels  Your blood sugar levels become high because your body does not have enough insulin  Insulin helps move sugar out of the blood so it can be used for energy  The lack of insulin forces your body to use fat instead of sugar for energy  As fats are broken down, they leave chemicals called ketones that build up in your blood  Ketones are dangerous at high levels  DISCHARGE INSTRUCTIONS:   Call or have someone call your local emergency number ([phone] in the [address] [facility]) for any of the following: You have a seizure  You begin to breathe fast, or are short of breath  You become weak and confused  Seek care immediately if:   You are more drowsy than usual        Contact your healthcare provider if:   You have fruity, sweet breath       You have severe, new stomach pain and are vomiting  Your blood sugar level is lower or higher than your healthcare provider says it should be  You have ketones in your blood or urine  You have a fever or chills  You are more thirsty than usual      You are urinating more often than usual      You have questions or concerns about your condition or care  Medicines:   Insulin and diabetes medicine  decreases the amount of sugar in your blood  Take your medicine as directed  Contact your healthcare provider if you think your medicine is not helping or if you have side effects  Tell him or her if you are allergic to any medicine  Keep a list of the medicines, vitamins, and herbs you take  Include the amounts, and when and why you take them  Bring the list or the pill bottles to follow-up visits  Carry your medicine list with you in case of an emergency  Help prevent DKA: The best way to prevent DKA is to control your diabetes  Ask your healthcare provider for more information on how to manage your diabetes  The following may help decrease your risk for DKA: Monitor your blood sugar levels closely  if you have an infection, are stressed, sick, or experience trauma  Check your blood sugar levels often  You may need to check at least 3 times each day  If your blood sugar level is too high, give yourself insulin as directed by your healthcare provider  Manage your sick days  When you are sick, you may not eat as much as you normally would  You may need to change the amount of insulin you give yourself  You may need to check your blood sugar level more often than usual  Make a plan with your healthcare provider about how to manage your diabetes when you are sick  Check your ketones as directed  Follow your healthcare provider's instructions about when you should check your blood or urine for ketones  Your healthcare provider may give you a machine to check your blood ketones   Urine ketones can be checked with sticks you dip in your urine  Do not exercise if you have ketones in your urine or blood  Exercise may increase the level of ketones in your blood or urine and cause dehydration  Know how to treat DKA  If you have signs of DKA, drink more liquids that do not contain sugar, such as water  Take your insulin as directed by your healthcare provider and go to the nearest emergency room  Follow up with your healthcare provider as directed: You may need to return often for blood tests  Write down your questions so you remember to ask them during your visits  © Copyright [employer] 2022 Information is for End User's use only and may not be sold, redistributed or otherwise used for commercial purposes  All illustrations and images included in CareNotes® are the copyrighted property of A D A M [employer] Inc  or Cheryl Yi   The above information is an  only  It is not intended as medical advice for individual conditions or treatments  Talk to your doctor, nurse or pharmacist before following any medical regimen to see if it is safe and effective for you  Diabetes and Exercise   WHAT YOU NEED TO KNOW:   Physical activity, such as exercise, can help keep your blood sugar level steady or improve insulin resistance  Activity can help decrease your risk for heart disease, and help you lose weight  Exercise can also help lower your A1c  Your diabetes care team will help you create an exercise plan  The plan will be based on the type of diabetes you have and your starting fitness level  DISCHARGE INSTRUCTIONS:   Call your local emergency number ([phone] in the [address] [facility]) if:   You have chest pain or shortness of breath  Return to the emergency department if:   You have a low blood sugar level and it does not improve with treatment  Symptoms are trouble thinking, a pounding heartbeat, and sweating      Your blood sugar level is above 240 mg/dL and does not come down within 15 minutes of treatment  You have blurred or double vision  Your breath has a fruity, sweet smell, or your breathing is shallow  Call your doctor or diabetes care team if:   You have ketones in your blood or urine  You have a fever  Your blood sugar levels are higher than your target goals  You often have low blood sugar levels  Your skin is red, dry, warm, or swollen  You have a wound that does not heal     You have trouble coping with diabetes, or you feel anxious or depressed  You have questions or concerns about your condition or care  Tips to help you create and meet your exercise goals:   Set a goal for 150 minutes (2 5 hours) of moderate to vigorous aerobic activity each week  Aerobic activity helps your heart stay strong  Aerobic activity includes walking, bicycling, dancing, swimming, and raking leaves  Spread aerobic activity over 3 to 5 days  Do not take more than 2 days off in a row  It is best to do at least 10 minutes at a time and 30 minutes each day  You can work up to these goals  Remember that any activity is better than no activity  Over time, you can make exercise more intense or last longer  You can also add more days of exercise as your fitness level improves  Your diabetes care team can help you make a step-by-step plan to achieve your goals  Set a strength training goal of 2 to 3 times a week  Take at least 1 day off in between strength training sessions  Strength training helps you keep the muscles you have and build new muscles  Strength training includes lifting weights, climbing stairs, yoga, and kirill chi          Older adults should include balance training 2 to 3 times each week  These include walking backwards, standing on one foot, and walking heel to toe in a straight line  Other healthy exercise tips:   Stretch before and after you exercise to prevent injury  Drink water or liquids that do not contain sugar before, during, and after exercise  Ask your dietitian or healthcare provider which liquids you should drink when you exercise  Do not sit for longer than 30 minutes at a time during your day  If you cannot walk around, at least stand up  This will help you stay active and keep your blood circulating  Exercise and blood sugar levels:  Check your blood sugar level before and after exercise, if you use insulin  Healthcare providers may tell you to change the amount of insulin you take or food you eat  If your blood sugar level is high, check your blood or urine for ketones before you exercise  Do not exercise if your blood sugar level is high and you have ketones  If your blood sugar level is less than 100 mg/dL, have a carbohydrate snack before you exercise  Examples are 4 to 6 crackers, ½ banana, 8 ounces (1 cup) of milk, or 4 ounces (½ cup) of juice  Follow up with your doctor or diabetes care team as directed:  Write down your questions so you remember to ask them during your visits  © [employer] 2022 Information is for End User's use only and may not be sold, redistributed or otherwise used for commercial purposes  All illustrations and images included in CareNotes® are the copyrighted property of A D A M , Inc  or [employer]   The above information is an  only  It is not intended as medical advice for individual conditions or treatments  Talk to your doctor, nurse or pharmacist before following any medical regimen to see if it is safe and effective for you  Diabetes and Nutrition   WHAT YOU NEED TO KNOW:   Nutrition plans help with healthy eating patterns that improve health  Nutrition plans and regular exercise help keep your blood sugar levels steady  They also help delay or prevent complications of diabetes, such as diabetic kidney disease    DISCHARGE INSTRUCTIONS:   Call your local emergency number [phone] in the [address] [facility]) if:   You have any of the following signs of a heart attack: Squeezing, pressure, or pain in your chest    You may  also have any of the following:     Discomfort or pain in your back, neck, jaw, stomach, or arm    Shortness of breath    Nausea or vomiting    Lightheadedness or a sudden cold sweat      Seek care immediately if:   You have a low blood sugar level and it does not improve with treatment  Symptoms are trouble thinking, a pounding heartbeat, and sweating  Your blood sugar level is above 240 mg/dL and does not come down within 15 minutes of treatment  You have ketones in your blood or urine  You have nausea or are vomiting and cannot keep any food or liquid down  You have blurred or double vision  Your breath has a fruity, sweet smell, or your breathing is shallow  Call your doctor or diabetes care team if:   Your blood sugar levels are higher than your target goals  You often have low blood sugar levels  You have trouble coping with diabetes, or you feel anxious or depressed  You have questions or concerns about your condition or care  A dietitian will help you create a nutrition plan  to meet your needs and your family's needs  The goal is for you to reach or maintain healthy weight, blood sugar, blood pressure, and lipid levels  You should meet with the dietitian at least 1 time each year  You will learn the following: How food affects your blood sugar levels    How to create healthy eating habits    How to make food choices based on your activity level, weight, and glucose levels    How your favorite foods may fit into your plan    How to keep track of carbohydrates    Correct portion sizes for each food    Changes you can make to your plan if you get pregnant or are breastfeeding    Do not skip meals: The goal is to keep your blood sugar level steady  Blood sugar levels may drop too low if you have received insulin and do not eat    Eat more high-fiber foods:  High-fiber foods include fresh or frozen fruits and vegetables, whole-grain breads, and beans  Fiber helps control or lower blood sugar and cholesterol levels  Choose whole fruits instead of fruit juice as much as possible  Sugar may be added to juice, and fiber may be removed  Choose heart-healthy fats:  Foods high in heart-healthy fats include olive oil, nuts, avocados, and fatty fish, such as salmon and tuna  Foods high in unhealthy fats include red meat, full-fat dairy products, and soft margarine  Unhealthy fats can increase your risk for heart disease, increase bad cholesterol, and lower good cholesterol  Choose complex carbohydrates:  Foods with complex carbohydrates include brown rice, whole-grain breads and cereals, and cooked beans  Foods with simple carbohydrates include white bread, white rice, most cold cereals, and snack foods  Your plan will include the amount of carbohydrate to have at one time or in a day  Your blood sugar level can get too high if you eat too much carbohydrate at one time  Blood sugar levels do not spike as high or drop as quickly with complex carbohydrates as with simple carbohydrates  Choose complex carbohydrates whenever possible  Have less sodium (salt): The risk for high blood pressure (BP) increases with high-sodium foods  Limit high-sodium foods, such as soy sauce, potato chips, and canned soup  Do not add salt to food you cook  Limit your use of table salt  Read labels to have no more than 2,300 milligrams of sodium in one day  Limit artificial sweeteners: These may be found in food or drinks, such as diet soft drinks or other low-calorie beverages  Artificial sweeteners are low in calories  They may help you lower your overall calories and carbohydrates  It is important not to have more calories from other foods to make up for the calories saved  Artificial sweeteners do not have any nutrition  Eat whole foods and drink water as much as possible   Your plan may include beverages with artificial sweeteners for a short time  These can help you transition from high-sugar beverages to water  Use the plate method for each meal:  This method can help you eat the right amount of carbohydrates and keep your blood sugar levels under control  Draw an imaginary line down the middle of a 9-inch dinner plate  On one side, draw another line to divide that section in half  Your plate will have one large section and 2 small sections  Fill the largest section with non-starchy vegetables  These include broccoli, spinach, cucumbers, peppers, cauliflower, and tomatoes  Add a starch to one of the small sections  Starches include pasta, rice, whole-grain bread, tortillas, corn, potatoes, and beans  Add meat or another source of protein to the other small section  Examples include chicken or turkey without skin, fish, lean beef or pork, low-fat cheese, tofu, and eggs  Add dairy products or fruit next to your plate if your meal plan allows  Examples of dairy include skim or 1% milk and low-fat yogurt  If you do not drink milk or eat dairy products, you may be able to add another serving of starchy food instead  Have a low-calorie or calorie-free drink with your meal  Examples include water or unsweetened tea or coffee  Know the risks if you choose to drink alcohol:  Alcohol can cause hypoglycemia (low blood sugar level), especially if you use insulin  Alcohol can cause high blood sugar and BP levels, and weight gain if you drink too much  Women 21 years or older and men 72 years or older should limit alcohol to 1 drink a day  Men aged 24 to 59 years should limit alcohol to 2 drinks a day  A drink of alcohol is 12 ounces of beer, 5 ounces of wine, or 1½ ounces of liquor  Hypoglycemia can happen hours after you drink alcohol  Check your blood sugar level for several hours after you drink alcohol  Have a source of fast-acting carbohydrates with you in case your level goes too low   You need immediate care if you have signs or symptoms of hypoglycemia, such as sweating, confusion, or fainting  Maintain a healthy weight:  A healthy weight can help you control your diabetes  You can maintain a healthy weight with a nutrition plan and exercise  Ask your healthcare provider how much you should weigh  Ask him or her to help you create a weight loss plan if you are overweight  Together you can set weight loss and maintenance goals  Follow up with your doctor or diabetes team as directed:  Write down your questions so you remember to ask them during your visits  © Copyright [employer] 2022 Information is for End User's use only and may not be sold, redistributed or otherwise used for commercial purposes  All illustrations and images included in CareNotes® are the copyrighted property of A D A M , Inc  or [facility] [employer]   The above information is an  only  It is not intended as medical advice for individual conditions or treatments  Talk to your doctor, nurse or pharmacist before following any medical regimen to see if it is safe and effective for you  Diabetic Neuropathy   WHAT YOU NEED TO KNOW:   Diabetic neuropathy (DN) is a type of nerve damage that can develop if you have diabetes  High blood sugar that is not controlled can damage nerves and slow or stop their ability to send signals  DN is most common in the legs and feet  DISCHARGE INSTRUCTIONS:   Return to the emergency department if:   Your legs or feet start to turn blue or black  You have a wound that does not heal or is red, swollen, or draining fluid  Call your care team provider if:   You begin to have symptoms  Your blood sugar level is higher or lower than care team providers have told you it should be  You have redness, calluses, or sores on your feet  You have questions or concerns about your condition or care  Treatment for DN:  DN cannot be cured   The goal of treatment is to decrease pain, slow progression of DN, and prevent complications  Keep your blood sugar levels as close to your target levels as possible  Check your blood sugar levels often, as directed  Contact your healthcare provider if your levels are higher than they should be  Work with your dietitian to create a healthy meal plan  This meal plan can help you control your blood sugar and decrease your symptoms  Be physically active at least 30 minutes, 5 days a week  This can help keep your blood sugar level steady and help you manage your weight  Ask your care team provider about the best activity plan for you  Use caution when you exercise if you have decreased feeling in your feet  Maintain a healthy weight  Ask your care team provider what a healthy weight is for you  A healthy weight can help you manage your blood sugar level  Medicines  may be given to help decrease nerve pain  Manage your symptoms:   Care for your feet  Check your feet each day for cuts, scratches, calluses, or other wounds  Look for redness and swelling, and feel for warmth  Wear shoes that fit well  Check your shoes for rocks or other objects that can hurt your feet  Do not walk barefoot or wear shoes without socks  Wear cotton socks to help keep your feet dry  Do not smoke  Nicotine can worsen your symptoms and make it more difficult to manage your diabetes  Ask your healthcare provider for information if you currently smoke and need help to quit  E-cigarettes or smokeless tobacco still contain nicotine  Talk to your healthcare provider before you use these products  Limit alcohol as directed  Alcohol can cause your blood sugar levels to be low if you use insulin  Alcohol can cause high blood sugar levels and weight gain if you drink too much  A drink of alcohol is 12 ounces of beer, 5 ounces of wine, or 1½ ounces of liquor  Your healthcare provider can tell you how many drinks are okay to have within 24 hours and within 1 week      Follow up with your care team provider as directed: You will need to have your feet checked at least 1 time each year  Write down your questions so you remember to ask them during your visits  © Copyright [employer] 2022 Information is for End User's use only and may not be sold, redistributed or otherwise used for commercial purposes  All illustrations and images included in CareNotes® are the copyrighted property of [employer]  or Cheryl Rush  The above information is an  only  It is not intended as medical advice for individual conditions or treatments  Talk to your doctor, nurse or pharmacist before following any medical regimen to see if it is safe and effective for you

## 2022-09-19 NOTE — PROGRESS NOTES
09/19/22 0903   Pain Assessment   Pain Assessment Tool 0-10   Pain Score 7   Pain Location/Orientation Orientation: Right;Location: Knee   Pain 2   Pain Score 2 3   Pain Location/Orientation 2 Orientation: Lower; Location: Knee   Restrictions/Precautions   Precautions Fall Risk;Pain   Oral Hygiene   Type of Assistance Needed Independent   Physical Assistance Level No physical assistance   Oral Hygiene CARE Score 6   Grooming   Able To Initiate Tasks; Wash/Dry Face;Comb/Brush Hair;Brush/Clean Teeth;Wash/Dry Hands   Findings stance   Shower/Bathe Self   Type of Assistance Needed Independent; Adaptive equipment   Physical Assistance Level No physical assistance   Shower/Bathe Self CARE Score 6   Bathing   Assessed Bath Style Shower   Anticipated D/C Bath Style Shower   Able to Wash/Rinse/Dry (body part) Left Arm;Right Arm;L Upper Leg;R Upper Leg;R Lower Leg/Foot;L Lower Leg/Foot;Chest;Abdomen;Perineal Area; Buttocks   Adaptive Equipment Longhand Sponge; Shower Labtrip; Shower Seat;Hand [facility]   Limitations Noted In [employer] Strength   Adaptive Equipment Grab Bars;Seat with out Back   Assessed Shower   Findings MI   Upper Body Dressing   Type of Assistance Needed Independent   Physical Assistance Level No physical assistance   Upper Body Dressing CARE Score 6   Lower Body Dressing   Type of Assistance Needed Independent   Physical Assistance Level No physical assistance   Lower Body Dressing CARE Score 6   Putting On/Taking Off Footwear   Type of Assistance Needed Independent; Adaptive equipment   Physical Assistance Level No physical assistance   Putting On/Taking Off Footwear CARE Score 6   Picking Up Object   Type of Assistance Needed Independent   Physical Assistance Level No physical assistance   Picking Up Object CARE Score 6   Dressing/Undressing Clothing   Able to  Obtain Clothing;Store removed clothing   Remove UB Clothes   (gown)   Alban Ethan   (gown)   Remove LB Clothes [facility] LB Clothes Socks   Adaptive Equipment Sock Aide   Sit to Stand   Type of Assistance Needed Independent   Physical Assistance Level No physical assistance   Sit to Stand CARE Score 6   Toileting Hygiene   Type of Assistance Needed Independent   Physical Assistance Level No physical assistance   Toileting Hygiene CARE Score 6   Toilet Transfer   Type of Assistance Needed Independent   Physical Assistance Level No physical assistance   Toilet Transfer CARE Score 6   Light Housekeeping   Light Housekeeping Level Walker   Light Housekeeping Level of Assistance Modified independent   Light Housekeeping s/u items for ADL   Exercise Tools   Other Exercise Tool 1 10# 5x15 bicep curl seated , 5x10 side to side in  stance , 5x10 trunk twist in stance, forward [place] in stance   Cognition   Overall Cognitive Status [facility]   Arousal/Participation Alert; Cooperative   Attention Within functional limits   Orientation Level Oriented X4   Memory Within functional limits   Following Commands Follows all commands and directions without difficulty   Additional Activities   Additional Activities Comments fxnl mobility RW MI;demon ability to ambulate from pt room for medical testing on first floor   Activity Tolerance   Activity Tolerance Patient tolerated treatment well   Assessment   Treatment Assessment OT tx addressed ADLs via shower level with focus on safe techniques and compensatory strategies assessing consistency with previously instructed tech and strategies  Levels  noted in respective sections of note  Pt has overall good recall with techniques  Pt managing well at MI level with toileting  Pt managing application of moisture barrier cream  Pt questioning dale stockings and discussed with MD during session  ALso suggested a compression sleeve for R LE  D/C planning ongoing in prep for dc to home tomorrow     Prognosis Good   Problem List Impaired balance;Pain   Plan   Treatment/Interventions ADL retraining;Functional transfer training; Therapeutic exercise; Endurance training;Patient/family training;Equipment eval/education;Gait training; Compensatory technique education;Spoke to nursing;OT   Progress Improving as expected   OT Therapy Minutes   OT Time In 0903   OT Time Out 1100   OT Total Time (minutes) 117   OT Mode of treatment - Individual (minutes) 117   OT Mode of treatment - Concurrent (minutes) 0   OT Mode of treatment - Group (minutes) 0   OT Mode of treatment - Co-treat (minutes) 0   OT Mode of Treatment - Total time(minutes) 117 minutes   OT Cumulative Minutes 711   Therapy Time missed   Time missed?  No

## 2022-09-19 NOTE — NUTRITION
22 1345   Biochemical Data,Medical Tests, and Procedures   Biochemical Data/Medical Tests/Procedures Lab values reviewed; Meds reviewed   Labs (Comment)  B, pro:6 2, alb:3 2, P:4 4, H&H:11  5   Meds (Comment) neurontin, heparin, lantus, humalog, zofran, protonix   Nutrition-Focused Physical Exam   Nutrition-Focused Physical Exam Findings RN skin assessment reviewed  (mositure associated skin damage coccyx)   Medical-Related Concerns PMH reviewed   Adequacy of Intake   Nutrition Modality PO   Feeding Route   PO Independent   Current PO Intake   Current Diet Order CCD 2, double vegetable, double protein   Current Meal Intake %   Estimated calorie intake compared to estimated need nutrient needs met  PES Statement   Problem Continue previous diagnosis   Recommendations/Interventions   Malnutrition/BMI Present No  (does not meet criteria)   Summary CCD 2 diet, double vegetable, double protein thin liquids  No supplements  Meal completions 100%   231#, BMI=31 33; weight is up 4# since admission 9/10 227#  Medications reviewed; lantus and humalog prescribed  BG has been within good range  Trace BLE edema noted  Skin integrity reviewed  LBM   Patient remains eager to learn about healthy diet and is motivated to make lifestyle changes  Discussed outpatient MNT and an ambulatory referral was placed by JOHN  Tenative discharge tomorrow per notes  Interventions/Recommendations Continue current diet order   Education Assessment   Education Education initiated/ completed  (Provided outpatient MNT brochure  Discussed campuses that offer MNT and how to set up an appointment  Ambulatory referral made by JOHN as patient reports he does not currently have a PCP )   Patient Nutrition Goals   Goal Improve to healthful diet; Comprehend education   Goal Status Met;Extended   Timeframe to complete goal by next f/u   Nutrition Complexity Risk   Nutrition complexity level Low risk   Nutrition review: 09/29/22   Follow up date 09/29/22

## 2022-09-19 NOTE — NURSING NOTE
09/19/22 Patient with no complaints this an except stating he hurt his right knee "over doing" therapy on his own over the weekend  Education provided and medications and purpose for them  Pt demonstrated self injecting insulin and is comfortable using an insulin pen in the past  Per doctor's ordered inserted lemons cath 16fr, patient tolerated well with immediate return of yellow clear urine  Education provided about keeping area clean, not pulling on anything and changing the cath secure 1 time weekly  Pt asking about a leg bag and recommended he ask home leodan nurse or Urology after discharge  Will continue to educate and answer questions as needed   Giuliano Kent RN

## 2022-09-19 NOTE — PROGRESS NOTES
PM&R PROGRESS NOTE:  Oracio Richardson Jr  75 y o  male MRN: [id number]  Unit/Bed#: -01 Encounter: [id number]        Rehabilitation Diagnosis: Impairment of mobility, safety and Activities of Daily Living (ADLs) due to Debility:  16  Debility (Non-cardiac/Non-pulmonary)    HPI: Tree Call  is a 75 y o  male with a PMH significant for T2DM,  who presented to the [facility] with altered mental status, N/V and generalized weakness  Patients spouse found patient this way after work and reported patient had lost weight several months ago resulting in patient not taking insulin due to feeling he no longer needed it  Glucose was elevated to 800 upon arrival  He recently moved here from [place] and does not have established care in the area  He was seen by [facility]) Urology for dysuria on 9/2 however  He was noted to have severe anion gap metabolic acidosis and lactatemia due to acute pancreatitis, DKA, obstructive uropathy and non-obstructing 8 mm right renal calculus  Also with new onset Afib and CARLTON  IV insulin given for DKA which resolved and patient was transitioned to basal/bolus regimen  Endocrinology consulted and adjusted regimen to Lantus/Humalog  GI consulted for acute pancreatitis and initiated on IV fluids and NG tube  Once resolved, NG tube d/c on 9/6 and pt has been tolerating solids/liquids since  Urology consulted for urinary retention and a lemons catheter was placed with plans for urodynamic studies once lemons removed  Also initiated on Ceftriaxone with urine culture positive for coag neg staph  Urology does not recommend removal while inpatient  Wound care nursing consulted for scrotal/groin fungal rash requiring topical antifungal treatment  Evaluated by PT/OT and deemed appropriate for post-acute rehabilitation services  He was accepted to [facility] and arrived on 9/10/22         SUBJECTIVE: Patient seen and evaluated   Explained to patient about re-inserting the lemons do to continual abnormal PVR/bladder scan readings  Patient is agreeable to having the lemons reinserted and will having nursing perform lemons care training today  Patient will be discharged home tomorrow with his wife  Will be reviewing medications and will confirm pharmacy  Patient will need to see Urology as an OP which he understands  Will set up referrals to made for Urology, general surgery for hernia removal, Orthopedics for ongoing bilateral knee pain especially the right, endocrinology and diabetic educator  ASSESSMENT: Stable, progressing      PLAN:    Rehabilitation   Functional deficits:  Impaired mobility, self care, RUE weakness, BLE weakness and Neuropathy   Continue current rehabilitation plan of care to maximize function       Functional update:   o PT:  Sit-lying: independent  Sit-stand: independent   Bed-chair transfer: independent  Ambulation: supervision   Stairs: supervision  Picking up objects: independent   o OT:  Oral Hygiene: independent  Grooming: independent   Showering/bathing: independent   Tub/shower transfer: Modified independent   UB dressing: independent   LB dressing: independent   Putting on/taking off footwear: independent   Picking up object: independent      Estimated Discharge: 9/20/22 with [facility] PT/OT/N      Pain   Neurontin 100 mg TID    Tylenol 650 mg Q6H PRN for mild pain    DVT prophylaxis   Heparin 5000 Units SQ Q8H [address]    Bladder plan  · Abnormal bladder scans/PVRs   · Lemons Catheter to be re-inserted today  · Nursing to train with lemons care  · OP urology FU to be made      Bowel plan   Continent   Last [facility] 9/17    Skin care  · Calazime to coccyx/intergluteal crease TID/PRN  · Hydra-guard b/l heels/buttocks BID/PRN  · Elevate heels to offload pressure  · EHOB when OOB  · Turn/reposiiton Q2H for pressure re-distribution of skin  · Moisturize skin daily with skin nourishing cream  · Scrotal and bilateral medial thighs- cleanse skin with soap/water, pat dry, apply Francois cream over affected areas BID and with chayo-care    Polyuria  Assessment & Plan  · Lemons removed 9/15, voiding trial initiated but unsuccessful   Continues to have abnormal PVRs/Bladder scans, lemons to be placed today 9/19 with plan for OP urology follow up   · Strict I&Os  · Noted increase in urine OP after initiation of Lamictal, unclear etiology, Lamictal held   · Nephrology consulted; input appreciated   · Please see under urinary retention     Pain in both lower extremities  Assessment & Plan  · Tenderness to palpation of left calf- reported at time of initial evaluation   · Doppler BLE to rule out DVT ; negative for acute DVT 9/12/22  · Voltaren gel to bilateral knee 4x daily  · Consider OP Ortho follow up    Depressed mood  Assessment & Plan  · Reported at time of admission by nursing  · Patient recently lost son to OD and commented on wife with negative comments towards patients appearance  · Psych consult appreciated  · Lamictal-continue to hold   · Pastoral care consult completed 9/13/22  · OP f/u with Psychiatry recommended    Fungal infection of skin  Assessment & Plan  · Francois cream to groin BID  · Wound care following  · Calazime to coccyx and intergluteal crease TID/PRN    Urinary retention  Assessment & Plan  · Thought to be due to neurogenic bladder in setting of uncontrolled diabetes  · Had bilateral hydronephrosis from retention, improvement noted after decompression on US  · Flomax 0 4 mg daily with dinner   · Urology following  · With ongoing abnormal bladder scans/PVRs, lemons to be reinserted today with plan for OP urology f/u  · Strict I&Os  · Nephrology follow for changes in creatinine/bun       New onset atrial fibrillation ([address] )  Assessment & Plan  · Noted with RVR in ED on EKG  · Spontaneously converted to sinus tachycardia prior to arrival to ICU  · Thought to be d/t severe metabolic derangements  · Converted to Normal sinus while in acute care  · Monitor   · Consider OP Cardiology follow up         * DKA (diabetic ketoacidosis) [facility])  Assessment & Plan  Lab Results   Component Value Date    HGBA1C >14 0 (H) 09/05/2022       Recent Labs     09/18/22  1123 09/18/22  1632 09/18/22 2020 09/19/22  0804   POCGLU 155* 70 75 123       Blood Sugar Average: Last 72 hrs:  (P) 919 [id number]   · S/p insulin drip, endocrinology consulted and transitioned to basal insulin and SSI  · Continue Lantus 40 units in AM, Humalog 10 units TID with meals, SSI   · Goals for basal/bolus regimen at discharge  · Will need insulin pens ordered, glucometer, supplies and pen needles at discharge  · Patient reported using pens in the past and feels comfortable self-administering insulin   · Will place ambulatory referral to endocrinology to help get patient established   · Monitor accu cheks  · Hypoglycemia protocol   · RD following; consider OP diabetic educator follow up   · New onset neuropathy to BLE- Continue gabapentin  · Acute chomprehensive interdisciplinary inpatient rehabilitation to include intensive skilled therapies as outlines with oversight and management by a rehabilitation Physician Assistant overseen by rehabilitation physician as well as inpatient rehabilitation nursing, case management and weekly interdisciplinary team meeting          Appreciate IM consultants medical co-management  Labs, medications, and imaging personally reviewed  ROS:  A ten point review of systems was completed and pertinent positives are listed in subjective section  All other systems reviewed were negative  Review of Systems   Constitutional: Negative  Negative for fatigue  HENT: Negative  Negative for trouble swallowing  Eyes: Negative  Respiratory: Negative  Negative for shortness of breath  Cardiovascular: Negative  Negative for chest pain  Gastrointestinal: Negative  Negative for abdominal pain, constipation and diarrhea  Genitourinary: Positive for difficulty urinating   Negative for dysuria  Lane to be reinserted today   Musculoskeletal: Positive for arthralgias  Knee pain R>L, RLE with increased ROM since admission however feels he may have overdid exercising on his own yesterday after PT session; pain is worse today to right knee   Neurological: Negative  Psychiatric/Behavioral: Negative  OBJECTIVE:   /65 (BP Location: Right arm)   Pulse 78   Temp 98 1 °F (36 7 °C) (Temporal)   Resp 18   Ht 6' (1 829 m)   Wt 105 kg (231 lb 0 7 oz)   SpO2 97%   BMI 31 33 kg/m²     Physical Exam  Vitals reviewed  Constitutional:       General: He is not in acute distress  Appearance: He is not ill-appearing  HENT:      Head: Normocephalic and atraumatic  Eyes:      Pupils: Pupils are equal, round, and reactive to light  Cardiovascular:      Rate and Rhythm: Normal rate and regular rhythm  Pulses: Normal pulses  Heart sounds: Normal heart sounds  No murmur heard  Pulmonary:      Effort: Pulmonary effort is normal  No respiratory distress  Breath sounds: Normal breath sounds  Abdominal:      General: Bowel sounds are normal  There is no distension  Palpations: Abdomen is soft  Musculoskeletal:         General: Swelling present  Comments: Right knee   ROM improving to BLE   Skin:     General: Skin is warm and dry  Neurological:      General: No focal deficit present  Mental Status: He is alert and oriented to person, place, and time     Psychiatric:         Mood and Affect: Mood normal           Lab Results   Component Value Date    WBC 5 92 09/16/2022    HGB 11 1 (L) 09/16/2022    HCT 34 5 (L) 09/16/2022    MCV 93 09/16/2022     09/16/2022     Lab Results   Component Value Date    SODIUM 137 09/16/2022    K 4 3 09/16/2022     09/16/2022    CO2 26 09/16/2022    BUN 18 09/16/2022    CREATININE 0 83 09/16/2022    GLUC 180 (H) 09/16/2022    CALCIUM 9 1 09/16/2022     No results found for: INR, PROTIME        Current Facility-Administered Medications:     acetaminophen (TYLENOL) tablet 650 mg, 650 mg, Oral, Q6H PRN, Wilma Dixon PA-C, 650 mg at 09/19/22 0404    Diclofenac Sodium (VOLTAREN) 1 % topical gel 2 g, 2 g, Topical, 4x Daily, Wilma Dixon PA-C, 2 g at 09/19/22 0835    gabapentin (NEURONTIN) capsule 100 mg, 100 mg, Oral, TID, Wilma Dixon PA-C, 100 mg at 09/19/22 0829    glucose chewable tablet 16 g, 16 g, Oral, Once, Aaron Mendez MD    heparin (porcine) subcutaneous injection 5,000 Units, 5,000 Units, Subcutaneous, Q8H [address], Wilma Dixon PA-C, 5,000 Units at 09/19/22 0620    insulin glargine (LANTUS) subcutaneous injection 40 Units 0 4 mL, 40 Units, Subcutaneous, QAM, [employer], CRNP, 40 Units at 09/19/22 0832    insulin lispro (HumaLOG) 100 units/mL subcutaneous injection 1-5 Units, 1-5 Units, Subcutaneous, HS, Wilma Dixon PA-C, 2 Units at 09/12/22 2106    insulin lispro (HumaLOG) 100 units/mL subcutaneous injection 10 Units, 10 Units, Subcutaneous, TID With Meals, [employer], CRNP, 10 Units at 09/19/22 0834    insulin lispro (HumaLOG) 100 units/mL subcutaneous injection 2-12 Units, 2-12 Units, Subcutaneous, TID AC, 2 Units at 09/18/22 1207 **AND** Fingerstick Glucose (POCT), , , TID AC, Amy Sapp PA-C    lidocaine (URO-JET) 2 % urethral/mucosal gel 1 application, 1 application, Urethral, Q4K PRN Max 4/day, Aaron Mendez MD, 1 application at 48/32/44 2149    moisture barrier miconazole 2% cream (aka ALEXI MOISTURE BARRIER ANTIFUNGAL CREAM), , Topical, BID, Aaron Mendez MD, Given at 09/19/22 0834    ondansetron (ZOFRAN-ODT) dispersible tablet 4 mg, 4 mg, Oral, Q6H PRN, Wilma Dixon PA-C    pantoprazole (PROTONIX) EC tablet 40 mg, 40 mg, Oral, Early Morning, Amy Sapp PA-C, 40 mg at 09/19/22 0620    polyethylene glycol (MIRALAX) packet 17 g, 17 g, Oral, Daily PRN, Wilma Dixon PA-C    senna (SENOKOT) tablet 17 2 mg, 2 tablet, Oral, Daily, Tiffany Machado PA-C, 17 2 mg at 09/19/22 6225    tamsulosin (FLOMAX) capsule 0 4 mg, 0 4 mg, Oral, Daily With Susy Wanda Sapp PA-C, 0 4 mg at 09/18/22 1723    Past Medical History:   Diagnosis Date    A-fib [facility])     Acute metabolic encephalopathy 91/59/8672    [place]LTON (acute kidney injury) ([place] [id number] ) 09/04/2022    Diabetes mellitus ([place] [id number] )     Pancreatitis 09/04/2022    Sepsis ([place] [id number] ) 09/04/2022       Patient Active Problem List    Diagnosis Date Noted    Polyuria 09/15/2022    Depressed mood 09/11/2022    Pain in both lower extremities 09/11/2022    Fungal infection of skin 09/09/2022    Urinary retention 09/07/2022    DKA (diabetic ketoacidosis) ([place] [id number] ) 09/04/2022    New onset atrial fibrillation ([place] [address] ) 09/04/2022    Urinary frequency 09/02/2022    Type 2 diabetes mellitus ([place] [address] ) 09/02/2022    Prostate cancer screening 09/02/2022    Microscopic hematuria 09/02/2022    Urge incontinence of urine 09/02/2022    Nocturnal enuresis 09/02/2022    Feeling of incomplete bladder emptying 09/02/2022    Balanitis 09/02/2022          Tiffany Machado PA-C    Total time spent:  35 minutes with more than 50% spent counseling/coordinating care  Counseling includes discussion with patient re: progress and discussion with patient of his/her current medical/functional state/information  Coordination of patient's care was performed in conjunction with consulting services  Time invested included review of patient's labs, vitals, and management of their comorbidities with continued monitoring  The care of the patient was extensively discussed and appropriate treatment plan was formulated unique for this patient  ** Please Note:  voice to text software may have been used in the creation of this document   Although proof errors in transcription or interpretation are a potential of such software**

## 2022-09-19 NOTE — NURSING NOTE
09/19/22 Pt reported feeling shaky at 210 pm  Blood sugar checked 59  Given 2 regular vanilla pudding and 4oz of apple juice  Pt reported feeling better with no further symptoms  Education provided regarding watching for these symptoms at home and treatment for hypoglycemia  Will continue to monitor   Alex Chappell RN

## 2022-09-19 NOTE — CASE MANAGEMENT
DC instructions reviewed with Pt & Pt's daughter, who expressed an understanding & agreement  Message left for Pt's spouse as well (per his request)  Pt being [address] home tomorrow, being transported by family via car, which tx team has determined to be a safe mode of transport  Pt stated that his wife will be transporting him home & he agreed for this worker to call her regarding this; Pt's daughter stated that her mother has not yet been approved to have the day off tomorrow & if she is unable to do so, Pt will be transported home later in the day tomorrow by her  FU appts indicated on DC instructions, to be scheduled by Pt per scheduling preferences  Melody Montaño arranged with [facility]a(Nsg, PT, OT, Nsg, HHA), per Pt preferences from choice list provided  The Pt's current course of Tx & post DC goals of care have been shared with Post-acute care service providers  FU IMM reviewed & signed

## 2022-09-20 VITALS
SYSTOLIC BLOOD PRESSURE: 110 MMHG | HEIGHT: 72 IN | HEART RATE: 77 BPM | BODY MASS INDEX: 31.62 KG/M2 | OXYGEN SATURATION: 100 % | WEIGHT: 233.47 LBS | TEMPERATURE: 97.9 F | DIASTOLIC BLOOD PRESSURE: 64 MMHG | RESPIRATION RATE: 16 BRPM

## 2022-09-20 LAB
ANION GAP SERPL CALCULATED.3IONS-SCNC: 8 MMOL/L (ref 4–13)
BUN SERPL-MCNC: 18 MG/DL (ref 5–25)
CALCIUM SERPL-MCNC: 9.1 MG/DL (ref 8.4–10.2)
CHLORIDE SERPL-SCNC: 104 MMOL/L (ref 96–108)
CO2 SERPL-SCNC: 27 MMOL/L (ref 21–32)
CREAT SERPL-MCNC: 0.93 MG/DL (ref 0.6–1.3)
GFR SERPL CREATININE-BSD FRML MDRD: 84 ML/MIN/1.73SQ M
GLUCOSE SERPL-MCNC: 120 MG/DL (ref 65–140)
GLUCOSE SERPL-MCNC: 130 MG/DL (ref 65–140)
GLUCOSE SERPL-MCNC: 93 MG/DL (ref 65–140)
POTASSIUM SERPL-SCNC: 3.9 MMOL/L (ref 3.5–5.3)
SODIUM SERPL-SCNC: 139 MMOL/L (ref 135–147)

## 2022-09-20 PROCEDURE — 97530 THERAPEUTIC ACTIVITIES: CPT | Performed by: PHYSICAL THERAPIST

## 2022-09-20 PROCEDURE — 99239 HOSP IP/OBS DSCHRG MGMT >30: CPT | Performed by: PHYSICAL MEDICINE & REHABILITATION

## 2022-09-20 PROCEDURE — 97116 GAIT TRAINING THERAPY: CPT | Performed by: PHYSICAL THERAPIST

## 2022-09-20 PROCEDURE — 80048 BASIC METABOLIC PNL TOTAL CA: CPT | Performed by: PHYSICIAN ASSISTANT

## 2022-09-20 PROCEDURE — 99232 SBSQ HOSP IP/OBS MODERATE 35: CPT | Performed by: PHYSICIAN ASSISTANT

## 2022-09-20 PROCEDURE — 97112 NEUROMUSCULAR REEDUCATION: CPT | Performed by: PHYSICAL THERAPIST

## 2022-09-20 PROCEDURE — 97110 THERAPEUTIC EXERCISES: CPT | Performed by: PHYSICAL THERAPIST

## 2022-09-20 PROCEDURE — 82948 REAGENT STRIP/BLOOD GLUCOSE: CPT

## 2022-09-20 PROCEDURE — 97535 SELF CARE MNGMENT TRAINING: CPT

## 2022-09-20 RX ORDER — GABAPENTIN 100 MG/1
100 CAPSULE ORAL 3 TIMES DAILY
Qty: 90 CAPSULE | Refills: 0 | Status: SHIPPED | OUTPATIENT
Start: 2022-09-20

## 2022-09-20 RX ORDER — INSULIN LISPRO 100 [IU]/ML
10 INJECTION, SOLUTION INTRAVENOUS; SUBCUTANEOUS
Qty: 20 ML | Refills: 0 | Status: SHIPPED | OUTPATIENT
Start: 2022-09-20

## 2022-09-20 RX ORDER — TAMSULOSIN HYDROCHLORIDE 0.4 MG/1
0.4 CAPSULE ORAL
Qty: 30 CAPSULE | Refills: 0 | Status: SHIPPED | OUTPATIENT
Start: 2022-09-20 | End: 2022-10-07 | Stop reason: SDUPTHER

## 2022-09-20 RX ORDER — INSULIN GLARGINE 300 U/ML
40 INJECTION, SOLUTION SUBCUTANEOUS EVERY MORNING
Qty: 4.5 ML | Refills: 0 | Status: SHIPPED | OUTPATIENT
Start: 2022-09-20 | End: 2022-10-17 | Stop reason: SDUPTHER

## 2022-09-20 RX ORDER — PANTOPRAZOLE SODIUM 40 MG/1
40 TABLET, DELAYED RELEASE ORAL
Qty: 30 TABLET | Refills: 0 | Status: SHIPPED | OUTPATIENT
Start: 2022-09-20

## 2022-09-20 RX ADMIN — MICONAZOLE NITRATE 1 APPLICATION: 20 CREAM TOPICAL at 09:04

## 2022-09-20 RX ADMIN — PANTOPRAZOLE SODIUM 40 MG: 40 TABLET, DELAYED RELEASE ORAL at 05:42

## 2022-09-20 RX ADMIN — INSULIN GLARGINE 40 UNITS: 100 INJECTION, SOLUTION SUBCUTANEOUS at 09:00

## 2022-09-20 RX ADMIN — GABAPENTIN 100 MG: 100 CAPSULE ORAL at 08:59

## 2022-09-20 RX ADMIN — INSULIN LISPRO 10 UNITS: 100 INJECTION, SOLUTION INTRAVENOUS; SUBCUTANEOUS at 12:04

## 2022-09-20 RX ADMIN — DICLOFENAC SODIUM 2 G: 10 GEL TOPICAL at 09:02

## 2022-09-20 RX ADMIN — INSULIN LISPRO 10 UNITS: 100 INJECTION, SOLUTION INTRAVENOUS; SUBCUTANEOUS at 09:01

## 2022-09-20 RX ADMIN — HEPARIN SODIUM 5000 UNITS: 5000 INJECTION INTRAVENOUS; SUBCUTANEOUS at 05:42

## 2022-09-20 RX ADMIN — SENNOSIDES 17.2 MG: 8.6 TABLET, FILM COATED ORAL at 08:59

## 2022-09-20 RX ADMIN — DICLOFENAC SODIUM 2 G: 10 GEL TOPICAL at 12:03

## 2022-09-20 NOTE — NURSING NOTE
Patient and wife verbalized understanding for dc instructions for home, f/u appointments, [address] , diabetic education, insulin for home, lemons care and emptying bag  Interdisciplinary care team determined safest means of transport is via car  Patient discharged to lobby via wc with assist of PCA   Mod I for car xfr

## 2022-09-20 NOTE — DISCHARGE SUMMARY
Discharge Summary - PMR   Oracio Marie Mar  75 y o  male MRN: [id number]  Unit/Bed#: -01 Encounter: [id number]    Admission Date: 9/10/2022     Discharge Date: 9/20/22    Etiologic/Rehabilitation Diagnosis: Impairment of mobility, safety and Activities of Daily Living (ADLs) due to Debility:  16  Debility (Non-cardiac/Non-pulmonary)    HPI: Oracio Richardson Jr  is a 75 y  o  male with a PMH significant for T2DM,  who presented to the [address] [place] [address] altered mental status, N/V and generalized weakness  Patients spouse found patient this way after work and reported patient had lost weight several months ago resulting in patient not taking insulin due to feeling he no longer needed it  Glucose was elevated to 800 upon arrival  He recently moved here from [place] and does not have established care in the area  He was seen by [address] [place] [address] Urology for dysuria on 9/2 however  He was noted to have severe anion gap metabolic acidosis and lactatemia due to acute pancreatitis, DKA, obstructive uropathy and non-obstructing 8 mm right renal calculus  Also with new onset Afib and CARLTON  IV insulin given for DKA which resolved and patient was transitioned to basal/bolus regimen  Endocrinology consulted and adjusted regimen to Lantus/Humalog  GI consulted for acute pancreatitis and initiated on IV fluids and NG tube  Once resolved, NG tube d/c on 9/6 and pt has been tolerating solids/liquids since  Urology consulted for urinary retention and a lemons catheter was placed with plans for urodynamic studies once lemons removed  Also initiated on Ceftriaxone with urine culture positive for coag neg [facility]  Urology does not recommend removal while inpatient  Wound care nursing consulted for scrotal/groin fungal rash requiring topical antifungal treatment  Evaluated by PT/OT and deemed appropriate for post-acute rehabilitation services   He was accepted to [facility] and arrived on 9/10/22      Procedures Performed During ARC Admission: none    Acute Rehabilitation Center Course: Patient participated in a comprehensive interdisciplinary inpatient rehabilitation program which included involvment of MD, therapies (PT, OT, and/or SLP), RN, CM, SW, dietary, and psychology services  He was able to be advanced to a modified independent level of assist and considered safe for discharge home  Please see below for patient's day to day management of medical needs  Polyuria  Assessment & Plan  · Lemons removed 9/15, voiding trial initiated but unsuccessful   Continues to have abnormal PVRs/Bladder scans, lemons to be placed today 9/19 with plan for OP urology follow up   · Strict I&Os  · Noted increase in urine OP after initiation of Lamictal, unclear etiology, Lamictal d/c  · Nephrology consulted; input appreciated   · Please see under urinary retention   · Urology OP FU    Pain in both lower extremities  Assessment & Plan  · Tenderness to palpation of left calf- reported at time of initial evaluation   · Doppler BLE to rule out DVT ; negative for acute DVT 9/12/22  · Voltaren gel to bilateral knee 4x daily- discontinued as patient does not wish to have at home  · Ortho surgery ambulatory referral made at discharge    Depressed mood  Assessment & Plan  · Reported at time of admission by nursing  · Patient recently lost son to OD and commented on wife with negative comments towards patients appearance  · Psych consult appreciated  · Lamictal-discontinued in [facility]  · Pastoral care consult completed 9/13/22  · OP f/u with Psychiatry; ambulatory referral made at time of discharge    Fungal infection of skin  Assessment & Plan  · Francois cream to groin BID  · To continue at home  · [facility] nursing to assist with wound care  · Calazime to coccyx and intergluteal crease TID/PRN    Urinary retention  Assessment & Plan  · Thought to be due to neurogenic bladder in setting of uncontrolled diabetes  · Had bilateral hydronephrosis from retention, improvement noted after decompression on US  · Flomax 0 4 mg daily with dinner   · Strict I&Os  · Lane catheter in place; training performed by nursing   · OP FU with Urology  · Ambulatory referral made at time of discharge       New onset atrial fibrillation [facility])  Assessment & Plan  · Noted with RVR in ED on EKG  · Spontaneously converted to sinus tachycardia prior to arrival to ICU  · Thought to be d/t severe metabolic derangements  · Converted to Normal sinus while in acute care  · No complications in ARC  · Ambulatory referral made to Cardiology at time of discharge; discussed with patient          * DKA (diabetic ketoacidosis) [facility])  Assessment & Plan  Lab Results   Component Value Date    HGBA1C >14 0 (H) 09/05/2022       Recent Labs     09/19/22  1615 09/19/22  2033 09/20/22  0720 09/20/22  1140   POCGLU 134 107 120 93       Blood Sugar Average: Last 72 hrs:  (P) 106 25   · S/p insulin drip, endocrinology consulted and transitioned to basal insulin and SSI  · Continue Lantus 40 units in AM, Humalog 10 units TID with meals, SSI   · Goals for basal/bolus regimen at discharge  Patient provided diabetic education while in [facility]  · insulin pens ordered, glucometer, supplies and pen needles ordered at discharge  · Ambulatory referrals made to endocrinology and diabetic educator   · New onset neuropathy to BLE- Continue gabapentin  · Acute chomprehensive interdisciplinary inpatient rehabilitation to include intensive skilled therapies as outlines with oversight and management by a rehabilitation Physician Assistant overseen by rehabilitation physician as well as inpatient rehabilitation nursing, case management and weekly interdisciplinary team meeting        Discharge Physical Examination:  Vitals reviewed  Constitutional:       General: He is not in acute distress  Appearance: He is not ill-appearing  HENT:      Head: Normocephalic and atraumatic     Eyes:      Pupils: Pupils are equal, round, and reactive to light  Cardiovascular:      Rate and Rhythm: Normal rate and regular rhythm  Pulses: Normal pulses  Heart sounds: Normal heart sounds  No murmur heard  Pulmonary:      Effort: Pulmonary effort is normal  No respiratory distress  Breath sounds: Normal breath sounds  Abdominal:      General: Bowel sounds are normal  There is no distension  Palpations: Abdomen is soft  Musculoskeletal:         Rom improving      No edema BLE  Skin:     General: Skin is warm and dry  Neurological:      General: No focal deficit present  Mental Status: He is alert and oriented to person, place, and time  Psychiatric:         Mood and Affect: Mood normal      Significant Findings, Care, Treatment and Services Provided: Required Lane catheter reinserted while in [facility]  To follow up with urology as OP; ambulatory referral made  Participated with PT/OT three hrs per day with a minimum of 5x per week  Followed by IM, ARC, Nephrology, RD, Urology while in [facility]       Complications: Urinary retention requiring Lane reinsertion and training    Functional Status Upon Admission to [facility]:  Physical Therapy: Bed mobility, transfers and ambulation min assist   Occupational Therapy:Eating independent, UB bathing and grooming supervision s/u, LB bathing/dressing mod assist, UB dressing min assist, toileting assistance max assist, functional mobility min assist  Speech Therapy: N/a    Functional Status Upon Discharge from [facility]:   PT:  Roll L-R: independent  Sit-lying: independent  Lying to sitting on side of bed: independent  Sit-stand: independent   Bed-chair transfer: independent   Ambulation: independent   Stairs: independent   OT:   Oral Hygiene: independent   Grooming: independent   Showering/bathing: independent   Tub/shower transfer: MI  UB dressing: independent   LB dressing: independent   Putting on/taking off footwear: independent  toileting hygiene: independent   Toilet transfer: independent   Light housekeeping: MI       Discharge Diagnosis: Impairment of mobility, safety and Activities of Daily Living (ADLs) due to Debility:  16  Debility (Non-cardiac/Non-pulmonary)    Discharge Medications:   See after visit summary for reconciled discharge medications provided to patient and family  Condition at Discharge: good     Discharge instructions/Information to patient and family:   See after visit summary for information provided to patient and family  Provisions for Follow-Up Care:  See after visit summary for information related to follow-up care and any pertinent home health orders  Future Appointments   Date Time Provider Woodrow Gaytan   10/7/2022  9:45 AM LEEANNA Lilly URO [facility]   12/6/2022 12:45 PM LEEANNA Nelson RAMONITA END PA [facility]       Disposition: See After Visit Summary for discharge disposition information  Planned Readmission: No    Discharge Statement   I spent more than 30 minutes discharging the patient  This time was spent on the day of discharge  I had direct contact with the patient on the day of discharge  Greater than 50% of the total time was spent examining patient, answering all patient questions, arranging and discussing plan of care with patient as well as directly providing post-discharge instructions  Additional time then spent on discharge activities  Discharge Medications:  See after visit summary for reconciled discharge medications provided to patient and family

## 2022-09-20 NOTE — NURSING NOTE
Pt  Given urinary leg bag for use at home and given printed literature on lemons catheters, how to clean self, how to use leg bag

## 2022-09-20 NOTE — PROGRESS NOTES
09/20/22 0903   Pain Assessment   Pain Assessment Tool 0-10   Pain Score 7   Pain Location/Orientation Orientation: Right;Location: Knee   Restrictions/Precautions   Precautions Lemons; Fall Risk;Pain   Eating   Type of Assistance Needed Independent   Eating CARE Score 6   Oral Hygiene   Type of Assistance Needed Independent   Physical Assistance Level No physical assistance   Oral Hygiene CARE Score 6   Grooming   Able To Comb/Brush Hair;Wash/Dry Face;Brush/Clean Teeth;Wash/Dry Hands   Shower/Bathe Self   Type of Assistance Needed Independent   Physical Assistance Level No physical assistance   Shower/Bathe Self CARE Score 6   Bathing   Assessed Bath Style Shower   Adaptive Equipment Longhand Sponge; Shower BrightContext; Shower Seat;Hand [employer] Noted In LE Strength   Adaptive Equipment Grab Bars;Seat with out Back   Assessed Shower   Findings MI   Upper Body Dressing   Type of Assistance Needed Independent   Physical Assistance Level No physical assistance   Upper Body Dressing CARE Score 6   Lower Body Dressing   Type of Assistance Needed Independent   Physical Assistance Level No physical assistance   Lower Body Dressing CARE Score 6   Putting On/Taking Off Footwear   Type of Assistance Needed Independent   Physical Assistance Level No physical assistance   Putting On/Taking Off Footwear CARE Score 6   Picking Up Object   Type of Assistance Needed Independent   Physical Assistance Level No physical assistance   Picking Up Object CARE Score 6   Dressing/Undressing Clothing   Able to  Obtain Clothing;Store removed clothing   Sit to Stand   Type of Assistance Needed Independent   Sit to Stand CARE Score 6   Bed-Chair Transfer   Type of Assistance Needed Independent   Chair/Bed-to-Chair Transfer CARE Score 6   Toileting Hygiene   Type of Assistance Needed Independent   Physical Assistance Level No physical assistance   Comment Pt is able to manage threading lemons through garments;emptying it and transitioning from surface to surface   Toileting Hygiene CARE Score 6   Toilet Transfer   Type of Assistance Needed Independent   Physical Assistance Level No physical assistance   Toilet Transfer CARE Score 6   Kitchen Mobility   Kitchen-Mobility Level Walker   Kitchen Activity Retrieve items;Transport items   Light Housekeeping   Light Housekeeping Level Walker   Light Housekeeping Level of Assistance Modified independent   Light Housekeeping laundry;c/u items from shower and educ in how to drape over walker for transport   Cognition   Overall Cognitive Status [facility]   Arousal/Participation Alert; Cooperative   Attention Within functional limits   Orientation Level Oriented X4   Memory Within functional limits   Following Commands Follows all commands and directions without difficulty   Additional Activities   Additional Activities Comments fxnl mobility RW MI   Activity Tolerance   Activity Tolerance Patient tolerated treatment well   Assessment   Treatment Assessment Pt being d/cd to home today  Pt demon consistent Mi with all ADLs/iADLs  Refer to respectvie sections of note for details of session  Prognosis Good   Recommendation   Discharge Summary Pt has met OT goals at MI level  DME includes: 3in1, tub seat, RW  Ongoing deficit of pain in R knee  Pt to have Melody [address] services to aide in transition to home  D/C to home warranted for today  OT Therapy Minutes   OT Time In 0903   OT Time Out 1030   OT Total Time (minutes) 87   OT Mode of treatment - Individual (minutes) 87   OT Mode of treatment - Concurrent (minutes) 0   OT Mode of treatment - Group (minutes) 0   OT Mode of treatment - Co-treat (minutes) 0   OT Mode of Treatment - Total time(minutes) 87 minutes   OT Cumulative Minutes 985   Therapy Time missed   Time missed?  No

## 2022-09-20 NOTE — PROGRESS NOTES
Progress Note - Nephrology   Oracio Richardson Jr  75 y o  male MRN: [id number]  Unit/Bed#: [facility] 210-01 Encounter: [id number]    Assessment and Plan    1  Polyuria  improved with reduction in fluid intake  Nephrology will sign off and follow peripherally      2  Urinary retention  on Flomax, with Lane catheter in place  Management per hospitalist      3  Electrolyte abnormalities  Resolved at present periodically monitor      4  Tubulointerstitial nephritis, possibly  Consider DDAVP and urine osmolality if there is any clinical worsening  No indication at this time      5  Acute kidney injury  resolved with creatinine 0 93 mg/dL      6  Dry lips  Recommend petrolatum based ointment  Follow up reason for today's visit:     DKA (diabetic ketoacidosis) ([facility] )    Patient Active Problem List   Diagnosis    Urinary frequency    Type 2 diabetes mellitus ([facility] )    Prostate cancer screening    Microscopic hematuria    Urge incontinence of urine    Nocturnal enuresis    Feeling of incomplete bladder emptying    Balanitis    DKA (diabetic ketoacidosis) ([facility] )    New onset atrial fibrillation ([facility] )    Urinary retention    Fungal infection of skin    Depressed mood    Pain in both lower extremities    Polyuria         Subjective:   Denies concerns regarding fluid intake  A complete 10 point review of systems was performed and is otherwise negative  Objective:     Vitals: Blood pressure 110/64, pulse 77, temperature 97 9 °F (36 6 °C), temperature source Temporal, resp  rate 16, height 6' (1 829 m), weight 106 kg (233 lb 7 5 oz), SpO2 100 %  ,Body mass index is 31 66 kg/m²      Weight (last 2 days)     Date/Time Weight    09/20/22 0552 106 (233 47)    09/19/22 0534 105 (231 04)    09/19/22 0500 105 (231 04)    09/18/22 0555 106 (233 25)            Intake/Output Summary (Last 24 hours) at 9/20/2022 1145  Last data filed at 9/20/2022 0900  Gross per 24 hour   Intake 1900 ml   Output 600 ml   Net 1300 ml I/O last 3 completed shifts: In: 0 [P O :2240]  Out: 4100 [Urine:4100]    Urethral Catheter Asept 16 Fr  (Active)   Reasons to continue Urinary Catheter  Acute urinary retention/obstruction failing urinary retention protocol 09/19/22 1401   Goal for Removal Will consult urology 09/19/22 1401   Site Assessment Clean;Skin intact; Patent 09/19/22 1401   Lane Care Done 09/20/22 0900   Collection Container Standard drainage bag 09/19/22 1401   Securement Method Securing device (Describe) 09/19/22 1401   Securing Device Change Date 09/19/22 09/19/22 1401   Output (mL) 400 mL 09/19/22 2041   CAUTI Time-out performed before Urine Culture Yes 09/19/22 1401       Physical Exam: /64 (BP Location: Right arm)   Pulse 77   Temp 97 9 °F (36 6 °C) (Temporal)   Resp 16   Ht 6' (1 829 m)   Wt 106 kg (233 lb 7 5 oz)   SpO2 100%   BMI 31 66 kg/m²     General Appearance:    No acute distress  Cooperative  Appears stated age  Head:    Normocephalic  Atraumatic  Normal jaw occlusion  Eyes:    Lids, conjunctiva normal  No scleral icterus  Ears:    Normal external ears  Nose:   Nares normal  No drainage  Mouth:   Lips, tongue normal  Mucosa normal  Phonation normal    Neck:   Supple  Symmetrical    Back:     Symmetric  No CVA tenderness  Lungs:     Normal respiratory effort  Clear to auscultation bilaterally  Chest wall:    No tenderness or deformity  Heart:    Regular rate and rhythm  Normal S1 and S2  No murmur  No JVD  No edema  Abdomen:     Soft  Non-tender  Bowel sounds active  Genitourinary: Lane catheter present with clear, yellow urine output  Extremities:   Extremities normal  Atraumatic  No cyanosis  Skin:   Warm and dry  No pallor, jaundice, rash, ecchymoses  Neurologic:   Alert and oriented to person, place, time  No focal deficit  Lab, Imaging and other studies: I have personally reviewed pertinent labs    CBC: No results found for: WBC, HGB, HCT, MCV, PLT, ADJUSTEDWBC, MCH, MCHC, RDW, MPV, NRBC  CMP:   Lab Results   Component Value Date    K 3 9 09/20/2022     09/20/2022    CO2 27 09/20/2022    BUN 18 09/20/2022    CREATININE 0 93 09/20/2022    CALCIUM 9 1 09/20/2022    EGFR 84 09/20/2022         Results from last 7 days   Lab Units 09/20/22  0546 09/16/22  0532 09/14/22  1432   POTASSIUM mmol/L 3 9 4 3 3 8   CHLORIDE mmol/L 104 103 102   CO2 mmol/L 27 26 21   BUN mg/dL 18 18 12   CREATININE mg/dL 0 93 0 83 1 26   CALCIUM mg/dL 9 1 9 1 9 3   ALK PHOS U/L  --  84  --    ALT U/L  --  14  --    AST U/L  --  33  --          Phosphorus: No results found for: PHOS  Magnesium: No results found for: MG  Urinalysis: No results found for: James Bevel, SPECGRAV, PHUR, LEUKOCYTESUR, NITRITE, PROTEINUA, GLUCOSEU, KETONESU, BILIRUBINUR, BLOODU  Ionized Calcium: No results found for: CAION  Coagulation: No results found for: PT, INR, APTT  Troponin: No results found for: TROPONINI  ABG: No results found for: PHART, DIO5HWJ, PO2ART, SBJ4LPU, R7WNJPIA, BEART, SOURCE  Radiology review:     IMAGING  Procedure: US kidney and bladder with pvr    Result Date: 9/20/2022  Narrative: RENAL ULTRASOUND WITH PVR INDICATION:   Change in kidney function, polyuria  Follow-up hydronephrosis  COMPARISON: Ultrasound 9/6/2022  CT 9/4/2022  TECHNIQUE:   Ultrasound of the retroperitoneum was performed with a curvilinear transducer utilizing volumetric sweeps and still imaging techniques  FINDINGS: KIDNEYS: Symmetric and normal size  Right kidney:  11 5 x 5 7 x 5 6 cm  Volume 191 3 mL Left kidney:  11 6 x 5 9 x 6 6 cm  Volume 236 3 mL Right kidney Normal echogenicity and contour  No mass is identified  Minimal hydronephrosis  7 mm upper pole calculus reidentified  No perinephric fluid collections  Left kidney Normal echogenicity and contour  No mass is identified  Minimal hydronephrosis  No shadowing calculi  No perinephric fluid collections  URETERS: Nonvisualized   BLADDER: Normally distended  No focal thickening or mass lesions  Bilateral ureteral jets not visualized  Prevoid: 84 Post void: Cannot be assessed because the patient was unable to void during exam      Impression: Minimal bilateral hydroureteronephrosis, improved since the prior study  Stable right renal calculus   Workstation performed: [id number]       Current Facility-Administered Medications   Medication Dose Route Frequency    acetaminophen (TYLENOL) tablet 650 mg  650 mg Oral Q6H PRN    Diclofenac Sodium (VOLTAREN) 1 % topical gel 2 g  2 g Topical 4x Daily    gabapentin (NEURONTIN) capsule 100 mg  100 mg Oral TID    glucose chewable tablet 16 g  16 g Oral Once    heparin (porcine) subcutaneous injection 5,000 Units  5,000 Units Subcutaneous Q8H [address]    insulin glargine (LANTUS) subcutaneous injection 40 Units 0 4 mL  40 Units Subcutaneous QAM    insulin lispro (HumaLOG) 100 units/mL subcutaneous injection 1-5 Units  1-5 Units Subcutaneous HS    insulin lispro (HumaLOG) 100 units/mL subcutaneous injection 10 Units  10 Units Subcutaneous TID With Meals    insulin lispro (HumaLOG) 100 units/mL subcutaneous injection 2-12 Units  2-12 Units Subcutaneous TID AC    lidocaine (URO-JET) 2 % urethral/mucosal gel 1 application  1 application Urethral W6H PRN Max 4/day    moisture barrier miconazole 2% cream (aka ALEXI MOISTURE BARRIER ANTIFUNGAL CREAM)   Topical BID    ondansetron (ZOFRAN-ODT) dispersible tablet 4 mg  4 mg Oral Q6H PRN    pantoprazole (PROTONIX) EC tablet 40 mg  40 mg Oral Early Morning    polyethylene glycol (MIRALAX) packet 17 g  17 g Oral Daily PRN    senna (SENOKOT) tablet 17 2 mg  2 tablet Oral Daily    tamsulosin (FLOMAX) capsule 0 4 mg  0 4 mg Oral Daily With Dinner     Medications Discontinued During This Encounter   Medication Reason    nystatin (MYCOSTATIN) powder     insulin glargine (LANTUS) subcutaneous injection 44 Units 0 44 mL     lamoTRIgine (LaMICtal) tablet 25 mg     lidocaine (URO-JET) 2 % urethral/mucosal gel 1 application     insulin lispro (HumaLOG) 100 units/mL subcutaneous injection 14 Units        Blanquita Smith PA-C    Portions of the record may have been created with voice recognition software  Occasional wrong word or "sound a like" substitutions may have occurred due to the inherent limitations of voice recognition software  Read the chart carefully and recognize, using context, where substitutions have occurred

## 2022-09-20 NOTE — PHYSICAL THERAPY NOTE
PT DISCHARGE SUMMARY    Patient HAS met max benefit from inpatient ARC PT  Patient independent Bed mobility; independent Transfers; independent Gait with RW on level and unlevel surfaces  Stairs without assist, 16 stairs, with HR and SPC  Patient continues to require cues for pacing activity  Will benefit from continued PT services post discharge

## 2022-09-20 NOTE — PLAN OF CARE
Problem: Potential for Falls  Goal: Patient will remain free of falls  Description: INTERVENTIONS:  - Educate patient/family on patient safety including physical limitations  - Instruct patient to call for assistance with activity   - Consult OT/PT to assist with strengthening/mobility   - Keep Call bell within reach  - Keep bed low and locked with side rails adjusted as appropriate  - Keep care items and personal belongings within reach  - Initiate and maintain comfort rounds  - Offer Toileting every 2 Hours, in advance of need  - Initiate/Maintain bed/chair alarm  - Apply yellow socks and bracelet for high fall risk patients  - Consider moving patient to room near nurses station  Outcome: Progressing     Problem: PAIN - ADULT  Goal: Verbalizes/displays adequate comfort level or baseline comfort level  Description: Interventions:  - Encourage patient to monitor pain and request assistance  - Assess pain using appropriate pain scale  - Administer analgesics based on type and severity of pain and evaluate response  - Implement non-pharmacological measures as appropriate and evaluate response  - Consider cultural and social influences on pain and pain management  - Notify physician/advanced practitioner if interventions unsuccessful or patient reports new pain  Outcome: Progressing     Problem: INFECTION - ADULT  Goal: Absence or prevention of progression during hospitalization  Description: INTERVENTIONS:  - Assess and monitor for signs and symptoms of infection  - Monitor lab/diagnostic results  - Monitor all insertion sites, i e  indwelling lines, tubes, and drains  - Monitor endotracheal if appropriate and nasal secretions for changes in amount and color  - [place] appropriate cooling/warming therapies per order  - Administer medications as ordered  - Instruct and encourage patient and family to use good hand hygiene technique  - Identify and instruct in appropriate isolation precautions for identified infection/condition  Outcome: Progressing     Problem: SAFETY ADULT  Goal: Patient will remain free of falls  Description: INTERVENTIONS:  - Educate patient/family on patient safety including physical limitations  - Instruct patient to call for assistance with activity   - Consult OT/PT to assist with strengthening/mobility   - Keep Call bell within reach  - Keep bed low and locked with side rails adjusted as appropriate  - Keep care items and personal belongings within reach  - Initiate and maintain comfort rounds  - Offer Toileting every 2 Hours, in advance of need  - Initiate/Maintain bed/chair alarm  - Apply yellow socks and bracelet for high fall risk patients  - Consider moving patient to room near nurses station  Outcome: Progressing     Problem: Prexisting or High Potential for Compromised Skin Integrity  Goal: Skin integrity is maintained or improved  Description: INTERVENTIONS:  - Identify patients at risk for skin breakdown  - Assess and monitor skin integrity  - Assess and monitor nutrition and hydration status  - Monitor labs   - Assess for incontinence   - Turn and reposition patient  - Assist with mobility/ambulation  - Relieve pressure over bony prominences  - Avoid friction and shearing  - Provide appropriate hygiene as needed including keeping skin clean and dry  - Evaluate need for skin moisturizer/barrier cream  - Collaborate with interdisciplinary team   - Patient/family teaching  - Consider wound care consult   Outcome: Progressing

## 2022-09-20 NOTE — OCCUPATIONAL THERAPY NOTE
OT DISCHARGE SUMMARY    Pt has met OT ADL and functional transfer goals at MI level  DME includes: 3in1, tub seat, RW  Ongoing deficit of pain in R knee  Pt to have [facility] services to aide in transition to home  D/C to home warranted for today      Peg Juarez MS, OTR/L

## 2022-09-20 NOTE — CASE MANAGEMENT
DC instructions reviewed with Pt & Pt's daughter, who expressed an understanding & agreement  Pt's wife has also been notified per his request  Pt being [address] home today, being transported by family via car, which tx team has determined to be a safe mode of transport  FU appts indicated on DC instructions, to be scheduled by Pt per scheduling preferences  [facility] arranged with Amina(Nsg, PT, OT, Nsg, HHA), per Pt preferences from choice list provided  The Pt's current course of Tx & post DC goals of care have been shared with Post-acute care service providers  FU IMM reviewed & signed  Physical Therapy Rehab Treatment Note  [facility]/Department: [facility]  Room: R249/R249-01       NAME: Edward Martinez  : 1956 (76 y.o.)  MRN: [id number]  CODE STATUS: Full Code    Date of Service: 2021       Restrictions:  Restrictions/Precautions: Fall Risk,Seizure       SUBJECTIVE:       Pre Treatment Pain Screening  Pain at present: 3  Intervention List: Patient able to continue with treatment;Nurse called to administer meds  Comments / Details: right arm and leg    Post Treatment Pain Screening:as above       OBJECTIVE:               Neuromuscular Education  PNF: standing and ambulatory balance facilitation. Challenges included: obstacle course performance around and over varying surfaces, theraband resistance at thighs in gait with qc,, TUG testing and multiple trials,, varyin directions in gait with qc  Neuromuscular Comments: continued limitation in Right gastroc length results in recurvatum required for safe right foot weight acceptance. Pt demonstrated good planning and judgement with obstacle course, TUG performed with qc and SBA initially in 36 sec and improved to 26 sec following interventions. Transfers  Sit to Stand: Modified independent  Stand to sit: Modified independent  Bed to Chair: Modified independent  Car Transfer: Modified independent  Comment: good safety and judgment with approach to chairs   - demonstrated floor transfer to pt. He requests no trial in this session but will consider trial in future session.     Ambulation  Ambulation?: Yes  More Ambulation?: Yes  Ambulation 1  Surface: level tile;uneven;carpet  Device: Large Dann [place]  Other Apparatus: Right (air cast)  Assistance: Modified Independent;Supervision  Quality of Gait: decreased RLE sance time, recurvatum of right knee needed to maintain right foot flat on floor and to achieve adequate knee stability, pelvis rotated to right  Distance: 50 feet in protected area; 150 feet in hallway  Comments: Lite gait utilized for faciitation of pelvic stability with appropriate rotation in gait - hands on manual facilitation required to minimize overall pelvic rotation to the right    Stairs/Curb  Stairs?: Yes  Stairs  # Steps : 12  Stairs Height: 6\"  Rails: Left ascending  Curbs: 4\"  Device:  (rail to simulate home rail)  Assistance: Supervision  Comment: no cues required; supervision to ensure motor control consistency         Activity Tolerance  Activity Tolerance: Patient Tolerated treatment well  Activity Tolerance: fatigue with challenges noted however pt recovers well to continue with session          ASSESSMENT/PROGRESS TOWARDS GOALS:  Assessment: Pt continues with longstanding deviation in motor control of RLE in gait due to CVA. He has made good improvements functionally and with safey/judgement. Goals have been met at this time  PT Education: General Safety  Patient Education: good judgement  - requests SBA for busy areas appropriately    Goals:  Long term goals  Long term goal 1: indep and effecient bed mobility -met  Long term goal 2: indep sit to stand and bed transfers - met  Long term goal 3: indep gait with qc 50 feet in protected environment - met  Long term goal 4: supervision with curb step performance for home entry - met  Long term goal 5:  for villalba balance testing - met    PLAN OF CARE/Safety:   Safety Devices  Type of devices: Chair alarm in place; Left in chair      Therapy Time:   Individual   Time In 1000   Time Out 1100   Minutes 60     Minutes:      Transfer/Bed mobility training:10      Gait trainin      Neuro re education:25        Caio Leung, PT, 21 at 11:59 AM

## 2022-09-20 NOTE — PROGRESS NOTES
09/20/22 0647   Pain Assessment   Pain Assessment Tool 0-10   Pain Score No Pain   Restrictions/Precautions   Precautions Fall Risk; Lemons   Cognition   Overall Cognitive Status WFL   Subjective   Subjective Pt agreeable to PT, not thrilled with lemons reinsertion, but managing well  Roll Left and Right   Type of Assistance Needed Independent   Physical Assistance Level No physical assistance   Roll Left and Right CARE Score 6   Sit to Lying   Type of Assistance Needed Independent   Physical Assistance Level No physical assistance   Sit to Lying CARE Score 6   Lying to Sitting on Side of Bed   Type of Assistance Needed Independent   Physical Assistance Level No physical assistance   Lying to Sitting on Side of Bed CARE Score 6   Sit to Stand   Type of Assistance Needed Independent   Physical Assistance Level No physical assistance   Comment No AD   Sit to Stand CARE Score 6   Bed-Chair Transfer   Type of Assistance Needed Independent   Physical Assistance Level No physical assistance   Chair/Bed-to-Chair Transfer CARE Score 6   Car Transfer   Reason if not Attempted Environmental limitations   Car Transfer CARE Score 10   Walk 10 Feet   Type of Assistance Needed Independent   Physical Assistance Level No physical assistance   Walk 10 Feet CARE Score 6   Walk 50 Feet with Two Turns   Type of Assistance Needed Independent   Physical Assistance Level No physical assistance   Walk 50 Feet with Two Turns CARE Score 6   Walk 150 Feet   Type of Assistance Needed Independent   Physical Assistance Level No physical assistance   Comment RW   Walk 150 Feet CARE Score 6   Walking 10 Feet on Uneven Surfaces   Type of Assistance Needed Independent   Physical Assistance Level No physical assistance   Comment RW, green foam mat   Walking 10 Feet on Uneven Surfaces CARE Score 6   Ambulation   Does the patient walk? 2   Yes   Primary Mode of Locomotion Prior to Admission Walk   Distance Walked (feet) 801 ft   Assist Device Roller [address] 50 Feet with Two Turns   Reason if not Attempted Activity not applicable   Wheel 50 Feet with Two Turns CARE Score 9   Wheel 150 Feet   Reason if not Attempted Activity not applicable   Wheel 311 Feet CARE Score 9   Wheelchair mobility   Does the patient use a wheelchair? 0  No   Curb or Single Stair   Style negotiated Single stair   Type of Assistance Needed Independent   Physical Assistance Level No physical assistance   Comment SPC, single HR, independently managing lemons cath   1 Step (Curb) CARE Score 6   4 Steps   Type of Assistance Needed Independent   Physical Assistance Level No physical assistance   4 Steps CARE Score 6   12 Steps   Type of Assistance Needed Independent   Physical Assistance Level No physical assistance   Comment 16 steps, single HR, SPC, 8" step   12 Steps CARE Score 6   Picking Up Object   Type of Assistance Needed Independent   Physical Assistance Level No physical assistance   Picking Up Object CARE Score 6   Toilet Transfer   Type of Assistance Needed Independent   Physical Assistance Level No physical assistance   Toilet Transfer CARE Score 6   Therapeutic Interventions   Strengthening Standing LE TE   Flexibility HS and HC stretch, self patellar mob   Equipment Use   NuStep Deferred 2/2 lemons   Assessment   Treatment Assessment Patient has made good progress with PT  Patient continues to right knee pain, however, this too was improving  Patient will benefit from ongoing interventions post discharge  Problem List Decreased endurance; Impaired balance;Pain   PT Barriers   Physical Impairment Decreased strength;Decreased range of motion;Decreased endurance;Pain   Functional Limitation Stair negotiation;Ramp negotiation   Plan   Treatment/Interventions LE strengthening/ROM; Elevations; Therapeutic exercise; Functional transfer training; Endurance training;Cognitive reorientation;Patient/family training;Equipment eval/education; Bed mobility;Gait training   Progress Improving as expected   PT Therapy Minutes   PT Time In 0647   PT Time Out 0820   PT Total Time (minutes) 93   PT Mode of treatment - Individual (minutes) 93   PT Mode of treatment - Concurrent (minutes) 0   PT Mode of treatment - Group (minutes) 0   PT Mode of treatment - Co-treat (minutes) 0   PT Mode of Treatment - Total time(minutes) 93 minutes   PT Cumulative Minutes 838     Standing LE TE:  2#, 3x10, B/L LEs  March  Hip ABd  HS Curls  Mini Squats  HR  TR  SLR - flex    HS stretch, HC stretch, Self patellar mobs    Patient remains OOB in chair, all needs in reach  Alarm in place and activated  Encouraged use of call bell, patient verbalizes understanding

## 2022-09-21 NOTE — PROGRESS NOTES
09/21/22 1201   Hello, [Guardians Name / Jane Hebert, this is [Caller Lopezmari Christy from [facility], and our clinical care team wanted to check on you / your child after your recent visit to the hospital  It will only take 3-5 minutes  Is this a good time? Discharge Call Type/ Specific Diagnosis: General Call   General Discharge Phone Call   Were your/your child's discharge instructions clear and understandable? Please tell me in your own words how to care for yourself/your child now that you're home Yes;Patient understood instructions   Have you filled your/your child's new prescriptions yet? Yes   What questions do you have about those medications? No questions   Are you/your child having any unusual symptoms or problems? (Specific to problem- i e , dressing, pain, bruising or swelling, procedure, etc ) No reported symptoms/problems   Is there anything preventing you from keeping that appointment? No;Patient able to keep appointment   Are there any physicians, nurses, or hospital staff you would like us to recognize for doing a very good job? Global Team Acknowledgment   This call resulted in: No interventions needed   Call Complete   Attempted Number of Calls 1   Discharge phone call complete?  Complete

## 2022-09-22 ENCOUNTER — CONSULT (OUTPATIENT)
Dept: SURGERY | Facility: CLINIC | Age: 67
End: 2022-09-22
Payer: MEDICARE

## 2022-09-22 VITALS
RESPIRATION RATE: 18 BRPM | TEMPERATURE: 97.6 F | WEIGHT: 235 LBS | DIASTOLIC BLOOD PRESSURE: 60 MMHG | OXYGEN SATURATION: 100 % | BODY MASS INDEX: 31.83 KG/M2 | HEIGHT: 72 IN | SYSTOLIC BLOOD PRESSURE: 100 MMHG | HEART RATE: 83 BPM

## 2022-09-22 DIAGNOSIS — K46.9 ABDOMINAL HERNIA: ICD-10-CM

## 2022-09-22 DIAGNOSIS — I48.91 NEW ONSET ATRIAL FIBRILLATION (HCC): ICD-10-CM

## 2022-09-22 DIAGNOSIS — E11.00 TYPE 2 DIABETES MELLITUS WITH HYPEROSMOLARITY WITHOUT COMA, WITHOUT LONG-TERM CURRENT USE OF INSULIN (HCC): Primary | ICD-10-CM

## 2022-09-22 PROCEDURE — 99203 OFFICE O/P NEW LOW 30 MIN: CPT | Performed by: SPECIALIST

## 2022-09-22 NOTE — PATIENT INSTRUCTIONS
You have a ventral hernia involving the umbilicus and the midline abdomen above the belly button  The hernia needs to be surgically repaired using a prosthetic tissue reinforcement - Mesh  We should not implant mesh while you have a urinary catheter due to the risk of infection  Also I would need clearance from your medical doctors that you are OK to undergo the surgery, and can stand up to the anesthesia required  As per your request, I have made a referral to Dr Job Sales, his office is convenient to where you live  There is a very small chance that the hernia will need an emergency operation if the bowel becomes stuck and causes an obstruction, call or go directly to the emergency room if your abdomen becomes distended, and you become nauseated or start to vomit  Return in about 4 weeks, and if everything is in order, we will schedule your surgery at that time  Ventral Hernia Repair   WHAT YOU NEED TO KNOW:   What do I need to know about ventral hernia repair? A ventral hernia repair is surgery to fix a ventral hernia  A ventral hernia may be repaired if the hernia is preventing blood flow to organs or blocking the intestines  It may be done laparoscopically or open  Laparoscopically means that your healthcare provider will use several small incisions to fix the hernia  In an open repair, your healthcare provider will make one incision to fix your hernia  How do I prepare for a ventral hernia repair? Your healthcare provider will talk to you about how to prepare for surgery  He may tell you not to eat or drink anything after midnight on the day of your surgery  He will tell you what medicines to take or not take on the day of your surgery  You may need to stop taking blood thinners or aspirin several days to weeks before your surgery  You may need blood work, a CT scan, or an ultrasound before surgery   These tests take pictures of your hernia and help your healthcare provider plan your surgery  You may be given contrast liquid before the tests  Tell the healthcare provider if you have ever had an allergic reaction to contrast liquid  You may need a bowel prep before surgery  A bowel prep is when you take medicine to help clean out the colon  This decreases your risk of infection if a hole is made in your intestines during surgery  You may be given an antibiotic through your IV to help prevent a bacterial infection  Arrange for someone to drive you home and stay with you after surgery  What will happen during a ventral hernia repair? You will be given general anesthesia to keep you asleep and free from pain during surgery  To fix the hernia laparoscopically, your healthcare provider will make a small incision above or to the side of your hernia, and insert a laparoscope  A laparoscope is a long metal tube with a light and camera on the end  He will insert other instruments by making 2 to 4 smaller incisions at different places on your abdomen  In an open hernia repair, your healthcare provider will make one large incision over your hernia  In both types of hernia repair, tools are used to remove the sac that contains your organs or abdominal tissue  Next, your healthcare provider will move your organs or tissue back into its correct place  Stitches or mesh may be used to close or cover the opening in your abdominal wall  This may prevent your organs and tissues from bulging through it again  Your healthcare provider may close the incisions in your skin with stitches, medical glue, or strips of medical tape  What will happen after a ventral hernia repair? Healthcare providers will monitor you until you are awake  You may be able to go home when your pain is controlled, you can drink liquids, and you can urinate  You may instead need to spend a night in the hospital  Garcia Snuffer will not be able to drive or lift anything heavy for one to two weeks     What are the risks of a ventral hernia repair? Your organs, blood vessels, or nerves may get injured during the surgery  You may bleed more than expected or get an infection  A pocket of fluid may form under your skin  This may heal on its own or you may need surgery to remove it  Problems, such as a hole in your intestines, may happen during your laparoscopic repair that may lead to a laparotomy (open surgery)  Even after you have this surgery, there is a chance that you could have another hernia  You may get a blood clot in your leg or arm  This may become life-threatening  CARE AGREEMENT:   You have the right to help plan your care  Learn about your health condition and how it may be treated  Discuss treatment options with your healthcare providers to decide what care you want to receive  You always have the right to refuse treatment  The above information is an  only  It is not intended as medical advice for individual conditions or treatments  Talk to your doctor, nurse or pharmacist before following any medical regimen to see if it is safe and effective for you  © Copyright [employer] 2022 Information is for End User's use only and may not be sold, redistributed or otherwise used for commercial purposes   All illustrations and images included in CareNotes® are the copyrighted property of [employer]  or [address] [place] [address] [employer]

## 2022-09-22 NOTE — PROGRESS NOTES
Assessment/Plan:    No problem-specific Assessment & Plan notes found for this encounter  Diagnoses and all orders for this visit:    Type 2 diabetes mellitus with hyperosmolarity without coma, without long-term current use of insulin ([facility] [address] )  -     Ambulatory Referral to [facility]; Future    Abdominal hernia  -     Ambulatory Referral to General Surgery    New onset atrial fibrillation [facility])  -     Ambulatory Referral to [facility]; Future          Subjective:      Patient ID: Madi Mejia  is a 75 y o  male  The patient is a 16-year-old  male presenting with a tender ventral hernia that he is noted for years, and since his recent weight loss is become much more prominent  The patient was recently hospitalized at [address] [facility] with diabetic ketoacidosis, and has been in declining health for some time  His weight loss appears to be the result of complications from diabetes  The patient also presented with urinary retention, and was discharged from the hospital with a Lemons catheter, which remains in place  Physical exam confirms the presence of a ventral hernia with a rather small fascial defect, with incarcerated omentum  The patient has been counseled that surgery will be necessary to repair the hernia, but that there is some increased infection risk with the indwelling lemons catheter, and in any case medical clearance will be required for the anesthesia  In the meantime he is at no imminent risk of bowel becoming incarcerated  The patient will return in 4 weeks, and if appropriate I will schedule surgery at his next visit  The following portions of the patient's history were reviewed and updated as appropriate: allergies, current medications, past family history, past medical history, past social history, past surgical history and problem list     Review of Systems   Constitutional: Negative for chills and fever  HENT: Negative for trouble swallowing  Respiratory: Negative for cough and shortness of breath  Cardiovascular: Negative for chest pain  Palpitations: transient a-fib, spontaneous return to NSR  Gastrointestinal: Negative for abdominal distention, blood in stool, nausea and vomiting  Abdominal pain: from ventral hernia  Endocrine: Positive for polydipsia and polyuria  Genitourinary: Positive for difficulty urinating  Negative for hematuria  Musculoskeletal: Positive for arthralgias and gait problem  Skin: Negative for wound  Color change: hyperpigmentation of lower legs  Neurological: Positive for weakness  Psychiatric/Behavioral: The patient is nervous/anxious  Objective:      /60 (BP Location: Left arm, Patient Position: Sitting, Cuff Size: Large)   Pulse 83   Temp 97 6 °F (36 4 °C) (Temporal)   Resp 18   Ht 6' (1 829 m)   Wt 107 kg (235 lb)   SpO2 100%   BMI 31 87 kg/m²          Physical Exam  Constitutional:       General: He is not in acute distress  Appearance: He is ill-appearing  HENT:      Head: Normocephalic  Eyes:      General: No scleral icterus  Pupils: Pupils are equal, round, and reactive to light  Cardiovascular:      Rate and Rhythm: Normal rate and regular rhythm  Heart sounds: Normal heart sounds  No murmur heard  Pulmonary:      Effort: Pulmonary effort is normal       Breath sounds: Normal breath sounds  No wheezing, rhonchi or rales  Abdominal:      General: There is no distension  Palpations: Abdomen is soft  There is no mass  Hernia: A hernia is present

## 2022-09-22 NOTE — ASSESSMENT & PLAN NOTE
The patient has an obvious ventral hernia involving the top of the umbilicus, that is prolapsed with standing  There appears to be incarcerated omentum, bowel does not appear to be involved, the fascial defect appears quite small  The patient was recently hospitalized with diabetic ketoacidosis/hyperosmolar coma  He is yet to make contact with a primary care physician to manage his medical treatment  He is in no imminent danger from this ventral hernia, and will ultimately require clearance for anesthesia  He will return in 4 weeks, and if appropriate surgery will be scheduled at that time

## 2022-09-23 ENCOUNTER — TELEPHONE (OUTPATIENT)
Dept: PSYCHIATRY | Facility: CLINIC | Age: 67
End: 2022-09-23

## 2022-09-23 NOTE — TELEPHONE ENCOUNTER
contacted patient in regards to referral in attempts to add patient to proper waitlsit  spoke w  patient whom stated they are interested in tlk therapy prefers [facility]

## 2022-09-26 ENCOUNTER — PREP FOR PROCEDURE (OUTPATIENT)
Dept: OBGYN CLINIC | Facility: CLINIC | Age: 67
End: 2022-09-26

## 2022-09-26 ENCOUNTER — OFFICE VISIT (OUTPATIENT)
Dept: OBGYN CLINIC | Facility: CLINIC | Age: 67
End: 2022-09-26
Payer: MEDICARE

## 2022-09-26 VITALS
WEIGHT: 242 LBS | HEART RATE: 87 BPM | SYSTOLIC BLOOD PRESSURE: 132 MMHG | HEIGHT: 72 IN | BODY MASS INDEX: 32.78 KG/M2 | DIASTOLIC BLOOD PRESSURE: 83 MMHG

## 2022-09-26 DIAGNOSIS — M79.604 PAIN IN BOTH LOWER EXTREMITIES: ICD-10-CM

## 2022-09-26 DIAGNOSIS — E11.65 TYPE 2 DIABETES MELLITUS WITH HYPERGLYCEMIA, UNSPECIFIED WHETHER LONG TERM INSULIN USE (HCC): ICD-10-CM

## 2022-09-26 DIAGNOSIS — Z11.59 SPECIAL SCREENING EXAMINATION FOR VIRAL DISEASE: ICD-10-CM

## 2022-09-26 DIAGNOSIS — M79.605 PAIN IN BOTH LOWER EXTREMITIES: ICD-10-CM

## 2022-09-26 DIAGNOSIS — M17.11 PRIMARY OSTEOARTHRITIS OF RIGHT KNEE: Primary | ICD-10-CM

## 2022-09-26 DIAGNOSIS — E11.10 DKA (DIABETIC KETOACIDOSIS) (HCC): ICD-10-CM

## 2022-09-26 DIAGNOSIS — Z01.812 PRE-OPERATIVE LABORATORY EXAMINATION: ICD-10-CM

## 2022-09-26 DIAGNOSIS — M25.561 CHRONIC PAIN OF RIGHT KNEE: ICD-10-CM

## 2022-09-26 DIAGNOSIS — G89.29 CHRONIC PAIN OF RIGHT KNEE: ICD-10-CM

## 2022-09-26 PROCEDURE — 99203 OFFICE O/P NEW LOW 30 MIN: CPT | Performed by: ORTHOPAEDIC SURGERY

## 2022-09-26 RX ORDER — CHLORHEXIDINE GLUCONATE 0.12 MG/ML
15 RINSE ORAL ONCE
OUTPATIENT
Start: 2022-09-26 | End: 2022-09-26

## 2022-09-26 RX ORDER — FOLIC ACID 1 MG/1
1 TABLET ORAL DAILY
Qty: 30 TABLET | Refills: 1 | Status: SHIPPED | OUTPATIENT
Start: 2022-11-14 | End: 2022-10-22

## 2022-09-26 RX ORDER — ASCORBIC ACID 500 MG
500 TABLET ORAL 2 TIMES DAILY
Qty: 60 TABLET | Refills: 1 | Status: SHIPPED | OUTPATIENT
Start: 2022-11-14 | End: 2023-01-13

## 2022-09-26 RX ORDER — CHLORHEXIDINE GLUCONATE 4 G/100ML
SOLUTION TOPICAL DAILY PRN
OUTPATIENT
Start: 2022-09-26

## 2022-09-26 RX ORDER — FERROUS SULFATE TAB EC 324 MG (65 MG FE EQUIVALENT) 324 (65 FE) MG
324 TABLET DELAYED RESPONSE ORAL
Qty: 60 TABLET | Refills: 1 | Status: SHIPPED | OUTPATIENT
Start: 2022-11-14 | End: 2022-10-22

## 2022-09-26 RX ORDER — MULTIVIT-MIN/IRON FUM/FOLIC AC 7.5 MG-4
1 TABLET ORAL DAILY
Qty: 30 TABLET | Refills: 1 | Status: SHIPPED | OUTPATIENT
Start: 2022-11-14 | End: 2023-01-13

## 2022-09-26 NOTE — PROGRESS NOTES
ASSESSMENT/PLAN:    Diagnoses and all orders for this visit:    Primary osteoarthritis of right knee  -     Ambulatory referral to Physical Therapy; Future  -     Case request operating room: ARTHROPLASTY KNEE TOTAL; Standing  -     Multiple Vitamins-Minerals (multivitamin with minerals) tablet; Take 1 tablet by mouth daily  -     folic acid (FOLVITE) 1 mg tablet; Take 1 tablet (1 mg total) by mouth daily  -     ferrous sulfate 324 (65 Fe) mg; Take 1 tablet (324 mg total) by mouth 2 (two) times a day before meals  -     ascorbic acid (VITAMIN C) 500 MG tablet; Take 1 tablet (500 mg total) by mouth 2 (two) times a day  -     Case request operating room: ARTHROPLASTY KNEE TOTAL    Pain in both lower extremities  -     Ambulatory referral to Orthopedic Surgery    Chronic pain of right knee  -     XR knee 3 vw right non injury; Future    Pre-operative laboratory examination  -     Comprehensive metabolic panel; Future  -     Hemoglobin A1C W/EAG Estimation; Future  -     CBC and differential; Future  -     Anemia Panel w/Reflex; Future  -     Protime-INR; Future  -     APTT; Future  -     Type and screen; Future    Special screening examination for viral disease  -     PAT Covid Screening; Future    Type 2 diabetes mellitus with hyperglycemia, unspecified whether long term insulin use ([facility] )  -     Ambulatory Referral to Endocrinology; Future    Other orders  -     Diet NPO; Sips with meds; Standing  -     Nursing Communication [facility] Interventions Implemented; Standing  -     Nursing Communication [facility] bath, have staff wash entire body (neck down) per pre-op bathing protocol  Routine, evening prior to, and day of surgery ; Standing  -     Nursing Communication Swab both nares with Povidone-Iodine solution, EXCLUDE if patient has shellfish/Iodine allergy   Routine, day of surgery, on call to OR; Standing  -     chlorhexidine (PERIDEX) 0 12 % oral rinse 15 mL  -     Void on call to OR; Standing  -     Insert peripheral IV; Standing  -     Place sequential compression device; Standing  -     chlorhexidine (HIBICLENS) 4 % topical liquid  -     ceFAZolin (ANCEF) 2,000 mg in dextrose 5 % 100 mL IVPB        X-rays of the patient's right knee are consistent with advanced arthritic changes  There is no joint space remaining  The patient stated that his symptoms were continuing to worsen and starting to affect his quality of life  After exhausting conservative treatment, the risks and benefits of surgery discussed with the patient  He decided on an elective right total knee replacement  The patient's surgery is tentatively scheduled for December 14, 2022  Unfortunately, the patient's A1c was extremely elevated  We will refer him to endocrinology  The patient is acceptable to this plan  Return in about 3 months (around 12/14/2022) for Surgery  The patient has advanced arthritic changes along his right knee  Ultimately, he will need a right total knee replacement  All risks, complications, benefits were discussed with the patient in great detail including bleeding, infection, blood clots, pain, stiffness, neurovascular damage, fractures, dislocations, possibility of loss of life from surgery, heart attack, stroke, etcetera  His surgery scheduled for December 14, 2022  With his extremely elevated hemoglobin A1c, the surgery probably will be delayed  A referral was made to endocrine      _____________________________________________________  CHIEF COMPLAINT:  Chief Complaint   Patient presents with    Right Knee - Pain         SUBJECTIVE:  Alvin Lutz  is a 75 y o  male who presents to our office complaining of right knee pain  The patient states in the past he has had 2 operations on that same knee  He states that his pain is continuing to worsen  His pain is worse with ambulation  He denies any numbness or tingling  He denies any fever or chills      The following portions of the patient's history were reviewed and updated as appropriate: allergies, current medications, past family history, past medical history, past social history, past surgical history and problem list     PAST MEDICAL HISTORY:  Past Medical History:   Diagnosis Date    A-fib ([facility] )     Acute metabolic encephalopathy 74/04/8005    CARLTON (acute kidney injury) ([facility] ) 09/04/2022    Diabetes mellitus ([facility] )     Pancreatitis 09/04/2022    Sepsis ([place] [address] ) 09/04/2022       PAST SURGICAL HISTORY:  Past Surgical History:   Procedure Laterality Date    KNEE CARTILAGE SURGERY      KNEE SURGERY Right        FAMILY HISTORY:  Family History   Problem Relation Age of Onset    Diabetes Father     Diabetes Mother        SOCIAL HISTORY:  Social History     Tobacco Use    Smoking status: Never Smoker    Smokeless tobacco: Never Used   Vaping Use    Vaping Use: Never used   Substance Use Topics    Alcohol use: Never    Drug use: Never       MEDICATIONS:    Current Outpatient Medications:     [START ON 11/14/2022] ascorbic acid (VITAMIN C) 500 MG tablet, Take 1 tablet (500 mg total) by mouth 2 (two) times a day, Disp: 60 tablet, Rfl: 1    [START ON 11/14/2022] ferrous sulfate 324 (65 Fe) mg, Take 1 tablet (324 mg total) by mouth 2 (two) times a day before meals, Disp: 60 tablet, Rfl: 1    [START ON 34/63/9688] folic acid (FOLVITE) 1 mg tablet, Take 1 tablet (1 mg total) by mouth daily, Disp: 30 tablet, Rfl: 1    gabapentin (NEURONTIN) 100 mg capsule, Take 1 capsule (100 mg total) by mouth 3 (three) times a day, Disp: 90 capsule, Rfl: 0    Incontinence Supply Disposable (Incontinence Brief Large) MISC, Use every 4 (four) hours as needed (Urinary incontinence), Disp: 36 each, Rfl: 12    insulin glargine (Toujeo SoloStar) 300 units/mL CONCENTRATED U-300 injection pen (1-unit dial), Inject 40 Units under the skin every morning, Disp: 4 5 mL, Rfl: 0    insulin lispro (HumaLOG) 100 units/mL injection, Inject 10 Units under the skin 3 (three) times a day with meals, Disp: 20 mL, Rfl: 0    [START ON 11/14/2022] Multiple Vitamins-Minerals (multivitamin with minerals) tablet, Take 1 tablet by mouth daily, Disp: 30 tablet, Rfl: 1    pantoprazole (PROTONIX) 40 mg tablet, Take 1 tablet (40 mg total) by mouth daily in the early morning, Disp: 30 tablet, Rfl: 0    tamsulosin (FLOMAX) 0 4 mg, Take 1 capsule (0 4 mg total) by mouth daily with dinner, Disp: 30 capsule, Rfl: 0    glucose 4 g chewable tablet, Chew 4 tablets (16 g total) once for 1 dose, Disp: 4 tablet, Rfl: 0    ALLERGIES:  No Known Allergies    ROS:  Review of Systems     Constitutional: Negative for fatigue, fever or loss of appetite  HENT: Negative  Respiratory: Negative for shortness of breath, dyspnea  Cardiovascular: Negative for chest pain/tightness  Gastrointestinal: Negative for abdominal pain, N/V  Endocrine: Negative for cold/heat intolerance, unexplained weight loss/gain  Genitourinary: Negative for flank pain, dysuria, hematuria  Musculoskeletal: Positive for arthralgia   Skin: Negative for rash  Neurological: Negative for numbness or tingling  Psychiatric/Behavioral: Negative for agitation  _____________________________________________________  PHYSICAL EXAMINATION:    Blood pressure 132/83, pulse 87, height 6' (1 829 m), weight 110 kg (242 lb)  Constitutional: Oriented to person, place, and time  Appears well-developed and well-nourished  No distress  HENT:   Head: Normocephalic  Eyes: Conjunctivae are normal  Right eye exhibits no discharge  Left eye exhibits no discharge  No scleral icterus  Cardiovascular: Normal rate  Pulmonary/Chest: Effort normal    Neurological: Alert and oriented to person, place, and time  Skin: Skin is warm and dry  No rash noted  Not diaphoretic  No erythema  No pallor  Psychiatric: Normal mood and affect   Behavior is normal  Judgment and thought content normal       MUSCULOSKELETAL EXAMINATION:   Physical Exam  Ortho Exam    Right lower extremity is neurovascularly intact  Toes are pink and mobile  Compartments are soft  Range of motion of the knee is from 5-110 degrees  No ligament laxity  Medial and lateral joint line tenderness  Negative Lachman, drawer or pivot shift  Brisk cap refill  Sensation intact  Objective:  BP Readings from Last 1 Encounters:   09/26/22 132/83      Wt Readings from Last 1 Encounters:   09/26/22 110 kg (242 lb)        BMI:   Estimated body mass index is 32 82 kg/m² as calculated from the following:    Height as of this encounter: 6' (1 829 m)  Weight as of this encounter: 110 kg (242 lb)          Scribe Attestation    I,:  Abhay Rogel PA-C am acting as a scribe while in the presence of the attending physician :       I,:  Joceline Hernadez DO personally performed the services described in this documentation    as scribed in my presence :

## 2022-09-26 NOTE — TELEPHONE ENCOUNTER
Patient called stating he reserved the wrong medication. Patient stated he needs insulin lispro (HumaLOG Felix KwikPen) 100 units/mL injection pen. Are you able to send in a script. [facility] [place].

## 2022-09-27 ENCOUNTER — PREP FOR PROCEDURE (OUTPATIENT)
Dept: OBGYN CLINIC | Facility: CLINIC | Age: 67
End: 2022-09-27

## 2022-09-27 ENCOUNTER — OFFICE VISIT (OUTPATIENT)
Dept: NEPHROLOGY | Facility: CLINIC | Age: 67
End: 2022-09-27
Payer: MEDICARE

## 2022-09-27 VITALS
HEART RATE: 97 BPM | WEIGHT: 238 LBS | SYSTOLIC BLOOD PRESSURE: 118 MMHG | BODY MASS INDEX: 32.28 KG/M2 | DIASTOLIC BLOOD PRESSURE: 64 MMHG | OXYGEN SATURATION: 99 %

## 2022-09-27 DIAGNOSIS — R35.0 URINARY FREQUENCY: ICD-10-CM

## 2022-09-27 DIAGNOSIS — N13.30 HYDROURETERONEPHROSIS: ICD-10-CM

## 2022-09-27 DIAGNOSIS — R33.9 URINARY RETENTION: ICD-10-CM

## 2022-09-27 DIAGNOSIS — R35.89 POLYURIA: Primary | ICD-10-CM

## 2022-09-27 DIAGNOSIS — E11.9 TYPE 2 DIABETES MELLITUS (HCC): ICD-10-CM

## 2022-09-27 PROCEDURE — 99215 OFFICE O/P EST HI 40 MIN: CPT | Performed by: PHYSICIAN ASSISTANT

## 2022-09-27 NOTE — ASSESSMENT & PLAN NOTE
Lab Results   Component Value Date    HGBA1C >14 0 (H) 09/05/2022   Establish care with PCP  Recommend referral to Endocrinology  At risk for CKD  Should be monitored for proteinuria and referred back for CKD  Would recommend SGLT2 inhibitor as patient likely cannot tolerate RAASi due to low blood pressures

## 2022-09-27 NOTE — PROGRESS NOTES
Assessment & Plan:    1  Polyuria  Assessment & Plan:  Due to fluid intake  Reports decreased since discharge  2  Hydroureteronephrosis  Assessment & Plan: To follow up wit Urology    Orders:  -     Ambulatory Referral to Nephrology    3  Urinary retention  Assessment & Plan:  See Urology regarding Lane catheter      4  Urinary frequency  Assessment & Plan: Lane catheter in place      5  Type 2 diabetes mellitus ([address] )  Assessment & Plan:    Lab Results   Component Value Date    HGBA1C >14 0 (H) 09/05/2022   Establish care with PCP  Recommend referral to Endocrinology  At risk for CKD  Should be monitored for proteinuria and referred back for CKD  Would recommend SGLT2 inhibitor as patient likely cannot tolerate RAASi due to low blood pressures  The benefits, risks and alternatives to the treatment plan were discussed at this visit  Patient was advised of common adverse effects of any medical therapies prescribed  All questions were answered and discussed with the patient and any accompanying family members or caretakers  Subjective:      Patient ID: Satnam Upton  is a 75 y o  male seen in the [facility] office  Patient was seen during stay at [facility] during stay 09/10/2022 through 09/20/2022, at which time Nephrology was consulted for evaluation of polyuria  HPI     Today, patient presents for follow-up of hospitalization  He denies any acute complaints since discharge besides pain with Lane catheter  Denies hematuria  Reports 900 mL to 2100 mL UOP per day  Blood pressure is 118/64 with a heart rate of 97  Patient does monitor blood pressures at home and reports  Denies headaches, lightheadedness, dizziness  Not on antihypertensives  Patient denies lower extremity swelling  Reviewed and discussed the results of labs performed 09/20/2022 which revealed renal function is excellent with a creatinine of 0 93 mg/dL with estimated GFR 84 mL/min      History is obtained from patient and spouse  The following portions of the patient's history were reviewed and updated as appropriate: allergies, current medications, past family history, past medical history, past social history, past surgical history, and problem list     Review of Systems   Constitutional: Negative for activity change, chills, diaphoresis, fatigue and fever  HENT: Negative for mouth sores and trouble swallowing  Respiratory: Negative for apnea, cough, chest tightness, shortness of breath and wheezing  Cardiovascular: Negative for chest pain, palpitations and leg swelling  Gastrointestinal: Negative for abdominal distention, abdominal pain, blood in stool, constipation, diarrhea and nausea  Genitourinary: Negative for decreased urine volume, difficulty urinating, dysuria, enuresis, frequency, hematuria and urgency  Pain with Lane catheter  Musculoskeletal: Negative for arthralgias, back pain and joint swelling  Skin: Negative for pallor, rash and wound  Neurological: Negative for dizziness, seizures, light-headedness, numbness and headaches  Hematological: Does not bruise/bleed easily  Psychiatric/Behavioral: Negative for agitation, behavioral problems and confusion  The patient is not nervous/anxious  Objective:      /64   Pulse 97   Wt 108 kg (238 lb)   SpO2 99%   BMI 32 28 kg/m²          Physical Exam  Vitals and nursing note reviewed  Constitutional:       General: He is awake  He is not in acute distress  Appearance: Normal appearance  He is well-developed and normal weight  He is not ill-appearing, toxic-appearing or diaphoretic  HENT:      Head: Normocephalic and atraumatic  Jaw: There is normal jaw occlusion  Nose: Nose normal       Mouth/Throat:      Mouth: Mucous membranes are moist       Pharynx: Oropharynx is clear  No oropharyngeal exudate or posterior oropharyngeal erythema     Eyes:      General: Lids are normal  Vision grossly intact  Gaze aligned appropriately  No scleral icterus  Right eye: No discharge  Left eye: No discharge  Extraocular Movements: Extraocular movements intact  Conjunctiva/sclera: Conjunctivae normal       Pupils: Pupils are equal, round, and reactive to light  Neck:      Thyroid: No thyroid mass or thyromegaly  Trachea: Trachea and phonation normal    Cardiovascular:      Rate and Rhythm: Normal rate and regular rhythm  Heart sounds: Normal heart sounds, S1 normal and S2 normal  No murmur heard  No friction rub  No gallop  Pulmonary:      Effort: Pulmonary effort is normal  No respiratory distress  Breath sounds: Normal breath sounds  No stridor  No wheezing, rhonchi or rales  Abdominal:      General: Abdomen is flat  Bowel sounds are normal  There is no distension  Palpations: Abdomen is soft  There is no mass  Tenderness: There is no abdominal tenderness  There is no guarding  Hernia: No hernia is present  Genitourinary:     Comments: Lane catheter with clear, yellow urine output  Musculoskeletal:         General: Normal range of motion  Cervical back: Normal range of motion and neck supple  No rigidity or tenderness  Right lower leg: No edema  Left lower leg: No edema  Lymphadenopathy:      Cervical: No cervical adenopathy  Skin:     General: Skin is warm and dry  Coloration: Skin is not jaundiced  Findings: No bruising  Nails: There is no clubbing  Neurological:      General: No focal deficit present  Mental Status: He is alert and oriented to person, place, and time  Mental status is at baseline  Psychiatric:         Attention and Perception: Attention and perception normal          Mood and Affect: Mood and affect normal          Speech: Speech normal          Behavior: Behavior normal  Behavior is cooperative  Thought Content:  Thought content normal          Judgment: Judgment normal  Lab Results   Component Value Date    SODIUM 139 09/20/2022    K 3 9 09/20/2022     09/20/2022    CO2 27 09/20/2022    AGAP 8 09/20/2022    BUN 18 09/20/2022    CREATININE 0 93 09/20/2022    GLUC 130 09/20/2022    GLUF 180 (H) 09/16/2022    CALCIUM 9 1 09/20/2022    AST 33 09/16/2022    ALT 14 09/16/2022    ALKPHOS 84 09/16/2022    TP 6 2 (L) 09/16/2022    TBILI 0 44 09/16/2022    EGFR 84 09/20/2022      Lab Results   Component Value Date    CREATININE 0 93 09/20/2022    CREATININE 0 83 09/16/2022    CREATININE 1 26 09/14/2022    CREATININE 0 75 09/11/2022    CREATININE 0 82 09/08/2022    CREATININE 0 77 09/07/2022    CREATININE 0 73 09/06/2022    CREATININE 0 87 09/06/2022    CREATININE 1 04 09/05/2022    CREATININE 1 19 09/05/2022    CREATININE 1 31 (H) 09/05/2022    CREATININE 1 34 (H) 09/04/2022    CREATININE 1 48 (H) 09/04/2022    CREATININE 1 81 (H) 09/04/2022    CREATININE 1 75 (H) 09/04/2022      Lab Results   Component Value Date    COLORU Yellow 09/15/2022    CLARITYU Clear 09/15/2022    SPECGRAV 1 015 09/15/2022    PHUR 6 0 09/15/2022    LEUKOCYTESUR Negative 09/15/2022    NITRITE Negative 09/15/2022    PROTEIN UA Negative 09/15/2022    GLUCOSEU Negative 09/15/2022    KETONESU Negative 09/15/2022    UROBILINOGEN 0 2 09/15/2022    BILIRUBINUR Negative 09/15/2022    BLOODU Negative 09/15/2022    RBCUA 0-1 (A) 09/15/2022    WBCUA None Seen 09/15/2022    EPIS Occasional 09/15/2022    BACTERIA None Seen 09/15/2022      No results found for: LABPROT  No results found for: Kathlen Coma  Lab Results   Component Value Date    WBC 5 92 09/16/2022    HGB 11 1 (L) 09/16/2022    HCT 34 5 (L) 09/16/2022    MCV 93 09/16/2022     09/16/2022      Lab Results   Component Value Date    HGB 11 1 (L) 09/16/2022    HGB 11 9 (L) 09/14/2022    HGB 10 9 (L) 09/11/2022    HGB 12 3 09/08/2022    HGB 12 6 09/07/2022      No results found for: IRON, TIBC, FERRITIN   No results found for: PTHCALCIUM, [id number], PHOSPHORUS   Lab Results   Component Value Date    CHOLESTEROL 153 09/04/2022    HDL 54 09/04/2022    LDLCALC 81 09/04/2022    TRIG 92 09/04/2022      No results found for: URICACID   Lab Results   Component Value Date    HGBA1C >14 0 (H) 09/05/2022      No results found for: TSHANTIBODY, [id number], FREET4   No results found for: HANSEL, DSDNAAB, RFIGM   No results found for: PROT, UPEP, IMMUNOFIX, KAPPALAMBDA, KAPPALIGHT     Portions of the record may have been created with voice recognition software  Occasional wrong word or "sound a like" substitutions may have occurred due to the inherent limitations of voice recognition software  Read the chart carefully and recognize, using context, where substitutions have occurred  If you have any questions, please contact the dictating provider

## 2022-09-28 RX ORDER — INSULIN LISPRO 100 [IU]/ML
10 INJECTION, SOLUTION INTRAVENOUS; SUBCUTANEOUS
Qty: 20 ML | Refills: 0 | OUTPATIENT
Start: 2022-09-28

## 2022-09-29 ENCOUNTER — OFFICE VISIT (OUTPATIENT)
Dept: FAMILY MEDICINE CLINIC | Facility: CLINIC | Age: 67
End: 2022-09-29
Payer: MEDICARE

## 2022-09-29 VITALS
TEMPERATURE: 97.2 F | WEIGHT: 239 LBS | HEIGHT: 72 IN | DIASTOLIC BLOOD PRESSURE: 80 MMHG | SYSTOLIC BLOOD PRESSURE: 124 MMHG | BODY MASS INDEX: 32.37 KG/M2 | OXYGEN SATURATION: 100 % | RESPIRATION RATE: 20 BRPM | HEART RATE: 68 BPM

## 2022-09-29 DIAGNOSIS — N13.30 HYDROURETERONEPHROSIS: ICD-10-CM

## 2022-09-29 DIAGNOSIS — I48.91 NEW ONSET ATRIAL FIBRILLATION (HCC): ICD-10-CM

## 2022-09-29 DIAGNOSIS — E11.65 UNCONTROLLED TYPE 2 DIABETES MELLITUS WITH HYPERGLYCEMIA (HCC): Primary | ICD-10-CM

## 2022-09-29 PROCEDURE — 99203 OFFICE O/P NEW LOW 30 MIN: CPT | Performed by: NURSE PRACTITIONER

## 2022-09-29 RX ORDER — MULTIVITAMIN WITH FOLIC ACID 400 MCG
1 TABLET ORAL DAILY
COMMUNITY
Start: 2022-09-26

## 2022-09-29 RX ORDER — INSULIN LISPRO 100 [IU]/ML
10 INJECTION, SOLUTION INTRAVENOUS; SUBCUTANEOUS
Qty: 15 ML | Refills: 0 | Status: SHIPPED | OUTPATIENT
Start: 2022-09-29 | End: 2022-09-30 | Stop reason: SDUPTHER

## 2022-09-29 NOTE — ASSESSMENT & PLAN NOTE
New onset afib while hospitalized   He converted spontaneous to NSR, most likey related to electrolyte derangement

## 2022-09-29 NOTE — ASSESSMENT & PLAN NOTE
Lab Results   Component Value Date    HGBA1C >14 0 (H) 09/05/2022   He is open to meeting with the diabetic educator at this office  Appointment was made  He will be referred to endocrinology  Needs in lower A1c for potential surgery  He was recently started on insulin, did not have coverage since discharge  Continuous glucose monitor ordered  Labs already pending in chart for follow-up

## 2022-09-29 NOTE — ASSESSMENT & PLAN NOTE
Per ultrasound obstructive uropathy and non-obstructing 8 mm right renal calculus, currently has Lane catheter in place draining yellow urine    Has follow-up with Urology

## 2022-09-29 NOTE — PROGRESS NOTES
OFFICE VISIT  Oracio Richardson Jr  75 y o  male MRN: [id number]    BMI Counseling: Body mass index is 32 41 kg/m²  The BMI is above normal  Nutrition recommendations include decreasing portion sizes  Exercise recommendations include moderate physical activity 150 minutes/week  Rationale for BMI follow-up plan is due to patient being overweight or obese  Assessment / Plan:  Problem List Items Addressed This Visit        Endocrine    Uncontrolled type 2 diabetes mellitus with hyperglycemia ([facility] [address] ) - Primary       Lab Results   Component Value Date    HGBA1C >14 0 (H) 09/05/2022   He is open to meeting with the diabetic educator at this office  Appointment was made  He will be referred to endocrinology  Needs in lower A1c for potential surgery  He was recently started on insulin, did not have coverage since discharge  Continuous glucose monitor ordered  Labs already pending in chart for follow-up  Relevant Medications    insulin lispro (HumaLOG KwikPen) 100 units/mL injection pen    Other Relevant Orders    Ambulatory Referral to Endocrinology    Continous glucose monitoring dexcom placement    Continous glucose monitoring dexcom intrepretation       Cardiovascular and Mediastinum    New onset atrial fibrillation ([facility] [address] )     New onset afib while hospitalized  He converted spontaneous to NSR, most likey related to electrolyte derangement             Genitourinary    Hydroureteronephrosis     Per ultrasound obstructive uropathy and non-obstructing 8 mm right renal calculus, currently has Lane catheter in place draining yellow urine  Has follow-up with Urology                 Reason For Visit / Chief Complaint  Chief Complaint   Patient presents with   [facility]     New patient        HPI:  Leonardo Yoon  is a 75 y o  male who presents today to [facility]  He has know diabetes for approx 3 year, he is insulin dependant  He reports running out of his Humalog since hospital discharge   Has not had a meter for over 2 years  He has been off his medication for over 2 years  He reports losing over 100 pounds  Recent hospitalization September 4th for 6 days due to altered mental status secondary to DKA        Historical Information   Past Medical History:   Diagnosis Date    A-fib [facility])     Acute metabolic encephalopathy 88/76/8432    CARLTON (acute kidney injury) ([facility] [address] [facility] 09/04/2022    Diabetes mellitus ([facility] )     Pancreatitis 09/04/2022    Sepsis ([facility] ) 09/04/2022     Past Surgical History:   Procedure Laterality Date    KNEE CARTILAGE SURGERY      KNEE SURGERY Right      Social History   Social History     Substance and Sexual Activity   Alcohol Use Never     Social History     Substance and Sexual Activity   Drug Use Never     Social History     Tobacco Use   Smoking Status Never Smoker   Smokeless Tobacco Never Used     Family History   Problem Relation Age of Onset    Diabetes Father     Diabetes Mother        Meds/Allergies   No Known Allergies    Meds:    Current Outpatient Medications:     [START ON 11/14/2022] ascorbic acid (VITAMIN C) 500 MG tablet, Take 1 tablet (500 mg total) by mouth 2 (two) times a day, Disp: 60 tablet, Rfl: 1    [START ON 11/14/2022] ferrous sulfate 324 (65 Fe) mg, Take 1 tablet (324 mg total) by mouth 2 (two) times a day before meals, Disp: 60 tablet, Rfl: 1    [START ON 43/18/4767] folic acid (FOLVITE) 1 mg tablet, Take 1 tablet (1 mg total) by mouth daily, Disp: 30 tablet, Rfl: 1    gabapentin (NEURONTIN) 100 mg capsule, Take 1 capsule (100 mg total) by mouth 3 (three) times a day, Disp: 90 capsule, Rfl: 0    Incontinence Supply Disposable (Incontinence Brief Large) MISC, Use every 4 (four) hours as needed (Urinary incontinence), Disp: 36 each, Rfl: 12    insulin glargine (Toujeo SoloStar) 300 units/mL CONCENTRATED U-300 injection pen (1-unit dial), Inject 40 Units under the skin every morning, Disp: 4 5 mL, Rfl: 0    insulin lispro (HumaLOG KwikPen) 100 units/mL injection pen, Inject 10 Units under the skin 3 (three) times a day with meals, Disp: 15 mL, Rfl: 0    Multiple Vitamin (Daily-Octavio Multivitamin) TABS, Take 1 tablet by mouth daily, Disp: , Rfl:     pantoprazole (PROTONIX) 40 mg tablet, Take 1 tablet (40 mg total) by mouth daily in the early morning, Disp: 30 tablet, Rfl: 0    tamsulosin (FLOMAX) 0 4 mg, Take 1 capsule (0 4 mg total) by mouth daily with dinner, Disp: 30 capsule, Rfl: 0    glucose 4 g chewable tablet, Chew 4 tablets (16 g total) once for 1 dose, Disp: 4 tablet, Rfl: 0    insulin lispro (HumaLOG) 100 units/mL injection, Inject 10 Units under the skin 3 (three) times a day with meals (Patient not taking: Reported on 9/29/2022), Disp: 20 mL, Rfl: 0    [START ON 11/14/2022] Multiple Vitamins-Minerals (multivitamin with minerals) tablet, Take 1 tablet by mouth daily (Patient not taking: Reported on 9/29/2022 Do not start before November 14, 2022 ), Disp: 30 tablet, Rfl: 1      REVIEW OF SYSTEMS  Review of Systems   Constitutional: Negative for activity change, chills, fatigue and fever  HENT: Negative for congestion, ear discharge, ear pain, sinus pressure, sinus pain, sore throat, tinnitus and trouble swallowing  Eyes: Negative for photophobia, pain, discharge, itching and visual disturbance  Respiratory: Negative for cough, chest tightness, shortness of breath and wheezing  Cardiovascular: Negative for chest pain and leg swelling  Gastrointestinal: Negative for abdominal distention, abdominal pain, constipation, diarrhea, nausea and vomiting  Endocrine: Negative for polydipsia, polyphagia and polyuria  Genitourinary: Negative for dysuria and frequency  Musculoskeletal: Positive for arthralgias  Negative for myalgias, neck pain and neck stiffness  Skin: Negative for color change  Neurological: Negative for dizziness, syncope, weakness, numbness and headaches  Hematological: Does not bruise/bleed easily  Psychiatric/Behavioral: Negative for behavioral problems, confusion, self-injury, sleep disturbance and suicidal ideas  The patient is not nervous/anxious  Current Vitals:   Blood Pressure: 124/80 (09/29/22 0755)  Pulse: 68 (09/29/22 0755)  Temperature: (!) 97 2 °F (36 2 °C) (09/29/22 0755)  Respirations: 20 (09/29/22 0755)  Height: 6' (182 9 cm) (09/29/22 0755)  Weight - Scale: 108 kg (239 lb) (09/29/22 0755)  SpO2: 100 % (09/29/22 0755)  [unfilled]    PHYSICAL EXAMS:  Physical Exam  Constitutional:       Appearance: He is obese  HENT:      Head: Normocephalic and atraumatic  Cardiovascular:      Rate and Rhythm: Normal rate and regular rhythm  Pulses: Normal pulses  Heart sounds: Normal heart sounds  Pulmonary:      Effort: Pulmonary effort is normal       Breath sounds: Normal breath sounds  Genitourinary:     Comments: Lane catheter in place  Skin:     General: Skin is warm  Neurological:      General: No focal deficit present  Mental Status: He is alert and oriented to person, place, and time  Psychiatric:         Mood and Affect: Mood normal          Behavior: Behavior normal              Lab, imaging and other studies: I have personally reviewed pertinent reports  Ashley Dupree

## 2022-09-30 DIAGNOSIS — E11.65 UNCONTROLLED TYPE 2 DIABETES MELLITUS WITH HYPERGLYCEMIA (HCC): ICD-10-CM

## 2022-09-30 RX ORDER — INSULIN LISPRO 100 [IU]/ML
10 INJECTION, SOLUTION INTRAVENOUS; SUBCUTANEOUS
Qty: 15 ML | Refills: 0 | Status: SHIPPED | OUTPATIENT
Start: 2022-09-30

## 2022-10-01 NOTE — SEPSIS NOTE
Sepsis Note   Oracio Richardson Jr  75 y o  male MRN: [id number]  Unit/Bed#:  Encounter: [id number]         Initial Sepsis Screening     Row Name 10/01/22 3586                Is the patient's history suggestive of a new or worsening infection? No  Elevated lactate was due to DKA  Sirs criteria likely from DKA  -DC        Suspected source of infection --        Are two or more of the following signs & symptoms of infection both present and new to the patient? --        Indicate SIRS criteria --        If the answer is yes to both questions, suspicion of sepsis is present --        If severe sepsis is present AND tissue hypoperfusion perists in the hour after fluid resuscitation or lactate > 4, the patient meets criteria for SEPTIC SHOCK --        Are any of the following organ dysfunction criteria present within 6 hours of suspected infection and SIRS criteria that are NOT considered to be chronic conditions?  --        Organ dysfunction --        Date of presentation of severe sepsis --        Time of presentation of severe sepsis --        Tissue hypoperfusion persists in the hour after crystalloid fluid administration, evidenced, by either: --        Was hypotension present within one hour of the conclusion of crystalloid fluid administration? --        Date of presentation of septic shock --        Time of presentation of septic shock --              User Key  (r) = Recorded By, (t) = Taken By, (c) = Cosigned By    [address] Name Provider Via Mavis Randle PA-C Physician Assistant

## 2022-10-03 ENCOUNTER — OFFICE VISIT (OUTPATIENT)
Dept: DIABETES SERVICES | Facility: CLINIC | Age: 67
End: 2022-10-03

## 2022-10-03 VITALS — BODY MASS INDEX: 31.95 KG/M2 | WEIGHT: 235.6 LBS

## 2022-10-03 DIAGNOSIS — E11.10 DKA (DIABETIC KETOACIDOSIS) (HCC): Primary | ICD-10-CM

## 2022-10-03 NOTE — PROGRESS NOTES
Medical Nutrition Therapy        Assessment    Visit Type: Initial visit  Chief complaint/Medical Diagnosis/reason for visit E11 10 (ICD-10-CM) - DKA (diabetic ketoacidosis) ([facility] )    SARAH Narayanan was seen in person for the initial MNT appointment and accompanied by his wife Mayelin Zhang who also refers to herself as Ron Grace  Patient expressed he is trying to eat correctly to manage his diabetes and does not want to end up back in the hospital with DKA  Currently Oracio does not check BGs and did not have a meter to check levels  I provided him with a OneTouch Verio Reflect and extra test strips  Patient was educated on how to check BG  This time was not included in the MNT session billable hours  Prior to hospitalization, patient ate vegetable salads daily and sometimes no carbs  Now patient avoids the salads but does not know what foods are appropriate  Problems identified in food recall include meal skipping, inconsistent carbohydrate intake, was unable to identify carbohydrate foods correctly  Consumption of carbohydrates ranges from 140 to 240 grams per meal  Provided patient with a 1787 calorie meal plan to assist with consistency, balance and portion control  Encouraged the consumption of regular meals at regular times  Advised patient to keep carbohydrate intake to 45-60 grams per meal and 15-30 per snack to assist with glycemic control  Suggested keeping protein intake to 8 ounces a day and fat to 5 servings daily to assist with lipid management and calorie control  Portion booklet and food labels were used to teach basic carbohydrate counting  Patient agreed to keep daily food logs and return them in 2 months for assessment  RD will remain available for further dietary questions/concerns       Ht Readings from Last 1 Encounters:   09/29/22 6' (1 829 m)     Wt Readings from Last 3 Encounters:   09/29/22 108 kg (239 lb)   09/27/22 108 kg (238 lb)   09/26/22 110 kg (242 lb)     Weight Change: No    Barriers to Learning: no barriers    Do you follow any special diet presently?: No  Who shops: patient  Who cooks: patient    Food Log: Completed via the method of food recall    3-4 am wakes up    Up to about 3 weeks ago, (hospital stay for DKA) would eat- 8 cups Salad (egg, spinach, tavares, cucumber, tomato, croutons, creamy Beck) for any of the meals    Breakfast:11 am-12 pm; skips     8-8:30 am; 4 cups cook oatmeal (120 g) with cinnamon, sometimes 1 TBSP honey with 90% water and 10% milk (1% variety), no beverage  Morning Snack: 10-11 am; Kind bar OR 1 medium banana with water  Lunch:12-1 pm; 3 bowls soup (broccoli, cauliflower, green beans, peas, sometimes sweet potato, turkey or beef sausage, sometimes garbanzo beans or lentils) OR sandwich (647 bread, turkey, mustard) with water  Afternoon Snack: 2-3 pm; Kind bar OR 1 medium banana with water  Dinner: 5-6 pm; 3 bowls soup (broccoli, cauliflower, green beans, peas, sometimes sweet potato, turkey or beef sausage, sometimes garbanzo beans or lentils) OR sandwich (647 bread, turkey, mustard) with water  Evening Snack: 3 cups mixed fruit (pineapple, cantaloupe, banana, orange, blackberries, raspberries)  Beverages: water and seltzer  Eating out/Take out: Before 1x/ every 2 weeks- dominoes entire medium pizza (240 g), spicy wings; no take out since returning home from hospital  Exercise 20# dumbbells for 15 minutes each arm, resistance bands for 1 hr, almost every day    Calorie needs 1787 kcals/day Carbs: 45-60 g/meal, 15-30 g/snack     Fat: 5 servings/day    Protein:8 ounces/day    Nutrition Diagnosis:  Food and nutrition related knowledge deficit  related to Lack of prior exposure to accurate nutrition related information as evidenced by Verbalizes inaccurate or incomplete information    Intervention: plate method, label reading, carbohydrate counting, meal timing, individualized meal plan and food diary     Treatment Goals: Patient understands education and recommendations, Patient will monitor food intake daily with tracker, Patient will consume 3 meals a day, Patient will count carbohydrates, Patient will exercise and Patient will monitor blood glucose    Monitoring and evaluation:    Term code indicator  FH 1 3 2 Food Intake Criteria: Eat 3 meals per day, 4-5 hours apart  No meal skipping  Term code indicator  FH 1 6 3 Carbohydrate Intake Criteria: Eat 45-60 grams of carbohydrate per meal and 15 grams per snack   Term code indicator  CH 2 2 Treatments/Therapy/Alternative Medicine Criteria: Continue exercise, at least 30 minutes per day or 150 minutes of moderate exercise per week, pending physician approval      Materials Provided: Portion book, 3-day food log, OVT Reflect Starter, and a box of test strips    Patients Response to Instruction:  Quinn Dus  Expected Compliancefair    Begin Time: 8:40 am  End Time: 10:00 am  Referring Provider: Laura Humphrey PA-C    Thank you for coming to the [facility] for education today  Please feel free to call with any questions or concerns      Beal Persons  [address] [place] [id number] [phone]

## 2022-10-03 NOTE — PATIENT INSTRUCTIONS
Eat 3 meals per day, 4-5 hours apart  No meal skipping       Eat 45-60 grams of carbohydrate per meal and 15 grams per snack     Continue exercise, at least 30 minutes per day or 150 minutes of moderate exercise per week, pending physician approval      Complete 3-day food log and return completed log at the follow up appointment

## 2022-10-07 ENCOUNTER — OFFICE VISIT (OUTPATIENT)
Dept: UROLOGY | Facility: CLINIC | Age: 67
End: 2022-10-07
Payer: MEDICARE

## 2022-10-07 VITALS
BODY MASS INDEX: 31.83 KG/M2 | HEART RATE: 94 BPM | DIASTOLIC BLOOD PRESSURE: 60 MMHG | WEIGHT: 235 LBS | SYSTOLIC BLOOD PRESSURE: 104 MMHG | HEIGHT: 72 IN

## 2022-10-07 DIAGNOSIS — R31.29 MICROSCOPIC HEMATURIA: ICD-10-CM

## 2022-10-07 DIAGNOSIS — Z12.5 PROSTATE CANCER SCREENING: ICD-10-CM

## 2022-10-07 DIAGNOSIS — N39.41 URGE INCONTINENCE OF URINE: ICD-10-CM

## 2022-10-07 DIAGNOSIS — N39.44 NOCTURNAL ENURESIS: ICD-10-CM

## 2022-10-07 DIAGNOSIS — R35.0 URINARY FREQUENCY: ICD-10-CM

## 2022-10-07 DIAGNOSIS — N48.1 BALANITIS: ICD-10-CM

## 2022-10-07 DIAGNOSIS — R33.9 URINARY RETENTION: Primary | ICD-10-CM

## 2022-10-07 PROCEDURE — 99214 OFFICE O/P EST MOD 30 MIN: CPT | Performed by: NURSE PRACTITIONER

## 2022-10-07 PROCEDURE — 51702 INSERT TEMP BLADDER CATH: CPT

## 2022-10-07 RX ORDER — TAMSULOSIN HYDROCHLORIDE 0.4 MG/1
0.4 CAPSULE ORAL
Qty: 30 CAPSULE | Refills: 12 | Status: SHIPPED | OUTPATIENT
Start: 2022-10-07

## 2022-10-07 NOTE — ASSESSMENT & PLAN NOTE
· Likely secondary to uncontrolled diabetes with A1c > 14 versus overflow incontinence  · Schedule urodynamic testing  · Consider void trial pending results of urodynamic testing

## 2022-10-07 NOTE — ASSESSMENT & PLAN NOTE
· Likely multifactorial  · Schedule urodynamic testing  · Change Lane catheter today  · Attempt void trial pending results of urodynamic testing  · If void trial attempted and successful will plan for ultrasound kidney and bladder with PVR 3 months later

## 2022-10-07 NOTE — ASSESSMENT & PLAN NOTE
· Resolved  · Recheck urine 6 months  · CT/ultrasound with 8 mm renal calculi  · Refused cystoscopy unless under anesthesia

## 2022-10-07 NOTE — ASSESSMENT & PLAN NOTE
· Likely secondary to uncontrolled diabetes, A1c > 14  · AUA 26  · Positive ketones in urine  · Recommend improved glucose control, has follow-up with endocrinology scheduled  · Has Lane catheter in place currently

## 2022-10-07 NOTE — PROGRESS NOTES
10/7/2022  Oracio Richardson Jr  is a 75 y o  male  [id number]    Diagnosis:  Chief Complaint     Nephrolithiasis          Patient presents for routine lemons exchange managed by our office    Plan:  Follow up as scheduled for next exchange in 4 weeks  Follow up with UDS as scheduled  Patient instructed to call with any questions or concerns in the meantime  Orders Placed This Encounter   Procedures    Urodynamics        Vitals:    10/07/22 1002   BP: 104/60   Pulse: 94   Weight: 107 kg (235 lb)   Height: 6' (1 829 m)       Procedure:    Universal Protocol:  Consent: Verbal consent obtained  Risks and benefits: risks, benefits and alternatives were discussed  Consent given by: patient  Patient understanding: patient states understanding of the procedure being performed  Patient identity confirmed: verbally with patient      Bladder catheterization    Date/Time: 10/7/2022 11:19 AM  Performed by: Heath Tiwari RN  Authorized by: LEEANNA Kitchen     Consent:     Consent given by:  Patient  Universal protocol:     Procedure explained and questions answered to patient or proxy's satisfaction: yes      Patient identity confirmed:  Verbally with patient  Pre-procedure details:     Procedure purpose:  Therapeutic  Anesthesia (see MAR for exact dosages): Anesthesia method:  None  Procedure details:     Catheter insertion:  Indwelling    Approach: natural orifice      Catheter type:  Coude, latex and Lemons    Catheter size:  16 Fr    Number of attempts:  1    Successful placement: yes      Urine characteristics:  Yellow and clear  Post-procedure details:     Patient tolerance of procedure:   Tolerated well, no immediate complications          Heath Tiwari

## 2022-10-07 NOTE — ASSESSMENT & PLAN NOTE
· Previously referred to pelvic floor physical therapy  · Avoid bladder irritants  · Likely multifactorial

## 2022-10-07 NOTE — ASSESSMENT & PLAN NOTE
· Reviewed proper cleaning techniques with plain water and patting dry  · Apply Lotrisone cream b i d  as needed p r n    · Nystatin powder ordered for scrotal sac due to irritation as needed

## 2022-10-07 NOTE — PROGRESS NOTES
Assessment and plan:     Microscopic hematuria  · Resolved  · Recheck urine 6 months  · CT/ultrasound with 8 mm renal calculi  · Refused cystoscopy unless under anesthesia    Balanitis  · Reviewed proper cleaning techniques with plain water and patting dry  · Apply Lotrisone cream b i d  as needed p r n  · Nystatin powder ordered for scrotal sac due to irritation as needed    Nocturnal enuresis  · Likely secondary to uncontrolled diabetes with A1c > 14 versus overflow incontinence  · Schedule urodynamic testing  · Consider void trial pending results of urodynamic testing    Urinary retention  · Likely multifactorial  · Schedule urodynamic testing  · Change Lane catheter today  · Attempt void trial pending results of urodynamic testing  · If void trial attempted and successful will plan for ultrasound kidney and bladder with PVR 3 months later    Urge incontinence of urine  · Previously referred to pelvic floor physical therapy  · Avoid bladder irritants  · Likely multifactorial    Urinary frequency  · Likely secondary to uncontrolled diabetes, A1c > 14  · AUA 26  · Positive ketones in urine  · Recommend improved glucose control, has follow-up with endocrinology scheduled  · Has Lane catheter in place currently    Prostate cancer screening  · Denies family hx of prostate cancer  · PSA 2 18  · Follow up 1 year      Bobbi Renae    History of Present Illness     Oracio Pinedo  is a 75 y o  male who presents for follow-up visit  He initially establish care with me 09/02/2022 for urinary frequency, intermittent urinary stream, urgency, weak stream, straining to urinate, nocturia x5 or more for over year  He feels symptoms started after weight loss  He lost 180 lbs, no on purpose  He was told this could be caused by his diabetes  He has not taken medication for over a  Year  He reports that his diabetic medications caused him to "fall on the floor"   He was told that he lost muscle mass and that is the cause of his current falls and inability to get up  He reports urinary incontinence for 8 months at night  During the day he has post void dribbling  He starts to leak and will go back in the bathroom and "force it out"  He drinks about 2 gallons of water a day  At his previous visit ultrasound kidney and bladder with PVR was ordered  At the time there was no equipment in the office to obtain PVR  His A1c was > 14 and his PSA was 2 18  He also had a positive UTI that was treated with Bactrim  Unfortunately he ended up in the hospital with sepsis related to pancreatitis, DKA with admitting glucose exceeding 800, new onset atrial fibrillation, hyponatremia, lactic and metabolic acidosis and CARLTON on 09/04/2022  He has CT the abdomen pelvis 09/04/2022 that revealed bilateral mild-to-moderate hydronephrosis and hydroureter with dilated ureter extending to the level the distended urinary bladder  He was straight cath multiple times while in hospital and ultimately a Lane catheter was placed for high-volume urinary retention of 1 3 L  Follow-up ultrasound kidney and bladder demonstrated resolution of hydronephrosis after Lane catheter placement  He presents today for follow-up  We discussed that many of his urinary symptoms could be secondary to his urinary retention with a high suspicion of diabetic vesicopathy with resultant neurogenic bladder  Today we discussed void trial verses scheduling urodynamic testing prior to void trial   Patient opted to schedule urodynamic testing 1st   He is not interested in CIC should the testing demonstrate neurogenic bladder  If urodynamic testing does not demonstrated neurogenic bladder will plan for outpatient void trial with follow-up ultrasound kidney and bladder with PVR approximately 3 months later  His CT scan also demonstrated an 8 mm stone in mid right kidney  He previously had microscopic hematuria which has resolved  His recent urine testing revealed 0-1 RBC/hpf  Previously 4-10  Denied gross hematuria, denies smoking, denies exposure to chemicals however reports this past also exposure  Denies pelvic radiation or family history of bladder kidney cancer  He has increased his water intake  His urine is dark yellow in color  Laboratory     Lab Results   Component Value Date    BUN 18 09/20/2022    CREATININE 0 93 09/20/2022       No components found for: GFR    Lab Results   Component Value Date    GLUCOSE 241 (H) 09/07/2022    CALCIUM 9 1 09/20/2022    K 3 9 09/20/2022    CO2 27 09/20/2022     09/20/2022       Lab Results   Component Value Date    WBC 5 92 09/16/2022    HGB 11 1 (L) 09/16/2022    HCT 34 5 (L) 09/16/2022    MCV 93 09/16/2022     09/16/2022       No results found for: PSA    No results found for this or any previous visit (from the past 1 hour(s))  @RESULT(URINEMICROSCOPIC)@    @RESULT(URINECULTURE)@    Radiology     RENAL ULTRASOUND WITH PVR 9/19/2022     INDICATION:   Change in kidney function, polyuria  Follow-up hydronephrosis      COMPARISON: Ultrasound 9/6/2022  CT 9/4/2022      TECHNIQUE:   Ultrasound of the retroperitoneum was performed with a curvilinear transducer utilizing volumetric sweeps and still imaging techniques       FINDINGS:     KIDNEYS:  Symmetric and normal size  Right kidney:  11 5 x 5 7 x 5 6 cm  Volume 191 3 mL  Left kidney:  11 6 x 5 9 x 6 6 cm  Volume 236 3 mL     Right kidney  Normal echogenicity and contour  No mass is identified  Minimal hydronephrosis  7 mm upper pole calculus reidentified  No perinephric fluid collections      Left kidney  Normal echogenicity and contour  No mass is identified  Minimal hydronephrosis  No shadowing calculi  No perinephric fluid collections      URETERS:  Nonvisualized      BLADDER:   Normally distended  No focal thickening or mass lesions  Bilateral ureteral jets not visualized    Prevoid: 84   Post void: Cannot be assessed because the patient was unable to void during exam         IMPRESSION:     Minimal bilateral hydroureteronephrosis, improved since the prior study  Stable right renal calculus          Review of Systems     Review of Systems   Constitutional: Negative for activity change, appetite change, chills, fatigue, fever and unexpected weight change  HENT: Negative for facial swelling  Eyes: Negative for discharge  Respiratory: Negative  Negative for cough and shortness of breath  Cardiovascular: Negative for chest pain and leg swelling  Gastrointestinal: Negative  Negative for abdominal distention, abdominal pain, constipation, diarrhea, nausea and vomiting  Endocrine: Negative  Genitourinary: Positive for difficulty urinating  Negative for decreased urine volume, dysuria, enuresis, flank pain, genital sores and hematuria  Lane catheter in place   Musculoskeletal: Negative for back pain and myalgias  Skin: Negative for pallor and rash  Allergic/Immunologic: Negative  Negative for immunocompromised state  Neurological: Negative for facial asymmetry and speech difficulty  Psychiatric/Behavioral: Negative for agitation and confusion           AUA SYMPTOM SCORE    Flowsheet Row Most Recent Value   AUA SYMPTOM SCORE    How often have you had a sensation of not emptying your bladder completely after you finished urinating? 0 (P)     How often have you had to urinate again less than two hours after you finished urinating? 0 (P)     How often have you found you stopped and started again several times when you urinate? 0 (P)     How often have you found it difficult to postpone urination? 0 (P)     How often have you had a weak urinary stream? 0 (P)     How often have you had to push or strain to begin urination? 0 (P)     How many times did you most typically get up to urinate from the time you went to bed at night until the time you got up in the morning? 0 (P)     Quality of Life: If you were to spend the rest of your life with your urinary condition just the way it is now, how would you feel about that? 5 (P)     AUA SYMPTOM SCORE 0 (P)                 Allergies     No Known Allergies    Physical Exam     Physical Exam  Vitals reviewed  Constitutional:       General: He is not in acute distress  Appearance: Normal appearance  He is obese  He is not ill-appearing, toxic-appearing or diaphoretic  HENT:      Head: Normocephalic and atraumatic  Eyes:      General: No scleral icterus  Cardiovascular:      Rate and Rhythm: Normal rate  Pulmonary:      Effort: Pulmonary effort is normal  No respiratory distress  Abdominal:      General: Abdomen is flat  There is no distension  Palpations: Abdomen is soft  Genitourinary:     Comments: Lane catheter draining clear yellow urine  Musculoskeletal:         General: No swelling  Cervical back: Normal range of motion  Skin:     General: Skin is warm and dry  Coloration: Skin is not jaundiced or pale  Findings: No rash  Neurological:      General: No focal deficit present  Mental Status: He is alert and oriented to person, place, and time  Gait: Gait normal    Psychiatric:         Mood and Affect: Mood normal          Behavior: Behavior normal          Thought Content:  Thought content normal          Judgment: Judgment normal          Vital Signs     Vitals:    10/07/22 1002   BP: 104/60   Pulse: 94   Weight: 107 kg (235 lb)   Height: 6' (1 829 m)       Current Medications       Current Outpatient Medications:     [START ON 11/14/2022] ascorbic acid (VITAMIN C) 500 MG tablet, Take 1 tablet (500 mg total) by mouth 2 (two) times a day, Disp: 60 tablet, Rfl: 1    [START ON 11/14/2022] ferrous sulfate 324 (65 Fe) mg, Take 1 tablet (324 mg total) by mouth 2 (two) times a day before meals, Disp: 60 tablet, Rfl: 1    [START ON 48/49/8630] folic acid (FOLVITE) 1 mg tablet, Take 1 tablet (1 mg total) by mouth daily, Disp: 30 tablet, Rfl: 1    gabapentin (NEURONTIN) 100 mg capsule, Take 1 capsule (100 mg total) by mouth 3 (three) times a day, Disp: 90 capsule, Rfl: 0    Incontinence Supply Disposable (Incontinence Brief Large) MISC, Use every 4 (four) hours as needed (Urinary incontinence), Disp: 36 each, Rfl: 12    insulin glargine (Toujeo SoloStar) 300 units/mL CONCENTRATED U-300 injection pen (1-unit dial), Inject 40 Units under the skin every morning, Disp: 4 5 mL, Rfl: 0    insulin lispro (HumaLOG KwikPen) 100 units/mL injection pen, Inject 10 Units under the skin 3 (three) times a day with meals, Disp: 15 mL, Rfl: 0    insulin lispro (HumaLOG) 100 units/mL injection, Inject 10 Units under the skin 3 (three) times a day with meals, Disp: 20 mL, Rfl: 0    Multiple Vitamin (Daily-Octavio Multivitamin) TABS, Take 1 tablet by mouth daily, Disp: , Rfl:     pantoprazole (PROTONIX) 40 mg tablet, Take 1 tablet (40 mg total) by mouth daily in the early morning, Disp: 30 tablet, Rfl: 0    tamsulosin (FLOMAX) 0 4 mg, Take 1 capsule (0 4 mg total) by mouth daily with dinner, Disp: 30 capsule, Rfl: 12    glucose 4 g chewable tablet, Chew 4 tablets (16 g total) once for 1 dose, Disp: 4 tablet, Rfl: 0    [START ON 11/14/2022] Multiple Vitamins-Minerals (multivitamin with minerals) tablet, Take 1 tablet by mouth daily, Disp: 30 tablet, Rfl: 1    Active Problems     Patient Active Problem List   Diagnosis    Urinary frequency    Type 2 diabetes mellitus (HCC)    Prostate cancer screening    Microscopic hematuria    Urge incontinence of urine    Nocturnal enuresis    Feeling of incomplete bladder emptying    Balanitis    DKA (diabetic ketoacidosis) (HCC)    New onset atrial fibrillation (HCC)    Urinary retention    Fungal infection of skin    Depressed mood    Pain in both lower extremities    Polyuria    Abdominal hernia    Hydroureteronephrosis    Uncontrolled type 2 diabetes mellitus with hyperglycemia [facility])       Past Medical History     Past Medical History:   Diagnosis Date    A-fib [facility])     Acute metabolic encephalopathy 64/70/6071    CARLTON [facility]acute kidney injury) ([facility] ) 09/04/2022    Diabetes mellitus ([place] [address] )     Pancreatitis 09/04/2022    Sepsis ([facility] ) 09/04/2022       Surgical History     Past Surgical History:   Procedure Laterality Date    KNEE CARTILAGE SURGERY      KNEE SURGERY Right        Family History     Family History   Problem Relation Age of Onset    Diabetes Father     Diabetes Mother        Social History     Social History     Social History     Tobacco Use   Smoking Status Never Smoker   Smokeless Tobacco Never Used       Past Surgical History:   Procedure Laterality Date    KNEE CARTILAGE SURGERY      KNEE SURGERY Right          The following portions of the patient's history were reviewed and updated as appropriate: allergies, current medications, past family history, past medical history, past social history, past surgical history and problem list    Please note :  Voice dictation software has been used to create this document  There may be inadvertent transcription errors      [id number] [address]

## 2022-10-17 ENCOUNTER — OFFICE VISIT (OUTPATIENT)
Dept: ENDOCRINOLOGY | Facility: CLINIC | Age: 67
End: 2022-10-17
Payer: MEDICARE

## 2022-10-17 ENCOUNTER — TELEPHONE (OUTPATIENT)
Dept: GASTROENTEROLOGY | Facility: CLINIC | Age: 67
End: 2022-10-17

## 2022-10-17 VITALS
WEIGHT: 232.4 LBS | HEIGHT: 73 IN | BODY MASS INDEX: 30.8 KG/M2 | OXYGEN SATURATION: 97 % | DIASTOLIC BLOOD PRESSURE: 74 MMHG | TEMPERATURE: 98.1 F | HEART RATE: 68 BPM | SYSTOLIC BLOOD PRESSURE: 132 MMHG

## 2022-10-17 DIAGNOSIS — E11.9 TYPE 2 DIABETES MELLITUS WITHOUT COMPLICATION, WITH LONG-TERM CURRENT USE OF INSULIN (HCC): Primary | ICD-10-CM

## 2022-10-17 DIAGNOSIS — E11.65 UNCONTROLLED TYPE 2 DIABETES MELLITUS WITH HYPERGLYCEMIA (HCC): Primary | ICD-10-CM

## 2022-10-17 DIAGNOSIS — E78.5 DYSLIPIDEMIA: ICD-10-CM

## 2022-10-17 DIAGNOSIS — E11.10 DKA (DIABETIC KETOACIDOSIS) (HCC): ICD-10-CM

## 2022-10-17 DIAGNOSIS — Z79.4 TYPE 2 DIABETES MELLITUS WITHOUT COMPLICATION, WITH LONG-TERM CURRENT USE OF INSULIN (HCC): Primary | ICD-10-CM

## 2022-10-17 DIAGNOSIS — E55.9 VITAMIN D DEFICIENCY: ICD-10-CM

## 2022-10-17 PROCEDURE — 99204 OFFICE O/P NEW MOD 45 MIN: CPT | Performed by: STUDENT IN AN ORGANIZED HEALTH CARE EDUCATION/TRAINING PROGRAM

## 2022-10-17 RX ORDER — DOCUSATE SODIUM 100 MG/1
100 CAPSULE, LIQUID FILLED ORAL 2 TIMES DAILY
Qty: 60 CAPSULE | Refills: 1 | Status: SHIPPED | OUTPATIENT
Start: 2022-10-17 | End: 2022-12-16

## 2022-10-17 RX ORDER — INSULIN GLARGINE 300 U/ML
40 INJECTION, SOLUTION SUBCUTANEOUS EVERY MORNING
Qty: 4.5 ML | Refills: 0 | Status: CANCELLED | OUTPATIENT
Start: 2022-10-17

## 2022-10-17 RX ORDER — INSULIN GLARGINE 300 U/ML
40 INJECTION, SOLUTION SUBCUTANEOUS EVERY MORNING
Qty: 4.5 ML | Refills: 0 | Status: SHIPPED | OUTPATIENT
Start: 2022-10-17 | End: 2022-10-28 | Stop reason: SDUPTHER

## 2022-10-17 NOTE — TELEPHONE ENCOUNTER
Hello,     Patient called to relay message that he is using Onetouch Verio Reflect Glucose meter       Thank you,   Sandra Fox

## 2022-10-17 NOTE — PATIENT INSTRUCTIONS
Continue Toujeo 40 units in the morning  Take Humalog 8 units before each meals  If your blood sugars are consistently below 100 call me to make changes  We see you back in the office in 3 months  Check your blood sugars 2 to 3 times a day send me your sugar logs in 2 weeks    If your blood sugars are below 70 annual have symptoms like shaky sweaty the brain fog take 15 g of carb which includes 1 cup of milk or 1 cup of regular soda or 3 glucose tablets on recheck your blood sugars in 15 minutes if still your blood sugars are below 70 and you have symptoms repeat the same

## 2022-10-18 DIAGNOSIS — M17.11 PRIMARY OSTEOARTHRITIS OF RIGHT KNEE: ICD-10-CM

## 2022-10-20 ENCOUNTER — TELEPHONE (OUTPATIENT)
Dept: SURGERY | Facility: CLINIC | Age: 67
End: 2022-10-20

## 2022-10-20 NOTE — TELEPHONE ENCOUNTER
Call was placed out to the patient to reschedule his appt   Due the doctor not being in the office the day of his appt I was looking to reschedule his appt to that tuesday

## 2022-10-22 RX ORDER — FERROUS SULFATE TAB EC 324 MG (65 MG FE EQUIVALENT) 324 (65 FE) MG
TABLET DELAYED RESPONSE ORAL
Qty: 180 TABLET | Refills: 1 | Status: SHIPPED | OUTPATIENT
Start: 2022-10-22

## 2022-10-22 RX ORDER — FOLIC ACID 1 MG/1
TABLET ORAL
Qty: 90 TABLET | Refills: 1 | Status: SHIPPED | OUTPATIENT
Start: 2022-10-22

## 2022-10-25 ENCOUNTER — OFFICE VISIT (OUTPATIENT)
Dept: SURGERY | Facility: CLINIC | Age: 67
End: 2022-10-25

## 2022-10-25 VITALS
BODY MASS INDEX: 30.75 KG/M2 | HEIGHT: 73 IN | HEART RATE: 99 BPM | TEMPERATURE: 97 F | SYSTOLIC BLOOD PRESSURE: 116 MMHG | DIASTOLIC BLOOD PRESSURE: 68 MMHG | RESPIRATION RATE: 18 BRPM | OXYGEN SATURATION: 99 % | WEIGHT: 232 LBS

## 2022-10-25 DIAGNOSIS — E11.65 UNCONTROLLED TYPE 2 DIABETES MELLITUS WITH HYPERGLYCEMIA (HCC): ICD-10-CM

## 2022-10-25 DIAGNOSIS — K46.9 ABDOMINAL HERNIA: Primary | ICD-10-CM

## 2022-10-25 DIAGNOSIS — E11.65 UNCONTROLLED TYPE 2 DIABETES MELLITUS WITH HYPERGLYCEMIA (HCC): Primary | ICD-10-CM

## 2022-10-25 RX ORDER — BLOOD SUGAR DIAGNOSTIC
STRIP MISCELLANEOUS
Qty: 400 EACH | Refills: 3 | Status: SHIPPED | OUTPATIENT
Start: 2022-10-25 | End: 2022-10-25

## 2022-10-25 RX ORDER — LANCETS 33 GAUGE
EACH MISCELLANEOUS
Qty: 400 EACH | Refills: 3 | Status: SHIPPED | OUTPATIENT
Start: 2022-10-25 | End: 2022-10-25

## 2022-10-25 RX ORDER — BLOOD-GLUCOSE METER
KIT MISCELLANEOUS
Qty: 1 KIT | Refills: 0 | Status: SHIPPED | OUTPATIENT
Start: 2022-10-25

## 2022-10-25 RX ORDER — LANCETS 33 GAUGE
EACH MISCELLANEOUS
Qty: 400 EACH | Refills: 3 | Status: SHIPPED | OUTPATIENT
Start: 2022-10-25 | End: 2022-10-26 | Stop reason: SDUPTHER

## 2022-10-25 RX ORDER — BLOOD SUGAR DIAGNOSTIC
STRIP MISCELLANEOUS
Qty: 400 STRIP | Refills: 3 | Status: SHIPPED | OUTPATIENT
Start: 2022-10-25 | End: 2022-10-26 | Stop reason: SDUPTHER

## 2022-10-25 NOTE — PATIENT INSTRUCTIONS
You look to be doing better, but still we need to work on getting your blood sugars under control and getting the lemons catheter out  I talked to Lynn at Dr Lenora Barrientos office, please call his office and work with her to get your test strips, and a continuous glucose monitor so that you can check your blood sugars regularly and then send the logs to Dr Mary Andersen so that he can make adjustments to you insulin dosing  With regard to your constipation, please  Metamucil and MiraLax at the pharmacy, both are over the counter, both are available as generics  Take one rounded tablespoon of Metamucil in 8 oz of chilled water once a day  Do this regardless of whether you are constipated or not  Take MiraLax as needed to relieve constipation  The usual starting dose is one measuring capful in a large glass of water or beverage of choice once a day  However, you can titrate this to effect, meaning you can take as much as you need until your bowels move  Please touch base with the urologist so you have a time frame regarding the urodynamic testing that they want to do prior to removing the lemons catheter  Once you get the lemons out (or know that it has to stay in and we have to go forward with surgery regardless of the infection risk) then call our office, schedule an appointment, and we will see you back to schedule your hernia surgery  Last thing, if your hernia become incarcerated, they you need to get immediate attention, in that case go to the nearest emergency room, and you may need an emergency operation            I

## 2022-10-25 NOTE — PROGRESS NOTES
Assessment/Plan:    Ventral Hernia  The patient is ventral hernia is unchanged in appearance  He continues to be tender, and this is exacerbated by the patient's complaints of constipation  The patient continues to have a Lane catheter in place, although urodynamic studies are planned, prior to a voiding trial is supervised by the Dr Monia Sacks from Urology  The patient is also being followed by Endocrinology, and it would appear that his sugars are under better control  I have asked the patient to follow-up after the Lane catheters removed, and I will schedule a patient ventral hernia repair  I have also discussed the slim possibility of developing bowel obstruction, in which case he should proceed immediately to the nearest emergency room  Diagnoses and all orders for this visit:    Abdominal hernia          Subjective:      Patient ID: Jacky Ramos  is a 75 y o  male  The patient presents in follow-up for a tender umbilical hernia  Patient is convalescing from a prolonged illness precipitated by uncontrolled diabetes  He continues with a Lane catheter, and has urodynamics scheduled in [facility] prior to planning a voiding trial   Patient is also been seen by his primary care physician, and AA and endocrinologist, and his diabetes appears to be under better control  Although the hernia is tender, he has not had any symptoms to suggest obstruction, and the hernia is reducible  We discussed the follow-up necessary to document improvement in his glycemic control, and the plan with urology to proceed with a voiding trial if appropriate  I also discussed with him a reasonable bowel regime to prevent constipation, and straining at stool which was also adding to his misery regarding his symptomatic ventral hernia  I will see him back after he follows up with urology    I would hold off on an elective hernia repair until the Lane catheter he is out, or until it is clear that he will need antibiotic coverage continuously if the Lane catheter cannot be removed  The following portions of the patient's history were reviewed and updated as appropriate: allergies, current medications, past family history, past medical history, past social history, past surgical history and problem list     Review of Systems   Constitutional: Positive for fatigue  Negative for chills and fever  Respiratory: Negative for cough and shortness of breath  Gastrointestinal: Positive for constipation  Genitourinary: Positive for difficulty urinating  Musculoskeletal: Positive for arthralgias  Skin: Negative for color change  Neurological: Positive for weakness  Psychiatric/Behavioral: The patient is not nervous/anxious  Objective:      /68 (BP Location: Left arm, Patient Position: Sitting, Cuff Size: Large)   Pulse 99   Temp (!) 97 °F (36 1 °C)   Resp 18   Ht 6' 1" (1 854 m)   Wt 105 kg (232 lb)   SpO2 99%   BMI 30 61 kg/m²          Physical Exam  Constitutional:       General: He is not in acute distress  Appearance: He is ill-appearing  He is not toxic-appearing  Eyes:      General: No scleral icterus  Pupils: Pupils are equal, round, and reactive to light  Cardiovascular:      Rate and Rhythm: Normal rate  Heart sounds: Normal heart sounds  Pulmonary:      Effort: Pulmonary effort is normal       Breath sounds: Normal breath sounds  Abdominal:      General: Abdomen is flat  Palpations: Abdomen is soft  There is no mass  Hernia: A hernia is present  Genitourinary:     Comments: Indwelling Lane with clear urine  Musculoskeletal:         General: No swelling  Skin:     General: Skin is warm and dry  Neurological:      Mental Status: He is oriented to person, place, and time     Psychiatric:         Mood and Affect: Mood normal          Behavior: Behavior normal

## 2022-10-25 NOTE — ASSESSMENT & PLAN NOTE
The patient is ventral hernia is unchanged in appearance  He continues to be tender, and this is exacerbated by the patient's complaints of constipation  The patient continues to have a Lane catheter in place, although urodynamic studies are planned, prior to a voiding trial is supervised by the Dr Moni Bermudez from Urology  The patient is also being followed by Endocrinology, and it would appear that his sugars are under better control  I have asked the patient to follow-up after the Lane catheters removed, and I will schedule a patient ventral hernia repair  I have also discussed the slim possibility of developing bowel obstruction, in which case he should proceed immediately to the nearest emergency room

## 2022-10-26 ENCOUNTER — OFFICE VISIT (OUTPATIENT)
Dept: PODIATRY | Facility: CLINIC | Age: 67
End: 2022-10-26
Payer: MEDICARE

## 2022-10-26 VITALS
OXYGEN SATURATION: 97 % | HEIGHT: 73 IN | DIASTOLIC BLOOD PRESSURE: 80 MMHG | HEART RATE: 91 BPM | WEIGHT: 232 LBS | SYSTOLIC BLOOD PRESSURE: 122 MMHG | BODY MASS INDEX: 30.75 KG/M2

## 2022-10-26 DIAGNOSIS — L85.3 XEROSIS OF SKIN: Primary | ICD-10-CM

## 2022-10-26 DIAGNOSIS — E11.65 UNCONTROLLED TYPE 2 DIABETES MELLITUS WITH HYPERGLYCEMIA (HCC): ICD-10-CM

## 2022-10-26 DIAGNOSIS — E11.49 OTHER DIABETIC NEUROLOGICAL COMPLICATION ASSOCIATED WITH TYPE 2 DIABETES MELLITUS (HCC): ICD-10-CM

## 2022-10-26 PROCEDURE — 99203 OFFICE O/P NEW LOW 30 MIN: CPT | Performed by: STUDENT IN AN ORGANIZED HEALTH CARE EDUCATION/TRAINING PROGRAM

## 2022-10-26 RX ORDER — AMMONIUM LACTATE 12 G/100G
CREAM TOPICAL AS NEEDED
Qty: 385 G | Refills: 1 | Status: SHIPPED | OUTPATIENT
Start: 2022-10-26

## 2022-10-26 RX ORDER — LANCETS 33 GAUGE
EACH MISCELLANEOUS
Qty: 400 EACH | Refills: 3 | Status: SHIPPED | OUTPATIENT
Start: 2022-10-26

## 2022-10-26 RX ORDER — BLOOD SUGAR DIAGNOSTIC
STRIP MISCELLANEOUS
Qty: 400 STRIP | Refills: 3 | Status: SHIPPED | OUTPATIENT
Start: 2022-10-26

## 2022-10-27 NOTE — PROGRESS NOTES
Assessment/Plan:    No problem-specific Assessment & Plan notes found for this encounter  Diagnoses and all orders for this visit:    Xerosis of skin  -     ammonium lactate (LAC-HYDRIN) 12 % cream; Apply topically as needed for dry skin    Other diabetic neurological complication associated with type 2 diabetes mellitus ([facility] )  -     Ambulatory referral to Podiatry  -     ammonium lactate (LAC-HYDRIN) 12 % cream; Apply topically as needed for dry skin      Plan:    1  A thorough neurovascular exam was performed  Patient presents for at-risk foot care  Patient has no acute concerns today  Patient has significant lower extremity risk due to neuropathy, parasthesia, edema, and trophic skin changes to the lower extremity  Patient has Q9  findings and is recommended for at risk foot care every 3 months  2  Patient was educated on importance of glycemic control, daily foot assessment and proper shoe gear  Educational materials were provided  Patient will follow up annually for diabetic foot risk assessment  3  Call if any questions or occurrence of any foot and ankle related complications     4  Return in 10-12 weeks  Lab Results   Component Value Date    HGBA1C >14 0 (H) 09/05/2022         Subjective:      Patient ID: Jacky Ramos  is a 75 y o  male  54-year-old male presents with wife for evaluation of diabetic foot risk assessment  Patient reports he was referred by his endocrinologist as well as his PCP to establish care with podiatrist and routine foot care  Patient reports he used to see a foot doctor in [place] and now that he has moved here his interested in establishing care near home  Past medical history as below  No other complaints at this time  Denies nausea vomiting fever chills shortness of breath        The following portions of the patient's history were reviewed and updated as appropriate:   He  has a past medical history of A-fib ([facility] ), Acute metabolic encephalopathy (09/04/2022), CARLTON (acute kidney injury) ([place] [address] ) (09/04/2022), Diabetes mellitus ([place] [address] ), Pancreatitis (09/04/2022), and Sepsis ([place] [address] ) (09/04/2022)  He   Patient Active Problem List    Diagnosis Date Noted   • Vitamin D deficiency 10/17/2022   • Dyslipidemia 10/17/2022   • Uncontrolled type 2 diabetes mellitus with hyperglycemia ([place] [address] ) 09/29/2022   • Hydroureteronephrosis 09/27/2022   • Ventral Hernia 09/22/2022   • Polyuria 09/15/2022   • Depressed mood 09/11/2022   • Pain in both lower extremities 09/11/2022   • Fungal infection of skin 09/09/2022   • Urinary retention 09/07/2022   • DKA (diabetic ketoacidosis) ([place] [address] ) 09/04/2022   • New onset atrial fibrillation ([place] [address] ) 09/04/2022   • Urinary frequency 09/02/2022   • Type 2 diabetes mellitus ([place] [address] ) 09/02/2022   • Prostate cancer screening 09/02/2022   • Microscopic hematuria 09/02/2022   • Urge incontinence of urine 09/02/2022   • Nocturnal enuresis 09/02/2022   • Feeling of incomplete bladder emptying 09/02/2022   • Balanitis 09/02/2022     He  has a past surgical history that includes Knee cartilage surgery and Knee surgery (Right)       Review of Systems   Constitutional: Negative for chills  Respiratory: Negative for shortness of breath  Gastrointestinal: Negative for vomiting  Musculoskeletal: Negative for arthralgias  Skin: Negative for color change  Neurological: Negative for dizziness  Psychiatric/Behavioral: Negative for agitation  Objective:      /80 (BP Location: Right arm, Patient Position: Sitting, Cuff Size: Standard)   Pulse 91   Ht 6' 1" (1 854 m)   Wt 105 kg (232 lb)   SpO2 97%   BMI 30 61 kg/m²          Physical Exam  Vitals reviewed  Cardiovascular:      Rate and Rhythm: Normal rate  Pulses: Pulses are weak  Dorsalis pedis pulses are 1+ on the right side and 1+ on the left side  Posterior tibial pulses are 0 on the right side and 1+ on the left side     Musculoskeletal:         General: Tenderness and deformity present  Feet:      Right foot:      Protective Sensation: 10 sites tested  2 sites sensed  Skin integrity: Dry skin present  Toenail Condition: Fungal disease present  Left foot:      Protective Sensation: 10 sites tested  Skin integrity: Dry skin present  Toenail Condition: Fungal disease present  Skin:     General: Skin is warm  Neurological:      General: No focal deficit present  Mental Status: He is alert  Psychiatric:         Mood and Affect: Mood normal        Diabetic Foot Exam    Patient's shoes and socks removed  Right Foot/Ankle   Right Foot Inspection  Skin Exam: dry skin  Toe Exam: swelling  Sensory   Vibration: diminished  Proprioception: diminished  Monofilament testing: diminished    Vascular  The right DP pulse is 1+  The right PT pulse is 0  Left Foot/Ankle  Left Foot Inspection  Skin Exam: dry skin  Toe Exam: swelling  Sensory   Vibration: diminished  Proprioception: diminished  Monofilament testing: diminished    Vascular  The left DP pulse is 1+  The left PT pulse is 1+       Assign Risk Category  Deformity present  Loss of protective sensation  Weak pulses  Risk: 2

## 2022-10-28 ENCOUNTER — TELEPHONE (OUTPATIENT)
Dept: ENDOCRINOLOGY | Facility: CLINIC | Age: 67
End: 2022-10-28

## 2022-10-28 DIAGNOSIS — E11.10 DKA (DIABETIC KETOACIDOSIS) (HCC): ICD-10-CM

## 2022-10-28 RX ORDER — INSULIN GLARGINE 300 U/ML
40 INJECTION, SOLUTION SUBCUTANEOUS EVERY MORNING
Qty: 13.5 ML | Refills: 0 | Status: SHIPPED | OUTPATIENT
Start: 2022-10-28 | End: 2022-11-04 | Stop reason: SDUPTHER

## 2022-10-31 DIAGNOSIS — Z12.11 ENCOUNTER FOR SCREENING COLONOSCOPY: Primary | ICD-10-CM

## 2022-10-31 DIAGNOSIS — E11.65 UNCONTROLLED TYPE 2 DIABETES MELLITUS WITH HYPERGLYCEMIA (HCC): Primary | ICD-10-CM

## 2022-11-01 PROBLEM — Z12.5 PROSTATE CANCER SCREENING: Status: RESOLVED | Noted: 2022-09-02 | Resolved: 2022-11-01

## 2022-11-02 ENCOUNTER — OFFICE VISIT (OUTPATIENT)
Dept: FAMILY MEDICINE CLINIC | Facility: CLINIC | Age: 67
End: 2022-11-02

## 2022-11-02 ENCOUNTER — TELEPHONE (OUTPATIENT)
Dept: ENDOCRINOLOGY | Facility: CLINIC | Age: 67
End: 2022-11-02

## 2022-11-02 VITALS
WEIGHT: 220.6 LBS | SYSTOLIC BLOOD PRESSURE: 124 MMHG | BODY MASS INDEX: 29.24 KG/M2 | HEIGHT: 73 IN | DIASTOLIC BLOOD PRESSURE: 84 MMHG | HEART RATE: 90 BPM | TEMPERATURE: 97.6 F | OXYGEN SATURATION: 97 % | RESPIRATION RATE: 20 BRPM

## 2022-11-02 DIAGNOSIS — E11.69 TYPE 2 DIABETES MELLITUS WITH OTHER SPECIFIED COMPLICATION, WITH LONG-TERM CURRENT USE OF INSULIN (HCC): ICD-10-CM

## 2022-11-02 DIAGNOSIS — Z00.00 MEDICARE ANNUAL WELLNESS VISIT, SUBSEQUENT: ICD-10-CM

## 2022-11-02 DIAGNOSIS — R33.9 URINARY RETENTION: ICD-10-CM

## 2022-11-02 DIAGNOSIS — Z12.11 SCREENING FOR COLON CANCER: ICD-10-CM

## 2022-11-02 DIAGNOSIS — Z01.818 PRE-OP TESTING: ICD-10-CM

## 2022-11-02 DIAGNOSIS — Z23 ENCOUNTER FOR IMMUNIZATION: Primary | ICD-10-CM

## 2022-11-02 DIAGNOSIS — Z01.818 PRE-OP EXAM: ICD-10-CM

## 2022-11-02 DIAGNOSIS — E11.65 UNCONTROLLED TYPE 2 DIABETES MELLITUS WITH HYPERGLYCEMIA (HCC): ICD-10-CM

## 2022-11-02 DIAGNOSIS — Z79.4 TYPE 2 DIABETES MELLITUS WITH OTHER SPECIFIED COMPLICATION, WITH LONG-TERM CURRENT USE OF INSULIN (HCC): ICD-10-CM

## 2022-11-02 DIAGNOSIS — I48.91 NEW ONSET ATRIAL FIBRILLATION (HCC): ICD-10-CM

## 2022-11-02 DIAGNOSIS — N39.44 NOCTURNAL ENURESIS: ICD-10-CM

## 2022-11-02 NOTE — PROGRESS NOTES
Assessment and Plan:     Problem List Items Addressed This Visit        Endocrine    Type 2 diabetes mellitus ([facility] )    Relevant Orders    POCT hemoglobin A1c    CBC and differential    Comprehensive metabolic panel    Hemoglobin A1C    Lipid panel    TSH, 3rd generation with Free T4 reflex    Microalbumin / creatinine urine ratio    Uncontrolled type 2 diabetes mellitus with hyperglycemia ([facility] )       Lab Results   Component Value Date    HGBA1C >14 0 (H) 09/05/2022   Both long-acting Toujeo and short-acting insulin Humalog are being used now as he has had uncontrolled diabetes with A1c greater than 14 back in September  This will need to be repeated prior to his orthopedic surgery            Cardiovascular and Mediastinum    New onset atrial fibrillation ([facility] )     Stable with controlled ventricular response continue to monitor and follow up with Cardiology            Genitourinary    Urinary retention     Continue with Flomax follow-up urology            Other    Nocturnal enuresis     Improved with tamsulosin Flomax follow-up with Urology as needed         Medicare annual wellness visit, subsequent      Other Visit Diagnoses     Encounter for immunization    -  Primary    Relevant Orders    influenza vaccine, high-dose, PF 0 7 mL (FLUZONE HIGH-DOSE) (Completed)    Pre-op exam        Pre-op testing        Screening for colon cancer        Relevant Orders    Ambulatory referral for colonoscopy           Preventive health issues were discussed with patient, and age appropriate screening tests were ordered as noted in patient's After Visit Summary  Personalized health advice and appropriate referrals for health education or preventive services given if needed, as noted in patient's After Visit Summary       History of Present Illness:     Patient presents for a Medicare Wellness Visit    Patient presents today for follow-up evaluation Medicare wellness visit and discussion regarding his diabetes which has been uncontrolled  He has upcoming orthopedic surgery pending control of diabetes     Patient Care Team:  Zaki Garza DO as PCP - General (Family Medicine)  Pricilla Arguello as Nurse Practitioner (Urology)  Savannah Le RD (Nutrition)  Hemanth Bonilla PA-C as Physician Assistant (Nephrology)  Anoop Mc MD (Endocrinology)     Review of Systems:     Review of Systems   Constitutional: Negative for chills, fatigue and fever  HENT: Negative for congestion, nosebleeds, rhinorrhea, sinus pressure and sore throat  Eyes: Negative for discharge and redness  Respiratory: Negative for cough and shortness of breath  Cardiovascular: Negative for chest pain, palpitations and leg swelling  Gastrointestinal: Negative for abdominal pain, blood in stool and nausea  Endocrine: Negative for cold intolerance, heat intolerance and polyuria  Genitourinary: Negative for dysuria and frequency  Musculoskeletal: Positive for arthralgias  Negative for back pain and myalgias  Skin: Negative for rash  Neurological: Negative for dizziness, weakness and headaches  Hematological: Negative for adenopathy  Psychiatric/Behavioral: Negative for behavioral problems and sleep disturbance  The patient is not nervous/anxious           Problem List:     Patient Active Problem List   Diagnosis   • Urinary frequency   • Type 2 diabetes mellitus (HCC)   • Microscopic hematuria   • Urge incontinence of urine   • Nocturnal enuresis   • Feeling of incomplete bladder emptying   • Balanitis   • DKA (diabetic ketoacidosis) ([facility] [address] )   • New onset atrial fibrillation (HCC)   • Urinary retention   • Fungal infection of skin   • Depressed mood   • Pain in both lower extremities   • Polyuria   • Ventral Hernia   • Hydroureteronephrosis   • Uncontrolled type 2 diabetes mellitus with hyperglycemia ([facility] )   • Vitamin D deficiency   • Dyslipidemia   • Medicare annual wellness visit, subsequent      Past Medical and Surgical History: Past Medical History:   Diagnosis Date   • A-fib [facility])    • Acute metabolic encephalopathy 36/40/5050   • CARLTON (acute kidney injury) ([facility] ) 09/04/2022   • Diabetes mellitus ([facility] )    • Pancreatitis 09/04/2022   • Sepsis ([facility] ) 09/04/2022     Past Surgical History:   Procedure Laterality Date   • KNEE CARTILAGE SURGERY     • KNEE SURGERY Right       Family History:     Family History   Problem Relation Age of Onset   • Diabetes Father    • Diabetes Mother       Social History:     Social History     Socioeconomic History   • Marital status: /Civil Union     Spouse name: None   • Number of children: None   • Years of education: None   • Highest education level: None   Occupational History   • None   Tobacco Use   • Smoking status: Never Smoker   • Smokeless tobacco: Never Used   Vaping Use   • Vaping Use: Never used   Substance and Sexual Activity   • Alcohol use: Never   • Drug use: Never   • Sexual activity: None   Other Topics Concern   • None   Social History Narrative   • None     Social Determinants of Health     Financial Resource Strain: Low Risk    • Difficulty of Paying Living Expenses: Not hard at all   Food Insecurity: No Food Insecurity   • Worried About Running Out of Food in the Last Year: Never true   • Ran Out of Food in the Last Year: Never true   Transportation Needs: No Transportation Needs   • Lack of Transportation (Medical): No   • Lack of Transportation (Non-Medical):  No   Physical Activity: Not on file   Stress: Not on file   Social Connections: Not on file   Intimate Partner Violence: Not on file   Housing Stability: Low Risk    • Unable to Pay for Housing in the Last Year: No   • Number of Places Lived in the Last Year: 1   • Unstable Housing in the Last Year: No      Medications and Allergies:     Current Outpatient Medications   Medication Sig Dispense Refill   • ammonium lactate (LAC-HYDRIN) 12 % cream Apply topically as needed for dry skin 385 g 1   • [START ON 11/14/2022] ascorbic acid (VITAMIN C) 500 MG tablet Take 1 tablet (500 mg total) by mouth 2 (two) times a day 60 tablet 1   • Blood Glucose Monitoring Suppl (OneTouch Verio Reflect) w/Device KIT Check blood sugars four times daily  Please substitute with appropriate alternative as covered by patient's insurance   Dx: E11 65 1 kit 0   • docusate sodium (COLACE) 100 mg capsule Take 1 capsule (100 mg total) by mouth 2 (two) times a day 60 capsule 1   • ferrous sulfate 324 (65 Fe) mg TAKE 1 TABLET BY MOUTH 2 TIMES A DAY BEFORE MEALS 623 tablet 1   • folic acid (FOLVITE) 1 mg tablet TAKE 1 TABLET BY MOUTH EVERY DAY 90 tablet 1   • gabapentin (NEURONTIN) 100 mg capsule Take 1 capsule (100 mg total) by mouth 3 (three) times a day 90 capsule 0   • glucose blood (OneTouch Verio) test strip CHECK BLOOD SUGARS THREE TIMES DAILY DX: E11 65 400 strip 3   • Incontinence Supply Disposable (Incontinence Brief Large) MISC Use every 4 (four) hours as needed (Urinary incontinence) 36 each 12   • insulin glargine (Toujeo SoloStar) 300 units/mL CONCENTRATED U-300 injection pen (1-unit dial) Inject 40 Units under the skin every morning 13 5 mL 0   • insulin lispro (HumaLOG KwikPen) 100 units/mL injection pen Inject 10 Units under the skin 3 (three) times a day with meals 15 mL 0   • insulin lispro (HumaLOG) 100 units/mL injection Inject 10 Units under the skin 3 (three) times a day with meals (Patient taking differently: Inject 8 Units under the skin 3 (three) times a day with meals) 20 mL 0   • Lancets (OneTouch Delica Plus [id number]) MISC CHECK BLOOD SUGARS THREE TIMES DAILY DX: E11 65 400 each 3   • Multiple Vitamin (Daily-Octavio Multivitamin) TABS Take 1 tablet by mouth daily     • [START ON 11/14/2022] Multiple Vitamins-Minerals (multivitamin with minerals) tablet Take 1 tablet by mouth daily 30 tablet 1   • pantoprazole (PROTONIX) 40 mg tablet Take 1 tablet (40 mg total) by mouth daily in the early morning 30 tablet 0   • tamsulosin (FLOMAX) 0 4 mg Take 1 capsule (0 4 mg total) by mouth daily with dinner 30 capsule 12   • glucose 4 g chewable tablet Chew 4 tablets (16 g total) once for 1 dose 4 tablet 0     No current facility-administered medications for this visit  No Known Allergies   Immunizations:     Immunization History   Administered Date(s) Administered   • Influenza, high dose seasonal 0 7 mL 11/02/2022      Health Maintenance:         Topic Date Due   • Hepatitis C Screening  Never done   • Colorectal Cancer Screening  Never done         Topic Date Due   • COVID-19 Vaccine (1) Never done   • Pneumococcal Vaccine: 65+ Years (1 - PCV) Never done      Medicare Screening Tests and Risk Assessments:     Oracio is here for his Initial Wellness visit  Health Risk Assessment:   Patient rates overall health as fair  Patient feels that their physical health rating is slightly better  Patient is satisfied with their life  Eyesight was rated as same  Hearing was rated as same  Patient feels that their emotional and mental health rating is slightly better  Patients states they are sometimes angry  Patient states they are sometimes unusually tired/fatigued  Pain experienced in the last 7 days has been some  Patient's pain rating has been 9/10  Patient states that he has experienced weight loss or gain in last 6 months  Fall Risk Screening: In the past year, patient has experienced: history of falling in past year    Number of falls: 2 or more  Injured during fall?: Yes    Feels unsteady when standing or walking?: Yes    Worried about falling?: Yes      Home Safety:  Patient has trouble with stairs inside or outside of their home  Patient has working smoke alarms and has working carbon monoxide detector  Home safety hazards include: none  Nutrition:   Current diet is Diabetic  Medications:   Patient is not currently taking any over-the-counter supplements  Patient is able to manage medications       Activities of Daily Living (ADLs)/Instrumental Activities of Daily Living (IADLs):   Walk and transfer into and out of bed and chair?: Yes  Dress and groom yourself?: Yes    Bathe or shower yourself?: Yes    Feed yourself? Yes  Do your laundry/housekeeping?: Yes  Manage your money, pay your bills and track your expenses?: Yes  Make your own meals?: Yes    Do your own shopping?: Yes    Previous Hospitalizations:   Any hospitalizations or ED visits within the last 12 months?: Yes    How many hospitalizations have you had in the last year?: 1-2    Advance Care Planning:   Living will: No    Durable POA for healthcare: No    Advanced directive: No      PREVENTIVE SCREENINGS      Cardiovascular Screening:    General: Screening Current      Diabetes Screening:     General: Screening Not Indicated and History Diabetes      Abdominal Aortic Aneurysm (AAA) Screening:    Risk factors include: age between 73-67 yo        Lung Cancer Screening:     General: Screening Not Indicated    Screening, Brief Intervention, and Referral to Treatment (SBIRT)    Screening  Typical number of drinks in a day: 0  Typical number of drinks in a week: 0  Interpretation: Low risk drinking behavior      AUDIT-C Screenin) How often did you have a drink containing alcohol in the past year? never  2) How many drinks did you have on a typical day when you were drinking in the past year? 0  3) How often did you have 6 or more drinks on one occasion in the past year? never    AUDIT-C Score: 0  Interpretation: Score 0-3 (male): Negative screen for alcohol misuse    Single Item Drug Screening:  How often have you used an illegal drug (including marijuana) or a prescription medication for non-medical reasons in the past year? never    Single Item Drug Screen Score: 0  Interpretation: Negative screen for possible drug use disorder    No exam data present     Physical Exam:     /84 (BP Location: Right arm, Patient Position: Sitting)   Pulse 90   Temp 97 6 °F (36 4 °C) Resp 20   Ht 6' 1" (1 854 m)   Wt 100 kg (220 lb 9 6 oz)   SpO2 97%   BMI 29 10 kg/m²     Physical Exam  Vitals and nursing note reviewed  Constitutional:       Appearance: Normal appearance  He is well-developed  HENT:      Head: Normocephalic and atraumatic  Right Ear: Tympanic membrane, ear canal and external ear normal       Left Ear: Tympanic membrane, ear canal and external ear normal       Nose: Nose normal       Mouth/Throat:      Mouth: Mucous membranes are moist       Pharynx: Oropharynx is clear  Eyes:      Conjunctiva/sclera: Conjunctivae normal       Pupils: Pupils are equal, round, and reactive to light  Cardiovascular:      Rate and Rhythm: Normal rate and regular rhythm  Pulses: Normal pulses  Heart sounds: Normal heart sounds  No murmur heard  Pulmonary:      Effort: Pulmonary effort is normal  No respiratory distress  Breath sounds: Normal breath sounds  Abdominal:      General: Bowel sounds are normal       Palpations: Abdomen is soft  Tenderness: There is no abdominal tenderness  Musculoskeletal:         General: Normal range of motion  Cervical back: Normal range of motion and neck supple  Skin:     General: Skin is warm and dry  Capillary Refill: Capillary refill takes less than 2 seconds  Neurological:      General: No focal deficit present  Mental Status: He is alert and oriented to person, place, and time  Mental status is at baseline  Psychiatric:         Mood and Affect: Mood normal          Behavior: Behavior normal          Thought Content:  Thought content normal          Judgment: Judgment normal           VASILE SOMMER DO

## 2022-11-02 NOTE — ASSESSMENT & PLAN NOTE
Lab Results   Component Value Date    HGBA1C >14 0 (H) 09/05/2022   Both long-acting Toujeo and short-acting insulin Humalog are being used now as he has had uncontrolled diabetes with A1c greater than 14 back in September    This will need to be repeated prior to his orthopedic surgery

## 2022-11-03 ENCOUNTER — OFFICE VISIT (OUTPATIENT)
Dept: CARDIOLOGY CLINIC | Facility: CLINIC | Age: 67
End: 2022-11-03

## 2022-11-03 ENCOUNTER — CLINICAL SUPPORT (OUTPATIENT)
Dept: CARDIOLOGY CLINIC | Facility: CLINIC | Age: 67
End: 2022-11-03

## 2022-11-03 VITALS
WEIGHT: 223 LBS | SYSTOLIC BLOOD PRESSURE: 118 MMHG | HEIGHT: 73 IN | DIASTOLIC BLOOD PRESSURE: 82 MMHG | BODY MASS INDEX: 29.55 KG/M2 | HEART RATE: 90 BPM

## 2022-11-03 DIAGNOSIS — R06.83 SNORES: ICD-10-CM

## 2022-11-03 DIAGNOSIS — Z86.711 HISTORY OF PULMONARY EMBOLUS (PE): ICD-10-CM

## 2022-11-03 DIAGNOSIS — I48.0 PAF (PAROXYSMAL ATRIAL FIBRILLATION) (HCC): Primary | ICD-10-CM

## 2022-11-03 DIAGNOSIS — E66.3 OVERWEIGHT (BMI 25.0-29.9): ICD-10-CM

## 2022-11-03 DIAGNOSIS — I48.91 NEW ONSET ATRIAL FIBRILLATION (HCC): ICD-10-CM

## 2022-11-03 DIAGNOSIS — I48.0 PAF (PAROXYSMAL ATRIAL FIBRILLATION) (HCC): ICD-10-CM

## 2022-11-03 NOTE — PROGRESS NOTES
Cardiology Consultation     Oracio Sharp   [id number]  1955  [facility] [address] [address]      1  PAF (paroxysmal atrial fibrillation) ([facility] [address] )     2  New onset atrial fibrillation [facility])  Ambulatory referral to Cardiology    POCT ECG   3  Snores     4  Overweight (BMI 25 0-29 9)     5  History of pulmonary embolus (PE)         Discussion/Summary:  1  Paroxysmal atrial fibrillation  2  History of pulmonary embolism times to thought possibly provoked due to obesity and sedentary lifestyle however was not completely treated with oral anticoagulation per patient account  3  Overweight  4  Snoring  5   Perioperative risk evaluation    -EKG performed in the office today shows sinus rhythm heart rate 90 beats per minute with left anterior fascicular block and incomplete right bundle-branch block  -transthoracic echocardiogram 09/07/2022 technically difficult study with use of definity contrast showing left ventricular systolic function normal estimated LVEF 60% with normal right ventricular systolic function  -counseled patient on dietary and lifestyle modifications including following a low-salt, low-fat, heart healthy diet with weight loss  -patient notes he can easily walk up 2 flights of stairs without any chest pain or shortness of breath and states the only thing that limits his physical activity is his significant right knee pain also notes he could walk 4 city blocks on flat surface   -after discussion with patient he understands the qualifies for oral anticoagulation but wishes to hold off initiation understanding risk of stroke until after surgical procedures tentatively scheduled for December of 2022    -will check 2 week ZIO patch monitor to evaluate for overall burden of atrial fibrillation  -will have patient seen and evaluated by Hematology for possible workup to make sure that history of PE times to was more provoked in the setting of sedentary lifestyle and obesity instead of hypercoagulable state  -will give patient referral for Sleep Medicine for evaluation of possible obstructive sleep apnea  -will see patient in 1 month or sooner if necessary prior to planned surgical procedures and if stable without significant issue at that time will perform perioperative risk stratification at that visit  -patient counseled if he were to have any warning or alarm type symptoms he is to seek emergency medical care immediately  History of Present Illness:  - patient is a 65-year-old male with type 2 diabetes mellitus who was hospitalized in early September at [facility] with altered mental status, nausea vomiting and generalized weakness found at that time to be in diabetic ketoacidosis with severe anion gap metabolic acidosis and elevated lactate levels with acute pancreatitis and acute kidney injury along with obstructive uropathy and nonobstructing 8 mm right renal calculus who was also found during that time to have new onset paroxysmal atrial fibrillation that converted spontaneously  He was treated with antibiotic therapy during hospitalization and went to rehabilitation post hospitalization and presents to the office today for evaluation and perioperative risk stratification prior to potential right knee replacement and abdominal hernia repair  -patient also notes while living in [place] approximately 10 years ago he also believes he had a DVT/PE treated with 2 weeks anticoagulant therapy to his knowledge  He states that at the time of this he was very sedentary due to being significantly obese   -during hospitalization atrial fibrillation was thought to be secondary to significant metabolic derangement and patient was not seen by Cardiology and was not initiated on oral anticoagulation as very short lived event  He was discharged from hospital with Lane catheter due to urinary retention and denies any active chest pain, palpitations, lightheadedness or dizziness, loss of consciousness or shortness of breath  He does have significant discomfort with ambulation in right knee due to chronic arthritis in that area and is being followed by Orthopedic surgery for this as well   -patient denies any tobacco, alcohol, or illicit drug use   -patient notes only significant family history is for diabetes in both his father and his mother        Patient Active Problem List   Diagnosis   • Urinary frequency   • Type 2 diabetes mellitus ([facility])   • Microscopic hematuria   • Urge incontinence of urine   • Nocturnal enuresis   • Feeling of incomplete bladder emptying   • Balanitis   • DKA (diabetic ketoacidosis) ([facility])   • New onset atrial fibrillation ([facility])   • Urinary retention   • Fungal infection of skin   • Depressed mood   • Pain in both lower extremities   • Polyuria   • Ventral Hernia   • Hydroureteronephrosis   • Uncontrolled type 2 diabetes mellitus with hyperglycemia ([facility])   • Vitamin D deficiency   • Dyslipidemia   • Medicare annual wellness visit, subsequent     Past Medical History:   Diagnosis Date   • A-fib ([place] [address] )    • Acute metabolic encephalopathy 73/07/1475   • CARLTON (acute kidney injury) ([facility] ) 09/04/2022   • Diabetes mellitus ([place] [address] )    • Pancreatitis 09/04/2022   • Sepsis ([facility] ) 09/04/2022     Social History     Socioeconomic History   • Marital status: /Civil Union     Spouse name: Not on file   • Number of children: Not on file   • Years of education: Not on file   • Highest education level: Not on file   Occupational History   • Not on file   Tobacco Use   • Smoking status: Never Smoker   • Smokeless tobacco: Never Used   Vaping Use   • Vaping Use: Never used   Substance and Sexual Activity   • Alcohol use: Never   • Drug use: Never   • Sexual activity: Not on file   Other Topics Concern   • Not on file   Social History Narrative   • Not on file     Social Determinants of Health     Financial Resource Strain: Low Risk    • Difficulty of Paying Living Expenses: Not hard at all   Food Insecurity: No Food Insecurity   • Worried About [address] in the Last Year: Never true   • Ran Out of Food in the Last Year: Never true   Transportation Needs: No Transportation Needs   • Lack of Transportation (Medical): No   • Lack of Transportation (Non-Medical): No   Physical Activity: Not on file   Stress: Not on file   Social Connections: Not on file   Intimate Partner Violence: Not on file   Housing Stability: Low Risk    • Unable to Pay for Housing in the Last Year: No   • Number of Places Lived in the Last Year: 1   • Unstable Housing in the Last Year: No      Family History   Problem Relation Age of Onset   • Diabetes Father    • Diabetes Mother      Past Surgical History:   Procedure Laterality Date   • KNEE CARTILAGE SURGERY     • KNEE SURGERY Right        Current Outpatient Medications:   •  ammonium lactate (LAC-HYDRIN) 12 % cream, Apply topically as needed for dry skin, Disp: 385 g, Rfl: 1  •  [START ON 11/14/2022] ascorbic acid (VITAMIN C) 500 MG tablet, Take 1 tablet (500 mg total) by mouth 2 (two) times a day, Disp: 60 tablet, Rfl: 1  •  Blood Glucose Monitoring Suppl (OneTouch Verio Reflect) w/Device KIT, Check blood sugars four times daily  Please substitute with appropriate alternative as covered by patient's insurance   Dx: E11 65, Disp: 1 kit, Rfl: 0  •  docusate sodium (COLACE) 100 mg capsule, Take 1 capsule (100 mg total) by mouth 2 (two) times a day, Disp: 60 capsule, Rfl: 1  •  ferrous sulfate 324 (65 Fe) mg, TAKE 1 TABLET BY MOUTH 2 TIMES A DAY BEFORE MEALS, Disp: 180 tablet, Rfl: 1  •  folic acid (FOLVITE) 1 mg tablet, TAKE 1 TABLET BY MOUTH EVERY DAY, Disp: 90 tablet, Rfl: 1  •  gabapentin (NEURONTIN) 100 mg capsule, Take 1 capsule (100 mg total) by mouth 3 (three) times a day, Disp: 90 capsule, Rfl: 0  • glucose 4 g chewable tablet, Chew 4 tablets (16 g total) once for 1 dose, Disp: 4 tablet, Rfl: 0  •  glucose blood (OneTouch Verio) test strip, CHECK BLOOD SUGARS THREE TIMES DAILY DX: E11 65, Disp: 400 strip, Rfl: 3  •  Incontinence Supply Disposable (Incontinence Brief Large) MISC, Use every 4 (four) hours as needed (Urinary incontinence), Disp: 36 each, Rfl: 12  •  insulin glargine (Toujeo SoloStar) 300 units/mL CONCENTRATED U-300 injection pen (1-unit dial), Inject 40 Units under the skin every morning, Disp: 13 5 mL, Rfl: 0  •  insulin lispro (HumaLOG KwikPen) 100 units/mL injection pen, Inject 10 Units under the skin 3 (three) times a day with meals, Disp: 15 mL, Rfl: 0  •  insulin lispro (HumaLOG) 100 units/mL injection, Inject 10 Units under the skin 3 (three) times a day with meals (Patient taking differently: Inject 8 Units under the skin 3 (three) times a day with meals), Disp: 20 mL, Rfl: 0  •  Lancets (OneTouch Delica Plus [id number]) MISC, CHECK BLOOD SUGARS THREE TIMES DAILY DX: E11 65, Disp: 400 each, Rfl: 3  •  Multiple Vitamin (Daily-Octavio Multivitamin) TABS, Take 1 tablet by mouth daily, Disp: , Rfl:   •  [START ON 11/14/2022] Multiple Vitamins-Minerals (multivitamin with minerals) tablet, Take 1 tablet by mouth daily, Disp: 30 tablet, Rfl: 1  •  pantoprazole (PROTONIX) 40 mg tablet, Take 1 tablet (40 mg total) by mouth daily in the early morning, Disp: 30 tablet, Rfl: 0  •  tamsulosin (FLOMAX) 0 4 mg, Take 1 capsule (0 4 mg total) by mouth daily with dinner, Disp: 30 capsule, Rfl: 12  No Known Allergies      Labs:  Admission on 09/10/2022, Discharged on 09/20/2022   Component Date Value   • POC Glucose 09/10/2022 277 (A)   • POC Glucose 09/10/2022 109    • Sodium 09/11/2022 138    • Potassium 09/11/2022 3 6    • Chloride 09/11/2022 101    • CO2 09/11/2022 28    • ANION GAP 09/11/2022 9    • BUN 09/11/2022 11    • Creatinine 09/11/2022 0 75    • Glucose 09/11/2022 103    • Glucose, Fasting 09/11/2022 103 (A)   • Calcium 09/11/2022 8 5    • Corrected Calcium 09/11/2022 9 5    • AST 09/11/2022 22    • ALT 09/11/2022 11    • Alkaline Phosphatase 09/11/2022 92    • Total Protein 09/11/2022 5 7 (A)   • Albumin 09/11/2022 2 8 (A)   • Total Bilirubin 09/11/2022 0 55    • eGFR 09/11/2022 94    • WBC 09/11/2022 5 45    • RBC 09/11/2022 3 71 (A)   • Hemoglobin 09/11/2022 10 9 (A)   • Hematocrit 09/11/2022 33 7 (A)   • MCV 09/11/2022 91    • MCH 09/11/2022 29 4    • MCHC 09/11/2022 32 3    • RDW 09/11/2022 13 0    • MPV 09/11/2022 11 2    • Platelets 22/34/7330 169    • nRBC 09/11/2022 0    • Neutrophils Relative 09/11/2022 40 (A)   • Immat GRANS % 09/11/2022 0    • Lymphocytes Relative 09/11/2022 44    • Monocytes Relative 09/11/2022 13 (A)   • Eosinophils Relative 09/11/2022 3    • Basophils Relative 09/11/2022 0    • Neutrophils Absolute 09/11/2022 2 16    • Immature Grans Absolute 09/11/2022 0 02    • Lymphocytes Absolute 09/11/2022 2 37    • Monocytes Absolute 09/11/2022 0 73    • Eosinophils Absolute 09/11/2022 0 16    • Basophils Absolute 09/11/2022 0 01    • POC Glucose 09/11/2022 128    • POC Glucose 09/11/2022 192 (A)   • POC Glucose 09/11/2022 108    • POC Glucose 09/11/2022 140    • POC Glucose 09/12/2022 150 (A)   • POC Glucose 09/12/2022 141 (A)   • POC Glucose 09/12/2022 88    • POC Glucose 09/12/2022 242 (A)   • POC Glucose 09/13/2022 162 (A)   • POC Glucose 09/13/2022 127    • POC Glucose 09/13/2022 134    • POC Glucose 09/13/2022 89    • POC Glucose 09/14/2022 128    • POC Glucose 09/14/2022 118    • WBC 09/14/2022 7 79    • RBC 09/14/2022 3 98    • Hemoglobin 09/14/2022 11 9 (A)   • Hematocrit 09/14/2022 36 8    • MCV 09/14/2022 93    • MCH 09/14/2022 29 9    • MCHC 09/14/2022 32 3    • RDW 09/14/2022 13 0    • MPV 09/14/2022 10 8    • Platelets 61/88/7514 244    • nRBC 09/14/2022 0    • Neutrophils Relative 09/14/2022 47    • Immat GRANS % 09/14/2022 0    • Lymphocytes Relative 09/14/2022 42 • Monocytes Relative 09/14/2022 9    • Eosinophils Relative 09/14/2022 2    • Basophils Relative 09/14/2022 0    • Neutrophils Absolute 09/14/2022 3 58    • Immature Grans Absolute 09/14/2022 0 02    • Lymphocytes Absolute 09/14/2022 3 27    • Monocytes Absolute 09/14/2022 0 73    • Eosinophils Absolute 09/14/2022 0 17    • Basophils Absolute 09/14/2022 0 02    • Sodium 09/14/2022 137    • Potassium 09/14/2022 3 8    • Chloride 09/14/2022 102    • CO2 09/14/2022 21    • ANION GAP 09/14/2022 14 (A)   • BUN 09/14/2022 12    • Creatinine 09/14/2022 1 26    • Glucose 09/14/2022 84    • Calcium 09/14/2022 9 3    • eGFR 09/14/2022 58    • POC Glucose 09/14/2022 77    • POC Glucose 09/14/2022 93    • POC Glucose 09/15/2022 119    • Color, UA 09/15/2022 Yellow    • Clarity, UA 09/15/2022 Clear    • Specific Gravity, UA 09/15/2022 1 015    • pH, UA 09/15/2022 6 0    • Leukocytes, UA 09/15/2022 Negative    • Nitrite, UA 09/15/2022 Negative    • Protein, UA 09/15/2022 Negative    • Glucose, UA 09/15/2022 Negative    • Ketones, UA 09/15/2022 Negative    • Urobilinogen, UA 09/15/2022 0 2    • Bilirubin, UA 09/15/2022 Negative    • Occult Blood, UA 09/15/2022 Negative    • RBC, UA 09/15/2022 0-1 (A)   • WBC, UA 09/15/2022 None Seen    • Epithelial Cells 09/15/2022 Occasional    • Bacteria, UA 09/15/2022 None Seen    • POC Glucose 09/15/2022 93    • Creatinine, Ur 09/15/2022 76 7    • Osmolality, Ur 09/15/2022 320    • Sodium, Ur 09/15/2022 60    • Creatinine, Ur 09/15/2022 76 7    • Protein Urine Random 09/15/2022 14    • Prot/Creat Ratio, Ur 09/15/2022 0 18 (A)   • Osmolality Serum 09/15/2022 297    • POC Glucose 09/15/2022 143 (A)   • POC Glucose 09/15/2022 96    • Sodium 09/16/2022 137    • Potassium 09/16/2022 4 3    • Chloride 09/16/2022 103    • CO2 09/16/2022 26    • ANION GAP 09/16/2022 8    • BUN 09/16/2022 18    • Creatinine 09/16/2022 0 83    • Glucose 09/16/2022 180 (A)   • Glucose, Fasting 09/16/2022 180 (A)   • Calcium 09/16/2022 9 1    • Corrected Calcium 09/16/2022 9 7    • AST 09/16/2022 33    • ALT 09/16/2022 14    • Alkaline Phosphatase 09/16/2022 84    • Total Protein 09/16/2022 6 2 (A)   • Albumin 09/16/2022 3 2 (A)   • Total Bilirubin 09/16/2022 0 44    • eGFR 09/16/2022 90    • WBC 09/16/2022 5 92    • RBC 09/16/2022 3 73 (A)   • Hemoglobin 09/16/2022 11 1 (A)   • Hematocrit 09/16/2022 34 5 (A)   • MCV 09/16/2022 93    • MCH 09/16/2022 29 8    • MCHC 09/16/2022 32 2    • RDW 09/16/2022 13 2    • MPV 09/16/2022 11 0    • Platelets 65/84/1980 231    • nRBC 09/16/2022 0    • Neutrophils Relative 09/16/2022 60    • Immat GRANS % 09/16/2022 0    • Lymphocytes Relative 09/16/2022 29    • Monocytes Relative 09/16/2022 8    • Eosinophils Relative 09/16/2022 3    • Basophils Relative 09/16/2022 0    • Neutrophils Absolute 09/16/2022 3 48    • Immature Grans Absolute 09/16/2022 0 01    • Lymphocytes Absolute 09/16/2022 1 74    • Monocytes Absolute 09/16/2022 0 49    • Eosinophils Absolute 09/16/2022 0 19    • Basophils Absolute 09/16/2022 0 01    • Phosphorus 09/16/2022 4 4 (A)   • Magnesium 09/16/2022 2 1    • POC Glucose 09/16/2022 160 (A)   • POC Glucose 09/16/2022 51 (A)   • POC Glucose 09/16/2022 136    • POC Glucose 09/16/2022 120    • POC Glucose 09/17/2022 125    • POC Glucose 09/17/2022 65    • POC Glucose 09/17/2022 147 (A)   • POC Glucose 09/17/2022 64 (A)   • POC Glucose 09/17/2022 94    • POC Glucose 09/18/2022 151 (A)   • POC Glucose 09/18/2022 155 (A)   • POC Glucose 09/18/2022 70    • POC Glucose 09/18/2022 75    • POC Glucose 09/19/2022 123    • POC Glucose 09/19/2022 118    • POC Glucose 09/19/2022 59 (A)   • POC Glucose 09/19/2022 134    • POC Glucose 09/19/2022 107    • Sodium 09/20/2022 139    • Potassium 09/20/2022 3 9    • Chloride 09/20/2022 104    • CO2 09/20/2022 27    • ANION GAP 09/20/2022 8    • BUN 09/20/2022 18    • Creatinine 09/20/2022 0 93    • Glucose 09/20/2022 130    • Calcium 09/20/2022 9 1    • eGFR 09/20/2022 84    • POC Glucose 09/20/2022 120    • POC Glucose 09/20/2022 93    No results displayed because visit has over 200 results  Imaging: No results found  Review of Systems:  Review of Systems   Constitutional: Negative for chills, diaphoresis, fatigue and fever  HENT: Negative for trouble swallowing and voice change  Eyes: Negative for pain and redness  Respiratory: Negative for shortness of breath and wheezing  Cardiovascular: Negative for chest pain, palpitations and leg swelling  Gastrointestinal: Negative for abdominal pain, constipation, diarrhea, nausea and vomiting  Genitourinary: Positive for difficulty urinating  Musculoskeletal: Positive for arthralgias  Negative for neck pain and neck stiffness  Skin: Negative for rash  Neurological: Negative for dizziness, syncope, light-headedness and headaches  Psychiatric/Behavioral: Negative for agitation and confusion  All other systems reviewed and are negative  Vitals:    11/03/22 1014   BP: 118/82   Pulse: 90   Weight: 101 kg (223 lb)   Height: 6' 1" (1 854 m)     Vitals:    11/03/22 1014   Weight: 101 kg (223 lb)     Height: 6' 1" (185 4 cm)     Physical Exam:  Physical Exam  Vitals reviewed  Constitutional:       General: He is not in acute distress  Appearance: Normal appearance  He is not diaphoretic  HENT:      Head: Normocephalic and atraumatic  Eyes:      General:         Right eye: No discharge  Left eye: No discharge  Neck:      Comments: Trachea midline, neck obese, difficult to assess JVD  Cardiovascular:      Rate and Rhythm: Normal rate and regular rhythm  Heart sounds: No friction rub  Pulmonary:      [place]: No respiratory distress  Breath sounds: No wheezing or rhonchi  Chest:      Chest wall: No tenderness  Abdominal:      General: Bowel sounds are normal       Palpations: Abdomen is soft  Tenderness: There is no abdominal tenderness  There is no rebound  Musculoskeletal:      Right lower leg: No edema  Left lower leg: No edema  Skin:     General: Skin is warm and dry  Neurological:      Mental Status: He is alert  Comments: Awake, alert, able to answer questions appropriately, able to move extremities bilaterally     Psychiatric:         Mood and Affect: Mood normal          Behavior: Behavior normal

## 2022-11-04 RX ORDER — INSULIN GLARGINE 300 U/ML
40 INJECTION, SOLUTION SUBCUTANEOUS EVERY MORNING
Qty: 13.5 ML | Refills: 0 | Status: SHIPPED | OUTPATIENT
Start: 2022-11-04

## 2022-11-04 NOTE — TELEPHONE ENCOUNTER
Pt left msg on voicemail that he is completely out of Touo as he never recieved this shipment       Pt asking for this to be sent to [facility] [place]

## 2022-11-11 ENCOUNTER — PROCEDURE VISIT (OUTPATIENT)
Dept: UROLOGY | Facility: CLINIC | Age: 67
End: 2022-11-11

## 2022-11-11 ENCOUNTER — TELEPHONE (OUTPATIENT)
Dept: UROLOGY | Facility: AMBULATORY SURGERY CENTER | Age: 67
End: 2022-11-11

## 2022-11-11 VITALS
WEIGHT: 225 LBS | HEIGHT: 73 IN | SYSTOLIC BLOOD PRESSURE: 118 MMHG | HEART RATE: 83 BPM | BODY MASS INDEX: 29.82 KG/M2 | DIASTOLIC BLOOD PRESSURE: 74 MMHG

## 2022-11-11 DIAGNOSIS — N32.89 BLADDER SPASMS: Primary | ICD-10-CM

## 2022-11-11 DIAGNOSIS — R33.9 URINARY RETENTION: Primary | ICD-10-CM

## 2022-11-11 RX ORDER — OXYBUTYNIN CHLORIDE 5 MG/1
5 TABLET ORAL 3 TIMES DAILY PRN
Qty: 60 TABLET | Refills: 6 | Status: SHIPPED | OUTPATIENT
Start: 2022-11-11

## 2022-11-11 NOTE — TELEPHONE ENCOUNTER
Advised on AP's note  Patient also had questions on an insulin patch that goes on his arm to read his sugar levels  States pharmacy in [facility] says they never received the order 
Oxybutynin was sent    He should be hydrating and avoiding constipation as well
Patient in for catheter exchange  He states he has been having a lot of bladder spasms with leakage 
Spoke to patient, informed him I faxed over the paperwork RX for [employer] to dispense his sensors 
Yes

## 2022-11-11 NOTE — PROGRESS NOTES
11/11/2022  Oracio Richardson Jr  is a 75 y o  male  [id number]    Diagnosis:  Chief Complaint     lemons exchange          Patient presents for routine lemons exchange managed by our office    Plan:  Follow up as scheduled for UDS in two weeks  Patient instructed to call with any questions or concerns in the meantime  Vitals:    11/11/22 0917   BP: 118/74   Pulse: 83   Weight: 102 kg (225 lb)   Height: 6' 1" (1 854 m)         Procedure:    Universal Protocol:  Consent: Verbal consent obtained  Risks and benefits: risks, benefits and alternatives were discussed  Consent given by: patient  Patient understanding: patient states understanding of the procedure being performed  Patient identity confirmed: verbally with patient      Bladder catheterization    Date/Time: 11/11/2022 9:44 AM  Performed by: Yahaira Cruz RN  Authorized by: Macho Saleh MD     Consent:     Consent given by:  Patient  Universal protocol:     Procedure explained and questions answered to patient or proxy's satisfaction: yes      Patient identity confirmed:  Verbally with patient  Pre-procedure details:     Procedure purpose:  Therapeutic  Anesthesia (see MAR for exact dosages): Anesthesia method:  None  Procedure details:     Catheter insertion:  Indwelling    Approach: natural orifice      Catheter type:  Coude, latex and Lemons    Catheter size:  16 Fr    Number of attempts:  1    Successful placement: yes      Urine characteristics:  Clear and yellow  Post-procedure details:     Patient tolerance of procedure:   Tolerated well, no immediate complications      Provided patient with 2 stat locks and 2 overnight bags at patient request       Yahaira Cruz RN

## 2022-11-16 ENCOUNTER — TELEPHONE (OUTPATIENT)
Dept: HEMATOLOGY ONCOLOGY | Facility: CLINIC | Age: 67
End: 2022-11-16

## 2022-11-16 DIAGNOSIS — Z12.11 SCREENING FOR COLON CANCER: Primary | ICD-10-CM

## 2022-11-16 NOTE — TELEPHONE ENCOUNTER
Appointment Confirmation (to confirm pre existing appointments - ONLY)  No need to route   Appointment with  Dr Edmundo Abreu   Appointment date & time 01/04/23 @ 3:20pm   Location [address] [facility]   Patient verbilized Understanding yes

## 2022-11-17 ENCOUNTER — APPOINTMENT (OUTPATIENT)
Dept: LAB | Facility: CLINIC | Age: 67
End: 2022-11-17

## 2022-11-17 ENCOUNTER — TELEPHONE (OUTPATIENT)
Dept: OBGYN CLINIC | Facility: CLINIC | Age: 67
End: 2022-11-17

## 2022-11-17 DIAGNOSIS — Z01.812 PRE-OPERATIVE LABORATORY EXAMINATION: ICD-10-CM

## 2022-11-17 LAB
ALBUMIN SERPL BCP-MCNC: 3.3 G/DL (ref 3.5–5)
ALP SERPL-CCNC: 79 U/L (ref 46–116)
ALT SERPL W P-5'-P-CCNC: 27 U/L (ref 12–78)
ANION GAP SERPL CALCULATED.3IONS-SCNC: 4 MMOL/L (ref 4–13)
APTT PPP: 29 SECONDS (ref 23–37)
AST SERPL W P-5'-P-CCNC: 26 U/L (ref 5–45)
BASOPHILS # BLD AUTO: 0.02 THOUSANDS/ÂΜL (ref 0–0.1)
BASOPHILS NFR BLD AUTO: 0 % (ref 0–1)
BILIRUB SERPL-MCNC: 0.74 MG/DL (ref 0.2–1)
BUN SERPL-MCNC: 21 MG/DL (ref 5–25)
CALCIUM ALBUM COR SERPL-MCNC: 10.2 MG/DL (ref 8.3–10.1)
CALCIUM SERPL-MCNC: 9.6 MG/DL (ref 8.3–10.1)
CHLORIDE SERPL-SCNC: 106 MMOL/L (ref 96–108)
CO2 SERPL-SCNC: 30 MMOL/L (ref 21–32)
CREAT SERPL-MCNC: 0.93 MG/DL (ref 0.6–1.3)
EOSINOPHIL # BLD AUTO: 0.03 THOUSAND/ÂΜL (ref 0–0.61)
EOSINOPHIL NFR BLD AUTO: 1 % (ref 0–6)
ERYTHROCYTE [DISTWIDTH] IN BLOOD BY AUTOMATED COUNT: 12.7 % (ref 11.6–15.1)
EST. AVERAGE GLUCOSE BLD GHB EST-MCNC: 140 MG/DL
GFR SERPL CREATININE-BSD FRML MDRD: 84 ML/MIN/1.73SQ M
GLUCOSE SERPL-MCNC: 143 MG/DL (ref 65–140)
HBA1C MFR BLD: 6.5 %
HCT VFR BLD AUTO: 41.6 % (ref 36.5–49.3)
HGB BLD-MCNC: 13.5 G/DL (ref 12–17)
IMM GRANULOCYTES # BLD AUTO: 0.01 THOUSAND/UL (ref 0–0.2)
IMM GRANULOCYTES NFR BLD AUTO: 0 % (ref 0–2)
INR PPP: 1.01 (ref 0.84–1.19)
LYMPHOCYTES # BLD AUTO: 2.37 THOUSANDS/ÂΜL (ref 0.6–4.47)
LYMPHOCYTES NFR BLD AUTO: 37 % (ref 14–44)
MCH RBC QN AUTO: 29.3 PG (ref 26.8–34.3)
MCHC RBC AUTO-ENTMCNC: 32.5 G/DL (ref 31.4–37.4)
MCV RBC AUTO: 90 FL (ref 82–98)
MONOCYTES # BLD AUTO: 0.38 THOUSAND/ÂΜL (ref 0.17–1.22)
MONOCYTES NFR BLD AUTO: 6 % (ref 4–12)
NEUTROPHILS # BLD AUTO: 3.56 THOUSANDS/ÂΜL (ref 1.85–7.62)
NEUTS SEG NFR BLD AUTO: 56 % (ref 43–75)
NRBC BLD AUTO-RTO: 0 /100 WBCS
PLATELET # BLD AUTO: 247 THOUSANDS/UL (ref 149–390)
PMV BLD AUTO: 11.8 FL (ref 8.9–12.7)
POTASSIUM SERPL-SCNC: 3.6 MMOL/L (ref 3.5–5.3)
PROT SERPL-MCNC: 7.7 G/DL (ref 6.4–8.4)
PROTHROMBIN TIME: 13.5 SECONDS (ref 11.6–14.5)
RBC # BLD AUTO: 4.61 MILLION/UL (ref 3.88–5.62)
SODIUM SERPL-SCNC: 140 MMOL/L (ref 135–147)
WBC # BLD AUTO: 6.37 THOUSAND/UL (ref 4.31–10.16)

## 2022-11-18 LAB
ABO GROUP BLD: NORMAL
BLD GP AB SCN SERPL QL: NEGATIVE
RH BLD: POSITIVE
SPECIMEN EXPIRATION DATE: NORMAL

## 2022-11-20 DIAGNOSIS — M17.11 UNILATERAL PRIMARY OSTEOARTHRITIS, RIGHT KNEE: ICD-10-CM

## 2022-11-21 RX ORDER — MULTIVITAMIN WITH FOLIC ACID 400 MCG
TABLET ORAL
Qty: 30 TABLET | Refills: 1 | Status: SHIPPED | OUTPATIENT
Start: 2022-11-21

## 2022-11-23 ENCOUNTER — PROCEDURE VISIT (OUTPATIENT)
Dept: UROLOGY | Facility: MEDICAL CENTER | Age: 67
End: 2022-11-23

## 2022-11-23 DIAGNOSIS — N39.41 URGE INCONTINENCE OF URINE: ICD-10-CM

## 2022-11-23 DIAGNOSIS — R33.9 INCOMPLETE EMPTYING OF BLADDER: ICD-10-CM

## 2022-11-23 DIAGNOSIS — N32.81 DETRUSOR INSTABILITY: ICD-10-CM

## 2022-11-23 DIAGNOSIS — N45.1 EPIDIDYMITIS: Primary | ICD-10-CM

## 2022-11-23 DIAGNOSIS — R39.15 URGENCY OF URINATION: ICD-10-CM

## 2022-11-23 RX ORDER — LEVOFLOXACIN 500 MG/1
500 TABLET, FILM COATED ORAL EVERY 24 HOURS
Qty: 7 TABLET | Refills: 0 | Status: SHIPPED | OUTPATIENT
Start: 2022-11-23 | End: 2022-11-25 | Stop reason: SDUPTHER

## 2022-11-23 NOTE — PROGRESS NOTES
CC: "I was only urinating 40-50% and have a catheter"        CMG:       Position:  sitting          Fill sensation:   29 ml,  pdet -2 cm H2O       First urge:       115 ml,  pdet  -1 cm H2O          Normal urge:    131 ml,  pdet 16 cm H2O          Must urge:        131 ml,  pdet16 cm H2O        Permission to void    403ml,   pdet 28 cm                             H2O           Max pdet during void    34 cm H2O       Voided volume:     25 6ml    Bladder stability:    unstable at  130 ml  without leakage, + DI at 247 300, and 400 ml ml with leak    Compliance:  normal         EMG activity:       Normal during filling,        normal during voiding    Comments:   Sensory urgency   + DI with leak at 130 ml   + DI with leak at 247, 300 and 400 ml   Void attempt at 293 ml was unsuccessful with no detrusor activity   Other void attempt with DI were only successful for leakage of small volumes  Incomplete emptying / retention   16 Fr coude lemons inserted post test and emptied 300 ml    Urodynamics    Date/Time: 11/23/2022 8:30 AM  Performed by: Addy Cueto RN  Authorized by: Kerwin Mccauley MD   Universal Protocol:  Consent: Verbal consent obtained  Written consent obtained    Risks and benefits: risks, benefits and alternatives were discussed  Consent given by: patient  Patient understanding: patient states understanding of the procedure being performed  Patient consent: the patient's understanding of the procedure matches consent given  Procedure consent: procedure consent matches procedure scheduled  Patient identity confirmed: verbally with patient    Procedure - Urodynamics:  Procedure details: CMG      Voiding Pressure Study: Yes    Intra-abdominal Voiding Pressure Study: Yes    Post-procedure:     Patient tolerance: Patient tolerated procedure well with no immediate complications

## 2022-11-23 NOTE — PROGRESS NOTES
Patient seen by me at the request of Savita Lou RN-  Reports a history of some sort a hernia and he sneeze and he said all the sudden he felt swelling in his scrotum  On exam he actually has an epididymitis with a swollen testicle and epididymis and the epididymis itself is tender  No evidence a hernia with cough whatsoever  My impression is that this was already present, he was not feeling it very much, and we will treated with Levaquin 500 mg p o  q day for 7 days, and I told him to use scrotal support  We will also get a scrotal ultrasound

## 2022-11-25 ENCOUNTER — TELEPHONE (OUTPATIENT)
Dept: OTHER | Facility: OTHER | Age: 67
End: 2022-11-25

## 2022-11-25 DIAGNOSIS — N45.1 EPIDIDYMITIS: ICD-10-CM

## 2022-11-25 DIAGNOSIS — E11.10 DKA (DIABETIC KETOACIDOSIS) (HCC): ICD-10-CM

## 2022-11-25 LAB
DME PARACHUTE DELIVERY DATE REQUESTED: NORMAL
DME PARACHUTE ITEM DESCRIPTION: NORMAL
DME PARACHUTE ORDER STATUS: NORMAL
DME PARACHUTE SUPPLIER NAME: NORMAL
DME PARACHUTE SUPPLIER PHONE: NORMAL

## 2022-11-25 RX ORDER — LEVOFLOXACIN 500 MG/1
500 TABLET, FILM COATED ORAL EVERY 24 HOURS
Qty: 7 TABLET | Refills: 0 | Status: SHIPPED | OUTPATIENT
Start: 2022-11-25 | End: 2022-12-01 | Stop reason: SDUPTHER

## 2022-11-25 RX ORDER — INSULIN GLARGINE 300 U/ML
40 INJECTION, SOLUTION SUBCUTANEOUS EVERY MORNING
Qty: 13.5 ML | Refills: 0 | Status: SHIPPED | OUTPATIENT
Start: 2022-11-25

## 2022-11-25 NOTE — TELEPHONE ENCOUNTER
Patient is two days post op and has testicle swelling  Also needs his levofloxacin Rx sent to [facility] on file; it was originally sent to an old mail in [place] [address]  on the 23rd

## 2022-11-25 NOTE — TELEPHONE ENCOUNTER
Spoke to patient refilled DME order for rajani 2 and possible 3, refilled requested medication and asked patient to call me if he does not hear from [address] [place] [address] Corby Salazar by the end of the week

## 2022-11-25 NOTE — TELEPHONE ENCOUNTER
Called and spoke with patient  Advised on [facility] [place]  He also said that he never got his Ozie Hutching or Dexcom, requesting only Dexcom and [facility]  Advised that would be Endocrinology and that I would forward to them for input

## 2022-11-28 NOTE — PROGRESS NOTES
I supervised the Advanced Practitioner  I reviewed the Advanced Practitioner note and agree      Drake Serrano MD 11/28/22

## 2022-11-29 ENCOUNTER — HOSPITAL ENCOUNTER (OUTPATIENT)
Dept: ULTRASOUND IMAGING | Facility: HOSPITAL | Age: 67
Discharge: HOME | End: 2022-11-29
Attending: UROLOGY

## 2022-11-29 DIAGNOSIS — E11.65 UNCONTROLLED TYPE 2 DIABETES MELLITUS WITH HYPERGLYCEMIA (HCC): ICD-10-CM

## 2022-11-29 DIAGNOSIS — N45.1 EPIDIDYMITIS: ICD-10-CM

## 2022-11-29 RX ORDER — BLOOD-GLUCOSE METER
EACH MISCELLANEOUS
Qty: 1 KIT | Refills: 0 | Status: SHIPPED | OUTPATIENT
Start: 2022-11-29

## 2022-11-29 RX ORDER — BLOOD-GLUCOSE METER
KIT MISCELLANEOUS
Qty: 1 KIT | Refills: 0 | Status: SHIPPED | OUTPATIENT
Start: 2022-11-29 | End: 2022-11-29

## 2022-11-29 NOTE — LETTER
[address] [place] [address]  [address] [facility] [address]      December 2, 2022    MRN: [id number]     Phone: [phone]     Dear Mr Fiona Felipe recently had a(n) Ultrasound performed on 11/29/2022 at  [address] [place] [address] that was requested by Antonio Kc MD  The study was reviewed by a radiologist, which is a physician who specializes in medical imaging  The radiologist issued a report describing his or her findings  In that report there was a finding that the radiologist felt warranted further discussion with your health care provider and that discussion would be beneficial to you  The results were sent to Antonio Kc MD on 11/29/2022  4:46 PM  We recommend that you contact Antonio Kc MD at [phone] or set up an appointment to discuss the results of the imaging test  If you have already heard from Antonio Kc MD regarding the results of your study, you can disregard this letter  This letter is not meant to alarm you, but intended to encourage you to follow-up on your results with the provider that sent you for the imaging study  In addition, we have enclosed answers to frequently asked questions by other patients who have also received a letter to review results with their health care provider (see page two)  Thank you for choosing [address] [place] [address] for your medical imaging needs  FREQUENTLY ASKED QUESTIONS    1  Why am I receiving this letter? [facility] requires us to notify patients who have findings on imaging exams that may require more testing or follow-up with a health professional within the next 3 months  2  How serious is the finding on the imaging test?  This letter is sent to all patients who may need follow-up or more testing within the next 3 months  Receiving this letter does not necessarily mean you have a life-threatening imaging finding or disease  Recommendations in the radiologist’s imaging report are general in nature and it is up to your healthcare provider to say whether those recommendations make sense for your situation  You are strongly encouraged to talk to your health care provider about the results and ask whether additional steps need to be taken  3  Where can I get a copy of the final report for my recent radiology exam?  To get a full copy of the report you can access your records online at [email or web address] or please contact [facility] at [phone] Monday through Friday between 8 am and 6 pm          4  What do I need to do now? Please contact your health care provider who requested the imaging study to discuss what further actions (if any) are needed  You may have already heard from (your ordering provider) in regard to this test in which case you can disregard this letter  NOTICE IN ACCORDANCE WITH THE [place] STATE “PATIENT TEST RESULT INFORMATION ACT OF 2018”    You are receiving this notice as a result of a determination by your diagnostic imaging service that further discussions of your test results are warranted and would be beneficial to you  The complete results of your test or tests have been or will be sent to the health care practitioner that ordered the test or tests  It is recommended that you contact your health care practitioner to discuss your results as soon as possible

## 2022-11-30 ENCOUNTER — TELEPHONE (OUTPATIENT)
Dept: UROLOGY | Facility: MEDICAL CENTER | Age: 67
End: 2022-11-30

## 2022-11-30 NOTE — TELEPHONE ENCOUNTER
Patient seen by Mikaela Cash at [facility]    U/S ordered by Dr Vanessa Cosme from [facility] ([facility]) Radiology called with significant findings on U/s of scrotum  Results are in epic for review

## 2022-11-30 NOTE — RESULT ENCOUNTER NOTE
Please let patient know that scrotal ultrasound shows testicular infection, and hoping is getting better with the antibiotics  Please have him come in for evaluation with somebody in the [place] office in the near future to make sure it has resolved

## 2022-12-01 ENCOUNTER — TELEPHONE (OUTPATIENT)
Dept: UROLOGY | Facility: MEDICAL CENTER | Age: 67
End: 2022-12-01

## 2022-12-01 ENCOUNTER — CLINICAL SUPPORT (OUTPATIENT)
Dept: CARDIOLOGY CLINIC | Facility: CLINIC | Age: 67
End: 2022-12-01

## 2022-12-01 ENCOUNTER — TELEPHONE (OUTPATIENT)
Dept: OTHER | Facility: OTHER | Age: 67
End: 2022-12-01

## 2022-12-01 DIAGNOSIS — I48.0 PAF (PAROXYSMAL ATRIAL FIBRILLATION) (HCC): ICD-10-CM

## 2022-12-01 DIAGNOSIS — N45.1 EPIDIDYMITIS: ICD-10-CM

## 2022-12-01 RX ORDER — LEVOFLOXACIN 500 MG/1
500 TABLET, FILM COATED ORAL EVERY 24 HOURS
Qty: 7 TABLET | Refills: 0 | Status: SHIPPED | OUTPATIENT
Start: 2022-12-01 | End: 2022-12-08

## 2022-12-01 NOTE — TELEPHONE ENCOUNTER
Lexi from [facility] called stating patient is having a lot of pain right now  He is stated is a 10 out 10  He is finishing his antibiotics today and he is not any better  He has a lot swelling on scrotum  She thinks he should be seen today for evaluation  Any questions call Lexi at [phone]      Please contact patient [phone]

## 2022-12-01 NOTE — TELEPHONE ENCOUNTER
Call from patients nurse, Lexi w/ [address] advising the patient will finish the Levaquin prescription today and his issue has remained the same  His scrotum is still painful, red and swollen  She is requesting we call patient directly to discuss his next steps

## 2022-12-01 NOTE — TELEPHONE ENCOUNTER
Please let patient know Kriss was resend to his pharmacy    I would like him it schedule follow-up with MD for evaluation as soon as possible

## 2022-12-01 NOTE — TELEPHONE ENCOUNTER
Called patient - he is informed of the infection  He notes that there has been no change in the size of his scrotum  He is wondering if there is a different antibiotic that he can try  New script for Levaquin was sent to pharmacy today

## 2022-12-01 NOTE — TELEPHONE ENCOUNTER
----- Message from Cecil Fuentes MD sent at 11/30/2022  9:44 AM EST -----  Please let patient know that scrotal ultrasound shows testicular infection, and hoping is getting better with the antibiotics  Please have him come in for evaluation with somebody in the [place] office in the near future to make sure it has resolved

## 2022-12-05 ENCOUNTER — OFFICE VISIT (OUTPATIENT)
Dept: DIABETES SERVICES | Facility: CLINIC | Age: 67
End: 2022-12-05

## 2022-12-05 VITALS — WEIGHT: 215.2 LBS | BODY MASS INDEX: 28.39 KG/M2

## 2022-12-05 DIAGNOSIS — E11.10 DIABETIC KETOACIDOSIS WITHOUT COMA ASSOCIATED WITH TYPE 2 DIABETES MELLITUS (HCC): Primary | ICD-10-CM

## 2022-12-05 NOTE — PATIENT INSTRUCTIONS
Eat 3 meals per day, 4-5 hours apart  No meal skipping       Eat 45 grams of carbohydrate per meal and 15 grams per snack     Review portion booklet    Complete 3-day food log and return completed log at the follow up appointment

## 2022-12-05 NOTE — PROGRESS NOTES
Medical Nutrition Therapy        Assessment    Visit Type: Follow-up visit  Chief complaint/Medical Diagnosis/reason for visit E11 10 (ICD-10-CM) - DKA (diabetic ketoacidosis) ([facility] )    SARAH Narayanan was seen in person for the follow-up MNT appointment, and accompanied by his wife, Ivy Narayanan reports checking BG levels 1x per day, randomly  Ivy Rock presented a blood glucose log and food log  Upon assessment it appeared that the patient's food intake is inadequate for calories and sufficient amounts of all nutrients  Patient is not following his recommended meal plan, despite his wife's efforts to encourage proper eating  Oracio has lost 9% of his body weight (20 lbs) in almost 2 months since our last appointment on 10/03/22  Problems identified in food recall include inadequate intake and inconsistent carbohydrate intake  Educated patient on starvation complications from inadequate intake  Encouraged the consumption of regular meals at regular times  Advised patient to keep carbohydrate intake to 45 grams per meal and 15 per snack to assist with glycemic control  Patient agreed to keep daily food logs and return them in 2 months for assessment  RD will remain available for further dietary questions/concerns       Ht Readings from Last 1 Encounters:   11/11/22 6' 1" (1 854 m)     Wt Readings from Last 3 Encounters:   11/11/22 102 kg (225 lb)   11/03/22 101 kg (223 lb)   11/02/22 100 kg (220 lb 9 6 oz)     Weight Change: Yes 20 lbs (or 9%) intentional weight loss in less than 2 months    Barriers to Learning: no barriers    Do you follow any special diet presently?: No  Who shops: patient and spouse  Who cooks: spouse    Food Log: Please see scanned document    Exercise none    Calorie needs 1659 kcals/day Carbs: 45 g/meal, 15 g/snack     Fat: 5 servings/day    Protein:7 servings/day    Nutrition Diagnosis:  Inadequate energy intake  related to Food and nutrition related knowledge deficit concerning energy intake as evidenced by  Food avoidance and/or lack of interest in food    Intervention: behavior modification strategies, carbohydrate counting and meal timing     Treatment Goals: Patient understands education and recommendations, Patient will consume 3 meals a day, Patient will count carbohydrates and Patient will monitor blood glucose    Monitoring and evaluation:    Term code indicator  FH 1 3 2 Food Intake Criteria: Eat 3 meals per day, 4-5 hours apart  No meal skipping  Term code indicator  FH 1 6 3 Carbohydrate Intake Criteria: Eat 45 grams of carbohydrate per meal and 15 grams per snack   Term code indicator  FH 3 1 Food and Nutrition Knowledge Criteria: Review portion booklet    Materials Provided: none    Patient’s Response to Instruction:  Armida Winn  Expected Compliancefair    Begin Time: 9:09 am  End Time: 9:40 am  Referring Provider: Alanna Hood PA-C    Thank you for coming to the [place] [address] for education today  Please feel free to call with any questions or concerns      Marguerite Christopher, RD  [address] [place] [id number] [phone]

## 2022-12-06 ENCOUNTER — OFFICE VISIT (OUTPATIENT)
Dept: UROLOGY | Facility: CLINIC | Age: 67
End: 2022-12-06

## 2022-12-06 ENCOUNTER — OFFICE VISIT (OUTPATIENT)
Dept: FAMILY MEDICINE CLINIC | Facility: CLINIC | Age: 67
End: 2022-12-06

## 2022-12-06 VITALS
OXYGEN SATURATION: 98 % | HEART RATE: 86 BPM | SYSTOLIC BLOOD PRESSURE: 110 MMHG | WEIGHT: 215.8 LBS | RESPIRATION RATE: 18 BRPM | BODY MASS INDEX: 28.6 KG/M2 | HEIGHT: 73 IN | DIASTOLIC BLOOD PRESSURE: 70 MMHG | TEMPERATURE: 97.3 F

## 2022-12-06 VITALS
WEIGHT: 215 LBS | HEART RATE: 78 BPM | SYSTOLIC BLOOD PRESSURE: 124 MMHG | HEIGHT: 73 IN | DIASTOLIC BLOOD PRESSURE: 88 MMHG | BODY MASS INDEX: 28.49 KG/M2

## 2022-12-06 DIAGNOSIS — E11.65 UNCONTROLLED TYPE 2 DIABETES MELLITUS WITH HYPERGLYCEMIA (HCC): Primary | ICD-10-CM

## 2022-12-06 DIAGNOSIS — N45.2 ORCHITIS OF RIGHT TESTICLE: Primary | ICD-10-CM

## 2022-12-06 DIAGNOSIS — E11.10 DKA (DIABETIC KETOACIDOSIS) (HCC): ICD-10-CM

## 2022-12-06 DIAGNOSIS — N48.1 BALANITIS: ICD-10-CM

## 2022-12-06 DIAGNOSIS — I48.91 NEW ONSET ATRIAL FIBRILLATION (HCC): ICD-10-CM

## 2022-12-06 DIAGNOSIS — R33.9 URINARY RETENTION: ICD-10-CM

## 2022-12-06 RX ORDER — TRAMADOL HYDROCHLORIDE 50 MG/1
50 TABLET ORAL EVERY 6 HOURS PRN
Qty: 20 TABLET | Refills: 0 | Status: SHIPPED | OUTPATIENT
Start: 2022-12-06

## 2022-12-06 RX ORDER — GABAPENTIN 100 MG/1
100 CAPSULE ORAL 3 TIMES DAILY
Qty: 90 CAPSULE | Refills: 3 | Status: SHIPPED | OUTPATIENT
Start: 2022-12-06

## 2022-12-06 RX ORDER — DOXYCYCLINE HYCLATE 100 MG/1
100 CAPSULE ORAL EVERY 12 HOURS SCHEDULED
Qty: 14 CAPSULE | Refills: 0 | Status: SHIPPED | OUTPATIENT
Start: 2022-12-06 | End: 2022-12-13

## 2022-12-06 NOTE — ASSESSMENT & PLAN NOTE
Lab Results   Component Value Date    HGBA1C 6 5 (H) 11/17/2022   Diabetes under better control with A1c of 6 5 continuing on same regimen of insulin monitor closely follow-up with A1c every 4 months

## 2022-12-06 NOTE — PATIENT INSTRUCTIONS
Take the doxycycline as prescribed, take the tramadol as needed for pain  No driving or operating machinery if taking the tramadol  Do not take the Levaquin at this time but keep it on hand  Scrotal support as described along with heating pad and ice packs  Have the scrotal ultrasound done and I will send you the results  Go to the emergency department if you develop a fever or your symptoms become definitely worse

## 2022-12-06 NOTE — ASSESSMENT & PLAN NOTE
Follow-up with cardiology as scheduled this week controlled ventricular response atrial fibrillation unchanged since last exam

## 2022-12-06 NOTE — PROGRESS NOTES
Assessment/Plan:       Problem List Items Addressed This Visit        Endocrine    DKA (diabetic ketoacidosis) ([facility] )    Uncontrolled type 2 diabetes mellitus with hyperglycemia ([facility] ) - Primary       Lab Results   Component Value Date    HGBA1C 6 5 (H) 11/17/2022   Diabetes under better control with A1c of 6 5 continuing on same regimen of insulin monitor closely follow-up with A1c every 4 months            Cardiovascular and Mediastinum    New onset atrial fibrillation [facility])     Follow-up with cardiology as scheduled this week controlled ventricular response atrial fibrillation unchanged since last exam            Genitourinary    Balanitis     Patient has pain in the scrotum right-sided much worse than left with significant swelling he is following up with urology today potential for infection         Urinary retention     Indwelling Lane catheter remains in he will follow-up with urology today              Subjective:      Patient ID: Isaiah Mallory  is a 75 y o  male  Follow up evaluation review of laboratory work for diabetes prior to upcoming potential surgery for his knee      The following portions of the patient's history were reviewed and updated as appropriate: allergies, current medications, past family history, past medical history, past social history, past surgical history and problem list     Review of Systems   Constitutional: Negative for chills, fatigue and fever  HENT: Negative for congestion, nosebleeds, rhinorrhea, sinus pressure and sore throat  Eyes: Negative for discharge and redness  Respiratory: Negative for cough and shortness of breath  Cardiovascular: Negative for chest pain, palpitations and leg swelling  Gastrointestinal: Negative for abdominal pain, blood in stool and nausea  Endocrine: Negative for cold intolerance, heat intolerance and polyuria  Genitourinary: Positive for difficulty urinating, frequency, penile pain, scrotal swelling and testicular pain  Negative for dysuria  Musculoskeletal: Negative for arthralgias, back pain and myalgias  Skin: Negative for rash  Neurological: Negative for dizziness, weakness and headaches  Hematological: Negative for adenopathy  Psychiatric/Behavioral: Negative for behavioral problems and sleep disturbance  The patient is not nervous/anxious  Objective:      /70 (BP Location: Left arm, Patient Position: Sitting, Cuff Size: Standard)   Pulse 86   Temp (!) 97 3 °F (36 3 °C) (Tympanic)   Resp 18   Ht 6' 1" (1 854 m)   Wt 97 9 kg (215 lb 12 8 oz)   SpO2 98%   BMI 28 47 kg/m²        Physical Exam  Vitals and nursing note reviewed  Constitutional:       Appearance: Normal appearance  He is well-developed, normal weight and well-nourished  HENT:      Head: Normocephalic and atraumatic  Right Ear: Tympanic membrane, ear canal and external ear normal       Left Ear: Tympanic membrane, ear canal and external ear normal       Nose: Nose normal       Mouth/Throat:      Mouth: Oropharynx is clear and moist  Mucous membranes are moist       Pharynx: Oropharynx is clear  Eyes:      General: No scleral icterus  Extraocular Movements: EOM normal       Conjunctiva/sclera: Conjunctivae normal       Pupils: Pupils are equal, round, and reactive to light  Neck:      Thyroid: No thyromegaly  Vascular: No JVD  Cardiovascular:      Rate and Rhythm: Normal rate and regular rhythm  Heart sounds: Normal heart sounds  No murmur heard  Pulmonary:      Effort: Pulmonary effort is normal       Breath sounds: Normal breath sounds  No wheezing or rales  Chest:      Chest wall: No tenderness  Abdominal:      General: Abdomen is flat  Bowel sounds are normal  There is no distension  Palpations: Abdomen is soft  There is no mass  Tenderness: There is no abdominal tenderness  There is no guarding or rebound  Musculoskeletal:         General: No tenderness, deformity or edema   Normal range of motion  Cervical back: Normal range of motion and neck supple  Lymphadenopathy:      Cervical: No cervical adenopathy  Skin:     General: Skin is warm and dry  Findings: No erythema or rash  Neurological:      General: No focal deficit present  Mental Status: He is alert and oriented to person, place, and time  Mental status is at baseline  Cranial Nerves: No cranial nerve deficit  Deep Tendon Reflexes: Reflexes are normal and symmetric  Reflexes normal    Psychiatric:         Mood and Affect: Mood and affect and mood normal          Behavior: Behavior normal          Thought Content: Thought content normal          Judgment: Judgment normal           Data:    Laboratory Results: I have personally reviewed the pertinent laboratory results/reports   Radiology/Other Diagnostic Testing Results: I have personally reviewed pertinent reports         Lab Results   Component Value Date    WBC 6 37 11/17/2022    HGB 13 5 11/17/2022    HCT 41 6 11/17/2022    MCV 90 11/17/2022     11/17/2022     Lab Results   Component Value Date    K 3 6 11/17/2022     11/17/2022    CO2 30 11/17/2022    BUN 21 11/17/2022    CREATININE 0 93 11/17/2022    GLUCOSE 241 (H) 09/07/2022    GLUF 180 (H) 09/16/2022    CALCIUM 9 6 11/17/2022    CORRECTEDCA 10 2 (H) 11/17/2022    AST 26 11/17/2022    ALT 27 11/17/2022    ALKPHOS 79 11/17/2022    EGFR 84 11/17/2022     Lab Results   Component Value Date    CHOLESTEROL 153 09/04/2022     Lab Results   Component Value Date    HDL 54 09/04/2022     Lab Results   Component Value Date    LDLCALC 81 09/04/2022     Lab Results   Component Value Date    TRIG 92 09/04/2022     No results found for: CHOLHDL  No results found for: [id number], TSH  Lab Results   Component Value Date    HGBA1C 6 5 (H) 11/17/2022     No results found for: PSA    Carol ROJAS'Davin, DO

## 2022-12-06 NOTE — PROGRESS NOTES
[address] [facility]  [facility]  [address] [facility] [address]  [place], [address] [facility]  [phone]  www  [facility]  org      NAME: Oracio Richardson Jr  AGE: 75 y o  SEX: male  : 1955   MRN: [id number]    DATE: 2022  TIME: 2:03 PM    Assessment and Plan:    Right orchitis: He now has pain with this  It is still tense and swollen which can be expected but given the increase in pain we will check another scrotal ultrasound within the week  We will send him the results  Obviously if there is an abscess he will need intervention  He has been prescribed Levaquin but I told him not to take that but to take doxycycline which I prescribed instead  I have also sent in a prescription for tramadol 50 mg every 6 hours as needed for pain  Also encouraged to get scrotal support  Also can try heat pad alternating with ice packs  Follow-up in January with me in [place] for reevaluation but I will let him know what the ultrasound shows  Urinary retention: Not sure of the reason for this  He cannot get a cystoscopy unless under anesthesia  After this is resolved we will plan on setting him up for cystoscopy in the operating room  We will do a voiding trial on  for his next catheter change  Chief Complaint   No chief complaint on file  History of Present Illness    Follow-up orchitis-suddenly discovered during his last tube change, and we treated him with 7 days of Levaquin  He reports no change  Again this was found incidentally and he was not having pain with that but he says the there is no decrease in the size  He is now having pain  This is in the right side  He also feels more irritation in the inside of his urethra with the indwelling Lane catheter  He already has a prescription for Levaquin but he has not taken another course        The following portions of the patient's history were reviewed and updated as appropriate: allergies, current medications, past family history, past medical history, past social history, past surgical history and problem list   Past Medical History:   Diagnosis Date   • A-fib ([place] [address] )    • Acute metabolic encephalopathy 91/96/7362   • CARLTON (acute kidney injury) ([place] [address] ) 09/04/2022   • Diabetes mellitus ([place] [address] )    • Pancreatitis 09/04/2022   • Sepsis ([place] [address] ) 09/04/2022     Past Surgical History:   Procedure Laterality Date   • KNEE CARTILAGE SURGERY     • KNEE SURGERY Right      shoulder  Review of Systems   Review of Systems   Constitutional: Negative for fever  Respiratory: Negative for cough and shortness of breath  Genitourinary: Positive for testicular pain  Has indwelling Lane catheter  Active Problem List     Patient Active Problem List   Diagnosis   • Urinary frequency   • Type 2 diabetes mellitus (HCC)   • Microscopic hematuria   • Urge incontinence of urine   • Nocturnal enuresis   • Feeling of incomplete bladder emptying   • Balanitis   • DKA (diabetic ketoacidosis) ([place] [address] )   • New onset atrial fibrillation (HCC)   • Urinary retention   • Fungal infection of skin   • Depressed mood   • Pain in both lower extremities   • Polyuria   • Ventral Hernia   • Hydroureteronephrosis   • Uncontrolled type 2 diabetes mellitus with hyperglycemia ([place] [address] )   • Vitamin D deficiency   • Dyslipidemia   • Medicare annual wellness visit, subsequent       Objective   /88   Pulse 78   Ht 6' 1" (1 854 m)   Wt 97 5 kg (215 lb)   BMI 28 37 kg/m²     Physical Exam  Vitals reviewed  Constitutional:       Appearance: Normal appearance  HENT:      Head: Normocephalic and atraumatic  Eyes:      Extraocular Movements: Extraocular movements intact     Pulmonary:      Effort: Pulmonary effort is normal    Genitourinary:     Penis: Normal        Comments: Scrotum is normal, but is warmer to the touch than the last exam   However there is decreased erythema of the scrotum than the previous exam   The testicle itself remains enlarged with a tense hydrocele and is more tender than the last exam   Musculoskeletal:      Cervical back: Normal range of motion  Skin:     Coloration: Skin is not jaundiced or pale  Neurological:      General: No focal deficit present  Mental Status: He is alert and oriented to person, place, and time  Mental status is at baseline  Psychiatric:         Mood and Affect: Mood normal          Behavior: Behavior normal          Thought Content:  Thought content normal          Judgment: Judgment normal              Current Medications     Current Outpatient Medications:   •  ammonium lactate (LAC-HYDRIN) 12 % cream, Apply topically as needed for dry skin, Disp: 385 g, Rfl: 1  •  ascorbic acid (VITAMIN C) 500 MG tablet, Take 1 tablet (500 mg total) by mouth 2 (two) times a day, Disp: 60 tablet, Rfl: 1  •  Blood Glucose Monitoring Suppl (OneTouch Verio Flex System) w/Device KIT, CHECK BLOOD SUGARS THREE TIMES DAILY DX: E11 65, Disp: 1 kit, Rfl: 0  •  docusate sodium (COLACE) 100 mg capsule, Take 1 capsule (100 mg total) by mouth 2 (two) times a day, Disp: 60 capsule, Rfl: 1  •  ferrous sulfate 324 (65 Fe) mg, TAKE 1 TABLET BY MOUTH 2 TIMES A DAY BEFORE MEALS, Disp: 180 tablet, Rfl: 1  •  folic acid (FOLVITE) 1 mg tablet, TAKE 1 TABLET BY MOUTH EVERY DAY, Disp: 90 tablet, Rfl: 1  •  gabapentin (NEURONTIN) 100 mg capsule, Take 1 capsule (100 mg total) by mouth 3 (three) times a day, Disp: 90 capsule, Rfl: 3  •  glucose blood (OneTouch Verio) test strip, CHECK BLOOD SUGARS THREE TIMES DAILY DX: E11 65, Disp: 400 strip, Rfl: 3  •  Incontinence Supply Disposable (Incontinence Brief Large) MISC, Use every 4 (four) hours as needed (Urinary incontinence), Disp: 36 each, Rfl: 12  •  insulin lispro (HumaLOG KwikPen) 100 units/mL injection pen, Inject 10 Units under the skin 3 (three) times a day with meals (Patient taking differently: Inject 10 Units under the skin 3 (three) times a day with meals Patient reports taking 6 units with meals), Disp: 15 mL, Rfl: 0  •  insulin lispro (HumaLOG) 100 units/mL injection, Inject 10 Units under the skin 3 (three) times a day with meals (Patient taking differently: Inject 8 Units under the skin 3 (three) times a day with meals), Disp: 20 mL, Rfl: 0  •  Lancets (OneTouch Delica Plus MTSELF90A) MISC, CHECK BLOOD SUGARS THREE TIMES DAILY DX: E11 65, Disp: 400 each, Rfl: 3  •  levofloxacin (LEVAQUIN) 500 mg tablet, Take 1 tablet (500 mg total) by mouth every 24 hours for 7 days, Disp: 7 tablet, Rfl: 0  •  Multiple Vitamin (Daily-Octavio Multivitamin) TABS, TAKE 1 TABLET BY MOUTH EVERY DAY, Disp: 30 tablet, Rfl: 1  •  Multiple Vitamins-Minerals (multivitamin with minerals) tablet, Take 1 tablet by mouth daily, Disp: 30 tablet, Rfl: 1  •  oxybutynin (DITROPAN) 5 mg tablet, Take 1 tablet (5 mg total) by mouth 3 (three) times a day as needed (Bladder spasms), Disp: 60 tablet, Rfl: 6  •  pantoprazole (PROTONIX) 40 mg tablet, Take 1 tablet (40 mg total) by mouth daily in the early morning, Disp: 30 tablet, Rfl: 0  •  tamsulosin (FLOMAX) 0 4 mg, Take 1 capsule (0 4 mg total) by mouth daily with dinner, Disp: 30 capsule, Rfl: 12  •  Toujeo SoloStar 300 units/mL CONCENTRATED U-300 injection pen (1-unit dial), INJECT 40 UNITS UNDER THE SKIN EVERY MORNING, Disp: 13 5 mL, Rfl: 0  •  glucose 4 g chewable tablet, Chew 4 tablets (16 g total) once for 1 dose, Disp: 4 tablet, Rfl: 0        Nery Campbell MD

## 2022-12-06 NOTE — ASSESSMENT & PLAN NOTE
Patient has pain in the scrotum right-sided much worse than left with significant swelling he is following up with urology today potential for infection

## 2022-12-08 ENCOUNTER — OFFICE VISIT (OUTPATIENT)
Dept: CARDIOLOGY CLINIC | Facility: CLINIC | Age: 67
End: 2022-12-08

## 2022-12-08 VITALS
BODY MASS INDEX: 29.03 KG/M2 | WEIGHT: 219 LBS | HEIGHT: 73 IN | DIASTOLIC BLOOD PRESSURE: 68 MMHG | SYSTOLIC BLOOD PRESSURE: 106 MMHG | HEART RATE: 92 BPM

## 2022-12-08 DIAGNOSIS — I48.0 PAF (PAROXYSMAL ATRIAL FIBRILLATION) (HCC): Primary | ICD-10-CM

## 2022-12-08 DIAGNOSIS — Z01.810 PREOP CARDIOVASCULAR EXAM: ICD-10-CM

## 2022-12-08 DIAGNOSIS — I49.1 PAC (PREMATURE ATRIAL CONTRACTION): ICD-10-CM

## 2022-12-08 DIAGNOSIS — E66.3 OVERWEIGHT (BMI 25.0-29.9): ICD-10-CM

## 2022-12-08 NOTE — PROGRESS NOTES
Cardiology Follow up    [address]   [id number]  1955  [facility] [address] [address]      1  PAF (paroxysmal atrial fibrillation) ([address] )        2  PAC (premature atrial contraction)        3  Overweight (BMI 25 0-29 9)        4  Preop cardiovascular exam            Discussion/Summary:  1  Perioperative risk stratification  2  Paroxysmal atrial fibrillation  3  Overweight  4  Snoring  5  History of pulmonary embolism      -Transthoracic echocardiogram 9/7/2022 technically difficult study with use of Definity contrast showing left regular systolic function normal estimated LVEF 60% with normal right ventricular function as well  -Zio patch monitor 12/1/2022 showing predominantly sinus rhythm average heart rate 83 bpm with no atrial fibrillation seen  -After discussion with patient he is agreeable to undergoing implantable loop recorder placement as he does wish to try and avoid anticoagulation if possible  -We will have office staff assist in helping set this up along with referral to hematology  -Patient counseled on dietary and lifestyle modifications including following a low-salt, low-fat, heart healthy diet with continued physical activity  -Patient has referral to sleep medicine as well for evaluation of snoring  -I will see patient in 3 months or sooner if necessary  -According to revised cardiac risk index patient is intermediate-high risk for operative procedures due to chronic medical conditions however patient fully understands and is accepting of these risks and wishes to proceed with surgeries once infection has improved as planned    As patient has adequate functional status still able to easily walk 2 flights of stairs or 4 city blocks on flat surface without any chest pain or anginal equivalent symptoms he is appropriate risk to proceed with surgery as planned   -Patient counseled if he were to have any warning or alarm type symptoms he is to seek emergency medical care immediately  History of Present Illness:  -Patient is a 68-year-old male with diabetes mellitus who was hospitalized in early September at [facility] with altered mental status, nausea and vomiting and generalized weakness found at that time to be in diabetic ketoacidosis with a severe anion gap metabolic acidosis, elevated lactic acid level and acute pancreatitis along with acute kidney injury with obstructive uropathy and nonobstructing 8 mm right renal calculus who was also found at that time to have newly diagnosed paroxysmal atrial fibrillation that converted spontaneously  He was treated with antibiotic therapy during hospitalization and went to rehabilitation posthospitalization and initially presented to the office in November 2022 for evaluation and perioperative restratification prior to right knee replacement and abdominal hernia repair  Patient had also previously been living in [place] and believes in the past he had had DVT/PE treated with 2 weeks of anticoagulation therapy to his knowledge and this was thought at that time to be due to his obesity and sedentary lifestyle  As during hospitalization atrial fibrillation was thought secondary to significant metabolic derangements he was not seen by cardiology and was not initiated on oral anticoagulation due to very short-lived event and he was discharged from hospital with Lane catheter due to urinary retention    At last office visit conversation was had and in the setting of [id number] score of 2 anticoagulation was recommended however patient wished to avoid particularly due to retention and upcoming surgical procedures and underwent Zio patch monitor to evaluate overall ectopic burden   -Currently in the office today he denies any chest pain, palpitations, lightheadedness or dizziness, loss of consciousness or shortness of breath  He states that apart from his knee discomfort with along with scrotal infection and catheter issues he is otherwise doing reasonably well  Does continue to be active stating he can easily walk 2 flights of stairs or 4 city blocks without any anginal equivalent symptoms however more recently with scrotal infection this does cause him discomfort      Patient Active Problem List   Diagnosis   • Urinary frequency   • Type 2 diabetes mellitus ([facility])   • Microscopic hematuria   • Urge incontinence of urine   • Nocturnal enuresis   • Feeling of incomplete bladder emptying   • Balanitis   • DKA (diabetic ketoacidosis) ([facility])   • New onset atrial fibrillation ([facility])   • Urinary retention   • Fungal infection of skin   • Depressed mood   • Pain in both lower extremities   • Polyuria   • Ventral Hernia   • Hydroureteronephrosis   • Uncontrolled type 2 diabetes mellitus with hyperglycemia ([facility])   • Vitamin D deficiency   • Dyslipidemia   • Medicare annual wellness visit, subsequent     Past Medical History:   Diagnosis Date   • A-fib ([place] [address] )    • Acute metabolic encephalopathy 29/56/6671   • CARLTON (acute kidney injury) ([place] [address] ) 09/04/2022   • Diabetes mellitus ([place] [address] )    • Pancreatitis 09/04/2022   • Sepsis ([place] [address] ) 09/04/2022     Social History     Socioeconomic History   • Marital status: /Civil Union     Spouse name: Not on file   • Number of children: Not on file   • Years of education: Not on file   • Highest education level: Not on file   Occupational History   • Not on file   Tobacco Use   • Smoking status: Never   • Smokeless tobacco: Never   Vaping Use   • Vaping Use: Never used   Substance and Sexual Activity   • Alcohol use: Never   • Drug use: Never   • Sexual activity: Not on file   Other Topics Concern   • Not on file   Social History Narrative   • Not on file     Social Determinants of Health     Financial Resource Strain: Low Risk    • Difficulty of Paying Living Expenses: Not hard at all   Food Insecurity: No Food Insecurity   • Worried About [address] in the Last Year: Never true   • Ran Out of Food in the Last Year: Never true   Transportation Needs: No Transportation Needs   • Lack of Transportation (Medical): No   • Lack of Transportation (Non-Medical):  No   Physical Activity: Not on file   Stress: Not on file   Social Connections: Not on file   Intimate Partner Violence: Not on file   Housing Stability: Low Risk    • Unable to Pay for Housing in the Last Year: No   • Number of Places Lived in the Last Year: 1   • Unstable Housing in the Last Year: No      Family History   Problem Relation Age of Onset   • Diabetes Father    • Diabetes Mother      Past Surgical History:   Procedure Laterality Date   • KNEE CARTILAGE SURGERY     • KNEE SURGERY Right        Current Outpatient Medications:   •  ammonium lactate (LAC-HYDRIN) 12 % cream, Apply topically as needed for dry skin, Disp: 385 g, Rfl: 1  •  ascorbic acid (VITAMIN C) 500 MG tablet, Take 1 tablet (500 mg total) by mouth 2 (two) times a day, Disp: 60 tablet, Rfl: 1  •  Blood Glucose Monitoring Suppl (OneTouch Verio Flex System) w/Device KIT, CHECK BLOOD SUGARS THREE TIMES DAILY DX: E11 65, Disp: 1 kit, Rfl: 0  •  doxycycline hyclate (VIBRAMYCIN) 100 mg capsule, Take 1 capsule (100 mg total) by mouth every 12 (twelve) hours for 7 days, Disp: 14 capsule, Rfl: 0  •  ferrous sulfate 324 (65 Fe) mg, TAKE 1 TABLET BY MOUTH 2 TIMES A DAY BEFORE MEALS, Disp: 180 tablet, Rfl: 1  •  folic acid (FOLVITE) 1 mg tablet, TAKE 1 TABLET BY MOUTH EVERY DAY, Disp: 90 tablet, Rfl: 1  •  gabapentin (NEURONTIN) 100 mg capsule, Take 1 capsule (100 mg total) by mouth 3 (three) times a day, Disp: 90 capsule, Rfl: 3  •  glucose blood (OneTouch Verio) test strip, CHECK BLOOD SUGARS THREE TIMES DAILY DX: E11 65, Disp: 400 strip, Rfl: 3  •  Incontinence Supply Disposable (Incontinence Brief Large) MISC, Use every 4 (four) hours as needed (Urinary incontinence), Disp: 36 each, Rfl: 12  •  insulin lispro (HumaLOG KwikPen) 100 units/mL injection pen, Inject 10 Units under the skin 3 (three) times a day with meals (Patient taking differently: Inject 10 Units under the skin 3 (three) times a day with meals Patient reports taking 6 units with meals), Disp: 15 mL, Rfl: 0  •  Lancets (OneTouch Delica Plus [id number]) MISC, CHECK BLOOD SUGARS THREE TIMES DAILY DX: E11 65, Disp: 400 each, Rfl: 3  •  Multiple Vitamin (Daily-Octavio Multivitamin) TABS, TAKE 1 TABLET BY MOUTH EVERY DAY, Disp: 30 tablet, Rfl: 1  •  Multiple Vitamins-Minerals (multivitamin with minerals) tablet, Take 1 tablet by mouth daily, Disp: 30 tablet, Rfl: 1  •  oxybutynin (DITROPAN) 5 mg tablet, Take 1 tablet (5 mg total) by mouth 3 (three) times a day as needed (Bladder spasms), Disp: 60 tablet, Rfl: 6  •  pantoprazole (PROTONIX) 40 mg tablet, Take 1 tablet (40 mg total) by mouth daily in the early morning, Disp: 30 tablet, Rfl: 0  •  Toujeo SoloStar 300 units/mL CONCENTRATED U-300 injection pen (1-unit dial), INJECT 40 UNITS UNDER THE SKIN EVERY MORNING, Disp: 13 5 mL, Rfl: 0  •  traMADol (ULTRAM) 50 mg tablet, Take 1 tablet (50 mg total) by mouth every 6 (six) hours as needed for moderate pain, Disp: 20 tablet, Rfl: 0  •  docusate sodium (COLACE) 100 mg capsule, Take 1 capsule (100 mg total) by mouth 2 (two) times a day (Patient not taking: Reported on 12/8/2022), Disp: 60 capsule, Rfl: 1  •  glucose 4 g chewable tablet, Chew 4 tablets (16 g total) once for 1 dose (Patient not taking: Reported on 12/8/2022), Disp: 4 tablet, Rfl: 0  •  insulin lispro (HumaLOG) 100 units/mL injection, Inject 10 Units under the skin 3 (three) times a day with meals (Patient not taking: Reported on 12/8/2022), Disp: 20 mL, Rfl: 0  •  levofloxacin (LEVAQUIN) 500 mg tablet, Take 1 tablet (500 mg total) by mouth every 24 hours for 7 days (Patient not taking: Reported on 12/8/2022), Disp: 7 tablet, Rfl: 0  • tamsulosin (FLOMAX) 0 4 mg, Take 1 capsule (0 4 mg total) by mouth daily with dinner (Patient not taking: Reported on 12/8/2022), Disp: 30 capsule, Rfl: 12  No Known Allergies      Labs:  Appointment on 11/17/2022   Component Date Value   • Sodium 11/17/2022 140    • Potassium 11/17/2022 3 6    • Chloride 11/17/2022 106    • CO2 11/17/2022 30    • ANION GAP 11/17/2022 4    • BUN 11/17/2022 21    • Creatinine 11/17/2022 0 93    • Glucose 11/17/2022 143 (H)    • Calcium 11/17/2022 9 6    • Corrected Calcium 11/17/2022 10 2 (H)    • AST 11/17/2022 26    • ALT 11/17/2022 27    • Alkaline Phosphatase 11/17/2022 79    • Total Protein 11/17/2022 7 7    • Albumin 11/17/2022 3 3 (L)    • Total Bilirubin 11/17/2022 0 74    • eGFR 11/17/2022 84    • WBC 11/17/2022 6 37    • RBC 11/17/2022 4 61    • Hemoglobin 11/17/2022 13 5    • Hematocrit 11/17/2022 41 6    • MCV 11/17/2022 90    • MCH 11/17/2022 29 3    • MCHC 11/17/2022 32 5    • RDW 11/17/2022 12 7    • MPV 11/17/2022 11 8    • Platelets 95/61/8707 247    • nRBC 11/17/2022 0    • Neutrophils Relative 11/17/2022 56    • Immat GRANS % 11/17/2022 0    • Lymphocytes Relative 11/17/2022 37    • Monocytes Relative 11/17/2022 6    • Eosinophils Relative 11/17/2022 1    • Basophils Relative 11/17/2022 0    • Neutrophils Absolute 11/17/2022 3 56    • Immature Grans Absolute 11/17/2022 0 01    • Lymphocytes Absolute 11/17/2022 2 37    • Monocytes Absolute 11/17/2022 0 38    • Eosinophils Absolute 11/17/2022 0 03    • Basophils Absolute 11/17/2022 0 02    • Protime 11/17/2022 13 5    • INR 11/17/2022 1 01    • PTT 11/17/2022 29    • ABO Grouping 11/18/2022 O    • Rh Factor 11/18/2022 Positive    • Antibody Screen 11/18/2022 Negative    • Specimen Expiration Date 11/18/2022 [id number]    • Hemoglobin A1C 11/17/2022 6 5 (H)    • EAG 11/17/2022 140         Imaging: US scrotum and testicles    Result Date: 11/29/2022  Narrative: SCROTAL ULTRASOUND INDICATION:    N45 1: Epididymitis  COMPARISON: None TECHNIQUE:   Ultrasound the scrotal contents was performed with a high frequency linear transducer utilizing volumetric sweep imaging as well as standard still image techniques  Imaging performed in longitudinal and transverse orientation  Color and spectral Doppler evaluation also performed bilaterally  FINDINGS: Evaluation slightly limited by patient pain  TESTES: RIGHT testis = 5 2 x 3 6 x 3 6 cm  Volume 34 6 mL Normal contour with mildly heterogeneous echotexture  No intratesticular mass lesion or calcifications  LEFT testis = 4 2 x 3 3 x 2 5 cm  Volume 18 4 mL Normal contour with homogeneous smooth echotexture  No intratesticular mass lesion or calcifications  Doppler flow within both testes is present with hyperemia on the right  EPIDIDYMIDES: Left epididymis appears grossly normal on the cine images  Right epididymis not clearly visualized  HYDROCELE:  Moderate-sized complex hydrocele with multiple septations and low level debris  VARICOCELE:  None present  SCROTUM:  Scrotal thickness and appearance within normal limits  No evidence for extratesticular mass or hernia demonstrated  Impression:  Abnormal right testicle compatible with acute orchitis  Right epididymis not clearly visualized  No significant hyperemia in the expected location of the epididymis  Moderate complex right hydrocele  This study demonstrates a significant  finding and was documented as such in Psychiatric for liaison and referring practitioner notification  Workstation performed: [id number]     Review of Systems:  Review of Systems   Constitutional: Negative for chills, diaphoresis, fatigue and fever  HENT: Negative for trouble swallowing and voice change  Eyes: Negative for pain and redness  Respiratory: Negative for shortness of breath and wheezing  Cardiovascular: Negative for chest pain, palpitations and leg swelling     Gastrointestinal: Negative for abdominal pain, constipation, diarrhea, nausea and vomiting  Genitourinary: Negative for dysuria  Musculoskeletal: Positive for arthralgias  Negative for neck pain and neck stiffness  All other systems reviewed and are negative  Vitals:    12/08/22 1130   BP: 106/68   Pulse: 92   Weight: 99 3 kg (219 lb)   Height: 6' 1" (1 854 m)     Vitals:    12/08/22 1130   Weight: 99 3 kg (219 lb)     Height: 6' 1" (185 4 cm)     Physical Exam:  Physical Exam  Vitals reviewed  Constitutional:       General: He is not in acute distress  Appearance: Normal appearance  He is not diaphoretic  HENT:      Head: Normocephalic and atraumatic  Eyes:      General:         Right eye: No discharge  Left eye: No discharge  Neck:      Comments: Trachea midline, no JVD present  Cardiovascular:      Rate and Rhythm: Normal rate and regular rhythm  Heart sounds: No friction rub  Pulmonary:      Effort: Pulmonary effort is normal  No respiratory distress  Breath sounds: No wheezing  Chest:      Chest wall: No tenderness  Abdominal:      General: Bowel sounds are normal       Palpations: Abdomen is soft  Tenderness: There is no abdominal tenderness  There is no rebound  Musculoskeletal:      Right lower leg: No edema  Left lower leg: No edema  Skin:     General: Skin is warm and dry  Neurological:      Mental Status: He is alert  Comments: Awake, alert, able to answer questions appropriately, able to move extremities bilaterally     Psychiatric:         Mood and Affect: Mood normal          Behavior: Behavior normal

## 2022-12-09 ENCOUNTER — TELEPHONE (OUTPATIENT)
Dept: PODIATRY | Facility: CLINIC | Age: 67
End: 2022-12-09

## 2022-12-14 ENCOUNTER — HOSPITAL ENCOUNTER (OUTPATIENT)
Dept: ULTRASOUND IMAGING | Facility: HOSPITAL | Age: 67
Discharge: HOME | End: 2022-12-14
Attending: UROLOGY

## 2022-12-14 DIAGNOSIS — N45.2 ORCHITIS OF RIGHT TESTICLE: ICD-10-CM

## 2022-12-14 NOTE — LETTER
[address] [place] [address]  [facility] [address] [address]      December 24, 2022    MRN: [id number]     Phone: [phone]     Dear Mr Claudia Ulloa recently had a(n) Ultrasound performed on 12/14/2022 at  [address] [place] [address] that was requested by Hira Redding MD  The study was reviewed by a radiologist, which is a physician who specializes in medical imaging  The radiologist issued a report describing his or her findings  In that report there was a finding that the radiologist felt warranted further discussion with your health care provider and that discussion would be beneficial to you  The results were sent to Hira Redding MD on 12/19/2022 12:48 PM  We recommend that you contact Hira Redding MD at [phone] or set up an appointment to discuss the results of the imaging test  If you have already heard from Hira Redding MD regarding the results of your study, you can disregard this letter  This letter is not meant to alarm you, but intended to encourage you to follow-up on your results with the provider that sent you for the imaging study  In addition, we have enclosed answers to frequently asked questions by other patients who have also received a letter to review results with their health care provider (see page two)  Thank you for choosing [facility] [facility] for your medical imaging needs  FREQUENTLY ASKED QUESTIONS    1  Why am I receiving this letter? [facility] requires us to notify patients who have findings on imaging exams that may require more testing or follow-up with a health professional within the next 3 months  2  How serious is the finding on the imaging test?  This letter is sent to all patients who may need follow-up or more testing within the next 3 months  Receiving this letter does not necessarily mean you have a life-threatening imaging finding or disease  Recommendations in the radiologist’s imaging report are general in nature and it is up to your healthcare provider to say whether those recommendations make sense for your situation  You are strongly encouraged to talk to your health care provider about the results and ask whether additional steps need to be taken  3  Where can I get a copy of the final report for my recent radiology exam?  To get a full copy of the report you can access your records online at [email or web address] or please contact [facility] at [phone] Monday through Friday between 8 am and 6 pm          4  What do I need to do now? Please contact your health care provider who requested the imaging study to discuss what further actions (if any) are needed  You may have already heard from (your ordering provider) in regard to this test in which case you can disregard this letter  NOTICE IN ACCORDANCE WITH THE [place] STATE “PATIENT TEST RESULT INFORMATION ACT OF 2018”    You are receiving this notice as a result of a determination by your diagnostic imaging service that further discussions of your test results are warranted and would be beneficial to you  The complete results of your test or tests have been or will be sent to the health care practitioner that ordered the test or tests  It is recommended that you contact your health care practitioner to discuss your results as soon as possible

## 2022-12-16 PROBLEM — E11.10 DKA (DIABETIC KETOACIDOSIS) (HCC): Status: RESOLVED | Noted: 2022-09-04 | Resolved: 2022-12-16

## 2022-12-19 ENCOUNTER — TELEPHONE (OUTPATIENT)
Dept: OTHER | Facility: OTHER | Age: 67
End: 2022-12-19

## 2022-12-19 NOTE — TELEPHONE ENCOUNTER
Called and left a voicemail asking patient to please call the office to discuss next appointment  Left message stating there is no nurse available for the 23rd in [place]  Next opening is the 30th

## 2022-12-19 NOTE — TELEPHONE ENCOUNTER
Pt called in stating he would like to have his catheter removed earlier because it's irritating him  He would also like a refill of doxycycline  He is requesting a call back

## 2022-12-20 ENCOUNTER — TELEPHONE (OUTPATIENT)
Dept: UROLOGY | Facility: CLINIC | Age: 67
End: 2022-12-20

## 2022-12-20 NOTE — TELEPHONE ENCOUNTER
rec'd message from Dr Hoda Carreno with Results  Copied below  Please let him know that his scrotal ultrasound shows no sign of cancer, no abscess  [place] is a hydrocele as we knew about before  Narcisa Wei will see him at the next office visit as scheduled   If when talking to him he is not getting better, let me know and I will order a CAT scan  Telephone call to patient   Left generic message for pt to call the office back at [phone]

## 2022-12-20 NOTE — RESULT ENCOUNTER NOTE
Please let him know that his scrotal ultrasound shows no sign of cancer, no abscess  There is a hydrocele as we knew about before  I will see him at the next office visit as scheduled  If when talking to him he is not getting better, let me know and I will order a CAT scan

## 2022-12-20 NOTE — TELEPHONE ENCOUNTER
----- Message from Lynn White MD sent at 12/20/2022 10:54 AM EST -----  Please let him know that his scrotal ultrasound shows no sign of cancer, no abscess  There is a hydrocele as we knew about before  I will see him at the next office visit as scheduled  If when talking to him he is not getting better, let me know and I will order a CAT scan

## 2022-12-23 DIAGNOSIS — N45.2 ORCHITIS OF RIGHT TESTICLE: Primary | ICD-10-CM

## 2022-12-23 RX ORDER — DOXYCYCLINE HYCLATE 100 MG/1
100 CAPSULE ORAL EVERY 12 HOURS SCHEDULED
Qty: 14 CAPSULE | Refills: 0 | Status: SHIPPED | OUTPATIENT
Start: 2022-12-23 | End: 2022-12-30

## 2022-12-23 NOTE — TELEPHONE ENCOUNTER
Patient states that is swelling is going down not having as much pain  Reports a lot of redness on thighs and scrotum   No fevers or chills  Is currently taking Levofloxacin 500 mg tablet , he does not have any more doxycycline acylate he finished 3-4 days ago  Will need another prescription  Also has questions about catheter   His upcoming appt is for a change, patient is questioning about having it removed  Changed appt to 8:30 am with an 8:15 arrival time  Due to schedule availability will check with RN on site for later pvr timing       Will send provider medication request

## 2022-12-30 ENCOUNTER — PROCEDURE VISIT (OUTPATIENT)
Dept: UROLOGY | Facility: CLINIC | Age: 67
End: 2022-12-30

## 2022-12-30 VITALS
HEART RATE: 84 BPM | HEIGHT: 73 IN | BODY MASS INDEX: 29.55 KG/M2 | SYSTOLIC BLOOD PRESSURE: 124 MMHG | DIASTOLIC BLOOD PRESSURE: 78 MMHG | WEIGHT: 223 LBS

## 2022-12-30 DIAGNOSIS — R33.9 URINARY RETENTION: ICD-10-CM

## 2022-12-30 LAB — POST-VOID RESIDUAL VOLUME, ML POC: 488 ML

## 2022-12-30 NOTE — PROGRESS NOTES
12/30/2022    Oracio Richardosn    1955  [id number]    Diagnosis  Chief Complaint    void trial; Urinary Retention         Patient presents for void trial managed by Dr Micaela Broderick  Follow up as scheduled for OV with Dr Hoda Carreno    Procedure Lane removal/voiding trial    Lane catheter removed after deflation of an intact balloon  Patient tolerated well  Encouraged patient to hydrate well and return this afternoon for post void residual   he knows he may return early if uncomfortable and unable to urinate  Patient agrees to this plan  Patient returned this afternoon  Patient states able to void but super uncomfortable  Patient voided while in office  Bladder ultrasound performed and PVR measured 488ml  Recent Results (from the past 4 hour(s))   POCT Measure PVR    Collection Time: 12/30/22  2:38 PM   Result Value Ref Range    POST-VOID RESIDUAL VOLUME, ML  mL           Vitals:    12/30/22 0829   BP: 124/78   Pulse: 84   Weight: 101 kg (223 lb)   Height: 6' 1" (1 854 m)       Procedure:    Universal Protocol:  Consent: Verbal consent obtained  Risks and benefits: risks, benefits and alternatives were discussed  Consent given by: patient  Patient understanding: patient states understanding of the procedure being performed  Patient identity confirmed: verbally with patient      Bladder catheterization    Date/Time: 12/30/2022 2:39 PM  Performed by: Jossy Campos RN  Authorized by: Valery Oliva MD     Consent:     Consent given by:  Patient  Universal protocol:     Procedure explained and questions answered to patient or proxy's satisfaction: yes      Patient identity confirmed:  Verbally with patient  Pre-procedure details:     Procedure purpose:  Therapeutic  Anesthesia (see MAR for exact dosages):      Anesthesia method:  None  Procedure details:     Catheter insertion:  Indwelling    Approach: natural orifice      Catheter type:  Coude, latex and Lane    Catheter size:  16 Fr    Number of attempts:  1    Successful placement: yes      Urine characteristics:  Clear  Post-procedure details:     Patient tolerance of procedure: Tolerated well, no immediate complications  Comments:      750 mL of clear yellow urine returned  Attached to overnight bag and supplied with additional bag and 3 stat locks            Solo Light RN

## 2022-12-30 NOTE — RESULT ENCOUNTER NOTE
Is patient taking the tamsulosin? He had urodynamic testing which is consistent with bladder outlet obstruction    Did he stop the oxybutynin prior to his void trial?

## 2022-12-30 NOTE — PROGRESS NOTES
I supervised the Advanced Practitioner  I reviewed the Advanced Practitioner note and agree      Umesh Bolivar MD 12/30/22

## 2023-01-01 PROBLEM — Z00.00 MEDICARE ANNUAL WELLNESS VISIT, SUBSEQUENT: Status: RESOLVED | Noted: 2022-11-02 | Resolved: 2023-01-01

## 2023-01-03 ENCOUNTER — TELEPHONE (OUTPATIENT)
Dept: UROLOGY | Facility: AMBULATORY SURGERY CENTER | Age: 68
End: 2023-01-03

## 2023-01-03 ENCOUNTER — TELEPHONE (OUTPATIENT)
Dept: ENDOCRINOLOGY | Facility: CLINIC | Age: 68
End: 2023-01-03

## 2023-01-03 DIAGNOSIS — N45.2 ORCHITIS OF RIGHT TESTICLE: Primary | ICD-10-CM

## 2023-01-03 DIAGNOSIS — E11.65 UNCONTROLLED TYPE 2 DIABETES MELLITUS WITH HYPERGLYCEMIA (HCC): ICD-10-CM

## 2023-01-03 RX ORDER — BLOOD SUGAR DIAGNOSTIC
STRIP MISCELLANEOUS
Qty: 400 STRIP | Refills: 3 | Status: SHIPPED | OUTPATIENT
Start: 2023-01-03

## 2023-01-03 RX ORDER — DOXYCYCLINE 100 MG/1
100 TABLET ORAL 2 TIMES DAILY
Qty: 28 TABLET | Refills: 0 | Status: SHIPPED | OUTPATIENT
Start: 2023-01-03 | End: 2023-01-17

## 2023-01-03 NOTE — TELEPHONE ENCOUNTER
----- Message from [address] [place] [address] sent at 12/30/2022  2:43 PM EST -----  Is patient taking the tamsulosin? He had urodynamic testing which is consistent with bladder outlet obstruction    Did he stop the oxybutynin prior to his void trial?

## 2023-01-03 NOTE — TELEPHONE ENCOUNTER
Patient did not stop any medications prior to void trial and he is scheduled with Dr Gabriella Mcconnell on 1/20/2023  Patient needs refills on the following medication: Doxycycline 100 mg

## 2023-01-03 NOTE — TELEPHONE ENCOUNTER
Is he still having testicular pain that he is requesting a refill of doxycycline? Fill it but if he is not having pain he does not need to take it    He needs to stop the oxybutynin prior to his void trial at least a day or 2 before    He should be taking the Flomax that was previously prescribed

## 2023-01-04 ENCOUNTER — CONSULT (OUTPATIENT)
Dept: HEMATOLOGY ONCOLOGY | Facility: CLINIC | Age: 68
End: 2023-01-04

## 2023-01-04 VITALS
OXYGEN SATURATION: 98 % | DIASTOLIC BLOOD PRESSURE: 76 MMHG | HEART RATE: 85 BPM | HEIGHT: 73 IN | BODY MASS INDEX: 30.09 KG/M2 | SYSTOLIC BLOOD PRESSURE: 122 MMHG | WEIGHT: 227 LBS | TEMPERATURE: 97.8 F | RESPIRATION RATE: 18 BRPM

## 2023-01-04 DIAGNOSIS — Z12.11 ENCOUNTER FOR SCREENING COLONOSCOPY: ICD-10-CM

## 2023-01-04 DIAGNOSIS — D51.3 OTHER DIETARY VITAMIN B12 DEFICIENCY ANEMIA: ICD-10-CM

## 2023-01-04 DIAGNOSIS — I82.91 CHRONIC EMBOLISM AND THROMBOSIS OF UNSPECIFIED VEIN: ICD-10-CM

## 2023-01-04 DIAGNOSIS — Z86.711 HISTORY OF PULMONARY EMBOLUS (PE): ICD-10-CM

## 2023-01-04 DIAGNOSIS — D64.9 ANEMIA, UNSPECIFIED TYPE: Primary | ICD-10-CM

## 2023-01-04 NOTE — PROGRESS NOTES
Hematology/Oncology Outpatient Follow-up  Oracio Bundy  75 y o  male 1955 [id number]    Date:  1/4/2023    Assessment and Plan:  1  History of pulmonary embolus (PE)  He seems to have remote history of unprovoked pulmonary embolism more than 10 years ago  At that time he also had lower extremity DVT which most likely contributed to the PE  The patient was told that thrombosis panel can be ordered  However, result is not going to influence the clinical decision regarding anticoagulation  He was told that if he develops a second clotting event for any reason then indefinite anticoagulation usually is recommended unless there is absolute contraindication   - Ambulatory Referral to Hematology / Oncology  - Thrombosis Panel; Future    2  Anemia, unspecified type  He was anemic when he was in the hospital in September of last year  Work-up will be done prior to his next visit  - CBC and differential; Future  - Comprehensive metabolic panel; Future  - Magnesium; Future  - Vitamin B12; Future  - Iron Panel (Includes Ferritin, Iron Sat%, Iron, and TIBC); Future  - Ferritin; Future  - C-reactive protein; Future  - Sedimentation rate, automated; Future  - LD,Blood; Future  - Ambulatory referral to Gastroenterology; Future  - IgG, IgA, IgM; Future  - Immunoglobulin free LT chains blood; Future  - Protein, Total and Protein Electrophoresis with Immunofixation; Future    3  Encounter for screening colonoscopy  He seems to be overdue for colonoscopy which was done more than 10 years ago  - Ambulatory referral to Gastroenterology; Future    4  Chronic embolism and thrombosis of unspecified vein    - Thrombosis Panel; Future    5  Other dietary vitamin B12 deficiency anemia    - Vitamin B12; Future      HPI:  This is a 41-year-old male with a history of type 2 diabetes mellitus and morbid obesity  He also had a remote history of pulmonary embolism seems to be unprovoked at least 10 years ago    The patient was admitted to the hospital around the beginning of November 2022 for diabetic ketoacidosis  Also was found to have paroxysmal atrial fibrillation which converted spontaneously  The patient was sent by the cardiology team for hypercoagulable work-up with the previous history of pulmonary embolism  The patient denied any recent clotting events  He seems to have difficulties urinating  He has a urinary catheter which is causing a great deal of discomfort  His hemoglobin level was around 11 g back in September 2022  However, most recent CBC from 11/17/2022 showed hemoglobin of 13 5 with normal white cells and platelets  Creatinine was 0 9 with high normal calcium of 10 2 and normal liver enzymes  Interval history:  He denied obvious bleeding from any sites  ROS: Review of Systems   Constitutional: Negative for chills and fever  HENT: Negative for ear pain and sore throat  Eyes: Negative for pain and visual disturbance  Respiratory: Negative for cough and shortness of breath  Cardiovascular: Negative for chest pain and palpitations  Gastrointestinal: Negative for abdominal pain and vomiting  Genitourinary: Positive for dysuria  Negative for hematuria  Musculoskeletal: Negative for arthralgias and back pain  Skin: Negative for color change and rash  Neurological: Negative for seizures and syncope  All other systems reviewed and are negative        Past Medical History:   Diagnosis Date   • A-fib [facility])    • Acute metabolic encephalopathy 71/24/3660   • CARLTON (acute kidney injury) ([facility] [address] ) 09/04/2022   • Diabetes mellitus ([facility] )    • Pancreatitis 09/04/2022   • Sepsis ([facility] ) 09/04/2022       Past Surgical History:   Procedure Laterality Date   • KNEE CARTILAGE SURGERY     • KNEE SURGERY Right        Social History     Socioeconomic History   • Marital status: /Civil Union     Spouse name: None   • Number of children: None   • Years of education: None   • Highest education level: None Occupational History   • None   Tobacco Use   • Smoking status: Never   • Smokeless tobacco: Never   Vaping Use   • Vaping Use: Never used   Substance and Sexual Activity   • Alcohol use: Never   • Drug use: Never   • Sexual activity: None   Other Topics Concern   • None   Social History Narrative   • None     Social Determinants of Health     Financial Resource Strain: Low Risk    • Difficulty of Paying Living Expenses: Not hard at all   Food Insecurity: No Food Insecurity   • Worried About Running Out of Food in the Last Year: Never true   • Ran Out of Food in the Last Year: Never true   Transportation Needs: No Transportation Needs   • Lack of Transportation (Medical): No   • Lack of Transportation (Non-Medical):  No   Physical Activity: Not on file   Stress: Not on file   Social Connections: Not on file   Intimate Partner Violence: Not on file   Housing Stability: Low Risk    • Unable to Pay for Housing in the Last Year: No   • Number of Places Lived in the Last Year: 1   • Unstable Housing in the Last Year: No       Family History   Problem Relation Age of Onset   • Diabetes Father    • Diabetes Mother        No Known Allergies      Current Outpatient Medications:   •  ammonium lactate (LAC-HYDRIN) 12 % cream, Apply topically as needed for dry skin, Disp: 385 g, Rfl: 1  •  ascorbic acid (VITAMIN C) 500 MG tablet, Take 1 tablet (500 mg total) by mouth 2 (two) times a day, Disp: 60 tablet, Rfl: 1  •  Blood Glucose Monitoring Suppl (OneTouch Verio Flex System) w/Device KIT, CHECK BLOOD SUGARS THREE TIMES DAILY DX: E11 65, Disp: 1 kit, Rfl: 0  •  doxycycline (ADOXA) 100 MG tablet, Take 1 tablet (100 mg total) by mouth 2 (two) times a day for 14 days, Disp: 28 tablet, Rfl: 0  •  ferrous sulfate 324 (65 Fe) mg, TAKE 1 TABLET BY MOUTH 2 TIMES A DAY BEFORE MEALS, Disp: 180 tablet, Rfl: 1  •  folic acid (FOLVITE) 1 mg tablet, TAKE 1 TABLET BY MOUTH EVERY DAY, Disp: 90 tablet, Rfl: 1  •  gabapentin (NEURONTIN) 100 mg capsule, Take 1 capsule (100 mg total) by mouth 3 (three) times a day, Disp: 90 capsule, Rfl: 3  •  glucose blood (OneTouch Verio) test strip, CHECK BLOOD SUGARS THREE TIMES DAILY DX: E11 65, Disp: 400 strip, Rfl: 3  •  Incontinence Supply Disposable (Incontinence Brief Large) MISC, Use every 4 (four) hours as needed (Urinary incontinence), Disp: 36 each, Rfl: 12  •  insulin lispro (HumaLOG KwikPen) 100 units/mL injection pen, Inject 10 Units under the skin 3 (three) times a day with meals (Patient taking differently: Inject 10 Units under the skin 3 (three) times a day with meals Patient reports taking 6 units with meals), Disp: 15 mL, Rfl: 0  •  Lancets (OneTouch Delica Plus [id number]) MISC, CHECK BLOOD SUGARS THREE TIMES DAILY DX: E11 65, Disp: 400 each, Rfl: 3  •  Multiple Vitamin (Daily-Octavio Multivitamin) TABS, TAKE 1 TABLET BY MOUTH EVERY DAY, Disp: 30 tablet, Rfl: 1  •  Multiple Vitamins-Minerals (multivitamin with minerals) tablet, Take 1 tablet by mouth daily, Disp: 30 tablet, Rfl: 1  •  pantoprazole (PROTONIX) 40 mg tablet, Take 1 tablet (40 mg total) by mouth daily in the early morning, Disp: 30 tablet, Rfl: 0  •  Toujeo SoloStar 300 units/mL CONCENTRATED U-300 injection pen (1-unit dial), INJECT 40 UNITS UNDER THE SKIN EVERY MORNING, Disp: 13 5 mL, Rfl: 0  •  traMADol (ULTRAM) 50 mg tablet, Take 1 tablet (50 mg total) by mouth every 6 (six) hours as needed for moderate pain, Disp: 20 tablet, Rfl: 0  •  docusate sodium (COLACE) 100 mg capsule, Take 1 capsule (100 mg total) by mouth 2 (two) times a day (Patient not taking: Reported on 12/8/2022), Disp: 60 capsule, Rfl: 1  •  glucose 4 g chewable tablet, Chew 4 tablets (16 g total) once for 1 dose (Patient not taking: Reported on 12/8/2022), Disp: 4 tablet, Rfl: 0  •  insulin lispro (HumaLOG) 100 units/mL injection, Inject 10 Units under the skin 3 (three) times a day with meals (Patient not taking: Reported on 12/8/2022), Disp: 20 mL, Rfl: 0  •  tamsulosin (FLOMAX) 0 4 mg, Take 1 capsule (0 4 mg total) by mouth daily with dinner (Patient not taking: Reported on 12/8/2022), Disp: 30 capsule, Rfl: 12      Physical Exam:  /76 (BP Location: Left arm, Patient Position: Sitting, Cuff Size: Adult)   Pulse 85   Temp 97 8 °F (36 6 °C)   Resp 18   Ht 6' 1" (1 854 m)   Wt 103 kg (227 lb)   SpO2 98%   BMI 29 95 kg/m²     Physical Exam  Constitutional:       Appearance: He is well-developed  HENT:      Head: Normocephalic and atraumatic  Eyes:      General: No scleral icterus  Right eye: No discharge  Left eye: No discharge  Conjunctiva/sclera: Conjunctivae normal       Pupils: Pupils are equal, round, and reactive to light  Neck:      Thyroid: No thyromegaly  Trachea: No tracheal deviation  Cardiovascular:      Rate and Rhythm: Normal rate and regular rhythm  Heart sounds: Normal heart sounds  No murmur heard  No friction rub  Pulmonary:      Effort: Pulmonary effort is normal  No respiratory distress  Breath sounds: Normal breath sounds  No wheezing or rales  Chest:      Chest wall: No tenderness  Abdominal:      General: There is no distension  Palpations: Abdomen is soft  There is no hepatomegaly or splenomegaly  Tenderness: There is no abdominal tenderness  There is no guarding or rebound  Comments: Pain in the suprapubic area   Musculoskeletal:         General: No tenderness or deformity  Normal range of motion  Cervical back: Normal range of motion and neck supple  Lymphadenopathy:      Cervical: No cervical adenopathy  Skin:     General: Skin is warm and dry  Coloration: Skin is not pale  Findings: No erythema or rash  Neurological:      Mental Status: He is alert and oriented to person, place, and time  Cranial Nerves: No cranial nerve deficit  Coordination: Coordination normal       Deep Tendon Reflexes: Reflexes are normal and symmetric     Psychiatric: Behavior: Behavior normal          Thought Content: Thought content normal          Judgment: Judgment normal            Labs:  Lab Results   Component Value Date    WBC 6 37 11/17/2022    HGB 13 5 11/17/2022    HCT 41 6 11/17/2022    MCV 90 11/17/2022     11/17/2022     Lab Results   Component Value Date    K 3 6 11/17/2022     11/17/2022    CO2 30 11/17/2022    BUN 21 11/17/2022    CREATININE 0 93 11/17/2022    GLUCOSE 241 (H) 09/07/2022    GLUF 180 (H) 09/16/2022    CALCIUM 9 6 11/17/2022    CORRECTEDCA 10 2 (H) 11/17/2022    AST 26 11/17/2022    ALT 27 11/17/2022    ALKPHOS 79 11/17/2022    EGFR 84 11/17/2022       Patient voiced understanding and agreement in the above discussion  Aware to contact our office with questions/symptoms in the interim

## 2023-01-05 DIAGNOSIS — R33.9 URINARY RETENTION: ICD-10-CM

## 2023-01-06 RX ORDER — TAMSULOSIN HYDROCHLORIDE 0.4 MG/1
0.4 CAPSULE ORAL
Qty: 90 CAPSULE | Refills: 3 | Status: SHIPPED | OUTPATIENT
Start: 2023-01-06

## 2023-01-06 NOTE — TELEPHONE ENCOUNTER
An Auto-fax Refill Request for Tamsulosin 0 4mg was received from [facility]/pharmacy #[id number]        Request for same, 90 day supply with 3 refills was queued and forwarded to the Advanced Practitioner covering the [facility] location for approval

## 2023-01-09 ENCOUNTER — HOSPITAL ENCOUNTER (OUTPATIENT)
Dept: SURGERY | Facility: HOSPITAL | Age: 68
Discharge: HOME | End: 2023-01-09

## 2023-01-09 VITALS
WEIGHT: 227 LBS | HEART RATE: 73 BPM | DIASTOLIC BLOOD PRESSURE: 75 MMHG | SYSTOLIC BLOOD PRESSURE: 154 MMHG | BODY MASS INDEX: 30.75 KG/M2 | HEIGHT: 72 IN | OXYGEN SATURATION: 99 % | TEMPERATURE: 97.7 F | RESPIRATION RATE: 16 BRPM

## 2023-01-09 LAB — GLUCOSE SERPL-MCNC: 131 MG/DL (ref 65–140)

## 2023-01-09 RX ORDER — LIDOCAINE HYDROCHLORIDE 10 MG/ML
5 INJECTION, SOLUTION EPIDURAL; INFILTRATION; INTRACAUDAL; PERINEURAL ONCE
Status: COMPLETED | OUTPATIENT
Start: 2023-01-09 | End: 2023-01-09

## 2023-01-09 RX ADMIN — LIDOCAINE HYDROCHLORIDE 5 ML: 10 INJECTION, SOLUTION EPIDURAL; INFILTRATION; INTRACAUDAL; PERINEURAL at 10:16

## 2023-01-09 NOTE — PROCEDURES
INVASIVE CARDIOLOGY- LOOP RECORDER IMPLANTATION    PRE-OP DIAGNOSIS: Paroxysmal atrial fibrillation    POST-OP DIAGNOSIS:  Same     :  Brandion    Device Details: Medtronic [id number] Serial [id number]      ANESTHESIA:  Local anesthesia with 1% lidocaine    COMPLICATIONS:  None  The nature of the procedure, risks and alternatives were discussed with the patient who gave informed consent  OPERATIVE TECHNIQUE:  The patient draped in a sterile fashion  The 4th intercostal space 2 cm from the left edge of the sternum was identified  Local anesthesia was performed with 1% Lidocaine   An incision was made with the push blade  The insertion tool was placed through the incision and rotated 180 degrees  The plunger was inserted and the device was deployed  The skin was closed with dermabond  Steri-strips were added  Follow up wound and device f/u arranged

## 2023-01-17 LAB
DME PARACHUTE DELIVERY DATE REQUESTED: NORMAL
DME PARACHUTE ITEM DESCRIPTION: NORMAL
DME PARACHUTE ITEM DESCRIPTION: NORMAL
DME PARACHUTE ORDER STATUS: NORMAL
DME PARACHUTE SUPPLIER NAME: NORMAL
DME PARACHUTE SUPPLIER PHONE: NORMAL

## 2023-01-18 ENCOUNTER — OFFICE VISIT (OUTPATIENT)
Dept: ENDOCRINOLOGY | Facility: CLINIC | Age: 68
End: 2023-01-18

## 2023-01-18 VITALS
SYSTOLIC BLOOD PRESSURE: 118 MMHG | OXYGEN SATURATION: 98 % | HEART RATE: 88 BPM | WEIGHT: 221 LBS | DIASTOLIC BLOOD PRESSURE: 70 MMHG | HEIGHT: 72 IN | RESPIRATION RATE: 20 BRPM | BODY MASS INDEX: 29.93 KG/M2

## 2023-01-18 DIAGNOSIS — E55.9 VITAMIN D DEFICIENCY: ICD-10-CM

## 2023-01-18 DIAGNOSIS — E11.9 TYPE 2 DIABETES MELLITUS WITHOUT COMPLICATION, WITH LONG-TERM CURRENT USE OF INSULIN (HCC): Primary | ICD-10-CM

## 2023-01-18 DIAGNOSIS — Z79.4 TYPE 2 DIABETES MELLITUS WITHOUT COMPLICATION, WITH LONG-TERM CURRENT USE OF INSULIN (HCC): Primary | ICD-10-CM

## 2023-01-18 DIAGNOSIS — E78.5 DYSLIPIDEMIA: ICD-10-CM

## 2023-01-18 NOTE — PATIENT INSTRUCTIONS
Take Toujeo 15 units at bedtime  If you notice your blood sugars are consistently below 80 before breakfast bring down Toujeo to 10 units at bedtime  Fasting blood sugar between  and 2 hours after you eat below 150  If you notice your blood sugars after you eat are consistently above 188 take 2 units of Humalog before you eat    Please complete your labs  Continue checking her blood sugars 2 or 3 times a day and send me a log in 2 or 3 weeks

## 2023-01-18 NOTE — PROGRESS NOTES
Established patient  Progress Note      Cc: diabetes    Referring Provider  Awa Hernandez Do  [address] [facility],  [address]     History of Present Illness:   Иван Kulkarni  is a 75 y o  male with a history of type 2 diabetes with long term use of insulin who presented for follow up  Last seen in the office in October 2022  He has history of diabetes ketoacidosis  Reports complications of none  Denies recent illness or hospitalizations  Denies recent severe hypoglycemic or severe hyperglycemic episodes  Denies any issues with his current regimen  home glucose monitoring: are performed regularly      Current regimen: Toujeo 40 units at bedtime  Humalog 8 units before meals  However he stopped taking insulin December and he is taking it as needed          Home blood glucose readings:   Blood sugars are ranging from 120- 190     Hypoglycemic episodes:   H/o of hypoglycemia causing hospitalization or Intervention such as glucagon injection  or ambulance call : no  Hypoglycemia symptoms: drowsy,   Treatment of hypoglycemia: juice       Diabetes education: YES              Opthamology:UTD  Podiatry: UTD      Has hypertension: no   Has hyperlipidemia: no  Thyroid disorders: no  History of pancreatitis: no    Patient Active Problem List   Diagnosis   • Urinary frequency   • Type 2 diabetes mellitus (HCC)   • Microscopic hematuria   • Urge incontinence of urine   • Nocturnal enuresis   • Feeling of incomplete bladder emptying   • Balanitis   • New onset atrial fibrillation (HCC)   • Urinary retention   • Fungal infection of skin   • Depressed mood   • Pain in both lower extremities   • Polyuria   • Ventral Hernia   • Hydroureteronephrosis   • Uncontrolled type 2 diabetes mellitus with hyperglycemia (HCC)   • Vitamin D deficiency   • Dyslipidemia   • History of pulmonary embolus (PE)      Past Medical History:   Diagnosis Date   • A-fib ([facility] )    • Acute metabolic encephalopathy 16/05/5499   • CARLTON (acute kidney injury) ([place] [address] ) 09/04/2022   • Diabetes mellitus ([place] [address] )    • Pancreatitis 09/04/2022   • Sepsis ([place] [address] ) 09/04/2022      Past Surgical History:   Procedure Laterality Date   • KNEE CARTILAGE SURGERY     • KNEE SURGERY Right       Family History   Problem Relation Age of Onset   • Diabetes Father    • Diabetes Mother      Social History     Tobacco Use   • Smoking status: Never   • Smokeless tobacco: Never   Substance Use Topics   • Alcohol use: Never     No Known Allergies      Current Outpatient Medications:   •  ascorbic acid (VITAMIN C) 500 MG tablet, Take 1 tablet (500 mg total) by mouth 2 (two) times a day, Disp: 60 tablet, Rfl: 1  •  Blood Glucose Monitoring Suppl (OneTouch Verio Flex System) w/Device KIT, CHECK BLOOD SUGARS THREE TIMES DAILY DX: E11 65, Disp: 1 kit, Rfl: 0  •  ferrous sulfate 324 (65 Fe) mg, TAKE 1 TABLET BY MOUTH 2 TIMES A DAY BEFORE MEALS, Disp: 180 tablet, Rfl: 1  •  folic acid (FOLVITE) 1 mg tablet, TAKE 1 TABLET BY MOUTH EVERY DAY, Disp: 90 tablet, Rfl: 1  •  gabapentin (NEURONTIN) 100 mg capsule, Take 1 capsule (100 mg total) by mouth 3 (three) times a day, Disp: 90 capsule, Rfl: 3  •  glucose blood (OneTouch Verio) test strip, CHECK BLOOD SUGARS THREE TIMES DAILY DX: E11 65, Disp: 400 strip, Rfl: 3  •  Incontinence Supply Disposable (Incontinence Brief Large) MISC, Use every 4 (four) hours as needed (Urinary incontinence), Disp: 36 each, Rfl: 12  •  insulin lispro (HumaLOG KwikPen) 100 units/mL injection pen, Inject 10 Units under the skin 3 (three) times a day with meals (Patient taking differently: Inject 10 Units under the skin 3 (three) times a day with meals Patient reports taking 6 units with meals), Disp: 15 mL, Rfl: 0  •  Lancets (OneTouch Delica Plus [id number]) MISC, CHECK BLOOD SUGARS THREE TIMES DAILY DX: E11 65, Disp: 400 each, Rfl: 3  •  Multiple Vitamin (Daily-Octavio Multivitamin) TABS, TAKE 1 TABLET BY MOUTH EVERY DAY, Disp: 30 tablet, Rfl: 1  •  pantoprazole (PROTONIX) 40 mg tablet, Take 1 tablet (40 mg total) by mouth daily in the early morning, Disp: 30 tablet, Rfl: 0  •  Toujeo SoloStar 300 units/mL CONCENTRATED U-300 injection pen (1-unit dial), INJECT 40 UNITS UNDER THE SKIN EVERY MORNING, Disp: 13 5 mL, Rfl: 0  •  traMADol (ULTRAM) 50 mg tablet, Take 1 tablet (50 mg total) by mouth every 6 (six) hours as needed for moderate pain, Disp: 20 tablet, Rfl: 0  •  ammonium lactate (LAC-HYDRIN) 12 % cream, Apply topically as needed for dry skin (Patient not taking: Reported on 1/18/2023), Disp: 385 g, Rfl: 1  •  docusate sodium (COLACE) 100 mg capsule, Take 1 capsule (100 mg total) by mouth 2 (two) times a day (Patient not taking: Reported on 12/8/2022), Disp: 60 capsule, Rfl: 1  •  glucose 4 g chewable tablet, Chew 4 tablets (16 g total) once for 1 dose (Patient not taking: Reported on 12/8/2022), Disp: 4 tablet, Rfl: 0  •  insulin lispro (HumaLOG) 100 units/mL injection, Inject 10 Units under the skin 3 (three) times a day with meals (Patient not taking: Reported on 1/18/2023), Disp: 20 mL, Rfl: 0  •  Multiple Vitamins-Minerals (multivitamin with minerals) tablet, Take 1 tablet by mouth daily, Disp: 30 tablet, Rfl: 1  •  tamsulosin (FLOMAX) 0 4 mg, Take 1 capsule (0 4 mg total) by mouth daily with dinner (Patient not taking: Reported on 1/18/2023), Disp: 90 capsule, Rfl: 3  Review of Systems   Constitutional: Negative for activity change, appetite change, chills, diaphoresis, fatigue, fever and unexpected weight change  HENT: Negative for congestion, drooling, ear discharge, ear pain, trouble swallowing and voice change  Eyes: Negative for photophobia, pain, discharge, redness, itching and visual disturbance  Respiratory: Negative for cough, chest tightness, shortness of breath and wheezing  Cardiovascular: Negative for chest pain, palpitations and leg swelling     Gastrointestinal: Negative for abdominal distention, abdominal pain, blood in stool, diarrhea, nausea and vomiting  Endocrine: Negative for cold intolerance, heat intolerance, polydipsia, polyphagia and polyuria  Genitourinary: Negative for dysuria, flank pain, frequency and hematuria  Musculoskeletal: Negative for back pain, gait problem, joint swelling, myalgias, neck pain and neck stiffness  Skin: Negative for color change, pallor, rash and wound  Neurological: Negative for dizziness, tremors, seizures, syncope, speech difficulty, weakness, numbness and headaches  Psychiatric/Behavioral: Negative for agitation  All other systems reviewed and are negative  Physical Exam:  Body mass index is 29 97 kg/m²    /70 (BP Location: Left arm, Patient Position: Sitting, Cuff Size: Large)   Pulse 88   Resp 20   Ht 6' (1 829 m)   Wt 100 kg (221 lb)   SpO2 98%   BMI 29 97 kg/m²    Wt Readings from Last 3 Encounters:   01/18/23 100 kg (221 lb)   01/09/23 103 kg (227 lb)   01/04/23 103 kg (227 lb)       GEN: NAD  E/n/m nl facies, hearing intact bilat, tongue midline, lips nl  Eyes: no stare or proptosis, nl lids and conjunctiva, EOMI  Neck: trachea midline, thyroid NT to palpation, nl in size, no nodules or neck masses noted, no cervical LAD  CV; heart reg rate s1s2 nl, no m/r/g appreciated, no REBECA  Resp: CTAB, good effort  Ab+BS  Neuro: no tremor, 2+ DTRs in BUE  MS: no c/c in digits, moves all 4 ext, nl muscle bulk, gait nl  Skin: warm and dry, no palmar erythema  Ext: no c/c in digits, no edema bilaterally, 2+ DP and PT pulses bilat, t  Psych: nl mood and affect, no gross lapses in memory    Labs:   No components found for: HA1C  No components found for: GLU    Lab Results   Component Value Date    CREATININE 0 93 11/17/2022    CREATININE 0 93 09/20/2022    CREATININE 0 83 09/16/2022    BUN 21 11/17/2022    K 3 6 11/17/2022     11/17/2022    CO2 30 11/17/2022     eGFR   Date Value Ref Range Status   11/17/2022 84 ml/min/1 73sq m Final     No components found for: [facility]    Lab Results   Component Value Date    HDL 54 09/04/2022    TRIG 92 09/04/2022       Lab Results   Component Value Date    ALT 27 11/17/2022    AST 26 11/17/2022    ALKPHOS 79 11/17/2022       No results found for: TSH, FREET4, TSI    Impression:  1  Type 2 diabetes mellitus without complication, with long-term current use of insulin ([facility] [address] )    2  Vitamin D deficiency    3  Dyslipidemia           Plan:    Oracio was seen today for diabetes type 2  Diagnoses and all orders for this visit:    Type 2 diabetes mellitus without complication, with long-term current use of insulin ([facility] [address] ):  Well-controlled and most recent A1c of 6 5%  Significant improvement in glycemic control, A1c prior to that was more than 14%, while he was admitted for diabetes ketoacidosis  Patient was on insulin however few weeks ago as his blood sugars improved, he is now taking them as needed  He checks his blood sugars 2 times a day and blood sugars are ranging from 120s to 200  I advised patient to take Toujeo 15 units once a day and if his postprandial blood sugars are consistently above 180 take 2 units Humalog before each meal   He was instructed to complete his blood work to differentiate between type I versus type 2 diabetes if we are able to add oral agents to his regimen  He was advised to call the office if his blood sugars are consistently below 80 or above 300  Return back in 3 months  Labs as ordered  -     IA2 Autoantibodies Lab Collect; Future  -     Glutamic acid decarboxylase Lab Collect; Future  -     C-peptide Lab Collect; Future  -     Microalbumin / creatinine urine ratio Lab Collect; Future  -     Comprehensive metabolic panel Lab Collect; Future  -     Lipid panel Lab Collect Lab Collect; Future  -     TSH, 3rd generation Lab Collect; Future  -     Anti-islet cell antibody; Future    Vitamin D deficiency  -     Vitamin D 25 hydroxy Lab Collect;  Future    Dyslipidemia:  Lipid profile      Discussed with the patient and all questioned fully answered  He will call me if any problems arise      Counseled patient on diagnostic results, prognosis, risk and benefit of treatment options, instruction for management, importance of treatment compliance, Risk  factor reduction and impressions      Gurwinder Gordon MD

## 2023-01-19 ENCOUNTER — APPOINTMENT (OUTPATIENT)
Dept: LAB | Facility: CLINIC | Age: 68
End: 2023-01-19

## 2023-01-19 DIAGNOSIS — Z86.711 HISTORY OF PULMONARY EMBOLUS (PE): ICD-10-CM

## 2023-01-19 DIAGNOSIS — Z79.4 TYPE 2 DIABETES MELLITUS WITH OTHER SPECIFIED COMPLICATION, WITH LONG-TERM CURRENT USE OF INSULIN (HCC): ICD-10-CM

## 2023-01-19 DIAGNOSIS — E11.65 UNCONTROLLED TYPE 2 DIABETES MELLITUS WITH HYPERGLYCEMIA (HCC): ICD-10-CM

## 2023-01-19 DIAGNOSIS — R33.9 URINARY RETENTION: ICD-10-CM

## 2023-01-19 DIAGNOSIS — I82.91 CHRONIC EMBOLISM AND THROMBOSIS OF UNSPECIFIED VEIN: ICD-10-CM

## 2023-01-19 DIAGNOSIS — D64.9 ANEMIA, UNSPECIFIED TYPE: ICD-10-CM

## 2023-01-19 DIAGNOSIS — E11.69 TYPE 2 DIABETES MELLITUS WITH OTHER SPECIFIED COMPLICATION, WITH LONG-TERM CURRENT USE OF INSULIN (HCC): ICD-10-CM

## 2023-01-19 DIAGNOSIS — E11.9 TYPE 2 DIABETES MELLITUS WITHOUT COMPLICATION, WITH LONG-TERM CURRENT USE OF INSULIN (HCC): ICD-10-CM

## 2023-01-19 DIAGNOSIS — Z79.4 TYPE 2 DIABETES MELLITUS WITHOUT COMPLICATION, WITH LONG-TERM CURRENT USE OF INSULIN (HCC): ICD-10-CM

## 2023-01-19 DIAGNOSIS — D51.3 OTHER DIETARY VITAMIN B12 DEFICIENCY ANEMIA: ICD-10-CM

## 2023-01-19 DIAGNOSIS — E55.9 VITAMIN D DEFICIENCY: ICD-10-CM

## 2023-01-19 DIAGNOSIS — I48.91 NEW ONSET ATRIAL FIBRILLATION (HCC): ICD-10-CM

## 2023-01-19 DIAGNOSIS — E11.10 DKA (DIABETIC KETOACIDOSIS) (HCC): ICD-10-CM

## 2023-01-19 LAB
25(OH)D3 SERPL-MCNC: 24.2 NG/ML (ref 30–100)
ALBUMIN SERPL BCP-MCNC: 4.2 G/DL (ref 3.5–5)
ALP SERPL-CCNC: 80 U/L (ref 46–116)
ALT SERPL W P-5'-P-CCNC: 25 U/L (ref 12–78)
ANION GAP SERPL CALCULATED.3IONS-SCNC: 7 MMOL/L (ref 4–13)
AST SERPL W P-5'-P-CCNC: 33 U/L (ref 5–45)
BASOPHILS # BLD AUTO: 0.01 THOUSANDS/ÂΜL (ref 0–0.1)
BASOPHILS NFR BLD AUTO: 0 % (ref 0–1)
BILIRUB SERPL-MCNC: 1.64 MG/DL (ref 0.2–1)
BUN SERPL-MCNC: 20 MG/DL (ref 5–25)
CALCIUM SERPL-MCNC: 10.2 MG/DL (ref 8.3–10.1)
CHLORIDE SERPL-SCNC: 106 MMOL/L (ref 96–108)
CHOLEST SERPL-MCNC: 146 MG/DL
CO2 SERPL-SCNC: 26 MMOL/L (ref 21–32)
CREAT SERPL-MCNC: 0.98 MG/DL (ref 0.6–1.3)
CREAT UR-MCNC: 240 MG/DL
CRP SERPL QL: <3 MG/L
DEPRECATED AT III PPP: 131 % OF NORMAL (ref 92–136)
EOSINOPHIL # BLD AUTO: 0.07 THOUSAND/ÂΜL (ref 0–0.61)
EOSINOPHIL NFR BLD AUTO: 1 % (ref 0–6)
ERYTHROCYTE [DISTWIDTH] IN BLOOD BY AUTOMATED COUNT: 13.8 % (ref 11.6–15.1)
ERYTHROCYTE [SEDIMENTATION RATE] IN BLOOD: 25 MM/HOUR (ref 0–19)
FERRITIN SERPL-MCNC: 340 NG/ML (ref 8–388)
GFR SERPL CREATININE-BSD FRML MDRD: 78 ML/MIN/1.73SQ M
GLUCOSE P FAST SERPL-MCNC: 140 MG/DL (ref 65–99)
HCT VFR BLD AUTO: 45.5 % (ref 36.5–49.3)
HDLC SERPL-MCNC: 43 MG/DL
HGB BLD-MCNC: 14.8 G/DL (ref 12–17)
IGA SERPL-MCNC: 354 MG/DL (ref 70–400)
IGG SERPL-MCNC: 1470 MG/DL (ref 700–1600)
IGM SERPL-MCNC: 175 MG/DL (ref 40–230)
IMM GRANULOCYTES # BLD AUTO: 0.01 THOUSAND/UL (ref 0–0.2)
IMM GRANULOCYTES NFR BLD AUTO: 0 % (ref 0–2)
IRON SATN MFR SERPL: 35 % (ref 20–50)
IRON SERPL-MCNC: 106 UG/DL (ref 65–175)
LDH SERPL-CCNC: 189 U/L (ref 81–234)
LDLC SERPL CALC-MCNC: 89 MG/DL (ref 0–100)
LYMPHOCYTES # BLD AUTO: 2.19 THOUSANDS/ÂΜL (ref 0.6–4.47)
LYMPHOCYTES NFR BLD AUTO: 39 % (ref 14–44)
MAGNESIUM SERPL-MCNC: 2.1 MG/DL (ref 1.6–2.6)
MCH RBC QN AUTO: 28.1 PG (ref 26.8–34.3)
MCHC RBC AUTO-ENTMCNC: 32.5 G/DL (ref 31.4–37.4)
MCV RBC AUTO: 87 FL (ref 82–98)
MICROALBUMIN UR-MCNC: 870 MG/L (ref 0–20)
MICROALBUMIN/CREAT 24H UR: 363 MG/G CREATININE (ref 0–30)
MONOCYTES # BLD AUTO: 0.24 THOUSAND/ÂΜL (ref 0.17–1.22)
MONOCYTES NFR BLD AUTO: 4 % (ref 4–12)
NEUTROPHILS # BLD AUTO: 3.05 THOUSANDS/ÂΜL (ref 1.85–7.62)
NEUTS SEG NFR BLD AUTO: 56 % (ref 43–75)
NONHDLC SERPL-MCNC: 103 MG/DL
NRBC BLD AUTO-RTO: 0 /100 WBCS
PLATELET # BLD AUTO: 204 THOUSANDS/UL (ref 149–390)
PMV BLD AUTO: 12.5 FL (ref 8.9–12.7)
POTASSIUM SERPL-SCNC: 3.8 MMOL/L (ref 3.5–5.3)
PROT SERPL-MCNC: 8.6 G/DL (ref 6.4–8.4)
RBC # BLD AUTO: 5.26 MILLION/UL (ref 3.88–5.62)
SODIUM SERPL-SCNC: 139 MMOL/L (ref 135–147)
T4 FREE SERPL-MCNC: 1.25 NG/DL (ref 0.76–1.46)
TIBC SERPL-MCNC: 300 UG/DL (ref 250–450)
TRIGL SERPL-MCNC: 71 MG/DL
TSH SERPL DL<=0.05 MIU/L-ACNC: 0.74 UIU/ML (ref 0.45–4.5)
WBC # BLD AUTO: 5.57 THOUSAND/UL (ref 4.31–10.16)

## 2023-01-19 NOTE — PROGRESS NOTES
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 but also could 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  Since he cannot tolerate cystoscopy awake in the office, we will do this in the operating room and if I see major hypertrophy I will perform transurethral section of the prostate as described below  We will also place a suprapubic tube no matter what  We will schedule this  Chief Complaint     Chief Complaint   Patient presents with   • Urinary Retention       History of Present Illness   Follow-up for right orchitis, last ultrasound showed no scrotal abscess  Also urinary retention with Lane catheter  Seen by me 2022  Doxycycline resolved everything  No longer has right orchitis  His main concern now is the urinary retention with the indwelling Lane catheter  This is very bothersome to him  He cannot exercise or walk due to the discomfort  He has urine spasms around the catheter and he has to wear briefs or pads to protect from being wet  He would like to have something done  He is also sexually active and this is interfering with that  He must be noted that he did have diabetic ketoacidosis when he first presented with this and he had hydronephrosis when he was in retention  We discussed transurethral resection of the prostate when doing cystoscopy if I feel it is indicated  Since he cannot tolerate cystoscopy awake, my plan will be to take him to the operating room, look in there and if there is major prostatic obstruction I will open up his channel and hopefully this will facilitate voiding    The risks of bleeding infection retrograde ejaculation leakage of urine failure to achieve desired result and erectile dysfunction were explained and he gives informed consent  I also think he should get an SP tube because of him being sexually active if he is unable to urinate because his bladder is dysfunctional lysed from the diabetes etc  he will still be able to have intercourse with the suprapubic tube and drain his bladder and not need a urethral catheter  The SP tube will also be more comfortable for him  After the procedure he will have a suprapubic tube and we will keep it plugged and he will try urinating and he will measure his residuals and if he is draining more through his urethra then through his suprapubic tube then we can pull the suprapubic tube out and the hole will close within an hour  Urodynamics November 23, 2022 showed sensory urgency, detrusor instability with leak at 130 cc and 247 and 300 400 cc  No detrusor activity noted with voiding attempt  Other voiding attempts with detrusor instability were successful for leakage of small volumes  Compliance was normal and EMG activity was normal   Last hemoglobin A1c was 6 2  The following portions of the patient's history were reviewed and updated as appropriate: allergies, current medications, past family history, past medical history, past social history, past surgical history and problem list   Past Medical History:   Diagnosis Date   • A-fib ([place] [address] )    • Acute metabolic encephalopathy 94/32/3218   • CARLTON (acute kidney injury) ([place] [address] ) 09/04/2022   • Diabetes mellitus ([place] [address] )    • Pancreatitis 09/04/2022   • Sepsis ([place] [address] ) 09/04/2022     Past Surgical History:   Procedure Laterality Date   • KNEE CARTILAGE SURGERY     • KNEE SURGERY Right      shoulder  Review of Systems   Review of Systems   Genitourinary: Positive for penile pain  Negative for scrotal swelling and testicular pain         Active Problem List     Patient Active Problem List   Diagnosis   • Urinary frequency   • Type 2 diabetes mellitus (HCC)   • Microscopic hematuria   • Urge incontinence of urine   • Nocturnal enuresis   • Feeling of incomplete bladder emptying   • Balanitis   • New onset atrial fibrillation (HCC)   • Urinary retention   • Fungal infection of skin   • Depressed mood   • Pain in both lower extremities   • Polyuria   • Ventral Hernia   • Hydroureteronephrosis   • Uncontrolled type 2 diabetes mellitus with hyperglycemia (HCC)   • Vitamin D deficiency   • Dyslipidemia   • History of pulmonary embolus (PE)       Objective   /80 (BP Location: Left arm, Patient Position: Sitting, Cuff Size: Large)   Pulse 100   Ht 6' (1 829 m)   Wt 100 kg (221 lb)   BMI 29 97 kg/m²     Physical Exam  Vitals reviewed  Constitutional:       Appearance: Normal appearance  HENT:      Head: Normocephalic and atraumatic  Eyes:      Extraocular Movements: Extraocular movements intact  Cardiovascular:      Rate and Rhythm: Normal rate  Pulmonary:      Effort: Pulmonary effort is normal       Breath sounds: Normal breath sounds  Abdominal:      Palpations: Abdomen is soft  Genitourinary:     Penis: Normal        Comments: Lane catheter in place, testicles descended bilaterally, no sign of infection, they are somewhat tender just as baseline  Musculoskeletal:         General: Normal range of motion  Cervical back: Normal range of motion  Skin:     Coloration: Skin is not jaundiced or pale  Neurological:      General: No focal deficit present  Mental Status: He is alert and oriented to person, place, and time  Mental status is at baseline  Psychiatric:         Mood and Affect: Mood normal          Behavior: Behavior normal          Thought Content:  Thought content normal          Judgment: Judgment normal              Current Medications     Current Outpatient Medications:   •  Blood Glucose Monitoring Suppl (OneTouch Verio Flex System) w/Device KIT, CHECK BLOOD SUGARS THREE TIMES DAILY DX: E11 65, Disp: 1 kit, Rfl: 0  •  ferrous sulfate 324 (65 Fe) mg, TAKE 1 TABLET BY MOUTH 2 TIMES A DAY BEFORE MEALS, Disp: 180 tablet, Rfl: 1  •  folic acid (FOLVITE) 1 mg tablet, TAKE 1 TABLET BY MOUTH EVERY DAY, Disp: 90 tablet, Rfl: 1  •  gabapentin (NEURONTIN) 100 mg capsule, Take 1 capsule (100 mg total) by mouth 3 (three) times a day, Disp: 90 capsule, Rfl: 3  •  glucose blood (OneTouch Verio) test strip, CHECK BLOOD SUGARS THREE TIMES DAILY DX: E11 65, Disp: 400 strip, Rfl: 3  •  Incontinence Supply Disposable (Incontinence Brief Large) MISC, Use every 4 (four) hours as needed (Urinary incontinence), Disp: 36 each, Rfl: 12  •  insulin lispro (HumaLOG KwikPen) 100 units/mL injection pen, Inject 10 Units under the skin 3 (three) times a day with meals (Patient taking differently: Inject 10 Units under the skin 3 (three) times a day with meals Patient reports taking 6 units with meals), Disp: 15 mL, Rfl: 0  •  Lancets (OneTouch Delica Plus [id number]) MISC, CHECK BLOOD SUGARS THREE TIMES DAILY DX: E11 65, Disp: 400 each, Rfl: 3  •  Multiple Vitamin (Daily-Octavio Multivitamin) TABS, TAKE 1 TABLET BY MOUTH EVERY DAY, Disp: 30 tablet, Rfl: 1  •  pantoprazole (PROTONIX) 40 mg tablet, Take 1 tablet (40 mg total) by mouth daily in the early morning, Disp: 30 tablet, Rfl: 0  •  tamsulosin (FLOMAX) 0 4 mg, Take 1 capsule (0 4 mg total) by mouth daily with dinner, Disp: 90 capsule, Rfl: 3  •  Toujeo SoloStar 300 units/mL CONCENTRATED U-300 injection pen (1-unit dial), INJECT 40 UNITS UNDER THE SKIN EVERY MORNING, Disp: 13 5 mL, Rfl: 0  •  traMADol (ULTRAM) 50 mg tablet, Take 1 tablet (50 mg total) by mouth every 6 (six) hours as needed for moderate pain, Disp: 20 tablet, Rfl: 0  •  ammonium lactate (LAC-HYDRIN) 12 % cream, Apply topically as needed for dry skin, Disp: 385 g, Rfl: 1  •  ascorbic acid (VITAMIN C) 500 MG tablet, Take 1 tablet (500 mg total) by mouth 2 (two) times a day, Disp: 60 tablet, Rfl: 1  •  docusate sodium (COLACE) 100 mg capsule, Take 1 capsule (100 mg total) by mouth 2 (two) times a day (Patient not taking: Reported on 12/8/2022), Disp: 60 capsule, Rfl: 1  •  glucose 4 g chewable tablet, Chew 4 tablets (16 g total) once for 1 dose (Patient not taking: Reported on 12/8/2022), Disp: 4 tablet, Rfl: 0  •  insulin lispro (HumaLOG) 100 units/mL injection, Inject 10 Units under the skin 3 (three) times a day with meals, Disp: 20 mL, Rfl: 0  •  Multiple Vitamins-Minerals (multivitamin with minerals) tablet, Take 1 tablet by mouth daily, Disp: 30 tablet, Rfl: 1        Cheo Hernandez MD

## 2023-01-20 ENCOUNTER — OFFICE VISIT (OUTPATIENT)
Dept: UROLOGY | Facility: CLINIC | Age: 68
End: 2023-01-20

## 2023-01-20 VITALS
BODY MASS INDEX: 29.93 KG/M2 | HEIGHT: 72 IN | WEIGHT: 221 LBS | DIASTOLIC BLOOD PRESSURE: 80 MMHG | SYSTOLIC BLOOD PRESSURE: 120 MMHG | HEART RATE: 100 BPM

## 2023-01-20 DIAGNOSIS — N31.2 HYPOTONIC BLADDER: ICD-10-CM

## 2023-01-20 DIAGNOSIS — E11.65 UNCONTROLLED TYPE 2 DIABETES MELLITUS WITH HYPERGLYCEMIA (HCC): ICD-10-CM

## 2023-01-20 DIAGNOSIS — R33.9 URINARY RETENTION: Primary | ICD-10-CM

## 2023-01-20 DIAGNOSIS — N32.81 DETRUSOR INSTABILITY: ICD-10-CM

## 2023-01-20 LAB
C PEPTIDE SERPL-MCNC: 2.3 NG/ML (ref 1.1–4.4)
CARDIOLIPIN IGA SER IA-ACNC: 4.8
CARDIOLIPIN IGG SER IA-ACNC: 1.1
CARDIOLIPIN IGM SER IA-ACNC: 2.1
EST. AVERAGE GLUCOSE BLD GHB EST-MCNC: 134 MG/DL
HBA1C MFR BLD: 6.3 %
KAPPA LC FREE SER-MCNC: 29.3 MG/L (ref 3.3–19.4)
KAPPA LC FREE/LAMBDA FREE SER: 1.52 {RATIO} (ref 0.26–1.65)
LAMBDA LC FREE SERPL-MCNC: 19.3 MG/L (ref 5.7–26.3)
VIT B12 SERPL-MCNC: 340 PG/ML (ref 100–900)

## 2023-01-20 NOTE — H&P
[address] [facility]  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 but also could 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  Since he cannot tolerate cystoscopy awake in the office, we will do this in the operating room and if I see major hypertrophy I will perform transurethral section of the prostate as described below  We will also place a suprapubic tube no matter what  We will schedule this  Chief Complaint     Chief Complaint   Patient presents with   • Urinary Retention       History of Present Illness   Follow-up for right orchitis, last ultrasound showed no scrotal abscess  Also urinary retention with Lane catheter  Seen by me 2022  Doxycycline resolved everything  No longer has right orchitis  His main concern now is the urinary retention with the indwelling Lane catheter  This is very bothersome to him  He cannot exercise or walk due to the discomfort  He has urine spasms around the catheter and he has to wear briefs or pads to protect from being wet  He would like to have something done  He is also sexually active and this is interfering with that  He must be noted that he did have diabetic ketoacidosis when he first presented with this and he had hydronephrosis when he was in retention  We discussed transurethral resection of the prostate when doing cystoscopy if I feel it is indicated  Since he cannot tolerate cystoscopy awake, my plan will be to take him to the operating room, look in there and if there is major prostatic obstruction I will open up his channel and hopefully this will facilitate voiding    The risks of bleeding infection retrograde ejaculation leakage of urine failure to achieve desired result and erectile dysfunction were explained and he gives informed consent  I also think he should get an SP tube because of him being sexually active if he is unable to urinate because his bladder is dysfunctional lysed from the diabetes etc  he will still be able to have intercourse with the suprapubic tube and drain his bladder and not need a urethral catheter  The SP tube will also be more comfortable for him  After the procedure he will have a suprapubic tube and we will keep it plugged and he will try urinating and he will measure his residuals and if he is draining more through his urethra then through his suprapubic tube then we can pull the suprapubic tube out and the hole will close within an hour  Urodynamics November 23, 2022 showed sensory urgency, detrusor instability with leak at 130 cc and 247 and 300 400 cc  No detrusor activity noted with voiding attempt  Other voiding attempts with detrusor instability were successful for leakage of small volumes  Compliance was normal and EMG activity was normal   Last hemoglobin A1c was 6 2  The following portions of the patient's history were reviewed and updated as appropriate: allergies, current medications, past family history, past medical history, past social history, past surgical history and problem list   Past Medical History:   Diagnosis Date   • A-fib ([place] [address] )    • Acute metabolic encephalopathy 55/66/3476   • CARLTON (acute kidney injury) ([place] [address] ) 09/04/2022   • Diabetes mellitus ([place] [address] )    • Pancreatitis 09/04/2022   • Sepsis ([place] [address] ) 09/04/2022     Past Surgical History:   Procedure Laterality Date   • KNEE CARTILAGE SURGERY     • KNEE SURGERY Right      shoulder  Review of Systems   Review of Systems   Genitourinary: Positive for penile pain  Negative for scrotal swelling and testicular pain         Active Problem List     Patient Active Problem List   Diagnosis   • Urinary frequency   • Type 2 diabetes mellitus (HCC)   • Microscopic hematuria   • Urge incontinence of urine   • Nocturnal enuresis   • Feeling of incomplete bladder emptying   • Balanitis   • New onset atrial fibrillation (HCC)   • Urinary retention   • Fungal infection of skin   • Depressed mood   • Pain in both lower extremities   • Polyuria   • Ventral Hernia   • Hydroureteronephrosis   • Uncontrolled type 2 diabetes mellitus with hyperglycemia (HCC)   • Vitamin D deficiency   • Dyslipidemia   • History of pulmonary embolus (PE)       Objective   /80 (BP Location: Left arm, Patient Position: Sitting, Cuff Size: Large)   Pulse 100   Ht 6' (1 829 m)   Wt 100 kg (221 lb)   BMI 29 97 kg/m²     Physical Exam  Vitals reviewed  Constitutional:       Appearance: Normal appearance  HENT:      Head: Normocephalic and atraumatic  Eyes:      Extraocular Movements: Extraocular movements intact  Cardiovascular:      Heart sounds: Normal heart sounds  Pulmonary:      Effort: Pulmonary effort is normal       Breath sounds: Normal breath sounds  Abdominal:      Palpations: Abdomen is soft  Genitourinary:     Penis: Normal        Comments: Lane catheter in place, testicles descended bilaterally, no sign of infection, they are somewhat tender just as baseline  Musculoskeletal:         General: Normal range of motion  Cervical back: Normal range of motion  Skin:     Coloration: Skin is not jaundiced or pale  Neurological:      General: No focal deficit present  Mental Status: He is alert and oriented to person, place, and time  Mental status is at baseline  Psychiatric:         Mood and Affect: Mood normal          Behavior: Behavior normal          Thought Content:  Thought content normal          Judgment: Judgment normal              Current Medications     Current Outpatient Medications:   •  Blood Glucose Monitoring Suppl (OneTouch Verio Flex System) w/Device KIT, CHECK BLOOD SUGARS THREE TIMES DAILY DX: E11 65, Disp: 1 kit, Rfl: 0  •  ferrous sulfate 324 (65 Fe) mg, TAKE 1 TABLET BY MOUTH 2 TIMES A DAY BEFORE MEALS, Disp: 180 tablet, Rfl: 1  •  folic acid (FOLVITE) 1 mg tablet, TAKE 1 TABLET BY MOUTH EVERY DAY, Disp: 90 tablet, Rfl: 1  •  gabapentin (NEURONTIN) 100 mg capsule, Take 1 capsule (100 mg total) by mouth 3 (three) times a day, Disp: 90 capsule, Rfl: 3  •  glucose blood (OneTouch Verio) test strip, CHECK BLOOD SUGARS THREE TIMES DAILY DX: E11 65, Disp: 400 strip, Rfl: 3  •  Incontinence Supply Disposable (Incontinence Brief Large) MISC, Use every 4 (four) hours as needed (Urinary incontinence), Disp: 36 each, Rfl: 12  •  insulin lispro (HumaLOG KwikPen) 100 units/mL injection pen, Inject 10 Units under the skin 3 (three) times a day with meals (Patient taking differently: Inject 10 Units under the skin 3 (three) times a day with meals Patient reports taking 6 units with meals), Disp: 15 mL, Rfl: 0  •  Lancets (OneTouch Delica Plus [id number]) MISC, CHECK BLOOD SUGARS THREE TIMES DAILY DX: E11 65, Disp: 400 each, Rfl: 3  •  Multiple Vitamin (Daily-Octavio Multivitamin) TABS, TAKE 1 TABLET BY MOUTH EVERY DAY, Disp: 30 tablet, Rfl: 1  •  pantoprazole (PROTONIX) 40 mg tablet, Take 1 tablet (40 mg total) by mouth daily in the early morning, Disp: 30 tablet, Rfl: 0  •  tamsulosin (FLOMAX) 0 4 mg, Take 1 capsule (0 4 mg total) by mouth daily with dinner, Disp: 90 capsule, Rfl: 3  •  Toujeo SoloStar 300 units/mL CONCENTRATED U-300 injection pen (1-unit dial), INJECT 40 UNITS UNDER THE SKIN EVERY MORNING, Disp: 13 5 mL, Rfl: 0  •  traMADol (ULTRAM) 50 mg tablet, Take 1 tablet (50 mg total) by mouth every 6 (six) hours as needed for moderate pain, Disp: 20 tablet, Rfl: 0  •  ammonium lactate (LAC-HYDRIN) 12 % cream, Apply topically as needed for dry skin, Disp: 385 g, Rfl: 1  •  ascorbic acid (VITAMIN C) 500 MG tablet, Take 1 tablet (500 mg total) by mouth 2 (two) times a day, Disp: 60 tablet, Rfl: 1  •  docusate sodium (COLACE) 100 mg capsule, Take 1 capsule (100 mg total) by mouth 2 (two) times a day (Patient not taking: Reported on 12/8/2022), Disp: 60 capsule, Rfl: 1  •  glucose 4 g chewable tablet, Chew 4 tablets (16 g total) once for 1 dose (Patient not taking: Reported on 12/8/2022), Disp: 4 tablet, Rfl: 0  •  insulin lispro (HumaLOG) 100 units/mL injection, Inject 10 Units under the skin 3 (three) times a day with meals, Disp: 20 mL, Rfl: 0  •  Multiple Vitamins-Minerals (multivitamin with minerals) tablet, Take 1 tablet by mouth daily, Disp: 30 tablet, Rfl: 1        Valery Oliva MD

## 2023-01-20 NOTE — PROGRESS NOTES
1/20/2023  Oracio Richardson Jr  is a 75 y o  male  [id number]    Diagnosis:  Chief Complaint    Urinary Retention         Patient presents for routine lemons exchange managed by Dr Bethany Acosta:  Follow up as scheduled for next catheter exchange if patient doesn't have surgery before then  Patient instructed to call with any questions or concerns in the meantime  Orders Placed This Encounter   Procedures   • Urine culture   • Type and screen        Vitals:    01/20/23 1004   BP: 120/80   BP Location: Left arm   Patient Position: Sitting   Cuff Size: Large   Pulse: 100   Weight: 100 kg (221 lb)   Height: 6' (1 829 m)       Procedure:    Universal Protocol:  Consent: Verbal consent obtained  Risks and benefits: risks, benefits and alternatives were discussed  Consent given by: patient  Patient understanding: patient states understanding of the procedure being performed  Patient identity confirmed: verbally with patient      Bladder catheterization    Date/Time: 1/20/2023 11:23 AM  Performed by: Laurent Bolden RN  Authorized by: Temi Collier MD     Consent:     Consent given by:  Patient  Universal protocol:     Procedure explained and questions answered to patient or proxy's satisfaction: yes      Patient identity confirmed:  Verbally with patient  Pre-procedure details:     Procedure purpose:  Therapeutic  Anesthesia (see MAR for exact dosages): Anesthesia method:  None  Procedure details:     Catheter insertion:  Indwelling    Approach: natural orifice      Catheter type:  Coude, latex and Lemons    Catheter size:  16 Fr    Number of attempts:  1    Urine characteristics:  Clear and yellow  Post-procedure details:     Patient tolerance of procedure: Tolerated well, no immediate complications  Comments:      Attached to overnight bag and new stat lock applied   Patient supplied with 3 additional overnight bags and 2 stat locks as patient request         Laurent Bolden RN

## 2023-01-21 LAB
ALBUMIN SERPL ELPH-MCNC: 5.35 G/DL (ref 3.5–5)
ALBUMIN SERPL ELPH-MCNC: 64.4 % (ref 52–65)
ALPHA1 GLOB SERPL ELPH-MCNC: 0.26 G/DL (ref 0.1–0.4)
ALPHA1 GLOB SERPL ELPH-MCNC: 3.1 % (ref 2.5–5)
ALPHA2 GLOB SERPL ELPH-MCNC: 0.83 G/DL (ref 0.4–1.2)
ALPHA2 GLOB SERPL ELPH-MCNC: 10 % (ref 7–13)
BETA GLOB ABNORMAL SERPL ELPH-MCNC: 0.32 G/DL (ref 0.4–0.8)
BETA1 GLOB SERPL ELPH-MCNC: 3.9 % (ref 5–13)
BETA2 GLOB SERPL ELPH-MCNC: 4.1 % (ref 2–8)
BETA2+GAMMA GLOB SERPL ELPH-MCNC: 0.34 G/DL (ref 0.2–0.5)
GAMMA GLOB ABNORMAL SERPL ELPH-MCNC: 1.2 G/DL (ref 0.5–1.6)
GAMMA GLOB SERPL ELPH-MCNC: 14.5 % (ref 12–22)
IGG/ALB SER: 1.81 {RATIO} (ref 1.1–1.8)
PROT PATTERN SERPL ELPH-IMP: ABNORMAL
PROT S ACT/NOR PPP: 81 % (ref 61–136)
PROT S PPP-ACNC: 74 % (ref 60–150)
PROT SERPL-MCNC: 8.3 G/DL (ref 6.4–8.2)

## 2023-01-22 LAB
APTT SCREEN TO CONFIRM RATIO: 0.98 RATIO (ref 0–1.34)
CONFIRM APTT/NORMAL: 39 SEC (ref 0–47.6)
LA PPP-IMP: NORMAL
SCREEN APTT: 40.9 SEC (ref 0–51.9)
SCREEN DRVVT: 37 SEC (ref 0–47)
THROMBIN TIME: 17.7 SEC (ref 0–23)

## 2023-01-23 LAB
PROT C AG ACT/NOR PPP IA: 96 % OF NORMAL (ref 60–150)
PROT S ACT/NOR PPP: 69 % (ref 71–117)

## 2023-01-24 ENCOUNTER — OFFICE VISIT (OUTPATIENT)
Dept: CARDIOLOGY CLINIC | Facility: CLINIC | Age: 68
End: 2023-01-24

## 2023-01-24 DIAGNOSIS — I48.91 NEW ONSET ATRIAL FIBRILLATION (HCC): Primary | ICD-10-CM

## 2023-01-24 LAB
GAD65 AB SER-ACNC: <5 U/ML (ref 0–5)
PANC ISLET CELL AB TITR SER: NEGATIVE {TITER}

## 2023-01-24 NOTE — PROGRESS NOTES
The patient is seen in the office for a post device wound check  The site is clean and dry without erythema or swelling  There is no discharge or tenderness    The patient is instructed to return to normal activity, normal ROM and normal bathing and to report any sign or symptom of infection

## 2023-01-26 LAB
F2 GENE MUT ANL BLD/T: NORMAL
F5 GENE MUT ANL BLD/T: NORMAL
ISLET CELL512 AB SER-ACNC: <7.5 U/ML

## 2023-01-30 ENCOUNTER — TELEPHONE (OUTPATIENT)
Dept: UROLOGY | Facility: CLINIC | Age: 68
End: 2023-01-30

## 2023-02-03 LAB
B2 GLYCOPROT1 IGA SERPL IA-ACNC: 3.8
B2 GLYCOPROT1 IGG SERPL IA-ACNC: 0.9
B2 GLYCOPROT1 IGM SERPL IA-ACNC: <2.4

## 2023-02-07 ENCOUNTER — OFFICE VISIT (OUTPATIENT)
Dept: DIABETES SERVICES | Facility: CLINIC | Age: 68
End: 2023-02-07

## 2023-02-07 VITALS — BODY MASS INDEX: 30 KG/M2 | WEIGHT: 221.2 LBS

## 2023-02-07 DIAGNOSIS — E11.10 DIABETIC KETOACIDOSIS WITHOUT COMA ASSOCIATED WITH TYPE 2 DIABETES MELLITUS (HCC): Primary | ICD-10-CM

## 2023-02-07 NOTE — PATIENT INSTRUCTIONS
Eat 3 meals per day, 4-5 hours apart  No meal skipping  Eat your snack 2-3 hours away from your meals  Eat 45 grams of carbohydrate per meal, and no more than 15 grams per snack     Practice carb counting and complete food log to bring to follow-up appointment

## 2023-02-07 NOTE — PROGRESS NOTES
Medical Nutrition Therapy        Assessment    Visit Type: Follow-up visit  Chief complaint/Medical Diagnosis/reason for visit E11 10 (ICD-10-CM) - DKA (diabetic ketoacidosis) ([facility] [address] )    SARAH Narayanan was seen in person for the follow-up MNT appointment, and accompanied by his spouse Bonita Lynch (aka: Gely Aguilar)  Medication is taken differently than listed in the chart  Patient reports taking 2 units of Humalog with meals and 10 units of Toujeo instead of 40  BG levels are checked 3x per day  FBG in the morning ranges between 123-160 mg/dL  Patient is unable to exercise at this time  Oracio appeared to be in pain while attempting to stand after our appointment, which took him about 5 minutes  Patient also entered our session late due to pain walking reported to be from his catheter  A food log was returned during this session; however, no carbohydrates were counted  When asked why that section of the log was left incomplete patient said he thought his wife was going to fill it out and stated he knows how to carb count  When given a food label and asked to provide the carb amount, patient and wife provided incorrect answers  Educated on how to perform portion adjustment calculations  Explained to patient it is necessary to count carbs in order to know if he is consuming appropriately to manage his diabetes  Patient agreed this sounded logical and is willing to complete a food log for assessment at the follow-up session  Patient also shared that he does not have an appetite to eat his allotted carbs  Explained to Oracio that he may have an appetite if he does not consume in excess at certain meals  Consumption of carbohydrates ranges from 30 to 75 grams per meal  Problems identified in food recall include inconsistent carbohydrate intake, sugary beverages (juice), high sodium and fat from convenience foods  Patient agreed to keep daily food logs and return them in 4 months for assessment   RD will remain available for further dietary questions/concerns  Ht Readings from Last 1 Encounters:   01/20/23 6' (1 829 m)     Wt Readings from Last 3 Encounters:   01/20/23 100 kg (221 lb)   01/18/23 100 kg (221 lb)   01/09/23 103 kg (227 lb)     Weight Change: Yes 6 lbs weight gain in 2 months    Barriers to Learning: no barriers    Do you follow any special diet presently?: No  Who shops: patient and spouse  Who cooks: spouse    Food Log: Please see scanned log    Exercise none    Calorie needs 1923 kcals/day Carbs: 45 g/meal, 15-30 g/snack     Fat: 5 servings/day    Protein:8 ounces/day    Nutrition Diagnosis:  Inconsistent carbohydrate intake  intake related to Physiological causes requiring careful timing and consistency in the amount of carbohydrate (i e  diabetes mellitus, hypoglycemia) as evidenced by  Estimated carbohydrate intake that is different from recommended types or ingested on an irregular basis    Intervention: label reading, carbohydrate counting, meal timing and food diary     Treatment Goals: Patient understands education and recommendations, Patient will monitor food intake daily with tracker, Patient will consume 3 meals a day, Patient will count carbohydrates and Patient will monitor blood glucose    Monitoring and evaluation:    Term code indicator  FH 1 3 2 Food Intake Criteria: Eat 3 meals per day, 4-5 hours apart  No meal skipping  Eat your snack 2-3 hours away from your meals  Term code indicator  FH 1 6 3 Carbohydrate Intake Criteria: Eat 45 grams of carbohydrate per meal, and no more than 15 grams per snack   Term code indicator  FH 3 1 Food and Nutrition Knowledge Criteria: Practice carb counting and complete food log to bring to follow-up appointment      Materials Provided: none    Patient’s Response to Instruction:  Devorah James  Expected Compliancefair    Begin Time: 9:36 am  End Time: 10:14 am  Referring Provider: Lala Carpio PA-C    Thank you for coming to the [facility] for education today  Please feel free to call with any questions or concerns      Rich Darling, RD  [address] [facility] [id number] [phone]

## 2023-02-15 ENCOUNTER — OFFICE VISIT (OUTPATIENT)
Dept: HEMATOLOGY ONCOLOGY | Facility: CLINIC | Age: 68
End: 2023-02-15

## 2023-02-15 VITALS
WEIGHT: 223 LBS | TEMPERATURE: 97.6 F | HEART RATE: 83 BPM | SYSTOLIC BLOOD PRESSURE: 124 MMHG | HEIGHT: 72 IN | OXYGEN SATURATION: 99 % | RESPIRATION RATE: 18 BRPM | BODY MASS INDEX: 30.2 KG/M2 | DIASTOLIC BLOOD PRESSURE: 80 MMHG

## 2023-02-15 DIAGNOSIS — E55.9 VITAMIN D DEFICIENCY: ICD-10-CM

## 2023-02-15 DIAGNOSIS — Z86.711 HISTORY OF PULMONARY EMBOLUS (PE): Primary | ICD-10-CM

## 2023-02-15 DIAGNOSIS — Z12.11 ENCOUNTER FOR SCREENING COLONOSCOPY: ICD-10-CM

## 2023-02-15 DIAGNOSIS — D64.9 ANEMIA, UNSPECIFIED TYPE: ICD-10-CM

## 2023-02-15 DIAGNOSIS — D51.3 OTHER DIETARY VITAMIN B12 DEFICIENCY ANEMIA: ICD-10-CM

## 2023-02-15 NOTE — PROGRESS NOTES
Hematology/Oncology Outpatient Follow-up  Oracio Cai  70 y o  male 1955 [id number]    Date:  2/15/2023    Assessment and Plan:  1  History of pulmonary embolus (PE)  The patient had remote history of unprovoked pulmonary embolism more than 10 years ago  He is currently off of anticoagulation  I did review with him the extensive hypercoagulable work-up which was nonconclusive  The patient was told that from the hematological standpoint there is no need to consider anticoagulation unless he develops a new clotting event which would require indefinite anticoagulation unless there is absolute contraindication  We will see him in the future on as-needed basis  2  Anemia, unspecified type  He seems to have normal CBC without any obvious hint of anemia according to the most recent work-up  3  Encounter for screening colonoscopy  He was asked to consider getting a colonoscopy in the near future as since he is overdue for 1   - Ambulatory referral to Gastroenterology; Future    4  Other dietary vitamin B12 deficiency anemia  He was encouraged to take vitamin B12 supplements on a daily basis since her vitamin B12 level is borderline low  5  Vitamin D deficiency  He seems to have vitamin D deficiency which needs to be corrected with vitamin D supplements daily  HPI:  This is a 50-year-old male with a history of type 2 diabetes mellitus and morbid obesity  He also had a remote history of pulmonary embolism seems to be unprovoked at least 10 years ago  The patient was admitted to the hospital around the beginning of November 2022 for diabetic ketoacidosis  Also was found to have paroxysmal atrial fibrillation which converted spontaneously  The patient was sent by the cardiology team for hypercoagulable work-up with the previous history of pulmonary embolism  The patient denied any recent clotting events  He seems to have difficulties urinating    He has a urinary catheter which is causing a great deal of discomfort      His hemoglobin level was around 11 g back in September 2022  However, most recent CBC from 11/17/2022 showed hemoglobin of 13 5 with normal white cells and platelets  Creatinine was 0 9 with high normal calcium of 10 2 and normal liver enzymes  Interval history:  The patient came today for follow-up visit  He continues to have the Lane catheter in place which is causing significant discomfort and pain in the pelvic area  Recent blood work on 1/19/2023 showed normal hemoglobin of 14 8 with normal white cells and platelets  White cell differential was normal   Vitamin D 24  Creatinine was 0 9 with normal liver enzymes  Calcium was borderline high at 10 2  TSH normal 0 7  Magnesium 2 1  Vitamin B12 340  C-reactive protein was normal with sed rate of 25  LDH was normal   Immunoglobulins were within normal range SPEP was negative for M protein  Iron panel showed ferritin of 348 saturation of 35%  Anticardiolipin antibody titers were within normal range with normal cardiolipin antibodies  Factor V Leiden and prothrombin gene mutation studies came back normal   Lupus anticoagulant test was negative  Protein C was normal with borderline low normal protein as activity level of 69%  ROS: Review of Systems   Constitutional: Positive for fatigue  Negative for chills and fever  HENT: Negative for ear pain and sore throat  Eyes: Negative for pain and visual disturbance  Respiratory: Negative for cough and shortness of breath  Cardiovascular: Negative for chest pain and palpitations  Gastrointestinal: Positive for constipation  Negative for abdominal pain and vomiting  Genitourinary: Positive for difficulty urinating and dysuria  Negative for hematuria  Musculoskeletal: Negative for arthralgias and back pain  Skin: Negative for color change and rash  Neurological: Negative for seizures and syncope  Psychiatric/Behavioral: Positive for sleep disturbance     All other systems reviewed and are negative  Past Medical History:   Diagnosis Date   • A-fib [facility])    • Acute metabolic encephalopathy 07/56/1639   • CARLTON (acute kidney injury) ([facility] ) 09/04/2022   • Diabetes mellitus ([place] [address] )    • Pancreatitis 09/04/2022   • Sepsis ([place] [address] ) 09/04/2022       Past Surgical History:   Procedure Laterality Date   • KNEE CARTILAGE SURGERY     • KNEE SURGERY Right        Social History     Socioeconomic History   • Marital status: /Civil Union     Spouse name: None   • Number of children: None   • Years of education: None   • Highest education level: None   Occupational History   • None   Tobacco Use   • Smoking status: Never   • Smokeless tobacco: Never   Vaping Use   • Vaping Use: Never used   Substance and Sexual Activity   • Alcohol use: Never   • Drug use: Never   • Sexual activity: None   Other Topics Concern   • None   Social History Narrative   • None     Social Determinants of Health     Financial Resource Strain: Low Risk    • Difficulty of Paying Living Expenses: Not hard at all   Food Insecurity: No Food Insecurity   • Worried About Running Out of Food in the Last Year: Never true   • Ran Out of Food in the Last Year: Never true   Transportation Needs: No Transportation Needs   • Lack of Transportation (Medical): No   • Lack of Transportation (Non-Medical):  No   Physical Activity: Not on file   Stress: Not on file   Social Connections: Not on file   Intimate Partner Violence: Not on file   Housing Stability: Low Risk    • Unable to Pay for Housing in the Last Year: No   • Number of Places Lived in the Last Year: 1   • Unstable Housing in the Last Year: No       Family History   Problem Relation Age of Onset   • Diabetes Father    • Diabetes Mother        No Known Allergies      Current Outpatient Medications:   •  ammonium lactate (LAC-HYDRIN) 12 % cream, Apply topically as needed for dry skin, Disp: 385 g, Rfl: 1  •  Blood Glucose Monitoring Suppl (OneTouch Verio Flex System) w/Device KIT, CHECK BLOOD SUGARS THREE TIMES DAILY DX: E11 65, Disp: 1 kit, Rfl: 0  •  ferrous sulfate 324 (65 Fe) mg, TAKE 1 TABLET BY MOUTH 2 TIMES A DAY BEFORE MEALS, Disp: 180 tablet, Rfl: 1  •  folic acid (FOLVITE) 1 mg tablet, TAKE 1 TABLET BY MOUTH EVERY DAY, Disp: 90 tablet, Rfl: 1  •  gabapentin (NEURONTIN) 100 mg capsule, Take 1 capsule (100 mg total) by mouth 3 (three) times a day, Disp: 90 capsule, Rfl: 3  •  glucose blood (OneTouch Verio) test strip, CHECK BLOOD SUGARS THREE TIMES DAILY DX: E11 65, Disp: 400 strip, Rfl: 3  •  Incontinence Supply Disposable (Incontinence Brief Large) MISC, Use every 4 (four) hours as needed (Urinary incontinence), Disp: 36 each, Rfl: 12  •  insulin lispro (HumaLOG KwikPen) 100 units/mL injection pen, Inject 10 Units under the skin 3 (three) times a day with meals (Patient taking differently: Inject 10 Units under the skin 3 (three) times a day with meals Patient reports taking 6 units with meals  Patient taking 2 units with meals), Disp: 15 mL, Rfl: 0  •  insulin lispro (HumaLOG) 100 units/mL injection, Inject 10 Units under the skin 3 (three) times a day with meals, Disp: 20 mL, Rfl: 0  •  Lancets (OneTouch Delica Plus [id number]) MISC, CHECK BLOOD SUGARS THREE TIMES DAILY DX: E11 65, Disp: 400 each, Rfl: 3  •  Multiple Vitamin (Daily-Octavio Multivitamin) TABS, TAKE 1 TABLET BY MOUTH EVERY DAY, Disp: 30 tablet, Rfl: 1  •  pantoprazole (PROTONIX) 40 mg tablet, Take 1 tablet (40 mg total) by mouth daily in the early morning, Disp: 30 tablet, Rfl: 0  •  tamsulosin (FLOMAX) 0 4 mg, Take 1 capsule (0 4 mg total) by mouth daily with dinner, Disp: 90 capsule, Rfl: 3  •  Toujeo SoloStar 300 units/mL CONCENTRATED U-300 injection pen (1-unit dial), INJECT 40 UNITS UNDER THE SKIN EVERY MORNING (Patient taking differently: Inject 40 Units under the skin every morning Patient taking 10 units not 40), Disp: 13 5 mL, Rfl: 0  •  traMADol (ULTRAM) 50 mg tablet, Take 1 tablet (50 mg total) by mouth every 6 (six) hours as needed for moderate pain, Disp: 20 tablet, Rfl: 0  •  ascorbic acid (VITAMIN C) 500 MG tablet, Take 1 tablet (500 mg total) by mouth 2 (two) times a day, Disp: 60 tablet, Rfl: 1  •  docusate sodium (COLACE) 100 mg capsule, Take 1 capsule (100 mg total) by mouth 2 (two) times a day (Patient not taking: Reported on 12/8/2022), Disp: 60 capsule, Rfl: 1  •  glucose 4 g chewable tablet, Chew 4 tablets (16 g total) once for 1 dose (Patient not taking: Reported on 12/8/2022), Disp: 4 tablet, Rfl: 0  •  Multiple Vitamins-Minerals (multivitamin with minerals) tablet, Take 1 tablet by mouth daily, Disp: 30 tablet, Rfl: 1      Physical Exam:  /80 (BP Location: Right arm, Patient Position: Sitting, Cuff Size: Adult)   Pulse 83   Temp 97 6 °F (36 4 °C)   Resp 18   Ht 6' (1 829 m)   Wt 101 kg (223 lb)   SpO2 99%   BMI 30 24 kg/m²     Physical Exam  Constitutional:       Appearance: He is well-developed  HENT:      Head: Normocephalic and atraumatic  Nose: Nose normal    Eyes:      General: No scleral icterus  Right eye: No discharge  Left eye: No discharge  Conjunctiva/sclera: Conjunctivae normal       Pupils: Pupils are equal, round, and reactive to light  Neck:      Thyroid: No thyromegaly  Trachea: No tracheal deviation  Cardiovascular:      Rate and Rhythm: Normal rate and regular rhythm  Heart sounds: Normal heart sounds  No murmur heard  No friction rub  Pulmonary:      Effort: Pulmonary effort is normal  No respiratory distress  Breath sounds: Normal breath sounds  No wheezing or rales  Chest:      Chest wall: No tenderness  Abdominal:      General: There is no distension  Palpations: Abdomen is soft  There is no hepatomegaly or splenomegaly  Tenderness: There is no abdominal tenderness  There is no guarding or rebound  Musculoskeletal:         General: No tenderness or deformity  Normal range of motion  Cervical back: Normal range of motion and neck supple  Lymphadenopathy:      Cervical: No cervical adenopathy  Skin:     General: Skin is warm and dry  Coloration: Skin is not pale  Findings: No erythema or rash  Neurological:      Mental Status: He is alert and oriented to person, place, and time  Cranial Nerves: No cranial nerve deficit  Coordination: Coordination normal       Deep Tendon Reflexes: Reflexes are normal and symmetric  Psychiatric:         Behavior: Behavior normal          Thought Content: Thought content normal          Judgment: Judgment normal            Labs:  Lab Results   Component Value Date    WBC 5 57 01/19/2023    HGB 14 8 01/19/2023    HCT 45 5 01/19/2023    MCV 87 01/19/2023     01/19/2023     Lab Results   Component Value Date    K 3 8 01/19/2023     01/19/2023    CO2 26 01/19/2023    BUN 20 01/19/2023    CREATININE 0 98 01/19/2023    GLUCOSE 241 (H) 09/07/2022    GLUF 140 (H) 01/19/2023    CALCIUM 10 2 (H) 01/19/2023    CORRECTEDCA 10 2 (H) 11/17/2022    AST 33 01/19/2023    ALT 25 01/19/2023    ALKPHOS 80 01/19/2023    EGFR 78 01/19/2023       Patient voiced understanding and agreement in the above discussion  Aware to contact our office with questions/symptoms in the interim

## 2023-02-17 ENCOUNTER — PROCEDURE VISIT (OUTPATIENT)
Dept: UROLOGY | Facility: CLINIC | Age: 68
End: 2023-02-17

## 2023-02-17 VITALS
BODY MASS INDEX: 30.07 KG/M2 | HEART RATE: 92 BPM | HEIGHT: 72 IN | DIASTOLIC BLOOD PRESSURE: 70 MMHG | SYSTOLIC BLOOD PRESSURE: 112 MMHG | WEIGHT: 222 LBS

## 2023-02-17 DIAGNOSIS — R33.9 URINARY RETENTION: Primary | ICD-10-CM

## 2023-02-17 NOTE — PROGRESS NOTES
I supervised the Advanced Practitioner  I reviewed the Advanced Practitioner note and agree      Umesh Bolivar MD 02/17/23

## 2023-02-17 NOTE — PROGRESS NOTES
2/17/2023  Oracio Richardson Jr  is a 70 y o  male  [id number]    Diagnosis:  Chief Complaint    Chronic lemons catheter; Urinary Retention         Patient presents for routine lemons exchange managed by Dr Luciana Kaminski:  Follow up as scheduled for pre-op OV and surgery  Patient instructed to call with any questions or concerns in the meantime  No orders of the defined types were placed in this encounter  Vitals:    02/17/23 0938   BP: 112/70   Pulse: 92   Weight: 101 kg (222 lb)   Height: 6' (1 829 m)         Procedure:    Universal Protocol:  Consent: Verbal consent obtained  Risks and benefits: risks, benefits and alternatives were discussed  Consent given by: patient  Patient understanding: patient states understanding of the procedure being performed  Patient identity confirmed: verbally with patient      Bladder catheterization    Date/Time: 2/17/2023 10:27 AM  Performed by: Solo Light RN  Authorized by: Lindy Cochran MD     Consent:     Consent given by:  Patient  Universal protocol:     Procedure explained and questions answered to patient or proxy's satisfaction: yes      Patient identity confirmed:  Verbally with patient  Pre-procedure details:     Procedure purpose:  Therapeutic  Anesthesia (see MAR for exact dosages): Anesthesia method:  None  Procedure details:     Catheter insertion:  Indwelling    Approach: natural orifice      Catheter type:  Coude, latex and Lemons    Catheter size:  16 Fr    Number of attempts:  1    Successful placement: yes      Urine characteristics:  Clear and yellow  Post-procedure details:     Patient tolerance of procedure: Tolerated well, no immediate complications  Comments:      Attached to overnight bag and stat lock applied   3 additional overnight bags and stat locks supplied at patient request             Solo Light RN

## 2023-02-22 ENCOUNTER — TELEPHONE (OUTPATIENT)
Dept: UROLOGY | Facility: CLINIC | Age: 68
End: 2023-02-22

## 2023-02-22 NOTE — TELEPHONE ENCOUNTER
Per surgery prior auth specialist Denilson Torres for pt to check with insurance  Apparently, stated it does not cover [facility] currently scheduled for 3/16  If insurance states they do participate, then procedure will be covered

## 2023-02-23 NOTE — TELEPHONE ENCOUNTER
Patient's insurance is calling to advise that he is in a PPO plan and it is covered  For prior auth call [phone]      [id number] - Interaction Id  #

## 2023-03-03 ENCOUNTER — OFFICE VISIT (OUTPATIENT)
Dept: UROLOGY | Facility: CLINIC | Age: 68
End: 2023-03-03

## 2023-03-03 VITALS
HEIGHT: 72 IN | SYSTOLIC BLOOD PRESSURE: 120 MMHG | WEIGHT: 222 LBS | BODY MASS INDEX: 30.07 KG/M2 | HEART RATE: 93 BPM | DIASTOLIC BLOOD PRESSURE: 80 MMHG

## 2023-03-03 DIAGNOSIS — R33.9 URINARY RETENTION: Primary | ICD-10-CM

## 2023-03-03 NOTE — H&P (VIEW-ONLY)
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 but also may 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-has again planned TURP if I see major obstruction and no matter what given the suprapubic tube cystoscopy  The risks of bleeding infection damage urinary tract incontinence explained and he gives informed consent  He is on no blood thinners  Check urine culture which was drawn today  Scheduled for   Chief Complaint     Chief Complaint   Patient presents with   • Urinary Retention       History of Present Illness   Follow-up urinary retention, 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 but also could 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-cannot tolerate cystoscopy awake in the office so my plan was to do this in the operating room and if I saw major hypertrophy I will do TURP with a suprapubic tube  He would get a suprapubic tube no matter what  This was scheduled and I saw him last 2023  Has indwelling Lane catheter  The following portions of the patient's history were reviewed and updated as appropriate: allergies, current medications, past family history, past medical history, past social history, past surgical history and problem list   Past Medical History:   Diagnosis Date   • A-fib ([facility] )    • Acute metabolic encephalopathy    • [facility] (acute kidney injury) ([facility] ) 2022   • Diabetes mellitus ([facility] )    • Pancreatitis 2022   • Sepsis ([facility] ) 2022     Past Surgical History:   Procedure Laterality Date   • KNEE CARTILAGE SURGERY     • KNEE SURGERY Right      shoulder  Review of Systems   Review of Systems   Constitutional: Negative for fever     Respiratory: Negative for shortness of breath  Cardiovascular: Positive for leg swelling  Negative for chest pain  Genitourinary: Negative for hematuria  Has indwelling lemons       Active Problem List     Patient Active Problem List   Diagnosis   • Urinary frequency   • Type 2 diabetes mellitus (HCC)   • Microscopic hematuria   • Urge incontinence of urine   • Nocturnal enuresis   • Feeling of incomplete bladder emptying   • Balanitis   • New onset atrial fibrillation (HCC)   • Urinary retention   • Fungal infection of skin   • Depressed mood   • Pain in both lower extremities   • Polyuria   • Ventral Hernia   • Hydroureteronephrosis   • Uncontrolled type 2 diabetes mellitus with hyperglycemia (HCC)   • Vitamin D deficiency   • Dyslipidemia   • History of pulmonary embolus (PE)   • Other dietary vitamin B12 deficiency anemia   • Encounter for screening colonoscopy   • Anemia       Objective   /80 (BP Location: Left arm, Patient Position: Sitting, Cuff Size: Large)   Pulse 93   Ht 6' (1 829 m)   Wt 101 kg (222 lb)   BMI 30 11 kg/m²     Physical Exam  Vitals reviewed  Constitutional:       Appearance: Normal appearance  HENT:      Head: Normocephalic and atraumatic  Eyes:      Extraocular Movements: Extraocular movements intact  Cardiovascular:      Rate and Rhythm: Normal rate and regular rhythm  Pulses: Normal pulses  Pulmonary:      Effort: No respiratory distress  Breath sounds: Normal breath sounds  No stridor  No wheezing  Abdominal:      Palpations: Abdomen is soft  Hernia: A hernia is present  Comments: Umbil  hernia   Genitourinary:     Penis: Normal        Comments: Lemons in place  Musculoskeletal:         General: Normal range of motion  Cervical back: Normal range of motion  Right lower leg: Edema present  Left lower leg: Edema present  Skin:     Coloration: Skin is not jaundiced or pale  Neurological:      General: No focal deficit present        Mental Status: He is alert and oriented to person, place, and time  Mental status is at baseline  Psychiatric:         Mood and Affect: Mood normal          Behavior: Behavior normal          Thought Content:  Thought content normal          Judgment: Judgment normal              Current Medications     Current Outpatient Medications:   •  ammonium lactate (LAC-HYDRIN) 12 % cream, Apply topically as needed for dry skin, Disp: 385 g, Rfl: 1  •  ascorbic acid (VITAMIN C) 500 MG tablet, Take 1 tablet (500 mg total) by mouth 2 (two) times a day, Disp: 60 tablet, Rfl: 1  •  Blood Glucose Monitoring Suppl (OneTouch Verio Flex System) w/Device KIT, CHECK BLOOD SUGARS THREE TIMES DAILY DX: E11 65, Disp: 1 kit, Rfl: 0  •  docusate sodium (COLACE) 100 mg capsule, Take 1 capsule (100 mg total) by mouth 2 (two) times a day (Patient not taking: Reported on 12/8/2022), Disp: 60 capsule, Rfl: 1  •  ferrous sulfate 324 (65 Fe) mg, TAKE 1 TABLET BY MOUTH 2 TIMES A DAY BEFORE MEALS, Disp: 180 tablet, Rfl: 1  •  folic acid (FOLVITE) 1 mg tablet, TAKE 1 TABLET BY MOUTH EVERY DAY, Disp: 90 tablet, Rfl: 1  •  gabapentin (NEURONTIN) 100 mg capsule, Take 1 capsule (100 mg total) by mouth 3 (three) times a day, Disp: 90 capsule, Rfl: 3  •  glucose 4 g chewable tablet, Chew 4 tablets (16 g total) once for 1 dose (Patient not taking: Reported on 12/8/2022), Disp: 4 tablet, Rfl: 0  •  glucose blood (OneTouch Verio) test strip, CHECK BLOOD SUGARS THREE TIMES DAILY DX: E11 65, Disp: 400 strip, Rfl: 3  •  Incontinence Supply Disposable (Incontinence Brief Large) MISC, Use every 4 (four) hours as needed (Urinary incontinence), Disp: 36 each, Rfl: 12  •  insulin lispro (HumaLOG KwikPen) 100 units/mL injection pen, Inject 10 Units under the skin 3 (three) times a day with meals (Patient taking differently: Inject 10 Units under the skin 3 (three) times a day with meals Patient reports taking 6 units with meals  Patient taking 2 units with meals), Disp: 15 mL, Rfl: 0  •  insulin lispro (HumaLOG) 100 units/mL injection, Inject 10 Units under the skin 3 (three) times a day with meals, Disp: 20 mL, Rfl: 0  •  Lancets (OneTouch Delica Plus [id number]) MISC, CHECK BLOOD SUGARS THREE TIMES DAILY DX: E11 65, Disp: 400 each, Rfl: 3  •  Multiple Vitamin (Daily-Octavio Multivitamin) TABS, TAKE 1 TABLET BY MOUTH EVERY DAY, Disp: 30 tablet, Rfl: 1  •  Multiple Vitamins-Minerals (multivitamin with minerals) tablet, Take 1 tablet by mouth daily, Disp: 30 tablet, Rfl: 1  •  pantoprazole (PROTONIX) 40 mg tablet, Take 1 tablet (40 mg total) by mouth daily in the early morning, Disp: 30 tablet, Rfl: 0  •  tamsulosin (FLOMAX) 0 4 mg, Take 1 capsule (0 4 mg total) by mouth daily with dinner, Disp: 90 capsule, Rfl: 3  •  Toujeo SoloStar 300 units/mL CONCENTRATED U-300 injection pen (1-unit dial), INJECT 40 UNITS UNDER THE SKIN EVERY MORNING (Patient not taking: Reported on 2/17/2023), Disp: 13 5 mL, Rfl: 0  •  traMADol (ULTRAM) 50 mg tablet, Take 1 tablet (50 mg total) by mouth every 6 (six) hours as needed for moderate pain, Disp: 20 tablet, Rfl: 0        Samantha Rudd MD

## 2023-03-03 NOTE — PROGRESS NOTES
[address] [facility]  [facility]  [address] [facility] [address]  [place], [address] 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-has again planned TURP if I see major obstruction and no matter what given the suprapubic tube cystoscopy  The risks of bleeding infection damage urinary tract incontinence explained and he gives informed consent  He is on no blood thinners  Check urine culture which was drawn today  Scheduled for   Chief Complaint     Chief Complaint   Patient presents with   • Urinary Retention       History of Present Illness   Follow-up urinary retention, 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 but also could 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-cannot tolerate cystoscopy awake in the office so my plan was to do this in the operating room and if I saw major hypertrophy I will do TURP with a suprapubic tube  He would get a suprapubic tube no matter what  This was scheduled and I saw him last 2023  Has indwelling Lane catheter  The following portions of the patient's history were reviewed and updated as appropriate: allergies, current medications, past family history, past medical history, past social history, past surgical history and problem list   Past Medical History:   Diagnosis Date   • A-fib ([facility] )    • Acute metabolic encephalopathy    • CARLTON (acute kidney injury) ([facility] ) 2022   • Diabetes mellitus ([facility] )    • Pancreatitis 2022   • Sepsis ([facility] ) 2022     Past Surgical History:   Procedure Laterality Date   • KNEE CARTILAGE SURGERY     • KNEE SURGERY Right      shoulder  Review of Systems   Review of Systems   Constitutional: Negative for fever     Respiratory: Negative for shortness of breath  Cardiovascular: Positive for leg swelling  Negative for chest pain  Genitourinary: Negative for hematuria  Has indwelling lemons       Active Problem List     Patient Active Problem List   Diagnosis   • Urinary frequency   • Type 2 diabetes mellitus (HCC)   • Microscopic hematuria   • Urge incontinence of urine   • Nocturnal enuresis   • Feeling of incomplete bladder emptying   • Balanitis   • New onset atrial fibrillation (HCC)   • Urinary retention   • Fungal infection of skin   • Depressed mood   • Pain in both lower extremities   • Polyuria   • Ventral Hernia   • Hydroureteronephrosis   • Uncontrolled type 2 diabetes mellitus with hyperglycemia (HCC)   • Vitamin D deficiency   • Dyslipidemia   • History of pulmonary embolus (PE)   • Other dietary vitamin B12 deficiency anemia   • Encounter for screening colonoscopy   • Anemia       Objective   /80 (BP Location: Left arm, Patient Position: Sitting, Cuff Size: Large)   Pulse 93   Ht 6' (1 829 m)   Wt 101 kg (222 lb)   BMI 30 11 kg/m²     Physical Exam  Vitals reviewed  Constitutional:       Appearance: Normal appearance  HENT:      Head: Normocephalic and atraumatic  Eyes:      Extraocular Movements: Extraocular movements intact  Cardiovascular:      Rate and Rhythm: Normal rate and regular rhythm  Pulses: Normal pulses  Pulmonary:      Effort: No respiratory distress  Breath sounds: Normal breath sounds  No stridor  No wheezing  Abdominal:      Palpations: Abdomen is soft  Hernia: A hernia is present  Comments: Umbil  hernia   Genitourinary:     Penis: Normal        Comments: Lemons in place  Musculoskeletal:         General: Normal range of motion  Cervical back: Normal range of motion  Right lower leg: Edema present  Left lower leg: Edema present  Skin:     Coloration: Skin is not jaundiced or pale  Neurological:      General: No focal deficit present        Mental Status: He is alert and oriented to person, place, and time  Mental status is at baseline  Psychiatric:         Mood and Affect: Mood normal          Behavior: Behavior normal          Thought Content:  Thought content normal          Judgment: Judgment normal              Current Medications     Current Outpatient Medications:   •  ammonium lactate (LAC-HYDRIN) 12 % cream, Apply topically as needed for dry skin, Disp: 385 g, Rfl: 1  •  ascorbic acid (VITAMIN C) 500 MG tablet, Take 1 tablet (500 mg total) by mouth 2 (two) times a day, Disp: 60 tablet, Rfl: 1  •  Blood Glucose Monitoring Suppl (OneTouch Verio Flex System) w/Device KIT, CHECK BLOOD SUGARS THREE TIMES DAILY DX: E11 65, Disp: 1 kit, Rfl: 0  •  docusate sodium (COLACE) 100 mg capsule, Take 1 capsule (100 mg total) by mouth 2 (two) times a day (Patient not taking: Reported on 12/8/2022), Disp: 60 capsule, Rfl: 1  •  ferrous sulfate 324 (65 Fe) mg, TAKE 1 TABLET BY MOUTH 2 TIMES A DAY BEFORE MEALS, Disp: 180 tablet, Rfl: 1  •  folic acid (FOLVITE) 1 mg tablet, TAKE 1 TABLET BY MOUTH EVERY DAY, Disp: 90 tablet, Rfl: 1  •  gabapentin (NEURONTIN) 100 mg capsule, Take 1 capsule (100 mg total) by mouth 3 (three) times a day, Disp: 90 capsule, Rfl: 3  •  glucose 4 g chewable tablet, Chew 4 tablets (16 g total) once for 1 dose (Patient not taking: Reported on 12/8/2022), Disp: 4 tablet, Rfl: 0  •  glucose blood (OneTouch Verio) test strip, CHECK BLOOD SUGARS THREE TIMES DAILY DX: E11 65, Disp: 400 strip, Rfl: 3  •  Incontinence Supply Disposable (Incontinence Brief Large) MISC, Use every 4 (four) hours as needed (Urinary incontinence), Disp: 36 each, Rfl: 12  •  insulin lispro (HumaLOG KwikPen) 100 units/mL injection pen, Inject 10 Units under the skin 3 (three) times a day with meals (Patient taking differently: Inject 10 Units under the skin 3 (three) times a day with meals Patient reports taking 6 units with meals  Patient taking 2 units with meals), Disp: 15 mL, Rfl: 0  •  insulin lispro (HumaLOG) 100 units/mL injection, Inject 10 Units under the skin 3 (three) times a day with meals, Disp: 20 mL, Rfl: 0  •  Lancets (OneTouch Delica Plus [id number]) MISC, CHECK BLOOD SUGARS THREE TIMES DAILY DX: E11 65, Disp: 400 each, Rfl: 3  •  Multiple Vitamin (Daily-Octavio Multivitamin) TABS, TAKE 1 TABLET BY MOUTH EVERY DAY, Disp: 30 tablet, Rfl: 1  •  Multiple Vitamins-Minerals (multivitamin with minerals) tablet, Take 1 tablet by mouth daily, Disp: 30 tablet, Rfl: 1  •  pantoprazole (PROTONIX) 40 mg tablet, Take 1 tablet (40 mg total) by mouth daily in the early morning, Disp: 30 tablet, Rfl: 0  •  tamsulosin (FLOMAX) 0 4 mg, Take 1 capsule (0 4 mg total) by mouth daily with dinner, Disp: 90 capsule, Rfl: 3  •  Toujeo SoloStar 300 units/mL CONCENTRATED U-300 injection pen (1-unit dial), INJECT 40 UNITS UNDER THE SKIN EVERY MORNING (Patient not taking: Reported on 2/17/2023), Disp: 13 5 mL, Rfl: 0  •  traMADol (ULTRAM) 50 mg tablet, Take 1 tablet (50 mg total) by mouth every 6 (six) hours as needed for moderate pain, Disp: 20 tablet, Rfl: 0        Anil Hernandez MD

## 2023-03-04 LAB — BACTERIA UR CULT: NORMAL

## 2023-03-06 DIAGNOSIS — N31.2 HYPOTONIC BLADDER: ICD-10-CM

## 2023-03-06 DIAGNOSIS — R33.9 URINARY RETENTION: Primary | ICD-10-CM

## 2023-03-06 RX ORDER — LEVOFLOXACIN 500 MG/1
500 TABLET, FILM COATED ORAL EVERY 24 HOURS
Qty: 5 TABLET | Refills: 0 | Status: SHIPPED | OUTPATIENT
Start: 2023-03-06 | End: 2023-03-11

## 2023-03-07 ENCOUNTER — TELEPHONE (OUTPATIENT)
Dept: UROLOGY | Facility: CLINIC | Age: 68
End: 2023-03-07

## 2023-03-07 NOTE — TELEPHONE ENCOUNTER
Called pt to let him know that his urine culture showed contaminating bacteria and that Levaquin was sent to his pharmacy  Pt had no further questions        ----- Message from Jazmyne Gerber MD sent at 3/6/2023  5:22 PM EST -----  Pls let him know his culture just showed contaminating bacteria- no actual UTI- but just to make sure, I want him to take the Levaquin 5 day course I just sent to his pharmacy

## 2023-03-13 ENCOUNTER — ANESTHESIA EVENT (OUTPATIENT)
Dept: PERIOP | Facility: HOSPITAL | Age: 68
End: 2023-03-13

## 2023-03-13 ENCOUNTER — NURSE TRIAGE (OUTPATIENT)
Dept: OTHER | Facility: OTHER | Age: 68
End: 2023-03-13

## 2023-03-13 ENCOUNTER — TELEPHONE (OUTPATIENT)
Dept: UROLOGY | Facility: CLINIC | Age: 68
End: 2023-03-13

## 2023-03-13 NOTE — TELEPHONE ENCOUNTER
----- Message from Sahara Crespo MA sent at 3/13/2023  2:48 PM EDT -----  Regarding: FW: H&P  Please review, thanks   ----- Message -----  From: Curtis Haider MA  Sent: 3/13/2023   2:07 PM EDT  To: [facility], #  Subject: H&P                                              Hi, does anyone have his H&P that was suppose to have been signed at [address] 3/3?     Thanks

## 2023-03-13 NOTE — TELEPHONE ENCOUNTER
Patient called to make sure it is okay to continue his Flomax up until his procedure  He would like a call back to advise  Reason for Disposition  • Nursing judgment    Answer Assessment - Initial Assessment Questions  1  REASON FOR CALL or QUESTION: "What is your reason for calling today?" or "How can I best help you?" or "What question do you have that I can help answer?"      Can patient take Flomax prior to his procedure?     Protocols used: INFORMATION ONLY CALL - NO TRIAGE-ADULT-[place]

## 2023-03-13 NOTE — TELEPHONE ENCOUNTER
Thomas Covarrubias made aware patients consent for TURP was obtained at office visit with Dr Caitlyn Esquivel on 3/3 and is scanned in patients chart under "procedure consent"

## 2023-03-13 NOTE — TELEPHONE ENCOUNTER
Regarding: Medication question  ----- Message from Camden Smith RN sent at 3/13/2023 12:57 PM EDT -----  "I have a procedure on Thursday   I want to know if I can continue take the flomax "

## 2023-03-13 NOTE — PRE-PROCEDURE INSTRUCTIONS
Pre-Surgery Instructions:   Medication Instructions   • ammonium lactate (LAC-HYDRIN) 12 % cream Hold day of surgery  • ascorbic acid (VITAMIN C) 500 MG tablet Stop taking 7 days prior to surgery  • ferrous sulfate 324 (65 Fe) mg Stop taking 7 days prior to surgery  • folic acid (FOLVITE) 1 mg tablet Stop taking 7 days prior to surgery  • gabapentin (NEURONTIN) 100 mg capsule Take day of surgery  • insulin lispro (HumaLOG) 100 units/mL injection Hold day of surgery  • Multiple Vitamin (Daily-Octavio Multivitamin) TABS Stop taking 7 days prior to surgery  • pantoprazole (PROTONIX) 40 mg tablet Take day of surgery  • tamsulosin (FLOMAX) 0 4 mg Instructions provided by [facility] calling surgeon office 3/13/23 if to continue taking    Medication instructions for day surgery reviewed  Please use only a sip of water to take your instructed medications  Avoid all over the counter vitamins, supplements and NSAIDS for one week prior to surgery per anesthesia guidelines  Tylenol is ok to take as needed  You will receive a call one business day prior to surgery with an arrival time and hospital directions  If your surgery is scheduled on a Monday, the hospital will be calling you on the Friday prior to your surgery  If you have not heard from anyone by 8pm, please call the hospital supervisor through the hospital  at [phone]  Joy Titus [phone])  Do not eat or drink anything after midnight the night before your surgery, including candy, mints, lifesavers, or chewing gum  Do not drink alcohol 24hrs before your surgery  Try not to smoke at least 24hrs before your surgery  Follow the pre surgery showering instructions as listed in the [facility] Surgical Experience Booklet” or otherwise provided by your surgeon's office  Do not shave the surgical area 24 hours before surgery   Do not apply any lotions, creams, including makeup, cologne, deodorant, or perfumes after showering on the day of your surgery  No contact lenses, eye make-up, or artificial eyelashes  Remove nail polish, including gel polish, and any artificial, gel, or acrylic nails if possible  Remove all jewelry including rings and body piercing jewelry  Wear causal clothing that is easy to take on and off  Consider your type of surgery  Keep any valuables, jewelry, piercings at home  Please bring any specially ordered equipment (sling, braces) if indicated  Arrange for a responsible person to drive you to and from the hospital on the day of your surgery  Visitor Guidelines discussed  Call the surgeon's office with any new illnesses, exposures, or additional questions prior to surgery  Please reference your [facility] Surgical Experience Booklet” for additional information to prepare for your upcoming surgery

## 2023-03-14 ENCOUNTER — TELEPHONE (OUTPATIENT)
Dept: OTHER | Facility: OTHER | Age: 68
End: 2023-03-14

## 2023-03-14 NOTE — TELEPHONE ENCOUNTER
Pt would like to cancel his consultation for 3/17/2023 with JULIA Lane due to he is having surgery 3/16/2023

## 2023-03-16 ENCOUNTER — HOSPITAL ENCOUNTER (OUTPATIENT)
Facility: HOSPITAL | Age: 68
Setting detail: OUTPATIENT SURGERY
Discharge: HOME | End: 2023-03-17
Attending: UROLOGY | Admitting: UROLOGY

## 2023-03-16 ENCOUNTER — ANESTHESIA (OUTPATIENT)
Dept: PERIOP | Facility: HOSPITAL | Age: 68
End: 2023-03-16

## 2023-03-16 ENCOUNTER — TELEPHONE (OUTPATIENT)
Dept: UROLOGY | Facility: CLINIC | Age: 68
End: 2023-03-16

## 2023-03-16 DIAGNOSIS — R33.9 URINARY RETENTION: ICD-10-CM

## 2023-03-16 DIAGNOSIS — N32.81 DETRUSOR INSTABILITY: ICD-10-CM

## 2023-03-16 DIAGNOSIS — N40.1 BPH WITH OBSTRUCTION/LOWER URINARY TRACT SYMPTOMS: Primary | ICD-10-CM

## 2023-03-16 DIAGNOSIS — N31.2 HYPOTONIC BLADDER: ICD-10-CM

## 2023-03-16 DIAGNOSIS — N13.8 BPH WITH OBSTRUCTION/LOWER URINARY TRACT SYMPTOMS: Primary | ICD-10-CM

## 2023-03-16 LAB
ABO GROUP BLD: NORMAL
BLD GP AB SCN SERPL QL: NEGATIVE
GLUCOSE SERPL-MCNC: 165 MG/DL (ref 65–140)
GLUCOSE SERPL-MCNC: 190 MG/DL (ref 65–140)
GLUCOSE SERPL-MCNC: 231 MG/DL (ref 65–140)
GLUCOSE SERPL-MCNC: 233 MG/DL (ref 65–140)
GLUCOSE SERPL-MCNC: 263 MG/DL (ref 65–140)
PLATELET # BLD AUTO: 229 THOUSANDS/UL (ref 149–390)
PMV BLD AUTO: 10.6 FL (ref 8.9–12.7)
RH BLD: POSITIVE
SPECIMEN EXPIRATION DATE: NORMAL

## 2023-03-16 RX ORDER — LIDOCAINE HYDROCHLORIDE 20 MG/ML
INJECTION, SOLUTION EPIDURAL; INFILTRATION; INTRACAUDAL; PERINEURAL AS NEEDED
Status: DISCONTINUED | OUTPATIENT
Start: 2023-03-16 | End: 2023-03-16

## 2023-03-16 RX ORDER — CALCIUM CARBONATE 200(500)MG
1000 TABLET,CHEWABLE ORAL DAILY PRN
Status: DISCONTINUED | OUTPATIENT
Start: 2023-03-16 | End: 2023-03-17 | Stop reason: HOSPADM

## 2023-03-16 RX ORDER — PANTOPRAZOLE SODIUM 40 MG/1
40 TABLET, DELAYED RELEASE ORAL
Status: DISCONTINUED | OUTPATIENT
Start: 2023-03-16 | End: 2023-03-17 | Stop reason: HOSPADM

## 2023-03-16 RX ORDER — SODIUM CHLORIDE 450 MG/100ML
50 INJECTION, SOLUTION INTRAVENOUS CONTINUOUS
Status: DISCONTINUED | OUTPATIENT
Start: 2023-03-16 | End: 2023-03-17 | Stop reason: HOSPADM

## 2023-03-16 RX ORDER — PHENAZOPYRIDINE HYDROCHLORIDE 200 MG/1
200 TABLET, FILM COATED ORAL 3 TIMES DAILY PRN
Qty: 30 TABLET | Refills: 0 | Status: SHIPPED | OUTPATIENT
Start: 2023-03-16

## 2023-03-16 RX ORDER — TAMSULOSIN HYDROCHLORIDE 0.4 MG/1
0.4 CAPSULE ORAL
Status: DISCONTINUED | OUTPATIENT
Start: 2023-03-16 | End: 2023-03-17 | Stop reason: HOSPADM

## 2023-03-16 RX ORDER — GABAPENTIN 100 MG/1
100 CAPSULE ORAL 3 TIMES DAILY
Status: DISCONTINUED | OUTPATIENT
Start: 2023-03-16 | End: 2023-03-17 | Stop reason: HOSPADM

## 2023-03-16 RX ORDER — ONDANSETRON 2 MG/ML
4 INJECTION INTRAMUSCULAR; INTRAVENOUS ONCE AS NEEDED
Status: DISCONTINUED | OUTPATIENT
Start: 2023-03-16 | End: 2023-03-16 | Stop reason: HOSPADM

## 2023-03-16 RX ORDER — SODIUM CHLORIDE, SODIUM LACTATE, POTASSIUM CHLORIDE, CALCIUM CHLORIDE 600; 310; 30; 20 MG/100ML; MG/100ML; MG/100ML; MG/100ML
100 INJECTION, SOLUTION INTRAVENOUS CONTINUOUS
Status: DISCONTINUED | OUTPATIENT
Start: 2023-03-16 | End: 2023-03-17 | Stop reason: HOSPADM

## 2023-03-16 RX ORDER — PHENYLEPHRINE HYDROCHLORIDE 10 MG/ML
INJECTION INTRAVENOUS AS NEEDED
Status: DISCONTINUED | OUTPATIENT
Start: 2023-03-16 | End: 2023-03-16

## 2023-03-16 RX ORDER — ONDANSETRON 2 MG/ML
4 INJECTION INTRAMUSCULAR; INTRAVENOUS EVERY 6 HOURS PRN
Status: DISCONTINUED | OUTPATIENT
Start: 2023-03-16 | End: 2023-03-17 | Stop reason: HOSPADM

## 2023-03-16 RX ORDER — ACETAMINOPHEN 325 MG/1
650 TABLET ORAL EVERY 6 HOURS SCHEDULED
Status: DISCONTINUED | OUTPATIENT
Start: 2023-03-16 | End: 2023-03-17 | Stop reason: HOSPADM

## 2023-03-16 RX ORDER — CEPHALEXIN 500 MG/1
500 CAPSULE ORAL EVERY 6 HOURS SCHEDULED
Qty: 12 CAPSULE | Refills: 0 | Status: SHIPPED | OUTPATIENT
Start: 2023-03-16 | End: 2023-03-19

## 2023-03-16 RX ORDER — INSULIN LISPRO 100 [IU]/ML
4-20 INJECTION, SOLUTION INTRAVENOUS; SUBCUTANEOUS
Status: DISCONTINUED | OUTPATIENT
Start: 2023-03-16 | End: 2023-03-17 | Stop reason: HOSPADM

## 2023-03-16 RX ORDER — OXYBUTYNIN CHLORIDE 5 MG/1
5 TABLET, EXTENDED RELEASE ORAL DAILY
Status: DISCONTINUED | OUTPATIENT
Start: 2023-03-16 | End: 2023-03-17 | Stop reason: HOSPADM

## 2023-03-16 RX ORDER — SODIUM CHLORIDE, SODIUM LACTATE, POTASSIUM CHLORIDE, CALCIUM CHLORIDE 600; 310; 30; 20 MG/100ML; MG/100ML; MG/100ML; MG/100ML
INJECTION, SOLUTION INTRAVENOUS CONTINUOUS PRN
Status: DISCONTINUED | OUTPATIENT
Start: 2023-03-16 | End: 2023-03-16

## 2023-03-16 RX ORDER — ENOXAPARIN SODIUM 100 MG/ML
40 INJECTION SUBCUTANEOUS DAILY
Status: DISCONTINUED | OUTPATIENT
Start: 2023-03-16 | End: 2023-03-17 | Stop reason: HOSPADM

## 2023-03-16 RX ORDER — BISACODYL 10 MG
10 SUPPOSITORY, RECTAL RECTAL DAILY PRN
Status: DISCONTINUED | OUTPATIENT
Start: 2023-03-16 | End: 2023-03-17 | Stop reason: HOSPADM

## 2023-03-16 RX ORDER — PROPOFOL 10 MG/ML
INJECTION, EMULSION INTRAVENOUS AS NEEDED
Status: DISCONTINUED | OUTPATIENT
Start: 2023-03-16 | End: 2023-03-16

## 2023-03-16 RX ORDER — FENTANYL CITRATE 50 UG/ML
INJECTION, SOLUTION INTRAMUSCULAR; INTRAVENOUS AS NEEDED
Status: DISCONTINUED | OUTPATIENT
Start: 2023-03-16 | End: 2023-03-16

## 2023-03-16 RX ORDER — ONDANSETRON 2 MG/ML
INJECTION INTRAMUSCULAR; INTRAVENOUS AS NEEDED
Status: DISCONTINUED | OUTPATIENT
Start: 2023-03-16 | End: 2023-03-16

## 2023-03-16 RX ORDER — DEXAMETHASONE SODIUM PHOSPHATE 10 MG/ML
INJECTION, SOLUTION INTRAMUSCULAR; INTRAVENOUS AS NEEDED
Status: DISCONTINUED | OUTPATIENT
Start: 2023-03-16 | End: 2023-03-16

## 2023-03-16 RX ORDER — FENTANYL CITRATE/PF 50 MCG/ML
25 SYRINGE (ML) INJECTION
Status: COMPLETED | OUTPATIENT
Start: 2023-03-16 | End: 2023-03-16

## 2023-03-16 RX ORDER — MAGNESIUM HYDROXIDE/ALUMINUM HYDROXICE/SIMETHICONE 120; 1200; 1200 MG/30ML; MG/30ML; MG/30ML
30 SUSPENSION ORAL EVERY 6 HOURS PRN
Status: DISCONTINUED | OUTPATIENT
Start: 2023-03-16 | End: 2023-03-17 | Stop reason: HOSPADM

## 2023-03-16 RX ORDER — CEFAZOLIN SODIUM 2 G/50ML
2000 SOLUTION INTRAVENOUS ONCE
Status: COMPLETED | OUTPATIENT
Start: 2023-03-16 | End: 2023-03-16

## 2023-03-16 RX ORDER — DOCUSATE SODIUM 100 MG/1
100 CAPSULE, LIQUID FILLED ORAL 2 TIMES DAILY
Status: DISCONTINUED | OUTPATIENT
Start: 2023-03-16 | End: 2023-03-16

## 2023-03-16 RX ORDER — DOCUSATE SODIUM 100 MG/1
100 CAPSULE, LIQUID FILLED ORAL 2 TIMES DAILY
Status: DISCONTINUED | OUTPATIENT
Start: 2023-03-16 | End: 2023-03-17 | Stop reason: HOSPADM

## 2023-03-16 RX ORDER — MORPHINE SULFATE 4 MG/ML
4 INJECTION, SOLUTION INTRAMUSCULAR; INTRAVENOUS EVERY 2 HOUR PRN
Status: DISCONTINUED | OUTPATIENT
Start: 2023-03-16 | End: 2023-03-17 | Stop reason: HOSPADM

## 2023-03-16 RX ORDER — MAGNESIUM HYDROXIDE 1200 MG/15ML
3000 LIQUID ORAL CONTINUOUS
Status: DISCONTINUED | OUTPATIENT
Start: 2023-03-16 | End: 2023-03-17 | Stop reason: HOSPADM

## 2023-03-16 RX ORDER — HYDROCODONE BITARTRATE AND ACETAMINOPHEN 5; 325 MG/1; MG/1
1 TABLET ORAL EVERY 6 HOURS PRN
Qty: 5 TABLET | Refills: 0 | Status: SHIPPED | OUTPATIENT
Start: 2023-03-16

## 2023-03-16 RX ORDER — PROPOFOL 10 MG/ML
INJECTION, EMULSION INTRAVENOUS CONTINUOUS PRN
Status: DISCONTINUED | OUTPATIENT
Start: 2023-03-16 | End: 2023-03-16

## 2023-03-16 RX ORDER — TRAMADOL HYDROCHLORIDE 50 MG/1
50 TABLET ORAL EVERY 6 HOURS PRN
Status: DISCONTINUED | OUTPATIENT
Start: 2023-03-16 | End: 2023-03-17 | Stop reason: HOSPADM

## 2023-03-16 RX ADMIN — FENTANYL CITRATE 25 MCG: 50 INJECTION INTRAMUSCULAR; INTRAVENOUS at 10:55

## 2023-03-16 RX ADMIN — SODIUM CHLORIDE, SODIUM LACTATE, POTASSIUM CHLORIDE, AND CALCIUM CHLORIDE: .6; .31; .03; .02 INJECTION, SOLUTION INTRAVENOUS at 08:46

## 2023-03-16 RX ADMIN — FENTANYL CITRATE 25 MCG: 50 INJECTION INTRAMUSCULAR; INTRAVENOUS at 10:34

## 2023-03-16 RX ADMIN — CEFAZOLIN SODIUM 2000 MG: 2 SOLUTION INTRAVENOUS at 07:42

## 2023-03-16 RX ADMIN — PHENYLEPHRINE HYDROCHLORIDE 100 MCG: 10 INJECTION INTRAVENOUS at 08:03

## 2023-03-16 RX ADMIN — TRAMADOL HYDROCHLORIDE 50 MG: 50 TABLET, COATED ORAL at 15:28

## 2023-03-16 RX ADMIN — LIDOCAINE HYDROCHLORIDE 100 MG: 20 INJECTION, SOLUTION EPIDURAL; INFILTRATION; INTRACAUDAL; PERINEURAL at 07:47

## 2023-03-16 RX ADMIN — FENTANYL CITRATE 25 MCG: 50 INJECTION INTRAMUSCULAR; INTRAVENOUS at 08:39

## 2023-03-16 RX ADMIN — SODIUM CHLORIDE FOR IRRIGATION 3000 ML: 0.9 SOLUTION IRRIGATION at 15:01

## 2023-03-16 RX ADMIN — PROPOFOL 120 MCG/KG/MIN: 10 INJECTION, EMULSION INTRAVENOUS at 07:49

## 2023-03-16 RX ADMIN — PROPOFOL 200 MG: 10 INJECTION, EMULSION INTRAVENOUS at 07:47

## 2023-03-16 RX ADMIN — FENTANYL CITRATE 25 MCG: 50 INJECTION INTRAMUSCULAR; INTRAVENOUS at 08:06

## 2023-03-16 RX ADMIN — GABAPENTIN 100 MG: 100 CAPSULE ORAL at 21:27

## 2023-03-16 RX ADMIN — SODIUM CHLORIDE, SODIUM LACTATE, POTASSIUM CHLORIDE, AND CALCIUM CHLORIDE: .6; .31; .03; .02 INJECTION, SOLUTION INTRAVENOUS at 07:42

## 2023-03-16 RX ADMIN — ACETAMINOPHEN 650 MG: 325 TABLET ORAL at 23:39

## 2023-03-16 RX ADMIN — DEXAMETHASONE SODIUM PHOSPHATE 10 MG: 10 INJECTION, SOLUTION INTRAMUSCULAR; INTRAVENOUS at 07:47

## 2023-03-16 RX ADMIN — FENTANYL CITRATE 25 MCG: 50 INJECTION INTRAMUSCULAR; INTRAVENOUS at 08:09

## 2023-03-16 RX ADMIN — ONDANSETRON 4 MG: 2 INJECTION INTRAMUSCULAR; INTRAVENOUS at 08:49

## 2023-03-16 RX ADMIN — ENOXAPARIN SODIUM 40 MG: 100 INJECTION SUBCUTANEOUS at 10:37

## 2023-03-16 RX ADMIN — PHENYLEPHRINE HYDROCHLORIDE 100 MCG: 10 INJECTION INTRAVENOUS at 08:16

## 2023-03-16 RX ADMIN — SODIUM CHLORIDE 50 ML/HR: 0.45 INJECTION, SOLUTION INTRAVENOUS at 15:28

## 2023-03-16 RX ADMIN — INSULIN LISPRO 8 UNITS: 100 INJECTION, SOLUTION INTRAVENOUS; SUBCUTANEOUS at 19:36

## 2023-03-16 RX ADMIN — OXYBUTYNIN CHLORIDE 5 MG: 5 TABLET, EXTENDED RELEASE ORAL at 15:27

## 2023-03-16 RX ADMIN — FENTANYL CITRATE 25 MCG: 50 INJECTION INTRAMUSCULAR; INTRAVENOUS at 07:55

## 2023-03-16 RX ADMIN — TAMSULOSIN HYDROCHLORIDE 0.4 MG: 0.4 CAPSULE ORAL at 15:32

## 2023-03-16 RX ADMIN — GABAPENTIN 100 MG: 100 CAPSULE ORAL at 15:28

## 2023-03-16 RX ADMIN — SODIUM CHLORIDE, SODIUM LACTATE, POTASSIUM CHLORIDE, AND CALCIUM CHLORIDE 100 ML/HR: .6; .31; .03; .02 INJECTION, SOLUTION INTRAVENOUS at 12:30

## 2023-03-16 RX ADMIN — DOCUSATE SODIUM 100 MG: 100 CAPSULE, LIQUID FILLED ORAL at 17:34

## 2023-03-16 RX ADMIN — PHENYLEPHRINE HYDROCHLORIDE 100 MCG: 10 INJECTION INTRAVENOUS at 08:21

## 2023-03-16 RX ADMIN — ACETAMINOPHEN 650 MG: 325 TABLET ORAL at 17:34

## 2023-03-16 NOTE — QUICK NOTE
Patient underwent TURP and suprapubic tube placement  Prostate showed only moderate occlusion  Plan is to discharge home tomorrow with a three-way Lane catheter that is to remove Monday in the office if the urine remains reasonably light red to clear and no fevers or other medical issues  Prescriptions for Norco, Pyridium and Keflex have been sent into the pharmacy already  Discharge instructions in the chart  He will likely be discharged remotely

## 2023-03-16 NOTE — OP NOTE
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]  BPH with obstruction, bladder calculus  Procedure(s):  TRANSURETHRAL RESECTION OF PROSTATE (TURP) cystoscopy  SP tube placement, removal of bladder calculus    Specimen(s):  ID Type Source Tests Collected by Time Destination   1 : prostate chips Tissue Prostate TISSUE EXAM Saloni Salcido MD 3/16/2023 [id number]        Estimated Blood Loss:   Minimal    Drains:  Urethral Catheter Coude; Latex 16 Fr  (Active)   Number of days: 27       [REMOVED] Urethral Catheter Coude; Latex 16 Fr  (Removed)   Number of days: 49       Anesthesia Type:   General/LMA    Operative Indications:  Urinary retention [R33 9]  Hypotonic bladder [N31 2]  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]  BPH with obstruction    Operative Findings: Moderately obstructive prostate, with friable vessels  This was resected  Small bladder stones evacuated from bladder  16 Swiss    Complications:   None    Procedure and Technique:      Oracio Cai  is a 71 y/o man with urinary retention, who has been scheduled for cystoscopy and transurethral resection of the prostate  The risks of bleeding, infection, damage to the urinary tract and adjacent organs, retrograde ejaculation, incontinence, irritative voiding symptoms were discussed with the patient and informed consent was given  The patient was brought to the operating room and identified  After general anesthesia was induced, the patient was prepared and draped in the dorsolithotomy position in the usual fashion, with standard care taken to protect pressure points   A time out was performed  Cystoscopy was first carried out with a 22 Tongan cystoscopy sheath and 30 degree lens  Urethra was normal without stricture  The prostate showed moderate occlusion  There were friable vessels  The bladder itself was trabeculated with catheter induced erythema and edema and there was a small bladder stone in the base of the bladder  I took the 70 degree lens and pointed towards the dome and made an incision just a fingerbreadth above the pubic symphysis, with an 11 blade, transversely  I then placed the Juan David kit needle into the bladder under direct vision and then placed a wire through this and remove the needle  I then took the inner dilator for the 20 [place] kit and placed this into the bladder under direct vision, and then remove that and put the sheath over it and replaced this  Once the sheath was imaged, I was able to put a 16 [address] catheter through this and inflated the balloon to 10 cc  This was later secured with a 2-0 nylon suture to the skin  The 26 Tongan resectoscope sheath was introduced into the bladder under direct vison  Quick Cystoscopy was peformed with the 30 degree lens  The bladder was trabeculated/nontrabeculated, and there was a small median lobe  The bladder was drained often during the procedure to avoid hyperdistension  The resectoscope was introduced, and resection begun at 6:00 to include the median lobe, and the prostate was resected circumferentially from the bladder neck to the verumontanum, with care being taken not to resect the Veru or resect distal to it  Once an adequate resection was obtained, all of the chips were evacuated with the [facility] evacuator, and hemostasis achieved with electrocautery  When I was satisfied with hemostasis,a 24 Turkish 3 way hematuria catheter was placed, the balloon inflated, and the catheter placed to slight traction  The patient was awakened and transferred to the PACU in good condition    The irrigant was clear at the end of the procedure     I was present for the entire procedure and A qualified resident physician was not available    Patient Disposition:  PACU  and extubated and stable        SIGNATURE: Yo Watkins MD  DATE: March 16, 2023  TIME: 8:51 AM

## 2023-03-16 NOTE — NURSING NOTE
Received Patient from PACU, Continuous bladder irrigation running through Lane catheter and suprapubic catheter capped, Dressing site is dry and intact  Urine output is clear  Patient and alert and oriented

## 2023-03-16 NOTE — TELEPHONE ENCOUNTER
----- Message from Yo Watkins MD sent at 3/16/2023  9:36 AM EDT -----  Please contact patient for urethral catheter he underwent TURP on which will stay in place for the plugging  He is to unplug the tube if he cannot urinate at all or if he urinates he can measure his postvoid residuals

## 2023-03-16 NOTE — PLAN OF CARE
Problem: MOBILITY - ADULT  Goal: Maintain or return to baseline ADL function  Description: INTERVENTIONS:  -  Assess patient's ability to carry out ADLs; assess patient's baseline for ADL function and identify physical deficits which impact ability to perform ADLs (bathing, care of mouth/teeth, toileting, grooming, dressing, etc )  - Assess/evaluate cause of self-care deficits   - Assess range of motion  - Assess patient's mobility; develop plan if impaired  - Assess patient's need for assistive devices and provide as appropriate  - Encourage maximum independence but intervene and supervise when necessary  - Involve family in performance of ADLs  - Assess for home care needs following discharge   - Consider OT consult to assist with ADL evaluation and planning for discharge  - Provide patient education as appropriate  Outcome: Progressing  Goal: Maintains/Returns to pre admission functional level  Description: INTERVENTIONS:  - Perform BMAT or MOVE assessment daily    - Set and communicate daily mobility goal to care team and patient/family/caregiver  - Collaborate with rehabilitation services on mobility goals if consulted  - Perform Range of Motion 3 times a day  - Reposition patient every 2 hours    - Dangle patient 3 times a day  - Stand patient 3 times a day  - Ambulate patient 3 times a day  - Out of bed to chair 3 times a day   - Out of bed for meals 3 times a day  - Out of bed for toileting  - Record patient progress and toleration of activity level   Outcome: Progressing     Problem: PAIN - ADULT  Goal: Verbalizes/displays adequate comfort level or baseline comfort level  Description: Interventions:  - Encourage patient to monitor pain and request assistance  - Assess pain using appropriate pain scale  - Administer analgesics based on type and severity of pain and evaluate response  - Implement non-pharmacological measures as appropriate and evaluate response  - Consider cultural and social influences on pain and pain management  - Notify physician/advanced practitioner if interventions unsuccessful or patient reports new pain  Outcome: Progressing     Problem: INFECTION - ADULT  Goal: Absence or prevention of progression during hospitalization  Description: INTERVENTIONS:  - Assess and monitor for signs and symptoms of infection  - Monitor lab/diagnostic results  - Monitor all insertion sites, i e  indwelling lines, tubes, and drains  - Monitor endotracheal if appropriate and nasal secretions for changes in amount and color  - Crescent appropriate cooling/warming therapies per order  - Administer medications as ordered  - Instruct and encourage patient and family to use good hand hygiene technique  - Identify and instruct in appropriate isolation precautions for identified infection/condition  Outcome: Progressing  Goal: Absence of fever/infection during neutropenic period  Description: INTERVENTIONS:  - Monitor WBC    Outcome: Progressing     Problem: SAFETY ADULT  Goal: Maintain or return to baseline ADL function  Description: INTERVENTIONS:  -  Assess patient's ability to carry out ADLs; assess patient's baseline for ADL function and identify physical deficits which impact ability to perform ADLs (bathing, care of mouth/teeth, toileting, grooming, dressing, etc )  - Assess/evaluate cause of self-care deficits   - Assess range of motion  - Assess patient's mobility; develop plan if impaired  - Assess patient's need for assistive devices and provide as appropriate  - Encourage maximum independence but intervene and supervise when necessary  - Involve family in performance of ADLs  - Assess for home care needs following discharge   - Consider OT consult to assist with ADL evaluation and planning for discharge  - Provide patient education as appropriate  Outcome: Progressing  Goal: Maintains/Returns to pre admission functional level  Description: INTERVENTIONS:  - Perform BMAT or MOVE assessment daily    - Set and communicate daily mobility goal to care team and patient/family/caregiver  - Collaborate with rehabilitation services on mobility goals if consulted  - Perform Range of Motion 3 times a day  - Reposition patient every 2 hours    - Dangle patient 3 times a day  - Stand patient 3 times a day  - Ambulate patient 3 times a day  - Out of bed to chair 3 times a day   - Out of bed for meals 3 times a day  - Out of bed for toileting  - Record patient progress and toleration of activity level   Outcome: Progressing  Goal: Patient will remain free of falls  Description: INTERVENTIONS:  - Educate patient/family on patient safety including physical limitations  - Instruct patient to call for assistance with activity   - Consult OT/PT to assist with strengthening/mobility   - Keep Call bell within reach  - Keep bed low and locked with side rails adjusted as appropriate  - Keep care items and personal belongings within reach  - Initiate and maintain comfort rounds  - Make Fall Risk Sign visible to staff  - Offer Toileting every 2 Hours, in advance of need  - Initiate/Maintain alarm  - Obtain necessary fall risk management equipment:   - Apply yellow socks and bracelet for high fall risk patients  - Consider moving patient to room near nurses station  Outcome: Progressing     Problem: DISCHARGE PLANNING  Goal: Discharge to home or other facility with appropriate resources  Description: INTERVENTIONS:  - Identify barriers to discharge w/patient and caregiver  - Arrange for needed discharge resources and transportation as appropriate  - Identify discharge learning needs (meds, wound care, etc )  - Arrange for interpretive services to assist at discharge as needed  - Refer to Case Management Department for coordinating discharge planning if the patient needs post-hospital services based on physician/advanced practitioner order or complex needs related to functional status, cognitive ability, or social support system  Outcome: Progressing     Problem: Knowledge Deficit  Goal: Patient/family/caregiver demonstrates understanding of disease process, treatment plan, medications, and discharge instructions  Description: Complete learning assessment and assess knowledge base    Interventions:  - Provide teaching at level of understanding  - Provide teaching via preferred learning methods  Outcome: Progressing

## 2023-03-16 NOTE — DISCHARGE INSTR - AVS FIRST PAGE
Expect to see blood in the urine, and to experience urgency/frequency/burning with urination and dribbling  This is normal after urological procedures  Is also normal to experience some nausea after these procedures  Go back your regular diet carefully  Unplug the suprapubic tube as needed to urinate after the urethral catheter is removed Monday  Call for fever greater that 101 5, inability to urinate, prolonged nausea and vomiting, or severe pain not relieved by pain medications        No driving/operating machinery for 24 hours, and while taking narcotics  Take over the counter remedy of choice to avoid constipation  Drink plenty of fluids

## 2023-03-16 NOTE — INTERVAL H&P NOTE
H&P reviewed  After examining the patient I find no changes in the patients condition since the H&P had been written      Vitals:    03/16/23 0711   BP: 134/74   Pulse: 84   Resp: 20   Temp: (!) 97 4 °F (36 3 °C)   SpO2: 97%

## 2023-03-16 NOTE — ANESTHESIA PREPROCEDURE EVALUATION
Procedure:  TRANSURETHRAL RESECTION OF PROSTATE (TURP)(possible), cystoscopy, SP tube placement (Bladder)    Relevant Problems   CARDIO   (+) New onset atrial fibrillation (HCC)      ENDO   (+) Type 2 diabetes mellitus (HCC)      /RENAL   (+) Hydroureteronephrosis      HEMATOLOGY   (+) Anemia   (+) Other dietary vitamin B12 deficiency anemia      NEURO/PSYCH   (+) History of pulmonary embolus (PE)        Physical Exam    Airway    Mallampati score: II  TM Distance: >3 FB  Neck ROM: full     Dental   Comment: Bottom front tooth loose,     Cardiovascular      Pulmonary      Other Findings        Anesthesia Plan  ASA Score- 2     Anesthesia Type- general with ASA Monitors  Additional Monitors:   Airway Plan: LMA  Plan Factors-    Chart reviewed  Patient summary reviewed  Induction- intravenous  Postoperative Plan- Plan for postoperative opioid use  Planned trial extubation    Informed Consent- Anesthetic plan and risks discussed with patient  I personally reviewed this patient with the CRNA  Discussed and agreed on the Anesthesia Plan with the CRNA  Sammi Angeles

## 2023-03-16 NOTE — ANESTHESIA POSTPROCEDURE EVALUATION
Post-Op Assessment Note    CV Status:  Stable  Pain Score: 0    Pain management: satisfactory to patient     Mental Status:  Awake and alert   Hydration Status:  Euvolemic   PONV Controlled:  Controlled   Airway Patency:  Patent      Post Op Vitals Reviewed: Yes      Staff: CRNA, Anesthesiologist         No notable events documented      BP   105/61   Temp  97 3   Pulse  74   Resp   16   SpO2   99

## 2023-03-17 VITALS
WEIGHT: 220 LBS | HEIGHT: 72 IN | SYSTOLIC BLOOD PRESSURE: 117 MMHG | BODY MASS INDEX: 29.8 KG/M2 | DIASTOLIC BLOOD PRESSURE: 68 MMHG | TEMPERATURE: 97.7 F | OXYGEN SATURATION: 99 % | HEART RATE: 68 BPM | RESPIRATION RATE: 17 BRPM

## 2023-03-17 LAB
GLUCOSE SERPL-MCNC: 149 MG/DL (ref 65–140)
GLUCOSE SERPL-MCNC: 195 MG/DL (ref 65–140)

## 2023-03-17 RX ADMIN — DOCUSATE SODIUM 100 MG: 100 CAPSULE, LIQUID FILLED ORAL at 08:21

## 2023-03-17 RX ADMIN — PANTOPRAZOLE SODIUM 40 MG: 40 TABLET, DELAYED RELEASE ORAL at 05:27

## 2023-03-17 RX ADMIN — ENOXAPARIN SODIUM 40 MG: 100 INJECTION SUBCUTANEOUS at 08:21

## 2023-03-17 RX ADMIN — OXYBUTYNIN CHLORIDE 5 MG: 5 TABLET, EXTENDED RELEASE ORAL at 08:21

## 2023-03-17 RX ADMIN — ACETAMINOPHEN 650 MG: 325 TABLET ORAL at 11:19

## 2023-03-17 RX ADMIN — ACETAMINOPHEN 650 MG: 325 TABLET ORAL at 05:27

## 2023-03-17 RX ADMIN — GABAPENTIN 100 MG: 100 CAPSULE ORAL at 08:21

## 2023-03-17 RX ADMIN — INSULIN LISPRO 4 UNITS: 100 INJECTION, SOLUTION INTRAVENOUS; SUBCUTANEOUS at 11:19

## 2023-03-17 NOTE — NURSING NOTE
DC paperwork given to and explained to patient with verbal understanding  Attempted to place leg bag for lemons but patient refused

## 2023-03-17 NOTE — CASE MANAGEMENT
Case Management Discharge Planning Note    Patient name Jose Martin Verma  Location /-01 MRN [id number]  : 1955 Date 3/17/2023       Current Admission Date: 3/16/2023  Current Admission Diagnosis:Urinary retention, Hypotonic bladder, Detrusor instability   Patient Active Problem List    Diagnosis Date Noted   • Other dietary vitamin B12 deficiency anemia 02/15/2023   • Encounter for screening colonoscopy 02/15/2023   • Anemia 02/15/2023   • History of pulmonary embolus (PE) 2023   • Vitamin D deficiency 10/17/2022   • Dyslipidemia 10/17/2022   • Uncontrolled type 2 diabetes mellitus with hyperglycemia ([facility] [address] ) 2022   • Hydroureteronephrosis 2022   • Ventral Hernia 2022   • Polyuria 09/15/2022   • Depressed mood 2022   • Pain in both lower extremities 2022   • Fungal infection of skin 2022   • Urinary retention 2022   • New onset atrial fibrillation ([facility] [address] ) 2022   • Urinary frequency 2022   • Type 2 diabetes mellitus ([facility] [address] ) 2022   • Microscopic hematuria 2022   • Urge incontinence of urine 2022   • Nocturnal enuresis 2022   • Feeling of incomplete bladder emptying 2022   • Balanitis 2022      LOS (days): 0  Geometric Mean LOS (GMLOS) (days):   Days to GMLOS:     OBJECTIVE:            Current admission status: Outpatient Surgery   Preferred Pharmacy:   [facility]/pharmacy #[id number]- [place], [place] - [address]  Phone: [phone] Fax: [phone]    Primary Care Provider: Karly Palmer DO    Primary Insurance: [facility]  Secondary Insurance: BLUE CROSS    DISCHARGE DETAILS:    Additional Comments: Chart reviewed for dischagre planning purposes  Plan is to discharge home today with a three-way Lane catheter per Urology  Plan to remove Monday in the office per Urology  Pt will not require HHC at this time  No other CM discharge needs identified at this time

## 2023-03-17 NOTE — PLAN OF CARE
Problem: MOBILITY - ADULT  Goal: Maintain or return to baseline ADL function  Description: INTERVENTIONS:  -  Assess patient's ability to carry out ADLs; assess patient's baseline for ADL function and identify physical deficits which impact ability to perform ADLs (bathing, care of mouth/teeth, toileting, grooming, dressing, etc )  - Assess/evaluate cause of self-care deficits   - Assess range of motion  - Assess patient's mobility; develop plan if impaired  - Assess patient's need for assistive devices and provide as appropriate  - Encourage maximum independence but intervene and supervise when necessary  - Involve family in performance of ADLs  - Assess for home care needs following discharge   - Consider OT consult to assist with ADL evaluation and planning for discharge  - Provide patient education as appropriate  Outcome: Progressing  Goal: Maintains/Returns to pre admission functional level  Description: INTERVENTIONS:  - Perform BMAT or MOVE assessment daily    - Set and communicate daily mobility goal to care team and patient/family/caregiver  - Collaborate with rehabilitation services on mobility goals if consulted  - Perform Range of Motion 3 times a day  - Reposition patient every 2 hours    - Dangle patient 3 times a day  - Stand patient 3 times a day  - Ambulate patient 3 times a day  - Out of bed to chair 3 times a day   - Out of bed for meals 3 times a day  - Out of bed for toileting  - Record patient progress and toleration of activity level   Outcome: Progressing     Problem: PAIN - ADULT  Goal: Verbalizes/displays adequate comfort level or baseline comfort level  Description: Interventions:  - Encourage patient to monitor pain and request assistance  - Assess pain using appropriate pain scale  - Administer analgesics based on type and severity of pain and evaluate response  - Implement non-pharmacological measures as appropriate and evaluate response  - Consider cultural and social influences on pain and pain management  - Notify physician/advanced practitioner if interventions unsuccessful or patient reports new pain  Outcome: Progressing     Problem: INFECTION - ADULT  Goal: Absence or prevention of progression during hospitalization  Description: INTERVENTIONS:  - Assess and monitor for signs and symptoms of infection  - Monitor lab/diagnostic results  - Monitor all insertion sites, i e  indwelling lines, tubes, and drains  - Monitor endotracheal if appropriate and nasal secretions for changes in amount and color  - Mobile appropriate cooling/warming therapies per order  - Administer medications as ordered  - Instruct and encourage patient and family to use good hand hygiene technique  - Identify and instruct in appropriate isolation precautions for identified infection/condition  Outcome: Progressing  Goal: Absence of fever/infection during neutropenic period  Description: INTERVENTIONS:  - Monitor WBC    Outcome: Progressing     Problem: SAFETY ADULT  Goal: Maintain or return to baseline ADL function  Description: INTERVENTIONS:  -  Assess patient's ability to carry out ADLs; assess patient's baseline for ADL function and identify physical deficits which impact ability to perform ADLs (bathing, care of mouth/teeth, toileting, grooming, dressing, etc )  - Assess/evaluate cause of self-care deficits   - Assess range of motion  - Assess patient's mobility; develop plan if impaired  - Assess patient's need for assistive devices and provide as appropriate  - Encourage maximum independence but intervene and supervise when necessary  - Involve family in performance of ADLs  - Assess for home care needs following discharge   - Consider OT consult to assist with ADL evaluation and planning for discharge  - Provide patient education as appropriate  Outcome: Progressing  Goal: Maintains/Returns to pre admission functional level  Description: INTERVENTIONS:  - Perform BMAT or MOVE assessment daily    - Set and communicate daily mobility goal to care team and patient/family/caregiver  - Collaborate with rehabilitation services on mobility goals if consulted  - Perform Range of Motion 3 times a day  - Reposition patient every 2 hours    - Dangle patient 3 times a day  - Stand patient 3 times a day  - Ambulate patient 3 times a day  - Out of bed to chair 3 times a day   - Out of bed for meals 3 times a day  - Out of bed for toileting  - Record patient progress and toleration of activity level   Outcome: Progressing  Goal: Patient will remain free of falls  Description: INTERVENTIONS:  - Educate patient/family on patient safety including physical limitations  - Instruct patient to call for assistance with activity   - Consult OT/PT to assist with strengthening/mobility   - Keep Call bell within reach  - Keep bed low and locked with side rails adjusted as appropriate  - Keep care items and personal belongings within reach  - Initiate and maintain comfort rounds  - Make Fall Risk Sign visible to staff  - Offer Toileting every 2 Hours, in advance of need  - Initiate/Maintain alarm  - Obtain necessary fall risk management equipment:   - Apply yellow socks and bracelet for high fall risk patients  - Consider moving patient to room near nurses station  Outcome: Progressing     Problem: DISCHARGE PLANNING  Goal: Discharge to home or other facility with appropriate resources  Description: INTERVENTIONS:  - Identify barriers to discharge w/patient and caregiver  - Arrange for needed discharge resources and transportation as appropriate  - Identify discharge learning needs (meds, wound care, etc )  - Arrange for interpretive services to assist at discharge as needed  - Refer to Case Management Department for coordinating discharge planning if the patient needs post-hospital services based on physician/advanced practitioner order or complex needs related to functional status, cognitive ability, or social support system  Outcome: Progressing     Problem: Knowledge Deficit  Goal: Patient/family/caregiver demonstrates understanding of disease process, treatment plan, medications, and discharge instructions  Description: Complete learning assessment and assess knowledge base    Interventions:  - Provide teaching at level of understanding  - Provide teaching via preferred learning methods  Outcome: Progressing

## 2023-03-17 NOTE — TELEPHONE ENCOUNTER
Post Op Note    Oracio Reinoso  is a 70 y o  male s/p TRANSURETHRAL RESECTION OF PROSTATE (TURP)(possible), cystoscopy, SP tube placement, removal of bladder calculus (Bladder) performed 3/16/2023  Oracio Richardson Jr  is a patient of Dr Milady High and is seen at the [facility] office  How would you rate your pain on a scale from 1 to 10, 10 being the worst pain ever? 0  Have you had a fever? No    Do you have any difficulty urinating? No- Patient has 3 way Lemons and SPT     If the patient has a lemons- are you comfortable caring for your lemons? Yes Is it draining urine? Yes     Do you have any other questions or concerns that I can address at this time? Patient reports feeling well s/p procedure  He denies pain  Has occasional bladder spasms, for which he has prn Pyridium  Patient does not wish to travel to alternative office than [place] for Lemons removal on Monday  Advised there is no nurse at that office  Patient requested to be added to LEEANNA Vargas's schedule  Advised there may be a copay for visit  Patient verbalized understanding  Patient will need appointment for 6 week SPT exchange- he wishes to schedule this in [facility] with Nina James while at appointment on Monday

## 2023-03-17 NOTE — NURSING NOTE
Patient wished for me to call spouse to make her aware of discharge  I called with no answer, left voicemail  Will attempt again shortly  Patient doesn't want to leave until after lunch, around [phone]      [address] [facility] wife  She is aware of discharge and patients room number  Will be here around 1500

## 2023-03-17 NOTE — QUICK NOTE
Afebrile, vital signs stable, no major events overnight  As per plan, discharged home with Lane catheter in place with suprapubic tube plugged  Office has been contacted to make arrangements for patient to follow-up Monday for Lane catheter removal   Prescriptions have been called in

## 2023-03-20 ENCOUNTER — OFFICE VISIT (OUTPATIENT)
Dept: UROLOGY | Facility: CLINIC | Age: 68
End: 2023-03-20

## 2023-03-20 VITALS
DIASTOLIC BLOOD PRESSURE: 86 MMHG | BODY MASS INDEX: 29.39 KG/M2 | WEIGHT: 217 LBS | HEIGHT: 72 IN | OXYGEN SATURATION: 96 % | SYSTOLIC BLOOD PRESSURE: 122 MMHG | HEART RATE: 89 BPM

## 2023-03-20 DIAGNOSIS — R33.9 URINARY RETENTION: Primary | ICD-10-CM

## 2023-03-20 NOTE — ASSESSMENT & PLAN NOTE
· S/p TURP with suprapubic tube placement 3/16/2023  · Lane catheter removed today  · Maintain Suprapubic Tube, capped  · We will open up suprapubic tube and drain if unable to urinate and measure output  · Continue Flomax 0 4 mg daily  · Continue oxybutynin as needed for bladder spasms  · Continue Pyridium as needed for bladder spasms  · Follow-up 6 weeks for suprapubic tube change

## 2023-03-20 NOTE — PATIENT INSTRUCTIONS
Lane Catheter Placement and Care   WHAT YOU NEED TO KNOW:   A Lane catheter is a sterile tube that is inserted into your bladder to drain urine  It is also called an indwelling urinary catheter  DISCHARGE INSTRUCTIONS:   Return to the emergency department if:   Your catheter comes out  You suddenly have material that looks like sand in the tubing or drainage bag  No urine is draining into the bag and you have checked the system  You have pain in your hip, back, pelvis, or lower abdomen  You are confused or cannot think clearly  Call your doctor or urologist if:   You have a fever  You have bladder spasms for more than 1 day after the catheter is placed  You see blood in the tubing or drainage bag  You have a rash or itching where the catheter tube is secured to your skin  Urine leaks from or around the catheter, tubing, or drainage bag  The closed drainage system has accidently come open or apart  You see a layer of crystals inside the tubing  You have questions or concerns about your condition or care  Care for your catheter and drainage bag: You can reduce your risk for infection and injury by caring for your catheter and drainage bag properly  Wash your hands often  Wash before and after you touch your catheter, tubing, or drainage bag  Use soap and water  Wear clean disposable gloves when you care for your catheter or disconnect the drainage bag  Wash your hands before you prepare or eat food  Clean your genital area 2 times every day  Clean your catheter area and anal opening after every bowel movement  For men:  Use a soapy cloth to clean the tip of your penis  Start where the catheter enters  Wipe backward making sure to pull back the foreskin  Then use a cloth with clear water in the same direction to clean away the soap  For women:  Use a soapy cloth to clean the area that the catheter enters your body   Make sure to separate your labia and wipe toward the anus  Then use a cloth with clear water and wipe in the same direction  Secure the catheter tube  so you do not pull or move the catheter  This helps prevent pain and bladder spasms  Healthcare providers will show you how to use medical tape or a strap to secure the catheter tube to your body  Keep a closed drainage system  Your catheter should always be attached to the drainage bag to form a closed system  Do not disconnect any part of the closed system unless you need to change the bag  Keep the drainage bag below the level of your waist   This helps stop urine from moving back up the tubing and into your bladder  Do not loop or kink the tubing  This can cause urine to back up and collect in your bladder  Do not let the drainage bag touch or lie on the floor  Empty the drainage bag when needed  The weight of a full drainage bag can be painful  Empty the drainage bag every 3 to 6 hours or when it is ? full  Clean and change the drainage bag as directed  Ask your healthcare provider how often you should change the drainage bag and what cleaning solution to use  Wear disposable gloves when you change the bag  Do not allow the end of the catheter or tubing to touch anything  Clean the ends with an alcohol pad before you reconnect them  What to do if problems develop:   No urine is draining into the bag:      Check for kinks in the tubing and straighten them out  Check the tape or strap used to secure the catheter tube to your skin  Make sure it is not blocking the tube  Make sure you are not sitting or lying on the tubing  Make sure the urine bag is hanging below the level of your waist     Urine leaks from or around the catheter, tubing, or drainage bag:  Check if the closed drainage system has accidently come open or apart  Clean the catheter and tubing ends with a new alcohol pad and reconnect them      Follow up with your doctor or urologist as directed:  Write down your questions so you remember to ask them during your visits  © Copyright [employer] 2022 Information is for End User's use only and may not be sold, redistributed or otherwise used for commercial purposes  The above information is an  only  It is not intended as medical advice for individual conditions or treatments  Talk to your doctor, nurse or pharmacist before following any medical regimen to see if it is safe and effective for you

## 2023-03-20 NOTE — PROGRESS NOTES
Assessment and plan:     Urinary retention  · S/p TURP with suprapubic tube placement 3/16/2023  · Lane catheter removed today  · Maintain Suprapubic Tube, capped  · We will open up suprapubic tube and drain if unable to urinate and measure output  · Continue Flomax 0 4 mg daily  · Continue oxybutynin as needed for bladder spasms  · Continue Pyridium as needed for bladder spasms  · Follow-up 6 weeks for suprapubic tube change      LEEANNA Coreas    History of Present Illness     Oracio Dylan Narayan  is a 70 y o  male patient of Dr Otoniel Garcia status post TURP, removal of bladder calculi, suprapubic tube placement on 3/16/2023  He was found to have a moderately obstructive prostate with friable vessels  Waited 2 to urinary retention  He presents today for Lane catheter removal   He will maintain his suprapubic tube at this time which will be capped  He will open it if he is unable to urinate and to measure postvoid residuals  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  Suprapubic tube site clean dry and intact  Cleansed in the office today  Instructed on care at home  [address] catheter removed intact  Suture removed from spt  Laboratory     Lab Results   Component Value Date    BUN 20 01/19/2023    CREATININE 0 98 01/19/2023       No components found for: GFR    Lab Results   Component Value Date    GLUCOSE 241 (H) 09/07/2022    CALCIUM 10 2 (H) 01/19/2023    K 3 8 01/19/2023    CO2 26 01/19/2023     01/19/2023       Lab Results   Component Value Date    WBC 5 57 01/19/2023    HGB 14 8 01/19/2023    HCT 45 5 01/19/2023    MCV 87 01/19/2023     03/16/2023       No results found for: PSA    No results found for this or any previous visit (from the past 1 hour(s))      @RESULT(URINEMICROSCOPIC)@    @RESULT(URINECULTURE)@    Radiology       Review of Systems     Review of Systems   Constitutional: Negative for activity change, appetite change, chills, fatigue, fever and unexpected weight change  HENT: Negative for facial swelling  Eyes: Negative for discharge  Respiratory: Negative  Negative for cough and shortness of breath  Cardiovascular: Negative for chest pain and leg swelling  Gastrointestinal: Negative  Negative for abdominal distention, abdominal pain, constipation, diarrhea, nausea and vomiting  Endocrine: Negative  Genitourinary: Positive for difficulty urinating  Negative for decreased urine volume, dysuria, enuresis, flank pain, frequency, genital sores, hematuria and urgency  Bladder spasms, Lane catheter and suprapubic tube   Musculoskeletal: Negative for back pain and myalgias  Skin: Negative for pallor and rash  Allergic/Immunologic: Negative  Negative for immunocompromised state  Neurological: Negative for facial asymmetry and speech difficulty  Psychiatric/Behavioral: Negative for agitation and confusion  AUA SYMPTOM SCORE    Flowsheet Row Most Recent Value   AUA SYMPTOM SCORE    How often have you had a sensation of not emptying your bladder completely after you finished urinating? 0   How often have you had to urinate again less than two hours after you finished urinating? 2   How often have you found you stopped and started again several times when you urinate? 0   How often have you found it difficult to postpone urination? 0   How often have you had a weak urinary stream? 0   How often have you had to push or strain to begin urination? 0   How many times did you most typically get up to urinate from the time you went to bed at night until the time you got up in the morning? 2   Quality of Life: If you were to spend the rest of your life with your urinary condition just the way it is now, how would you feel about that? 4   AUA SYMPTOM SCORE 4                Allergies     No Known Allergies    Physical Exam     Physical Exam  Vitals reviewed  Constitutional:       General: He is not in acute distress       Appearance: Normal appearance  He is not ill-appearing, toxic-appearing or diaphoretic  HENT:      Head: Normocephalic and atraumatic  Eyes:      General: No scleral icterus  Cardiovascular:      Rate and Rhythm: Normal rate  Pulmonary:      Effort: Pulmonary effort is normal  No respiratory distress  Abdominal:      General: Abdomen is flat  There is no distension  Palpations: Abdomen is soft  Tenderness: There is no abdominal tenderness  There is no guarding or rebound  Genitourinary:     Comments: Suprapubic tube capped, Lane catheter draining clear yellow urine  Musculoskeletal:         General: No swelling  Cervical back: Normal range of motion  Skin:     General: Skin is warm and dry  Coloration: Skin is not jaundiced or pale  Findings: No rash  Neurological:      General: No focal deficit present  Mental Status: He is alert and oriented to person, place, and time  Gait: Gait normal    Psychiatric:         Mood and Affect: Mood normal          Behavior: Behavior normal          Thought Content:  Thought content normal          Judgment: Judgment normal            Vital Signs     Vitals:    03/20/23 0820   BP: 122/86   Pulse: 89   SpO2: 96%   Weight: 98 4 kg (217 lb)   Height: 6' (1 829 m)       Current Medications       Current Outpatient Medications:   •  ammonium lactate (LAC-HYDRIN) 12 % cream, Apply topically as needed for dry skin, Disp: 385 g, Rfl: 1  •  Blood Glucose Monitoring Suppl (OneTouch Verio Flex System) w/Device KIT, CHECK BLOOD SUGARS THREE TIMES DAILY DX: E11 65, Disp: 1 kit, Rfl: 0  •  ferrous sulfate 324 (65 Fe) mg, TAKE 1 TABLET BY MOUTH 2 TIMES A DAY BEFORE MEALS, Disp: 180 tablet, Rfl: 1  •  folic acid (FOLVITE) 1 mg tablet, TAKE 1 TABLET BY MOUTH EVERY DAY, Disp: 90 tablet, Rfl: 1  •  gabapentin (NEURONTIN) 100 mg capsule, Take 1 capsule (100 mg total) by mouth 3 (three) times a day, Disp: 90 capsule, Rfl: 3  •  glucose blood (OneTouch Verio) test strip, CHECK BLOOD SUGARS THREE TIMES DAILY DX: E11 65, Disp: 400 strip, Rfl: 3  •  HYDROcodone-acetaminophen (NORCO) 5-325 mg per tablet, Take 1 tablet by mouth every 6 (six) hours as needed for pain for up to 5 doses Max Daily Amount: 4 tablets, Disp: 5 tablet, Rfl: 0  •  Incontinence Supply Disposable (Incontinence Brief Large) MISC, Use every 4 (four) hours as needed (Urinary incontinence), Disp: 36 each, Rfl: 12  •  insulin lispro (HumaLOG) 100 units/mL injection, Inject 10 Units under the skin 3 (three) times a day with meals (Patient taking differently: Inject 2 Units under the skin 3 (three) times a day as needed), Disp: 20 mL, Rfl: 0  •  Lancets (OneTouch Delica Plus [id number]) MISC, CHECK BLOOD SUGARS THREE TIMES DAILY DX: E11 65, Disp: 400 each, Rfl: 3  •  pantoprazole (PROTONIX) 40 mg tablet, Take 1 tablet (40 mg total) by mouth daily in the early morning, Disp: 30 tablet, Rfl: 0  •  phenazopyridine (PYRIDIUM) 200 mg tablet, Take 1 tablet (200 mg total) by mouth 3 (three) times a day as needed for bladder spasms, Disp: 30 tablet, Rfl: 0  •  tamsulosin (FLOMAX) 0 4 mg, Take 1 capsule (0 4 mg total) by mouth daily with dinner, Disp: 90 capsule, Rfl: 3  •  Toujeo SoloStar 300 units/mL CONCENTRATED U-300 injection pen (1-unit dial), INJECT 40 UNITS UNDER THE SKIN EVERY MORNING (Patient taking differently: Inject 10 Units under the skin every morning), Disp: 13 5 mL, Rfl: 0  •  ascorbic acid (VITAMIN C) 500 MG tablet, Take 1 tablet (500 mg total) by mouth 2 (two) times a day (Patient not taking: Reported on 3/20/2023), Disp: 60 tablet, Rfl: 1  •  docusate sodium (COLACE) 100 mg capsule, Take 1 capsule (100 mg total) by mouth 2 (two) times a day (Patient not taking: Reported on 3/20/2023), Disp: 60 capsule, Rfl: 1  •  glucose 4 g chewable tablet, Chew 4 tablets (16 g total) once for 1 dose (Patient not taking: Reported on 12/8/2022), Disp: 4 tablet, Rfl: 0  •  Multiple Vitamin (Daily-Octavio Multivitamin) TABS, TAKE 1 TABLET BY MOUTH EVERY DAY (Patient not taking: Reported on 3/20/2023), Disp: 30 tablet, Rfl: 1  •  traMADol (ULTRAM) 50 mg tablet, Take 1 tablet (50 mg total) by mouth every 6 (six) hours as needed for moderate pain (Patient not taking: Reported on 3/20/2023), Disp: 20 tablet, Rfl: 0    Active Problems     Patient Active Problem List   Diagnosis   • Urinary frequency   • Type 2 diabetes mellitus (HCC)   • Microscopic hematuria   • Urge incontinence of urine   • Nocturnal enuresis   • Feeling of incomplete bladder emptying   • Balanitis   • New onset atrial fibrillation (HCC)   • Urinary retention   • Fungal infection of skin   • Depressed mood   • Pain in both lower extremities   • Polyuria   • Ventral Hernia   • Hydroureteronephrosis   • Uncontrolled type 2 diabetes mellitus with hyperglycemia (HCC)   • Vitamin D deficiency   • Dyslipidemia   • History of pulmonary embolus (PE)   • Other dietary vitamin B12 deficiency anemia   • Encounter for screening colonoscopy   • Anemia       Past Medical History     Past Medical History:   Diagnosis Date   • A-fib ([facility] )     per pt "happened one time"   • Abdominal hernia    • Acute metabolic encephalopathy 48/45/7489   • [facility] (acute kidney injury) ([facility] ) 09/04/2022   • Ambulates with cane     not recently using but has in home if needed   • Anesthesia     per pt "with knee surgery(in the )was awake for operation and fell asleep when being closed up" and than took long to wake up"   • Arthritis    • Blood in urine     "sometimes"   • Diabetes mellitus ([facility] )     Type 2--sees Endocrinologist [facility] Dr Juni Stoner   • Lane catheter in place    • History of pulmonary embolism    • Implantable loop recorder present    • Muscle weakness     per pt "started with diabetes"some muscle loss of density" --per pt "muscle coming back"   • Myopia of left eye     glasses for reading   • Pancreatitis 09/04/2022   • Risk for falls    • Sepsis ([facility] ) 09/04/2022   • Teeth missing    • Urethral pain "when passing urine"       Surgical History     Past Surgical History:   Procedure Laterality Date   • CARDIAC LOOP RECORDER     • CATARACT EXTRACTION Bilateral    • KNEE CARTILAGE SURGERY     • KNEE SURGERY Right    • RI TRURL ELECTROSURG RESCJ PROSTATE BLEED COMPLETE N/A 3/16/2023    Procedure: TRANSURETHRAL RESECTION OF PROSTATE (TURP)(possible), cystoscopy, SP tube placement, removal of bladder calculus;  Surgeon: Lilian Hwang MD;  Location: [facility];  Service: Urology       Family History     Family History   Problem Relation Age of Onset   • Diabetes Father    • Diabetes Mother        Social History     Social History     Social History     Tobacco Use   Smoking Status Never   • Passive exposure: Never   Smokeless Tobacco Never       Past Surgical History:   Procedure Laterality Date   • CARDIAC LOOP RECORDER     • CATARACT EXTRACTION Bilateral    • KNEE CARTILAGE SURGERY     • KNEE SURGERY Right    • RI TRURL ELECTROSURG RESCJ PROSTATE BLEED COMPLETE N/A 3/16/2023    Procedure: TRANSURETHRAL RESECTION OF PROSTATE (TURP)(possible), cystoscopy, SP tube placement, removal of bladder calculus;  Surgeon: Lilian Hwang MD;  Location: [facility];  Service: Urology         The following portions of the patient's history were reviewed and updated as appropriate: allergies, current medications, past family history, past medical history, past social history, past surgical history and problem list    Please note :  Voice dictation software has been used to create this document  There may be inadvertent transcription errors      [id number] [address]

## 2023-03-22 ENCOUNTER — NURSE TRIAGE (OUTPATIENT)
Dept: OTHER | Facility: OTHER | Age: 68
End: 2023-03-22

## 2023-03-22 ENCOUNTER — OFFICE VISIT (OUTPATIENT)
Dept: CARDIOLOGY CLINIC | Facility: CLINIC | Age: 68
End: 2023-03-22

## 2023-03-22 VITALS
BODY MASS INDEX: 29.82 KG/M2 | OXYGEN SATURATION: 99 % | SYSTOLIC BLOOD PRESSURE: 118 MMHG | TEMPERATURE: 97.6 F | WEIGHT: 220.2 LBS | HEIGHT: 72 IN | HEART RATE: 88 BPM | DIASTOLIC BLOOD PRESSURE: 62 MMHG

## 2023-03-22 DIAGNOSIS — I48.0 PAF (PAROXYSMAL ATRIAL FIBRILLATION) (HCC): Primary | ICD-10-CM

## 2023-03-22 DIAGNOSIS — R06.83 SNORING: ICD-10-CM

## 2023-03-22 DIAGNOSIS — E66.3 OVERWEIGHT (BMI 25.0-29.9): ICD-10-CM

## 2023-03-22 DIAGNOSIS — E78.5 HYPERLIPIDEMIA, UNSPECIFIED HYPERLIPIDEMIA TYPE: ICD-10-CM

## 2023-03-22 RX ORDER — ATORVASTATIN CALCIUM 10 MG/1
10 TABLET, FILM COATED ORAL DAILY
Qty: 30 TABLET | Refills: 8 | Status: SHIPPED | OUTPATIENT
Start: 2023-03-22

## 2023-03-22 NOTE — PROGRESS NOTES
Cardiology Follow Up     Arcadio Colon Laurey Gowers   [id number]  1955  [facility] [address] [address] [phone]      1  PAF (paroxysmal atrial fibrillation) ([address] )        2  Hyperlipidemia, unspecified hyperlipidemia type  atorvastatin (LIPITOR) 10 mg tablet    Comprehensive metabolic panel      3  Overweight (BMI 25 0-29 9)        4  Snoring            Discussion/Summary:  1  Paroxysmal atrial fibrillation  2  Overweight  3  Snoring  4  History of pulmonary embolism  5  Hyperlipidemia        -Transthoracic echocardiogram 9/7/2022 technically difficult study with use of Definity showing left-ventricular systolic function normal estimated LVEF 60% with normal right ventricular function  -Zio patch monitor 12/1/2022 showing predominantly sinus rhythm average heart rate 83 bpm with no atrial fibrillation seen  -Patient now s/p implantable loop recorder in January 2023 we will follow-up results  -Patient was seen and evaluated by hematology we will follow-up with them if recurrence of symptoms  -Lipid panel 1/19/2023 showing total cholesterol 146, triglycerides 71, HDL 43, LDL 89 however ASCVD risk elevated at 25%  -We will initiate atorvastatin 10 mg daily and monitor  -Patient counseled on dietary lifestyle modifications including following a low-salt, low-fat, heart healthy diet with continued physical activity  -Patient will be seen in 6 months or sooner if necessary and will recheck CMP prior to that office visit  -Patient counseled if he were to have any warning or alarm type symptoms he is to seek emergency medical care immediately   -Patient has referral to sleep medicine and will be making appointment when available        History of Present Illness:  -Patient is a 59-year-old male with diabetes mellitus who was hospitalized September 2022 at [address] [place] [address] with altered mental status, nausea vomiting and generalized weakness found at that time to be in diabetic ketoacidosis with severe anion gap metabolic acidosis, elevated lactic acid level and acute pancreatitis along with CARLTON with obstructive uropathy and during that time was found to have newly diagnosed paroxysmal atrial fibrillation that converted spontaneously  He was initiated on medical therapy and was even recently seen and evaluated and underwent TURP with now suprapubic catheter and underwent implantable loop recorder and January 2023  He was previously living in [place] and had history of DVT/PE treated with 2 weeks of anticoagulation therapy thought likely due to his obesity and sedentary lifestyle previously  -Currently in the office today he denies any chest pain, palpitations, lightness or dizziness, loss conscious, shortness of breath, lower extremity edema, orthopnea or bendopnea  He does still have some tenderness and irritation at the site of TURP but overall states that he is doing reasonably well  He still is very active easily able to walk 4 city blocks on flat surface or up 2 flights of stairs with no chest pain, shortness of breath or anginal symptoms and overall feels well      Patient Active Problem List   Diagnosis   • Urinary frequency   • Type 2 diabetes mellitus (HCC)   • Microscopic hematuria   • Urge incontinence of urine   • Nocturnal enuresis   • Feeling of incomplete bladder emptying   • Balanitis   • New onset atrial fibrillation (HCC)   • Urinary retention   • Fungal infection of skin   • Depressed mood   • Pain in both lower extremities   • Polyuria   • Ventral Hernia   • Hydroureteronephrosis   • Uncontrolled type 2 diabetes mellitus with hyperglycemia (HCC)   • Vitamin D deficiency   • Dyslipidemia   • History of pulmonary embolus (PE)   • Other dietary vitamin B12 deficiency anemia   • Encounter for screening colonoscopy   • Anemia     Past Medical History:   Diagnosis Date • A-fib ([place] [address] )     per pt "happened one time"   • Abdominal hernia    • Acute metabolic encephalopathy 04/58/2618   • CARLTON (acute kidney injury) ([place] [address] ) 09/04/2022   • Ambulates with cane     not recently using but has in home if needed   • Anesthesia     per pt "with knee surgery(in the )was awake for operation and fell asleep when being closed up" and than took long to wake up"   • Arthritis    • Blood in urine     "sometimes"   • Diabetes mellitus ([place] [address] )     Type 2--sees Endocrinologist [facility] Dr Elvin Landau   • Lane catheter in place    • History of pulmonary embolism    • Implantable loop recorder present    • Muscle weakness     per pt "started with diabetes"some muscle loss of density" --per pt "muscle coming back"   • Myopia of left eye     glasses for reading   • Pancreatitis 09/04/2022   • Risk for falls    • Sepsis ([place] [address] ) 09/04/2022   • Teeth missing    • Urethral pain     "when passing urine"     Social History     Socioeconomic History   • Marital status: /Civil Union     Spouse name: Not on file   • Number of children: Not on file   • Years of education: Not on file   • Highest education level: Not on file   Occupational History   • Not on file   Tobacco Use   • Smoking status: Never     Passive exposure: Never   • Smokeless tobacco: Never   Vaping Use   • Vaping Use: Never used   Substance and Sexual Activity   • Alcohol use: Never   • Drug use: Never   • Sexual activity: Not Currently     Comment: defer   Other Topics Concern   • Not on file   Social History Narrative   • Not on file     Social Determinants of Health     Financial Resource Strain: Low Risk    • Difficulty of Paying Living Expenses: Not hard at all   Food Insecurity: No Food Insecurity   • Worried About Running Out of Food in the Last Year: Never true   • Ran Out of Food in the Last Year: Never true   Transportation Needs: No Transportation Needs   • Lack of Transportation (Medical): No   • Lack of Transportation (Non-Medical):  No Physical Activity: Not on file   Stress: Not on file   Social Connections: Not on file   Intimate Partner Violence: Not on file   Housing Stability: Low Risk    • Unable to Pay for Housing in the Last Year: No   • Number of Places Lived in the Last Year: 1   • Unstable Housing in the Last Year: No      Family History   Problem Relation Age of Onset   • Diabetes Father    • Diabetes Mother      Past Surgical History:   Procedure Laterality Date   • CARDIAC LOOP RECORDER     • CATARACT EXTRACTION Bilateral    • KNEE CARTILAGE SURGERY     • KNEE SURGERY Right    • NM TRURL ELECTROSURG [address] COMPLETE N/A 3/16/2023    Procedure: TRANSURETHRAL RESECTION OF PROSTATE (TURP)(possible), cystoscopy, SP tube placement, removal of bladder calculus;  Surgeon: Lilian Hwang MD;  Location: [facility];  Service: Urology       Current Outpatient Medications:   •  atorvastatin (LIPITOR) 10 mg tablet, Take 1 tablet (10 mg total) by mouth daily, Disp: 30 tablet, Rfl: 8  •  ammonium lactate (LAC-HYDRIN) 12 % cream, Apply topically as needed for dry skin, Disp: 385 g, Rfl: 1  •  ascorbic acid (VITAMIN C) 500 MG tablet, Take 1 tablet (500 mg total) by mouth 2 (two) times a day (Patient not taking: Reported on 3/20/2023), Disp: 60 tablet, Rfl: 1  •  Blood Glucose Monitoring Suppl (OneTouch Verio Flex System) w/Device KIT, CHECK BLOOD SUGARS THREE TIMES DAILY DX: E11 65, Disp: 1 kit, Rfl: 0  •  docusate sodium (COLACE) 100 mg capsule, Take 1 capsule (100 mg total) by mouth 2 (two) times a day (Patient not taking: Reported on 3/20/2023), Disp: 60 capsule, Rfl: 1  •  ferrous sulfate 324 (65 Fe) mg, TAKE 1 TABLET BY MOUTH 2 TIMES A DAY BEFORE MEALS, Disp: 180 tablet, Rfl: 1  •  folic acid (FOLVITE) 1 mg tablet, TAKE 1 TABLET BY MOUTH EVERY DAY, Disp: 90 tablet, Rfl: 1  •  gabapentin (NEURONTIN) 100 mg capsule, Take 1 capsule (100 mg total) by mouth 3 (three) times a day, Disp: 90 capsule, Rfl: 3  •  glucose 4 g chewable tablet, Chew 4 tablets (16 g total) once for 1 dose (Patient not taking: Reported on 12/8/2022), Disp: 4 tablet, Rfl: 0  •  glucose blood (OneTouch Verio) test strip, CHECK BLOOD SUGARS THREE TIMES DAILY DX: E11 65, Disp: 400 strip, Rfl: 3  •  HYDROcodone-acetaminophen (NORCO) 5-325 mg per tablet, Take 1 tablet by mouth every 6 (six) hours as needed for pain for up to 5 doses Max Daily Amount: 4 tablets, Disp: 5 tablet, Rfl: 0  •  Incontinence Supply Disposable (Incontinence Brief Large) MISC, Use every 4 (four) hours as needed (Urinary incontinence), Disp: 36 each, Rfl: 12  •  insulin lispro (HumaLOG) 100 units/mL injection, Inject 10 Units under the skin 3 (three) times a day with meals (Patient taking differently: Inject 2 Units under the skin 3 (three) times a day as needed), Disp: 20 mL, Rfl: 0  •  Lancets (OneTouch Delica Plus [id number]) MISC, CHECK BLOOD SUGARS THREE TIMES DAILY DX: E11 65, Disp: 400 each, Rfl: 3  •  Multiple Vitamin (Daily-Octavio Multivitamin) TABS, TAKE 1 TABLET BY MOUTH EVERY DAY (Patient not taking: Reported on 3/20/2023), Disp: 30 tablet, Rfl: 1  •  pantoprazole (PROTONIX) 40 mg tablet, Take 1 tablet (40 mg total) by mouth daily in the early morning, Disp: 30 tablet, Rfl: 0  •  phenazopyridine (PYRIDIUM) 200 mg tablet, Take 1 tablet (200 mg total) by mouth 3 (three) times a day as needed for bladder spasms, Disp: 30 tablet, Rfl: 0  •  tamsulosin (FLOMAX) 0 4 mg, Take 1 capsule (0 4 mg total) by mouth daily with dinner, Disp: 90 capsule, Rfl: 3  •  Toujeo SoloStar 300 units/mL CONCENTRATED U-300 injection pen (1-unit dial), INJECT 40 UNITS UNDER THE SKIN EVERY MORNING (Patient taking differently: Inject 10 Units under the skin every morning), Disp: 13 5 mL, Rfl: 0  •  traMADol (ULTRAM) 50 mg tablet, Take 1 tablet (50 mg total) by mouth every 6 (six) hours as needed for moderate pain (Patient not taking: Reported on 3/20/2023), Disp: 20 tablet, Rfl: 0  No Known Allergies      Labs:  Admission on 03/16/2023, Discharged on 03/17/2023   Component Date Value   • POC Glucose 03/16/2023 165 (H)    • ABO Grouping 03/16/2023 O    • Rh Factor 03/16/2023 Positive    • Antibody Screen 03/16/2023 Negative    • Specimen Expiration Date 03/16/2023 [id number]    • Case Report 03/16/2023                      Value:Surgical Pathology Report                         Case: [id number]                                   Authorizing Provider:  Claudia Newell MD            Collected:           03/16/2023 4815              Ordering Location:     [facility] Received:            03/16/2023 [address]                                     Operating Room                                                               Pathologist:           Barbra Draper MD                                                                Specimen:    Prostate, prostate chips                                                                  • Final Diagnosis 03/16/2023                      Value: This result contains rich text formatting which cannot be displayed here  • Additional Information 03/16/2023                      Value: This result contains rich text formatting which cannot be displayed here  • Gross Description 03/16/2023                      Value: This result contains rich text formatting which cannot be displayed here  • Platelets 13/39/8749 229    • MPV 03/16/2023 10 6    • POC Glucose 03/16/2023 190 (H)    • POC Glucose 03/16/2023 231 (H)    • POC Glucose 03/16/2023 233 (H)    • POC Glucose 03/16/2023 263 (H)    • POC Glucose 03/17/2023 149 (H)    • POC Glucose 03/17/2023 195 (H)    Office Visit on 03/03/2023   Component Date Value   • Urine Culture 03/03/2023 >100,000 cfu/ml         Imaging: No results found  Review of Systems:  Review of Systems   Constitutional: Negative for chills, diaphoresis, fatigue and fever  HENT: Negative for trouble swallowing and voice change  Respiratory: Negative for shortness of breath and wheezing  Cardiovascular: Negative for chest pain, palpitations and leg swelling  Gastrointestinal: Negative for abdominal pain, constipation, diarrhea, nausea and vomiting  Musculoskeletal: Positive for arthralgias  Negative for neck pain and neck stiffness  Skin: Negative for rash  Neurological: Negative for dizziness, syncope, light-headedness and headaches  Psychiatric/Behavioral: Negative for agitation and confusion  All other systems reviewed and are negative  Vitals:    03/22/23 1101   BP: 118/62   BP Location: Left arm   Patient Position: Sitting   Cuff Size: Standard   Pulse: 88   Temp: 97 6 °F (36 4 °C)   TempSrc: Temporal   SpO2: 99%   Weight: 99 9 kg (220 lb 3 2 oz)   Height: 6' (1 829 m)     Vitals:    03/22/23 1101   Weight: 99 9 kg (220 lb 3 2 oz)     Height: 6' (182 9 cm)     Physical Exam:  Physical Exam  Vitals reviewed  Constitutional:       General: He is not in acute distress  Appearance: Normal appearance  He is not diaphoretic  HENT:      Head: Normocephalic and atraumatic  Eyes:      General:         Right eye: No discharge  Left eye: No discharge  Neck:      Comments: Trachea midline, neck obese, difficult assess JVD  Cardiovascular:      Rate and Rhythm: Normal rate and regular rhythm  Heart sounds: No friction rub  Pulmonary:      Effort: Pulmonary effort is normal  No respiratory distress  Breath sounds: No wheezing  Chest:      Chest wall: No tenderness  Abdominal:      General: Bowel sounds are normal       Palpations: Abdomen is soft  Tenderness: There is no abdominal tenderness  There is no rebound  Musculoskeletal:      Right lower leg: No edema  Left lower leg: No edema  Skin:     General: Skin is warm and dry  Neurological:      Mental Status: He is alert  Comments: Awake, alert, able to answer questions appropriately, able extremities bilaterally     Psychiatric:         Mood and Affect: Mood normal  Behavior: Behavior normal

## 2023-03-22 NOTE — TELEPHONE ENCOUNTER
Reason for Disposition  • [1] Caller has URGENT question AND [2] triager unable to answer question    Answer Assessment - Initial Assessment Questions  1  SYMPTOM: "What's the main symptom you're concerned about?" (e g , pain, fever, vomiting)     Hurts to urinate and blood clots     2  ONSET: "When did S/S  start?"      3/18/23    3  SURGERY: "What surgery was performed?"       TRANSURETHRAL RESECTION OF PROSTATE (TURP)(possible), cystoscopy, SP tube placement, removal of bladder calculus (Bladder)    4  DATE of SURGERY: "When was surgery performed?"       3/16/23    5  ANESTHESIA: " What type of anesthesia did you have?" (e g , general, spinal, epidural, local)      General     6  PAIN: "Is there any pain?" If Yes, ask: "How bad is it?"  (Scale 1-10; or mild, moderate, severe)     Yes, mild until uses the bathroom and then the pain goes away     7  FEVER: "Do you have a fever?" If Yes, ask: "What is your temperature, how was it measured, and when did it start?"     Denies    8  VOMITING: "Is there any vomiting?" If yes, ask: "How many times?"      Denies    9  BLEEDING: "Is there any bleeding?" If Yes, ask: "How much?" and "Where?"      Yes, with some blood clots     10   OTHER SYMPTOMS: "Do you have any other symptoms?" (e g , drainage from wound, painful urination, constipation)  Some coming out of suprapubic tube and some coming out of penis, three blood clots out of penis, urine is clear and yellow    Protocols used: POST-OP SYMPTOMS AND QUESTIONS-[facility]

## 2023-03-22 NOTE — TELEPHONE ENCOUNTER
Regarding: retention after suprapubic cath  ----- Message from Bobbi Keen sent at 3/22/2023  6:21 PM EDT -----  "I had a TURP procedure and a catheter inserted on 03/16 and I don't feel that it is emptying my bladder enough  Half of it comes out of my urethra and half of it goes in to the bag   Also I am passing some clots and would like to know if that's to be expected

## 2023-03-27 ENCOUNTER — NURSE TRIAGE (OUTPATIENT)
Dept: OTHER | Facility: OTHER | Age: 68
End: 2023-03-27

## 2023-03-27 NOTE — TELEPHONE ENCOUNTER
Called and spoke with patient  Reports having blood coming from his drain and also his urethra  Started on Saturday  Reports occurring with each time he urinates  Reports blood is dark red in color but mixed with the urine, reports some passing small size clots  Pt also having pain  Reports gets a tingling sensation at his urethra and then he needs to go  Hydration is good at this time, reports 60ozs or more per day  Reports that SPT is capped right now  No fever or chills, no nausea or vomiting  Reports no constipation at this time, taking stool softener

## 2023-03-27 NOTE — TELEPHONE ENCOUNTER
"Patient had a procedure done 3/24/23 and has had blood in his urine off and on since Friday  The blood is dark red and coming from his suprapubic catheter and his urethra  Patient would like a call back to advise  Reason for Disposition  • Caller has URGENT question and triager unable to answer question    Answer Assessment - Initial Assessment Questions  1  SYMPTOM: \"What's the main symptom you're concerned about? \" (e g , pain, fever, vomiting)      Blood in urine  2  ONSET: \"When did it start? \"      3/24/23  3  SURGERY: \"What surgery was performed? \"      TRANSURETHRAL RESECTION OF PROSTATE (TURP)(possible), cystoscopy, SP tube placement, removal of bladder calculus (Bladder)  4  DATE of SURGERY: \"When was surgery performed? \"       3/16/23  5  ANESTHESIA: \" What type of anesthesia did you have? \" (e g , general, spinal, epidural, local)      General   6  PAIN: \"Is there any pain? \" If Yes, ask: \"How bad is it? \"  (Scale 1-10; or mild, moderate, severe)      Denies   7  FEVER: \"Do you have a fever? \" If Yes, ask: \"What is your temperature, how was it measured, and when did it start? \"      Denies   8  VOMITING: \"Is there any vomiting? \" If yes, ask: \"How many times? \"      Denies   9  BLEEDING: \"Is there any bleeding? \" If Yes, ask: \"How much? \" and \"Where? \"      Blood in urine dark red, off and on   10  OTHER SYMPTOMS: \"Do you have any other symptoms? \" (e g , drainage from wound, painful urination, constipation)      Denies    Protocols used: POST-OP SYMPTOMS AND QUESTIONS-ADULT-[place]    "

## 2023-03-27 NOTE — TELEPHONE ENCOUNTER
Is he urinating without difficulty? Has he needed to drain his suprapubic tube? I recommend he continue to hydrate with water  He may have bleeding related to his recent procedure and I expect this should improve  If he is not on a fluid restriction I recommend he increase his water further  If he is passing large amounts of clots or is unable to urinate and his suprapubic tube also does not drain he should let us know  Any fevers or chills he should let us know or report to the emergency room  The discomfort in his penis should improve over time this is likely related to his recent procedure    If his symptoms do not lessen I recommend he go for urine testing

## 2023-03-27 NOTE — TELEPHONE ENCOUNTER
"Regarding: blood in urine  ----- Message from Lizzy Pace RN sent at 3/27/2023 12:12 PM EDT -----  \"I have a suprapubic tube  I am passing blood in my urine when I urinate  I just had surgery with Dr Enrique Hniojosa  I pass 2/3 and then the other 1/3 I let out of the catheter  \"    "

## 2023-03-31 ENCOUNTER — RA CDI HCC (OUTPATIENT)
Dept: OTHER | Facility: HOSPITAL | Age: 68
End: 2023-03-31

## 2023-03-31 ENCOUNTER — NURSE TRIAGE (OUTPATIENT)
Dept: OTHER | Facility: OTHER | Age: 68
End: 2023-03-31

## 2023-03-31 NOTE — TELEPHONE ENCOUNTER
"  Answer Assessment - Initial Assessment Questions  1  COLOR of URINE: \"Describe the color of the urine  \"  (e g , tea-colored, pink, red, blood clots, bloody)     Red colored blood in urine that started yesterday  Has noticed some small blood clots intermitently  2  ONSET: \"When did the bleeding start? \"       Started two days ago with burning  Yesterday noticed blood  3  EPISODES: \"How many times has there been blood in the urine? \" or \"How many times today? \"      Every time when urinating  4  PAIN with URINATION: \"Is there any pain with passing your urine? \" If Yes, ask: \"How bad is the pain? \"  (Scale 1-10; or mild, moderate, severe)     - MILD - complains slightly about urination hurting     - MODERATE - interferes with normal activities       - SEVERE - excruciating, unwilling or unable to urinate because of the pain       9/10-      5  FEVER: \"Do you have a fever? \" If Yes, ask: \"What is your temperature, how was it measured, and when did it start? \"      Temp:98 (oral)  6  ASSOCIATED SYMPTOMS: Arun Ariela you passing urine more frequently than usual?\"      Denies    7  OTHER SYMPTOMS: \"Do you have any other symptoms? \" (e g , back/flank pain, abdominal pain, vomiting)     No back pain or flank pain  Is having some pelvic pain/pressure- but states this is typical for him  Pt has a suprapubic tube that is capped- pt says he urinates normally then check suprapubic tube for residual urine  Pt has TURP on 3/16  Lane catheter removed on 3/20  Pt is taking Flomax 0 4 mg daily, Oxybutynin PRN and Pyridium PRN  Protocols used: URINE - BLOOD IN-ADULT-      Reached out to on call Urology Specialist- Ladarius Aguero- pts symptoms at this time are expected since he has TURP done on 3/16  Pt should continue to increase water consumption, avoid bladder irritants like coffee, dark beverages, and acidic foods/ beverages  Pt should also avoid heavy lifting or straining       Can continue to take his Oxybutynin " daily for one week  Recommendations discussed with pt who verbalized understanding  Pt states he has been eating spicy foods that are acidic everyday  Pt states he is going to stop eating spicy foods till symptoms resolve/improve

## 2023-03-31 NOTE — PROGRESS NOTES
[facility]  coding opportunities       Chart reviewed, no opportunity found:   [address]        Patients Insurance     Medicare Insurance: [employer]

## 2023-03-31 NOTE — TELEPHONE ENCOUNTER
"Regarding: post procedure issue/ bleeding  ----- Message from Geri Whyte sent at 3/31/2023  6:43 PM EDT -----  \"I had a procedure on March 16th I have burning when I urinate and its hard for me to go to the bathroom  I also see small clots of blood not everytime   I didn't know if this is normal and I still feel pain I wanted to speak to someone about this\"    "

## 2023-04-06 ENCOUNTER — OFFICE VISIT (OUTPATIENT)
Dept: FAMILY MEDICINE CLINIC | Facility: CLINIC | Age: 68
End: 2023-04-06

## 2023-04-06 VITALS
BODY MASS INDEX: 30.2 KG/M2 | TEMPERATURE: 98.5 F | SYSTOLIC BLOOD PRESSURE: 112 MMHG | HEART RATE: 104 BPM | OXYGEN SATURATION: 100 % | DIASTOLIC BLOOD PRESSURE: 64 MMHG | HEIGHT: 72 IN | WEIGHT: 223 LBS

## 2023-04-06 DIAGNOSIS — R33.9 URINARY RETENTION: ICD-10-CM

## 2023-04-06 DIAGNOSIS — N13.30 HYDROURETERONEPHROSIS: ICD-10-CM

## 2023-04-06 DIAGNOSIS — I48.91 NEW ONSET ATRIAL FIBRILLATION (HCC): ICD-10-CM

## 2023-04-06 DIAGNOSIS — E55.9 VITAMIN D DEFICIENCY: ICD-10-CM

## 2023-04-06 DIAGNOSIS — E11.65 UNCONTROLLED TYPE 2 DIABETES MELLITUS WITH HYPERGLYCEMIA (HCC): ICD-10-CM

## 2023-04-06 DIAGNOSIS — Z12.11 SCREENING FOR COLON CANCER: Primary | ICD-10-CM

## 2023-04-06 DIAGNOSIS — Z93.6 OTHER ARTIFICIAL OPENINGS OF URINARY TRACT STATUS (HCC): ICD-10-CM

## 2023-04-06 DIAGNOSIS — F39 UNSPECIFIED MOOD (AFFECTIVE) DISORDER (HCC): ICD-10-CM

## 2023-04-06 NOTE — PROGRESS NOTES
Assessment/Plan:       Problem List Items Addressed This Visit        Endocrine    Uncontrolled type 2 diabetes mellitus with hyperglycemia ([facility] [address] )       Lab Results   Component Value Date    HGBA1C 6 3 (H) 01/19/2023   Diabetes stable control with A1c of 6 3 maintaining good diet plan continue same dosing on insulin follow-up with endocrinology as well and reevaluate here in 4 months         Relevant Orders    Hemoglobin A1C    Comprehensive metabolic panel    Microalbumin / creatinine urine ratio    CBC and Platelet    TSH, 3rd generation with Free T4 reflex    Lipid Panel with Direct LDL reflex       Cardiovascular and Mediastinum    New onset atrial fibrillation (HCC)     Stable overall followed by cardiology controlled ventricular response remain on same medications  Genitourinary    Urinary retention     Post TURP follow-up with urology as scheduled continue same medication regimen  Patient has a Lane catheter suprapubic which is capped in case he does not urinate post TURP the Lane catheter otherwise was removed from the urethra at last visit with urology notes reviewed with patient at this time  Hydroureteronephrosis     Post TURP and Urostomy left side -stable urine flow follow-up with laboratory work as scheduled follow-up with urology as scheduled  Other    Vitamin D deficiency     Continue with vitamin D supplementation follow-up vitamin D levels as scheduled         Other artificial openings of urinary tract status ([facility] [address] )     Follow up Urology         Unspecified mood (affective) disorder (HCC)     Stable mood now  Other Visit Diagnoses     Screening for colon cancer    -  Primary    Relevant Orders    Ambulatory Referral to Gastroenterology            Subjective:      Patient ID: Janell Mcdowell  is a 70 y o  male      Patient here for follow-up evaluation 4-month visit      The following portions of the patient's history were reviewed and updated as appropriate: allergies, current medications, past family history, past medical history, past social history, past surgical history and problem list     Review of Systems   Constitutional: Negative for chills, fatigue and fever  HENT: Negative for congestion, nosebleeds, rhinorrhea, sinus pressure and sore throat  Eyes: Negative for discharge and redness  Respiratory: Negative for cough and shortness of breath  Cardiovascular: Negative for chest pain, palpitations and leg swelling  Gastrointestinal: Negative for abdominal pain, blood in stool and nausea  Endocrine: Negative for cold intolerance, heat intolerance and polyuria  Genitourinary: Negative for dysuria and frequency  Musculoskeletal: Negative for arthralgias, back pain and myalgias  Skin: Negative for rash  Neurological: Negative for dizziness, weakness and headaches  Hematological: Negative for adenopathy  Psychiatric/Behavioral: Negative for behavioral problems and sleep disturbance  The patient is not nervous/anxious  Objective:      /64   Pulse 104   Temp 98 5 °F (36 9 °C)   Ht 6' (1 829 m)   Wt 101 kg (223 lb)   SpO2 100%   BMI 30 24 kg/m²        Physical Exam  Vitals and nursing note reviewed  Constitutional:       Appearance: Normal appearance  He is well-developed  HENT:      Head: Normocephalic and atraumatic  Right Ear: Tympanic membrane and external ear normal       Left Ear: Tympanic membrane and external ear normal       Nose: Nose normal       Mouth/Throat:      Mouth: Mucous membranes are moist       Pharynx: Oropharynx is clear  Eyes:      General: No scleral icterus  Extraocular Movements: Extraocular movements intact  Conjunctiva/sclera: Conjunctivae normal       Pupils: Pupils are equal, round, and reactive to light  Neck:      Thyroid: No thyromegaly  Vascular: No JVD  Cardiovascular:      Rate and Rhythm: Normal rate and regular rhythm  Pulses: Normal pulses        Heart sounds: Normal heart sounds  No murmur heard  Pulmonary:      Effort: Pulmonary effort is normal       Breath sounds: Normal breath sounds  No wheezing or rales  Chest:      Chest wall: No tenderness  Abdominal:      General: Bowel sounds are normal  There is no distension  Palpations: Abdomen is soft  There is no mass  Tenderness: There is no abdominal tenderness  There is no guarding or rebound  Musculoskeletal:         General: No tenderness or deformity  Normal range of motion  Cervical back: Normal range of motion and neck supple  Comments: Suprapubic Lane catheter site is dry  Patient has pain and tenderness in the left lower quadrant which is mild   Lymphadenopathy:      Cervical: No cervical adenopathy  Skin:     General: Skin is warm and dry  Findings: No erythema or rash  Neurological:      General: No focal deficit present  Mental Status: He is alert and oriented to person, place, and time  Cranial Nerves: No cranial nerve deficit  Deep Tendon Reflexes: Reflexes are normal and symmetric  Reflexes normal    Psychiatric:         Mood and Affect: Mood normal          Behavior: Behavior normal          Thought Content: Thought content normal          Judgment: Judgment normal           Data:    Laboratory Results: I have personally reviewed the pertinent laboratory results/reports   Radiology/Other Diagnostic Testing Results: I have personally reviewed pertinent reports         Lab Results   Component Value Date    WBC 5 57 01/19/2023    HGB 14 8 01/19/2023    HCT 45 5 01/19/2023    MCV 87 01/19/2023     03/16/2023     Lab Results   Component Value Date    K 3 8 01/19/2023     01/19/2023    CO2 26 01/19/2023    BUN 20 01/19/2023    CREATININE 0 98 01/19/2023    GLUCOSE 241 (H) 09/07/2022    GLUF 140 (H) 01/19/2023    CALCIUM 10 2 (H) 01/19/2023    CORRECTEDCA 10 2 (H) 11/17/2022    AST 33 01/19/2023    ALT 25 01/19/2023    ALKPHOS 80 01/19/2023 EGFR 78 01/19/2023     Lab Results   Component Value Date    CHOLESTEROL 146 01/19/2023    CHOLESTEROL 153 09/04/2022     Lab Results   Component Value Date    HDL 43 01/19/2023    HDL 54 09/04/2022     Lab Results   Component Value Date    LDLCALC 89 01/19/2023    LDLCALC 81 09/04/2022     Lab Results   Component Value Date    TRIG 71 01/19/2023    TRIG 92 09/04/2022     No results found for: [place], [place]  Lab Results   Component Value Date    VIX1DLDRDHUH 0 741 01/19/2023     Lab Results   Component Value Date    HGBA1C 6 3 (H) 01/19/2023     No results found for: CRISTOBAL Iglesias, DO

## 2023-04-06 NOTE — ASSESSMENT & PLAN NOTE
Lab Results   Component Value Date    HGBA1C 6 3 (H) 01/19/2023   Diabetes stable control with A1c of 6 3 maintaining good diet plan continue same dosing on insulin follow-up with endocrinology as well and reevaluate here in 4 months

## 2023-04-06 NOTE — ASSESSMENT & PLAN NOTE
Post TURP follow-up with urology as scheduled continue same medication regimen  Patient has a Lane catheter suprapubic which is capped in case he does not urinate post TURP the Lane catheter otherwise was removed from the urethra at last visit with urology notes reviewed with patient at this time

## 2023-04-06 NOTE — ASSESSMENT & PLAN NOTE
Post TURP and Urostomy left side -stable urine flow follow-up with laboratory work as scheduled follow-up with urology as scheduled

## 2023-04-19 PROBLEM — E66.09 CLASS 1 OBESITY DUE TO EXCESS CALORIES WITH SERIOUS COMORBIDITY AND BODY MASS INDEX (BMI) OF 30.0 TO 30.9 IN ADULT: Status: ACTIVE | Noted: 2023-04-19

## 2023-04-19 PROBLEM — E66.811 CLASS 1 OBESITY DUE TO EXCESS CALORIES WITH SERIOUS COMORBIDITY AND BODY MASS INDEX (BMI) OF 30.0 TO 30.9 IN ADULT: Status: ACTIVE | Noted: 2023-04-19

## 2023-04-24 DIAGNOSIS — E11.10 TYPE 2 DIABETES MELLITUS WITH KETOACIDOSIS WITHOUT COMA (HCC): ICD-10-CM

## 2023-04-25 RX ORDER — INSULIN GLARGINE 300 U/ML
8 INJECTION, SOLUTION SUBCUTANEOUS
Qty: 2.4 ML | Refills: 0 | Status: SHIPPED | OUTPATIENT
Start: 2023-04-25 | End: 2023-07-24

## 2023-04-28 ENCOUNTER — OFFICE VISIT (OUTPATIENT)
Dept: UROLOGY | Facility: CLINIC | Age: 68
End: 2023-04-28

## 2023-04-28 VITALS
BODY MASS INDEX: 29.93 KG/M2 | WEIGHT: 221 LBS | OXYGEN SATURATION: 100 % | DIASTOLIC BLOOD PRESSURE: 88 MMHG | HEIGHT: 72 IN | HEART RATE: 83 BPM | SYSTOLIC BLOOD PRESSURE: 124 MMHG

## 2023-04-28 DIAGNOSIS — Z12.5 PROSTATE CANCER SCREENING: ICD-10-CM

## 2023-04-28 DIAGNOSIS — R33.9 URINARY RETENTION: Primary | ICD-10-CM

## 2023-04-28 NOTE — PATIENT INSTRUCTIONS
Have your PSA blood test prior to your next appointment in 6 months, can do with next blood draw    [address] [facility]? The average bladder can hold about 2 cups of urine before it needs to be emptied  The normal range of voiding urine is 6 to 8 times during a 24 hour period  As we get older, our bladder capacity can get smaller and we may need to pass urine more frequently but usually not more than every 2 hours  Urine should flow easily without discomfort in a good, steady stream until the bladder is empty  No pushing or straining is necessary to empty the bladder  An urge is a signal that you feel as the bladder stretches to fill with urine  Urges can be felt even if the bladder is not full  Urges are not commands to go to the toilet, merely a signal and can be controlled  WHAT ARE GOOD BLADDER HABITS? Take your time when emptying your bladder  Don’t strain or push to empty your bladder  Make sure you empty your bladder completely each time you pass urine  Do not rush the process  Consistently ignoring the urge to go (waiting more than 4 hours between toileting) or urinating too infrequently may be convenient but not healthy for your bladder  Avoid going to the toilet “just in case” or more often than every 2 hours  It is usually not necessary to go when you feel the first urge  Try to go only when your bladder is full  Urgency and frequency of urination can be improved by retraining the bladder and spacing your fluid intake throughout the day  Practice good toilet habits  Don’t let your bladder control your life  TIPS TO MAINTAIN GOOD BLADDER HABITS  Maintain a good fluid intake  Depending on your body size and environment, drink 6 -8 cups (8 oz each) of fluid per day unless otherwise advised by your doctor  Not enough fluid creates a foul odor and dark color of the urine    Limit the amount of caffeine (coffee, cola, chocolate or tea) and citrus foods that you "consume as these foods can be associated with increased sensation of urinary urgency and frequency  Limit the amount of alcohol you drink  Alcohol increases urine production and makes it difficult for the brain to coordinate bladder control  Avoid constipation by maintaining a balanced diet of dietary fiber  Cigarette smoking is also irritating to the bladder surface and is associated with bladder cancer  In addition, the coughing associated with smoking may lead to increased incontinent episodes because of the increased pressure  HOW DIET CAN AFFECT YOUR BLADDER  Although there is no particular \"diet\" that can cure bladder control, there are certain dietary suggestions you can use to help control the problem  There are 2 points to consider when evaluating how your habits and diet may affect your bladder:    Foods and fluids can irritate the bladder  Some foods and beverages are thought to contribute to bladder leakage and irritability  However their effect on the bladder is not completely understood and you may want to see if eliminating one or all of these items improves your bladder control  If you are unable to give them up completely, it is recommended that you use the following items in moderation:  Acidic beverages and foods (orange juice, grapefruit juice, lemonade etc)  Alcoholic beverages  Vinegar  Coffee (regular and decaf)  Tea (regular and decaf)  Caffeinated beverages  Carbonated beverages          Drinking enough and the right kinds of fluids  Many people with bladder control issues decrease their intake of liquids in hope that they will need to urinate less frequently or have less urinary leakage  You should not restrict fluids to control your bladder  While a decrease in liquid intake does result in a decrease in the volume of urine, the smaller amount of urine may be more highly concentrated        Highly concentrated, dark yellow urine is irritating to the bladder surface " and may actually cause you to go to the bathroom more frequently  It also encourages the growth of bacteria, which may lead to infections resulting in incontinence  Substitutions for Bladder Irritants: water is always the best beverage choice  Grape and apple juice are thirst quenchers are good selections and are not as irritating to the bladder    Low acid fruits:  Pears, apricots, papaya, watermelon  For coffee drinkers: KAVA®, Postum®, Ollie®, Kaffree Stacie®  For tea drinkers:  non-citrus or herbal and sun brewed tea

## 2023-04-28 NOTE — ASSESSMENT & PLAN NOTE
• S/p TURP with suprapubic tube placement 3/16/2023  • Discontinue Flomax 0 4 mg daily  • discontinue oxybutynin as needed for bladder spasms  • discontinue Pyridium as needed for bladder spasms  • Suprapubic tube removed today, low PVRs  • Follow-up 6 months

## 2023-04-28 NOTE — PROGRESS NOTES
Assessment and plan:     Urinary retention  • S/p TURP with suprapubic tube placement 3/16/2023  • Discontinue Flomax 0 4 mg daily  • discontinue oxybutynin as needed for bladder spasms  • discontinue Pyridium as needed for bladder spasms  • Suprapubic tube removed today, low PVRs  • Follow-up 6 months    Prostate cancer screening  · PSA ordered  · Follow-up 6 months      LEEANNA Franco    History of Present Illness     Oracio Colon Radha Peraza  is a 70 y o  male patient of Dr Pinky Clement status post TURP, removal of bladder calculi, suprapubic tube placement on 3/16/2023  He was found to have a moderately obstructive prostate with friable vessels  Waited 2 to urinary retention  He presents today for Lane catheter removal   He will maintain his suprapubic tube at this time which will be capped  He will open it if he is unable to urinate and to measure postvoid residuals  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      He has been urinating without difficulty  He gets very little residual urine from his suprapubic tube  Just dribbles of urine  At this time we will remove his suprapubic tube since he is peeing without difficulty  he drinks about 40-60 ounces of water a day  He can now hold his urine  His prior A1c was over 14  He is now following with endocrinology  His current A1c is 7 4      Laboratory     Lab Results   Component Value Date    BUN 20 01/19/2023    CREATININE 0 98 01/19/2023       No components found for: GFR    Lab Results   Component Value Date    GLUCOSE 241 (H) 09/07/2022    CALCIUM 10 2 (H) 01/19/2023    K 3 8 01/19/2023    CO2 26 01/19/2023     01/19/2023       Lab Results   Component Value Date    WBC 5 57 01/19/2023    HGB 14 8 01/19/2023    HCT 45 5 01/19/2023    MCV 87 01/19/2023     03/16/2023       No results found for: PSA    No results found for this or any previous visit (from the past 1 hour(s))      @RESULT(URINEMICROSCOPIC)@    @RESULT(URINECULTURE)@    Radiology Review of Systems     Review of Systems   Constitutional: Negative for activity change, appetite change, chills, fatigue, fever and unexpected weight change  HENT: Negative for facial swelling  Eyes: Negative for discharge  Respiratory: Negative  Negative for cough and shortness of breath  Cardiovascular: Negative for chest pain and leg swelling  Gastrointestinal: Negative  Negative for abdominal distention, abdominal pain, constipation, diarrhea, nausea and vomiting  Endocrine: Negative  Genitourinary: Negative  Negative for decreased urine volume, difficulty urinating, dysuria, enuresis, flank pain, frequency, genital sores, hematuria and urgency  Musculoskeletal: Negative for back pain and myalgias  Skin: Negative for pallor and rash  Allergic/Immunologic: Negative  Negative for immunocompromised state  Neurological: Negative for facial asymmetry and speech difficulty  Psychiatric/Behavioral: Negative for agitation and confusion  Allergies     No Known Allergies    Physical Exam     Physical Exam  Vitals reviewed  Constitutional:       General: He is not in acute distress  Appearance: Normal appearance  He is obese  He is not ill-appearing, toxic-appearing or diaphoretic  HENT:      Head: Normocephalic and atraumatic  Eyes:      General: No scleral icterus  Cardiovascular:      Rate and Rhythm: Normal rate  Pulmonary:      Effort: Pulmonary effort is normal  No respiratory distress  Abdominal:      General: Abdomen is flat  There is no distension  Palpations: Abdomen is soft  Tenderness: There is no abdominal tenderness  There is no guarding or rebound  Musculoskeletal:         General: No swelling  Cervical back: Normal range of motion  Right lower leg: No edema  Left lower leg: No edema  Skin:     General: Skin is warm and dry  Coloration: Skin is not jaundiced or pale  Findings: No rash     Neurological: General: No focal deficit present  Mental Status: He is alert and oriented to person, place, and time  Gait: Gait normal    Psychiatric:         Mood and Affect: Mood normal          Behavior: Behavior normal          Thought Content:  Thought content normal          Judgment: Judgment normal          Vital Signs     Vitals:    04/28/23 0907   BP: 124/88   Pulse: 83   SpO2: 100%   Weight: 100 kg (221 lb)   Height: 6' (1 829 m)       Current Medications       Current Outpatient Medications:   •  atorvastatin (LIPITOR) 10 mg tablet, TAKE 1 TABLET BY MOUTH EVERY DAY, Disp: 90 tablet, Rfl: 3  •  ammonium lactate (LAC-HYDRIN) 12 % cream, Apply topically as needed for dry skin (Patient not taking: Reported on 4/19/2023), Disp: 385 g, Rfl: 1  •  Blood Glucose Monitoring Suppl (OneTouch Verio Flex System) w/Device KIT, CHECK BLOOD SUGARS THREE TIMES DAILY DX: E11 65 (Patient not taking: Reported on 4/28/2023), Disp: 1 kit, Rfl: 0  •  Continuous Blood Gluc  (FreeStyle Downs 2 [place]) MARCELLA, As instructed (Patient not taking: Reported on 4/28/2023), Disp: 1 each, Rfl: 0  •  Continuous Blood Gluc Sensor (FreeStyle Miguel Angel 2 Sensor) MISC, Use to check her blood sugars continuously and change it every 2 weeks (Patient not taking: Reported on 4/28/2023), Disp: 2 each, Rfl: 2  •  ferrous sulfate 324 (65 Fe) mg, TAKE 1 TABLET BY MOUTH 2 TIMES A DAY BEFORE MEALS , Disp: 180 tablet, Rfl: 1  •  folic acid (FOLVITE) 1 mg tablet, TAKE 1 TABLET BY MOUTH EVERY DAY, Disp: 90 tablet, Rfl: 1  •  gabapentin (NEURONTIN) 100 mg capsule, Take 1 capsule (100 mg total) by mouth 3 (three) times a day, Disp: 90 capsule, Rfl: 3  •  glucose blood (OneTouch Verio) test strip, CHECK BLOOD SUGARS THREE TIMES DAILY DX: E11 65, Disp: 400 strip, Rfl: 3  •  Incontinence Supply Disposable (Incontinence Brief Large) MISC, Use every 4 (four) hours as needed (Urinary incontinence) (Patient not taking: Reported on 4/19/2023), Disp: 36 each, Rfl: "12  •  insulin glargine (Toujeo SoloStar) 300 units/mL CONCENTRATED U-300 injection pen (1-unit dial), Inject 8 Units under the skin daily at bedtime, Disp: 2 4 mL, Rfl: 0  •  Lancets (OneTouch Delica Plus VGGQCW25H) MISC, CHECK BLOOD SUGARS THREE TIMES DAILY DX: E11 65, Disp: 400 each, Rfl: 3  •  pantoprazole (PROTONIX) 40 mg tablet, Take 1 tablet (40 mg total) by mouth daily in the early morning, Disp: 30 tablet, Rfl: 0    Active Problems     Patient Active Problem List   Diagnosis   • Urinary frequency   • Type 2 diabetes mellitus (HCC)   • Prostate cancer screening   • Microscopic hematuria   • Urge incontinence of urine   • Nocturnal enuresis   • Feeling of incomplete bladder emptying   • Balanitis   • New onset atrial fibrillation (HCC)   • Urinary retention   • Fungal infection of skin   • Depressed mood   • Pain in both lower extremities   • Polyuria   • Ventral Hernia   • Hydroureteronephrosis   • Uncontrolled type 2 diabetes mellitus with hyperglycemia (HCC)   • Vitamin D deficiency   • Dyslipidemia   • History of pulmonary embolus (PE)   • Other dietary vitamin B12 deficiency anemia   • Encounter for screening colonoscopy   • Anemia   • Other artificial openings of urinary tract status (HCC)   • Unspecified mood (affective) disorder (HCC)   • Class 1 obesity due to excess calories with serious comorbidity and body mass index (BMI) of 30 0 to 30 9 in adult       Past Medical History     Past Medical History:   Diagnosis Date   • A-fib ([facility] )     per pt \"happened one time\"   • Abdominal hernia    • Acute metabolic encephalopathy 77/71/3548   • CARLTON (acute kidney injury) ([facility] [address] ) 09/04/2022   • Ambulates with cane     not recently using but has in home if needed   • Anesthesia     per pt \"with knee surgery(in the )was awake for operation and fell asleep when being closed up\" and than took long to wake up\"   • Arthritis    • Blood in urine     \"sometimes\"   • Diabetes mellitus ([facility] [address] )     Type 2--sees " "Endocrinologist [facility] Dr Carolina Quan   • Lane catheter in place    • History of pulmonary embolism    • Implantable loop recorder present    • Muscle weakness     per pt \"started with diabetes\"some muscle loss of density\" --per pt \"muscle coming back\"   • Myopia of left eye     glasses for reading   • Pancreatitis 09/04/2022   • Risk for falls    • Sepsis ([address] ) 09/04/2022   • Teeth missing    • Urethral pain     \"when passing urine\"       Surgical History     Past Surgical History:   Procedure Laterality Date   • CARDIAC LOOP RECORDER     • CATARACT EXTRACTION Bilateral    • KNEE CARTILAGE SURGERY     • KNEE SURGERY Right    • OK TRURL ELECTROSURG RESCJ PROSTATE BLEED COMPLETE N/A 3/16/2023    Procedure: TRANSURETHRAL RESECTION OF PROSTATE (TURP)(possible), cystoscopy, SP tube placement, removal of bladder calculus;  Surgeon: Belem Saravia MD;  Location: [facility] OR;  Service: Urology       Family History     Family History   Problem Relation Age of Onset   • Diabetes Father    • Diabetes type II Father    • Diabetes Mother        Social History     Social History     Social History     Tobacco Use   Smoking Status Never   • Passive exposure: Never   Smokeless Tobacco Never       Past Surgical History:   Procedure Laterality Date   • CARDIAC LOOP RECORDER     • CATARACT EXTRACTION Bilateral    • KNEE CARTILAGE SURGERY     • KNEE SURGERY Right    • OK TRURL ELECTROSURG RESCJ PROSTATE BLEED COMPLETE N/A 3/16/2023    Procedure: TRANSURETHRAL RESECTION OF PROSTATE (TURP)(possible), cystoscopy, SP tube placement, removal of bladder calculus;  Surgeon: Belem Saravia MD;  Location: [facility] OR;  Service: Urology         The following portions of the patient's history were reviewed and updated as appropriate: allergies, current medications, past family history, past medical history, past social history, past surgical history and problem list    Please note :  Voice dictation software has been used to create this document    There may " be inadvertent transcription errors      [id number] [id number] Griselda Comer

## 2023-05-26 LAB
LEFT EYE DIABETIC RETINOPATHY: NORMAL
RIGHT EYE DIABETIC RETINOPATHY: NORMAL

## 2023-06-06 ENCOUNTER — REMOTE DEVICE CLINIC VISIT (OUTPATIENT)
Dept: CARDIOLOGY CLINIC | Facility: CLINIC | Age: 68
End: 2023-06-06
Payer: COMMERCIAL

## 2023-06-06 DIAGNOSIS — Z95.818 PRESENCE OF CARDIAC DEVICE: Primary | ICD-10-CM

## 2023-06-06 PROCEDURE — 93298 REM INTERROG DEV EVAL SCRMS: CPT | Performed by: INTERNAL MEDICINE

## 2023-06-06 PROCEDURE — G2066 INTER DEVC REMOTE 30D: HCPCS | Performed by: INTERNAL MEDICINE

## 2023-06-06 NOTE — PROGRESS NOTES
"Results for orders placed or performed in visit on 06/06/23   Cardiac EP device report    Narrative    CARELINK TRANSMISSION: BATTERY STATUS \"OK  \" NO PATIENT OR DEVICE ACTIVATED EPISODES  --OSUNA       "

## 2023-06-27 PROBLEM — Z12.5 PROSTATE CANCER SCREENING: Status: RESOLVED | Noted: 2022-09-02 | Resolved: 2023-06-27

## 2023-07-21 ENCOUNTER — TELEPHONE (OUTPATIENT)
Dept: FAMILY MEDICINE CLINIC | Facility: CLINIC | Age: 68
End: 2023-07-21

## 2023-07-29 ENCOUNTER — APPOINTMENT (EMERGENCY)
Dept: CT IMAGING | Facility: HOSPITAL | Age: 68
End: 2023-07-29
Payer: COMMERCIAL

## 2023-07-29 ENCOUNTER — NURSE TRIAGE (OUTPATIENT)
Dept: OTHER | Facility: OTHER | Age: 68
End: 2023-07-29

## 2023-07-29 ENCOUNTER — HOSPITAL ENCOUNTER (INPATIENT)
Facility: HOSPITAL | Age: 68
LOS: 2 days | Discharge: HOME | End: 2023-08-01
Attending: EMERGENCY MEDICINE | Admitting: STUDENT IN AN ORGANIZED HEALTH CARE EDUCATION/TRAINING PROGRAM
Payer: COMMERCIAL

## 2023-07-29 DIAGNOSIS — E11.10 TYPE 2 DIABETES MELLITUS WITH KETOACIDOSIS WITHOUT COMA (HCC): ICD-10-CM

## 2023-07-29 DIAGNOSIS — R35.0 URINARY FREQUENCY: ICD-10-CM

## 2023-07-29 DIAGNOSIS — R73.9 HYPERGLYCEMIA: Primary | ICD-10-CM

## 2023-07-29 DIAGNOSIS — E11.10 DKA (DIABETIC KETOACIDOSIS) (HCC): ICD-10-CM

## 2023-07-29 DIAGNOSIS — N30.00 ACUTE CYSTITIS WITHOUT HEMATURIA: ICD-10-CM

## 2023-07-29 LAB
ALBUMIN SERPL BCP-MCNC: 4.7 G/DL (ref 3.5–5)
ALP SERPL-CCNC: 123 U/L (ref 34–104)
ALT SERPL W P-5'-P-CCNC: 16 U/L (ref 7–52)
ANION GAP SERPL CALCULATED.3IONS-SCNC: 18 MMOL/L
AST SERPL W P-5'-P-CCNC: 21 U/L (ref 13–39)
ATRIAL RATE: 88 BPM
BASE EX.OXY STD BLDV CALC-SCNC: 89 % (ref 60–80)
BASE EXCESS BLDV CALC-SCNC: -11.3 MMOL/L
BETA-HYDROXYBUTYRATE: 2.8 MMOL/L
BILIRUB SERPL-MCNC: 0.56 MG/DL (ref 0.2–1)
BUN SERPL-MCNC: 39 MG/DL (ref 5–25)
CALCIUM SERPL-MCNC: 10.9 MG/DL (ref 8.4–10.2)
CARDIAC TROPONIN I PNL SERPL HS: 5 NG/L
CHLORIDE SERPL-SCNC: 101 MMOL/L (ref 96–108)
CO2 SERPL-SCNC: 12 MMOL/L (ref 21–32)
CREAT SERPL-MCNC: 1.43 MG/DL (ref 0.6–1.3)
ERYTHROCYTE [DISTWIDTH] IN BLOOD BY AUTOMATED COUNT: 13.1 % (ref 11.6–15.1)
GFR SERPL CREATININE-BSD FRML MDRD: 49 ML/MIN/1.73SQ M
GLUCOSE SERPL-MCNC: 341 MG/DL (ref 65–140)
GLUCOSE SERPL-MCNC: 359 MG/DL (ref 65–140)
HCO3 BLDV-SCNC: 13 MMOL/L (ref 24–30)
HCT VFR BLD AUTO: 48.5 % (ref 36.5–49.3)
HGB BLD-MCNC: 16.6 G/DL (ref 12–17)
LACTATE SERPL-SCNC: 2.3 MMOL/L (ref 0.5–2)
MCH RBC QN AUTO: 28.5 PG (ref 26.8–34.3)
MCHC RBC AUTO-ENTMCNC: 34.2 G/DL (ref 31.4–37.4)
MCV RBC AUTO: 83 FL (ref 82–98)
O2 CT BLDV-SCNC: 21.6 ML/DL
P AXIS: 7 DEGREES
PCO2 BLDV: 26.5 MM HG (ref 42–50)
PH BLDV: 7.31 [PH] (ref 7.3–7.4)
PLATELET # BLD AUTO: 204 THOUSANDS/UL (ref 149–390)
PMV BLD AUTO: 11.3 FL (ref 8.9–12.7)
PO2 BLDV: 60 MM HG (ref 35–45)
POTASSIUM SERPL-SCNC: 3.9 MMOL/L (ref 3.5–5.3)
PR INTERVAL: 176 MS
PROT SERPL-MCNC: 9 G/DL (ref 6.4–8.4)
QRS AXIS: -36 DEGREES
QRSD INTERVAL: 104 MS
QT INTERVAL: 390 MS
QTC INTERVAL: 471 MS
RBC # BLD AUTO: 5.82 MILLION/UL (ref 3.88–5.62)
SODIUM SERPL-SCNC: 131 MMOL/L (ref 135–147)
T WAVE AXIS: 47 DEGREES
VENTRICULAR RATE: 88 BPM
WBC # BLD AUTO: 11.92 THOUSAND/UL (ref 4.31–10.16)

## 2023-07-29 PROCEDURE — 85027 COMPLETE CBC AUTOMATED: CPT

## 2023-07-29 PROCEDURE — 99285 EMERGENCY DEPT VISIT HI MDM: CPT

## 2023-07-29 PROCEDURE — 80053 COMPREHEN METABOLIC PANEL: CPT

## 2023-07-29 PROCEDURE — 84484 ASSAY OF TROPONIN QUANT: CPT

## 2023-07-29 PROCEDURE — 93005 ELECTROCARDIOGRAM TRACING: CPT

## 2023-07-29 PROCEDURE — 36415 COLL VENOUS BLD VENIPUNCTURE: CPT

## 2023-07-29 PROCEDURE — 82805 BLOOD GASES W/O2 SATURATION: CPT

## 2023-07-29 PROCEDURE — 83605 ASSAY OF LACTIC ACID: CPT

## 2023-07-29 PROCEDURE — 82948 REAGENT STRIP/BLOOD GLUCOSE: CPT

## 2023-07-29 PROCEDURE — 96374 THER/PROPH/DIAG INJ IV PUSH: CPT

## 2023-07-29 PROCEDURE — 99291 CRITICAL CARE FIRST HOUR: CPT

## 2023-07-29 PROCEDURE — G1004 CDSM NDSC: HCPCS

## 2023-07-29 PROCEDURE — 74177 CT ABD & PELVIS W/CONTRAST: CPT

## 2023-07-29 PROCEDURE — 96361 HYDRATE IV INFUSION ADD-ON: CPT

## 2023-07-29 PROCEDURE — 82010 KETONE BODYS QUAN: CPT

## 2023-07-29 RX ORDER — SODIUM CHLORIDE 9 MG/ML
3 INJECTION INTRAVENOUS
Status: DISCONTINUED | OUTPATIENT
Start: 2023-07-29 | End: 2023-08-01 | Stop reason: HOSPADM

## 2023-07-29 RX ORDER — ONDANSETRON 2 MG/ML
4 INJECTION INTRAMUSCULAR; INTRAVENOUS ONCE
Status: COMPLETED | OUTPATIENT
Start: 2023-07-29 | End: 2023-07-29

## 2023-07-29 RX ADMIN — IOHEXOL 100 ML: 350 INJECTION, SOLUTION INTRAVENOUS at 21:31

## 2023-07-29 RX ADMIN — ONDANSETRON 4 MG: 2 INJECTION INTRAMUSCULAR; INTRAVENOUS at 20:50

## 2023-07-29 RX ADMIN — SODIUM CHLORIDE 1000 ML: 0.9 INJECTION, SOLUTION INTRAVENOUS at 20:45

## 2023-07-29 NOTE — TELEPHONE ENCOUNTER
Regarding: sugar was 504 then 425 and now is 412. would like to know how it can come down more  ----- Message from Maria M Lobato sent at 7/29/2023  5:26 PM EDT -----  Pt daughter called "My dad has not been feeling well since Thursday. I took his sugar and it was 504. He took his insulin and had some oats it came down to 425. I then made him a shake and it came down to 412. I would like to know what else we can do for him.

## 2023-07-29 NOTE — TELEPHONE ENCOUNTER
Reason for Disposition  • [1] Blood glucose > 240 mg/dL (13.3 mmol/L) AND [2] vomiting AND [3] unable to check for ketones (in blood or urine)    Answer Assessment - Initial Assessment Questions  1. BLOOD GLUCOSE: "What is your blood glucose level?"       Last accu check was at 1700, result = 412  2. ONSET: "When did you check the blood glucose?"      At 1700  3. USUAL RANGE: "What is your glucose level usually?" (e.g., usual fasting morning value, usual evening value)      I think it is usually in the 200s  4. KETONES: "Do you check for ketones (urine or blood test strips)?" If yes, ask: "What does the test show now?"       Does not check   5. TYPE 1 or 2:  "Do you know what type of diabetes you have?"  (e.g., Type 1, Type 2, Gestational; doesn't know)       Type 2   6. INSULIN: "Do you take insulin?" "What type of insulin(s) do you use? What is the mode of delivery? (syringe, pen; injection or pump)? "       Takes Humalog, Insulin and Toujeo  7. DIABETES PILLS: "Do you take any pills for your diabetes?" If yes, ask: "Have you missed taking any pills recently?"      I don't know if he missed doses - I am not here, I work  8. OTHER SYMPTOMS: "Do you have any symptoms?" (e.g., fever, frequent urination, difficulty breathing, dizziness, weakness, vomiting)      Frequent urination, drinking a lot, was SOB, weak and has trouble walking, has been vomiting.     Protocols used: DIABETES - HIGH BLOOD SUGAR-ADULT-

## 2023-07-29 NOTE — ED PROVIDER NOTES
History  Chief Complaint   Patient presents with   • Hyperglycemia - Symptomatic     Pt reporting BG reading of 512 at home, 30 total units of insulin given around 1430. Pt c/o of feeling fatigued and weak     The patient is a 58-year-old male with a past medical history of insulin-dependent diabetes mellitus, urinary retention, paroxysmal atrial fibrillation, and anemia, who presents for evaluation of hyperglycemia. He reports fatigue, vomiting, generalized abdominal pain,  decreased appetite, a 15 pound unintentional weight loss, decreased urination, and exertional shortness of breath ongoing since Thursday. No shortness of breath at rest.  The patient reports that he was feeling better and stopped taking insulin approximately 2 months ago. Has not been routinely checking his sugar at home. Today his wife told him that she checked her sugar and it was 512. Around 2:30 PM he took 10 units of Humalog and 20 units of Toujeo and his sugar decreased to ~400. He denies chest pain, palpitations, diarrhea, headache, lightheadedness, room spinning dizziness, dysuria, hematuria, cold-like symptoms, or fevers. Prior to Admission Medications   Prescriptions Last Dose Informant Patient Reported? Taking?    Blood Glucose Monitoring Suppl (OneTouch Verio Flex System) w/Device KIT  Self No No   Sig: CHECK BLOOD SUGARS THREE TIMES DAILY DX: E11.65   Patient not taking: Reported on 4/28/2023   Continuous Blood Gluc  (FreeStyle Miguel Angel 2 Lacrosse) MARCELLA   No No   Sig: As instructed   Patient not taking: Reported on 4/28/2023   Continuous Blood Gluc Sensor (FreeStyle Miguel Angel 2 Sensor) [facility]   No No   Sig: Use to check her blood sugars continuously and change it every 2 weeks   Patient not taking: Reported on 4/28/2023   Incontinence Supply Disposable (Incontinence Brief Large) MISC  Self No No   Sig: Use every 4 (four) hours as needed (Urinary incontinence)   Patient not taking: Reported on 4/19/2023   Lancets (OneTouch Delica Plus [id number]) MISC  Self No No   Sig: CHECK BLOOD SUGARS THREE TIMES DAILY DX: E11.65   ammonium lactate (LAC-HYDRIN) 12 % cream  Self No No   Sig: Apply topically as needed for dry skin   Patient not taking: Reported on 4/19/2023   atorvastatin (LIPITOR) 10 mg tablet  Self No No   Sig: TAKE 1 TABLET BY MOUTH EVERY DAY   ferrous sulfate 324 (65 Fe) mg   No No   Sig: TAKE 1 TABLET BY MOUTH 2 TIMES A DAY BEFORE MEALS.    folic acid (FOLVITE) 1 mg tablet   No No   Sig: TAKE 1 TABLET BY MOUTH EVERY DAY   gabapentin (NEURONTIN) 100 mg capsule  Self No No   Sig: Take 1 capsule (100 mg total) by mouth 3 (three) times a day   glucose blood (OneTouch Verio) test strip   No No   Sig: CHECK BLOOD SUGARS THREE TIMES DAILY DX: E11.65   insulin glargine (Toujeo SoloStar) 300 units/mL CONCENTRATED U-300 injection pen (1-unit dial)   No No   Sig: Inject 8 Units under the skin daily at bedtime   pantoprazole (PROTONIX) 40 mg tablet  Self No No   Sig: Take 1 tablet (40 mg total) by mouth daily in the early morning      Facility-Administered Medications: None       Past Medical History:   Diagnosis Date   • A-fib (HCC)     per pt "happened one time"   • Abdominal hernia    • Acute metabolic encephalopathy 10/04/5239   • [place] (acute kidney injury) ([address]) 09/04/2022   • Ambulates with cane     not recently using but has in home if needed   • Anesthesia     per pt "with knee surgery(in the )was awake for operation and fell asleep when being closed up" and than took long to wake up"   • Arthritis    • Blood in urine     "sometimes"   • Diabetes mellitus ([address])     Type 2--sees Endocrinologist [facility] Dr Henrry Reza   • Lane catheter in place    • History of pulmonary embolism    • Implantable loop recorder present    • Muscle weakness     per pt "started with diabetes"some muscle loss of density" --per pt "muscle coming back"   • Myopia of left eye     glasses for reading   • Pancreatitis 09/04/2022   • Risk for falls    • Sepsis ([address] [facility]) 09/04/2022   • Teeth missing    • Urethral pain     "when passing urine"       Past Surgical History:   Procedure Laterality Date   • CARDIAC LOOP RECORDER     • CATARACT EXTRACTION Bilateral    • KNEE CARTILAGE SURGERY     • KNEE SURGERY Right    • CO TRURL ELECTROSURG RESCJ PROSTATE BLEED COMPLETE N/A 3/16/2023    Procedure: TRANSURETHRAL RESECTION OF PROSTATE (TURP)(possible), cystoscopy, SP tube placement, removal of bladder calculus;  Surgeon: Leopoldo Counter, MD;  Location: [facility];  Service: Urology       Family History   Problem Relation Age of Onset   • Diabetes Father    • Diabetes type II Father    • Diabetes Mother      I have reviewed and agree with the history as documented. E-Cigarette/Vaping   • E-Cigarette Use Never User      E-Cigarette/Vaping Substances   • Nicotine No    • THC No    • CBD No    • Flavoring No    • Other No    • Unknown No      Social History     Tobacco Use   • Smoking status: Never     Passive exposure: Never   • Smokeless tobacco: Never   Vaping Use   • Vaping Use: Never used   Substance Use Topics   • Alcohol use: Never   • Drug use: Never       Review of Systems   Constitutional: Positive for appetite change, fatigue and unexpected weight change. Negative for chills and fever. HENT: Negative for congestion, ear pain, rhinorrhea and sore throat. Eyes: Negative for pain and visual disturbance. Respiratory: Positive for shortness of breath. Negative for cough. Cardiovascular: Negative for chest pain and palpitations. Gastrointestinal: Positive for abdominal pain and vomiting. Negative for abdominal distention, constipation, diarrhea and nausea. Genitourinary: Positive for decreased urine volume. Negative for dysuria and hematuria. Musculoskeletal: Negative for arthralgias, back pain and myalgias. Skin: Negative for color change and rash. Neurological: Negative for dizziness, seizures, syncope, weakness, light-headedness and headaches.    All other systems reviewed and are negative. Physical Exam  Physical Exam  Vitals and nursing note reviewed. Constitutional:       General: He is awake. Appearance: He is well-developed and overweight. He is not toxic-appearing or diaphoretic. HENT:      Head: Normocephalic and atraumatic. Right Ear: External ear normal.      Left Ear: External ear normal.      Nose: Nose normal.      Mouth/Throat:      Lips: Pink. Mouth: Mucous membranes are dry. Pharynx: Oropharynx is clear. Uvula midline. Eyes:      General: Lids are normal. Gaze aligned appropriately. No scleral icterus. Conjunctiva/sclera: Conjunctivae normal.      Pupils: Pupils are equal, round, and reactive to light. Cardiovascular:      Rate and Rhythm: Regular rhythm. Tachycardia present. Heart sounds: S1 normal and S2 normal. No murmur heard. No friction rub. No gallop. Pulmonary:      Effort: Pulmonary effort is normal. Tachypnea present. No respiratory distress. Breath sounds: Normal breath sounds and air entry. No decreased air movement. No wheezing, rhonchi or rales. Comments: Mild tachypnea with a respiratory rate of 22. No Kussmaul respirations. Patient speaking in full sentences without difficulty. Abdominal:      General: Abdomen is flat. Palpations: Abdomen is soft. Tenderness: There is generalized abdominal tenderness. There is guarding. There is no rebound. Musculoskeletal:      Cervical back: Normal, full passive range of motion without pain and neck supple. No tenderness or bony tenderness. Thoracic back: Normal. No tenderness or bony tenderness. Lumbar back: Normal. No tenderness or bony tenderness. Skin:     General: Skin is warm. Capillary Refill: Capillary refill takes 2 to 3 seconds. Coloration: Skin is not cyanotic or pale. Findings: No rash. Neurological:      Mental Status: He is alert and oriented to person, place, and time.       GCS: GCS eye subscore is 4. GCS verbal subscore is 5. GCS motor subscore is 6. Psychiatric:         Behavior: Behavior is cooperative.          Vital Signs  ED Triage Vitals [07/29/23 1952]   Temperature Pulse Respirations Blood Pressure SpO2   97.6 °F (36.4 °C) 102 20 122/84 99 %      Temp Source Heart Rate Source Patient Position - Orthostatic VS BP Location FiO2 (%)   Oral Monitor Lying Left arm --      Pain Score       No Pain           Vitals:    07/29/23 1952   BP: 122/84   Pulse: 102   Patient Position - Orthostatic VS: Lying         Visual Acuity      ED Medications  Medications   sodium chloride (PF) 0.9 % injection 3 mL (has no administration in time range)   sodium chloride 0.9 % bolus 1,000 mL (1,000 mL Intravenous New Bag 7/29/23 2045)   ondansetron (ZOFRAN) injection 4 mg (4 mg Intravenous Given 7/29/23 2050)       Diagnostic Studies  Results Reviewed     Procedure Component Value Units Date/Time    Beta Hydroxybutyrate [[id number]]  (Abnormal) Collected: 07/29/23 2046    Lab Status: Final result Specimen: Blood from Arm, Right Updated: 07/29/23 2057     BETA-HYDROXYBUTYRATE 2.8 mmol/L     HS Troponin 0hr (reflex protocol) [[id number]]     Lab Status: No result Specimen: Blood     Blood gas, venous [[id number]]  (Abnormal) Collected: 07/29/23 2046    Lab Status: Final result Specimen: Blood from Arm, Right Updated: 07/29/23 2056     pH, Obie 7.307     pCO2, Obie 26.5 mm Hg      pO2, Obie 60.0 mm Hg      HCO3, Obie 13.0 mmol/L      Base Excess, Obie -11.3 mmol/L      O2 Content, Obie 21.6 ml/dL      O2 HGB, VENOUS 89.0 %     CBC [[id number]]  (Abnormal) Collected: 07/29/23 2046    Lab Status: Final result Specimen: Blood from Arm, Right Updated: 07/29/23 2055     WBC 11.92 Thousand/uL      RBC 5.82 Million/uL      Hemoglobin 16.6 g/dL      Hematocrit 48.5 %      MCV 83 fL      MCH 28.5 pg      MCHC 34.2 g/dL      RDW 13.1 %      Platelets 228 Thousands/uL      MPV 11.3 fL     Comprehensive metabolic panel [[id number]] Collected: 07/29/23 2046    Lab Status: In process Specimen: Blood from Arm, Right Updated: 07/29/23 2051    Lactic acid, plasma (w/reflex if result > 2.0) [[id number]] Collected: 07/29/23 2046    Lab Status: In process Specimen: Blood from Arm, Right Updated: 07/29/23 2051    UA w Reflex to Microscopic w Reflex to Culture [[id number]]     Lab Status: No result Specimen: Urine     Fingerstick Glucose (POCT) [[id number]]  (Abnormal) Collected: 07/29/23 2015    Lab Status: Final result Updated: 07/29/23 2016     POC Glucose 359 mg/dl                  CT abdomen pelvis with contrast    (Results Pending)              Procedures  Procedures         ED Course           Medical Decision Making  Patient with a history of insulin-dependent diabetes mellitus presents with multiple complaints including fatigue, SOB, and unintentional weight loss. Blood glucose at home was 512. Patient did give himself insulin at home and initial POCT glucose was 359. On exam he is slightly tachypneic, but able to speak in full sentences. There is dry mucous membranes and decreased capillary fill. He also has generalized tenderness to palpation of the abdomen. Differential diagnosis includes but is not limited to dehydration, symptomatic hyperglycemia, DKA, other metabolic abnormality, viral illness, gastritis, gastroenteritis, hepatitis, pancreatitis, colitis, constipation, bowel obstruction, ACS, MI, or cardiac dysrhythmia. On my personal interpretation of the EKG the patient the is in ***. Amount and/or Complexity of Data Reviewed  Independent Historian: spouse     Details: History provided by patient and his wife  External Data Reviewed: labs and notes. Labs: ordered. Radiology: ordered. ECG/medicine tests: ordered and independent interpretation performed. Risk  Prescription drug management.           Disposition  Final diagnoses:   None     ED Disposition     None      Follow-up Information    None         Patient's Medications   Discharge Prescriptions    No medications on file       No discharge procedures on file.     PDMP Review       Value Time User    PDMP Reviewed  Yes 9/19/2022 11:43 AM Petros Amaya PA-C          ED Provider  Electronically Signed by Specimen: Urine, Clean Catch Updated: 07/30/23 0206     RBC, UA 30-50 /hpf      WBC, UA Innumerable /hpf      Epithelial Cells Occasional /hpf      Bacteria, UA Occasional /hpf      MUCUS THREADS Occasional    UA w Reflex to Microscopic w Reflex to Culture [[id number]]  (Abnormal) Collected: 07/30/23 0135    Lab Status: Final result Specimen: Urine, Clean Catch Updated: 07/30/23 0201     Color, UA Light Yellow     Clarity, UA Clear     Specific Gravity, UA >=1.050     pH, UA 5.5     Leukocytes, UA Large     Nitrite, UA Negative     Protein, UA 30 (1+) mg/dl      Glucose,  (3/10%) mg/dl      Ketones, UA 40 (2+) mg/dl      Urobilinogen, UA <2.0 mg/dl      Bilirubin, UA Negative     Occult Blood, UA Moderate    Blood gas, venous [[id number]]  (Abnormal) Collected: 07/30/23 0142    Lab Status: Final result Specimen: Blood from Arm, Right Updated: 07/30/23 0153     pH, Obie 7.268     pCO2, Obie 35.0 mm Hg      pO2, Obie 54.2 mm Hg      HCO3, Obie 15.6 mmol/L      Base Excess, Obie -10.2 mmol/L      O2 Content, Obie 19.4 ml/dL      O2 HGB, VENOUS 85.8 %     Lactic acid 2 Hours [[id number]]  (Normal) Collected: 07/30/23 0002    Lab Status: Final result Specimen: Blood from Arm, Right Updated: 07/30/23 0026     LACTIC ACID 1.1 mmol/L     Narrative:      Result may be elevated if tourniquet was used during collection. HS Troponin 0hr (reflex protocol) [[id number]]  (Normal) Collected: 07/29/23 2259    Lab Status: Final result Specimen: Blood from Arm, Right Updated: 07/29/23 2332     hs TnI 0hr 5 ng/L     Lactic acid, plasma (w/reflex if result > 2.0) [[id number]]  (Abnormal) Collected: 07/29/23 2046    Lab Status: Final result Specimen: Blood from Arm, Right Updated: 07/29/23 2143     LACTIC ACID 2.3 mmol/L     Narrative:      Result may be elevated if tourniquet was used during collection.     Comprehensive metabolic panel [[id number]]  (Abnormal) Collected: 07/29/23 2046    Lab Status: Final result Specimen: Blood from Arm, Right Updated: 07/29/23 2117     Sodium 131 mmol/L      Potassium 3.9 mmol/L      Chloride 101 mmol/L      CO2 12 mmol/L      ANION GAP 18 mmol/L      BUN 39 mg/dL      Creatinine 1.43 mg/dL      Glucose 341 mg/dL      Calcium 10.9 mg/dL      AST 21 U/L      ALT 16 U/L      Alkaline Phosphatase 123 U/L      Total Protein 9.0 g/dL      Albumin 4.7 g/dL      Total Bilirubin 0.56 mg/dL      eGFR 49 ml/min/1.73sq m     Narrative:      [facility] guidelines for Chronic Kidney Disease (CKD):   •  Stage 1 with normal or high GFR (GFR > 90 mL/min/1.73 square meters)  •  Stage 2 Mild CKD (GFR = 60-89 mL/min/1.73 square meters)  •  Stage 3A Moderate CKD (GFR = 45-59 mL/min/1.73 square meters)  •  Stage 3B Moderate CKD (GFR = 30-44 mL/min/1.73 square meters)  •  Stage 4 Severe CKD (GFR = 15-29 mL/min/1.73 square meters)  •  Stage 5 End Stage CKD (GFR <15 mL/min/1.73 square meters)  Note: GFR calculation is accurate only with a steady state creatinine    Beta Hydroxybutyrate [[id number]]  (Abnormal) Collected: 07/29/23 2046    Lab Status: Final result Specimen: Blood from Arm, Right Updated: 07/29/23 2057     BETA-HYDROXYBUTYRATE 2.8 mmol/L     Blood gas, venous [[id number]]  (Abnormal) Collected: 07/29/23 2046    Lab Status: Final result Specimen: Blood from Arm, Right Updated: 07/29/23 2056     pH, Obie 7.307     pCO2, Obie 26.5 mm Hg      pO2, Obie 60.0 mm Hg      HCO3, Obie 13.0 mmol/L      Base Excess, Obie -11.3 mmol/L      O2 Content, Obie 21.6 ml/dL      O2 HGB, VENOUS 89.0 %     CBC [[id number]]  (Abnormal) Collected: 07/29/23 2046    Lab Status: Final result Specimen: Blood from Arm, Right Updated: 07/29/23 2055     WBC 11.92 Thousand/uL      RBC 5.82 Million/uL      Hemoglobin 16.6 g/dL      Hematocrit 48.5 %      MCV 83 fL      MCH 28.5 pg      MCHC 34.2 g/dL      RDW 13.1 %      Platelets 489 Thousands/uL      MPV 11.3 fL     Fingerstick Glucose (POCT) [[id number]]  (Abnormal) Collected: 07/29/23 2015 Lab Status: Final result Updated: 07/29/23 2016     POC Glucose 359 mg/dl                  RADIOLOGY RESULTS   Final Result by  (08/01 5419)      CT abdomen pelvis with contrast   Final Result by Yonny Sullivan MD (07/30 4161)      Moderate diffuse urinary bladder wall thickening which may be secondary to under distention versus a UTI/acute cystitis. Relation with urinalysis is recommended. Moderate amount of stool within the rectum. No free air or free fluid.          Findings are consistent with the preliminary report from [facility] which was provided shortly after completion of the exam.         Workstation performed: [id number]                    Procedures  CriticalCare Time    Date/Time: 7/29/2023 9:00 PM    Performed by: Gaviota Cervantes PA-C  Authorized by: Gaviota Cervantes PA-C    Critical care provider statement:     Critical care time (minutes):  35    Critical care time was exclusive of:  Separately billable procedures and treating other patients    Critical care was necessary to treat or prevent imminent or life-threatening deterioration of the following conditions:  Endocrine crisis (DKA)    Critical care was time spent personally by me on the following activities:  Development of treatment plan with patient or surrogate, re-evaluation of patient's condition, review of old charts, ordering and review of laboratory studies, ordering and review of radiographic studies, ordering and performing treatments and interventions, obtaining history from patient or surrogate, examination of patient and evaluation of patient's response to treatment    I assumed direction of critical care for this patient from another provider in my specialty: no    ECG 12 Lead Documentation Only    Date/Time: 7/29/2023 9:18 PM    Performed by: Gaviota Cervantes PA-C  Authorized by: Gaviota Cervantes PA-C    ECG reviewed by me, the ED Provider: yes    Patient location:  ED  Previous ECG:     Comparison to cardiac monitor: Yes    Interpretation:     Interpretation: non-specific    Rate:     ECG rate:  88    ECG rate assessment: normal    Rhythm:     Rhythm: sinus rhythm    Ectopy:     Ectopy: none    QRS:     QRS axis:  Left    QRS intervals:  Normal  Conduction:     Conduction: normal    ST segments:     ST segments:  Normal  T waves:     T waves: non-specific    Comments:      SD interval: 176ms  QRS duration: 104ms  QT/QTc: 390/471ms            ED Course  ED Course as of 08/16/23 0106   Sat Jul 29, 2023   2346 CT abdomen pelvis with contrast  Preliminary [facility] report shows moderately excessive colonic stool content and bladder wall thickening. No other acute abdominal pelvic pathology identified. Medical Decision Making  Patient with a history of insulin-dependent diabetes mellitus presents with multiple complaints including fatigue, SOB, and unintentional weight loss. Blood glucose at home was 512. Patient did give himself insulin at home and initial POCT glucose was 359. On exam he is slightly tachypneic, but able to speak in full sentences. There are dry mucous membranes and decreased capillary fill. He also has generalized tenderness to palpation of the abdomen. Differential diagnosis includes but is not limited to dehydration, symptomatic hyperglycemia, DKA, other metabolic abnormality, medication non-compliance, viral illness, gastritis, gastroenteritis, hepatitis, pancreatitis, colitis, constipation, bowel obstruction, UTI, ACS, MI, or cardiac dysrhythmia. On my personal interpretation of the EKG the patient is in a normal sinus rhythm without significant ST changes or ectopy. Initial troponin was 5, giving the patient a heart score of 3. Labs revealed mild leukocytosis, hyperglycemia, ketonemia, and lactic acidosis. Initial VBG showed a normal blood pH. Attempted to correct the hyperglycemia and ketonemia with insulin and IV fluids, however repeat labs revealed acidosis.   Subsequently obtained a CT of the abdomen and pelvis as well as a urinalysis. CT showed bladder wall thickening and excessive colonic stool, but no other acute pathology. UA did show a large amount of RBCs and innumerable WBCs. Unclear whether leukocytosis and lactic acidosis is secondary to DKA, UTI, or a combination of the two. After a discussion with internal medicine, patient was started on broad spectrum IV antibiotics and admitted for further management. DKA (diabetic ketoacidosis) ([address]): acute illness or injury  Hyperglycemia: acute illness or injury  Amount and/or Complexity of Data Reviewed  Independent Historian: spouse     Details: History provided by patient and his wife  External Data Reviewed: labs and notes. Labs: ordered. Radiology: ordered. Decision-making details documented in ED Course. ECG/medicine tests: ordered and independent interpretation performed. Risk  Prescription drug management. Decision regarding hospitalization. Disposition  Final diagnoses:   Hyperglycemia   DKA (diabetic ketoacidosis) ([address])     Time reflects when diagnosis was documented in both MDM as applicable and the Disposition within this note     Time User Action Codes Description Comment    7/30/2023  4:03 AM Carlota King Add [R73.9] Hyperglycemia     7/30/2023  4:05 AM Carlota King Add [E11.10] DKA (diabetic ketoacidosis) ([address])     8/1/2023 11:33 AM Bryce Carcamo Add [E11.10] Type 2 diabetes mellitus with ketoacidosis without coma ([address])     8/1/2023 11:34 AM Bryce Carcamo Add [R35.0] Urinary frequency     8/1/2023 11:34 AM Bryce Carcamo Add [N30.00] Acute cystitis without hematuria       ED Disposition     ED Disposition   Admit    Condition   Stable    Date/Time   Sun Jul 30, 2023  4:03 AM    Comment   Case was discussed with ROSE and the patient's admission status was agreed to be Admission Status: observation status to the service of Dr. Saintclair End. MD Documentation    Two [facility] Recent Value   Transported by [employer] and Unit #) [place]      RN Documentation    Flowsheet Row Most Recent Value   Transported by [employer] and Unit #) [place]      Follow-up Information     Follow up With Specialties Details Why [address], [address]  [facility] [address] [facility], [address]   [address] [place] [address] 235 [facility]      Brent Mcmillan MD Endocrinology   [id number] [facility]  [phone]            Patient's Medications   Discharge Prescriptions    No medications on file       No discharge procedures on file.     PDMP Review       Value Time User    PDMP Reviewed  Yes 9/19/2022 11:43 AM Rosalee Joiner PA-C          ED Provider  Electronically Signed by           Zenaida Cohen PA-C  08/16/23 [id number]

## 2023-07-29 NOTE — TELEPHONE ENCOUNTER
Wife called stating pt has not felt well since Thursday. Wife states pt lost 12 lbs since Thursday due to poor appetite and vomiting. Pt's family checked his blood sugar today and it was 504 at 1430. Pt has had his diabetes medications and at 1700 it was 412. Wife states pt is weak and is having difficulty ambulating. Wife is not sure if pt checks his blood sugar regularly and is also not sure if he takes appropriate doses of prescribed medications as she states she works all day and he is home by himself. Advice offered per protocol.

## 2023-07-30 PROBLEM — I48.0 PAF (PAROXYSMAL ATRIAL FIBRILLATION) (HCC): Status: ACTIVE | Noted: 2022-09-04

## 2023-07-30 LAB
2HR DELTA HS TROPONIN: 1 NG/L
4HR DELTA HS TROPONIN: -1 NG/L
ALBUMIN SERPL BCP-MCNC: 4 G/DL (ref 3.5–5)
ALP SERPL-CCNC: 102 U/L (ref 34–104)
ALT SERPL W P-5'-P-CCNC: 14 U/L (ref 7–52)
ANION GAP SERPL CALCULATED.3IONS-SCNC: 13 MMOL/L
ANION GAP SERPL CALCULATED.3IONS-SCNC: 16 MMOL/L
AST SERPL W P-5'-P-CCNC: 19 U/L (ref 13–39)
BACTERIA UR QL AUTO: ABNORMAL /HPF
BASE EX.OXY STD BLDV CALC-SCNC: 85.8 % (ref 60–80)
BASE EXCESS BLDV CALC-SCNC: -10.2 MMOL/L
BILIRUB SERPL-MCNC: 0.58 MG/DL (ref 0.2–1)
BILIRUB UR QL STRIP: NEGATIVE
BUN SERPL-MCNC: 35 MG/DL (ref 5–25)
BUN SERPL-MCNC: 36 MG/DL (ref 5–25)
CALCIUM SERPL-MCNC: 9.9 MG/DL (ref 8.4–10.2)
CALCIUM SERPL-MCNC: 9.9 MG/DL (ref 8.4–10.2)
CARDIAC TROPONIN I PNL SERPL HS: 4 NG/L
CARDIAC TROPONIN I PNL SERPL HS: 6 NG/L
CHLORIDE SERPL-SCNC: 103 MMOL/L (ref 96–108)
CHLORIDE SERPL-SCNC: 103 MMOL/L (ref 96–108)
CLARITY UR: CLEAR
CO2 SERPL-SCNC: 13 MMOL/L (ref 21–32)
CO2 SERPL-SCNC: 16 MMOL/L (ref 21–32)
COLOR UR: ABNORMAL
CREAT SERPL-MCNC: 1.04 MG/DL (ref 0.6–1.3)
CREAT SERPL-MCNC: 1.06 MG/DL (ref 0.6–1.3)
ERYTHROCYTE [DISTWIDTH] IN BLOOD BY AUTOMATED COUNT: 13.2 % (ref 11.6–15.1)
GFR SERPL CREATININE-BSD FRML MDRD: 71 ML/MIN/1.73SQ M
GFR SERPL CREATININE-BSD FRML MDRD: 72 ML/MIN/1.73SQ M
GLUCOSE SERPL-MCNC: 235 MG/DL (ref 65–140)
GLUCOSE SERPL-MCNC: 245 MG/DL (ref 65–140)
GLUCOSE SERPL-MCNC: 362 MG/DL (ref 65–140)
GLUCOSE SERPL-MCNC: 369 MG/DL (ref 65–140)
GLUCOSE SERPL-MCNC: 388 MG/DL (ref 65–140)
GLUCOSE SERPL-MCNC: 481 MG/DL (ref 65–140)
GLUCOSE UR STRIP-MCNC: ABNORMAL MG/DL
HCO3 BLDV-SCNC: 15.6 MMOL/L (ref 24–30)
HCT VFR BLD AUTO: 45.2 % (ref 36.5–49.3)
HGB BLD-MCNC: 15.5 G/DL (ref 12–17)
HGB UR QL STRIP.AUTO: ABNORMAL
KETONES UR STRIP-MCNC: ABNORMAL MG/DL
LACTATE SERPL-SCNC: 1.1 MMOL/L (ref 0.5–2)
LEUKOCYTE ESTERASE UR QL STRIP: ABNORMAL
LIPASE SERPL-CCNC: 7 U/L (ref 11–82)
MCH RBC QN AUTO: 29.4 PG (ref 26.8–34.3)
MCHC RBC AUTO-ENTMCNC: 34.3 G/DL (ref 31.4–37.4)
MCV RBC AUTO: 86 FL (ref 82–98)
MUCOUS THREADS UR QL AUTO: ABNORMAL
NITRITE UR QL STRIP: NEGATIVE
NON-SQ EPI CELLS URNS QL MICRO: ABNORMAL /HPF
O2 CT BLDV-SCNC: 19.4 ML/DL
PCO2 BLDV: 35 MM HG (ref 42–50)
PH BLDV: 7.27 [PH] (ref 7.3–7.4)
PH UR STRIP.AUTO: 5.5 [PH]
PLATELET # BLD AUTO: 171 THOUSANDS/UL (ref 149–390)
PMV BLD AUTO: 11.2 FL (ref 8.9–12.7)
PO2 BLDV: 54.2 MM HG (ref 35–45)
POTASSIUM SERPL-SCNC: 4 MMOL/L (ref 3.5–5.3)
POTASSIUM SERPL-SCNC: 4.4 MMOL/L (ref 3.5–5.3)
PROCALCITONIN SERPL-MCNC: 0.14 NG/ML
PROT SERPL-MCNC: 7.6 G/DL (ref 6.4–8.4)
PROT UR STRIP-MCNC: ABNORMAL MG/DL
RBC # BLD AUTO: 5.27 MILLION/UL (ref 3.88–5.62)
RBC #/AREA URNS AUTO: ABNORMAL /HPF
SODIUM SERPL-SCNC: 132 MMOL/L (ref 135–147)
SODIUM SERPL-SCNC: 132 MMOL/L (ref 135–147)
SP GR UR STRIP.AUTO: >=1.05 (ref 1–1.03)
UROBILINOGEN UR STRIP-ACNC: <2 MG/DL
WBC # BLD AUTO: 9.99 THOUSAND/UL (ref 4.31–10.16)
WBC #/AREA URNS AUTO: ABNORMAL /HPF

## 2023-07-30 PROCEDURE — 84145 PROCALCITONIN (PCT): CPT | Performed by: INTERNAL MEDICINE

## 2023-07-30 PROCEDURE — 87186 SC STD MICRODIL/AGAR DIL: CPT

## 2023-07-30 PROCEDURE — 84484 ASSAY OF TROPONIN QUANT: CPT | Performed by: INTERNAL MEDICINE

## 2023-07-30 PROCEDURE — 81001 URINALYSIS AUTO W/SCOPE: CPT

## 2023-07-30 PROCEDURE — 36415 COLL VENOUS BLD VENIPUNCTURE: CPT

## 2023-07-30 PROCEDURE — 83690 ASSAY OF LIPASE: CPT | Performed by: STUDENT IN AN ORGANIZED HEALTH CARE EDUCATION/TRAINING PROGRAM

## 2023-07-30 PROCEDURE — 80048 BASIC METABOLIC PNL TOTAL CA: CPT

## 2023-07-30 PROCEDURE — 84484 ASSAY OF TROPONIN QUANT: CPT

## 2023-07-30 PROCEDURE — 87040 BLOOD CULTURE FOR BACTERIA: CPT | Performed by: INTERNAL MEDICINE

## 2023-07-30 PROCEDURE — 82805 BLOOD GASES W/O2 SATURATION: CPT

## 2023-07-30 PROCEDURE — 87077 CULTURE AEROBIC IDENTIFY: CPT

## 2023-07-30 PROCEDURE — 80053 COMPREHEN METABOLIC PANEL: CPT | Performed by: INTERNAL MEDICINE

## 2023-07-30 PROCEDURE — 99223 1ST HOSP IP/OBS HIGH 75: CPT | Performed by: STUDENT IN AN ORGANIZED HEALTH CARE EDUCATION/TRAINING PROGRAM

## 2023-07-30 PROCEDURE — 83605 ASSAY OF LACTIC ACID: CPT

## 2023-07-30 PROCEDURE — 87086 URINE CULTURE/COLONY COUNT: CPT

## 2023-07-30 PROCEDURE — 85027 COMPLETE CBC AUTOMATED: CPT | Performed by: INTERNAL MEDICINE

## 2023-07-30 PROCEDURE — 82948 REAGENT STRIP/BLOOD GLUCOSE: CPT

## 2023-07-30 RX ORDER — ATORVASTATIN CALCIUM 10 MG/1
10 TABLET, FILM COATED ORAL DAILY
Status: DISCONTINUED | OUTPATIENT
Start: 2023-07-30 | End: 2023-08-01 | Stop reason: HOSPADM

## 2023-07-30 RX ORDER — INSULIN LISPRO 100 [IU]/ML
10 INJECTION, SOLUTION INTRAVENOUS; SUBCUTANEOUS
Status: DISCONTINUED | OUTPATIENT
Start: 2023-07-30 | End: 2023-08-01 | Stop reason: HOSPADM

## 2023-07-30 RX ORDER — GABAPENTIN 100 MG/1
100 CAPSULE ORAL 3 TIMES DAILY
Status: DISCONTINUED | OUTPATIENT
Start: 2023-07-30 | End: 2023-08-01 | Stop reason: HOSPADM

## 2023-07-30 RX ORDER — INSULIN LISPRO 100 [IU]/ML
1-6 INJECTION, SOLUTION INTRAVENOUS; SUBCUTANEOUS
Status: DISCONTINUED | OUTPATIENT
Start: 2023-07-30 | End: 2023-08-01 | Stop reason: HOSPADM

## 2023-07-30 RX ORDER — PANTOPRAZOLE SODIUM 40 MG/1
40 TABLET, DELAYED RELEASE ORAL
Status: DISCONTINUED | OUTPATIENT
Start: 2023-07-30 | End: 2023-08-01 | Stop reason: HOSPADM

## 2023-07-30 RX ORDER — SODIUM CHLORIDE 9 MG/ML
75 INJECTION, SOLUTION INTRAVENOUS CONTINUOUS
Status: DISPENSED | OUTPATIENT
Start: 2023-07-30 | End: 2023-07-30

## 2023-07-30 RX ORDER — ONDANSETRON 2 MG/ML
4 INJECTION INTRAMUSCULAR; INTRAVENOUS EVERY 6 HOURS PRN
Status: DISCONTINUED | OUTPATIENT
Start: 2023-07-30 | End: 2023-08-01 | Stop reason: HOSPADM

## 2023-07-30 RX ORDER — CEFTRIAXONE 1 G/50ML
1000 INJECTION, SOLUTION INTRAVENOUS EVERY 24 HOURS
Status: DISCONTINUED | OUTPATIENT
Start: 2023-07-30 | End: 2023-08-01 | Stop reason: HOSPADM

## 2023-07-30 RX ORDER — INSULIN GLARGINE 100 [IU]/ML
20 INJECTION, SOLUTION SUBCUTANEOUS
Status: DISCONTINUED | OUTPATIENT
Start: 2023-07-30 | End: 2023-07-31

## 2023-07-30 RX ORDER — POLYETHYLENE GLYCOL 3350 17 G/17G
17 POWDER, FOR SOLUTION ORAL DAILY
Status: DISCONTINUED | OUTPATIENT
Start: 2023-07-30 | End: 2023-08-01 | Stop reason: HOSPADM

## 2023-07-30 RX ORDER — FOLIC ACID 1 MG/1
1000 TABLET ORAL DAILY
Status: DISCONTINUED | OUTPATIENT
Start: 2023-07-30 | End: 2023-08-01 | Stop reason: HOSPADM

## 2023-07-30 RX ORDER — HEPARIN SODIUM 5000 [USP'U]/ML
5000 INJECTION, SOLUTION INTRAVENOUS; SUBCUTANEOUS EVERY 8 HOURS SCHEDULED
Status: DISCONTINUED | OUTPATIENT
Start: 2023-07-30 | End: 2023-08-01 | Stop reason: HOSPADM

## 2023-07-30 RX ORDER — DOCUSATE SODIUM 100 MG/1
100 CAPSULE, LIQUID FILLED ORAL 2 TIMES DAILY
Status: DISCONTINUED | OUTPATIENT
Start: 2023-07-30 | End: 2023-08-01 | Stop reason: HOSPADM

## 2023-07-30 RX ORDER — TAMSULOSIN HYDROCHLORIDE 0.4 MG/1
0.4 CAPSULE ORAL
Status: DISCONTINUED | OUTPATIENT
Start: 2023-07-30 | End: 2023-08-01 | Stop reason: HOSPADM

## 2023-07-30 RX ORDER — ACETAMINOPHEN 325 MG/1
650 TABLET ORAL EVERY 6 HOURS PRN
Status: DISCONTINUED | OUTPATIENT
Start: 2023-07-30 | End: 2023-08-01 | Stop reason: HOSPADM

## 2023-07-30 RX ADMIN — GABAPENTIN 100 MG: 100 CAPSULE ORAL at 15:46

## 2023-07-30 RX ADMIN — SODIUM CHLORIDE 75 ML/HR: 0.9 INJECTION, SOLUTION INTRAVENOUS at 06:06

## 2023-07-30 RX ADMIN — ONDANSETRON 4 MG: 2 INJECTION INTRAMUSCULAR; INTRAVENOUS at 08:05

## 2023-07-30 RX ADMIN — HEPARIN SODIUM 5000 UNITS: 5000 INJECTION INTRAVENOUS; SUBCUTANEOUS at 21:20

## 2023-07-30 RX ADMIN — GABAPENTIN 100 MG: 100 CAPSULE ORAL at 08:06

## 2023-07-30 RX ADMIN — DOCUSATE SODIUM 100 MG: 100 CAPSULE, LIQUID FILLED ORAL at 17:25

## 2023-07-30 RX ADMIN — CEFTRIAXONE 1000 MG: 1 INJECTION, SOLUTION INTRAVENOUS at 06:29

## 2023-07-30 RX ADMIN — INSULIN LISPRO 6 UNITS: 100 INJECTION, SOLUTION INTRAVENOUS; SUBCUTANEOUS at 11:41

## 2023-07-30 RX ADMIN — GABAPENTIN 100 MG: 100 CAPSULE ORAL at 21:20

## 2023-07-30 RX ADMIN — HEPARIN SODIUM 5000 UNITS: 5000 INJECTION INTRAVENOUS; SUBCUTANEOUS at 06:06

## 2023-07-30 RX ADMIN — POLYETHYLENE GLYCOL 3350 17 G: 17 POWDER, FOR SOLUTION ORAL at 08:06

## 2023-07-30 RX ADMIN — INSULIN LISPRO 6 UNITS: 100 INJECTION, SOLUTION INTRAVENOUS; SUBCUTANEOUS at 15:45

## 2023-07-30 RX ADMIN — PANTOPRAZOLE SODIUM 40 MG: 40 TABLET, DELAYED RELEASE ORAL at 06:06

## 2023-07-30 RX ADMIN — INSULIN GLARGINE 20 UNITS: 100 INJECTION, SOLUTION SUBCUTANEOUS at 21:20

## 2023-07-30 RX ADMIN — INSULIN LISPRO 3 UNITS: 100 INJECTION, SOLUTION INTRAVENOUS; SUBCUTANEOUS at 21:20

## 2023-07-30 RX ADMIN — SODIUM CHLORIDE 1000 ML: 0.9 INJECTION, SOLUTION INTRAVENOUS at 00:56

## 2023-07-30 RX ADMIN — DOCUSATE SODIUM 100 MG: 100 CAPSULE, LIQUID FILLED ORAL at 08:06

## 2023-07-30 RX ADMIN — INSULIN LISPRO 10 UNITS: 100 INJECTION, SOLUTION INTRAVENOUS; SUBCUTANEOUS at 08:08

## 2023-07-30 RX ADMIN — INSULIN LISPRO 6 UNITS: 100 INJECTION, SOLUTION INTRAVENOUS; SUBCUTANEOUS at 08:07

## 2023-07-30 RX ADMIN — ATORVASTATIN CALCIUM 10 MG: 10 TABLET, FILM COATED ORAL at 08:06

## 2023-07-30 RX ADMIN — INSULIN LISPRO 10 UNITS: 100 INJECTION, SOLUTION INTRAVENOUS; SUBCUTANEOUS at 15:45

## 2023-07-30 RX ADMIN — TAMSULOSIN HYDROCHLORIDE 0.4 MG: 0.4 CAPSULE ORAL at 15:46

## 2023-07-30 RX ADMIN — HEPARIN SODIUM 5000 UNITS: 5000 INJECTION INTRAVENOUS; SUBCUTANEOUS at 14:39

## 2023-07-30 RX ADMIN — FOLIC ACID 1000 MCG: 1 TABLET ORAL at 08:06

## 2023-07-30 RX ADMIN — INSULIN LISPRO 10 UNITS: 100 INJECTION, SOLUTION INTRAVENOUS; SUBCUTANEOUS at 11:41

## 2023-07-30 NOTE — ED NOTES
Patient transported to [facility] Cirilo Wang,[place] Dawson Vann RN  07/29/23 3266

## 2023-07-30 NOTE — ASSESSMENT & PLAN NOTE
Patient presenting to the ED for evaluation of generalized abdominal pain  · Meeting sepsis criteria for tachycardia and leukocytosis  · UA positive for leukocytes and bacteria  · CT A/P ([facility]): Pronounced bladder wall thickening consistent with incomplete distention, chronic outflow obstruction, or cystitis. Moderately excessive colonic stool contact. Otherwise no evidence of acute abdominal pelvic abnormality.   · Start on IV ceftriaxone  · Obtain infectious labs: procalcitonin, BC x2, urine culture  · Lactic acidosis resolved with IVF  · Trend WBC and fever curve

## 2023-07-30 NOTE — PROGRESS NOTES
Patient alert and oriented x 4. Patient oriented to room and unit. Call bell within reach. IV fluids imntated and IV antibiotics given. Patient denies lightheadedness and chest pain. Patient states "I feel weak." Patient cooperative with care. No deviation in assessment finding noted when compared to previous documentation. Will continue to monitor. Patient able to verbalize needs and obey commands with limitations.

## 2023-07-30 NOTE — PLAN OF CARE
Problem: PAIN - ADULT  Goal: Verbalizes/displays adequate comfort level or baseline comfort level  Description: Interventions:  - Encourage patient to monitor pain and request assistance  - Assess pain using appropriate pain scale  - Administer analgesics based on type and severity of pain and evaluate response  - Implement non-pharmacological measures as appropriate and evaluate response  - Consider cultural and social influences on pain and pain management  - Notify physician/advanced practitioner if interventions unsuccessful or patient reports new pain  Outcome: Progressing     Problem: SAFETY ADULT  Goal: Patient will remain free of falls  Description: INTERVENTIONS:  - Educate patient/family on patient safety including physical limitations  - Instruct patient to call for assistance with activity   - Consult OT/PT to assist with strengthening/mobility   - Keep Call bell within reach  - Keep bed low and locked with side rails adjusted as appropriate  - Keep care items and personal belongings within reach  - Initiate and maintain comfort rounds  - Make Fall Risk Sign visible to staff  - Offer Toileting every 2 Hours, in advance of need  - Initiate/Maintain bed alarm  - Obtain necessary fall risk management equipment: call bell within reach  - Apply yellow socks and bracelet for high fall risk patients  - Consider moving patient to room near nurses station  Outcome: Progressing  Goal: Maintain or return to baseline ADL function  Description: INTERVENTIONS:  -  Assess patient's ability to carry out ADLs; assess patient's baseline for ADL function and identify physical deficits which impact ability to perform ADLs (bathing, care of mouth/teeth, toileting, grooming, dressing, etc.)  - Assess/evaluate cause of self-care deficits   - Assess range of motion  - Assess patient's mobility; develop plan if impaired  - Assess patient's need for assistive devices and provide as appropriate  - Encourage maximum independence but intervene and supervise when necessary  - Involve family in performance of ADLs  - Assess for home care needs following discharge   - Consider OT consult to assist with ADL evaluation and planning for discharge  - Provide patient education as appropriate  Outcome: Progressing  Goal: Maintains/Returns to pre admission functional level  Description: INTERVENTIONS:  - Perform BMAT or MOVE assessment daily.   - Set and communicate daily mobility goal to care team and patient/family/caregiver. - Collaborate with rehabilitation services on mobility goals if consulted  - Perform Range of Motion 3 times a day. - Reposition patient every 2 hours. - Dangle patient 3 times a day  - Stand patient 3 times a day  - Ambulate patient 3 times a day  - Out of bed to chair 3 times a day   - Out of bed for meals 3 times a day  - Out of bed for toileting  - Record patient progress and toleration of activity level   Outcome: Progressing     Problem: Knowledge Deficit  Goal: Patient/family/caregiver demonstrates understanding of disease process, treatment plan, medications, and discharge instructions  Description: Complete learning assessment and assess knowledge base.   Interventions:  - Provide teaching at level of understanding  - Provide teaching via preferred learning methods  Outcome: Progressing     Problem: METABOLIC, FLUID AND ELECTROLYTES - ADULT  Goal: Electrolytes maintained within normal limits  Description: INTERVENTIONS:  - Monitor labs and assess patient for signs and symptoms of electrolyte imbalances  - Administer electrolyte replacement as ordered  - Monitor response to electrolyte replacements, including repeat lab results as appropriate  - Instruct patient on fluid and nutrition as appropriate  Outcome: Progressing  Goal: Fluid balance maintained  Description: INTERVENTIONS:  - Monitor labs   - Monitor I/O and WT  - Instruct patient on fluid and nutrition as appropriate  - Assess for signs & symptoms of volume excess or deficit  Outcome: Progressing  Goal: Glucose maintained within target range  Description: INTERVENTIONS:  - Monitor Blood Glucose as ordered  - Assess for signs and symptoms of hyperglycemia and hypoglycemia  - Administer ordered medications to maintain glucose within target range  - Assess nutritional intake and initiate nutrition service referral as needed  Outcome: Progressing     Problem: MUSCULOSKELETAL - ADULT  Goal: Maintain or return mobility to safest level of function  Description: INTERVENTIONS:  - Assess patient's ability to carry out ADLs; assess patient's baseline for ADL function and identify physical deficits which impact ability to perform ADLs (bathing, care of mouth/teeth, toileting, grooming, dressing, etc.)  - Assess/evaluate cause of self-care deficits   - Assess range of motion  - Assess patient's mobility  - Assess patient's need for assistive devices and provide as appropriate  - Encourage maximum independence but intervene and supervise when necessary  - Involve family in performance of ADLs  - Assess for home care needs following discharge   - Consider OT consult to assist with ADL evaluation and planning for discharge  - Provide patient education as appropriate  Outcome: Progressing  Goal: Maintain proper alignment of affected body part  Description: INTERVENTIONS:  - Support, maintain and protect limb and body alignment  - Provide patient/ family with appropriate education  Outcome: Progressing     Problem: Nutrition/Hydration-ADULT  Goal: Nutrient/Hydration intake appropriate for improving, restoring or maintaining nutritional needs  Description: Monitor and assess patient's nutrition/hydration status for malnutrition. Collaborate with interdisciplinary team and initiate plan and interventions as ordered. Monitor patient's weight and dietary intake as ordered or per policy. Utilize nutrition screening tool and intervene as necessary.  Determine patient's food preferences and provide high-protein, high-caloric foods as appropriate.      INTERVENTIONS:  - Monitor oral intake, urinary output, labs, and treatment plans  - Assess nutrition and hydration status and recommend course of action  - Evaluate amount of meals eaten  - Assist patient with eating if necessary   - Allow adequate time for meals  - Recommend/ encourage appropriate diets, oral nutritional supplements, and vitamin/mineral supplements  - Order, calculate, and assess calorie counts as needed  - Recommend, monitor, and adjust tube feedings and TPN/PPN based on assessed needs  - Assess need for intravenous fluids  - Provide specific nutrition/hydration education as appropriate  - Include patient/family/caregiver in decisions related to nutrition  Outcome: Progressing

## 2023-07-30 NOTE — ASSESSMENT & PLAN NOTE
· S/p loop recorder implant  · Not currently on [facility]  · Outpatient Cardiology follow up

## 2023-07-30 NOTE — ASSESSMENT & PLAN NOTE
Lab Results   Component Value Date    HGBA1C 7.4 (A) 04/19/2023     · Reports elevated BG at home, 512.  Took home insulin before coming to the ED  · BG now improved to 235  · Continue home regimen: Humalog 10U TID AC and Toujeo 20U HS  · Start on SSI with accu checks  · Hypoglycemia protocol  · Diabetic diet

## 2023-07-30 NOTE — H&P
[address]  H&P  Name: Ant Low 70 y.o. male I MRN: [id number]  Unit/Bed#: ED 21 I Date of Admission: 7/29/2023   Date of Service: 7/30/2023 I Hospital Day: 0      Assessment/Plan   * Sepsis [facility])  Assessment & Plan  Patient presenting to the ED for evaluation of generalized abdominal pain  · Meeting sepsis criteria for tachycardia and leukocytosis  · UA positive for leukocytes and bacteria  · CT A/P ([facility]): Pronounced bladder wall thickening consistent with incomplete distention, chronic outflow obstruction, or cystitis. Moderately excessive colonic stool contact. Otherwise no evidence of acute abdominal pelvic abnormality. · Start on IV ceftriaxone  · Obtain infectious labs: procalcitonin, BC x2, urine culture  · Lactic acidosis resolved with IVF  · Trend WBC and fever curve    Dyslipidemia  Assessment & Plan  · Continue statin therapy    Uncontrolled type 2 diabetes mellitus with hyperglycemia (HCC)  Assessment & Plan    Lab Results   Component Value Date    HGBA1C 7.4 (A) 04/19/2023     · Reports elevated BG at home, 512. Took home insulin before coming to the ED  · BG now improved to 235  · Continue home regimen: Humalog 10U TID AC and Toujeo 20U HS  · Start on SSI with accu checks  · Hypoglycemia protocol  · Diabetic diet    PAF (paroxysmal atrial fibrillation) ([facility])  Assessment & Plan  · S/p loop recorder implant  · Not currently on [facility]  · Outpatient Cardiology follow up       VTE Pharmacologic Prophylaxis: VTE Score: 5 Moderate Risk (Score 3-4) - Pharmacological DVT Prophylaxis Ordered: heparin. Code Status: Level 1 - Full Code   Discussion with family: Update in the AM    Anticipated Length of Stay: Patient will be admitted on an observation basis with an anticipated length of stay of less than 2 midnights secondary to sepsis.       Chief Complaint: Abdominal pain    History of Present Illness:  Ant Low is a 70 y.o. male with a PMH of hyperlipidemia and type 2 diabetes. Patient presenting to the ED for evaluation of generalized abdominal pain and hyperglycemia at home. Patient reports noncompliance with his home insulin regimen. Patient's glucose was 512 this evening, he reports taking his home insulin regimen which included Humalog 10 units and Toujeo 20 units. Patient blood glucose improved to 231 in the ED. Patient also reporting poor oral intake and generalized abdominal pain for the last 2 to 3 days. Denies any fever/chills, chest pain, shortness of breath, nausea or vomiting. Patient noted to have cystitis and positive UA on initial work-up. Patient will require medical admission for urinary tract infection. All patient questions answered to the best of my ability. Review of Systems:  Review of Systems   Constitutional: Negative for chills and fever. HENT: Negative for ear pain and sore throat. Eyes: Negative for pain and visual disturbance. Respiratory: Negative for cough and shortness of breath. Cardiovascular: Negative for chest pain and palpitations. Gastrointestinal: Positive for abdominal pain. Negative for vomiting. Genitourinary: Negative for dysuria and hematuria. Musculoskeletal: Negative for arthralgias and back pain. Skin: Negative for color change and rash. Neurological: Negative for seizures and syncope. All other systems reviewed and are negative.       Past Medical and Surgical History:   Past Medical History:   Diagnosis Date   • A-fib ([address])     per pt "happened one time"   • Abdominal hernia    • Acute metabolic encephalopathy 66/17/0150   • [place]TON (acute kidney injury) ([address]) 09/04/2022   • Ambulates with cane     not recently using but has in home if needed   • Anesthesia     per pt "with knee surgery(in the )was awake for operation and fell asleep when being closed up" and than took long to wake up"   • Arthritis    • Blood in urine     "sometimes"   • Diabetes mellitus ([address])     Type 2--sees Endocrinologist [facility] Dr Wiliam Love   • Lane catheter in place    • History of pulmonary embolism    • Implantable loop recorder present    • Muscle weakness     per pt "started with diabetes"some muscle loss of density" --per pt "muscle coming back"   • Myopia of left eye     glasses for reading   • Pancreatitis 09/04/2022   • Risk for falls    • Sepsis ([address]) 09/04/2022   • Teeth missing    • Urethral pain     "when passing urine"       Past Surgical History:   Procedure Laterality Date   • CARDIAC LOOP RECORDER     • CATARACT EXTRACTION Bilateral    • KNEE CARTILAGE SURGERY     • KNEE SURGERY Right    • IN TRURL ELECTROSURG RESCJ PROSTATE BLEED COMPLETE N/A 3/16/2023    Procedure: TRANSURETHRAL RESECTION OF PROSTATE (TURP)(possible), cystoscopy, SP tube placement, removal of bladder calculus;  Surgeon: Deidra Puente MD;  Location: [facility];  Service: Urology       Meds/Allergies:  Prior to Admission medications    Medication Sig Start Date End Date Taking? Authorizing Provider   ammonium lactate (LAC-HYDRIN) 12 % cream Apply topically as needed for dry skin  Patient not taking: Reported on 4/19/2023 10/26/22   Lianet Hernadez DPM   atorvastatin (LIPITOR) 10 mg tablet TAKE 1 TABLET BY MOUTH EVERY DAY 4/13/23   Louisa Mccarthy, DO   Blood Glucose Monitoring Suppl (OneTouch Verio Flex System) w/Device KIT CHECK BLOOD SUGARS THREE TIMES DAILY DX: E11.65  Patient not taking: Reported on 4/28/2023 11/29/22   Angelica Pederson, DO   Continuous Blood Gluc  (FreeStyle Aguilera WeStudy.Inrill 2 [place]) Karis Ojeda As instructed  Patient not taking: Reported on 4/28/2023 4/19/23   Mikhail Tijerina MD   Continuous Blood Gluc Sensor (FreeStyle Aguilera Dodrill 2 Sensor) MISC Use to check her blood sugars continuously and change it every 2 weeks  Patient not taking: Reported on 4/28/2023 4/19/23   Mikhail Tijerina MD   ferrous sulfate 324 (65 Fe) mg TAKE 1 TABLET BY MOUTH 2 TIMES A DAY BEFORE MEALS.  4/20/23   Mayra Mcdonald PA-C   folic acid (FOLVITE) 1 mg tablet TAKE 1 TABLET BY MOUTH EVERY DAY 4/20/23   Anjali Payne PA-C   gabapentin (NEURONTIN) 100 mg capsule Take 1 capsule (100 mg total) by mouth 3 (three) times a day 12/6/22   Eugenio De La Torre, DO   glucose blood (OneTouch Verio) test strip CHECK BLOOD SUGARS THREE TIMES DAILY DX: E11.65 4/19/23   Margarita Mckeon MD   Incontinence Supply Disposable (Incontinence Brief Large) MISC Use every 4 (four) hours as needed (Urinary incontinence)  Patient not taking: Reported on 4/19/2023 9/2/22   LEEANNA Quispe   insulin glargine (Toujeo SoloStar) 300 units/mL CONCENTRATED U-300 injection pen (1-unit dial) Inject 8 Units under the skin daily at bedtime 4/25/23 7/24/23  Margarita Mckeon MD   Lancets (OneTouch Delica Plus [id number]) MISC CHECK BLOOD SUGARS THREE TIMES DAILY DX: E11.65 10/26/22   Lisbet Barnes,    pantoprazole (PROTONIX) 40 mg tablet Take 1 tablet (40 mg total) by mouth daily in the early morning 9/20/22   Luisito Johnson PA-C     I have reviewed home medications using recent Epic encounter.     Allergies: No Known Allergies    Social History:  Marital Status: /Civil Union   Occupation: NA  Patient Pre-hospital Living Situation: Home, Apartment  Patient Pre-hospital Level of Mobility: walks  Patient Pre-hospital Diet Restrictions: Diabetic  Substance Use History:   Social History     Substance and Sexual Activity   Alcohol Use Never     Social History     Tobacco Use   Smoking Status Never   • Passive exposure: Never   Smokeless Tobacco Never     Social History     Substance and Sexual Activity   Drug Use Never       Family History:  Family History   Problem Relation Age of Onset   • Diabetes Father    • Diabetes type II Father    • Diabetes Mother        Physical Exam:     Vitals:   Blood Pressure: 91/66 (07/30/23 0145)  Pulse: 100 (07/30/23 0145)  Temperature: 97.6 °F (36.4 °C) (07/29/23 1952)  Temp Source: Oral (07/29/23 1952)  Respirations: 18 (07/30/23 0145)  Weight - Scale: 93 kg (205 lb) (07/29/23 2156)  SpO2: 98 % (07/30/23 0145)    Physical Exam  Vitals and nursing note reviewed. Constitutional:       General: He is not in acute distress. Appearance: He is well-developed. HENT:      Head: Normocephalic and atraumatic. Eyes:      Conjunctiva/sclera: Conjunctivae normal.   Cardiovascular:      Rate and Rhythm: Normal rate and regular rhythm. Heart sounds: No murmur heard. Pulmonary:      Effort: Pulmonary effort is normal. No respiratory distress. Breath sounds: Normal breath sounds. Abdominal:      Palpations: Abdomen is soft. Tenderness: There is no abdominal tenderness. Musculoskeletal:         General: No swelling. Cervical back: Neck supple. Skin:     General: Skin is warm and dry. Capillary Refill: Capillary refill takes less than 2 seconds. Neurological:      Mental Status: He is alert and oriented to person, place, and time.    Psychiatric:         Mood and Affect: Mood normal.        Additional Data:     Lab Results:  Results from last 7 days   Lab Units 07/29/23 2046   WBC Thousand/uL 11.92*   HEMOGLOBIN g/dL 16.6   HEMATOCRIT % 48.5   PLATELETS Thousands/uL 204     Results from last 7 days   Lab Units 07/30/23  0142 07/29/23 2046   SODIUM mmol/L 132* 131*   POTASSIUM mmol/L 4.0 3.9   CHLORIDE mmol/L 103 101   CO2 mmol/L 16* 12*   BUN mg/dL 36* 39*   CREATININE mg/dL 1.04 1.43*   ANION GAP mmol/L 13 18   CALCIUM mg/dL 9.9 10.9*   ALBUMIN g/dL  --  4.7   TOTAL BILIRUBIN mg/dL  --  0.56   ALK PHOS U/L  --  123*   ALT U/L  --  16   AST U/L  --  21   GLUCOSE RANDOM mg/dL 235* 341*         Results from last 7 days   Lab Units 07/29/23  2015   POC GLUCOSE mg/dl 359*         Results from last 7 days   Lab Units 07/30/23  0002 07/29/23 2046   LACTIC ACID mmol/L 1.1 2.3*       Lines/Drains:  Invasive Devices     Peripheral Intravenous Line  Duration           Peripheral IV 07/29/23 Right;Ventral (anterior) Forearm <1 day          Drain  Duration Urethral Catheter Coude; Latex 16 Fr. 162 days    Suprapubic Catheter 16 Fr. 135 days    Urethral Catheter Other (Comment) 24 Fr. 135 days                           Imaging: Reviewed radiology reports from this admission including: abdominal/pelvic CT  CT abdomen pelvis with contrast    (Results Pending)       EKG and Other Studies Reviewed on Admission:   · EKG: NSR. HR 88.    ** Please Note: This note has been constructed using a voice recognition system.  **

## 2023-07-30 NOTE — PLAN OF CARE
Problem: METABOLIC, FLUID AND ELECTROLYTES - ADULT  Goal: Electrolytes maintained within normal limits  Description: INTERVENTIONS:  - Monitor labs and assess patient for signs and symptoms of electrolyte imbalances  - Administer electrolyte replacement as ordered  - Monitor response to electrolyte replacements, including repeat lab results as appropriate  - Instruct patient on fluid and nutrition as appropriate  Outcome: Progressing     Problem: METABOLIC, FLUID AND ELECTROLYTES - ADULT  Goal: Fluid balance maintained  Description: INTERVENTIONS:  - Monitor labs   - Monitor I/O and WT  - Instruct patient on fluid and nutrition as appropriate  - Assess for signs & symptoms of volume excess or deficit  Outcome: Progressing     Problem: METABOLIC, FLUID AND ELECTROLYTES - ADULT  Goal: Glucose maintained within target range  Description: INTERVENTIONS:  - Monitor Blood Glucose as ordered  - Assess for signs and symptoms of hyperglycemia and hypoglycemia  - Administer ordered medications to maintain glucose within target range  - Assess nutritional intake and initiate nutrition service referral as needed  Outcome: Progressing

## 2023-07-30 NOTE — UTILIZATION REVIEW
Initial Clinical Review - patient presented to the ED on 7/29/23, initially admitted as OBS 7/30/23, converted to inpatient 7/30/23 due to need for continued treatment of sepsis secondary to UTI. Admission: Date/Time/Statement:   Admission Orders (From admission, onward)     Ordered        07/30/23 0851  Inpatient Admission  Once            07/30/23 0405  Place in Observation  Once                      Orders Placed This Encounter   Procedures   • Inpatient Admission     Standing Status:   Standing     Number of Occurrences:   1     Order Specific Question:   Level of Care     Answer:   Med Surg [16]     Order Specific Question:   Estimated length of stay     Answer:   More than 2 Midnights     Order Specific Question:   Certification     Answer:   I certify that inpatient services are medically necessary for this patient for a duration of greater than two midnights. See H&P and MD Progress Notes for additional information about the patient's course of treatment. ED Arrival Information     Expected   -    Arrival   7/29/2023 19:39    Acuity   Urgent            Means of arrival   Walk-In    Escorted by   Spouse    Service   Hospitalist    Admission type   Emergency            Arrival complaint   high blood sugar           Chief Complaint   Patient presents with   • Hyperglycemia - Symptomatic     Pt reporting BG reading of 512 at home, 30 total units of insulin given around 1430. Pt c/o of feeling fatigued and weak       Initial Presentation: 70 y.o. male with a PMH of DM2 and HL presented to the ED from home on 7/29/23 for evaluation of  generalized abdominal pain and hyperglycemia at home. Patient reports noncompliance with his home insulin regimen. Patient's glucose was 512 this evening, he reports taking his home insulin regimen which included Humalog 10 units and Toujeo 20 units. Patient blood glucose improved to 231 in the ED.   Patient also reporting poor oral intake and generalized abdominal pain for the last 2 to 3 days. ED work up revealed cystitis and positive UA. PE:  AOx3. Abdomen soft, no tenderness. 7/30 Admit to Observation for evaluation and treatment of sepsis, UA positive for leukocytes and bacteria,  uncontrolled DM2:  IV ceftriaxone, follow urine and blood cultures, trend WBC. Diabetic diet, continue home regimen, start SSI with accu checks. 7/30 Internal Medicine:  No acute events overnight. Patient states he feels better this morning, nausea improved with Zofran. PE: AOx3, pale in color. Regular rate and rhythm. Lungs CTAB. Abdomen soft, mild generalized abdominal tenderness. Sepsis secondary to UTI. CT chest abdomen pelvis showing bladder wall thickening consistent with incomplete distention, chronic outflow obstruction or cystitis. Moderately excessive colonic stool contact. Started on IV ceftriaxone. Follow-up on blood cultures and urine culture. Monitor fever curve and WBC count. 7/31 Day 3: Has surpassed a 2nd midnight with active treatments and services, which include IV antibiotic, pending cultures.           ED Triage Vitals [07/29/23 1952]   Temperature Pulse Respirations Blood Pressure SpO2   97.6 °F (36.4 °C) 102 20 122/84 99 %      Temp Source Heart Rate Source Patient Position - Orthostatic VS BP Location FiO2 (%)   Oral Monitor Lying Left arm --      Pain Score       No Pain          Wt Readings from Last 1 Encounters:   07/30/23 93.7 kg (206 lb 9.1 oz)     Additional Vital Signs:       Date/Time Temp Pulse Resp BP MAP (mmHg) SpO2 O2 Device   07/31/23 0828 -- -- -- -- -- 98 % None (Room air)   07/31/23 07:58:19 97 °F (36.1 °C) Abnormal  -- 17 125/78 94 -- --   07/30/23 23:13:09 97.4 °F (36.3 °C) Abnormal  -- 22 110/69 83 -- --   07/30/23 15:22:33 97.7 °F (36.5 °C) -- -- 112/64 80 -- --   07/30/23 0812 -- -- -- -- -- 100 % None (Room air)   07/30/23 07:25:33 97.4 °F (36.3 °C) Abnormal  -- 19 137/79 98 -- --   07/30/23 0547 97.7 °F (36.5 °C) 80 18 130/80 -- 100 % None (Room air)   07/30/23 0145 -- 100 18 91/66 74 98 % None (Room air)         Pertinent Labs/Diagnostic Test Results:     CT abdomen pelvis with contrast   Final Result by Jose Claire MD (07/30 0534)      Moderate diffuse urinary bladder wall thickening which may be secondary to under distention versus a UTI/acute cystitis. Relation with urinalysis is recommended. Moderate amount of stool within the rectum. No free air or free fluid.          Results from last 7 days   Lab Units 07/31/23  0525 07/30/23  0602 07/29/23  2046   WBC Thousand/uL 7.65 9.99 11.92*   HEMOGLOBIN g/dL 14.0 15.5 16.6   HEMATOCRIT % 41.2 45.2 48.5   PLATELETS Thousands/uL 162 171 204         Results from last 7 days   Lab Units 07/31/23  0525 07/30/23  0602 07/30/23 0142 07/29/23  2046   SODIUM mmol/L 132* 132* 132* 131*   POTASSIUM mmol/L 3.8 4.4 4.0 3.9   CHLORIDE mmol/L 104 103 103 101   CO2 mmol/L 20* 13* 16* 12*   ANION GAP mmol/L 8 16 13 18   BUN mg/dL 33* 35* 36* 39*   CREATININE mg/dL 0.90 1.06 1.04 1.43*   EGFR ml/min/1.73sq m 86 71 72 49   CALCIUM mg/dL 9.9 9.9 9.9 10.9*     Results from last 7 days   Lab Units 07/30/23  0602 07/29/23  2046   AST U/L 19 21   ALT U/L 14 16   ALK PHOS U/L 102 123*   TOTAL PROTEIN g/dL 7.6 9.0*   ALBUMIN g/dL 4.0 4.7   TOTAL BILIRUBIN mg/dL 0.58 0.56     Results from last 7 days   Lab Units 07/31/23  1116 07/31/23  0756 07/30/23 2029 07/30/23  1542 07/30/23  1129 07/30/23  0710 07/29/23 2015   POC GLUCOSE mg/dl 366* 304* 245* 481* 362* 388* 359*     Results from last 7 days   Lab Units 07/31/23  0525 07/30/23  0602 07/30/23 0142 07/29/23  2046   GLUCOSE RANDOM mg/dL 283* 369* 235* 341*             BETA-HYDROXYBUTYRATE   Date Value Ref Range Status   07/29/2023 2.8 (H) <0.6 mmol/L Final   09/04/2022 5.2 (H) <0.6 mmol/L Final          Results from last 7 days   Lab Units 07/30/23  0142 07/29/23 2046   PH NAJMA  7.268* 7.307   PCO2 NAJMA mm Hg 35.0* 26.5*   PO2 NAJMA mm Hg 54.2* 60.0*   HCO3 NAJMA mmol/L 15.6* 13.0*   BASE EXC NAJMA mmol/L -10.2 -11.3   O2 CONTENT NAMJA ml/dL 19.4 21.6   O2 HGB, VENOUS % 85.8* 89.0*             Results from last 7 days   Lab Units 07/30/23  0602 07/30/23  0142 07/29/23  2259   HS TNI 0HR ng/L  --   --  5   HS TNI 2HR ng/L  --  6  --    HSTNI D2 ng/L  --  1  --    HS TNI 4HR ng/L 4  --   --    HSTNI D4 ng/L -1  --   --              Results from last 7 days   Lab Units 07/31/23  0525 07/30/23  0602   PROCALCITONIN ng/ml 0.20 0.14     Results from last 7 days   Lab Units 07/30/23  0002 07/29/23 2046   LACTIC ACID mmol/L 1.1 2.3*                 Results from last 7 days   Lab Units 07/30/23  0602   LIPASE u/L 7*                 Results from last 7 days   Lab Units 07/30/23  0135   CLARITY UA  Clear   COLOR UA  Light Yellow   SPEC GRAV UA  >=1.050*   PH UA  5.5   GLUCOSE UA mg/dl 300 (3/10%)*   KETONES UA mg/dl 40 (2+)*   BLOOD UA  Moderate*   PROTEIN UA mg/dl 30 (1+)*   NITRITE UA  Negative   BILIRUBIN UA  Negative   UROBILINOGEN UA (BE) mg/dl <2.0   LEUKOCYTES UA  Large*   WBC UA /hpf Innumerable*   RBC UA /hpf 30-50*   BACTERIA UA /hpf Occasional   EPITHELIAL CELLS WET PREP /hpf Occasional   MUCUS THREADS  Occasional*             Results from last 7 days   Lab Units 07/30/23  0602 07/30/23  0135   BLOOD CULTURE  Received in Microbiology Lab. Culture in Progress. Received in Microbiology Lab. Culture in Progress.   --    URINE CULTURE   --  80,000-89,000 cfu/ml Gram Negative Addy Enteric Like*                   ED Treatment:   Medication Administration from 07/29/2023 1938 to 07/30/2023 0517       Date/Time Order Dose Route Action     07/29/2023 2255 EDT sodium chloride 0.9 % bolus 1,000 mL 0 mL Intravenous Stopped     07/29/2023 2045 EDT sodium chloride 0.9 % bolus 1,000 mL 1,000 mL Intravenous New Bag     07/29/2023 2050 EDT ondansetron (ZOFRAN) injection 4 mg 4 mg Intravenous Given     07/29/2023 2131 EDT iohexol (OMNIPAQUE) 350 MG/ML injection (SINGLE-DOSE) 100 mL 100 mL Intravenous Given     07/30/2023 0135 EDT sodium chloride 0.9 % bolus 1,000 mL 0 mL Intravenous Stopped     07/30/2023 0056 EDT sodium chloride 0.9 % bolus 1,000 mL 1,000 mL Intravenous New Bag        Past Medical History:   Diagnosis Date   • A-fib ([address])     per pt "happened one time"   • Abdominal hernia    • Acute metabolic encephalopathy 04/68/5738   • CARLTON (acute kidney injury) ([address]) 09/04/2022   • Ambulates with cane     not recently using but has in home if needed   • Anesthesia     per pt "with knee surgery(in the )was awake for operation and fell asleep when being closed up" and than took long to wake up"   • Arthritis    • Blood in urine     "sometimes"   • Diabetes mellitus ([address])     Type 2--sees Endocrinologist [facility] Dr Rosario Hitchcock   • Lane catheter in place    • History of pulmonary embolism    • Implantable loop recorder present    • Muscle weakness     per pt "started with diabetes"some muscle loss of density" --per pt "muscle coming back"   • Myopia of left eye     glasses for reading   • Pancreatitis 09/04/2022   • Risk for falls    • Sepsis ([address]) 09/04/2022   • Teeth missing    • Urethral pain     "when passing urine"     Present on Admission:  • Uncontrolled type 2 diabetes mellitus with hyperglycemia ([address])  • Dyslipidemia      Admitting Diagnosis: DKA (diabetic ketoacidosis) ([address]) [E11.10]  Hyperglycemia [R73.9]  Age/Sex: 70 y.o. male       Admission Orders:  SCD.       Scheduled Medications:  atorvastatin, 10 mg, Oral, Daily  cefTRIAXone, 1,000 mg, Intravenous, Q24H  docusate sodium, 100 mg, Oral, BID  folic acid, 5,435 mcg, Oral, Daily  gabapentin, 100 mg, Oral, TID  heparin (porcine), 5,000 Units, Subcutaneous, Q8H ILYA  insulin glargine, 20 Units, Subcutaneous, HS  insulin lispro, 1-6 Units, Subcutaneous, 4x Daily (AC & HS)  insulin lispro, 10 Units, Subcutaneous, TID With Meals  pantoprazole, 40 mg, Oral, Early Morning  polyethylene glycol, 17 g, Oral, Daily  tamsulosin, 0.4 mg, Oral, Daily With Dinner      Continuous IV Infusions:    sodium chloride 0.9 % infusion  Rate: 75 mL/hr Dose: 75 mL/hr  Freq: Continuous Route: IV  Last Dose: Stopped (07/30/23 1440)  Start: 07/30/23 0415 End: 07/30/23 1405      PRN Meds:    acetaminophen, 650 mg, Oral, Q6H PRN  ondansetron, 4 mg, Intravenous, Q6H PRN x 1 dose 7/30 @ 0805  sodium chloride (PF), 3 mL, Intravenous, Q1H PRN          Network Utilization Review Department  ATTENTION: Please call with any questions or concerns to [phone] and carefully listen to the prompts so that you are directed to the right person. All voicemails are confidential.  Anni Smoke all requests for admission clinical reviews, approved or denied determinations and any other requests to dedicated fax number below belonging to the campus where the patient is receiving treatment.  List of dedicated fax numbers for the Facilities:  [place] DENIALS (Administrative/Medical Necessity) [phone] [address] [facility] (Maternity/NICU/Pediatrics) [phone]   [address] [place] [address] Drive [phone]   [facility] [address] [facility] [phone]   [address] [facility] [address] [place] [address] [id number] [place] [address] [phone]   [address] [facility] [address] [facility]  [facility] [phone]

## 2023-07-31 LAB
ANION GAP SERPL CALCULATED.3IONS-SCNC: 8 MMOL/L
ATRIAL RATE: 88 BPM
BASE EX.OXY STD BLDV CALC-SCNC: 96.1 % (ref 60–80)
BASE EXCESS BLDV CALC-SCNC: -4.7 MMOL/L
BUN SERPL-MCNC: 33 MG/DL (ref 5–25)
CALCIUM SERPL-MCNC: 9.9 MG/DL (ref 8.4–10.2)
CHLORIDE SERPL-SCNC: 104 MMOL/L (ref 96–108)
CO2 SERPL-SCNC: 20 MMOL/L (ref 21–32)
CREAT SERPL-MCNC: 0.9 MG/DL (ref 0.6–1.3)
ERYTHROCYTE [DISTWIDTH] IN BLOOD BY AUTOMATED COUNT: 13.4 % (ref 11.6–15.1)
EST. AVERAGE GLUCOSE BLD GHB EST-MCNC: 398 MG/DL
GFR SERPL CREATININE-BSD FRML MDRD: 86 ML/MIN/1.73SQ M
GLUCOSE SERPL-MCNC: 283 MG/DL (ref 65–140)
GLUCOSE SERPL-MCNC: 299 MG/DL (ref 65–140)
GLUCOSE SERPL-MCNC: 304 MG/DL (ref 65–140)
GLUCOSE SERPL-MCNC: 315 MG/DL (ref 65–140)
GLUCOSE SERPL-MCNC: 366 MG/DL (ref 65–140)
GLUCOSE SERPL-MCNC: 410 MG/DL (ref 65–140)
HBA1C MFR BLD: 15.5 %
HCO3 BLDV-SCNC: 18.7 MMOL/L (ref 24–30)
HCT VFR BLD AUTO: 41.2 % (ref 36.5–49.3)
HGB BLD-MCNC: 14 G/DL (ref 12–17)
LACTATE SERPL-SCNC: 1.6 MMOL/L (ref 0.5–2)
LACTATE SERPL-SCNC: 2.5 MMOL/L (ref 0.5–2)
MCH RBC QN AUTO: 29 PG (ref 26.8–34.3)
MCHC RBC AUTO-ENTMCNC: 34 G/DL (ref 31.4–37.4)
MCV RBC AUTO: 85 FL (ref 82–98)
O2 CT BLDV-SCNC: 19.3 ML/DL
P AXIS: 7 DEGREES
PCO2 BLDV: 30.2 MM HG (ref 42–50)
PH BLDV: 7.41 [PH] (ref 7.3–7.4)
PLATELET # BLD AUTO: 162 THOUSANDS/UL (ref 149–390)
PMV BLD AUTO: 11.5 FL (ref 8.9–12.7)
PO2 BLDV: 143.6 MM HG (ref 35–45)
POTASSIUM SERPL-SCNC: 3.8 MMOL/L (ref 3.5–5.3)
PR INTERVAL: 176 MS
PROCALCITONIN SERPL-MCNC: 0.2 NG/ML
QRS AXIS: -36 DEGREES
QRSD INTERVAL: 104 MS
QT INTERVAL: 390 MS
QTC INTERVAL: 471 MS
RBC # BLD AUTO: 4.83 MILLION/UL (ref 3.88–5.62)
SODIUM SERPL-SCNC: 132 MMOL/L (ref 135–147)
T WAVE AXIS: 47 DEGREES
VENTRICULAR RATE: 88 BPM
WBC # BLD AUTO: 7.65 THOUSAND/UL (ref 4.31–10.16)

## 2023-07-31 PROCEDURE — 82948 REAGENT STRIP/BLOOD GLUCOSE: CPT

## 2023-07-31 PROCEDURE — 85027 COMPLETE CBC AUTOMATED: CPT | Performed by: STUDENT IN AN ORGANIZED HEALTH CARE EDUCATION/TRAINING PROGRAM

## 2023-07-31 PROCEDURE — 93010 ELECTROCARDIOGRAM REPORT: CPT | Performed by: INTERNAL MEDICINE

## 2023-07-31 PROCEDURE — 83605 ASSAY OF LACTIC ACID: CPT | Performed by: STUDENT IN AN ORGANIZED HEALTH CARE EDUCATION/TRAINING PROGRAM

## 2023-07-31 PROCEDURE — 99233 SBSQ HOSP IP/OBS HIGH 50: CPT | Performed by: STUDENT IN AN ORGANIZED HEALTH CARE EDUCATION/TRAINING PROGRAM

## 2023-07-31 PROCEDURE — 83036 HEMOGLOBIN GLYCOSYLATED A1C: CPT | Performed by: STUDENT IN AN ORGANIZED HEALTH CARE EDUCATION/TRAINING PROGRAM

## 2023-07-31 PROCEDURE — 80048 BASIC METABOLIC PNL TOTAL CA: CPT | Performed by: STUDENT IN AN ORGANIZED HEALTH CARE EDUCATION/TRAINING PROGRAM

## 2023-07-31 PROCEDURE — 82805 BLOOD GASES W/O2 SATURATION: CPT | Performed by: STUDENT IN AN ORGANIZED HEALTH CARE EDUCATION/TRAINING PROGRAM

## 2023-07-31 PROCEDURE — 84145 PROCALCITONIN (PCT): CPT | Performed by: INTERNAL MEDICINE

## 2023-07-31 RX ORDER — INSULIN GLARGINE 100 [IU]/ML
25 INJECTION, SOLUTION SUBCUTANEOUS
Status: DISCONTINUED | OUTPATIENT
Start: 2023-07-31 | End: 2023-08-01 | Stop reason: HOSPADM

## 2023-07-31 RX ORDER — SODIUM CHLORIDE, SODIUM GLUCONATE, SODIUM ACETATE, POTASSIUM CHLORIDE, MAGNESIUM CHLORIDE, SODIUM PHOSPHATE, DIBASIC, AND POTASSIUM PHOSPHATE .53; .5; .37; .037; .03; .012; .00082 G/100ML; G/100ML; G/100ML; G/100ML; G/100ML; G/100ML; G/100ML
500 INJECTION, SOLUTION INTRAVENOUS ONCE
Status: COMPLETED | OUTPATIENT
Start: 2023-07-31 | End: 2023-07-31

## 2023-07-31 RX ADMIN — DOCUSATE SODIUM 100 MG: 100 CAPSULE, LIQUID FILLED ORAL at 17:08

## 2023-07-31 RX ADMIN — SODIUM CHLORIDE, SODIUM GLUCONATE, SODIUM ACETATE, POTASSIUM CHLORIDE, MAGNESIUM CHLORIDE, SODIUM PHOSPHATE, DIBASIC, AND POTASSIUM PHOSPHATE 500 ML: .53; .5; .37; .037; .03; .012; .00082 INJECTION, SOLUTION INTRAVENOUS at 13:52

## 2023-07-31 RX ADMIN — INSULIN LISPRO 10 UNITS: 100 INJECTION, SOLUTION INTRAVENOUS; SUBCUTANEOUS at 08:35

## 2023-07-31 RX ADMIN — ATORVASTATIN CALCIUM 10 MG: 10 TABLET, FILM COATED ORAL at 08:28

## 2023-07-31 RX ADMIN — INSULIN LISPRO 10 UNITS: 100 INJECTION, SOLUTION INTRAVENOUS; SUBCUTANEOUS at 17:10

## 2023-07-31 RX ADMIN — DOCUSATE SODIUM 100 MG: 100 CAPSULE, LIQUID FILLED ORAL at 08:28

## 2023-07-31 RX ADMIN — HEPARIN SODIUM 5000 UNITS: 5000 INJECTION INTRAVENOUS; SUBCUTANEOUS at 05:10

## 2023-07-31 RX ADMIN — GABAPENTIN 100 MG: 100 CAPSULE ORAL at 08:28

## 2023-07-31 RX ADMIN — INSULIN LISPRO 4 UNITS: 100 INJECTION, SOLUTION INTRAVENOUS; SUBCUTANEOUS at 17:09

## 2023-07-31 RX ADMIN — INSULIN LISPRO 4 UNITS: 100 INJECTION, SOLUTION INTRAVENOUS; SUBCUTANEOUS at 08:35

## 2023-07-31 RX ADMIN — INSULIN LISPRO 5 UNITS: 100 INJECTION, SOLUTION INTRAVENOUS; SUBCUTANEOUS at 21:40

## 2023-07-31 RX ADMIN — GABAPENTIN 100 MG: 100 CAPSULE ORAL at 21:40

## 2023-07-31 RX ADMIN — POLYETHYLENE GLYCOL 3350 17 G: 17 POWDER, FOR SOLUTION ORAL at 08:28

## 2023-07-31 RX ADMIN — HEPARIN SODIUM 5000 UNITS: 5000 INJECTION INTRAVENOUS; SUBCUTANEOUS at 13:51

## 2023-07-31 RX ADMIN — CEFTRIAXONE 1000 MG: 1 INJECTION, SOLUTION INTRAVENOUS at 05:10

## 2023-07-31 RX ADMIN — GABAPENTIN 100 MG: 100 CAPSULE ORAL at 17:08

## 2023-07-31 RX ADMIN — INSULIN GLARGINE 25 UNITS: 100 INJECTION, SOLUTION SUBCUTANEOUS at 21:40

## 2023-07-31 RX ADMIN — PANTOPRAZOLE SODIUM 40 MG: 40 TABLET, DELAYED RELEASE ORAL at 05:10

## 2023-07-31 RX ADMIN — TAMSULOSIN HYDROCHLORIDE 0.4 MG: 0.4 CAPSULE ORAL at 17:08

## 2023-07-31 RX ADMIN — INSULIN LISPRO 6 UNITS: 100 INJECTION, SOLUTION INTRAVENOUS; SUBCUTANEOUS at 11:22

## 2023-07-31 RX ADMIN — HEPARIN SODIUM 5000 UNITS: 5000 INJECTION INTRAVENOUS; SUBCUTANEOUS at 21:40

## 2023-07-31 RX ADMIN — INSULIN LISPRO 10 UNITS: 100 INJECTION, SOLUTION INTRAVENOUS; SUBCUTANEOUS at 11:22

## 2023-07-31 RX ADMIN — FOLIC ACID 1000 MCG: 1 TABLET ORAL at 08:28

## 2023-07-31 NOTE — PLAN OF CARE
Problem: PAIN - ADULT  Goal: Verbalizes/displays adequate comfort level or baseline comfort level  Description: Interventions:  - Encourage patient to monitor pain and request assistance  - Assess pain using appropriate pain scale  - Administer analgesics based on type and severity of pain and evaluate response  - Implement non-pharmacological measures as appropriate and evaluate response  - Consider cultural and social influences on pain and pain management  - Notify physician/advanced practitioner if interventions unsuccessful or patient reports new pain  Outcome: Progressing     Problem: SAFETY ADULT  Goal: Patient will remain free of falls  Description: INTERVENTIONS:  - Educate patient/family on patient safety including physical limitations  - Instruct patient to call for assistance with activity   - Consult OT/PT to assist with strengthening/mobility   - Keep Call bell within reach  - Keep bed low and locked with side rails adjusted as appropriate  - Keep care items and personal belongings within reach  - Initiate and maintain comfort rounds  - Make Fall Risk Sign visible to staff  - Apply yellow socks and bracelet for high fall risk patients  - Consider moving patient to room near nurses station  Outcome: Progressing  Goal: Maintain or return to baseline ADL function  Description: INTERVENTIONS:  -  Assess patient's ability to carry out ADLs; assess patient's baseline for ADL function and identify physical deficits which impact ability to perform ADLs (bathing, care of mouth/teeth, toileting, grooming, dressing, etc.)  - Assess/evaluate cause of self-care deficits   - Assess range of motion  - Assess patient's mobility; develop plan if impaired  - Assess patient's need for assistive devices and provide as appropriate  - Encourage maximum independence but intervene and supervise when necessary  - Involve family in performance of ADLs  - Assess for home care needs following discharge   - Consider OT consult to assist with ADL evaluation and planning for discharge  - Provide patient education as appropriate  Outcome: Progressing  Goal: Maintains/Returns to pre admission functional level  Description: INTERVENTIONS:  - Perform BMAT or MOVE assessment daily.   - Set and communicate daily mobility goal to care team and patient/family/caregiver. - Collaborate with rehabilitation services on mobility goals if consulted  - Out of bed for toileting  - Record patient progress and toleration of activity level   Outcome: Progressing     Problem: Knowledge Deficit  Goal: Patient/family/caregiver demonstrates understanding of disease process, treatment plan, medications, and discharge instructions  Description: Complete learning assessment and assess knowledge base.   Interventions:  - Provide teaching at level of understanding  - Provide teaching via preferred learning methods  Outcome: Progressing     Problem: METABOLIC, FLUID AND ELECTROLYTES - ADULT  Goal: Electrolytes maintained within normal limits  Description: INTERVENTIONS:  - Monitor labs and assess patient for signs and symptoms of electrolyte imbalances  - Administer electrolyte replacement as ordered  - Monitor response to electrolyte replacements, including repeat lab results as appropriate  - Instruct patient on fluid and nutrition as appropriate  Outcome: Progressing  Goal: Fluid balance maintained  Description: INTERVENTIONS:  - Monitor labs   - Monitor I/O and WT  - Instruct patient on fluid and nutrition as appropriate  - Assess for signs & symptoms of volume excess or deficit  Outcome: Progressing  Goal: Glucose maintained within target range  Description: INTERVENTIONS:  - Monitor Blood Glucose as ordered  - Assess for signs and symptoms of hyperglycemia and hypoglycemia  - Administer ordered medications to maintain glucose within target range  - Assess nutritional intake and initiate nutrition service referral as needed  Outcome: Progressing     Problem: MUSCULOSKELETAL - ADULT  Goal: Maintain or return mobility to safest level of function  Description: INTERVENTIONS:  - Assess patient's ability to carry out ADLs; assess patient's baseline for ADL function and identify physical deficits which impact ability to perform ADLs (bathing, care of mouth/teeth, toileting, grooming, dressing, etc.)  - Assess/evaluate cause of self-care deficits   - Assess range of motion  - Assess patient's mobility  - Assess patient's need for assistive devices and provide as appropriate  - Encourage maximum independence but intervene and supervise when necessary  - Involve family in performance of ADLs  - Assess for home care needs following discharge   - Consider OT consult to assist with ADL evaluation and planning for discharge  - Provide patient education as appropriate  Outcome: Progressing  Goal: Maintain proper alignment of affected body part  Description: INTERVENTIONS:  - Support, maintain and protect limb and body alignment  - Provide patient/ family with appropriate education  Outcome: Progressing     Problem: GASTROINTESTINAL - ADULT  Goal: Maintains or returns to baseline bowel function  Description: INTERVENTIONS:  - Assess bowel function  - Encourage oral fluids to ensure adequate hydration  - Administer IV fluids if ordered to ensure adequate hydration  - Administer ordered medications as needed  - Encourage mobilization and activity  - Consider nutritional services referral to assist patient with adequate nutrition and appropriate food choices  Outcome: Progressing     Problem: Nutrition/Hydration-ADULT  Goal: Nutrient/Hydration intake appropriate for improving, restoring or maintaining nutritional needs  Description: Monitor and assess patient's nutrition/hydration status for malnutrition. Collaborate with interdisciplinary team and initiate plan and interventions as ordered. Monitor patient's weight and dietary intake as ordered or per policy.  Utilize nutrition screening tool and intervene as necessary. Determine patient's food preferences and provide high-protein, high-caloric foods as appropriate. INTERVENTIONS:  - Monitor oral intake, urinary output, labs, and treatment plans  - Assess nutrition and hydration status and recommend course of action  - Evaluate amount of meals eaten  - Assist patient with eating if necessary   - Allow adequate time for meals  - Recommend/ encourage appropriate diets, oral nutritional supplements, and vitamin/mineral supplements  - Order, calculate, and assess calorie counts as needed  - Recommend, monitor, and adjust tube feedings and TPN/PPN based on assessed needs  - Assess need for intravenous fluids  - Provide specific nutrition/hydration education as appropriate  - Include patient/family/caregiver in decisions related to nutrition  Outcome: Progressing     Problem: MOBILITY - ADULT  Goal: Maintain or return to baseline ADL function  Description: INTERVENTIONS:  -  Assess patient's ability to carry out ADLs; assess patient's baseline for ADL function and identify physical deficits which impact ability to perform ADLs (bathing, care of mouth/teeth, toileting, grooming, dressing, etc.)  - Assess/evaluate cause of self-care deficits   - Assess range of motion  - Assess patient's mobility; develop plan if impaired  - Assess patient's need for assistive devices and provide as appropriate  - Encourage maximum independence but intervene and supervise when necessary  - Involve family in performance of ADLs  - Assess for home care needs following discharge   - Consider OT consult to assist with ADL evaluation and planning for discharge  - Provide patient education as appropriate  Outcome: Progressing  Goal: Maintains/Returns to pre admission functional level  Description: INTERVENTIONS:  - Perform BMAT or MOVE assessment daily.   - Set and communicate daily mobility goal to care team and patient/family/caregiver.    - Collaborate with rehabilitation services on mobility goals if consulted  - Out of bed for toileting  - Record patient progress and toleration of activity level   Outcome: Progressing

## 2023-07-31 NOTE — ASSESSMENT & PLAN NOTE
· S/p loop recorder implant  · Not currently on [facility]  · Outpatient Cardiology follow up

## 2023-07-31 NOTE — NURSING NOTE
Morning round: Patient had questions regarding insulin administration at home and diabetes control. Provided patient with Diabetes and you education booklet. Patient very thankful.

## 2023-07-31 NOTE — PROGRESS NOTES
[address]  Progress Note  Name: Elmira Harris I  MRN: [id number]  Unit/Bed#: -01 I Date of Admission: 7/29/2023   Date of Service: 7/31/2023 I Hospital Day: 1    Assessment/Plan   Dyslipidemia  Assessment & Plan  · Continue statin therapy    Uncontrolled type 2 diabetes mellitus with hyperglycemia [facility])  Assessment & Plan    Lab Results   Component Value Date    HGBA1C 7.4 (A) 04/19/2023     · Reports elevated BG at home, 512. Took home insulin before coming to the ED  · BG now improved to 235  · Continue home regimen: Humalog 10U TID AC and Toujeo 20U HS  · Start on SSI with accu checks  · Hypoglycemia protocol  · Diabetic diet    PAF (paroxysmal atrial fibrillation) (HCC)  Assessment & Plan  · S/p loop recorder implant  · [facility]  · Outpatient Cardiology follow up    * Sepsis [facility])  Assessment & Plan  Patient presenting to the ED for evaluation of generalized abdominal pain  · Meeting sepsis criteria for tachycardia and leukocytosis  · UA positive for leukocytes and bacteria  · CT A/P ([facility]): Pronounced bladder wall thickening consistent with incomplete distention, chronic outflow obstruction, or cystitis. Moderately excessive colonic stool contact. Otherwise no evidence of acute abdominal pelvic abnormality. · Start on IV ceftriaxone  · Obtain infectious labs: procalcitonin, BC x2, urine culture  · Lactic acidosis resolved with IVF  · Trend WBC and fever curve               VTE Pharmacologic Prophylaxis: VTE Score: 5 Moderate Risk (Score 3-4) - Pharmacological DVT Prophylaxis Ordered: heparin. Patient Centered Rounds: I performed bedside rounds with nursing staff today. Discussions with Specialists or Other Care Team Provider: IRA    Education and Discussions with Family / Patient: Attempted to update  (wife) via phone. Left voicemail.      Total Time Spent on Date of Encounter in care of patient: 35 minutes This time was spent on one or more of the following: performing physical exam; counseling and coordination of care; obtaining or reviewing history; documenting in the medical record; reviewing/ordering tests, medications or procedures; communicating with other healthcare professionals and discussing with patient's family/caregivers. Current Length of Stay: 1 day(s)  Current Patient Status: Inpatient   Certification Statement: The patient will continue to require additional inpatient hospital stay due to urine culture  Discharge Plan: Anticipate discharge in 24-48 hrs to home. Code Status: Level 1 - Full Code    Subjective:   Pt seen and examined at bedside. Reports feeling better. Had a good BM. Objective:     Vitals:   Temp (24hrs), Av.4 °F (36.3 °C), Min:97 °F (36.1 °C), Max:97.7 °F (36.5 °C)    Temp:  [97 °F (36.1 °C)-97.7 °F (36.5 °C)] 97 °F (36.1 °C)  Resp:  [17-22] 17  BP: (110-125)/(64-78) 125/78  Body mass index is 28.02 kg/m². Input and Output Summary (last 24 hours): Intake/Output Summary (Last 24 hours) at 2023 1027  Last data filed at 2023 2201  Gross per 24 hour   Intake 120 ml   Output 500 ml   Net -380 ml       Physical Exam:   Physical Exam  Vitals reviewed. Constitutional:       General: He is not in acute distress. Appearance: He is well-developed. He is not diaphoretic. HENT:      Head: Normocephalic and atraumatic. Mouth/Throat:      Pharynx: No oropharyngeal exudate. Eyes:      General: No scleral icterus. Extraocular Movements: Extraocular movements intact. Conjunctiva/sclera: Conjunctivae normal.   Neck:      Vascular: No JVD. Trachea: No tracheal deviation. Cardiovascular:      Rate and Rhythm: Normal rate and regular rhythm. Heart sounds: No murmur heard. No friction rub. No gallop. Pulmonary:      [place]: Pulmonary effort is normal. No respiratory distress. Breath sounds: No stridor. No wheezing. Abdominal:      General: There is no distension. Palpations: Abdomen is soft. There is no mass. Tenderness: There is no abdominal tenderness. There is no right CVA tenderness or left CVA tenderness. Musculoskeletal:         General: No tenderness. Normal range of motion. Right lower leg: No edema. Left lower leg: No edema. Skin:     General: Skin is warm and dry. Coloration: Skin is not pale. Findings: No erythema. Neurological:      Mental Status: He is alert and oriented to person, place, and time. Psychiatric:         Behavior: Behavior normal.         Thought Content: Thought content normal.          Additional Data:     Labs:  Results from last 7 days   Lab Units 07/31/23  0525   WBC Thousand/uL 7.65   HEMOGLOBIN g/dL 14.0   HEMATOCRIT % 41.2   PLATELETS Thousands/uL 162     Results from last 7 days   Lab Units 07/31/23  0525 07/30/23  0602   SODIUM mmol/L 132* 132*   POTASSIUM mmol/L 3.8 4.4   CHLORIDE mmol/L 104 103   CO2 mmol/L 20* 13*   BUN mg/dL 33* 35*   CREATININE mg/dL 0.90 1.06   ANION GAP mmol/L 8 16   CALCIUM mg/dL 9.9 9.9   ALBUMIN g/dL  --  4.0   TOTAL BILIRUBIN mg/dL  --  0.58   ALK PHOS U/L  --  102   ALT U/L  --  14   AST U/L  --  19   GLUCOSE RANDOM mg/dL 283* 369*         Results from last 7 days   Lab Units 07/31/23  0756 07/30/23  2029 07/30/23  1542 07/30/23  1129 07/30/23  0710 07/29/23 2015   POC GLUCOSE mg/dl 304* 245* 481* 362* 388* 359*         Results from last 7 days   Lab Units 07/31/23  0525 07/30/23  0602 07/30/23  0002 07/29/23  2046   LACTIC ACID mmol/L  --   --  1.1 2.3*   PROCALCITONIN ng/ml 0.20 0.14  --   --        Lines/Drains:  Invasive Devices     Peripheral Intravenous Line  Duration           Peripheral IV 07/29/23 Right;Ventral (anterior) Forearm 1 day          Drain  Duration           Urethral Catheter Coude; Latex 16 Fr. 163 days    Suprapubic Catheter 16 Fr. 137 days    Urethral Catheter Other (Comment) 24 Fr. 137 days                           Imaging: No pertinent imaging reviewed. Recent Cultures (last 7 days):   Results from last 7 days   Lab Units 07/30/23  0602   BLOOD CULTURE  Received in Microbiology Lab. Culture in Progress. Received in Microbiology Lab. Culture in Progress. Last 24 Hours Medication List:   Current Facility-Administered Medications   Medication Dose Route Frequency Provider Last Rate   • acetaminophen  650 mg Oral Q6H PRN Karinmari Lambert PA-C     • atorvastatin  10 mg Oral Daily Cathy Crespo PA-C     • cefTRIAXone  1,000 mg Intravenous Q24H Karinmari Lambert PA-C 1,000 mg (07/31/23 0510)   • docusate sodium  100 mg Oral BID Karin Lambert PA-C     • folic acid  9,789 mcg Oral Daily Cathy Crespo PA-C     • gabapentin  100 mg Oral TID Karin Lambert PA-C     • heparin (porcine)  5,000 Units Subcutaneous Q8H [facility] Cathy Crespo PA-C     • insulin glargine  20 Units Subcutaneous HS Cathy Crespo PA-C     • insulin lispro  1-6 Units Subcutaneous 4x Daily (AC & HS) Cathy Crespo PA-C     • insulin lispro  10 Units Subcutaneous TID With Meals Karin Lambert PA-C     • ondansetron  4 mg Intravenous Q6H PRN Summernapoleon Dick DO     • pantoprazole  40 mg Oral Early Morning Cathy Crespo PA-C     • polyethylene glycol  17 g Oral Daily Summer Estella Dick, DO     • sodium chloride (PF)  3 mL Intravenous Q1H PRN Karin Lambert PA-C     • tamsulosin  0.4 mg Oral Daily With Dinner Karin Lambert PA-C          Today, Patient Was Seen By: Prabhu Perez DO    **Please Note: This note may have been constructed using a voice recognition system. **

## 2023-07-31 NOTE — CASE MANAGEMENT
Case Management Assessment & Discharge Planning Note    Patient name Demi Bob.   Location /-01 MRN [id number]  : 1955 Date 2023       Current Admission Date: 2023  Current Admission Diagnosis:Sepsis [facility])   Patient Active Problem List    Diagnosis Date Noted   • Class 1 obesity due to excess calories with serious comorbidity and body mass index (BMI) of 30.0 to 30.9 in adult 2023   • Other artificial openings of urinary tract status ([address]) 2023   • Unspecified mood (affective) disorder ([address]) 2023   • Other dietary vitamin B12 deficiency anemia 02/15/2023   • Encounter for screening colonoscopy 02/15/2023   • Anemia 02/15/2023   • History of pulmonary embolus (PE) 2023   • Vitamin D deficiency 10/17/2022   • Dyslipidemia 10/17/2022   • Uncontrolled type 2 diabetes mellitus with hyperglycemia ([address]) 2022   • Hydroureteronephrosis 2022   • Ventral Hernia 2022   • Polyuria 09/15/2022   • Depressed mood 2022   • Pain in both lower extremities 2022   • Fungal infection of skin 2022   • Urinary retention 2022   • Sepsis ([address]) 2022   • PAF (paroxysmal atrial fibrillation) ([address]) 2022   • Urinary frequency 2022   • Type 2 diabetes mellitus ([address]) 2022   • Microscopic hematuria 2022   • Urge incontinence of urine 2022   • Nocturnal enuresis 2022   • Feeling of incomplete bladder emptying 2022   • Balanitis 2022      LOS (days): 1  Geometric Mean LOS (GMLOS) (days):   Days to GMLOS:     OBJECTIVE:    Risk of Unplanned Readmission Score: 12.96         Current admission status: Inpatient       Preferred Pharmacy:   [id number] [place] [place] - [address] [place] [address]  Phone: [phone] Fax: [phone]    Primary Care Provider: Shar Cloud DO    Primary Insurance: [facility] REP  Secondary Insurance: BLUE CROSS    ASSESSMENT:  Active Health Care Proxies     Colon, [address] - Spouse   Primary Phone: [phone] (Mobile)               Advance Directives  Does patient have a [address]?: Yes  Does patient have Advance Directives?: Yes  Advance Directives: Living will, Power of  for health care  Primary Contact: Wife         Readmission Root Cause  30 Day Readmission: No    Patient Information  Admitted from[de-identified] Home  Mental Status: Alert  During Assessment patient was accompanied by: Not accompanied during assessment  Assessment information provided by[de-identified] Patient  Primary Caregiver: Self  Support Systems: Spouse/significant other  [facility]: [address] [place] [address] do you live in?: [address] [place] [address] entry access options.  Select all that apply.: Stairs  Number of steps to enter home.: 2  Do the steps have railings?: No  Type of Current Residence: 2 story home  Upon entering residence, is there a bedroom on the main floor (no further steps)?: No  A bedroom is located on the following floor levels of residence (select all that apply):: 2nd Floor  Upon entering residence, is there a bathroom on the main floor (no further steps)?: No  Indicate which floors of current residence have a bathroom (select all the apply):: 2nd Floor  Number of steps to 2nd floor from main floor: One Flight  In the last 12 months, was there a time when you were not able to pay the mortgage or rent on time?: No  In the last 12 months, how many places have you lived?: 1  In the last 12 months, was there a time when you did not have a steady place to sleep or slept in a shelter (including now)?: No  Homeless/housing insecurity resource given?: N/A  Living Arrangements: Lives w/ Spouse/significant other  Is patient a ?: No    Activities of Daily Living Prior to Admission  Functional Status: Independent  Completes ADLs independently?: Yes  Ambulates independently?: Yes  Does patient use assisted devices?: Yes  Assisted Devices (DME) used: [employer]  Does patient currently own DME?: Yes  What DME does the patient currently own?: [employer] Portillo  Does patient have a history of Outpatient Therapy (PT/OT)?: No  Does the patient have a history of Short-Term Rehab?: No  Does patient have a history of HHC?: Yes ([facility])  Does patient currently have [address]?: No         Patient Information Continued  Income Source: SSI/SSD  Does patient have prescription coverage?: Yes  Within the past 12 months, you worried that your food would run out before you got the money to buy more.: Never true  Within the past 12 months, the food you bought just didn't last and you didn't have money to get more.: Never true  Food insecurity resource given?: N/A  Does patient receive dialysis treatments?: No  Does patient have a history of substance abuse?: No  Does patient have a history of Mental Health Diagnosis?: No    PHQ 2/9 Screening   Reviewed PHQ 2/9 Depression Screening Score?: No    Means of Transportation  Means of Transport to Appts[de-identified] Drives Self  In the past 12 months, has lack of transportation kept you from medical appointments or from getting medications?: No  In the past 12 months, has lack of transportation kept you from meetings, work, or from getting things needed for daily living?: No  Was application for public transport provided?: N/A        DISCHARGE DETAILS:    Discharge planning discussed with[de-identified] Patient  Freedom of Choice: Yes  Comments - Freedom of Choice: Patient denied having any needs.   CM contacted family/caregiver?: No- see comments (declined)  Were Treatment Team discharge recommendations reviewed with patient/caregiver?: Yes  Did patient/caregiver verbalize understanding of patient care needs?: Yes  Were patient/caregiver advised of the risks associated with not following Treatment Team discharge recommendations?: Yes         [address]         Is the patient interested in [address] at discharge?: No    DME Referral Provided  Referral made for DME?: No    Other Referral/Resources/Interventions Provided:  Interventions: None Indicated    Would you like to participate in our [address] [facility] service program?  : No - Declined    Treatment Team Recommendation: Home  Discharge Destination Plan[de-identified] Home  Transport at Discharge : [employer]

## 2023-08-01 ENCOUNTER — TRANSITIONAL CARE MANAGEMENT (OUTPATIENT)
Dept: FAMILY MEDICINE CLINIC | Facility: CLINIC | Age: 68
End: 2023-08-01

## 2023-08-01 ENCOUNTER — RA CDI HCC (OUTPATIENT)
Dept: OTHER | Facility: HOSPITAL | Age: 68
End: 2023-08-01

## 2023-08-01 VITALS
TEMPERATURE: 97.7 F | HEIGHT: 72 IN | BODY MASS INDEX: 27.98 KG/M2 | RESPIRATION RATE: 17 BRPM | OXYGEN SATURATION: 97 % | SYSTOLIC BLOOD PRESSURE: 108 MMHG | DIASTOLIC BLOOD PRESSURE: 69 MMHG | HEART RATE: 82 BPM | WEIGHT: 206.57 LBS

## 2023-08-01 LAB
ANION GAP SERPL CALCULATED.3IONS-SCNC: 7 MMOL/L
BACTERIA UR CULT: ABNORMAL
BUN SERPL-MCNC: 32 MG/DL (ref 5–25)
CALCIUM SERPL-MCNC: 9.5 MG/DL (ref 8.4–10.2)
CHLORIDE SERPL-SCNC: 102 MMOL/L (ref 96–108)
CO2 SERPL-SCNC: 23 MMOL/L (ref 21–32)
CREAT SERPL-MCNC: 1.14 MG/DL (ref 0.6–1.3)
ERYTHROCYTE [DISTWIDTH] IN BLOOD BY AUTOMATED COUNT: 13.2 % (ref 11.6–15.1)
GFR SERPL CREATININE-BSD FRML MDRD: 65 ML/MIN/1.73SQ M
GLUCOSE SERPL-MCNC: 382 MG/DL (ref 65–140)
GLUCOSE SERPL-MCNC: 403 MG/DL (ref 65–140)
GLUCOSE SERPL-MCNC: 406 MG/DL (ref 65–140)
HCT VFR BLD AUTO: 40.9 % (ref 36.5–49.3)
HGB BLD-MCNC: 14 G/DL (ref 12–17)
MCH RBC QN AUTO: 29 PG (ref 26.8–34.3)
MCHC RBC AUTO-ENTMCNC: 34.2 G/DL (ref 31.4–37.4)
MCV RBC AUTO: 85 FL (ref 82–98)
PLATELET # BLD AUTO: 159 THOUSANDS/UL (ref 149–390)
PMV BLD AUTO: 11 FL (ref 8.9–12.7)
POTASSIUM SERPL-SCNC: 4.2 MMOL/L (ref 3.5–5.3)
RBC # BLD AUTO: 4.82 MILLION/UL (ref 3.88–5.62)
SODIUM SERPL-SCNC: 132 MMOL/L (ref 135–147)
WBC # BLD AUTO: 6.91 THOUSAND/UL (ref 4.31–10.16)

## 2023-08-01 PROCEDURE — 80048 BASIC METABOLIC PNL TOTAL CA: CPT | Performed by: STUDENT IN AN ORGANIZED HEALTH CARE EDUCATION/TRAINING PROGRAM

## 2023-08-01 PROCEDURE — 82948 REAGENT STRIP/BLOOD GLUCOSE: CPT

## 2023-08-01 PROCEDURE — 99239 HOSP IP/OBS DSCHRG MGMT >30: CPT | Performed by: INTERNAL MEDICINE

## 2023-08-01 PROCEDURE — 85027 COMPLETE CBC AUTOMATED: CPT | Performed by: STUDENT IN AN ORGANIZED HEALTH CARE EDUCATION/TRAINING PROGRAM

## 2023-08-01 RX ORDER — CEFPODOXIME PROXETIL 200 MG/1
200 TABLET, FILM COATED ORAL 2 TIMES DAILY
Qty: 12 TABLET | Refills: 0 | Status: SHIPPED | OUTPATIENT
Start: 2023-08-01 | End: 2023-08-07

## 2023-08-01 RX ORDER — INSULIN GLARGINE 300 U/ML
20 INJECTION, SOLUTION SUBCUTANEOUS
Qty: 2.4 ML | Refills: 0 | Status: SHIPPED | OUTPATIENT
Start: 2023-08-01 | End: 2023-08-17

## 2023-08-01 RX ORDER — TAMSULOSIN HYDROCHLORIDE 0.4 MG/1
0.4 CAPSULE ORAL
Qty: 30 CAPSULE | Refills: 0 | Status: SHIPPED | OUTPATIENT
Start: 2023-08-01

## 2023-08-01 RX ADMIN — HEPARIN SODIUM 5000 UNITS: 5000 INJECTION INTRAVENOUS; SUBCUTANEOUS at 05:43

## 2023-08-01 RX ADMIN — FOLIC ACID 1000 MCG: 1 TABLET ORAL at 10:31

## 2023-08-01 RX ADMIN — POLYETHYLENE GLYCOL 3350 17 G: 17 POWDER, FOR SOLUTION ORAL at 10:31

## 2023-08-01 RX ADMIN — INSULIN LISPRO 6 UNITS: 100 INJECTION, SOLUTION INTRAVENOUS; SUBCUTANEOUS at 11:30

## 2023-08-01 RX ADMIN — DOCUSATE SODIUM 100 MG: 100 CAPSULE, LIQUID FILLED ORAL at 10:31

## 2023-08-01 RX ADMIN — CEFTRIAXONE 1000 MG: 1 INJECTION, SOLUTION INTRAVENOUS at 04:00

## 2023-08-01 RX ADMIN — GABAPENTIN 100 MG: 100 CAPSULE ORAL at 10:31

## 2023-08-01 RX ADMIN — ATORVASTATIN CALCIUM 10 MG: 10 TABLET, FILM COATED ORAL at 10:31

## 2023-08-01 RX ADMIN — INSULIN LISPRO 10 UNITS: 100 INJECTION, SOLUTION INTRAVENOUS; SUBCUTANEOUS at 11:31

## 2023-08-01 RX ADMIN — PANTOPRAZOLE SODIUM 40 MG: 40 TABLET, DELAYED RELEASE ORAL at 05:43

## 2023-08-01 RX ADMIN — INSULIN LISPRO 10 UNITS: 100 INJECTION, SOLUTION INTRAVENOUS; SUBCUTANEOUS at 07:24

## 2023-08-01 RX ADMIN — INSULIN LISPRO 5 UNITS: 100 INJECTION, SOLUTION INTRAVENOUS; SUBCUTANEOUS at 07:24

## 2023-08-01 NOTE — ASSESSMENT & PLAN NOTE
Lab Results   Component Value Date    HGBA1C 15.5 (H) 07/31/2023     · Reports elevated BG at home, 512.  Took home insulin before coming to the ED  · BG now improved to 235  · Continue home regimen: Humalog 10U TID AC and Toujeo 20U HS

## 2023-08-01 NOTE — PROGRESS NOTES
[address] [facility] coding opportunities       Chart reviewed, no opportunity found: CHART REVIEWED, NO OPPORTUNITY FOUND        Patients Insurance     [facility]ic[facility]

## 2023-08-01 NOTE — ASSESSMENT & PLAN NOTE
Patient presenting to the ED for evaluation of generalized abdominal pain  · Meeting sepsis criteria for tachycardia and leukocytosis  · UA positive for leukocytes and bacteria  · CT A/P ([facility]): Pronounced bladder wall thickening consistent with incomplete distention, chronic outflow obstruction, or cystitis. Moderately excessive colonic stool contact. Otherwise no evidence of acute abdominal pelvic abnormality.   · Started on iv abx transitioned to oral abx

## 2023-08-01 NOTE — CASE MANAGEMENT
Case Management Discharge Planning Note    Patient name Connor Hernandez.   Location /-01 MRN [id number]  : 1955 Date 2023       Current Admission Date: 2023  Current Admission Diagnosis:Sepsis [facility])   Patient Active Problem List    Diagnosis Date Noted   • Class 1 obesity due to excess calories with serious comorbidity and body mass index (BMI) of 30.0 to 30.9 in adult 2023   • Other artificial openings of urinary tract status ([address]) 2023   • Unspecified mood (affective) disorder ([address]) 2023   • Other dietary vitamin B12 deficiency anemia 02/15/2023   • Encounter for screening colonoscopy 02/15/2023   • Anemia 02/15/2023   • History of pulmonary embolus (PE) 2023   • Vitamin D deficiency 10/17/2022   • Dyslipidemia 10/17/2022   • Uncontrolled type 2 diabetes mellitus with hyperglycemia ([address]) 2022   • Hydroureteronephrosis 2022   • Ventral Hernia 2022   • Polyuria 09/15/2022   • Depressed mood 2022   • Pain in both lower extremities 2022   • Fungal infection of skin 2022   • Urinary retention 2022   • Sepsis ([address]) 2022   • PAF (paroxysmal atrial fibrillation) ([address]) 2022   • Urinary frequency 2022   • Type 2 diabetes mellitus ([address]) 2022   • Microscopic hematuria 2022   • Urge incontinence of urine 2022   • Nocturnal enuresis 2022   • Feeling of incomplete bladder emptying 2022   • Balanitis 2022      LOS (days): 2  Geometric Mean LOS (GMLOS) (days):   Days to GMLOS:     OBJECTIVE:  Risk of Unplanned Readmission Score: 13.13         Current admission status: Inpatient   Preferred Pharmacy:   [id number] [place], [place] - [address] [place] [address]  Phone: [phone] Fax: [phone]    Primary Care Provider: Antonia Charter, DO    Primary Insurance: [facility] REP  Secondary Insurance: BLUE CROSS    DISCHARGE DETAILS:      Other Referral/Resources/Interventions Provided:  Interventions: Transportation  Referral Comments: Pt requested [place] ride home. CM scheduled 2pm lyanthony. Nursing made aware and will have patient down at front entrance by 1:50pm. Pt has house key and is able to let himself in when he arrives home. No other CM needs at this time. Treatment Team Recommendation: Home  Discharge Destination Plan[de-identified] Home  Transport at Discharge : Ride Share  Dispatcher Contacted: Yes  Number/Name of Dispatcher: Roundtrip  Transported by [employer] and Unit #):  [place]  ETA of Transport (Date): 08/01/23  ETA of Transport (Time): 1400

## 2023-08-01 NOTE — DISCHARGE SUMMARY
[address]  Discharge- Tia Reasons. 1955, 70 y.o. male MRN: [id number]  Unit/Bed#: -Deep Encounter: [id number]  Primary Care Provider: Solo Campbell DO   Date and time admitted to hospital: 7/29/2023  7:54 PM    * Sepsis [facility])  Assessment & Plan  Patient presenting to the ED for evaluation of generalized abdominal pain  · Meeting sepsis criteria for tachycardia and leukocytosis  · UA positive for leukocytes and bacteria  · CT A/P ([facility]): Pronounced bladder wall thickening consistent with incomplete distention, chronic outflow obstruction, or cystitis. Moderately excessive colonic stool contact. Otherwise no evidence of acute abdominal pelvic abnormality. · Started on iv abx transitioned to oral abx    Dyslipidemia  Assessment & Plan  · C/w statin     Uncontrolled type 2 diabetes mellitus with hyperglycemia [facility])  Assessment & Plan    Lab Results   Component Value Date    HGBA1C 15.5 (H) 07/31/2023     · Reports elevated BG at home, 512. Took home insulin before coming to the ED  · BG now improved to 235  · Continue home regimen: Humalog 10U TID AC and Toujeo 20U HS      PAF (paroxysmal atrial fibrillation) ([address])  Assessment & Plan  · S/p loop recorder implant        Discharging Physician / Practitioner: Belia Andrade MD  PCP: Solo Campbell DO  Admission Date:   Admission Orders (From admission, onward)     Ordered        07/30/23 0851  Inpatient Admission  Once            07/30/23 0405  Place in Observation  Once                      Discharge Date: 08/01/23    Medical Problems     Resolved Problems  Date Reviewed: 7/31/2023   None         Consultations During Hospital Stay:  · none    Procedures Performed:   · none    Significant Findings / Test Results:   CT abdomen pelvis with contrast    Result Date: 7/30/2023  · Impression: Moderate diffuse urinary bladder wall thickening which may be secondary to under distention versus a UTI/acute cystitis.  Relation with urinalysis is recommended. Moderate amount of stool within the rectum. No free air or free fluid. Findings are consistent with the preliminary report from [facility] which was provided shortly after completion of the exam. Workstation performed: [id number]     Incidental Findings:   · none     Test Results Pending at Discharge (will require follow up):   · none     Outpatient Tests Requested:  · none    Complications:  none    Reason for Admission: Sepsis    Hospital Course:     Prabhu Nj is a 70 y.o. male patient who originally presented to the hospital on 7/29/2023 due to sepsis from uti treated with iv abx and transitioned to oral abx        Please see above list of diagnoses and related plan for additional information. Condition at Discharge: stable     Discharge Day Visit / Exam:     Subjective:  Denies cp, sob, cough, fevers  Vitals: Blood Pressure: 108/69 (08/01/23 0722)  Pulse: 82 (07/31/23 2245)  Temperature: 97.7 °F (36.5 °C) (08/01/23 0722)  Temp Source: Oral (07/31/23 2245)  Respirations: 17 (08/01/23 0722)  Height: 6' (182.9 cm) (07/30/23 0547)  Weight - Scale: 93.7 kg (206 lb 9.1 oz) (07/30/23 0547)  SpO2: 97 % (08/01/23 1031)  Exam:   Physical Exam  Constitutional:       General: He is not in acute distress. Appearance: He is well-developed. He is not diaphoretic. HENT:      Head: Normocephalic and atraumatic. Nose: Nose normal.      Mouth/Throat:      Pharynx: No oropharyngeal exudate. Eyes:      General: No scleral icterus. Conjunctiva/sclera: Conjunctivae normal.   Cardiovascular:      Rate and Rhythm: Normal rate and regular rhythm. Heart sounds: Normal heart sounds. No murmur heard. No friction rub. No gallop. Pulmonary:      [place]: Pulmonary effort is normal. No respiratory distress. Breath sounds: Normal breath sounds. No wheezing or rales. Chest:      Chest wall: No tenderness.    Abdominal:      General: Bowel sounds are normal. There is no distension. Palpations: Abdomen is soft. Tenderness: There is no abdominal tenderness. There is no guarding. Musculoskeletal:         General: No tenderness or deformity. Normal range of motion. Cervical back: Normal range of motion and neck supple. Skin:     General: Skin is warm and dry. Findings: No erythema. Neurological:      Mental Status: He is alert. Mental status is at baseline. (   Discussion with Family: pt    Discharge instructions/Information to patient and family:   See after visit summary for information provided to patient and family. Provisions for Follow-Up Care:  See after visit summary for information related to follow-up care and any pertinent home health orders. Disposition:     Home    For Discharges to [facility] SNF:   · Not Applicable to this Patient - Not Applicable to this Patient    Planned Readmission: none     Discharge Statement:  I spent 60 minutes discharging the patient. This time was spent on the day of discharge. I had direct contact with the patient on the day of discharge. Greater than 50% of the total time was spent examining patient, answering all patient questions, arranging and discussing plan of care with patient as well as directly providing post-discharge instructions. Additional time then spent on discharge activities. Discharge Medications:  See after visit summary for reconciled discharge medications provided to patient and family.       ** Please Note: This note has been constructed using a voice recognition system **

## 2023-08-04 LAB
BACTERIA BLD CULT: NORMAL
BACTERIA BLD CULT: NORMAL

## 2023-08-07 ENCOUNTER — OFFICE VISIT (OUTPATIENT)
Dept: FAMILY MEDICINE CLINIC | Facility: CLINIC | Age: 68
End: 2023-08-07
Payer: COMMERCIAL

## 2023-08-07 VITALS
OXYGEN SATURATION: 98 % | DIASTOLIC BLOOD PRESSURE: 62 MMHG | HEIGHT: 72 IN | WEIGHT: 233 LBS | HEART RATE: 68 BPM | BODY MASS INDEX: 31.56 KG/M2 | TEMPERATURE: 98.3 F | SYSTOLIC BLOOD PRESSURE: 116 MMHG

## 2023-08-07 DIAGNOSIS — E11.65 UNCONTROLLED TYPE 2 DIABETES MELLITUS WITH HYPERGLYCEMIA (HCC): ICD-10-CM

## 2023-08-07 DIAGNOSIS — N52.8 OTHER MALE ERECTILE DYSFUNCTION: ICD-10-CM

## 2023-08-07 DIAGNOSIS — Z93.6 OTHER ARTIFICIAL OPENINGS OF URINARY TRACT STATUS (HCC): ICD-10-CM

## 2023-08-07 DIAGNOSIS — E11.10 DKA (DIABETIC KETOACIDOSIS) (HCC): ICD-10-CM

## 2023-08-07 DIAGNOSIS — A41.9 SEPSIS, DUE TO UNSPECIFIED ORGANISM, UNSPECIFIED WHETHER ACUTE ORGAN DYSFUNCTION PRESENT (HCC): Primary | ICD-10-CM

## 2023-08-07 DIAGNOSIS — N39.41 URGE INCONTINENCE OF URINE: ICD-10-CM

## 2023-08-07 PROCEDURE — 99496 TRANSJ CARE MGMT HIGH F2F 7D: CPT | Performed by: FAMILY MEDICINE

## 2023-08-07 RX ORDER — TADALAFIL 10 MG/1
10 TABLET ORAL DAILY PRN
Qty: 30 TABLET | Refills: 5 | Status: SHIPPED | OUTPATIENT
Start: 2023-08-07

## 2023-08-07 RX ORDER — INSULIN LISPRO 100 [IU]/ML
10 INJECTION, SOLUTION INTRAVENOUS; SUBCUTANEOUS
Qty: 15 ML | Refills: 3 | Status: SHIPPED | OUTPATIENT
Start: 2023-08-07 | End: 2023-08-17

## 2023-08-07 NOTE — ASSESSMENT & PLAN NOTE
Add Cialis at this time start with 10 mg of this can be increased up to 20 mg taken at least 1 hour prior to sexual intercourse.   Recommend allowing 2 hours for medication to take full effect and understanding that the medication will last in his system for up to about 12 hours

## 2023-08-07 NOTE — ASSESSMENT & PLAN NOTE
Posthospitalization from sepsis patient improved overall at this point was on IV antibiotics and fluids afebrile now without abdominal pain urinary status improved and he is moving his bowels.   We will continue with accurate blood sugar monitoring and diabetes control

## 2023-08-07 NOTE — ASSESSMENT & PLAN NOTE
Urinary tract infection causing sepsis patient stable at this time patient is continuing on Vantin 200 mg twice daily

## 2023-08-07 NOTE — ASSESSMENT & PLAN NOTE
Diabetes under poor control recently patient has been seen by endocrinology he is now taking Humalog 10 units with meals in addition to his Toujeo 20 units daily.   Recommend increasing Toujeo up to 30 units daily  Lab Results   Component Value Date    HGBA1C 15.5 (H) 07/31/2023

## 2023-08-07 NOTE — PROGRESS NOTES
Assessment & Plan     1. Sepsis, due to unspecified organism, unspecified whether acute organ dysfunction present [facility])  Assessment & Plan:  Posthospitalization from sepsis patient improved overall at this point was on IV antibiotics and fluids afebrile now without abdominal pain urinary status improved and he is moving his bowels. We will continue with accurate blood sugar monitoring and diabetes control      2. Uncontrolled type 2 diabetes mellitus with hyperglycemia (HCC)  Assessment & Plan:  Diabetes under poor control recently patient has been seen by endocrinology he is now taking Humalog 10 units with meals in addition to his Toujeo 20 units daily. Recommend increasing Toujeo up to 30 units daily  Lab Results   Component Value Date    HGBA1C 15.5 (H) 07/31/2023       Orders:  -     insulin lispro (HumaLOG KwikPen) 100 units/mL injection pen; Inject 10 Units under the skin 3 (three) times a day with meals  -     Albumin / creatinine urine ratio; Future; Expected date: 11/07/2023  -     Comprehensive metabolic panel; Future; Expected date: 11/07/2023  -     Hemoglobin A1C; Future; Expected date: 11/07/2023  -     Lipid Panel with Direct LDL reflex; Future; Expected date: 11/07/2023  -     TSH, 3rd generation with Free T4 reflex; Future; Expected date: 11/07/2023    3. Urge incontinence of urine  Assessment & Plan:  Urinary tract infection causing sepsis patient stable at this time patient is continuing on Vantin 200 mg twice daily      4. Other artificial openings of urinary tract status ([address])  Assessment & Plan:  Stable at this time      5. Other male erectile dysfunction  Assessment & Plan: Add Cialis at this time start with 10 mg of this can be increased up to 20 mg taken at least 1 hour prior to sexual intercourse.   Recommend allowing 2 hours for medication to take full effect and understanding that the medication will last in his system for up to about 12 hours    Orders:  -     tadalafil (CIALIS) 10 MG tablet; Take 1 tablet (10 mg total) by mouth daily as needed for erectile dysfunction       Subjective     Transitional Care Management Review:   Bridger Sigala is a 70 y.o. male here for TCM follow up. During the TCM phone call patient stated:  TCM Call     Date and time call was made  8/1/2023  2:29 PM    Hospital care reviewed  Records reviewed    Patient was hospitialized at  [facility]    Date of Admission  07/30/23    Date of discharge  08/01/23    Diagnosis  Sepsis    Disposition  Home    Were the patients medications reviewed and updated  No    Current Symptoms  None      TCM Call     Post hospital issues  None    Should patient be enrolled in anticoag monitoring? No    Scheduled for follow up? Yes    Did you obtain your prescribed medications  Yes    Do you need help managing your prescriptions or medications  No    Is transportation to your appointment needed  No    I have advised the patient to call PCP with any new or worsening symptoms  louis vides    Living Arrangements  Family members        Patient is here today posthospitalization for sepsis after he had developed severe hyperglycemia uncontrolled diabetes and urinary retention urinary tract infection along with constipation. Symptoms improved during hospitalization with IV antibiotics and fluids and he returned home and at this time he appears more stable blood sugars are under better control    Review of Systems   Constitutional: Negative for chills, fatigue and fever. HENT: Negative for congestion, nosebleeds, rhinorrhea, sinus pressure and sore throat. Eyes: Negative for discharge and redness. Respiratory: Negative for cough and shortness of breath. Cardiovascular: Negative for chest pain, palpitations and leg swelling. Gastrointestinal: Positive for abdominal distention and abdominal pain. Negative for blood in stool and nausea. Endocrine: Negative for cold intolerance, heat intolerance and polyuria. Genitourinary: Positive for difficulty urinating. Negative for dysuria and frequency. Musculoskeletal: Positive for arthralgias. Negative for back pain and myalgias. Skin: Negative for rash. Neurological: Negative for dizziness, weakness and headaches. Hematological: Negative for adenopathy. Psychiatric/Behavioral: Negative for behavioral problems and sleep disturbance. The patient is not nervous/anxious. Objective     /62   Pulse 68   Temp 98.3 °F (36.8 °C)   Ht 6' (1.829 m)   Wt 106 kg (233 lb)   SpO2 98%   BMI 31.60 kg/m²      Physical Exam  Vitals and nursing note reviewed. Constitutional:       General: He is not in acute distress. Appearance: Normal appearance. He is well-developed. HENT:      Head: Normocephalic and atraumatic. Right Ear: Tympanic membrane, ear canal and external ear normal.      Left Ear: Tympanic membrane, ear canal and external ear normal.      Nose: Nose normal.      Mouth/Throat:      Mouth: Mucous membranes are moist.      Pharynx: Oropharynx is clear. Eyes:      Extraocular Movements: Extraocular movements intact. Conjunctiva/sclera: Conjunctivae normal.      Pupils: Pupils are equal, round, and reactive to light. Cardiovascular:      Rate and Rhythm: Normal rate and regular rhythm. Pulses: Normal pulses. Heart sounds: Normal heart sounds. No murmur heard. Pulmonary:      Effort: Pulmonary effort is normal. No respiratory distress. Breath sounds: Normal breath sounds. Abdominal:      General: Bowel sounds are normal.      Palpations: Abdomen is soft. Tenderness: There is no abdominal tenderness. Musculoskeletal:         General: No swelling. Normal range of motion. Cervical back: Normal range of motion and neck supple. Skin:     General: Skin is warm and dry. Capillary Refill: Capillary refill takes less than 2 seconds. Neurological:      General: No focal deficit present.       Mental Status: He is alert and oriented to person, place, and time. Mental status is at baseline. Psychiatric:         Mood and Affect: Mood normal.         Behavior: Behavior normal.         Thought Content:  Thought content normal.         Judgment: Judgment normal.       Medications have been reviewed by provider in current encounter    Carol Iglesias DO

## 2023-08-09 ENCOUNTER — TELEPHONE (OUTPATIENT)
Dept: NON INVASIVE DIAGNOSTICS | Facility: HOSPITAL | Age: 68
End: 2023-08-09

## 2023-08-09 DIAGNOSIS — I48.0 PAF (PAROXYSMAL ATRIAL FIBRILLATION) (HCC): Primary | ICD-10-CM

## 2023-08-09 RX ORDER — METOPROLOL SUCCINATE 25 MG/1
25 TABLET, EXTENDED RELEASE ORAL DAILY
Qty: 30 TABLET | Refills: 6 | Status: SHIPPED | OUTPATIENT
Start: 2023-08-09

## 2023-08-09 NOTE — TELEPHONE ENCOUNTER
I was able to call and discussed results of loop interrogation with patient and recurrence of short episode of atrial fibrillation. In the setting of elevated [id number] score and history of atrial fibrillation after discussion with patient he denies any bleeding issues at this time and is agreeable to initiation of oral anticoagulation for stroke risk reduction and initiation of metoprolol therapy for hopeful continued rhythm control. After discussion with patient he is agreeable to trialing Eliquis 5 mg twice daily and this was sent along with metoprolol 25 mg daily to patient's pharmacy per her request at [facility]. Patient noted understanding was agreeable to plan and will let us know if there are any issues with medical therapy.

## 2023-08-17 ENCOUNTER — OFFICE VISIT (OUTPATIENT)
Dept: ENDOCRINOLOGY | Facility: CLINIC | Age: 68
End: 2023-08-17
Payer: COMMERCIAL

## 2023-08-17 VITALS
OXYGEN SATURATION: 98 % | HEART RATE: 74 BPM | HEIGHT: 72 IN | TEMPERATURE: 97.3 F | WEIGHT: 244.2 LBS | DIASTOLIC BLOOD PRESSURE: 64 MMHG | SYSTOLIC BLOOD PRESSURE: 118 MMHG | BODY MASS INDEX: 33.08 KG/M2

## 2023-08-17 DIAGNOSIS — E66.09 CLASS 1 OBESITY DUE TO EXCESS CALORIES WITH SERIOUS COMORBIDITY AND BODY MASS INDEX (BMI) OF 33.0 TO 33.9 IN ADULT: ICD-10-CM

## 2023-08-17 DIAGNOSIS — E11.10 TYPE 2 DIABETES MELLITUS WITH KETOACIDOSIS WITHOUT COMA, WITH LONG-TERM CURRENT USE OF INSULIN (HCC): ICD-10-CM

## 2023-08-17 DIAGNOSIS — Z79.4 TYPE 2 DIABETES MELLITUS WITH KETOACIDOSIS WITHOUT COMA, WITH LONG-TERM CURRENT USE OF INSULIN (HCC): ICD-10-CM

## 2023-08-17 DIAGNOSIS — E55.9 VITAMIN D DEFICIENCY: ICD-10-CM

## 2023-08-17 DIAGNOSIS — E11.65 UNCONTROLLED TYPE 2 DIABETES MELLITUS WITH HYPERGLYCEMIA (HCC): Primary | ICD-10-CM

## 2023-08-17 DIAGNOSIS — E78.2 MIXED HYPERLIPIDEMIA: ICD-10-CM

## 2023-08-17 PROCEDURE — 99214 OFFICE O/P EST MOD 30 MIN: CPT | Performed by: STUDENT IN AN ORGANIZED HEALTH CARE EDUCATION/TRAINING PROGRAM

## 2023-08-17 PROCEDURE — 95249 CONT GLUC MNTR PT PROV EQP: CPT | Performed by: STUDENT IN AN ORGANIZED HEALTH CARE EDUCATION/TRAINING PROGRAM

## 2023-08-17 RX ORDER — INSULIN GLARGINE 300 U/ML
30 INJECTION, SOLUTION SUBCUTANEOUS
Qty: 9 ML | Refills: 0 | Status: SHIPPED | OUTPATIENT
Start: 2023-08-17 | End: 2023-08-17

## 2023-08-17 RX ORDER — INSULIN LISPRO 100 [IU]/ML
10 INJECTION, SOLUTION INTRAVENOUS; SUBCUTANEOUS
Qty: 15 ML | Refills: 3 | Status: SHIPPED | OUTPATIENT
Start: 2023-08-17

## 2023-08-17 RX ORDER — INSULIN GLARGINE 300 U/ML
30 INJECTION, SOLUTION SUBCUTANEOUS
Qty: 9 ML | Refills: 0 | Status: SHIPPED | OUTPATIENT
Start: 2023-08-17 | End: 2023-11-15

## 2023-08-17 NOTE — PROGRESS NOTES
Established patient Progress Note      Cc: diabetes    Referring Provider  No referring provider defined for this encounter. History of Present Illness:   Oracio Chapa. is a 70 y.o. male with a history of type 2 diabetes with long term use of insulin who presented for follow up. Last seen in the office in April 2023. Patient stopped taking insulin ended up admission for hyperglycemia and sepsis due to UTI. Currently on Toujeo 30 units at bedtime and Humalog 10 units before each meals,  He checks his blood sugars once daily normally in the morning which ranging from 150 - 300,   He did not bring his log or his glucometer. Reports complications of Microalbuminuria  home glucose monitoring: are performed regularly    Hypoglycemic episodes:   H/o of hypoglycemia causing hospitalization or Intervention such as glucagon injection  or ambulance call :   Hypoglycemia symptoms:  Treatment of hypoglycemia:      Medic alert tag: recommended:            Opthamology:  UTD   Podiatry: UTD     Has hypertension: followed by PCP; on Metoprolol   Has hyperlipidemia: followed by PCP; on Lipitor 10 mg daily - tolerating well, no myalgias.     Thyroid disorders: no  History of pancreatitis: pancreatitis     Patient Active Problem List   Diagnosis   • Urinary frequency   • Type 2 diabetes mellitus (HCC)   • Microscopic hematuria   • Urge incontinence of urine   • Nocturnal enuresis   • Feeling of incomplete bladder emptying   • Balanitis   • Sepsis (HCC)   • PAF (paroxysmal atrial fibrillation) ([facility])   • Urinary retention   • Fungal infection of skin   • Depressed mood   • Pain in both lower extremities   • Polyuria   • Ventral Hernia   • Hydroureteronephrosis   • Uncontrolled type 2 diabetes mellitus with hyperglycemia (HCC)   • Vitamin D deficiency   • Dyslipidemia   • History of pulmonary embolus (PE) • Other dietary vitamin B12 deficiency anemia   • Encounter for screening colonoscopy   • Anemia   • Unspecified mood (affective) disorder (HCC)   • Class 1 obesity due to excess calories with serious comorbidity and body mass index (BMI) of 30.0 to 30.9 in adult   • Other male erectile dysfunction      Past Medical History:   Diagnosis Date   • A-fib ([address])     per pt "happened one time"   • Abdominal hernia    • Acute metabolic encephalopathy 56/24/4960   • CARLTON (acute kidney injury) ([address]) 09/04/2022   • Ambulates with cane     not recently using but has in home if needed   • Anesthesia     per pt "with knee surgery(in the )was awake for operation and fell asleep when being closed up" and than took long to wake up"   • Arthritis    • Blood in urine     "sometimes"   • Diabetes mellitus ([address])     Type 2--sees Endocrinologist [facility] Dr Rosario Hitchcock   • Lane catheter in place    • History of pulmonary embolism    • Implantable loop recorder present    • Muscle weakness     per pt "started with diabetes"some muscle loss of density" --per pt "muscle coming back"   • Myopia of left eye     glasses for reading   • Pancreatitis 09/04/2022   • Risk for falls    • Sepsis ([address]) 09/04/2022   • Teeth missing    • Urethral pain     "when passing urine"      Past Surgical History:   Procedure Laterality Date   • CARDIAC LOOP RECORDER     • CATARACT EXTRACTION Bilateral    • KNEE CARTILAGE SURGERY     • KNEE SURGERY Right    • NM TRURL ELECTROSURG RESCJ PROSTATE BLEED COMPLETE N/A 3/16/2023    Procedure: TRANSURETHRAL RESECTION OF PROSTATE (TURP)(possible), cystoscopy, SP tube placement, removal of bladder calculus;  Surgeon: Mejia Tejeda MD;  Location: [facility];  Service: Urology      Family History   Problem Relation Age of Onset   • Diabetes Father    • Diabetes type II Father    • Diabetes Mother      Social History     Tobacco Use   • Smoking status: Never     Passive exposure: Never   • Smokeless tobacco: Never   Substance Use Topics   • Alcohol use: Never     No Known Allergies      Current Outpatient Medications:   •  apixaban (Eliquis) 5 mg, Take 1 tablet (5 mg total) by mouth 2 (two) times a day, Disp: 60 tablet, Rfl: 6  •  atorvastatin (LIPITOR) 10 mg tablet, TAKE 1 TABLET BY MOUTH EVERY DAY, Disp: 90 tablet, Rfl: 3  •  Blood Glucose Monitoring Suppl (OneTouch Verio Flex System) w/Device KIT, CHECK BLOOD SUGARS THREE TIMES DAILY DX: E11.65, Disp: 1 kit, Rfl: 0  •  Continuous Blood Gluc  (FreeStyle Miguel Angel 2 [place]) MARCELLA, As instructed, Disp: 1 each, Rfl: 0  •  Continuous Blood Gluc Sensor (FreeStyle Miguel Angel 2 Sensor) MISC, Use to check her blood sugars continuously and change it every 2 weeks, Disp: 2 each, Rfl: 2  •  ferrous sulfate 324 (65 Fe) mg, TAKE 1 TABLET BY MOUTH 2 TIMES A DAY BEFORE MEALS., Disp: 180 tablet, Rfl: 1  •  folic acid (FOLVITE) 1 mg tablet, TAKE 1 TABLET BY MOUTH EVERY DAY, Disp: 90 tablet, Rfl: 1  •  gabapentin (NEURONTIN) 100 mg capsule, Take 1 capsule (100 mg total) by mouth 3 (three) times a day, Disp: 90 capsule, Rfl: 3  •  glucose blood (OneTouch Verio) test strip, CHECK BLOOD SUGARS THREE TIMES DAILY DX: E11.65, Disp: 400 strip, Rfl: 3  •  Incontinence Supply Disposable (Incontinence Brief Large) MISC, Use every 4 (four) hours as needed (Urinary incontinence), Disp: 36 each, Rfl: 12  •  insulin glargine (Toujeo SoloStar) 300 units/mL CONCENTRATED U-300 injection pen (1-unit dial), Inject 20 Units under the skin daily at bedtime, Disp: 2.4 mL, Rfl: 0  •  insulin lispro (HumaLOG KwikPen) 100 units/mL injection pen, Inject 10 Units under the skin 3 (three) times a day with meals, Disp: 15 mL, Rfl: 3  •  Lancets (OneTouch Delica Plus CAXFFH19I) MISC, CHECK BLOOD SUGARS THREE TIMES DAILY DX: E11.65, Disp: 400 each, Rfl: 3  •  metoprolol succinate (TOPROL-XL) 25 mg 24 hr tablet, Take 1 tablet (25 mg total) by mouth daily, Disp: 30 tablet, Rfl: 6  •  pantoprazole (PROTONIX) 40 mg tablet, Take 1 tablet (40 mg total) by mouth daily in the early morning, Disp: 30 tablet, Rfl: 0  •  tadalafil (CIALIS) 10 MG tablet, Take 1 tablet (10 mg total) by mouth daily as needed for erectile dysfunction, Disp: 30 tablet, Rfl: 5  •  tamsulosin (FLOMAX) 0.4 mg, Take 1 capsule (0.4 mg total) by mouth daily with dinner, Disp: 30 capsule, Rfl: 0  Review of Systems   Constitutional: Positive for unexpected weight change. Negative for appetite change and fatigue. HENT: Negative for trouble swallowing and voice change. Eyes: Negative for visual disturbance. Respiratory: Negative for cough, shortness of breath and wheezing. Cardiovascular: Negative for palpitations and leg swelling. Gastrointestinal: Positive for abdominal distention. Negative for abdominal pain, constipation, diarrhea, nausea and vomiting. Endocrine: Negative for cold intolerance, heat intolerance, polyphagia and polyuria. Musculoskeletal: Negative for arthralgias. Skin: Negative for color change, rash and wound. Neurological: Negative for dizziness, tremors, weakness, light-headedness, numbness and headaches. Psychiatric/Behavioral: Negative for agitation and sleep disturbance. The patient is not nervous/anxious. Physical Exam:  There is no height or weight on file to calculate BMI. There were no vitals taken for this visit.    Wt Readings from Last 3 Encounters:   08/07/23 106 kg (233 lb)   07/30/23 93.7 kg (206 lb 9.1 oz)   04/28/23 100 kg (221 lb)       GEN: NAD  E/n/m nl facies,   Eyes: no stare or proptosis,   Neck: trachea midline, thyroid NT to palpation, nl in size, no nodules or neck masses noted, no cervical LAD  CV; heart reg rate s1s2 nl, no m/r/g appreciated, no REBECA  Resp: CTAB, good effort  Ab+BS  Neuro: no tremor, 2+ DTRs in BUE  MS: no c/c in digits, moves all 4 ext, nl muscle bulk, gait nl  Skin: warm and dry, no palmar erythema  Ext: no c/c in digits, no edema bilaterally, 2+ DP and PT pulses bilat,   Psych: nl mood and affect, no gross lapses in memory      Labs:   No components found for: "HA1C"  No components found for: "GLU"    Lab Results   Component Value Date    CREATININE 1.14 08/01/2023    CREATININE 0.90 07/31/2023    CREATININE 1.06 07/30/2023    BUN 32 (H) 08/01/2023    K 4.2 08/01/2023     08/01/2023    CO2 23 08/01/2023     eGFR   Date Value Ref Range Status   08/01/2023 65 ml/min/1.73sq m Final     No components found for: "MALBCRER"    Lab Results   Component Value Date    HDL 43 01/19/2023    TRIG 71 01/19/2023       Lab Results   Component Value Date    ALT 14 07/30/2023    AST 19 07/30/2023    ALKPHOS 102 07/30/2023       Lab Results   Component Value Date    FREET4 1.25 01/19/2023      Latest Reference Range & Units 09/05/22 08:28 11/17/22 14:07 01/19/23 09:23 04/19/23 11:07 07/31/23 05:25   Hemoglobin A1C Normal 4.0-5.6%; PreDiabetic 5.7-6.4%; Diabetic >=6.5%; Glycemic control for adults with diabetes <7.0% % >14.0 (H) 6.5 (H) 6.3 (H) 7.4 ! 15.5 (H)   (H): Data is abnormally high  !: Data is abnormal  Impression:  1. Uncontrolled type 2 diabetes mellitus with hyperglycemia ([address])    2. Vitamin D deficiency    3. Type 2 diabetes mellitus with ketoacidosis without coma, with long-term current use of insulin ([address])    4. Mixed hyperlipidemia    5. Class 1 obesity due to excess calories with serious comorbidity and body mass index (BMI) of 33.0 to 33.9 in adult           Plan:    Oracio was seen today for diabetes type 2. Diagnoses and all orders for this visit:    Uncontrolled type 2 diabetes mellitus with hyperglycemia ([address]):  Previously controlled, unfortunately patient stopped taking all of his antihypertensive medication,, recently was admitted with hyperglycemia, A1c was found to be greater than 15%, he was started on insulin, currently on Toujeo 30 units at bedtime (primary physician recently increase the dose from 20 units) and Humalog 10 units 3 times daily AC. Blood sugars have improved.   I added metformin 500 mg twice a day. We will continue insulin at current dose. A Miguel Angel 2 sensor was placed today at his visit, he was corrected to our office, he was instructed to call the office in 2 weeks to download his report. Contact office if BG <70 or >350 more than twice a week. Follow Carb Controlled diet and increase physical activity as tolerated to help with control of your diabetes. Continue annual DM eye exams ophthalmology,   Continue foot care and follow with podiatry. Treatment for HYPOGLYCEMIA- RULE OF 15  Symptoms include tremors, diaphoresis, hunger, confusion, headache  Treat IMMEDIATELY with 15 grams of Carbs  ½ cup regular juice/soda  2 tablespoons raisins  4 teaspoons sugar  1 tablespoon honey  3-4 glucose tablets  Recheck blood glucose in 15 mins and repeat above if still symptomatic/blood glucose <80  Return back in 2 months   Lab prior to next visit. -     Hemoglobin A1C; Future  -     Comprehensive metabolic panel; Future  -     Lipid Panel with Direct LDL reflex; Future  -     Albumin / creatinine urine ratio; Future  -     insulin lispro (HumaLOG KwikPen) 100 units/mL injection pen; Inject 10 Units under the skin 3 (three) times a day with meals    Vitamin D deficiency:  Continue supplementation  -     Vitamin D 25 hydroxy; Future    Type 2 diabetes mellitus with ketoacidosis without coma, with long-term current use of insulin (HCC)  -     Discontinue: insulin glargine (Toujeo SoloStar) 300 units/mL CONCENTRATED U-300 injection pen (1-unit dial); Inject 30 Units under the skin daily at bedtime  -     metFORMIN (GLUCOPHAGE) 500 mg tablet; Take 1 tablet (500 mg total) by mouth 2 (two) times a day with meals  -     insulin glargine (Toujeo SoloStar) 300 units/mL CONCENTRATED U-300 injection pen (1-unit dial); Inject 30 Units under the skin daily at bedtime    Mixed hyperlipidemia:  Continue Lipitor 10 mg daily.  -     Lipid Panel with Direct LDL reflex;  Future    Class 1 obesity due to excess calories with serious comorbidity and body mass index (BMI) of 33.0 to 33.9 in adult  Emphasized importance of daily exercise, weight loss, caloric reduction, small meals, consumption of multiple servings of fruits and vegetables and avoidance of concentrated sweets such as juice and soda. Discussed with the patient and all questioned fully answered. He will call me if any problems arise.     Counseled patient on diagnostic results, prognosis, risk and benefit of treatment options, instruction for management, importance of treatment compliance, Risk  factor reduction and impressions      Isaías Bhardwaj MD

## 2023-08-17 NOTE — PATIENT INSTRUCTIONS
Contact office if BG <70 or >350 more than twice a week. Follow Carb Controlled diet and increase physical activity as tolerated to help with control of your diabetes. Continue annual DM eye exams ophthalmology,   Continue foot care and follow with podiatry.      Treatment for HYPOGLYCEMIA- RULE OF 15  Symptoms include tremors, diaphoresis, hunger, confusion, headache  Treat IMMEDIATELY with 15 grams of Carbs  ½ cup regular juice/soda  2 tablespoons raisins  4 teaspoons sugar  1 tablespoon honey  3-4 glucose tablets  Recheck blood glucose in 15 mins and repeat above if still symptomatic/blood glucose <80

## 2023-08-21 ENCOUNTER — TELEPHONE (OUTPATIENT)
Dept: PSYCHIATRY | Facility: CLINIC | Age: 68
End: 2023-08-21

## 2023-08-24 ENCOUNTER — TELEPHONE (OUTPATIENT)
Dept: GASTROENTEROLOGY | Facility: CLINIC | Age: 68
End: 2023-08-24

## 2023-08-24 ENCOUNTER — TELEPHONE (OUTPATIENT)
Dept: FAMILY MEDICINE CLINIC | Facility: CLINIC | Age: 68
End: 2023-08-24

## 2023-08-24 DIAGNOSIS — N52.8 OTHER MALE ERECTILE DYSFUNCTION: ICD-10-CM

## 2023-08-24 NOTE — TELEPHONE ENCOUNTER
Etelvina Koenig from patients podiatry office called stating she has faxed over a form five times for patient to get diabetic shoes and has got no response. I do not see anything in the patients chart. She wanted to know if we have it and if Dr. Apple Olson is willing to fill this out for the patient? Her call back number for questions is [phone].    Their fax number is [phone]

## 2023-08-24 NOTE — TELEPHONE ENCOUNTER
Patient requested a refill on Cialis but he asked if the medication can be increased in dose as he does not feel as the medication is fully working to the full potential for him. Patient feels as if the medication is only providing a half erection for him. He inquired about doubling on dosage on the remaining pills he has and possibly having the higher dose for the refill called in. Please advise.

## 2023-08-25 ENCOUNTER — TELEPHONE (OUTPATIENT)
Dept: FAMILY MEDICINE CLINIC | Facility: CLINIC | Age: 68
End: 2023-08-25

## 2023-08-25 DIAGNOSIS — N52.8 OTHER MALE ERECTILE DYSFUNCTION: ICD-10-CM

## 2023-08-25 RX ORDER — TADALAFIL 20 MG/1
20 TABLET ORAL DAILY PRN
Qty: 30 TABLET | Refills: 5 | Status: SHIPPED | OUTPATIENT
Start: 2023-08-25

## 2023-08-25 NOTE — TELEPHONE ENCOUNTER
Forms have been placed on providers desk awaiting signature, all forms can take up to 7-10 days upon receipt, will fax as soon as possible

## 2023-08-25 NOTE — TELEPHONE ENCOUNTER
Patient called back. Patient was taking a double dose of Cialis was equal to the 20 and it did not work. It made it grow but it did not get hard. And he knows not to take the tamsulosin with it.

## 2023-08-31 DIAGNOSIS — E11.9 TYPE 2 DIABETES MELLITUS WITHOUT COMPLICATION, WITH LONG-TERM CURRENT USE OF INSULIN (HCC): ICD-10-CM

## 2023-08-31 DIAGNOSIS — Z79.4 TYPE 2 DIABETES MELLITUS WITHOUT COMPLICATION, WITH LONG-TERM CURRENT USE OF INSULIN (HCC): ICD-10-CM

## 2023-08-31 NOTE — TELEPHONE ENCOUNTER
Patient calling back to check on the status of forms for diabetic shoes? I see Dr. Guido Vo marked as completed. Are we able to re-fax forms to patient?

## 2023-08-31 NOTE — TELEPHONE ENCOUNTER
Patient calling requesting refill on Back& 2 sensors. Patient would like sent to [facility].

## 2023-09-05 DIAGNOSIS — Z79.4 TYPE 2 DIABETES MELLITUS WITHOUT COMPLICATION, WITH LONG-TERM CURRENT USE OF INSULIN (HCC): ICD-10-CM

## 2023-09-05 DIAGNOSIS — E11.9 TYPE 2 DIABETES MELLITUS WITHOUT COMPLICATION, WITH LONG-TERM CURRENT USE OF INSULIN (HCC): ICD-10-CM

## 2023-09-06 ENCOUNTER — REMOTE DEVICE CLINIC VISIT (OUTPATIENT)
Dept: CARDIOLOGY CLINIC | Facility: CLINIC | Age: 68
End: 2023-09-06
Payer: COMMERCIAL

## 2023-09-06 DIAGNOSIS — Z95.818 PRESENCE OF OTHER CARDIAC IMPLANTS AND GRAFTS: Primary | ICD-10-CM

## 2023-09-06 PROCEDURE — 93298 REM INTERROG DEV EVAL SCRMS: CPT | Performed by: INTERNAL MEDICINE

## 2023-09-06 PROCEDURE — G2066 INTER DEVC REMOTE 30D: HCPCS | Performed by: INTERNAL MEDICINE

## 2023-09-06 NOTE — PROGRESS NOTES
Results for orders placed or performed in visit on 09/06/23   Cardiac EP device report    Narrative     [address] TRANSMISSION: BATTERY STATUS "OK." NO PATIENT OR DEVICE ACTIVATED EPISODES. NORMAL DEVICE FUNCTION.  NC

## 2023-09-07 ENCOUNTER — TELEPHONE (OUTPATIENT)
Dept: ENDOCRINOLOGY | Facility: CLINIC | Age: 68
End: 2023-09-07

## 2023-09-07 NOTE — TELEPHONE ENCOUNTER
Patient called and said [facility] told him that for his freestyle rajani 2 sensors it would cost $408.  Patient wants to know if there is another cheaper option

## 2023-09-27 ENCOUNTER — OFFICE VISIT (OUTPATIENT)
Dept: CARDIOLOGY CLINIC | Facility: CLINIC | Age: 68
End: 2023-09-27
Payer: COMMERCIAL

## 2023-09-27 VITALS
RESPIRATION RATE: 16 BRPM | WEIGHT: 238 LBS | BODY MASS INDEX: 32.23 KG/M2 | OXYGEN SATURATION: 98 % | HEIGHT: 72 IN | HEART RATE: 78 BPM | SYSTOLIC BLOOD PRESSURE: 108 MMHG | TEMPERATURE: 98.3 F | DIASTOLIC BLOOD PRESSURE: 62 MMHG

## 2023-09-27 DIAGNOSIS — E66.09 CLASS 1 OBESITY DUE TO EXCESS CALORIES WITH BODY MASS INDEX (BMI) OF 32.0 TO 32.9 IN ADULT, UNSPECIFIED WHETHER SERIOUS COMORBIDITY PRESENT: ICD-10-CM

## 2023-09-27 DIAGNOSIS — R06.83 SNORING: ICD-10-CM

## 2023-09-27 DIAGNOSIS — E78.5 HYPERLIPIDEMIA, UNSPECIFIED HYPERLIPIDEMIA TYPE: ICD-10-CM

## 2023-09-27 DIAGNOSIS — I48.0 PAF (PAROXYSMAL ATRIAL FIBRILLATION) (HCC): Primary | ICD-10-CM

## 2023-09-27 PROCEDURE — 99214 OFFICE O/P EST MOD 30 MIN: CPT | Performed by: INTERNAL MEDICINE

## 2023-09-27 NOTE — PROGRESS NOTES
Cardiology Follow Up    Oracio Benjamin.  [id number]  1955  [facility] [address]  [phone]      1. PAF (paroxysmal atrial fibrillation) (HCC)        2. Class 1 obesity due to excess calories with body mass index (BMI) of 32.0 to 32.9 in adult, unspecified whether serious comorbidity present        3. Snoring        4. Hyperlipidemia, unspecified hyperlipidemia type            Discussion/Summary:  1. Paroxysmal atrial fibrillation  2. Obesity  3. Intermittent snoring  4. History of thought provoked pulmonary embolism  5. Hyperlipidemia    -Transthoracic echocardiogram 9/7/22 showing left-ventricular systolic function normal estimated LVEF 60% with no diagnostic regional wall motion abnormalities appreciated with normal diastolic parameters with trace mitral regurgitation and trace tricuspid regurgitation.  -Loop interrogation 9/6/2023 showing no patient or device activated episodes with normal device function  -Patient counseled on dietary and lifestyle modifications and we will follow-up fasting lipid panel results.  -Patient will continue Eliquis 5 mg twice daily, atorvastatin 10 mg daily, metoprolol succinate 25 mg daily  -According to revised cardiac risk index patient is intermediate risk for operative procedures. He notes he is due for endoscopy in the near future and will be discussing with his gastroenterology team.  Patient has adequate functional status with no significant issues and therefore is appropriate risk to proceed with endoscopy.   If necessary patient can hold oral anticoagulation however this should be initiated shortly after procedure once cleared by gastroenterology team  -I will see patient in 3 months or sooner if necessary  -Patient understands my recommendations for evaluation with sleep medicine however at this time wishes to hold off but will let us know if he changes his mind  -Patient counseled if he were to have any warning or alarm type symptoms he is to seek emergency medical care immediately. History of Present Illness:  -Patient is a 27-year-old male with diabetes mellitus hospitalized at [facility] in September 2022 with altered mental status found at that time to have diabetic ketoacidosis with significant anion gap acidosis and elevated lactic acid level along with pancreatitis and CARLTON with obstructive uropathy diagnosed with paroxysmal atrial fibrillation that spontaneously converted. He was initiated on medical therapy and even underwent TURP procedure and follows with urology and underwent implantable loop recorder in January 2023. Patient also had been previously living in [facility] and had had history of DVT/PE treated with 2 weeks of anticoagulant therapy thought likely due to his obesity and sedentary lifestyle previously. Paroxysmal atrial fibrillation and elevated [id number] score patient was initiated on coagulation with Eliquis along with metoprolol therapy. Patient presents to the office today for scheduled follow-up. -Currently in office today patient denies any chest pain, palpitations, lightness or dizziness, loss of consciousness, shortness of breath, lower extremity edema, orthopnea or bendopnea. He notes he is able to walk over a mile at a time with no significant issues apart from some chronic arthropathies and states that blood pressures at home are well controlled and otherwise he denies any issues.     Patient Active Problem List   Diagnosis   • Urinary frequency   • Type 2 diabetes mellitus (HCC)   • Microscopic hematuria   • Urge incontinence of urine   • Nocturnal enuresis   • Feeling of incomplete bladder emptying   • Balanitis   • Sepsis ([facility])   • PAF (paroxysmal atrial fibrillation) ([facility])   • Urinary retention   • Fungal infection of skin   • Depressed mood   • Pain in both lower extremities   • Polyuria   • Ventral Hernia   • Hydroureteronephrosis   • Uncontrolled type 2 diabetes mellitus with hyperglycemia (HCC)   • Vitamin D deficiency   • Dyslipidemia   • History of pulmonary embolus (PE)   • Other dietary vitamin B12 deficiency anemia   • Encounter for screening colonoscopy   • Anemia   • Unspecified mood (affective) disorder (HCC)   • Class 1 obesity due to excess calories with serious comorbidity and body mass index (BMI) of 33.0 to 33.9 in adult   • Other male erectile dysfunction     Past Medical History:   Diagnosis Date   • A-fib ([address])     per pt "happened one time"   • Abdominal hernia    • Acute metabolic encephalopathy 37/24/9276   • CARLTON (acute kidney injury) ([address]) 09/04/2022   • Ambulates with cane     not recently using but has in home if needed   • Anesthesia     per pt "with knee surgery(in the )was awake for operation and fell asleep when being closed up" and than took long to wake up"   • Arthritis    • Blood in urine     "sometimes"   • Diabetes mellitus ([address])     Type 2--sees Endocrinologist [facility] Dr Marysol Griffin   • Lane catheter in place    • History of pulmonary embolism    • Implantable loop recorder present    • Muscle weakness     per pt "started with diabetes"some muscle loss of density" --per pt "muscle coming back"   • Myopia of left eye     glasses for reading   • Pancreatitis 09/04/2022   • Risk for falls    • Sepsis ([address]) 09/04/2022   • Teeth missing    • Urethral pain     "when passing urine"     Social History     Socioeconomic History   • Marital status: /Civil Union     Spouse name: Not on file   • Number of children: Not on file   • Years of education: Not on file   • Highest education level: Not on file   Occupational History   • Not on file   Tobacco Use   • Smoking status: Never     Passive exposure: Never   • Smokeless tobacco: Never   Vaping Use   • Vaping Use: Never used   Substance and Sexual Activity   • Alcohol use: Never   • Drug use: Never   • Sexual activity: Not Currently     Comment: defer   Other Topics Concern   • Not on file   Social History Narrative   • Not on file     Social Determinants of Health     Financial Resource Strain: Low Risk  (11/2/2022)    Overall Financial Resource Strain (CARDIA)    • Difficulty of Paying Living Expenses: Not hard at all   Food Insecurity: No Food Insecurity (7/31/2023)    Hunger Vital Sign    • Worried About Running Out of Food in the Last Year: Never true    • Ran Out of Food in the Last Year: Never true   Transportation Needs: No Transportation Needs (7/31/2023)    PRAPARE - Transportation    • Lack of Transportation (Medical): No    • Lack of Transportation (Non-Medical):  No   Physical Activity: Not on file   Stress: Not on file   Social Connections: Not on file   Intimate Partner Violence: Not on file   Housing Stability: Low Risk  (7/31/2023)    Housing Stability Vital Sign    • Unable to Pay for Housing in the Last Year: No    • Number of Places Lived in the Last Year: 1    • Unstable Housing in the Last Year: No      Family History   Problem Relation Age of Onset   • Diabetes Father    • Diabetes type II Father    • Diabetes Mother      Past Surgical History:   Procedure Laterality Date   • CARDIAC LOOP RECORDER     • CATARACT EXTRACTION Bilateral    • KNEE CARTILAGE SURGERY     • KNEE SURGERY Right    • [place] TRURL ELECTROSURG RESCJ PROSTATE BLEED COMPLETE N/A 3/16/2023    Procedure: TRANSURETHRAL RESECTION OF PROSTATE (TURP)(possible), cystoscopy, SP tube placement, removal of bladder calculus;  Surgeon: Ramo Estrada MD;  Location: [facility];  Service: Urology       Current Outpatient Medications:   •  apixaban (Eliquis) 5 mg, Take 1 tablet (5 mg total) by mouth 2 (two) times a day, Disp: 60 tablet, Rfl: 6  •  atorvastatin (LIPITOR) 10 mg tablet, TAKE 1 TABLET BY MOUTH EVERY DAY, Disp: 90 tablet, Rfl: 3  •  Blood Glucose Monitoring Suppl (OneTouch Verio Flex System) w/Device KIT, CHECK BLOOD SUGARS THREE TIMES DAILY DX: E11.65, Disp: 1 kit, Rfl: 0  •  Continuous Blood Gluc  (FreeStyle Miguel Angel 2 [place]) MARCELLA, As instructed, Disp: 1 each, Rfl: 0  •  Continuous Blood Gluc Sensor (FreeStyle Miguel Angel 2 Sensor) MISC, Use to check her blood sugars continuously and change it every 2 weeks, Disp: 7 each, Rfl: 2  •  ferrous sulfate 324 (65 Fe) mg, TAKE 1 TABLET BY MOUTH 2 TIMES A DAY BEFORE MEALS., Disp: 180 tablet, Rfl: 1  •  folic acid (FOLVITE) 1 mg tablet, TAKE 1 TABLET BY MOUTH EVERY DAY, Disp: 90 tablet, Rfl: 1  •  gabapentin (NEURONTIN) 100 mg capsule, Take 1 capsule (100 mg total) by mouth 3 (three) times a day, Disp: 90 capsule, Rfl: 3  •  glucose blood (OneTouch Verio) test strip, CHECK BLOOD SUGARS THREE TIMES DAILY DX: E11.65, Disp: 400 strip, Rfl: 3  •  Incontinence Supply Disposable (Incontinence Brief Large) MISC, Use every 4 (four) hours as needed (Urinary incontinence), Disp: 36 each, Rfl: 12  •  insulin glargine (Toujeo SoloStar) 300 units/mL CONCENTRATED U-300 injection pen (1-unit dial), Inject 30 Units under the skin daily at bedtime, Disp: 9 mL, Rfl: 0  •  insulin lispro (HumaLOG KwikPen) 100 units/mL injection pen, Inject 10 Units under the skin 3 (three) times a day with meals, Disp: 15 mL, Rfl: 3  •  Lancets (OneTouch Delica Plus [id number]) MISC, CHECK BLOOD SUGARS THREE TIMES DAILY DX: E11.65, Disp: 400 each, Rfl: 3  •  metFORMIN (GLUCOPHAGE) 500 mg tablet, Take 1 tablet (500 mg total) by mouth 2 (two) times a day with meals, Disp: 180 tablet, Rfl: 0  •  metoprolol succinate (TOPROL-XL) 25 mg 24 hr tablet, Take 1 tablet (25 mg total) by mouth daily, Disp: 30 tablet, Rfl: 6  •  pantoprazole (PROTONIX) 40 mg tablet, Take 1 tablet (40 mg total) by mouth daily in the early morning, Disp: 30 tablet, Rfl: 0  •  tadalafil (CIALIS) 20 MG tablet, Take 1 tablet (20 mg total) by mouth daily as needed for erectile dysfunction, Disp: 30 tablet, Rfl: 5  •  tamsulosin (FLOMAX) 0.4 mg, Take 1 capsule (0.4 mg total) by mouth daily with dinner, Disp: 30 capsule, Rfl: 0  No Known Allergies      Labs:  Admission on 07/29/2023, Discharged on 08/01/2023   Component Date Value   • POC Glucose 07/29/2023 359 (H)    • WBC 07/29/2023 11.92 (H)    • RBC 07/29/2023 5.82 (H)    • Hemoglobin 07/29/2023 16.6    • Hematocrit 07/29/2023 48.5    • MCV 07/29/2023 83    • MCH 07/29/2023 28.5    • MCHC 07/29/2023 34.2    • RDW 07/29/2023 13.1    • Platelets 07/90/2514 204    • MPV 07/29/2023 11.3    • Sodium 07/29/2023 131 (L)    • Potassium 07/29/2023 3.9    • Chloride 07/29/2023 101    • CO2 07/29/2023 12 (L)    • ANION GAP 07/29/2023 18    • BUN 07/29/2023 39 (H)    • Creatinine 07/29/2023 1.43 (H)    • Glucose 07/29/2023 341 (H)    • Calcium 07/29/2023 10.9 (H)    • AST 07/29/2023 21    • ALT 07/29/2023 16    • Alkaline Phosphatase 07/29/2023 123 (H)    • Total Protein 07/29/2023 9.0 (H)    • Albumin 07/29/2023 4.7    • Total Bilirubin 07/29/2023 0.56    • eGFR 07/29/2023 49    • BETA-HYDROXYBUTYRATE 07/29/2023 2.8 (H)    • pH, Obie 07/29/2023 7. 307    • pCO2, Obie 07/29/2023 26.5 (L)    • pO2, Obie 07/29/2023 60.0 (H)    • HCO3, Obie 07/29/2023 13.0 (L)    • Base Excess, Obie 07/29/2023 -11.3    • O2 Content, Obie 07/29/2023 21.6    • O2 HGB, VENOUS 07/29/2023 89.0 (H)    • LACTIC ACID 07/29/2023 2.3 (HH)    • Color, UA 07/30/2023 Light Yellow    • Clarity, UA 07/30/2023 Clear    • Specific Gravity, UA 07/30/2023 >=1.050 (H)    • pH, UA 07/30/2023 5.5    • Leukocytes, UA 07/30/2023 Large (A)    • Nitrite, UA 07/30/2023 Negative    • Protein, UA 07/30/2023 30 (1+) (A)    • Glucose, UA 07/30/2023 300 (3/10%) (A)    • Ketones, UA 07/30/2023 40 (2+) (A)    • Urobilinogen, UA 07/30/2023 <2.0    • Bilirubin, UA 07/30/2023 Negative    • Occult Blood, UA 07/30/2023 Moderate (A)    • hs TnI 0hr 07/29/2023 5    • Ventricular Rate 07/29/2023 88    • Atrial Rate 07/29/2023 88    • VT Interval 07/29/2023 176    • QRSD Interval 07/29/2023 104    • QT Interval 07/29/2023 390    • QTC Interval 07/29/2023 471    • P Axis 07/29/2023 7    • QRS Axis 07/29/2023 -36    • T Wave Axis 07/29/2023 47    • LACTIC ACID 07/30/2023 1.1    • hs TnI 2hr 07/30/2023 6    • Delta 2hr hsTnI 07/30/2023 1    • hs TnI 4hr 07/30/2023 4    • Delta 4hr hsTnI 07/30/2023 -1    • Sodium 07/30/2023 132 (L)    • Potassium 07/30/2023 4.0    • Chloride 07/30/2023 103    • CO2 07/30/2023 16 (L)    • ANION GAP 07/30/2023 13    • BUN 07/30/2023 36 (H)    • Creatinine 07/30/2023 1.04    • Glucose 07/30/2023 235 (H)    • Calcium 07/30/2023 9.9    • eGFR 07/30/2023 72    • pH, Obie 07/30/2023 7.268 (L)    • pCO2, Obie 07/30/2023 35.0 (L)    • pO2, Obie 07/30/2023 54.2 (H)    • HCO3, Obie 07/30/2023 15.6 (L)    • Base Excess, Obie 07/30/2023 -10.2    • O2 Content, Obie 07/30/2023 19.4    • O2 HGB, VENOUS 07/30/2023 85.8 (H)    • RBC, UA 07/30/2023 30-50 (A)    • WBC, UA 07/30/2023 Innumerable (A)    • Epithelial Cells 07/30/2023 Occasional    • Bacteria, UA 07/30/2023 Occasional    • MUCUS THREADS 07/30/2023 Occasional (A)    • Urine Culture 07/30/2023 80,000-89,000 cfu/ml Klebsiella oxytoca (A)    • Procalcitonin 07/30/2023 0.14    • Blood Culture 07/30/2023 No Growth After 5 Days. • Blood Culture 07/30/2023 No Growth After 5 Days.     • Sodium 07/30/2023 132 (L)    • Potassium 07/30/2023 4.4    • Chloride 07/30/2023 103    • CO2 07/30/2023 13 (L)    • ANION GAP 07/30/2023 16    • BUN 07/30/2023 35 (H)    • Creatinine 07/30/2023 1.06    • Glucose 07/30/2023 369 (H)    • Calcium 07/30/2023 9.9    • AST 07/30/2023 19    • ALT 07/30/2023 14    • Alkaline Phosphatase 07/30/2023 102    • Total Protein 07/30/2023 7.6    • Albumin 07/30/2023 4.0    • Total Bilirubin 07/30/2023 0.58    • eGFR 07/30/2023 71    • WBC 07/30/2023 9.99    • RBC 07/30/2023 5.27    • Hemoglobin 07/30/2023 15.5    • Hematocrit 07/30/2023 45.2    • MCV 07/30/2023 86    • MCH 07/30/2023 29.4    • MCHC 07/30/2023 34.3    • RDW 07/30/2023 13.2    • Platelets 07/75/9357 171    • MPV 07/30/2023 11.2    • POC Glucose 07/30/2023 388 (H)    • Lipase 07/30/2023 7 (L)    • POC Glucose 07/30/2023 362 (H)    • POC Glucose 07/30/2023 481 (H)    • POC Glucose 07/30/2023 245 (H)    • Procalcitonin 07/31/2023 0.20    • WBC 07/31/2023 7.65    • RBC 07/31/2023 4.83    • Hemoglobin 07/31/2023 14.0    • Hematocrit 07/31/2023 41.2    • MCV 07/31/2023 85    • MCH 07/31/2023 29.0    • MCHC 07/31/2023 34.0    • RDW 07/31/2023 13.4    • Platelets 85/72/9716 162    • MPV 07/31/2023 11.5    • Sodium 07/31/2023 132 (L)    • Potassium 07/31/2023 3.8    • Chloride 07/31/2023 104    • CO2 07/31/2023 20 (L)    • ANION GAP 07/31/2023 8    • BUN 07/31/2023 33 (H)    • Creatinine 07/31/2023 0.90    • Glucose 07/31/2023 283 (H)    • Calcium 07/31/2023 9.9    • eGFR 07/31/2023 86    • POC Glucose 07/31/2023 304 (H)    • LACTIC ACID 07/31/2023 2.5 (HH)    • pH, Obie 07/31/2023 7.409 (H)    • pCO2, Obie 07/31/2023 30.2 (L)    • pO2, Obie 07/31/2023 143.6 (H)    • HCO3, Obie 07/31/2023 18.7 (L)    • Base Excess, Obie 07/31/2023 -4.7    • O2 Content, Obie 07/31/2023 19.3    • O2 HGB, VENOUS 07/31/2023 96.1 (H)    • Hemoglobin A1C 07/31/2023 15.5 (H)    • EAG 07/31/2023 398    • POC Glucose 07/31/2023 366 (H)    • POC Glucose 07/29/2023 410 (H)    • LACTIC ACID 07/31/2023 1.6    • POC Glucose 07/31/2023 299 (H)    • POC Glucose 07/31/2023 315 (H)    • Sodium 08/01/2023 132 (L)    • Potassium 08/01/2023 4.2    • Chloride 08/01/2023 102    • CO2 08/01/2023 23    • ANION GAP 08/01/2023 7    • BUN 08/01/2023 32 (H)    • Creatinine 08/01/2023 1.14    • Glucose 08/01/2023 382 (H)    • Calcium 08/01/2023 9.5    • eGFR 08/01/2023 65    • WBC 08/01/2023 6.91    • RBC 08/01/2023 4.82    • Hemoglobin 08/01/2023 14.0    • Hematocrit 08/01/2023 40.9    • MCV 08/01/2023 85    • MCH 08/01/2023 29.0    • MCHC 08/01/2023 34.2    • RDW 08/01/2023 13.2    • Platelets 18/85/3071 159    • MPV 08/01/2023 11.0    • POC Glucose 08/01/2023 406 (H)    • POC Glucose 08/01/2023 403 (H)         Imaging: Cardiac EP device report    Result Date: 9/6/2023  Narrative: MDT LP2-ACTIVE SYSTEM IS MRI CONDITIONAL CARELINK TRANSMISSION: BATTERY STATUS "OK." NO PATIENT OR DEVICE ACTIVATED EPISODES. NORMAL DEVICE FUNCTION. NC     Review of Systems:  Review of Systems   Constitutional: Negative for chills, diaphoresis, fatigue and fever. HENT: Negative for trouble swallowing and voice change. Eyes: Negative for pain and redness. Respiratory: Negative for shortness of breath and wheezing. Cardiovascular: Negative for chest pain, palpitations and leg swelling. Gastrointestinal: Negative for abdominal pain, constipation, diarrhea and vomiting. Genitourinary: Negative for dysuria. Musculoskeletal: Positive for arthralgias. Negative for neck pain and neck stiffness. Skin: Negative for rash. Neurological: Negative for dizziness, syncope, light-headedness and headaches. Psychiatric/Behavioral: Negative for agitation and confusion. All other systems reviewed and are negative. Vitals:    09/27/23 1257   BP: 108/62   BP Location: Right arm   Patient Position: Sitting   Cuff Size: Large   Pulse: 78   Resp: 16   Temp: 98.3 °F (36.8 °C)   TempSrc: Temporal   SpO2: 98%   Weight: 108 kg (238 lb)   Height: 6' (1.829 m)     Vitals:    09/27/23 1257   Weight: 108 kg (238 lb)     Height: 6' (182.9 cm)     Physical Exam:   Physical Exam  Vitals reviewed. Constitutional:       General: He is not in acute distress. Appearance: He is obese. He is not diaphoretic. HENT:      Head: Normocephalic and atraumatic. Eyes:      General:         Right eye: No discharge. Left eye: No discharge. Neck:      Comments: Trachea midline, neck obese, difficult assess JVD  Cardiovascular:      Rate and Rhythm: Normal rate and regular rhythm. Heart sounds: No friction rub.    Pulmonary:      Effort: Pulmonary effort is normal. No respiratory distress. Breath sounds: No wheezing. Chest:      Chest wall: No tenderness. Abdominal:      General: Bowel sounds are normal.      Palpations: Abdomen is soft. Tenderness: There is no abdominal tenderness. There is no rebound. Musculoskeletal:      Right lower leg: No edema. Left lower leg: No edema. Skin:     General: Skin is warm and dry. Neurological:      Mental Status: He is alert. Comments: Awake, alert, able to answer questions appropriately, able to move extremities bilaterally.    Psychiatric:         Mood and Affect: Mood normal.         Behavior: Behavior normal.

## 2023-09-30 PROBLEM — A41.9 SEPSIS (HCC): Status: RESOLVED | Noted: 2022-09-04 | Resolved: 2023-09-30

## 2023-10-14 ENCOUNTER — DOCUMENTATION (OUTPATIENT)
Dept: GASTROENTEROLOGY | Facility: CLINIC | Age: 68
End: 2023-10-14

## 2023-10-27 ENCOUNTER — TELEPHONE (OUTPATIENT)
Dept: ADMINISTRATIVE | Facility: OTHER | Age: 68
End: 2023-10-27

## 2023-10-27 NOTE — TELEPHONE ENCOUNTER
Upon review of the In Basket request we were able to locate, review, and update the patient chart as requested for Diabetic Eye Exam.    Any additional questions or concerns should be emailed to the Practice Liaisons via the appropriate education email address, please do not reply via In Basket.     Thank you  Hoda Mcfarlane MA

## 2023-10-27 NOTE — TELEPHONE ENCOUNTER
----- Message from Mikey Brown sent at 10/27/2023  7:35 AM EDT -----  Regarding: Diabetic Eye Exam  Hello, our patient Oracio Richardson . has had Diabetic Eye Exam completed/performed. Please assist in updating the patient chart by pulling the document from the Media Tab. The date of service is 05/26/23 but the document was scanned into Media on 09/22/23.   Please refer to page 2 of 3, as it states under Imp/Plan number 4:    No evidence of Diabetic Retinopathy    If you would kindly assist us and updating the Health Maintenance    Thank you,    [facility]

## 2023-11-06 DIAGNOSIS — M17.11 PRIMARY OSTEOARTHRITIS OF RIGHT KNEE: ICD-10-CM

## 2023-11-06 RX ORDER — FERROUS SULFATE 324(65)MG
TABLET, DELAYED RELEASE (ENTERIC COATED) ORAL
Qty: 180 TABLET | Refills: 1 | Status: SHIPPED | OUTPATIENT
Start: 2023-11-06

## 2023-11-06 RX ORDER — FOLIC ACID 1 MG/1
TABLET ORAL
Qty: 90 TABLET | Refills: 1 | Status: SHIPPED | OUTPATIENT
Start: 2023-11-06

## 2023-11-07 ENCOUNTER — LAB (OUTPATIENT)
Dept: LAB | Facility: CLINIC | Age: 68
End: 2023-11-07
Payer: COMMERCIAL

## 2023-11-07 DIAGNOSIS — E55.9 VITAMIN D DEFICIENCY: ICD-10-CM

## 2023-11-07 DIAGNOSIS — Z12.5 PROSTATE CANCER SCREENING: ICD-10-CM

## 2023-11-07 DIAGNOSIS — E11.65 UNCONTROLLED TYPE 2 DIABETES MELLITUS WITH HYPERGLYCEMIA (HCC): ICD-10-CM

## 2023-11-07 DIAGNOSIS — E78.2 MIXED HYPERLIPIDEMIA: ICD-10-CM

## 2023-11-07 DIAGNOSIS — E78.5 HYPERLIPIDEMIA, UNSPECIFIED HYPERLIPIDEMIA TYPE: ICD-10-CM

## 2023-11-07 LAB
25(OH)D3 SERPL-MCNC: 19.7 NG/ML (ref 30–100)
ALBUMIN SERPL BCP-MCNC: 4.5 G/DL (ref 3.5–5)
ALP SERPL-CCNC: 83 U/L (ref 34–104)
ALT SERPL W P-5'-P-CCNC: 31 U/L (ref 7–52)
ANION GAP SERPL CALCULATED.3IONS-SCNC: 9 MMOL/L
AST SERPL W P-5'-P-CCNC: 38 U/L (ref 13–39)
BILIRUB SERPL-MCNC: 0.95 MG/DL (ref 0.2–1)
BUN SERPL-MCNC: 21 MG/DL (ref 5–25)
CALCIUM SERPL-MCNC: 9.5 MG/DL (ref 8.4–10.2)
CHLORIDE SERPL-SCNC: 103 MMOL/L (ref 96–108)
CHOLEST SERPL-MCNC: 168 MG/DL
CO2 SERPL-SCNC: 28 MMOL/L (ref 21–32)
CREAT SERPL-MCNC: 0.82 MG/DL (ref 0.6–1.3)
CREAT UR-MCNC: 109 MG/DL
ERYTHROCYTE [DISTWIDTH] IN BLOOD BY AUTOMATED COUNT: 12.3 % (ref 11.6–15.1)
EST. AVERAGE GLUCOSE BLD GHB EST-MCNC: 177 MG/DL
GFR SERPL CREATININE-BSD FRML MDRD: 90 ML/MIN/1.73SQ M
GLUCOSE P FAST SERPL-MCNC: 156 MG/DL (ref 65–99)
HBA1C MFR BLD: 7.8 %
HCT VFR BLD AUTO: 45.5 % (ref 36.5–49.3)
HDLC SERPL-MCNC: 47 MG/DL
HGB BLD-MCNC: 15.6 G/DL (ref 12–17)
LDLC SERPL CALC-MCNC: 109 MG/DL (ref 0–100)
MCH RBC QN AUTO: 30.1 PG (ref 26.8–34.3)
MCHC RBC AUTO-ENTMCNC: 34.3 G/DL (ref 31.4–37.4)
MCV RBC AUTO: 88 FL (ref 82–98)
MICROALBUMIN UR-MCNC: 33 MG/L
MICROALBUMIN/CREAT 24H UR: 30 MG/G CREATININE (ref 0–30)
PLATELET # BLD AUTO: 202 THOUSANDS/UL (ref 149–390)
PMV BLD AUTO: 12.1 FL (ref 8.9–12.7)
POTASSIUM SERPL-SCNC: 4.4 MMOL/L (ref 3.5–5.3)
PROT SERPL-MCNC: 7.4 G/DL (ref 6.4–8.4)
PSA SERPL-MCNC: 3.19 NG/ML (ref 0–4)
RBC # BLD AUTO: 5.19 MILLION/UL (ref 3.88–5.62)
SODIUM SERPL-SCNC: 140 MMOL/L (ref 135–147)
TRIGL SERPL-MCNC: 62 MG/DL
TSH SERPL DL<=0.05 MIU/L-ACNC: 1.43 UIU/ML (ref 0.45–4.5)
WBC # BLD AUTO: 5.24 THOUSAND/UL (ref 4.31–10.16)

## 2023-11-07 PROCEDURE — 36415 COLL VENOUS BLD VENIPUNCTURE: CPT

## 2023-11-07 PROCEDURE — 80053 COMPREHEN METABOLIC PANEL: CPT

## 2023-11-07 PROCEDURE — 84443 ASSAY THYROID STIM HORMONE: CPT

## 2023-11-07 PROCEDURE — 85027 COMPLETE CBC AUTOMATED: CPT

## 2023-11-07 PROCEDURE — 83036 HEMOGLOBIN GLYCOSYLATED A1C: CPT

## 2023-11-07 PROCEDURE — 80061 LIPID PANEL: CPT

## 2023-11-07 PROCEDURE — G0103 PSA SCREENING: HCPCS

## 2023-11-07 PROCEDURE — 82570 ASSAY OF URINE CREATININE: CPT

## 2023-11-07 PROCEDURE — 82306 VITAMIN D 25 HYDROXY: CPT

## 2023-11-07 PROCEDURE — 82043 UR ALBUMIN QUANTITATIVE: CPT

## 2023-11-08 ENCOUNTER — OFFICE VISIT (OUTPATIENT)
Dept: FAMILY MEDICINE CLINIC | Facility: CLINIC | Age: 68
End: 2023-11-08
Payer: COMMERCIAL

## 2023-11-08 VITALS
OXYGEN SATURATION: 99 % | SYSTOLIC BLOOD PRESSURE: 118 MMHG | WEIGHT: 241.4 LBS | RESPIRATION RATE: 18 BRPM | BODY MASS INDEX: 32.7 KG/M2 | DIASTOLIC BLOOD PRESSURE: 68 MMHG | TEMPERATURE: 97.5 F | HEIGHT: 72 IN | HEART RATE: 74 BPM

## 2023-11-08 DIAGNOSIS — I48.0 PAF (PAROXYSMAL ATRIAL FIBRILLATION) (HCC): ICD-10-CM

## 2023-11-08 DIAGNOSIS — F39 UNSPECIFIED MOOD (AFFECTIVE) DISORDER (HCC): ICD-10-CM

## 2023-11-08 DIAGNOSIS — Z00.00 MEDICARE ANNUAL WELLNESS VISIT, SUBSEQUENT: ICD-10-CM

## 2023-11-08 DIAGNOSIS — M17.0 PRIMARY OSTEOARTHRITIS OF BOTH KNEES: ICD-10-CM

## 2023-11-08 DIAGNOSIS — N13.8 BPH WITH OBSTRUCTION/LOWER URINARY TRACT SYMPTOMS: Primary | ICD-10-CM

## 2023-11-08 DIAGNOSIS — E55.9 VITAMIN D DEFICIENCY: Primary | ICD-10-CM

## 2023-11-08 DIAGNOSIS — E55.9 VITAMIN D DEFICIENCY: ICD-10-CM

## 2023-11-08 DIAGNOSIS — N40.1 BPH WITH OBSTRUCTION/LOWER URINARY TRACT SYMPTOMS: Primary | ICD-10-CM

## 2023-11-08 DIAGNOSIS — E11.65 UNCONTROLLED TYPE 2 DIABETES MELLITUS WITH HYPERGLYCEMIA (HCC): Primary | ICD-10-CM

## 2023-11-08 DIAGNOSIS — R39.14 FEELING OF INCOMPLETE BLADDER EMPTYING: ICD-10-CM

## 2023-11-08 PROCEDURE — 99214 OFFICE O/P EST MOD 30 MIN: CPT | Performed by: FAMILY MEDICINE

## 2023-11-08 PROCEDURE — G0439 PPPS, SUBSEQ VISIT: HCPCS | Performed by: FAMILY MEDICINE

## 2023-11-08 RX ORDER — ERGOCALCIFEROL 1.25 MG/1
50000 CAPSULE ORAL WEEKLY
Qty: 4 CAPSULE | Refills: 1 | Status: SHIPPED | OUTPATIENT
Start: 2023-11-08 | End: 2023-11-09 | Stop reason: SDUPTHER

## 2023-11-08 NOTE — ASSESSMENT & PLAN NOTE
Vitamin D level reviewed on laboratory work patient should start supplementation with vitamin D as recommended between 2005 1000 units daily

## 2023-11-08 NOTE — RESULT ENCOUNTER NOTE
Please let patient know his PSA was normal at 3.19.   Since we do not have a baseline for him I would recommend repeating in 6 months

## 2023-11-08 NOTE — ASSESSMENT & PLAN NOTE
Labile off medication at this time continue to monitor and follow-up with me as scheduled at next visit in 4 months

## 2023-11-08 NOTE — PROGRESS NOTES
Diabetic Foot Exam    Patient's shoes and socks removed. Right Foot/Ankle   Right Foot Inspection  Skin Exam: skin normal and skin intact. No dry skin, no warmth, no callus, no erythema, no maceration, no abnormal color, no pre-ulcer, no ulcer and no callus. Toe Exam: ROM and strength within normal limits. Sensory   Monofilament testing: intact    Vascular  Capillary refills: < 3 seconds  The right DP pulse is 2+. The right PT pulse is 2+. Left Foot/Ankle  Left Foot Inspection  Skin Exam: skin normal and skin intact. No dry skin, no warmth, no erythema, no maceration, normal color, no pre-ulcer, no ulcer and no callus. Toe Exam: ROM and strength within normal limits. Sensory   Monofilament testing: intact    Vascular  Capillary refills: < 3 seconds  The left DP pulse is 2+. The left PT pulse is 2+. Assign Risk Category  No deformity present  No loss of protective sensation  No weak pulses  Risk: 0  BMI Counseling: Body mass index is 33.2 kg/m². The BMI is above normal. Nutrition recommendations include reducing portion sizes, decreasing overall calorie intake, reducing fast food intake, consuming healthier snacks, decreasing soda and/or juice intake, moderation in carbohydrate intake, and increasing intake of lean protein. Exercise recommendations include moderate aerobic physical activity for 150 minutes/week and exercising 3-5 times per week. Assessment and Plan:     Problem List Items Addressed This Visit          Endocrine    Uncontrolled type 2 diabetes mellitus with hyperglycemia ([address]) - Primary       Lab Results   Component Value Date    HGBA1C 7.8 (H) 11/07/2023   Is A1c has improved significantly down to 7.8 with a change in his overall diet working on diet and avoidance of excess sugars carbohydrates. He does have insulin prescribed but he is not using this unless he feels that he needs it based on his caloric intake.   Recommend insulin as prescribed-pt refuses and is trying diet alone. Re check in 4 months         Relevant Orders    Albumin / creatinine urine ratio    Comprehensive metabolic panel    Hemoglobin A1C    Lipid Panel with Direct LDL reflex    TSH, 3rd generation with Free T4 reflex       Cardiovascular and Mediastinum    PAF (paroxysmal atrial fibrillation) (HCC)     Continue with Eliquis continue same medication regimen and monitor heart at next visit            Musculoskeletal and Integument    Osteoarthritis of both knees     Severe and needs follow up Ortho. Other    Feeling of incomplete bladder emptying     Continue with tamsulosin patient has difficulty with final emptying and post residual urine but discussed emptying the bladder at the end of urination as a resolution overall he understands this and will follow-up with me at next visit         Vitamin D deficiency     Vitamin D level reviewed on laboratory work patient should start supplementation with vitamin D as recommended between 2005 1000 units daily         Medicare annual wellness visit, subsequent    Unspecified mood (affective) disorder ([address])     Labile off medication at this time continue to monitor and follow-up with me as scheduled at next visit in 4 months            Depression Screening and Follow-up Plan: Patient was screened for depression during today's encounter. They screened negative with a PHQ-2 score of 0. Preventive health issues were discussed with patient, and age appropriate screening tests were ordered as noted in patient's After Visit Summary. Personalized health advice and appropriate referrals for health education or preventive services given if needed, as noted in patient's After Visit Summary. History of Present Illness:     Patient presents for a Medicare Wellness Visit    Follow-up evaluation and Medicare wellness visit today. Requesting note for a motorized tricycle.        Patient Care Team:  Leonie Hurtado DO as PCP - General (Family Medicine)  Kaelyn Solano Marysol Carvajal DO as PCP - [facility]ureen Pradeep [facility] ([facility])  Barbie Mcleod MD as PCP - Endocrinology (Endocrinology)  Basim Taveras as Nurse Practitioner (Urology)  eHnry Pineda RD (Nutrition)  Jj Robles PA-C as Physician Assistant (Nephrology)  Barbie Mcleod MD (Endocrinology)  Kristan Shanks MD (Urology)  Henry Pineda RD as Diabetes Educator (Nutrition)  Cleveland Ledbetter MD (Gastroenterology)  Amelia Wong DO (Cardiology)  Teo Philip DO (Gastroenterology)     Review of Systems:     Review of Systems   Constitutional:  Negative for chills and fever. HENT:  Negative for ear pain and sore throat. Eyes:  Negative for pain and visual disturbance. Respiratory:  Negative for cough and shortness of breath. Cardiovascular:  Negative for chest pain and palpitations. Gastrointestinal:  Negative for abdominal pain and vomiting. Genitourinary:  Negative for dysuria and hematuria. Musculoskeletal:  Negative for arthralgias and back pain. Skin:  Negative for color change and rash. Neurological:  Negative for seizures and syncope. All other systems reviewed and are negative.        Problem List:     Patient Active Problem List   Diagnosis    Urinary frequency    Type 2 diabetes mellitus ([facility])    Microscopic hematuria    Urge incontinence of urine    Nocturnal enuresis    Feeling of incomplete bladder emptying    Balanitis    PAF (paroxysmal atrial fibrillation) ([facility])    Urinary retention    Fungal infection of skin    Depressed mood    Pain in both lower extremities    Polyuria    Ventral Hernia    Hydroureteronephrosis    Uncontrolled type 2 diabetes mellitus with hyperglycemia ([address])    Vitamin D deficiency    Dyslipidemia    Medicare annual wellness visit, subsequent    History of pulmonary embolus (PE)    Other dietary vitamin B12 deficiency anemia    Encounter for screening colonoscopy    Anemia    Unspecified mood (affective) disorder ([facility])    Class 1 obesity due to excess calories with serious comorbidity and body mass index (BMI) of 33.0 to 33.9 in adult    Other male erectile dysfunction    Osteoarthritis of both knees      Past Medical and Surgical History:     Past Medical History:   Diagnosis Date    A-fib ([address])     per pt "happened one time"    Abdominal hernia     Acute metabolic encephalopathy 66/40/2328    CARLTON (acute kidney injury) ([address]) 09/04/2022    Ambulates with cane     not recently using but has in home if needed    Anesthesia     per pt "with knee surgery(in the )was awake for operation and fell asleep when being closed up" and than took long to wake up"    Arthritis     Blood in urine     "sometimes"    Diabetes mellitus ([address])     Type 2--sees Endocrinologist [facility] Dr Na Sommer    Lane catheter in place     History of pulmonary embolism     Implantable loop recorder present     Muscle weakness     per pt "started with diabetes"some muscle loss of density" --per pt "muscle coming back"    Myopia of left eye     glasses for reading    Pancreatitis 09/04/2022    Risk for falls     Sepsis ([address]) 09/04/2022    Teeth missing     Urethral pain     "when passing urine"     Past Surgical History:   Procedure Laterality Date    CARDIAC LOOP RECORDER      CATARACT EXTRACTION Bilateral     KNEE CARTILAGE SURGERY      KNEE SURGERY Right     MT TRURL ELECTROSURG RESCJ PROSTATE BLEED COMPLETE N/A 3/16/2023    Procedure: TRANSURETHRAL RESECTION OF PROSTATE (TURP)(possible), cystoscopy, SP tube placement, removal of bladder calculus;  Surgeon: Kristan Shanks MD;  Location: [facility];  Service: Urology      Family History:     Family History   Problem Relation Age of Onset    Diabetes Father     Diabetes type II Father     Diabetes Mother       Social History:     Social History     Socioeconomic History    Marital status: /Civil Union     Spouse name: None    Number of children: None    Years of education: None    Highest education level: None   Occupational History    None Tobacco Use    Smoking status: Never     Passive exposure: Never    Smokeless tobacco: Never   Vaping Use    Vaping Use: Never used   Substance and Sexual Activity    Alcohol use: Never    Drug use: Never    Sexual activity: Not Currently     Comment: defer   Other Topics Concern    None   Social History Narrative    None     Social Determinants of Health     Financial Resource Strain: Low Risk  (11/8/2023)    Overall Financial Resource Strain (CARDIA)     Difficulty of Paying Living Expenses: Not very hard   Food Insecurity: No Food Insecurity (7/31/2023)    Hunger Vital Sign     Worried About Running Out of Food in the Last Year: Never true     Ran Out of Food in the Last Year: Never true   Transportation Needs: No Transportation Needs (11/8/2023)    PRAPARE - Transportation     Lack of Transportation (Medical): No     Lack of Transportation (Non-Medical): No   Physical Activity: Not on file   Stress: Not on file   Social Connections: Not on file   Intimate Partner Violence: Not on file   Housing Stability: Low Risk  (7/31/2023)    Housing Stability Vital Sign     Unable to Pay for Housing in the Last Year: No     Number of Places Lived in the Last Year: 1     Unstable Housing in the Last Year: No      Medications and Allergies:     Current Outpatient Medications   Medication Sig Dispense Refill    apixaban (Eliquis) 5 mg Take 1 tablet (5 mg total) by mouth 2 (two) times a day 60 tablet 6    atorvastatin (LIPITOR) 10 mg tablet TAKE 1 TABLET BY MOUTH EVERY DAY 90 tablet 3    Blood Glucose Monitoring Suppl (OneTouch Verio Flex System) w/Device KIT CHECK BLOOD SUGARS THREE TIMES DAILY DX: E11.65 1 kit 0    Continuous Blood Gluc  (FreeStyle Miguel Angel 2 [place]) MARCELLA As instructed 1 each 0    Continuous Blood Gluc Sensor (FreeStyle Miguel Angel 2 Sensor) MISC Use to check her blood sugars continuously and change it every 2 weeks 7 each 2    ferrous sulfate 324 (65 Fe) mg TAKE 1 TABLET BY MOUTH 2 TIMES A DAY BEFORE MEALS. 693 tablet 1    folic acid (FOLVITE) 1 mg tablet TAKE 1 TABLET BY MOUTH EVERY DAY 90 tablet 1    gabapentin (NEURONTIN) 100 mg capsule Take 1 capsule (100 mg total) by mouth 3 (three) times a day 90 capsule 3    glucose blood (OneTouch Verio) test strip CHECK BLOOD SUGARS THREE TIMES DAILY DX: E11.65 400 strip 3    Incontinence Supply Disposable (Incontinence Brief Large) MISC Use every 4 (four) hours as needed (Urinary incontinence) 36 each 12    insulin glargine (Toujeo SoloStar) 300 units/mL CONCENTRATED U-300 injection pen (1-unit dial) Inject 30 Units under the skin daily at bedtime 9 mL 0    insulin lispro (HumaLOG KwikPen) 100 units/mL injection pen Inject 10 Units under the skin 3 (three) times a day with meals 15 mL 3    Lancets (OneTouch Delica Plus XRUHYT13G) MISC CHECK BLOOD SUGARS THREE TIMES DAILY DX: E11.65 400 each 3    metFORMIN (GLUCOPHAGE) 500 mg tablet Take 1 tablet (500 mg total) by mouth 2 (two) times a day with meals 180 tablet 0    metoprolol succinate (TOPROL-XL) 25 mg 24 hr tablet Take 1 tablet (25 mg total) by mouth daily 30 tablet 6    pantoprazole (PROTONIX) 40 mg tablet Take 1 tablet (40 mg total) by mouth daily in the early morning 30 tablet 0    tadalafil (CIALIS) 20 MG tablet Take 1 tablet (20 mg total) by mouth daily as needed for erectile dysfunction 30 tablet 5    tamsulosin (FLOMAX) 0.4 mg Take 1 capsule (0.4 mg total) by mouth daily with dinner 30 capsule 0     No current facility-administered medications for this visit.      No Known Allergies   Immunizations:     Immunization History   Administered Date(s) Administered    INFLUENZA 11/02/2022    Influenza, high dose seasonal 0.7 mL 11/02/2022      Health Maintenance:         Topic Date Due    Hepatitis C Screening  Never done    Colorectal Cancer Screening  Never done         Topic Date Due    COVID-19 Vaccine (1) Never done    Pneumococcal Vaccine: 65+ Years (1 - PCV) Never done    Influenza Vaccine (1) 09/01/2023 Medicare Screening Tests and Risk Assessments:     Traci Ye is here for his Subsequent Wellness visit. Health Risk Assessment:   Patient rates overall health as good. Patient feels that their physical health rating is slightly better. Patient is satisfied with their life. Eyesight was rated as same. Hearing was rated as same. Patient feels that their emotional and mental health rating is same. Patients states they are never, rarely angry. Patient states they are sometimes unusually tired/fatigued. Pain experienced in the last 7 days has been none. Patient states that he has experienced no weight loss or gain in last 6 months. Depression Screening:   PHQ-2 Score: 0      Fall Risk Screening: In the past year, patient has experienced: no history of falling in past year      Home Safety:  Patient does not have trouble with stairs inside or outside of their home. Patient has working smoke alarms and has working carbon monoxide detector. Home safety hazards include: none. Nutrition:   Current diet is Regular. Medications:   Patient is currently taking over-the-counter supplements. OTC medications include: see medication list. Patient is able to manage medications. Activities of Daily Living (ADLs)/Instrumental Activities of Daily Living (IADLs):   Walk and transfer into and out of bed and chair?: Yes  Dress and groom yourself?: Yes    Bathe or shower yourself?: Yes    Feed yourself?  Yes  Do your laundry/housekeeping?: Yes  Manage your money, pay your bills and track your expenses?: Yes  Make your own meals?: Yes    Do your own shopping?: Yes    Previous Hospitalizations:   Any hospitalizations or ED visits within the last 12 months?: No      PREVENTIVE SCREENINGS      Cardiovascular Screening:    General: Screening Not Indicated and History Lipid Disorder      Diabetes Screening:     General: Screening Not Indicated and History Diabetes      Prostate Cancer Screening:    General: Screening Current Abdominal Aortic Aneurysm (AAA) Screening:    Risk factors include: age between 70-75 yo        Lung Cancer Screening:     General: Screening Not Indicated    Screening, Brief Intervention, and Referral to Treatment (SBIRT)    Screening  Typical number of drinks in a day: 0  Typical number of drinks in a week: 0  Interpretation: Low risk drinking behavior. Single Item Drug Screening:  How often have you used an illegal drug (including marijuana) or a prescription medication for non-medical reasons in the past year? never    Single Item Drug Screen Score: 0  Interpretation: Negative screen for possible drug use disorder    No results found. Physical Exam:     /68 (BP Location: Left arm, Patient Position: Sitting, Cuff Size: Large)   Pulse 74   Temp 97.5 °F (36.4 °C) (Tympanic)   Resp 18   Ht 5' 11.5" (1.816 m)   Wt 109 kg (241 lb 6.4 oz)   SpO2 99%   BMI 33.20 kg/m²     Physical Exam  Vitals and nursing note reviewed. Constitutional:       General: He is not in acute distress. Appearance: Normal appearance. He is well-developed. He is obese. HENT:      Head: Normocephalic and atraumatic. Right Ear: Tympanic membrane and external ear normal.      Left Ear: Tympanic membrane and external ear normal.      Nose: Nose normal.      Mouth/Throat:      Mouth: Mucous membranes are moist.   Eyes:      Conjunctiva/sclera: Conjunctivae normal.   Cardiovascular:      Rate and Rhythm: Normal rate and regular rhythm. Pulses: Normal pulses. no weak pulses          Dorsalis pedis pulses are 2+ on the right side and 2+ on the left side. Posterior tibial pulses are 2+ on the right side and 2+ on the left side. Heart sounds: Normal heart sounds. No murmur heard. Pulmonary:      Effort: Pulmonary effort is normal. No respiratory distress. Breath sounds: Normal breath sounds. Abdominal:      General: Bowel sounds are normal.      Palpations: Abdomen is soft. Tenderness:  There is no abdominal tenderness. Musculoskeletal:         General: No swelling. Normal range of motion. Cervical back: Normal range of motion and neck supple. Feet:      Right foot:      Skin integrity: No ulcer, skin breakdown, erythema, warmth, callus or dry skin. Left foot:      Skin integrity: No ulcer, skin breakdown, erythema, warmth, callus or dry skin. Skin:     General: Skin is warm and dry. Capillary Refill: Capillary refill takes less than 2 seconds. Neurological:      General: No focal deficit present. Mental Status: He is alert and oriented to person, place, and time. Mental status is at baseline. Psychiatric:         Mood and Affect: Mood normal.         Behavior: Behavior normal.         Thought Content:  Thought content normal.         Judgment: Judgment normal.          Anupam Grace, DO

## 2023-11-08 NOTE — ASSESSMENT & PLAN NOTE
Continue with tamsulosin patient has difficulty with final emptying and post residual urine but discussed emptying the bladder at the end of urination as a resolution overall he understands this and will follow-up with me at next visit

## 2023-11-08 NOTE — ASSESSMENT & PLAN NOTE
Lab Results   Component Value Date    HGBA1C 7.8 (H) 11/07/2023   Is A1c has improved significantly down to 7.8 with a change in his overall diet working on diet and avoidance of excess sugars carbohydrates. He does have insulin prescribed but he is not using this unless he feels that he needs it based on his caloric intake. Recommend insulin as prescribed-pt refuses and is trying diet alone.   Re check in 4 months

## 2023-11-09 ENCOUNTER — OFFICE VISIT (OUTPATIENT)
Dept: ENDOCRINOLOGY | Facility: CLINIC | Age: 68
End: 2023-11-09
Payer: COMMERCIAL

## 2023-11-09 VITALS
SYSTOLIC BLOOD PRESSURE: 118 MMHG | HEART RATE: 70 BPM | TEMPERATURE: 97.6 F | WEIGHT: 242 LBS | OXYGEN SATURATION: 98 % | HEIGHT: 72 IN | BODY MASS INDEX: 32.78 KG/M2 | DIASTOLIC BLOOD PRESSURE: 68 MMHG

## 2023-11-09 DIAGNOSIS — Z79.4 TYPE 2 DIABETES MELLITUS WITH MICROALBUMINURIA, WITH LONG-TERM CURRENT USE OF INSULIN (HCC): Primary | ICD-10-CM

## 2023-11-09 DIAGNOSIS — R80.9 TYPE 2 DIABETES MELLITUS WITH MICROALBUMINURIA, WITH LONG-TERM CURRENT USE OF INSULIN (HCC): Primary | ICD-10-CM

## 2023-11-09 DIAGNOSIS — E11.29 TYPE 2 DIABETES MELLITUS WITH MICROALBUMINURIA, WITH LONG-TERM CURRENT USE OF INSULIN (HCC): Primary | ICD-10-CM

## 2023-11-09 DIAGNOSIS — E66.09 CLASS 1 OBESITY DUE TO EXCESS CALORIES WITH SERIOUS COMORBIDITY AND BODY MASS INDEX (BMI) OF 33.0 TO 33.9 IN ADULT: ICD-10-CM

## 2023-11-09 DIAGNOSIS — E78.5 HYPERLIPIDEMIA, UNSPECIFIED HYPERLIPIDEMIA TYPE: ICD-10-CM

## 2023-11-09 DIAGNOSIS — E55.9 VITAMIN D DEFICIENCY: ICD-10-CM

## 2023-11-09 PROBLEM — E11.9 TYPE 2 DIABETES MELLITUS, WITH LONG-TERM CURRENT USE OF INSULIN (HCC): Status: ACTIVE | Noted: 2022-09-29

## 2023-11-09 PROCEDURE — 99214 OFFICE O/P EST MOD 30 MIN: CPT | Performed by: STUDENT IN AN ORGANIZED HEALTH CARE EDUCATION/TRAINING PROGRAM

## 2023-11-09 RX ORDER — ERGOCALCIFEROL 1.25 MG/1
50000 CAPSULE ORAL WEEKLY
Qty: 4 CAPSULE | Refills: 1 | Status: SHIPPED | OUTPATIENT
Start: 2023-11-09 | End: 2023-12-29

## 2023-11-09 NOTE — PROGRESS NOTES
Established patient Progress Note      Cc: diabetes    Referring Provider  No referring provider defined for this encounter. History of Present Illness:   Oracio Tariq. is a 70 y.o. male with a history of type 2 diabetes with long term use of insulin who presented for follow up. Last seen in the office in August 2023. Dylan has stop taking all of his antihyperglycemic medications again as his blood sugars improved. He states that he watches what he eats and doing regular exercise,    Reports complications of microalbuminuria. Denies recent illness or hospitalizations. Denies recent severe hypoglycemic or severe hyperglycemic episodes. Denies any issues with his current regimen. home glucose monitoring: are performed regularly      Current regimen:   No medication    Blood sugars are ranging from 100 to 220 mg/dl.      Opthamology:UTD     Podiatry: UTD        Has hypertension: no   Has hyperlipidemia: on lipitor 10 mg once daily,   Thyroid disorders: no  History of pancreatitis: Yes      Patient Active Problem List   Diagnosis    Urinary frequency    Type 2 diabetes mellitus (HCC)    Microscopic hematuria    Urge incontinence of urine    Nocturnal enuresis    Feeling of incomplete bladder emptying    Balanitis    PAF (paroxysmal atrial fibrillation) ([facility])    Urinary retention    Fungal infection of skin    Depressed mood    Pain in both lower extremities    Polyuria    Ventral Hernia    Hydroureteronephrosis    Uncontrolled type 2 diabetes mellitus with hyperglycemia ([address])    Vitamin D deficiency    Dyslipidemia    Medicare annual wellness visit, subsequent    History of pulmonary embolus (PE)    Other dietary vitamin B12 deficiency anemia    Encounter for screening colonoscopy    Anemia    Unspecified mood (affective) disorder ([facility])    Class 1 obesity due to excess calories with serious comorbidity and body mass index (BMI) of 33.0 to 33.9 in adult    Other male erectile dysfunction    Osteoarthritis of both knees      Past Medical History:   Diagnosis Date    A-fib ([address])     per pt "happened one time"    Abdominal hernia     Acute metabolic encephalopathy [id number]/36/8367    [place] (acute kidney injury) ([address]) 09/04/2022    Ambulates with cane     not recently using but has in home if needed    Anesthesia     per pt "with knee surgery(in the )was awake for operation and fell asleep when being closed up" and than took long to wake up"    Arthritis     Blood in urine     "sometimes"    Diabetes mellitus ([address])     Type 2--sees Endocrinologist [facility] Dr Duarte Kaiden    Lane catheter in place     History of pulmonary embolism     Implantable loop recorder present     Muscle weakness     per pt "started with diabetes"some muscle loss of density" --per pt "muscle coming back"    Myopia of left eye     glasses for reading    Pancreatitis 09/04/2022    Risk for falls     Sepsis ([address]) 09/04/2022    Teeth missing     Urethral pain     "when passing urine"      Past Surgical History:   Procedure Laterality Date    CARDIAC LOOP RECORDER      CATARACT EXTRACTION Bilateral     KNEE CARTILAGE SURGERY      KNEE SURGERY Right     KY TRURL ELECTROSURG RESCJ PROSTATE BLEED COMPLETE N/A 3/16/2023    Procedure: TRANSURETHRAL RESECTION OF PROSTATE (TURP)(possible), cystoscopy, SP tube placement, removal of bladder calculus;  Surgeon: Aubrey Sandoval MD;  Location: [facility];  Service: Urology      Family History   Problem Relation Age of Onset    Diabetes Father     Diabetes type II Father     Diabetes Mother      Social History     Tobacco Use    Smoking status: Never     Passive exposure: Never    Smokeless tobacco: Never   Substance Use Topics    Alcohol use: Never     No Known Allergies      Current Outpatient Medications:     apixaban (Eliquis) 5 mg, Take 1 tablet (5 mg total) by mouth 2 (two) times a day, Disp: 60 tablet, Rfl: 6    atorvastatin (LIPITOR) 10 mg tablet, TAKE 1 TABLET BY MOUTH EVERY DAY, Disp: 90 tablet, Rfl: 3    Blood Glucose Monitoring Suppl (OneTouch Verio Flex System) w/Device KIT, CHECK BLOOD SUGARS THREE TIMES DAILY DX: E11.65, Disp: 1 kit, Rfl: 0    Continuous Blood Gluc  (FreeStyle Miguel Angel 2 [place]) MARCELLA, As instructed, Disp: 1 each, Rfl: 0    Continuous Blood Gluc Sensor (FreeStyle Miguel Angel 2 Sensor) MISC, Use to check her blood sugars continuously and change it every 2 weeks, Disp: 7 each, Rfl: 2    ergocalciferol (VITAMIN D2) 50,000 units, Take 1 capsule (50,000 Units total) by mouth once a week for 8 doses, Disp: 4 capsule, Rfl: 1    ferrous sulfate 324 (65 Fe) mg, TAKE 1 TABLET BY MOUTH 2 TIMES A DAY BEFORE MEALS., Disp: 180 tablet, Rfl: 1    folic acid (FOLVITE) 1 mg tablet, TAKE 1 TABLET BY MOUTH EVERY DAY, Disp: 90 tablet, Rfl: 1    gabapentin (NEURONTIN) 100 mg capsule, Take 1 capsule (100 mg total) by mouth 3 (three) times a day, Disp: 90 capsule, Rfl: 3    glucose blood (OneTouch Verio) test strip, CHECK BLOOD SUGARS THREE TIMES DAILY DX: E11.65, Disp: 400 strip, Rfl: 3    Incontinence Supply Disposable (Incontinence Brief Large) MISC, Use every 4 (four) hours as needed (Urinary incontinence), Disp: 36 each, Rfl: 12    insulin glargine (Toujeo SoloStar) 300 units/mL CONCENTRATED U-300 injection pen (1-unit dial), Inject 30 Units under the skin daily at bedtime, Disp: 9 mL, Rfl: 0    insulin lispro (HumaLOG KwikPen) 100 units/mL injection pen, Inject 10 Units under the skin 3 (three) times a day with meals, Disp: 15 mL, Rfl: 3    Lancets (OneTouch Delica Plus [id number]) MISC, CHECK BLOOD SUGARS THREE TIMES DAILY DX: E11.65, Disp: 400 each, Rfl: 3    metFORMIN (GLUCOPHAGE) 500 mg tablet, Take 1 tablet (500 mg total) by mouth 2 (two) times a day with meals, Disp: 180 tablet, Rfl: 0    metoprolol succinate (TOPROL-XL) 25 mg 24 hr tablet, Take 1 tablet (25 mg total) by mouth daily, Disp: 30 tablet, Rfl: 6 pantoprazole (PROTONIX) 40 mg tablet, Take 1 tablet (40 mg total) by mouth daily in the early morning, Disp: 30 tablet, Rfl: 0    tadalafil (CIALIS) 20 MG tablet, Take 1 tablet (20 mg total) by mouth daily as needed for erectile dysfunction, Disp: 30 tablet, Rfl: 5    tamsulosin (FLOMAX) 0.4 mg, Take 1 capsule (0.4 mg total) by mouth daily with dinner, Disp: 30 capsule, Rfl: 0  Review of Systems   Constitutional:  Negative for appetite change, fatigue and unexpected weight change. HENT:  Negative for trouble swallowing and voice change. Eyes:  Negative for visual disturbance. Respiratory:  Negative for cough, shortness of breath and wheezing. Cardiovascular:  Negative for palpitations and leg swelling. Gastrointestinal:  Negative for abdominal pain, constipation, diarrhea, nausea and vomiting. Endocrine: Negative for cold intolerance, heat intolerance, polyphagia and polyuria. Musculoskeletal:  Negative for arthralgias. Skin:  Negative for color change, rash and wound. Neurological:  Negative for dizziness, tremors, weakness, light-headedness, numbness and headaches. Psychiatric/Behavioral:  Negative for agitation and sleep disturbance. The patient is not nervous/anxious. Physical Exam:  There is no height or weight on file to calculate BMI. There were no vitals taken for this visit.    Wt Readings from Last 3 Encounters:   11/08/23 109 kg (241 lb 6.4 oz)   09/27/23 108 kg (238 lb)   08/17/23 111 kg (244 lb 3.2 oz)       GEN: NAD  E/n/m nl facies,   Eyes: no stare or proptosis,   Neck: trachea midline, thyroid NT to palpation, nl in size, no nodules or neck masses noted, no cervical LAD  CV; heart reg rate s1s2 nl, no m/r/g appreciated, no REBECA  Resp: CTAB, good effort  Ab+BS  Neuro: no tremor, 2+ DTRs in BUE  MS: no c/c in digits, moves all 4 ext, nl muscle bulk, gait nl  Skin: warm and dry, no palmar erythema  Ext: no c/c in digits, no edema bilaterally, 2+ DP and PT pulses bilat, Psych: nl mood and affect, no gross lapses in memory      Labs:   No components found for: "HA1C"  No components found for: "GLU"    Lab Results   Component Value Date    CREATININE 0.82 11/07/2023    CREATININE 1.14 08/01/2023    CREATININE 0.90 07/31/2023    BUN 21 11/07/2023    K 4.4 11/07/2023     11/07/2023    CO2 28 11/07/2023     eGFR   Date Value Ref Range Status   11/07/2023 90 ml/min/1.73sq m Final     No components found for: "MALBCRER"    Lab Results   Component Value Date    HDL 47 11/07/2023    TRIG 62 11/07/2023       Lab Results   Component Value Date    ALT 31 11/07/2023    AST 38 11/07/2023    ALKPHOS 83 11/07/2023       Lab Results   Component Value Date    FREET4 1.25 01/19/2023     Component      Latest Ref Rng 11/7/2023   Sodium      135 - 147 mmol/L 140    Potassium      3.5 - 5.3 mmol/L 4.4    Chloride      96 - 108 mmol/L 103    CO2      21 - 32 mmol/L 28    Anion Gap      mmol/L 9    BUN      5 - 25 mg/dL 21    Creatinine      0.60 - 1.30 mg/dL 0.82    GLUCOSE FASTING      65 - 99 mg/dL 156 (H)    Calcium      8.4 - 10.2 mg/dL 9.5    AST      13 - 39 U/L 38    ALT      7 - 52 U/L 31    Alkaline Phosphatase      34 - 104 U/L 83    Total Protein      6.4 - 8.4 g/dL 7.4    Albumin      3.5 - 5.0 g/dL 4.5    TOTAL BILIRUBIN      0.20 - 1.00 mg/dL 0.95    eGFR      ml/min/1.73sq m 90    Cholesterol      See Comment mg/dL 168    Triglycerides      See Comment mg/dL 62    HDL      >=40 mg/dL 47    LDL Calculated      0 - 100 mg/dL 109 (H)    EXT Creatinine Urine      Reference range not established. mg/dL 109.0    MICROALBUM.,U,RANDOM      <20.0 mg/L 33.0 (H)    MICROALBUMIN/CREATININE RATIO      0 - 30 mg/g creatinine 30    Hemoglobin A1C      Normal 4.0-5.6%; PreDiabetic 5.7-6.4%;  Diabetic >=6.5%; Glycemic control for adults with diabetes <7.0% % 7.8 (H)    eAG, EST AVG Glucose      mg/dl 177    TSH 3RD GENERATON      0.450 - 4.500 uIU/mL 1.429    Vit D, 25-Hydroxy      30.0 - 100.0 ng/mL 19.7 (L)       Legend:  (H) High  (L) Low    Impression:  1. Type 2 diabetes mellitus with microalbuminuria, with long-term current use of insulin ([address])    2. Vitamin D deficiency    3. Hyperlipidemia, unspecified hyperlipidemia type    4. Class 1 obesity due to excess calories with serious comorbidity and body mass index (BMI) of 33.0 to 33.9 in adult           Plan:    Oracio was seen today for diabetes type 2. Diagnoses and all orders for this visit:    Type 2 diabetes mellitus with microalbuminuria, with long-term current use of insulin ([address]):  His glycemic control improved significantly with A1c of 7.8%, however he stopped taking all of his antihyperglycemic medication, he believes that he can control his diabetes with lifestyle changes, the same thing happened last time, ended up in the hospital with severe hyperglycemia and ketoacidosis. I reviewed pathophysiology of type 2 diabetes, importance of compliance with medications to avoid complications. He is reluctant to resume any medications, after long discussion he is agreeable to start taking low-dose Toujeo, 6 units daily, he was advised to check his blood sugars 2 times a day, blood sugars goal or 80 -130 fasting and below 140, 2 hours after eating. He was instructed to call the office and notify me about his blood sugars. Return back in 3 months. Vitamin D deficiency  -     ergocalciferol (VITAMIN D2) 50,000 units; Take 1 capsule (50,000 Units total) by mouth once a week for 8 doses    Hyperlipidemia, unspecified hyperlipidemia type:  I resumed Lipitor 10 mg daily. Class 1 obesity due to excess calories with serious comorbidity and body mass index (BMI) of 33.0 to 33.9 in adult  Emphasized importance of daily exercise, weight loss, caloric reduction, small meals, consumption of multiple servings of fruits and vegetables and avoidance of concentrated sweets such as juice and soda.    Not a candidate for GLP-1 agonist, given history of pancreatitis. Discussed with the patient and all questioned fully answered. He will call me if any problems arise.     Counseled patient on diagnostic results, prognosis, risk and benefit of treatment options, instruction for management, importance of treatment compliance, Risk  factor reduction and impressions      Sharyn Ruiz MD

## 2023-11-09 NOTE — PATIENT INSTRUCTIONS
Take toujeo 6 units daily at bedtime, if blood sugars are within 80 - 130 in the morning  Continue the same dose, if blood sugars are consistently below 80, decrease the dose to 4 units  If blood sugars are consistently above 140 increase to 8 units. Start taking Lipitor 10 mg daily.

## 2023-11-13 ENCOUNTER — TELEPHONE (OUTPATIENT)
Dept: ENDOCRINOLOGY | Facility: CLINIC | Age: 68
End: 2023-11-13

## 2023-12-06 ENCOUNTER — REMOTE DEVICE CLINIC VISIT (OUTPATIENT)
Dept: CARDIOLOGY CLINIC | Facility: CLINIC | Age: 68
End: 2023-12-06
Payer: COMMERCIAL

## 2023-12-06 DIAGNOSIS — Z95.818 PRESENCE OF OTHER CARDIAC IMPLANTS AND GRAFTS: Primary | ICD-10-CM

## 2023-12-06 PROCEDURE — 93298 REM INTERROG DEV EVAL SCRMS: CPT | Performed by: INTERNAL MEDICINE

## 2023-12-06 PROCEDURE — G2066 INTER DEVC REMOTE 30D: HCPCS | Performed by: INTERNAL MEDICINE

## 2023-12-06 NOTE — PROGRESS NOTES
"   09/11/17 2300   Behavioral Health   Hallucinations denies / not responding to hallucinations   Thinking intact   Orientation person: oriented;place: oriented;date: oriented;time: oriented   Memory baseline memory   Insight insight appropriate to situation   Judgement (fair)   Eye Contact at examiner   Affect blunted, flat   Mood mood is calm   Physical Appearance/Attire appears stated age   Hygiene well groomed   Suicidality other (see comments)  (denies)   Self Injury other (see comment)  (denies)   Elopement (no concerns noted)   Activity other (see comment)  (visible in milieu)   Speech clear;coherent   Medication Sensitivity no stated side effects   Psychomotor / Gait balanced     Abdulaziz was visible in the milieu most of the evening.  He spent the evening watching the [place] game.  He was social with peers and did not have any visitors.  He stated that he did not talk to a doctor or CTC, but states that he will talk with them about best discharge plans.  He stated that the doctor did change his meds and that treatment might be an option \"later.\"  He did not state any other concerns.  " MDT LP2-ACTIVE SYSTEM IS MRI CONDITIONAL   CARELINK TRANSMISSION: LOOP RECORDER. PRESENTING RHYTHM NSR W/ PVC @ 66 BPM. BATTERY STATUS "OK." NO PATIENT OR DEVICE ACTIVATED EPISODES. HOWEVER THERE IS AN EGRAM SHOWING RVR. PT ON ELIQUIS. NORMAL DEVICE FUNCTION.  DL

## 2024-01-03 ENCOUNTER — OFFICE VISIT (OUTPATIENT)
Dept: CARDIOLOGY CLINIC | Facility: CLINIC | Age: 69
End: 2024-01-03
Payer: COMMERCIAL

## 2024-01-03 VITALS
TEMPERATURE: 97.7 F | WEIGHT: 248.2 LBS | BODY MASS INDEX: 33.62 KG/M2 | HEIGHT: 72 IN | OXYGEN SATURATION: 98 % | DIASTOLIC BLOOD PRESSURE: 68 MMHG | SYSTOLIC BLOOD PRESSURE: 118 MMHG | HEART RATE: 75 BPM

## 2024-01-03 DIAGNOSIS — E78.5 HYPERLIPIDEMIA, UNSPECIFIED HYPERLIPIDEMIA TYPE: ICD-10-CM

## 2024-01-03 DIAGNOSIS — R06.83 SNORING: ICD-10-CM

## 2024-01-03 DIAGNOSIS — I48.0 PAF (PAROXYSMAL ATRIAL FIBRILLATION) (HCC): Primary | ICD-10-CM

## 2024-01-03 DIAGNOSIS — E66.09 CLASS 1 OBESITY DUE TO EXCESS CALORIES WITH BODY MASS INDEX (BMI) OF 34.0 TO 34.9 IN ADULT, UNSPECIFIED WHETHER SERIOUS COMORBIDITY PRESENT: ICD-10-CM

## 2024-01-03 DIAGNOSIS — Z86.711 HISTORY OF PULMONARY EMBOLUS (PE): ICD-10-CM

## 2024-01-03 PROCEDURE — 99214 OFFICE O/P EST MOD 30 MIN: CPT | Performed by: INTERNAL MEDICINE

## 2024-01-03 RX ORDER — METOPROLOL SUCCINATE 25 MG/1
25 TABLET, EXTENDED RELEASE ORAL DAILY
Qty: 90 TABLET | Refills: 2 | Status: SHIPPED | OUTPATIENT
Start: 2024-01-03

## 2024-01-03 NOTE — PROGRESS NOTES
Cardiology Follow up    Oracio Richardson Jr.  [id number]  1955  [facility] [address] [place]  [place] [address] [phone]      1. PAF (paroxysmal atrial fibrillation) (HCC)  apixaban (Eliquis) 5 mg    metoprolol succinate (TOPROL-XL) 25 mg 24 hr tablet      2. Class 1 obesity due to excess calories with body mass index (BMI) of 34.0 to 34.9 in adult, unspecified whether serious comorbidity present        3. Hyperlipidemia, unspecified hyperlipidemia type        4. History of pulmonary embolus (PE)        5. Snoring            Discussion/Summary:  1.  Paroxysmal atrial fibrillation  2.  Hyperlipidemia  3.  History of DVT/PE  4.  Intermittent snoring  5.  Obesity      -Transthoracic echocardiogram 9/7/2022 showing left-ventricular systolic function normal estimated LVEF 60% with no diagnostic regional wall motion abnormalities appreciated with normal diastolic parameters with trace mitral regurgitation and trace tricuspid regurgitation.  -Loop interrogation 12/6/2023 showing short episode of paroxysmal atrial fibrillation.  -After discussion with patient as he does wish to try and minimize medical therapy is much as possible he does not wish to reinitiate statin therapy however he is agreeable to reinitiation of Eliquis 5 mg twice daily and low-dose metoprolol succinate 25 mg daily  -Patient counseled on dietary and lifestyle modifications including following a low-salt, low-fat, heart healthy diet with weight reduction goal BMI less than 29  -According to revised cardiac risk index patient is intermediate risk for operative procedures.  He fully understands and is accepting of these risks and wishes to proceed with endoscopy as planned.  In that setting along with adequate functional status with patient noting he can easily walk 4 city blocks on flat surface or up 2 flights of stairs with no  significant issues he is appropriate risk to proceed with surgery.  -Patient can hold oral anticoagulation 2 days prior to endoscopy but should have this reinitiated shortly thereafter once cleared by gastroenterology team.  -Will follow-up pending laboratory studies  -Patient will be seen in 6 months or sooner if necessary  -Patient counseled if he were to have any warning or alarm type symptoms he is to seek emergency medical care immediately.      History of Present Illness:  -Patient is a 68-year-old male with diabetes mellitus hospitalized at [facility] in September 2022 with altered mental status found at that time to have diabetic ketoacidosis with significant anion gap acidosis and elevated lactic acid levels along with pancreatitis and CARLTON with obstructive uropathy diagnosed with paroxysmal atrial fibrillation that spontaneously converted.  He was initiated on medical therapy and even underwent TURP procedure and has been following with urology and underwent implantable loop recorder in January 2023.  He had been previously living in [place] and had had issue with history of DVT/PE thought provoked treated with 2 weeks of anticoagulation therapy seen in the setting of obesity and sedentary lifestyle previously.  Paroxysmal atrial fibrillation and elevated [id number] score in that setting patient was initiated on Eliquis and metoprolol therapy and presents to the office today for scheduled follow-up.  -Currently in the office patient denies any chest pain, palpitations, lightheadedness or dizziness, loss of consciousness, shortness of breath, lower extremity edema, orthopnea or bendopnea.  He notes that he did self discontinue many of his medications and recently has only restarted his insulin therapy.  Patient has not been taking his metoprolol, atorvastatin or Eliquis therapy since at least October 2023    Patient Active Problem List   Diagnosis    Urinary frequency    Type 2 diabetes  "mellitus ([facility]ly [facility])    Microscopic hematuria    Urge incontinence of urine    Nocturnal enuresis    Feeling of incomplete bladder emptying    Balanitis    PAF (paroxysmal atrial fibrillation) ([facility])    Urinary retention    Fungal infection of skin    Depressed mood    Pain in both lower extremities    Polyuria    Ventral Hernia    Hydroureteronephrosis    Type 2 diabetes mellitus, with long-term current use of insulin ([facility])    Vitamin D deficiency    Dyslipidemia    Medicare annual wellness visit, subsequent    History of pulmonary embolus (PE)    Other dietary vitamin B12 deficiency anemia    Encounter for screening colonoscopy    Anemia    Unspecified mood (affective) disorder ([facility])    Class 1 obesity due to excess calories with serious comorbidity and body mass index (BMI) of 33.0 to 33.9 in adult    Other male erectile dysfunction    Osteoarthritis of both knees     Past Medical History:   Diagnosis Date    A-fib ([facility])     per pt \"happened one time\"    Abdominal hernia     Acute metabolic encephalopathy 09/04/2022    CARLTON (acute kidney injury) ([facility]) 09/04/2022    Ambulates with cane     not recently using but has in home if needed    Anesthesia     per pt \"with knee surgery(in the )was awake for operation and fell asleep when being closed up\" and than took long to wake up\"    Arthritis     Blood in urine     \"sometimes\"    Diabetes mellitus ([facility])     Type 2--sees Endocrinologist KAELYN Shaver    Lane catheter in place     History of pulmonary embolism     Implantable loop recorder present     Muscle weakness     per pt \"started with diabetes\"some muscle loss of density\" --per pt \"muscle coming back\"    Myopia of left eye     glasses for reading    Pancreatitis 09/04/2022    Risk for falls     Sepsis ([facility]) 09/04/2022    Teeth missing     Urethral pain     \"when passing urine\"     Social History     Socioeconomic History    Marital status: /Civil Union     Spouse name: Not on file    Number of children: " Not on file    Years of education: Not on file    Highest education level: Not on file   Occupational History    Not on file   Tobacco Use    Smoking status: Never     Passive exposure: Never    Smokeless tobacco: Never   Vaping Use    Vaping status: Never Used   Substance and Sexual Activity    Alcohol use: Never    Drug use: Never    Sexual activity: Not Currently     Comment: defer   Other Topics Concern    Not on file   Social History Narrative    Not on file     Social Determinants of Health     Financial Resource Strain: Low Risk  (11/8/2023)    Overall Financial Resource Strain (CARDIA)     Difficulty of Paying Living Expenses: Not very hard   Food Insecurity: No Food Insecurity (7/31/2023)    Hunger Vital Sign     Worried About Running Out of Food in the Last Year: Never true     Ran Out of Food in the Last Year: Never true   Transportation Needs: No Transportation Needs (11/8/2023)    PRAPARE - Transportation     Lack of Transportation (Medical): No     Lack of Transportation (Non-Medical): No   Physical Activity: Not on file   Stress: Not on file   Social Connections: Not on file   Intimate Partner Violence: Not on file   Housing Stability: Low Risk  (7/31/2023)    Housing Stability Vital Sign     Unable to Pay for Housing in the Last Year: No     Number of Places Lived in the Last Year: 1     Unstable Housing in the Last Year: No      Family History   Problem Relation Age of Onset    Diabetes Father     Diabetes type II Father     Diabetes Mother      Past Surgical History:   Procedure Laterality Date    CARDIAC LOOP RECORDER      CATARACT EXTRACTION Bilateral     KNEE CARTILAGE SURGERY      KNEE SURGERY Right     UT TRURL ELECTROSURG RESCJ PROSTATE BLEED COMPLETE N/A 3/16/2023    Procedure: TRANSURETHRAL RESECTION OF PROSTATE (TURP)(possible), cystoscopy, SP tube placement, removal of bladder calculus;  Surgeon: Ludwig King MD;  Location: [facility];  Service: Urology       Current Outpatient  Medications:     apixaban (Eliquis) 5 mg, Take 1 tablet (5 mg total) by mouth 2 (two) times a day, Disp: 180 tablet, Rfl: 2    Blood Glucose Monitoring Suppl (OneTouch Verio Flex System) w/Device KIT, CHECK BLOOD SUGARS THREE TIMES DAILY DX: E11.65, Disp: 1 kit, Rfl: 0    glucose blood (OneTouch Verio) test strip, CHECK BLOOD SUGARS THREE TIMES DAILY DX: E11.65, Disp: 400 strip, Rfl: 3    Incontinence Supply Disposable (Incontinence Brief Large) MISC, Use every 4 (four) hours as needed (Urinary incontinence), Disp: 36 each, Rfl: 12    Lancets (OneTouch Delica Plus [id number]) MISC, CHECK BLOOD SUGARS THREE TIMES DAILY DX: E11.65, Disp: 400 each, Rfl: 3    metoprolol succinate (TOPROL-XL) 25 mg 24 hr tablet, Take 1 tablet (25 mg total) by mouth daily, Disp: 90 tablet, Rfl: 2    atorvastatin (LIPITOR) 10 mg tablet, TAKE 1 TABLET BY MOUTH EVERY DAY (Patient not taking: Reported on 11/9/2023), Disp: 90 tablet, Rfl: 3    Continuous Blood Gluc  (FreeStyle Miguel Angel 2 Avinger) MARCELLA, As instructed (Patient not taking: Reported on 11/9/2023), Disp: 1 each, Rfl: 0    Continuous Blood Gluc Sensor (FreeStyle Miguel Angel 2 Sensor) MISC, Use to check her blood sugars continuously and change it every 2 weeks (Patient not taking: Reported on 11/9/2023), Disp: 7 each, Rfl: 2    ergocalciferol (VITAMIN D2) 50,000 units, Take 1 capsule (50,000 Units total) by mouth once a week for 8 doses, Disp: 4 capsule, Rfl: 1    ferrous sulfate 324 (65 Fe) mg, TAKE 1 TABLET BY MOUTH 2 TIMES A DAY BEFORE MEALS. (Patient not taking: Reported on 11/9/2023), Disp: 180 tablet, Rfl: 1    folic acid (FOLVITE) 1 mg tablet, TAKE 1 TABLET BY MOUTH EVERY DAY (Patient not taking: Reported on 11/9/2023), Disp: 90 tablet, Rfl: 1    gabapentin (NEURONTIN) 100 mg capsule, Take 1 capsule (100 mg total) by mouth 3 (three) times a day (Patient not taking: Reported on 11/9/2023), Disp: 90 capsule, Rfl: 3    insulin glargine (Toujeo SoloStar) 300 units/mL CONCENTRATED  U-300 injection pen (1-unit dial), Inject 30 Units under the skin daily at bedtime, Disp: 9 mL, Rfl: 0    insulin lispro (HumaLOG KwikPen) 100 units/mL injection pen, Inject 10 Units under the skin 3 (three) times a day with meals (Patient not taking: Reported on 11/9/2023), Disp: 15 mL, Rfl: 3    metFORMIN (GLUCOPHAGE) 500 mg tablet, Take 1 tablet (500 mg total) by mouth 2 (two) times a day with meals (Patient not taking: Reported on 11/9/2023), Disp: 180 tablet, Rfl: 0    pantoprazole (PROTONIX) 40 mg tablet, Take 1 tablet (40 mg total) by mouth daily in the early morning (Patient not taking: Reported on 11/9/2023), Disp: 30 tablet, Rfl: 0    tadalafil (CIALIS) 20 MG tablet, Take 1 tablet (20 mg total) by mouth daily as needed for erectile dysfunction (Patient not taking: Reported on 11/9/2023), Disp: 30 tablet, Rfl: 5    tamsulosin (FLOMAX) 0.4 mg, Take 1 capsule (0.4 mg total) by mouth daily with dinner (Patient not taking: Reported on 11/9/2023), Disp: 30 capsule, Rfl: 0  No Known Allergies      Labs:  Lab on 11/07/2023   Component Date Value    Creatinine, Ur 11/07/2023 109.0     Albumin,U,Random 11/07/2023 33.0 (H)     Albumin Creat Ratio 11/07/2023 30     Sodium 11/07/2023 140     Potassium 11/07/2023 4.4     Chloride 11/07/2023 103     CO2 11/07/2023 28     ANION GAP 11/07/2023 9     BUN 11/07/2023 21     Creatinine 11/07/2023 0.82     Glucose, Fasting 11/07/2023 156 (H)     Calcium 11/07/2023 9.5     AST 11/07/2023 38     ALT 11/07/2023 31     Alkaline Phosphatase 11/07/2023 83     Total Protein 11/07/2023 7.4     Albumin 11/07/2023 4.5     Total Bilirubin 11/07/2023 0.95     eGFR 11/07/2023 90     Hemoglobin A1C 11/07/2023 7.8 (H)     EAG 11/07/2023 177     Cholesterol 11/07/2023 168     Triglycerides 11/07/2023 62     HDL, Direct 11/07/2023 47     LDL Calculated 11/07/2023 109 (H)     TSH 3RD GENERATON 11/07/2023 1.429     Vit D, 25-Hydroxy 11/07/2023 19.7 (L)     WBC 11/07/2023 5.24     RBC 11/07/2023  "5.19     Hemoglobin 11/07/2023 15.6     Hematocrit 11/07/2023 45.5     MCV 11/07/2023 88     MCH 11/07/2023 30.1     MCHC 11/07/2023 34.3     RDW 11/07/2023 12.3     Platelets 11/07/2023 202     MPV 11/07/2023 12.1     PSA 11/07/2023 3.19         Imaging: Cardiac EP device report    Result Date: 12/6/2023  Narrative: MDT LP2-ACTIVE SYSTEM IS MRI CONDITIONAL CARELINK TRANSMISSION: LOOP RECORDER. PRESENTING RHYTHM NSR W/ PVC @ 66 BPM. BATTERY STATUS \"OK.\" NO PATIENT OR DEVICE ACTIVATED EPISODES. HOWEVER THERE IS AN EGRAM SHOWING RVR. PT ON ELIQUIS. NORMAL DEVICE FUNCTION. DL       Review of Systems:  Review of Systems   Constitutional:  Negative for chills, diaphoresis, fatigue and fever.   HENT:  Negative for trouble swallowing and voice change.    Eyes:  Negative for pain and redness.   Respiratory:  Negative for shortness of breath and wheezing.    Cardiovascular:  Negative for chest pain, palpitations and leg swelling.   Gastrointestinal:  Negative for abdominal pain, constipation, diarrhea, nausea and vomiting.   Genitourinary:  Negative for dysuria.   Musculoskeletal:  Positive for arthralgias. Negative for neck pain and neck stiffness.   Skin:  Negative for rash.   Neurological:  Negative for dizziness, syncope, light-headedness and headaches.   Psychiatric/Behavioral:  Negative for agitation.    All other systems reviewed and are negative.        Vitals:    01/03/24 1307   BP: 118/68   BP Location: Right arm   Patient Position: Sitting   Cuff Size: Standard   Pulse: 75   Temp: 97.7 °F (36.5 °C)   TempSrc: Temporal   SpO2: 98%   Weight: 113 kg (248 lb 3.2 oz)   Height: 5' 11.5\" (1.816 m)     Vitals:    01/03/24 1307   Weight: 113 kg (248 lb 3.2 oz)     Height: 5' 11.5\" (181.6 cm)     Physical Exam:  Physical Exam  Vitals reviewed.   Constitutional:       General: He is not in acute distress.     Appearance: He is obese. He is not diaphoretic.   HENT:      Head: Normocephalic and atraumatic.   Eyes:      " General:         Right eye: No discharge.         Left eye: No discharge.   Neck:      Comments: Trachea midline, no JVD present  Cardiovascular:      Rate and Rhythm: Normal rate and regular rhythm.      Heart sounds:      No friction rub.   Pulmonary:      Effort: Pulmonary effort is normal. No respiratory distress.      Breath sounds: No wheezing.   Chest:      Chest wall: No tenderness.   Abdominal:      General: Bowel sounds are normal.      Palpations: Abdomen is soft.      Tenderness: There is no rebound.   Musculoskeletal:      Right lower leg: No edema.      Left lower leg: No edema.   Skin:     General: Skin is warm and dry.   Neurological:      Mental Status: He is alert.      Comments: Awake, alert, able to answer questions appropriately, able to move extremities bilaterally.   Psychiatric:         Mood and Affect: Mood normal.         Behavior: Behavior normal.

## 2024-01-07 PROBLEM — Z00.00 MEDICARE ANNUAL WELLNESS VISIT, SUBSEQUENT: Status: RESOLVED | Noted: 2022-11-02 | Resolved: 2024-01-07

## 2024-01-17 ENCOUNTER — DOCUMENTATION (OUTPATIENT)
Dept: GASTROENTEROLOGY | Facility: CLINIC | Age: 69
End: 2024-01-17

## 2024-01-30 ENCOUNTER — VBI (OUTPATIENT)
Dept: ADMINISTRATIVE | Facility: OTHER | Age: 69
End: 2024-01-30

## 2024-02-01 DIAGNOSIS — E11.9 TYPE 2 DIABETES MELLITUS WITHOUT COMPLICATION, WITH LONG-TERM CURRENT USE OF INSULIN (HCC): ICD-10-CM

## 2024-02-01 DIAGNOSIS — Z79.4 TYPE 2 DIABETES MELLITUS WITHOUT COMPLICATION, WITH LONG-TERM CURRENT USE OF INSULIN (HCC): ICD-10-CM

## 2024-02-01 NOTE — TELEPHONE ENCOUNTER
Name of medication/s patient is requesting to be refilled Continuous Blood Gluc Sensor (FreeStyle Miguel Angel 2 Sensor) MISC .    Medication dosage N/A    How often is medication being taken N/A.    Is patient requesting 30 or 90 day supply, 90.    Name of Pharmacy Risk Ident [facility] - [place], [place] - [address]      Last Visit 11/13/2023  Next Visit 2/21/2024    Pt was asking if medication would be free regardless of where he picked up the medication due to being on Medicaid.

## 2024-02-21 ENCOUNTER — OFFICE VISIT (OUTPATIENT)
Dept: ENDOCRINOLOGY | Facility: CLINIC | Age: 69
End: 2024-02-21
Payer: COMMERCIAL

## 2024-02-21 VITALS
WEIGHT: 250 LBS | DIASTOLIC BLOOD PRESSURE: 68 MMHG | RESPIRATION RATE: 18 BRPM | HEIGHT: 72 IN | SYSTOLIC BLOOD PRESSURE: 118 MMHG | BODY MASS INDEX: 33.86 KG/M2 | HEART RATE: 78 BPM | OXYGEN SATURATION: 99 % | TEMPERATURE: 98.6 F

## 2024-02-21 DIAGNOSIS — Z79.4 TYPE 2 DIABETES MELLITUS WITH KETOACIDOSIS WITHOUT COMA, WITH LONG-TERM CURRENT USE OF INSULIN (HCC): ICD-10-CM

## 2024-02-21 DIAGNOSIS — Z79.4 TYPE 2 DIABETES MELLITUS WITHOUT COMPLICATION, WITH LONG-TERM CURRENT USE OF INSULIN (HCC): Primary | ICD-10-CM

## 2024-02-21 DIAGNOSIS — E11.10 TYPE 2 DIABETES MELLITUS WITH KETOACIDOSIS WITHOUT COMA, WITH LONG-TERM CURRENT USE OF INSULIN (HCC): ICD-10-CM

## 2024-02-21 DIAGNOSIS — R80.9 TYPE 2 DIABETES MELLITUS WITH MICROALBUMINURIA, WITH LONG-TERM CURRENT USE OF INSULIN (HCC): ICD-10-CM

## 2024-02-21 DIAGNOSIS — Z79.4 TYPE 2 DIABETES MELLITUS WITHOUT COMPLICATION, WITH LONG-TERM CURRENT USE OF INSULIN (HCC): ICD-10-CM

## 2024-02-21 DIAGNOSIS — E11.65 UNCONTROLLED TYPE 2 DIABETES MELLITUS WITH HYPERGLYCEMIA (HCC): ICD-10-CM

## 2024-02-21 DIAGNOSIS — E11.29 TYPE 2 DIABETES MELLITUS WITH MICROALBUMINURIA, WITH LONG-TERM CURRENT USE OF INSULIN (HCC): ICD-10-CM

## 2024-02-21 DIAGNOSIS — Z79.4 TYPE 2 DIABETES MELLITUS WITH MICROALBUMINURIA, WITH LONG-TERM CURRENT USE OF INSULIN (HCC): ICD-10-CM

## 2024-02-21 DIAGNOSIS — E11.9 TYPE 2 DIABETES MELLITUS WITHOUT COMPLICATION, WITH LONG-TERM CURRENT USE OF INSULIN (HCC): ICD-10-CM

## 2024-02-21 DIAGNOSIS — E55.9 VITAMIN D DEFICIENCY: ICD-10-CM

## 2024-02-21 DIAGNOSIS — E11.9 TYPE 2 DIABETES MELLITUS WITHOUT COMPLICATION, WITH LONG-TERM CURRENT USE OF INSULIN (HCC): Primary | ICD-10-CM

## 2024-02-21 DIAGNOSIS — E78.5 DYSLIPIDEMIA: ICD-10-CM

## 2024-02-21 DIAGNOSIS — E66.09 CLASS 1 OBESITY DUE TO EXCESS CALORIES WITH SERIOUS COMORBIDITY AND BODY MASS INDEX (BMI) OF 33.0 TO 33.9 IN ADULT: ICD-10-CM

## 2024-02-21 LAB — SL AMB POCT HEMOGLOBIN AIC: 7.3 (ref ?–6.5)

## 2024-02-21 PROCEDURE — 83036 HEMOGLOBIN GLYCOSYLATED A1C: CPT | Performed by: STUDENT IN AN ORGANIZED HEALTH CARE EDUCATION/TRAINING PROGRAM

## 2024-02-21 PROCEDURE — 99214 OFFICE O/P EST MOD 30 MIN: CPT | Performed by: STUDENT IN AN ORGANIZED HEALTH CARE EDUCATION/TRAINING PROGRAM

## 2024-02-21 RX ORDER — INSULIN GLARGINE 300 U/ML
26 INJECTION, SOLUTION SUBCUTANEOUS
Qty: 7.8 ML | Refills: 0 | Status: SHIPPED | OUTPATIENT
Start: 2024-02-21 | End: 2024-05-21

## 2024-02-21 RX ORDER — INSULIN LISPRO 100 [IU]/ML
8 INJECTION, SOLUTION INTRAVENOUS; SUBCUTANEOUS
Qty: 15 ML | Refills: 3 | Status: SHIPPED | OUTPATIENT
Start: 2024-02-21

## 2024-02-21 NOTE — ASSESSMENT & PLAN NOTE
Emphasized importance of daily exercise, weight loss, caloric reduction, small meals, consumption of multiple servings of fruits and vegetables and avoidance of concentrated sweets such as juice and soda.   Ozmepic 0.25 mg weekly x 4 weeks and then 0.5 mg weekly.

## 2024-02-21 NOTE — PATIENT INSTRUCTIONS
I started Ozempic 0.25 once weekly for 4 weeks and then will increase to 0.5 mg once weekly, please call the office if you develop any side effects.   Decrease humalog to 8 units before meals  Toujeo to 26 units daily

## 2024-02-21 NOTE — ASSESSMENT & PLAN NOTE
Lab Results   Component Value Date    HGBA1C 7.3 (A) 02/21/2024   Glycemic control has improved, A1c of 7.3%.  He has gained weight last visit, he is willing to try a GLP-1 agonist for  counseled on adverse side effects of GLP-1 agonist including nausea, vomiting, pancreatitis, medullary thyroid cancer. No FHx of MEN2.  Started Ozempic 0.25 for 4 weeks and then 0.5 mg weekly.  I decreased Humalog to 8 units 3 times daily before meals and Toujeo to 26 units nightly.  I ordered rajani 2 sensor to his pharmacy.  In the meantime He was instructed to check his blood sugar 3 times a day.  Return back in 3 months.  Complete labs as ordered.   Left arm;

## 2024-02-21 NOTE — ASSESSMENT & PLAN NOTE
Lab Results   Component Value Date    HGBA1C 7.3 (A) 02/21/2024   See plan for type 2 diabetes with microalbuminuria.

## 2024-02-21 NOTE — PROGRESS NOTES
Established patient Progress Note      Cc: diabetes    Referring Provider  No referring provider defined for this encounter.     History of Present Illness:   Oracio Richardson Jr. is a 69 y.o. male with a history of type 2 diabetes with long term use of insulin who presented for follow up.        Reports complications of microalbuminuria . Denies recent illness or hospitalizations. Denies recent severe hypoglycemic or severe hyperglycemic episodes. Denies any issues with his current regimen. home glucose monitoring: are performed regularly      Current regimen:   Toujeo 30 units daily  Humalog 10 units 3 times daily AC    Home blood glucose readings:   SMBG daily.   Did not bring his sugar log or reader.  Did not get his Miguel Angel 2 sensor from pharmacy yet.    Hypoglycemic episodes:   H/o of hypoglycemia causing hospitalization or Intervention such as glucagon injection  or ambulance call : No  Has awareness: Yes      Opthamology:UTD     Podiatry: UTD        Has hypertension: no   Has hyperlipidemia: on lipitor 10 mg once daily,   Thyroid disorders: no  History of pancreatitis: Yes    Patient Active Problem List   Diagnosis    Urinary frequency    Type 2 diabetes mellitus ([facility])    Microscopic hematuria    Urge incontinence of urine    Nocturnal enuresis    Feeling of incomplete bladder emptying    Balanitis    PAF (paroxysmal atrial fibrillation) ([facility])    Urinary retention    Fungal infection of skin    Depressed mood    Pain in both lower extremities    Polyuria    Ventral Hernia    Hydroureteronephrosis    Type 2 diabetes mellitus, with long-term current use of insulin ([facility])    Vitamin D deficiency    Dyslipidemia    History of pulmonary embolus (PE)    Other dietary vitamin B12 deficiency anemia    Encounter for screening colonoscopy    Anemia    Unspecified mood (affective) disorder ([facility])    Class 1  "obesity due to excess calories with serious comorbidity and body mass index (BMI) of 33.0 to 33.9 in adult    Other male erectile dysfunction    Osteoarthritis of both knees      Past Medical History:   Diagnosis Date    A-fib ([facility])     per pt \"happened one time\"    Abdominal hernia     Acute metabolic encephalopathy 09/04/2022    CARLTON (acute kidney injury) ([facility]) 09/04/2022    Ambulates with cane     not recently using but has in home if needed    Anesthesia     per pt \"with knee surgery(in the )was awake for operation and fell asleep when being closed up\" and than took long to wake up\"    Arthritis     Blood in urine     \"sometimes\"    Diabetes mellitus ([facility])     Type 2--sees Endocrinologist [facility] Dr MONTSE Shaver    Lane catheter in place     History of pulmonary embolism     Implantable loop recorder present     Muscle weakness     per pt \"started with diabetes\"some muscle loss of density\" --per pt \"muscle coming back\"    Myopia of left eye     glasses for reading    Pancreatitis 09/04/2022    Risk for falls     Sepsis ([facility]) 09/04/2022    Teeth missing     Urethral pain     \"when passing urine\"      Past Surgical History:   Procedure Laterality Date    CARDIAC LOOP RECORDER      CATARACT EXTRACTION Bilateral     KNEE CARTILAGE SURGERY      KNEE SURGERY Right     MD TRURL ELECTROSURG RESCJ PROSTATE BLEED COMPLETE N/A 3/16/2023    Procedure: TRANSURETHRAL RESECTION OF PROSTATE (TURP)(possible), cystoscopy, SP tube placement, removal of bladder calculus;  Surgeon: Ludwig King MD;  Location: [facility] OR;  Service: Urology      Family History   Problem Relation Age of Onset    Diabetes Father     Diabetes type II Father     Diabetes Mother      Social History     Tobacco Use    Smoking status: Never     Passive exposure: Never    Smokeless tobacco: Never   Substance Use Topics    Alcohol use: Never     No Known Allergies      Current Outpatient Medications:     apixaban (Eliquis) 5 mg, Take 1 tablet (5 mg total) by " mouth 2 (two) times a day, Disp: 180 tablet, Rfl: 2    Blood Glucose Monitoring Suppl (OneTouch Verio Flex System) w/Device KIT, CHECK BLOOD SUGARS THREE TIMES DAILY DX: E11.65, Disp: 1 kit, Rfl: 0    Continuous Blood Gluc  (FreeStyle Miguel Angel 2 [place]) MARCELLA, As instructed, Disp: 1 each, Rfl: 0    Continuous Blood Gluc Sensor (FreeStyle Miguel Angel 2 Sensor) MISC, Use to check her blood sugars continuously and change it every 2 weeks, Disp: 7 each, Rfl: 2    ferrous sulfate 324 (65 Fe) mg, TAKE 1 TABLET BY MOUTH 2 TIMES A DAY BEFORE MEALS., Disp: 180 tablet, Rfl: 1    glucose blood (OneTouch Verio) test strip, CHECK BLOOD SUGARS THREE TIMES DAILY DX: E11.65, Disp: 400 strip, Rfl: 3    Incontinence Supply Disposable (Incontinence Brief Large) MISC, Use every 4 (four) hours as needed (Urinary incontinence), Disp: 36 each, Rfl: 12    insulin lispro (HumaLOG KwikPen) 100 units/mL injection pen, Inject 10 Units under the skin 3 (three) times a day with meals, Disp: 15 mL, Rfl: 3    Lancets (OneTouch Delica Plus [id number]) MISC, CHECK BLOOD SUGARS THREE TIMES DAILY DX: E11.65, Disp: 400 each, Rfl: 3    metoprolol succinate (TOPROL-XL) 25 mg 24 hr tablet, Take 1 tablet (25 mg total) by mouth daily, Disp: 90 tablet, Rfl: 2    atorvastatin (LIPITOR) 10 mg tablet, TAKE 1 TABLET BY MOUTH EVERY DAY (Patient not taking: Reported on 11/9/2023), Disp: 90 tablet, Rfl: 3    ergocalciferol (VITAMIN D2) 50,000 units, Take 1 capsule (50,000 Units total) by mouth once a week for 8 doses, Disp: 4 capsule, Rfl: 1    folic acid (FOLVITE) 1 mg tablet, TAKE 1 TABLET BY MOUTH EVERY DAY (Patient not taking: Reported on 11/9/2023), Disp: 90 tablet, Rfl: 1    gabapentin (NEURONTIN) 100 mg capsule, Take 1 capsule (100 mg total) by mouth 3 (three) times a day (Patient not taking: Reported on 11/9/2023), Disp: 90 capsule, Rfl: 3    insulin glargine (Toujeo SoloStar) 300 units/mL CONCENTRATED U-300 injection pen (1-unit dial), Inject 30 Units under  "the skin daily at bedtime, Disp: 9 mL, Rfl: 0    metFORMIN (GLUCOPHAGE) 500 mg tablet, Take 1 tablet (500 mg total) by mouth 2 (two) times a day with meals (Patient not taking: Reported on 11/9/2023), Disp: 180 tablet, Rfl: 0    pantoprazole (PROTONIX) 40 mg tablet, Take 1 tablet (40 mg total) by mouth daily in the early morning (Patient not taking: Reported on 11/9/2023), Disp: 30 tablet, Rfl: 0    tadalafil (CIALIS) 20 MG tablet, Take 1 tablet (20 mg total) by mouth daily as needed for erectile dysfunction (Patient not taking: Reported on 11/9/2023), Disp: 30 tablet, Rfl: 5    tamsulosin (FLOMAX) 0.4 mg, Take 1 capsule (0.4 mg total) by mouth daily with dinner (Patient not taking: Reported on 11/9/2023), Disp: 30 capsule, Rfl: 0  Review of Systems   Constitutional:  Negative for appetite change, fatigue and unexpected weight change.   HENT:  Negative for trouble swallowing and voice change.    Eyes:  Negative for visual disturbance.   Respiratory:  Negative for cough, shortness of breath and wheezing.    Cardiovascular:  Negative for palpitations and leg swelling.   Gastrointestinal:  Negative for abdominal pain, constipation, diarrhea, nausea and vomiting.   Endocrine: Negative for cold intolerance, heat intolerance, polyphagia and polyuria.   Musculoskeletal:  Positive for arthralgias.   Skin:  Negative for color change, rash and wound.   Neurological:  Negative for dizziness, tremors, weakness, light-headedness, numbness and headaches.   Psychiatric/Behavioral:  Negative for agitation and sleep disturbance. The patient is not nervous/anxious.        Physical Exam:  Body mass index is 34.38 kg/m².  /68   Pulse 78   Temp 98.6 °F (37 °C)   Resp 18   Ht 5' 11.5\" (1.816 m)   Wt 113 kg (250 lb)   SpO2 99%   BMI 34.38 kg/m²    Wt Readings from Last 3 Encounters:   02/21/24 113 kg (250 lb)   01/03/24 113 kg (248 lb 3.2 oz)   11/09/23 110 kg (242 lb)       GEN: NAD  E/n/m nl facies,   Eyes: no stare or " "proptosis,   Resp: CTAB, good effort  Ab+BS  Neuro: no tremor,   Skin: warm and dry,   Ext:  no edema bilaterally,   Psych: nl mood and affect, no gross lapses in memory      Labs:   No components found for: \"HA1C\"  No components found for: \"GLU\"    Lab Results   Component Value Date    CREATININE 0.82 11/07/2023    CREATININE 1.14 08/01/2023    CREATININE 0.90 07/31/2023    BUN 21 11/07/2023    K 4.4 11/07/2023     11/07/2023    CO2 28 11/07/2023     eGFR   Date Value Ref Range Status   11/07/2023 90 ml/min/1.73sq m Final     No components found for: \"MALBCRER\"    Lab Results   Component Value Date    HDL 47 11/07/2023    TRIG 62 11/07/2023       Lab Results   Component Value Date    ALT 31 11/07/2023    AST 38 11/07/2023    ALKPHOS 83 11/07/2023       Lab Results   Component Value Date    FREET4 1.25 01/19/2023       Impression:  1. Type 2 diabetes mellitus without complication, with long-term current use of insulin ([facility])    2. Vitamin D deficiency    3. Class 1 obesity due to excess calories with serious comorbidity and body mass index (BMI) of 33.0 to 33.9 in adult    4. Dyslipidemia    5. Type 2 diabetes mellitus with Microalbuminuria, with long-term current use of insulin ([facility])    6. Type 2 diabetes mellitus with microalbuminuria, with long-term current use of insulin ([facility])    7. Type 2 diabetes mellitus with ketoacidosis without coma, with long-term current use of insulin ([facility])    8. Uncontrolled type 2 diabetes mellitus with hyperglycemia ([facility])           Plan:    Problem List Items Addressed This Visit          Endocrine    Type 2 diabetes mellitus with microalbuminuria, with long-term current use of insulin ([facility]) - Primary       Lab Results   Component Value Date    HGBA1C 7.3 (A) 02/21/2024   Glycemic control has improved, A1c of 7.3%.  He has gained weight last visit, he is willing to try a GLP-1 agonist for  counseled on adverse side effects of GLP-1 agonist including nausea, vomiting, " pancreatitis, medullary thyroid cancer. No FHx of MEN2.  Started Ozempic 0.25 for 4 weeks and then 0.5 mg weekly.  I decreased Humalog to 8 units 3 times daily before meals and Toujeo to 26 units nightly.  I ordered miguel angel 2 sensor to his pharmacy.  In the meantime He was instructed to check his blood sugar 3 times a day.  Return back in 3 months.  Complete labs as ordered.         Relevant Medications    semaglutide, 0.25 or 0.5 mg/dose, (Ozempic) 2 mg/3 mL injection pen    Continuous Blood Gluc  (FreeStyle Miguel Angel 2 [place]) MARCELLA    insulin lispro (HumaLOG KwikPen) 100 units/mL injection pen    insulin glargine (Toujeo SoloStar) 300 units/mL CONCENTRATED U-300 injection pen (1-unit dial)    Other Relevant Orders    Comprehensive metabolic panel    Lipid Panel with Direct LDL reflex    Vitamin D 25 hydroxy    Albumin / creatinine urine ratio    Type 2 diabetes mellitus, with long-term current use of insulin ([facility])       Lab Results   Component Value Date    HGBA1C 7.3 (A) 02/21/2024   See plan for type 2 diabetes with microalbuminuria.         Relevant Medications    semaglutide, 0.25 or 0.5 mg/dose, (Ozempic) 2 mg/3 mL injection pen    insulin lispro (HumaLOG KwikPen) 100 units/mL injection pen    insulin glargine (Toujeo SoloStar) 300 units/mL CONCENTRATED U-300 injection pen (1-unit dial)    Other Relevant Orders    Comprehensive metabolic panel    Lipid Panel with Direct LDL reflex    Vitamin D 25 hydroxy    Albumin / creatinine urine ratio    POCT hemoglobin A1c (Completed)    Comprehensive metabolic panel    Lipid Panel with Direct LDL reflex    Vitamin D 25 hydroxy    Albumin / creatinine urine ratio       Other    Vitamin D deficiency     Continue supplementation.         Relevant Orders    Vitamin D 25 hydroxy    Dyslipidemia    Relevant Orders    Lipid Panel with Direct LDL reflex    Class 1 obesity due to excess calories with serious comorbidity and body mass index (BMI) of 33.0 to 33.9 in adult      Emphasized importance of daily exercise, weight loss, caloric reduction, small meals, consumption of multiple servings of fruits and vegetables and avoidance of concentrated sweets such as juice and soda.   Ozmepic 0.25 mg weekly x 4 weeks and then 0.5 mg weekly.               Relevant Orders    Comprehensive metabolic panel    Lipid Panel with Direct LDL reflex    Vitamin D 25 hydroxy     Other Visit Diagnoses       Uncontrolled type 2 diabetes mellitus with hyperglycemia ([facility])        Relevant Medications    semaglutide, 0.25 or 0.5 mg/dose, (Ozempic) 2 mg/3 mL injection pen    insulin lispro (HumaLOG KwikPen) 100 units/mL injection pen    insulin glargine (Toujeo SoloStar) 300 units/mL CONCENTRATED U-300 injection pen (1-unit dial)              Discussed with the patient and all questioned fully answered. He will call me if any problems arise.    Counseled patient on diagnostic results, prognosis, risk and benefit of treatment options, instruction for management, importance of treatment compliance, Risk  factor reduction and impressions      Jose Luis Shaver MD

## 2024-03-06 ENCOUNTER — REMOTE DEVICE CLINIC VISIT (OUTPATIENT)
Dept: CARDIOLOGY CLINIC | Facility: CLINIC | Age: 69
End: 2024-03-06

## 2024-03-06 DIAGNOSIS — Z95.818 PRESENCE OF OTHER CARDIAC IMPLANTS AND GRAFTS: Primary | ICD-10-CM

## 2024-03-06 NOTE — PROGRESS NOTES
"MDT LP2-ACTIVE SYSTEM IS MRI CONDITIONAL   CARELINK TRANSMISSION: LOOP RECORDER. PRESENTING RHYTHM NSR @ 72 BPM. BATTERY STATUS \"OK.\" NO PATIENT OR DEVICE ACTIVATED EPISODES. NO PATIENT OR DEVICE ACTIVATED EPISODES. NORMAL DEVICE FUNCTION. DL   "

## 2024-03-07 ENCOUNTER — OFFICE VISIT (OUTPATIENT)
Dept: FAMILY MEDICINE CLINIC | Facility: CLINIC | Age: 69
End: 2024-03-07
Payer: COMMERCIAL

## 2024-03-07 VITALS
OXYGEN SATURATION: 95 % | SYSTOLIC BLOOD PRESSURE: 116 MMHG | HEART RATE: 72 BPM | HEIGHT: 72 IN | DIASTOLIC BLOOD PRESSURE: 74 MMHG | BODY MASS INDEX: 38.33 KG/M2 | WEIGHT: 283 LBS | TEMPERATURE: 97.8 F

## 2024-03-07 DIAGNOSIS — I48.0 PAF (PAROXYSMAL ATRIAL FIBRILLATION) (HCC): ICD-10-CM

## 2024-03-07 DIAGNOSIS — K21.9 GERD WITHOUT ESOPHAGITIS: ICD-10-CM

## 2024-03-07 DIAGNOSIS — E11.65 TYPE 2 DIABETES MELLITUS WITH HYPERGLYCEMIA, WITH LONG-TERM CURRENT USE OF INSULIN (HCC): ICD-10-CM

## 2024-03-07 DIAGNOSIS — Z79.4 TYPE 2 DIABETES MELLITUS WITH MICROALBUMINURIA, WITH LONG-TERM CURRENT USE OF INSULIN (HCC): ICD-10-CM

## 2024-03-07 DIAGNOSIS — E11.9 TYPE 2 DIABETES MELLITUS WITHOUT COMPLICATION, WITH LONG-TERM CURRENT USE OF INSULIN (HCC): ICD-10-CM

## 2024-03-07 DIAGNOSIS — E66.09 CLASS 1 OBESITY DUE TO EXCESS CALORIES WITH SERIOUS COMORBIDITY AND BODY MASS INDEX (BMI) OF 33.0 TO 33.9 IN ADULT: ICD-10-CM

## 2024-03-07 DIAGNOSIS — E11.69 TYPE 2 DIABETES MELLITUS WITH OTHER SPECIFIED COMPLICATION, WITH LONG-TERM CURRENT USE OF INSULIN (HCC): ICD-10-CM

## 2024-03-07 DIAGNOSIS — R80.9 TYPE 2 DIABETES MELLITUS WITH MICROALBUMINURIA, WITH LONG-TERM CURRENT USE OF INSULIN (HCC): ICD-10-CM

## 2024-03-07 DIAGNOSIS — M17.0 PRIMARY OSTEOARTHRITIS OF BOTH KNEES: ICD-10-CM

## 2024-03-07 DIAGNOSIS — Z79.4 TYPE 2 DIABETES MELLITUS WITHOUT COMPLICATION, WITH LONG-TERM CURRENT USE OF INSULIN (HCC): ICD-10-CM

## 2024-03-07 DIAGNOSIS — F39 UNSPECIFIED MOOD (AFFECTIVE) DISORDER (HCC): ICD-10-CM

## 2024-03-07 DIAGNOSIS — Z79.4 TYPE 2 DIABETES MELLITUS WITH HYPERGLYCEMIA, WITH LONG-TERM CURRENT USE OF INSULIN (HCC): ICD-10-CM

## 2024-03-07 DIAGNOSIS — E11.29 TYPE 2 DIABETES MELLITUS WITH MICROALBUMINURIA, WITH LONG-TERM CURRENT USE OF INSULIN (HCC): ICD-10-CM

## 2024-03-07 DIAGNOSIS — Z12.11 SCREENING FOR COLON CANCER: Primary | ICD-10-CM

## 2024-03-07 DIAGNOSIS — Z79.4 TYPE 2 DIABETES MELLITUS WITH OTHER SPECIFIED COMPLICATION, WITH LONG-TERM CURRENT USE OF INSULIN (HCC): ICD-10-CM

## 2024-03-07 LAB
CREATINE/CREATININE [MASS RATIO] IN URINE: 50 MG/G{CREAT}
MICROALBUMIN/CREAT 24H UR: 30 MG/G CREATININE (ref 0–30)
SL AMB POCT UR MICROALBUMIN: 10

## 2024-03-07 PROCEDURE — G2211 COMPLEX E/M VISIT ADD ON: HCPCS | Performed by: FAMILY MEDICINE

## 2024-03-07 PROCEDURE — 99214 OFFICE O/P EST MOD 30 MIN: CPT | Performed by: FAMILY MEDICINE

## 2024-03-07 PROCEDURE — 82043 UR ALBUMIN QUANTITATIVE: CPT | Performed by: FAMILY MEDICINE

## 2024-03-07 PROCEDURE — 82570 ASSAY OF URINE CREATININE: CPT | Performed by: FAMILY MEDICINE

## 2024-03-07 NOTE — ASSESSMENT & PLAN NOTE
Continue insulin continue with diet now and work on weight reduction primarily which she is excited to start a program as we approach spring as his knees are now bothering him from the weight gain over the winter  Lab Results   Component Value Date    HGBA1C 7.3 (A) 02/21/2024      done

## 2024-03-07 NOTE — ASSESSMENT & PLAN NOTE
Patient is going to work on weight reduction we discussed the benefits of this in light of upcoming potential surgery for his knees he needs to get his A1c down under 8 which is now at 7.3 but he would like it lower.

## 2024-03-07 NOTE — ASSESSMENT & PLAN NOTE
Would like to see his numbers under 7 he will work on this working on diet and exercise more as we approach spring weather he will get outside more and be more active and work on weight reduction as his weight is up over the wintertime now  Lab Results   Component Value Date    HGBA1C 7.3 (A) 02/21/2024

## 2024-03-07 NOTE — ASSESSMENT & PLAN NOTE
Patient medically stable continuing on same medication regimen of metoprolol now and Eliquis follow-up cardiology as scheduled

## 2024-03-07 NOTE — ASSESSMENT & PLAN NOTE
Review arthritis of both knees.  Patient will work on weight reduction.  Right knee is much worse with no cartilage left by imaging studies and he will need close follow-up with orthopedics and have to decide on knee replacement surgery when he is ready.

## 2024-03-07 NOTE — PROGRESS NOTES
Assessment/Plan:       Problem List Items Addressed This Visit          Endocrine    Type 2 diabetes mellitus with microalbuminuria, with long-term current use of insulin ([facility])     Would like to see his numbers under 7 he will work on this working on diet and exercise more as we approach spring weather he will get outside more and be more active and work on weight reduction as his weight is up over the wintertime now  Lab Results   Component Value Date    HGBA1C 7.3 (A) 02/21/2024            Relevant Medications    Continuous Blood Gluc Sensor (FreeStyle Miguel Angel 2 Sensor) MISC    Continuous Blood Gluc  (FreeStyle Miguel Angel 2 [place]) MARCELLA    semaglutide, 0.25 or 0.5 mg/dose, (Ozempic) 2 mg/3 mL injection pen    Type 2 diabetes mellitus, with long-term current use of insulin ([facility])     Continue insulin continue with diet now and work on weight reduction primarily which she is excited to start a program as we approach spring as his knees are now bothering him from the weight gain over the winter  Lab Results   Component Value Date    HGBA1C 7.3 (A) 02/21/2024            Relevant Medications    Continuous Blood Gluc Sensor (FreeStyle Miguel Angel 2 Sensor) MISC    Continuous Blood Gluc  (FreeStyle Miguel Angel 2 [place]) MARCELLA    semaglutide, 0.25 or 0.5 mg/dose, (Ozempic) 2 mg/3 mL injection pen    Other Relevant Orders    Lipid Panel with Direct LDL reflex    Hemoglobin A1C       Cardiovascular and Mediastinum    PAF (paroxysmal atrial fibrillation) ([facility])     Patient medically stable continuing on same medication regimen of metoprolol now and Eliquis follow-up cardiology as scheduled            Musculoskeletal and Integument    Osteoarthritis of both knees     Review arthritis of both knees.  Patient will work on weight reduction.  Right knee is much worse with no cartilage left by imaging studies and he will need close follow-up with orthopedics and have to decide on knee replacement surgery when he is ready.             Other    Unspecified mood (affective) disorder ([facility])     Stable currently on medication regimen sleep patterns are good we will follow-up as scheduled at next office visit         Relevant Orders    Albumin / creatinine urine ratio    Comprehensive metabolic panel    Hemoglobin A1C    Lipid Panel with Direct LDL reflex    Class 1 obesity due to excess calories with serious comorbidity and body mass index (BMI) of 33.0 to 33.9 in adult     Patient is going to work on weight reduction we discussed the benefits of this in light of upcoming potential surgery for his knees he needs to get his A1c down under 8 which is now at 7.3 but he would like it lower.          Other Visit Diagnoses       Screening for colon cancer    -  Primary    Relevant Orders    Ambulatory Referral to Gastroenterology    GERD without esophagitis        Relevant Orders    Ambulatory Referral to Gastroenterology              Subjective:      Patient ID: Oracio Richardson Jr. is a 69 y.o. male.    Follow up evaluation patient gained weight over the winter less activity        The following portions of the patient's history were reviewed and updated as appropriate: allergies, current medications, past family history, past medical history, past social history, past surgical history and problem list.    Review of Systems   Constitutional:  Negative for chills, fatigue and fever.   HENT:  Negative for congestion, nosebleeds, rhinorrhea, sinus pressure and sore throat.    Eyes:  Negative for discharge and redness.   Respiratory:  Negative for cough and shortness of breath.    Cardiovascular:  Negative for chest pain, palpitations and leg swelling.   Gastrointestinal:  Negative for abdominal pain, blood in stool and nausea.   Endocrine: Negative for cold intolerance, heat intolerance and polyuria.   Genitourinary:  Negative for dysuria and frequency.   Musculoskeletal:  Negative for arthralgias, back pain and myalgias.   Skin:  Negative for rash.  "  Neurological:  Negative for dizziness, weakness and headaches.   Hematological:  Negative for adenopathy.   Psychiatric/Behavioral:  Negative for behavioral problems and sleep disturbance. The patient is not nervous/anxious.          Objective:      /74   Pulse 72   Temp 97.8 °F (36.6 °C) (Tympanic)   Ht 5' 11.5\" (1.816 m)   Wt 128 kg (283 lb)   SpO2 95%   BMI 38.92 kg/m²        Physical Exam  Vitals and nursing note reviewed.   Constitutional:       Appearance: Normal appearance. He is well-developed. He is obese.   HENT:      Head: Normocephalic and atraumatic.      Right Ear: Tympanic membrane and external ear normal.      Left Ear: Tympanic membrane and external ear normal.      Nose: Nose normal.      Mouth/Throat:      Mouth: Mucous membranes are moist.   Eyes:      General: No scleral icterus.     Conjunctiva/sclera: Conjunctivae normal.      Pupils: Pupils are equal, round, and reactive to light.   Neck:      Thyroid: No thyromegaly.      Vascular: No JVD.   Cardiovascular:      Rate and Rhythm: Normal rate and regular rhythm.      Heart sounds: Normal heart sounds. No murmur heard.  Pulmonary:      Effort: Pulmonary effort is normal.      Breath sounds: Normal breath sounds. No wheezing or rales.   Chest:      Chest wall: No tenderness.   Abdominal:      General: Bowel sounds are normal. There is no distension.      Palpations: Abdomen is soft. There is no mass.      Tenderness: There is no abdominal tenderness. There is no guarding or rebound.   Musculoskeletal:         General: No tenderness or deformity. Normal range of motion.      Cervical back: Normal range of motion and neck supple.   Lymphadenopathy:      Cervical: No cervical adenopathy.   Skin:     General: Skin is warm and dry.      Findings: No erythema or rash.   Neurological:      Mental Status: He is alert and oriented to person, place, and time.      Cranial Nerves: No cranial nerve deficit.      Deep Tendon Reflexes: Reflexes " "are normal and symmetric. Reflexes normal.   Psychiatric:         Behavior: Behavior normal.         Thought Content: Thought content normal.         Judgment: Judgment normal.          Data:    Laboratory Results: I have personally reviewed the pertinent laboratory results/reports   Radiology/Other Diagnostic Testing Results: I have personally reviewed pertinent reports.       Lab Results   Component Value Date    WBC 5.24 11/07/2023    HGB 15.6 11/07/2023    HCT 45.5 11/07/2023    MCV 88 11/07/2023     11/07/2023     Lab Results   Component Value Date    K 4.4 11/07/2023     11/07/2023    CO2 28 11/07/2023    BUN 21 11/07/2023    CREATININE 0.82 11/07/2023    GLUCOSE 241 (H) 09/07/2022    GLUF 156 (H) 11/07/2023    CALCIUM 9.5 11/07/2023    CORRECTEDCA 10.2 (H) 11/17/2022    AST 38 11/07/2023    ALT 31 11/07/2023    ALKPHOS 83 11/07/2023    EGFR 90 11/07/2023     Lab Results   Component Value Date    CHOLESTEROL 168 11/07/2023    CHOLESTEROL 146 01/19/2023    CHOLESTEROL 153 09/04/2022     Lab Results   Component Value Date    HDL 47 11/07/2023    HDL 43 01/19/2023    HDL 54 09/04/2022     Lab Results   Component Value Date    LDLCALC 109 (H) 11/07/2023    LDLCALC 89 01/19/2023    LDLCALC 81 09/04/2022     Lab Results   Component Value Date    TRIG 62 11/07/2023    TRIG 71 01/19/2023    TRIG 92 09/04/2022     No results found for: \"CHOLHDL\"  Lab Results   Component Value Date    [id number] 1.429 11/07/2023     Lab Results   Component Value Date    HGBA1C 7.3 (A) 02/21/2024     Lab Results   Component Value Date    PSA 3.19 11/07/2023       Raymundo Barnes, DO      "

## 2024-03-07 NOTE — ASSESSMENT & PLAN NOTE
Stable currently on medication regimen sleep patterns are good we will follow-up as scheduled at next office visit

## 2024-04-09 DIAGNOSIS — E11.9 TYPE 2 DIABETES MELLITUS WITHOUT COMPLICATION, WITH LONG-TERM CURRENT USE OF INSULIN (HCC): ICD-10-CM

## 2024-04-09 DIAGNOSIS — Z79.4 TYPE 2 DIABETES MELLITUS WITHOUT COMPLICATION, WITH LONG-TERM CURRENT USE OF INSULIN (HCC): ICD-10-CM

## 2024-04-09 NOTE — TELEPHONE ENCOUNTER
Pharmacy told patient they never received scripts on [id number]. Please send to a different pharmacy ([facility])    Reason for call:   [x] Refill   [] Prior Auth  [] Other:     Office:   [] PCP/Provider -   [x] Specialty/Provider - endo    Medication:   Continuous Blood Gluc  (Freestyle miguel angel 2 reader)- as instructed  Continuous Blood Gluc Sensor (Freestyle Miguel Angel 2 sensor)- Use to check his blood sugar continuously and change it every 2 weeks  Semaglitude 0.25 or 0.5mg- Inject 0.375 mL (0.25 mg total) under the skin every 7 days for 30 days, THEN 0.75 mL (0.5 mg total) every 7 days       [facility]    Does the patient have enough for 3 days?   [] Yes   [x] No - Send as HP to [facility]

## 2024-05-16 ENCOUNTER — TELEPHONE (OUTPATIENT)
Age: 69
End: 2024-05-16

## 2024-05-16 DIAGNOSIS — Z79.4 TYPE 2 DIABETES MELLITUS WITH KETOACIDOSIS WITHOUT COMA, WITH LONG-TERM CURRENT USE OF INSULIN (HCC): ICD-10-CM

## 2024-05-16 DIAGNOSIS — E11.10 TYPE 2 DIABETES MELLITUS WITH KETOACIDOSIS WITHOUT COMA, WITH LONG-TERM CURRENT USE OF INSULIN (HCC): ICD-10-CM

## 2024-05-16 DIAGNOSIS — E11.65 UNCONTROLLED TYPE 2 DIABETES MELLITUS WITH HYPERGLYCEMIA (HCC): ICD-10-CM

## 2024-05-17 RX ORDER — INSULIN GLARGINE 300 U/ML
26 INJECTION, SOLUTION SUBCUTANEOUS
Qty: 9 ML | Refills: 1 | Status: SHIPPED | OUTPATIENT
Start: 2024-05-17 | End: 2024-05-23

## 2024-05-17 RX ORDER — INSULIN LISPRO 100 [IU]/ML
8 INJECTION, SOLUTION INTRAVENOUS; SUBCUTANEOUS
Qty: 30 ML | Refills: 1 | Status: SHIPPED | OUTPATIENT
Start: 2024-05-17 | End: 2024-05-23

## 2024-05-23 ENCOUNTER — APPOINTMENT (OUTPATIENT)
Dept: LAB | Facility: CLINIC | Age: 69
End: 2024-05-23
Payer: COMMERCIAL

## 2024-05-23 ENCOUNTER — OFFICE VISIT (OUTPATIENT)
Dept: ENDOCRINOLOGY | Facility: CLINIC | Age: 69
End: 2024-05-23
Payer: COMMERCIAL

## 2024-05-23 VITALS
DIASTOLIC BLOOD PRESSURE: 68 MMHG | SYSTOLIC BLOOD PRESSURE: 122 MMHG | WEIGHT: 286.2 LBS | OXYGEN SATURATION: 98 % | HEART RATE: 80 BPM | TEMPERATURE: 98.5 F | BODY MASS INDEX: 39.36 KG/M2

## 2024-05-23 DIAGNOSIS — E78.5 HYPERLIPIDEMIA, UNSPECIFIED HYPERLIPIDEMIA TYPE: ICD-10-CM

## 2024-05-23 DIAGNOSIS — E55.9 VITAMIN D DEFICIENCY: ICD-10-CM

## 2024-05-23 DIAGNOSIS — E66.09 CLASS 1 OBESITY DUE TO EXCESS CALORIES WITH SERIOUS COMORBIDITY AND BODY MASS INDEX (BMI) OF 33.0 TO 33.9 IN ADULT: ICD-10-CM

## 2024-05-23 DIAGNOSIS — R80.9 TYPE 2 DIABETES MELLITUS WITH MICROALBUMINURIA, WITH LONG-TERM CURRENT USE OF INSULIN (HCC): ICD-10-CM

## 2024-05-23 DIAGNOSIS — Z79.4 TYPE 2 DIABETES MELLITUS WITH MICROALBUMINURIA, WITH LONG-TERM CURRENT USE OF INSULIN (HCC): ICD-10-CM

## 2024-05-23 DIAGNOSIS — E11.65 UNCONTROLLED TYPE 2 DIABETES MELLITUS WITH HYPERGLYCEMIA (HCC): ICD-10-CM

## 2024-05-23 DIAGNOSIS — Z79.4 TYPE 2 DIABETES MELLITUS WITHOUT COMPLICATION, WITH LONG-TERM CURRENT USE OF INSULIN (HCC): ICD-10-CM

## 2024-05-23 DIAGNOSIS — E11.9 TYPE 2 DIABETES MELLITUS WITHOUT COMPLICATION, WITH LONG-TERM CURRENT USE OF INSULIN (HCC): Primary | ICD-10-CM

## 2024-05-23 DIAGNOSIS — N13.8 BPH WITH OBSTRUCTION/LOWER URINARY TRACT SYMPTOMS: ICD-10-CM

## 2024-05-23 DIAGNOSIS — E11.10 TYPE 2 DIABETES MELLITUS WITH KETOACIDOSIS WITHOUT COMA, WITH LONG-TERM CURRENT USE OF INSULIN (HCC): ICD-10-CM

## 2024-05-23 DIAGNOSIS — E11.29 TYPE 2 DIABETES MELLITUS WITH MICROALBUMINURIA, WITH LONG-TERM CURRENT USE OF INSULIN (HCC): ICD-10-CM

## 2024-05-23 DIAGNOSIS — N40.1 BPH WITH OBSTRUCTION/LOWER URINARY TRACT SYMPTOMS: ICD-10-CM

## 2024-05-23 DIAGNOSIS — E78.5 DYSLIPIDEMIA: ICD-10-CM

## 2024-05-23 DIAGNOSIS — Z79.4 TYPE 2 DIABETES MELLITUS WITH KETOACIDOSIS WITHOUT COMA, WITH LONG-TERM CURRENT USE OF INSULIN (HCC): ICD-10-CM

## 2024-05-23 DIAGNOSIS — E11.9 TYPE 2 DIABETES MELLITUS WITHOUT COMPLICATION, WITH LONG-TERM CURRENT USE OF INSULIN (HCC): ICD-10-CM

## 2024-05-23 DIAGNOSIS — E66.01 CLASS 2 SEVERE OBESITY DUE TO EXCESS CALORIES WITH SERIOUS COMORBIDITY AND BODY MASS INDEX (BMI) OF 39.0 TO 39.9 IN ADULT (HCC): ICD-10-CM

## 2024-05-23 DIAGNOSIS — Z79.4 TYPE 2 DIABETES MELLITUS WITHOUT COMPLICATION, WITH LONG-TERM CURRENT USE OF INSULIN (HCC): Primary | ICD-10-CM

## 2024-05-23 PROBLEM — E66.812 CLASS 2 SEVERE OBESITY DUE TO EXCESS CALORIES WITH SERIOUS COMORBIDITY AND BODY MASS INDEX (BMI) OF 39.0 TO 39.9 IN ADULT (HCC): Status: ACTIVE | Noted: 2024-05-23

## 2024-05-23 LAB
25(OH)D3 SERPL-MCNC: 19.5 NG/ML (ref 30–100)
ALBUMIN SERPL BCP-MCNC: 4.2 G/DL (ref 3.5–5)
ALP SERPL-CCNC: 89 U/L (ref 34–104)
ALT SERPL W P-5'-P-CCNC: 25 U/L (ref 7–52)
ANION GAP SERPL CALCULATED.3IONS-SCNC: 11 MMOL/L (ref 4–13)
AST SERPL W P-5'-P-CCNC: 39 U/L (ref 13–39)
BILIRUB SERPL-MCNC: 0.99 MG/DL (ref 0.2–1)
BUN SERPL-MCNC: 22 MG/DL (ref 5–25)
CALCIUM SERPL-MCNC: 9 MG/DL (ref 8.4–10.2)
CHLORIDE SERPL-SCNC: 106 MMOL/L (ref 96–108)
CHOLEST SERPL-MCNC: 134 MG/DL
CO2 SERPL-SCNC: 21 MMOL/L (ref 21–32)
CREAT SERPL-MCNC: 0.89 MG/DL (ref 0.6–1.3)
CREAT UR-MCNC: 109.1 MG/DL
EST. AVERAGE GLUCOSE BLD GHB EST-MCNC: 174 MG/DL
GFR SERPL CREATININE-BSD FRML MDRD: 86 ML/MIN/1.73SQ M
GLUCOSE P FAST SERPL-MCNC: 89 MG/DL (ref 65–99)
HBA1C MFR BLD: 7.7 %
HDLC SERPL-MCNC: 36 MG/DL
LDLC SERPL CALC-MCNC: 86 MG/DL (ref 0–100)
MICROALBUMIN UR-MCNC: 27.8 MG/L
MICROALBUMIN/CREAT 24H UR: 25 MG/G CREATININE (ref 0–30)
POTASSIUM SERPL-SCNC: 4.2 MMOL/L (ref 3.5–5.3)
PROT SERPL-MCNC: 7.7 G/DL (ref 6.4–8.4)
PSA SERPL-MCNC: 3.88 NG/ML (ref 0–4)
SL AMB POCT HEMOGLOBIN AIC: 7.5 (ref ?–6.5)
SODIUM SERPL-SCNC: 138 MMOL/L (ref 135–147)
TRIGL SERPL-MCNC: 62 MG/DL
TSH SERPL DL<=0.05 MIU/L-ACNC: 1.32 UIU/ML (ref 0.45–4.5)

## 2024-05-23 PROCEDURE — 84443 ASSAY THYROID STIM HORMONE: CPT

## 2024-05-23 PROCEDURE — 99214 OFFICE O/P EST MOD 30 MIN: CPT | Performed by: STUDENT IN AN ORGANIZED HEALTH CARE EDUCATION/TRAINING PROGRAM

## 2024-05-23 PROCEDURE — 84153 ASSAY OF PSA TOTAL: CPT

## 2024-05-23 PROCEDURE — 82306 VITAMIN D 25 HYDROXY: CPT

## 2024-05-23 PROCEDURE — 83036 HEMOGLOBIN GLYCOSYLATED A1C: CPT | Performed by: STUDENT IN AN ORGANIZED HEALTH CARE EDUCATION/TRAINING PROGRAM

## 2024-05-23 PROCEDURE — 80053 COMPREHEN METABOLIC PANEL: CPT

## 2024-05-23 PROCEDURE — 82043 UR ALBUMIN QUANTITATIVE: CPT

## 2024-05-23 PROCEDURE — 80061 LIPID PANEL: CPT

## 2024-05-23 PROCEDURE — 36415 COLL VENOUS BLD VENIPUNCTURE: CPT

## 2024-05-23 PROCEDURE — 83036 HEMOGLOBIN GLYCOSYLATED A1C: CPT

## 2024-05-23 PROCEDURE — 82570 ASSAY OF URINE CREATININE: CPT

## 2024-05-23 RX ORDER — INSULIN LISPRO 100 [IU]/ML
8 INJECTION, SOLUTION INTRAVENOUS; SUBCUTANEOUS
Qty: 24 ML | Refills: 0 | Status: SHIPPED | OUTPATIENT
Start: 2024-05-23 | End: 2024-08-21

## 2024-05-23 RX ORDER — INSULIN GLARGINE 300 U/ML
26 INJECTION, SOLUTION SUBCUTANEOUS
Qty: 9 ML | Refills: 1 | Status: SHIPPED | OUTPATIENT
Start: 2024-05-23 | End: 2024-12-17

## 2024-05-23 RX ORDER — ATORVASTATIN CALCIUM 10 MG/1
10 TABLET, FILM COATED ORAL DAILY
Qty: 90 TABLET | Refills: 3 | Status: SHIPPED | OUTPATIENT
Start: 2024-05-23

## 2024-05-23 NOTE — PROGRESS NOTES
Established patient Progress Note      Cc: diabetes    Referring Provider  No referring provider defined for this encounter.     History of Present Illness:   Oracio Richardson Jr. is a 69 y.o. male with a history of type 2 diabetes with long term use of insulin who presented for follow up.        Reports complications of microalbuminuria. Denies recent illness or hospitalizations.    Current regimen:       Ozempic 0.5 mg weekly  Humalog to 10 units 3 times daily before meals  Toujeo 30 units nightly.     He uses [employer],  he did not bring his reader, he states blood sugars     Hypoglycemic episodes:   H/o of hypoglycemia causing hospitalization or Intervention such as glucagon injection  or ambulance call : no  Has awareness: yes       Opthamology:UTD     Podiatry: UTD        Has hypertension: no   Has hyperlipidemia: on lipitor 10 mg once daily,   Thyroid disorders: no  History of pancreatitis: Yes    Patient Active Problem List   Diagnosis    Urinary frequency    Type 2 diabetes mellitus with microalbuminuria, with long-term current use of insulin ([facility])    Microscopic hematuria    Urge incontinence of urine    Nocturnal enuresis    Feeling of incomplete bladder emptying    Balanitis    PAF (paroxysmal atrial fibrillation) ([facility])    Urinary retention    Fungal infection of skin    Depressed mood    Pain in both lower extremities    Polyuria    Ventral Hernia    Hydroureteronephrosis    Type 2 diabetes mellitus, with long-term current use of insulin ([facility])    Vitamin D deficiency    Dyslipidemia    History of pulmonary embolus (PE)    Other dietary vitamin B12 deficiency anemia    Encounter for screening colonoscopy    Anemia    Unspecified mood (affective) disorder ([facility])    Class 1 obesity due to excess calories with serious comorbidity and body mass index (BMI) of 33.0 to 33.9 in adult    Other male  "erectile dysfunction    Osteoarthritis of both knees      Past Medical History:   Diagnosis Date    A-fib ([facility])     per pt \"happened one time\"    Abdominal hernia     Acute metabolic encephalopathy 09/04/2022    CARLTON (acute kidney injury) ([facility]) 09/04/2022    Ambulates with cane     not recently using but has in home if needed    Anesthesia     per pt \"with knee surgery(in the )was awake for operation and fell asleep when being closed up\" and than took long to wake up\"    Arthritis     Blood in urine     \"sometimes\"    Diabetes mellitus ([facility])     Type 2--sees Endocrinologist [facility] Dr MONTSE Shaver    Lane catheter in place     History of pulmonary embolism     Implantable loop recorder present     Muscle weakness     per pt \"started with diabetes\"some muscle loss of density\" --per pt \"muscle coming back\"    Myopia of left eye     glasses for reading    Obesity     Pancreatitis 09/04/2022    Risk for falls     Sepsis ([facility]) 09/04/2022    Teeth missing     Urethral pain     \"when passing urine\"    Urinary tract infection       Past Surgical History:   Procedure Laterality Date    CARDIAC LOOP RECORDER      CATARACT EXTRACTION Bilateral     KNEE CARTILAGE SURGERY      KNEE SURGERY Right     [place] TRURL ELECTROSURG RESCJ PROSTATE BLEED COMPLETE N/A 3/16/2023    Procedure: TRANSURETHRAL RESECTION OF PROSTATE (TURP)(possible), cystoscopy, SP tube placement, removal of bladder calculus;  Surgeon: Ludwig King MD;  Location: [facility];  Service: Urology      Family History   Problem Relation Age of Onset    Diabetes Father     Diabetes type II Father     Diabetes Mother      Social History     Tobacco Use    Smoking status: Never     Passive exposure: Never    Smokeless tobacco: Never   Substance Use Topics    Alcohol use: Never     No Known Allergies      Current Outpatient Medications:     apixaban (Eliquis) 5 mg, Take 1 tablet (5 mg total) by mouth 2 (two) times a day, Disp: 180 tablet, Rfl: 2    atorvastatin (LIPITOR) 10 mg " tablet, TAKE 1 TABLET BY MOUTH EVERY DAY (Patient not taking: Reported on 11/9/2023), Disp: 90 tablet, Rfl: 3    Blood Glucose Monitoring Suppl (OneTouch Verio Flex System) w/Device KIT, CHECK BLOOD SUGARS THREE TIMES DAILY DX: E11.65, Disp: 1 kit, Rfl: 0    Continuous Blood Gluc  (FreeStyle Miguel Angel 2 [place]) MARCELLA, As instructed, Disp: 6 each, Rfl: 2    Continuous Blood Gluc Sensor (FreeStyle Miguel Angel 2 Sensor) MISC, Use to check her blood sugars continuously and change it every 2 weeks, Disp: 7 each, Rfl: 1    ergocalciferol (VITAMIN D2) 50,000 units, Take 1 capsule (50,000 Units total) by mouth once a week for 8 doses, Disp: 4 capsule, Rfl: 1    ferrous sulfate 324 (65 Fe) mg, TAKE 1 TABLET BY MOUTH 2 TIMES A DAY BEFORE MEALS., Disp: 180 tablet, Rfl: 1    folic acid (FOLVITE) 1 mg tablet, TAKE 1 TABLET BY MOUTH EVERY DAY (Patient not taking: Reported on 11/9/2023), Disp: 90 tablet, Rfl: 1    gabapentin (NEURONTIN) 100 mg capsule, Take 1 capsule (100 mg total) by mouth 3 (three) times a day (Patient not taking: Reported on 11/9/2023), Disp: 90 capsule, Rfl: 3    glucose blood (OneTouch Verio) test strip, CHECK BLOOD SUGARS THREE TIMES DAILY DX: E11.65, Disp: 400 strip, Rfl: 3    Incontinence Supply Disposable (Incontinence Brief Large) MISC, Use every 4 (four) hours as needed (Urinary incontinence), Disp: 36 each, Rfl: 12    insulin glargine (Toujeo SoloStar) 300 units/mL CONCENTRATED U-300 injection pen (1-unit dial), Inject 26 Units under the skin daily at bedtime, Disp: 9 mL, Rfl: 1    insulin lispro (HumaLOG KwikPen) 100 units/mL injection pen, Inject 8 Units under the skin 3 (three) times a day with meals, Disp: 30 mL, Rfl: 1    Lancets (OneTouch Delica Plus [id number]) MISC, CHECK BLOOD SUGARS THREE TIMES DAILY DX: E11.65, Disp: 400 each, Rfl: 3    metoprolol succinate (TOPROL-XL) 25 mg 24 hr tablet, Take 1 tablet (25 mg total) by mouth daily, Disp: 90 tablet, Rfl: 2    pantoprazole (PROTONIX) 40 mg tablet,  Take 1 tablet (40 mg total) by mouth daily in the early morning (Patient not taking: Reported on 11/9/2023), Disp: 30 tablet, Rfl: 0    semaglutide, 0.25 or 0.5 mg/dose, (Ozempic) 2 mg/3 mL injection pen, Inject 0.375 mL (0.25 mg total) under the skin every 7 days for 30 days, THEN 0.75 mL (0.5 mg total) every 7 days., Disp: 9 mL, Rfl: 1    tadalafil (CIALIS) 20 MG tablet, Take 1 tablet (20 mg total) by mouth daily as needed for erectile dysfunction (Patient not taking: Reported on 11/9/2023), Disp: 30 tablet, Rfl: 5    tamsulosin (FLOMAX) 0.4 mg, Take 1 capsule (0.4 mg total) by mouth daily with dinner (Patient not taking: Reported on 11/9/2023), Disp: 30 capsule, Rfl: 0  Review of Systems   Constitutional:  Positive for unexpected weight change. Negative for appetite change and fatigue.   HENT:  Negative for trouble swallowing and voice change.    Eyes:  Negative for visual disturbance.   Respiratory:  Negative for cough, shortness of breath and wheezing.    Cardiovascular:  Negative for palpitations and leg swelling.   Gastrointestinal:  Negative for abdominal pain, constipation, diarrhea, nausea and vomiting.   Endocrine: Negative for cold intolerance, heat intolerance, polyphagia and polyuria.   Musculoskeletal:  Negative for arthralgias.   Skin:  Negative for color change, rash and wound.   Neurological:  Negative for dizziness, tremors, weakness, light-headedness, numbness and headaches.   Psychiatric/Behavioral:  Negative for agitation and sleep disturbance. The patient is not nervous/anxious.        Physical Exam:  There is no height or weight on file to calculate BMI.  There were no vitals taken for this visit.   Wt Readings from Last 3 Encounters:   03/07/24 128 kg (283 lb)   02/21/24 113 kg (250 lb)   01/03/24 113 kg (248 lb 3.2 oz)       GEN: NAD  E/n/m nl facies,   Eyes: no stare or proptosis,   Neuro: no tremor,   Skin: warm and dry,   Ext:  no edema bilaterally,   Psych: nl mood and affect, no gross  "lapses in memory      Labs:   No components found for: \"HA1C\"  No components found for: \"GLU\"    Lab Results   Component Value Date    CREATININE 0.82 11/07/2023    CREATININE 1.14 08/01/2023    CREATININE 0.90 07/31/2023    BUN 21 11/07/2023    K 4.4 11/07/2023     11/07/2023    CO2 28 11/07/2023     eGFR   Date Value Ref Range Status   11/07/2023 90 ml/min/1.73sq m Final     No components found for: \"MALBCRER\"    Lab Results   Component Value Date    HDL 47 11/07/2023    TRIG 62 11/07/2023       Lab Results   Component Value Date    ALT 31 11/07/2023    AST 38 11/07/2023    ALKPHOS 83 11/07/2023       Lab Results   Component Value Date    FREET4 1.25 01/19/2023       Impression:  1. Type 2 diabetes mellitus without complication, with long-term current use of insulin ([facility])    2. Obesity, morbid ([facility])    3. Hyperlipidemia, unspecified hyperlipidemia type    4. Type 2 diabetes mellitus with ketoacidosis without coma, with long-term current use of insulin ([facility])    5. Uncontrolled type 2 diabetes mellitus with hyperglycemia ([facility])    6. Vitamin D deficiency    7. Dyslipidemia           Plan:    Problem List Items Addressed This Visit          Endocrine    Type 2 diabetes mellitus, with long-term current use of insulin ([facility]) - Primary       Lab Results   Component Value Date    HGBA1C 7.5 (A) 05/23/2024     Diabetes is better control overall despite is suboptimally controlled with A1c of 7.5%, the goal less than 7%.  I increased Ozempic to 1 mg weekly.  I decreased Toujeo to 26 units at bedtime  I decreased Humalog to 8 units 3 times daily AC.  He was advised to bring his rajani reader to next visit.  Complete blood work as ordered.  Return back in 3 months.  Podiatry and ophthalmology follow-up.           Relevant Medications    semaglutide, 1 mg/dose, (Ozempic, 1 MG/DOSE,) 4 mg/3 mL injection pen    insulin glargine (Toujeo SoloStar) 300 units/mL CONCENTRATED U-300 injection pen (1-unit dial)    insulin lispro " (HumaLOG KwikPen) 100 units/mL injection pen    Other Relevant Orders    POCT hemoglobin A1c (Completed)       Other    Vitamin D deficiency    Hyperlipidemia     I resumed Lipitor 10 mg daily.  Check lipid profile.           Relevant Medications    atorvastatin (LIPITOR) 10 mg tablet    Class 2 severe obesity due to excess calories with serious comorbidity and body mass index (BMI) of 39.0 to 39.9 in adult ([facility])     Lifestyle modification including regular daily exercise and balanced diet was encouraged.  I increased Ozempic to 1 mg weekly.            Other Visit Diagnoses       Uncontrolled type 2 diabetes mellitus with hyperglycemia ([facility])        Relevant Medications    semaglutide, 1 mg/dose, (Ozempic, 1 MG/DOSE,) 4 mg/3 mL injection pen    insulin glargine (Toujeo SoloStar) 300 units/mL CONCENTRATED U-300 injection pen (1-unit dial)    insulin lispro (HumaLOG KwikPen) 100 units/mL injection pen                  Discussed with the patient and all questioned fully answered. He will call me if any problems arise.    Counseled patient on diagnostic results, prognosis, risk and benefit of treatment options, instruction for management, importance of treatment compliance, Risk  factor reduction and impressions      Jose Luis Shaver MD

## 2024-05-23 NOTE — ASSESSMENT & PLAN NOTE
Lab Results   Component Value Date    HGBA1C 7.5 (A) 05/23/2024     Diabetes is better control overall despite is suboptimally controlled with A1c of 7.5%, the goal less than 7%.  I increased Ozempic to 1 mg weekly.  I decreased Toujeo to 26 units at bedtime  I decreased Humalog to 8 units 3 times daily AC.  He was advised to bring his rajani reader to next visit.  Complete blood work as ordered.  Return back in 3 months.  Podiatry and ophthalmology follow-up.

## 2024-05-23 NOTE — ASSESSMENT & PLAN NOTE
Lifestyle modification including regular daily exercise and balanced diet was encouraged.  I increased Ozempic to 1 mg weekly.

## 2024-05-23 NOTE — PATIENT INSTRUCTIONS
We increased ozempic to 1 mg weekly  I decreased Toujeo to 26 units before bedtime  I decreased Humalog 8 units before meals

## 2024-05-28 NOTE — RESULT ENCOUNTER NOTE
Please let patient know that his PSA is normal.  He should be scheduled for annual follow-up in the near future.

## 2024-05-29 ENCOUNTER — TELEPHONE (OUTPATIENT)
Dept: UROLOGY | Facility: MEDICAL CENTER | Age: 69
End: 2024-05-29

## 2024-05-29 DIAGNOSIS — E55.9 VITAMIN D DEFICIENCY: ICD-10-CM

## 2024-05-29 RX ORDER — ERGOCALCIFEROL 1.25 MG/1
50000 CAPSULE ORAL WEEKLY
Qty: 8 CAPSULE | Refills: 0 | Status: SHIPPED | OUTPATIENT
Start: 2024-05-29 | End: 2024-07-18

## 2024-05-29 NOTE — TELEPHONE ENCOUNTER
----- Message from LEEANNA Naidu sent at 5/28/2024  2:49 PM EDT -----  Please let patient know that his PSA is normal.  He should be scheduled for annual follow-up in the near future.

## 2024-05-29 NOTE — TELEPHONE ENCOUNTER
Called patient to inform him that his PSA is normal. Also scheduled pt for an annual f/u on 7/22/24 at 11:00 am in the [place] office. Patient confirmed date, time, and location.

## 2024-06-05 ENCOUNTER — REMOTE DEVICE CLINIC VISIT (OUTPATIENT)
Dept: CARDIOLOGY CLINIC | Facility: CLINIC | Age: 69
End: 2024-06-05
Payer: COMMERCIAL

## 2024-06-05 DIAGNOSIS — Z95.818 PRESENCE OF OTHER CARDIAC IMPLANTS AND GRAFTS: Primary | ICD-10-CM

## 2024-06-05 PROCEDURE — 93298 REM INTERROG DEV EVAL SCRMS: CPT | Performed by: INTERNAL MEDICINE

## 2024-06-05 NOTE — PROGRESS NOTES
"MDT LP2-ACTIVE SYSTEM IS MRI CONDITIONAL   CARELINK TRANSMISSION: LOOP/BATTERY STATUS \"OK.\"  PRESENTING RHYTHM SHOWS NSR, ELECTRODE NOISE @ AVG 80 BPM.  NO PATIENT OR DEVICE ACTIVATED EPISODES.  PVC 0.1%.  NORMAL DEVICE FUNCTION.    ES   "

## 2024-06-20 ENCOUNTER — TELEPHONE (OUTPATIENT)
Age: 69
End: 2024-06-20

## 2024-06-20 NOTE — TELEPHONE ENCOUNTER
Patient needs a letter for his union ([id number]) from the doctor stating that he is alive and well.  Letter should be on doctor's letterhead, show full name, show doctor's signature and dated within last 30 days.  Letter is dated 6/17/24.  Please contact patient when it's ready.

## 2024-06-21 NOTE — TELEPHONE ENCOUNTER
Please see message below from POD in regards    I spoke to patient , he is asking if you can write a letter for his union stating that patient is alive and well so he can continue to collect his [employer] benefits     Can you please write letter for patient if appropriate

## 2024-06-24 NOTE — TELEPHONE ENCOUNTER
I spoke to patient, he asked if you can write the letter before his apt on 07/08.    He angrily expressed on the phone that he does not want to wait until his apt on July 8th to receive this letter that he need to turn into his Union by 07/15/2024. He expressed he does not trust his union and is worried he will not receive his retirements benefits. He stated that he he loses out on receiving his monthly payment that he is going to sulma the office.

## 2024-06-25 NOTE — TELEPHONE ENCOUNTER
I tried to call patient to make him aware letter is completed and he can pick letter up at office but got BRANDI

## 2024-06-29 ENCOUNTER — RA CDI HCC (OUTPATIENT)
Dept: OTHER | Facility: HOSPITAL | Age: 69
End: 2024-06-29

## 2024-07-08 ENCOUNTER — OFFICE VISIT (OUTPATIENT)
Dept: FAMILY MEDICINE CLINIC | Facility: CLINIC | Age: 69
End: 2024-07-08
Payer: COMMERCIAL

## 2024-07-08 VITALS
TEMPERATURE: 98.3 F | HEART RATE: 80 BPM | HEIGHT: 72 IN | SYSTOLIC BLOOD PRESSURE: 118 MMHG | OXYGEN SATURATION: 95 % | WEIGHT: 287 LBS | DIASTOLIC BLOOD PRESSURE: 62 MMHG | RESPIRATION RATE: 18 BRPM | BODY MASS INDEX: 38.87 KG/M2

## 2024-07-08 DIAGNOSIS — Z79.4 TYPE 2 DIABETES MELLITUS WITH DIABETIC MICROALBUMINURIA, WITH LONG-TERM CURRENT USE OF INSULIN (HCC): ICD-10-CM

## 2024-07-08 DIAGNOSIS — E66.01 CLASS 2 SEVERE OBESITY DUE TO EXCESS CALORIES WITH SERIOUS COMORBIDITY AND BODY MASS INDEX (BMI) OF 39.0 TO 39.9 IN ADULT (HCC): ICD-10-CM

## 2024-07-08 DIAGNOSIS — Z12.11 SCREENING FOR COLON CANCER: Primary | ICD-10-CM

## 2024-07-08 DIAGNOSIS — E11.29 TYPE 2 DIABETES MELLITUS WITH DIABETIC MICROALBUMINURIA, WITH LONG-TERM CURRENT USE OF INSULIN (HCC): ICD-10-CM

## 2024-07-08 DIAGNOSIS — R80.9 TYPE 2 DIABETES MELLITUS WITH DIABETIC MICROALBUMINURIA, WITH LONG-TERM CURRENT USE OF INSULIN (HCC): ICD-10-CM

## 2024-07-08 DIAGNOSIS — I48.0 PAF (PAROXYSMAL ATRIAL FIBRILLATION) (HCC): ICD-10-CM

## 2024-07-08 DIAGNOSIS — M17.0 PRIMARY OSTEOARTHRITIS OF BOTH KNEES: ICD-10-CM

## 2024-07-08 PROCEDURE — G2211 COMPLEX E/M VISIT ADD ON: HCPCS | Performed by: FAMILY MEDICINE

## 2024-07-08 PROCEDURE — 99214 OFFICE O/P EST MOD 30 MIN: CPT | Performed by: FAMILY MEDICINE

## 2024-07-08 RX ORDER — SEMAGLUTIDE 0.25 MG/.5ML
INJECTION, SOLUTION SUBCUTANEOUS
Qty: 2 ML | Refills: 0 | Status: SHIPPED | OUTPATIENT
Start: 2024-07-08

## 2024-07-08 NOTE — ASSESSMENT & PLAN NOTE
Atrial fibrillation continuous continuing on Eliquis 2 times daily no change in rate.  Continue with metoprolol follow-up with me as scheduled in 4 months

## 2024-07-08 NOTE — PROGRESS NOTES
Assessment/Plan:       Problem List Items Addressed This Visit          Cardiovascular and Mediastinum    PAF (paroxysmal atrial fibrillation) ([facility])     Atrial fibrillation continuous continuing on Eliquis 2 times daily no change in rate.  Continue with metoprolol follow-up with me as scheduled in 4 months            Endocrine    Type 2 diabetes mellitus, with long-term current use of insulin ([facility])     Patient here for follow-up evaluation review lab work from May with his A1c is 7.7 he is limited with ability to ambulate due to bilateral significant osteoarthritis of his knees.  He will use a cane for balance when he is in open spaces.  Like to repeat the lab work in 4 months and I encouraged him to work on weight reduction and avoidance of excess carbohydrates  Lab Results   Component Value Date    HGBA1C 7.7 (H) 05/23/2024            Relevant Medications    Semaglutide-Weight Management (Wegovy) 0.25 MG/0.5ML    Other Relevant Orders    Albumin / creatinine urine ratio    Comprehensive metabolic panel    Hemoglobin A1C    TSH, 3rd generation with Free T4 reflex    Lipid Panel with Direct LDL reflex       Musculoskeletal and Integument    Osteoarthritis of both knees     Severe osteoarthritis at this time working on and requesting help for diet and weight loss.  Ozempic was going to be $400 we will try alternative option for curbing appetite            Other    Class 2 severe obesity due to excess calories with serious comorbidity and body mass index (BMI) of 39.0 to 39.9 in adult ([facility])     Call diet and weight reduction reduce overall carb intake follow-up lab work 4 months limited exercise due to significant osteoarthritis of both knees          Other Visit Diagnoses       Screening for colon cancer    -  Primary    Relevant Orders    Ambulatory Referral to Gastroenterology              Subjective:      Patient ID: Oracio Richardson Jr. is a 69 y.o. male.    Follow-up evaluation today discuss medications and weight  "reduction and significant arthritis of his knees also diabetes and need for appetite suppression        The following portions of the patient's history were reviewed and updated as appropriate: allergies, current medications, past family history, past medical history, past social history, past surgical history and problem list.    Review of Systems   Constitutional:  Negative for chills, fatigue and fever.   HENT:  Negative for congestion, nosebleeds, rhinorrhea, sinus pressure and sore throat.    Eyes:  Negative for discharge and redness.   Respiratory:  Negative for cough and shortness of breath.    Cardiovascular:  Negative for chest pain, palpitations and leg swelling.   Gastrointestinal:  Negative for abdominal pain, blood in stool and nausea.   Endocrine: Negative for cold intolerance, heat intolerance and polyuria.   Genitourinary:  Negative for dysuria and frequency.   Musculoskeletal:  Positive for arthralgias and gait problem. Negative for back pain and myalgias.   Skin:  Negative for rash.   Neurological:  Negative for dizziness, weakness and headaches.   Hematological:  Negative for adenopathy.   Psychiatric/Behavioral:  Negative for behavioral problems and sleep disturbance. The patient is not nervous/anxious.          Objective:      /62 (BP Location: Left arm, Patient Position: Sitting, Cuff Size: Large)   Pulse 80   Temp 98.3 °F (36.8 °C) (Tympanic)   Resp 18   Ht 5' 11.5\" (1.816 m)   Wt 130 kg (287 lb)   SpO2 95%   BMI 39.47 kg/m²        Physical Exam  Vitals and nursing note reviewed.   Constitutional:       Appearance: Normal appearance. He is well-developed. He is obese.   HENT:      Head: Normocephalic and atraumatic.      Right Ear: External ear normal.      Left Ear: External ear normal.      Nose: Nose normal.   Eyes:      General: No scleral icterus.     Conjunctiva/sclera: Conjunctivae normal.      Pupils: Pupils are equal, round, and reactive to light.   Neck:      Thyroid: No " thyromegaly.      Vascular: No JVD.   Cardiovascular:      Rate and Rhythm: Normal rate and regular rhythm.      Heart sounds: Normal heart sounds. No murmur heard.  Pulmonary:      Effort: Pulmonary effort is normal.      Breath sounds: Normal breath sounds. No wheezing or rales.   Chest:      Chest wall: No tenderness.   Abdominal:      General: Bowel sounds are normal. There is no distension.      Palpations: Abdomen is soft. There is no mass.      Tenderness: There is no abdominal tenderness. There is no guarding or rebound.   Musculoskeletal:         General: No tenderness or deformity. Normal range of motion.      Cervical back: Normal range of motion and neck supple.   Lymphadenopathy:      Cervical: No cervical adenopathy.   Skin:     General: Skin is warm and dry.      Findings: No erythema or rash.   Neurological:      Mental Status: He is alert and oriented to person, place, and time.      Cranial Nerves: No cranial nerve deficit.      Deep Tendon Reflexes: Reflexes are normal and symmetric. Reflexes normal.   Psychiatric:         Behavior: Behavior normal.         Thought Content: Thought content normal.         Judgment: Judgment normal.          Data:    Laboratory Results: I have personally reviewed the pertinent laboratory results/reports   Radiology/Other Diagnostic Testing Results: I have personally reviewed pertinent reports.       Lab Results   Component Value Date    WBC 5.24 11/07/2023    HGB 15.6 11/07/2023    HCT 45.5 11/07/2023    MCV 88 11/07/2023     11/07/2023     Lab Results   Component Value Date    K 4.2 05/23/2024     05/23/2024    CO2 21 05/23/2024    BUN 22 05/23/2024    CREATININE 0.89 05/23/2024    GLUCOSE 241 (H) 09/07/2022    GLUF 89 05/23/2024    CALCIUM 9.0 05/23/2024    CORRECTEDCA 10.2 (H) 11/17/2022    AST 39 05/23/2024    ALT 25 05/23/2024    ALKPHOS 89 05/23/2024    EGFR 86 05/23/2024     Lab Results   Component Value Date    CHOLESTEROL 134 05/23/2024     "CHOLESTEROL 168 11/07/2023    CHOLESTEROL 146 01/19/2023     Lab Results   Component Value Date    HDL 36 (L) 05/23/2024    HDL 47 11/07/2023    HDL 43 01/19/2023     Lab Results   Component Value Date    LDLCALC 86 05/23/2024    LDLCALC 109 (H) 11/07/2023    LDLCALC 89 01/19/2023     Lab Results   Component Value Date    TRIG 62 05/23/2024    TRIG 62 11/07/2023    TRIG 71 01/19/2023     No results found for: \"CHOLHDL\"  Lab Results   Component Value Date    [id number] 1.318 05/23/2024     Lab Results   Component Value Date    HGBA1C 7.7 (H) 05/23/2024     Lab Results   Component Value Date    PSA 3.88 05/23/2024       Raymundo Barnes, DO      "

## 2024-07-08 NOTE — ASSESSMENT & PLAN NOTE
Call diet and weight reduction reduce overall carb intake follow-up lab work 4 months limited exercise due to significant osteoarthritis of both knees

## 2024-07-08 NOTE — ASSESSMENT & PLAN NOTE
Patient here for follow-up evaluation review lab work from May with his A1c is 7.7 he is limited with ability to ambulate due to bilateral significant osteoarthritis of his knees.  He will use a cane for balance when he is in open spaces.  Like to repeat the lab work in 4 months and I encouraged him to work on weight reduction and avoidance of excess carbohydrates  Lab Results   Component Value Date    HGBA1C 7.7 (H) 05/23/2024

## 2024-07-08 NOTE — ASSESSMENT & PLAN NOTE
Severe osteoarthritis at this time working on and requesting help for diet and weight loss.  Ozempic was going to be $400 we will try alternative option for curbing appetite

## 2024-07-15 ENCOUNTER — TELEPHONE (OUTPATIENT)
Dept: FAMILY MEDICINE CLINIC | Facility: CLINIC | Age: 69
End: 2024-07-15

## 2024-07-16 ENCOUNTER — TELEPHONE (OUTPATIENT)
Age: 69
End: 2024-07-16

## 2024-07-16 NOTE — TELEPHONE ENCOUNTER
Pt podiatrist need A1C results faxed to them at [phone]    Call back any question [phone]

## 2024-07-22 ENCOUNTER — OFFICE VISIT (OUTPATIENT)
Dept: UROLOGY | Facility: CLINIC | Age: 69
End: 2024-07-22
Payer: COMMERCIAL

## 2024-07-22 VITALS
BODY MASS INDEX: 39.33 KG/M2 | WEIGHT: 290.4 LBS | DIASTOLIC BLOOD PRESSURE: 82 MMHG | SYSTOLIC BLOOD PRESSURE: 140 MMHG | RESPIRATION RATE: 16 BRPM | OXYGEN SATURATION: 96 % | HEART RATE: 81 BPM | HEIGHT: 72 IN | TEMPERATURE: 98 F

## 2024-07-22 DIAGNOSIS — N13.8 BPH WITH OBSTRUCTION/LOWER URINARY TRACT SYMPTOMS: Primary | ICD-10-CM

## 2024-07-22 DIAGNOSIS — Z12.5 PROSTATE CANCER SCREENING: ICD-10-CM

## 2024-07-22 DIAGNOSIS — N40.1 BPH WITH OBSTRUCTION/LOWER URINARY TRACT SYMPTOMS: Primary | ICD-10-CM

## 2024-07-22 PROCEDURE — 99213 OFFICE O/P EST LOW 20 MIN: CPT

## 2024-07-22 NOTE — PROGRESS NOTES
7/22/2024    No chief complaint on file.      Assessment and Plan    69 y.o. male manage by A{ Team and Dr. King    BPH  S/p TURP with SPT placement 3/2023. SPT was removed April 2023   He continues to void well without difficulty   Continue watchful waiting.   Follow-up 1 year    2. Prostate cancer screening  PSA 3.88 (5/23/2024)  Rectal exam limited due to body habitus, no appreciated nodule on limited exam.   Follow-up 1 year with PSA            Interval HPI:      He presents today reporting doing well. He will experiencing nocturia about 2-3 times per night depending on his fluid intake. If he limits his water intake before bedtime he might get up about 1-2 time per night but when he drinks more the gets up more frequently. He feels like he is emptying well and denies any concern for retention. Denies any pain or gross hematuria.             History of Present Illness  Oracio Richardson Jr. is a 69 y.o. male here for follow-up evaluation of urinary retention and prostate cancer screening    Established patient last seen by LEEANNA Azevedo in April 2023 with history of BPH and urinary retention.  He did undergo a TURP and cystolitholapaxy with suprapubic tube placement by Dr. King on 3/16/2023. He was found to have a moderately obstructive prostate with friable vessels.  At his last office visit with Bobbi, his suprapubic tube was removed as he was having consistently low PVRs.  Flomax and oxybutynin were discontinued.    Prostate cancer screening: Current PSA 3.88 as of 5/23/2024 previously 3.19 on 11/7/2023.  Prostate chips were benign from TURP in March 2023            Review of Systems   Constitutional:  Negative for chills and fever.   HENT:  Negative for congestion and sore throat.    Respiratory:  Negative for cough and shortness of breath.    Cardiovascular:  Negative for chest pain and leg swelling.   Gastrointestinal:  Negative for abdominal pain, constipation and diarrhea.   Genitourinary:  Negative  "for difficulty urinating, dysuria, frequency, hematuria and urgency.        Nocturia   Musculoskeletal:  Negative for back pain and gait problem.   Skin:  Negative for wound.   Allergic/Immunologic: Negative for immunocompromised state.   Hematological:  Does not bruise/bleed easily.               Vitals  There were no vitals filed for this visit.    Physical Exam  Vitals reviewed.   Constitutional:       General: He is not in acute distress.     Appearance: Normal appearance. He is not ill-appearing or toxic-appearing.   HENT:      Head: Normocephalic and atraumatic.   Eyes:      General: No scleral icterus.     Conjunctiva/sclera: Conjunctivae normal.   Cardiovascular:      Rate and Rhythm: Normal rate.   Pulmonary:      Effort: Pulmonary effort is normal. No respiratory distress.   Abdominal:      Tenderness: There is no right CVA tenderness or left CVA tenderness.      Hernia: No hernia is present.   Genitourinary:     Comments: Rectal exam limited today due to patient's body habitus.  No appreciated nodules on limited exam.  Musculoskeletal:      Cervical back: Normal range of motion.      Right lower leg: No edema.      Left lower leg: No edema.   Skin:     General: Skin is warm and dry.      Coloration: Skin is not jaundiced or pale.   Neurological:      General: No focal deficit present.      Mental Status: He is alert and oriented to person, place, and time. Mental status is at baseline.      Gait: Gait normal.   Psychiatric:         Mood and Affect: Mood normal.         Behavior: Behavior normal.         Thought Content: Thought content normal.         Judgment: Judgment normal.         Past History  Past Medical History:   Diagnosis Date    A-fib ([facility])     per pt \"happened one time\"    Abdominal hernia     Acute metabolic encephalopathy 09/04/2022    CARLTON (acute kidney injury) ([facility]) 09/04/2022    Ambulates with cane     not recently using but has in home if needed    Anesthesia     per pt \"with knee " "surgery(in the )was awake for operation and fell asleep when being closed up\" and than took long to wake up\"    Arthritis     Blood in urine     \"sometimes\"    Diabetes mellitus (HCC)     Type 2--sees Endocrinologist KAELYN Shaver    Lane catheter in place     History of pulmonary embolism     Implantable loop recorder present     Muscle weakness     per pt \"started with diabetes\"some muscle loss of density\" --per pt \"muscle coming back\"    Myopia of left eye     glasses for reading    Obesity     Pancreatitis 09/04/2022    Risk for falls     Sepsis (HCC) 09/04/2022    Teeth missing     Urethral pain     \"when passing urine\"    Urinary tract infection      Social History     Socioeconomic History    Marital status: /Civil Union     Spouse name: Not on file    Number of children: Not on file    Years of education: Not on file    Highest education level: Not on file   Occupational History    Not on file   Tobacco Use    Smoking status: Never     Passive exposure: Never    Smokeless tobacco: Never   Vaping Use    Vaping status: Never Used   Substance and Sexual Activity    Alcohol use: Never    Drug use: Never    Sexual activity: Not Currently     Comment: defer   Other Topics Concern    Not on file   Social History Narrative    Not on file     Social Determinants of Health     Financial Resource Strain: Low Risk  (11/8/2023)    Overall Financial Resource Strain (CARDIA)     Difficulty of Paying Living Expenses: Not very hard   Food Insecurity: No Food Insecurity (7/31/2023)    Hunger Vital Sign     Worried About Running Out of Food in the Last Year: Never true     Ran Out of Food in the Last Year: Never true   Transportation Needs: No Transportation Needs (11/8/2023)    PRAPARE - Transportation     Lack of Transportation (Medical): No     Lack of Transportation (Non-Medical): No   Physical Activity: Not on file   Stress: Not on file   Social Connections: Not on file   Intimate Partner Violence: Not on " file   Housing Stability: Low Risk  (7/31/2023)    Housing Stability Vital Sign     Unable to Pay for Housing in the Last Year: No     Number of Times Moved in the Last Year: 1     Homeless in the Last Year: No     Social History     Tobacco Use   Smoking Status Never    Passive exposure: Never   Smokeless Tobacco Never     Family History   Problem Relation Age of Onset    Diabetes Father     Diabetes type II Father     Diabetes Mother        The following portions of the patient's history were reviewed and updated as appropriate allergies, current medications, past medical history, past social history, past surgical history and problem list    Imaging:    Results  No results found for this or any previous visit (from the past 1 hour(s)).]  Lab Results   Component Value Date    PSA 3.88 05/23/2024    PSA 3.19 11/07/2023     Lab Results   Component Value Date    GLUCOSE 241 (H) 09/07/2022    CALCIUM 9.0 05/23/2024    K 4.2 05/23/2024    CO2 21 05/23/2024     05/23/2024    BUN 22 05/23/2024    CREATININE 0.89 05/23/2024     Lab Results   Component Value Date    WBC 5.24 11/07/2023    HGB 15.6 11/07/2023    HCT 45.5 11/07/2023    MCV 88 11/07/2023     11/07/2023       Please Note:  Voice dictation software has been used to create this document. There may be inadvertent transcriptions errors.     LEEANNA Morrow 07/22/24

## 2024-07-24 ENCOUNTER — OFFICE VISIT (OUTPATIENT)
Dept: CARDIOLOGY CLINIC | Facility: CLINIC | Age: 69
End: 2024-07-24
Payer: COMMERCIAL

## 2024-07-24 VITALS
OXYGEN SATURATION: 97 % | DIASTOLIC BLOOD PRESSURE: 82 MMHG | SYSTOLIC BLOOD PRESSURE: 138 MMHG | BODY MASS INDEX: 38.95 KG/M2 | WEIGHT: 287.6 LBS | HEART RATE: 75 BPM | TEMPERATURE: 97.7 F | HEIGHT: 72 IN | RESPIRATION RATE: 18 BRPM

## 2024-07-24 DIAGNOSIS — E66.01 CLASS 2 SEVERE OBESITY DUE TO EXCESS CALORIES WITH SERIOUS COMORBIDITY AND BODY MASS INDEX (BMI) OF 39.0 TO 39.9 IN ADULT (HCC): ICD-10-CM

## 2024-07-24 DIAGNOSIS — I34.0 NONRHEUMATIC MITRAL VALVE REGURGITATION: ICD-10-CM

## 2024-07-24 DIAGNOSIS — E78.5 HYPERLIPIDEMIA, UNSPECIFIED HYPERLIPIDEMIA TYPE: ICD-10-CM

## 2024-07-24 DIAGNOSIS — I48.0 PAF (PAROXYSMAL ATRIAL FIBRILLATION) (HCC): Primary | ICD-10-CM

## 2024-07-24 PROCEDURE — 99214 OFFICE O/P EST MOD 30 MIN: CPT | Performed by: INTERNAL MEDICINE

## 2024-07-24 PROCEDURE — 93000 ELECTROCARDIOGRAM COMPLETE: CPT | Performed by: INTERNAL MEDICINE

## 2024-07-24 RX ORDER — ATORVASTATIN CALCIUM 20 MG/1
20 TABLET, FILM COATED ORAL DAILY
Qty: 90 TABLET | Refills: 2 | Status: SHIPPED | OUTPATIENT
Start: 2024-07-24

## 2024-07-24 NOTE — ASSESSMENT & PLAN NOTE
-Counseled on dietary and lifestyle modifications including following a low-salt, low-fat, heart healthy diet with sodium restriction to less than 1800 mg of sodium daily and fluid restriction to less than 2000 mL of fluid daily along with weight reduction goal BMI less than 29  -Will give referral to weight management

## 2024-07-24 NOTE — PROGRESS NOTES
Patient ID: Oracio Richardson Jr. is a 69 y.o. male.        Plan:      PAF (paroxysmal atrial fibrillation) ([facility])  -Currently sinus rhythm in office today  -Will continue metoprolol succinate 25 mg daily and Eliquis 5 mg twice daily  -Will check CMP and fasting lipid panel along with CBC in 6 months prior to next office visit  -Counseled patient on need for evaluation by sleep medicine along with weight reduction and will give referral to weight management as well.    Class 2 severe obesity due to excess calories with serious comorbidity and body mass index (BMI) of 39.0 to 39.9 in adult ([facility])  -Counseled on dietary and lifestyle modifications including following a low-salt, low-fat, heart healthy diet with sodium restriction to less than 1800 mg of sodium daily and fluid restriction to less than 2000 mL of fluid daily along with weight reduction goal BMI less than 29  -Will give referral to weight management    Hyperlipidemia  -Will increase atorvastatin from 10 mg daily to 20 mg daily given ASCVD risk and follow-up repeat labs    Nonrheumatic mitral valve regurgitation  -Trace on echocardiographic imaging  -Will continue to monitor       Follow up Plan/Other summary comments:  - ECG performed in the office today shows sinus rhythm heart rate 75 bpm with left axis deviation low voltage QRS.  -Lipid panel 5/23/2024 showing total cholesterol 134, triglyceride 62, HDL 36, LDL 86  -Patient will continue Eliquis 5 mg twice daily, metoprolol succinate 25 mg daily, atorvastatin 10 mg daily with up titration as patient tolerates to 20 mg  -Patient will be initiating on Wegovy once received from pharmacy  -Patient counseled on dietary and lifestyle modifications including following a low-salt, low-fat, heart healthy diet with sodium restriction to less than 1800 mg of sodium daily, fluid restriction less than 2000 mL of fluid daily  Weight reduction goal BMI less than 29  -Patient will be seen in 6 months or sooner if  necessary  -Patient counseled if he were to have any warning or alarm type symptoms he is to seek emergency medical care immediately.  -Will give referral to weight management for assistance with weight reduction    HPI:   -Patient is a 69-year-old male with diabetes mellitus hospitalized at [facility] in September [facility]22 with altered mental status found at that time to have diabetic ketoacidosis with significant anion gap acidosis and elevated lactic acid levels along with pancreatitis and CARLTON, obstructive uropathy and diagnosed with paroxysmal atrial fibrillation with spontaneous conversion.  He was initiated on medical therapy and underwent TURP procedure and had been followed by urology.  He underwent implantable loop recorder in January 2023.  He had been previously living in [place] and had had issues with history of DVT/PE thought provoked and treated for 2 weeks with anticoagulation therapy in the setting of obesity and sedentary lifestyle previously.  Patient had paroxysmal atrial fibrillation and elevated [id number] score and in that setting was initiated on Eliquis and metoprolol therapy and presents to the office today for follow-up.  -Previously patient had not been taking his metoprolol, atorvastatin, Eliquis therapy and after discussion at last office visit in January 2024 was reinitiated on Eliquis 5 mg twice daily and low-dose metoprolol succinate 25 mg daily but declined initiation of statin therapy.  -Currently in the office today patient denies any chest pain, palpitation, lightness dizziness, loss conscious, shortness of breath, lower EXTR edema, orthopnea or bendopnea.  He states that he has chronic arthropathies due to his weight but has no exertional chest discomfort or anginal symptoms.  He states his blood pressures at home are very well-controlled in the 110-120 mmHg systolic range.    Most recent or relevant cardiac/vascular testing:    -EP device report 6/4/2024 showing  "loop battery okay presenting rhythm sinus rhythm with no patient or device activated episodes and PVC burden 0.1% with normal device function.    -Transthoracic echocardiogram 9/7/2022 showing left ventricular systolic function normal cemented LVEF 60% with trace mitral regurgitation and trace tricuspid regurgitation.      Past Surgical History:   Procedure Laterality Date    CARDIAC LOOP RECORDER      CATARACT EXTRACTION Bilateral     KNEE CARTILAGE SURGERY      KNEE SURGERY Right     CO TRURL ELECTROSURG RESCJ PROSTATE BLEED COMPLETE N/A 3/16/2023    Procedure: TRANSURETHRAL RESECTION OF PROSTATE (TURP)(possible), cystoscopy, SP tube placement, removal of bladder calculus;  Surgeon: Ludwig King MD;  Location: [facility];  Service: Urology         Review of Systems   Review of Systems   Constitutional:  Negative for chills, diaphoresis, fatigue and fever.   HENT:  Negative for trouble swallowing and voice change.    Eyes:  Negative for pain and redness.   Respiratory:  Negative for shortness of breath and wheezing.    Cardiovascular:  Negative for chest pain, palpitations and leg swelling.   Gastrointestinal:  Negative for abdominal pain, constipation, diarrhea, nausea and vomiting.   Genitourinary:  Negative for dysuria.   Musculoskeletal:  Positive for arthralgias and back pain. Negative for neck pain and neck stiffness.   Skin:  Negative for rash.   Neurological:  Negative for dizziness, syncope, light-headedness and headaches.   Psychiatric/Behavioral:  Negative for agitation and confusion.    All other systems reviewed and are negative.       Objective:     /82   Pulse 75   Temp 97.7 °F (36.5 °C) (Temporal)   Resp 18   Ht 5' 11.5\" (1.816 m)   Wt 130 kg (287 lb 9.6 oz)   SpO2 97%   BMI 39.55 kg/m²     PHYSICAL EXAM:  Physical Exam  Vitals reviewed.   Constitutional:       General: He is not in acute distress.     Appearance: He is obese. He is not diaphoretic.   HENT:      Head: Normocephalic and " atraumatic.   Eyes:      General:         Right eye: No discharge.         Left eye: No discharge.   Neck:      Comments: Trachea midline, neck obese, difficult to assess JVD  Cardiovascular:      Rate and Rhythm: Normal rate and regular rhythm.      Heart sounds:      No friction rub.   Pulmonary:      Effort: Pulmonary effort is normal. No respiratory distress.      Breath sounds: No wheezing.   Chest:      Chest wall: No tenderness.   Abdominal:      General: Bowel sounds are normal.      Palpations: Abdomen is soft.      Tenderness: There is no abdominal tenderness. There is no rebound.   Musculoskeletal:      Right lower leg: No edema.      Left lower leg: No edema.   Skin:     General: Skin is warm and dry.   Neurological:      Mental Status: He is alert.      Comments: Awake, alert, able to answer questions appropriately, able to move extremities bilaterally.   Psychiatric:         Mood and Affect: Mood normal.         Behavior: Behavior normal.          Meds reviewed.  Current Outpatient Medications on File Prior to Visit   Medication Sig Dispense Refill    apixaban (Eliquis) 5 mg Take 1 tablet (5 mg total) by mouth 2 (two) times a day 180 tablet 2    Blood Glucose Monitoring Suppl (OneTouch Verio Flex System) w/Device KIT CHECK BLOOD SUGARS THREE TIMES DAILY DX: E11.65 1 kit 0    Continuous Blood Gluc  (FreeStyle Miguel Angel 2 Clifton) MARCELLA As instructed (Patient not taking: Reported on 7/8/2024) 6 each 2    Continuous Blood Gluc Sensor (FreeStyle Miguel Angel 2 Sensor) MISC Use to check her blood sugars continuously and change it every 2 weeks (Patient not taking: Reported on 7/8/2024) 7 each 1    ergocalciferol (VITAMIN D2) 50,000 units Take 1 capsule (50,000 Units total) by mouth once a week for 8 doses (Patient not taking: Reported on 7/8/2024) 8 capsule 0    ferrous sulfate 324 (65 Fe) mg TAKE 1 TABLET BY MOUTH 2 TIMES A DAY BEFORE MEALS. (Patient not taking: Reported on 7/8/2024) 180 tablet 1    folic acid  (FOLVITE) 1 mg tablet TAKE 1 TABLET BY MOUTH EVERY DAY (Patient not taking: Reported on 11/9/2023) 90 tablet 1    gabapentin (NEURONTIN) 100 mg capsule Take 1 capsule (100 mg total) by mouth 3 (three) times a day (Patient not taking: Reported on 11/9/2023) 90 capsule 3    glucose blood (OneTouch Verio) test strip CHECK BLOOD SUGARS THREE TIMES DAILY DX: E11.65 400 strip 3    Incontinence Supply Disposable (Incontinence Brief Large) MISC Use every 4 (four) hours as needed (Urinary incontinence) 36 each 12    insulin glargine (Toujeo SoloStar) 300 units/mL CONCENTRATED U-300 injection pen (1-unit dial) Inject 26 Units under the skin daily at bedtime (Patient taking differently: Inject 26 Units under the skin daily at bedtime Pt taking 30units reports increased himself) 9 mL 1    insulin lispro (HumaLOG KwikPen) 100 units/mL injection pen Inject 8 Units under the skin 3 (three) times a day with meals (Patient taking differently: Inject 8 Units under the skin 3 (three) times a day with meals Pt reports taking 10units at meals) 24 mL 0    Lancets (OneTouch Delica Plus Dxtnrv34C) MISC CHECK BLOOD SUGARS THREE TIMES DAILY DX: E11.65 400 each 3    metoprolol succinate (TOPROL-XL) 25 mg 24 hr tablet Take 1 tablet (25 mg total) by mouth daily (Patient not taking: Reported on 5/23/2024) 90 tablet 2    pantoprazole (PROTONIX) 40 mg tablet Take 1 tablet (40 mg total) by mouth daily in the early morning (Patient not taking: Reported on 7/8/2024) 30 tablet 0    semaglutide, 1 mg/dose, (Ozempic, 1 MG/DOSE,) 4 mg/3 mL injection pen Inject 0.75 mL (1 mg total) under the skin every 7 days (Patient not taking: Reported on 7/8/2024) 9 mL 0    Semaglutide-Weight Management (Wegovy) 0.25 MG/0.5ML Inject 0.25 mg under the skin weekly (Patient not taking: Reported on 7/22/2024) 2 mL 0    tadalafil (CIALIS) 20 MG tablet Take 1 tablet (20 mg total) by mouth daily as needed for erectile dysfunction (Patient not taking: Reported on 11/9/2023)  "30 tablet 5    tamsulosin (FLOMAX) 0.4 mg Take 1 capsule (0.4 mg total) by mouth daily with dinner (Patient not taking: Reported on 7/8/2024) 30 capsule 0    [DISCONTINUED] atorvastatin (LIPITOR) 10 mg tablet Take 1 tablet (10 mg total) by mouth daily 90 tablet 3     No current facility-administered medications on file prior to visit.      Past Medical History:   Diagnosis Date    A-fib ([facility])     per pt \"happened one time\"    Abdominal hernia     Acute metabolic encephalopathy 09/04/2022    CARLTON (acute kidney injury) ([facility]) 09/04/2022    Ambulates with cane     not recently using but has in home if needed    Anesthesia     per pt \"with knee surgery(in the )was awake for operation and fell asleep when being closed up\" and than took long to wake up\"    Arthritis     Blood in urine     \"sometimes\"    Diabetes mellitus ([facility])     Type 2--sees Endocrinologist KAELYN Shaver    Lane catheter in place     History of pulmonary embolism     Implantable loop recorder present     Muscle weakness     per pt \"started with diabetes\"some muscle loss of density\" --per pt \"muscle coming back\"    Myopia of left eye     glasses for reading    Obesity     Pancreatitis 09/04/2022    Risk for falls     Sepsis ([facility]) 09/04/2022    Teeth missing     Urethral pain     \"when passing urine\"    Urinary tract infection            Social History     Tobacco Use   Smoking Status Never    Passive exposure: Never   Smokeless Tobacco Never          "

## 2024-07-24 NOTE — ASSESSMENT & PLAN NOTE
-Will increase atorvastatin from 10 mg daily to 20 mg daily given ASCVD risk and follow-up repeat labs

## 2024-07-24 NOTE — ASSESSMENT & PLAN NOTE
-Currently sinus rhythm in office today  -Will continue metoprolol succinate 25 mg daily and Eliquis 5 mg twice daily  -Will check CMP and fasting lipid panel along with CBC in 6 months prior to next office visit  -Counseled patient on need for evaluation by sleep medicine along with weight reduction and will give referral to weight management as well.

## 2024-08-06 ENCOUNTER — APPOINTMENT (OUTPATIENT)
Dept: LAB | Facility: CLINIC | Age: 69
End: 2024-08-06
Payer: COMMERCIAL

## 2024-08-06 ENCOUNTER — OFFICE VISIT (OUTPATIENT)
Dept: BARIATRICS | Facility: CLINIC | Age: 69
End: 2024-08-06
Payer: COMMERCIAL

## 2024-08-06 VITALS
DIASTOLIC BLOOD PRESSURE: 90 MMHG | OXYGEN SATURATION: 98 % | WEIGHT: 291.8 LBS | HEART RATE: 78 BPM | HEIGHT: 72 IN | BODY MASS INDEX: 39.52 KG/M2 | SYSTOLIC BLOOD PRESSURE: 148 MMHG | TEMPERATURE: 98.8 F | RESPIRATION RATE: 20 BRPM

## 2024-08-06 DIAGNOSIS — E11.65 TYPE 2 DIABETES MELLITUS WITH HYPERGLYCEMIA, WITH LONG-TERM CURRENT USE OF INSULIN (HCC): ICD-10-CM

## 2024-08-06 DIAGNOSIS — Z79.4 TYPE 2 DIABETES MELLITUS WITH DIABETIC MICROALBUMINURIA, WITH LONG-TERM CURRENT USE OF INSULIN (HCC): ICD-10-CM

## 2024-08-06 DIAGNOSIS — E78.5 HYPERLIPIDEMIA, UNSPECIFIED HYPERLIPIDEMIA TYPE: ICD-10-CM

## 2024-08-06 DIAGNOSIS — Z79.4 TYPE 2 DIABETES MELLITUS WITH HYPERGLYCEMIA, WITH LONG-TERM CURRENT USE OF INSULIN (HCC): ICD-10-CM

## 2024-08-06 DIAGNOSIS — F39 UNSPECIFIED MOOD (AFFECTIVE) DISORDER (HCC): ICD-10-CM

## 2024-08-06 DIAGNOSIS — R80.9 TYPE 2 DIABETES MELLITUS WITH MICROALBUMINURIA, WITH LONG-TERM CURRENT USE OF INSULIN (HCC): ICD-10-CM

## 2024-08-06 DIAGNOSIS — I48.0 PAF (PAROXYSMAL ATRIAL FIBRILLATION) (HCC): ICD-10-CM

## 2024-08-06 DIAGNOSIS — E11.69 TYPE 2 DIABETES MELLITUS WITH OTHER SPECIFIED COMPLICATION, WITH LONG-TERM CURRENT USE OF INSULIN (HCC): ICD-10-CM

## 2024-08-06 DIAGNOSIS — E11.29 TYPE 2 DIABETES MELLITUS WITH MICROALBUMINURIA, WITH LONG-TERM CURRENT USE OF INSULIN (HCC): ICD-10-CM

## 2024-08-06 DIAGNOSIS — Z79.4 TYPE 2 DIABETES MELLITUS WITH OTHER SPECIFIED COMPLICATION, WITH LONG-TERM CURRENT USE OF INSULIN (HCC): ICD-10-CM

## 2024-08-06 DIAGNOSIS — Z79.4 TYPE 2 DIABETES MELLITUS WITH MICROALBUMINURIA, WITH LONG-TERM CURRENT USE OF INSULIN (HCC): ICD-10-CM

## 2024-08-06 DIAGNOSIS — E66.01 OBESITY, CLASS III, BMI 40-49.9 (MORBID OBESITY) (HCC): Primary | ICD-10-CM

## 2024-08-06 DIAGNOSIS — R80.9 TYPE 2 DIABETES MELLITUS WITH DIABETIC MICROALBUMINURIA, WITH LONG-TERM CURRENT USE OF INSULIN (HCC): ICD-10-CM

## 2024-08-06 DIAGNOSIS — E11.29 TYPE 2 DIABETES MELLITUS WITH DIABETIC MICROALBUMINURIA, WITH LONG-TERM CURRENT USE OF INSULIN (HCC): ICD-10-CM

## 2024-08-06 PROBLEM — E66.813 OBESITY, CLASS III, BMI 40-49.9 (MORBID OBESITY): Status: ACTIVE | Noted: 2024-05-23

## 2024-08-06 LAB
ALBUMIN SERPL BCG-MCNC: 4.2 G/DL (ref 3.5–5)
ALP SERPL-CCNC: 112 U/L (ref 34–104)
ALT SERPL W P-5'-P-CCNC: 35 U/L (ref 7–52)
ANION GAP SERPL CALCULATED.3IONS-SCNC: 11 MMOL/L (ref 4–13)
AST SERPL W P-5'-P-CCNC: 37 U/L (ref 13–39)
BILIRUB SERPL-MCNC: 0.79 MG/DL (ref 0.2–1)
BUN SERPL-MCNC: 20 MG/DL (ref 5–25)
CALCIUM SERPL-MCNC: 9.1 MG/DL (ref 8.4–10.2)
CHLORIDE SERPL-SCNC: 104 MMOL/L (ref 96–108)
CHOLEST SERPL-MCNC: 132 MG/DL
CO2 SERPL-SCNC: 23 MMOL/L (ref 21–32)
CREAT SERPL-MCNC: 0.84 MG/DL (ref 0.6–1.3)
CREAT UR-MCNC: 96.4 MG/DL
EST. AVERAGE GLUCOSE BLD GHB EST-MCNC: 169 MG/DL
GFR SERPL CREATININE-BSD FRML MDRD: 88 ML/MIN/1.73SQ M
GLUCOSE P FAST SERPL-MCNC: 163 MG/DL (ref 65–99)
HBA1C MFR BLD: 7.5 %
HDLC SERPL-MCNC: 48 MG/DL
LDLC SERPL CALC-MCNC: 70 MG/DL (ref 0–100)
MICROALBUMIN UR-MCNC: 30.5 MG/L
MICROALBUMIN/CREAT 24H UR: 32 MG/G CREATININE (ref 0–30)
POTASSIUM SERPL-SCNC: 4 MMOL/L (ref 3.5–5.3)
PROT SERPL-MCNC: 7.7 G/DL (ref 6.4–8.4)
SODIUM SERPL-SCNC: 138 MMOL/L (ref 135–147)
TRIGL SERPL-MCNC: 70 MG/DL
TSH SERPL DL<=0.05 MIU/L-ACNC: 1.58 UIU/ML (ref 0.45–4.5)

## 2024-08-06 PROCEDURE — 80053 COMPREHEN METABOLIC PANEL: CPT

## 2024-08-06 PROCEDURE — 80061 LIPID PANEL: CPT

## 2024-08-06 PROCEDURE — 83036 HEMOGLOBIN GLYCOSYLATED A1C: CPT

## 2024-08-06 PROCEDURE — 82043 UR ALBUMIN QUANTITATIVE: CPT

## 2024-08-06 PROCEDURE — 84443 ASSAY THYROID STIM HORMONE: CPT

## 2024-08-06 PROCEDURE — 99204 OFFICE O/P NEW MOD 45 MIN: CPT | Performed by: PHYSICIAN ASSISTANT

## 2024-08-06 PROCEDURE — 82570 ASSAY OF URINE CREATININE: CPT

## 2024-08-06 PROCEDURE — 36415 COLL VENOUS BLD VENIPUNCTURE: CPT

## 2024-08-06 NOTE — ASSESSMENT & PLAN NOTE
Lab Results   Component Value Date    HGBA1C 7.7 (H) 05/23/2024   -Managed by endocrinology  -Insulin dependent  -No longer on Ozempic? Cost?  -patient with hx pancreatitis. GLP-1 should be avoided

## 2024-08-06 NOTE — ASSESSMENT & PLAN NOTE
-Discussed options of [facility]-Intensive Lifestyle Intervention Program, Very Low Calorie Diet-VLCD, Conservative Program, Alfonso-En-Y Gastric Bypass, and Vertical Sleeve Gastrectomy and the role of weight loss medications.  -would avoid GLP-1 agonist due to hx pancreatitis  -Not a candidate for sympathomimetics  -Initial weight loss goal of 5-10% weight loss for improved health  -Screening labs: reviewed CMP, Lipid panel, TSH, HgbA1c  -Patient is interested in pursuing bariatric surgery. Patient will be scheduled with surgeon in [place]

## 2024-08-06 NOTE — PROGRESS NOTES
Assessment/Plan:    Obesity, Class III, BMI 40-49.9 (morbid obesity) ([facility])  -Discussed options of HealthyCORE-Intensive Lifestyle Intervention Program, Very Low Calorie Diet-VLCD, Conservative Program, Alfonso-En-Y Gastric Bypass, and Vertical Sleeve Gastrectomy and the role of weight loss medications.  -would avoid GLP-1 agonist due to hx pancreatitis  -Not a candidate for sympathomimetics  -Initial weight loss goal of 5-10% weight loss for improved health  -Screening labs: reviewed CMP, Lipid panel, TSH, HgbA1c  -Patient is interested in pursuing bariatric surgery. Patient will be scheduled with surgeon in [place]    Type 2 diabetes mellitus with microalbuminuria, with long-term current use of insulin ([facility])    Lab Results   Component Value Date    HGBA1C 7.7 (H) 05/23/2024   -Managed by endocrinology  -Insulin dependent  -No longer on Ozempic? Cost?  -patient with hx pancreatitis. GLP-1 should be avoided    PAF (paroxysmal atrial fibrillation) ([facility])  -managed by cardiology  -On Eliquis     Goals:    Food log (ie.) www.myfitnesspal.com,sparkpeople.com,loseit.com,calorieking.com,etc.   No sugary beverages. At least 64oz of water daily.  Increase physical activity by 10 minutes daily. Gradually increase physical activity to a goal of 5 days per week for 30 minutes of MODERATE intensity PLUS 2 days per week of FULL BODY resistance training    Follow up in approximately 2 weeks with Bariatric Surgeon.    Diagnoses and all orders for this visit:    Obesity, Class III, BMI 40-49.9 (morbid obesity) ([facility])    BMI 40.0-44.9, adult ([facility])  -     Ambulatory Referral to Weight Management    Type 2 diabetes mellitus with microalbuminuria, with long-term current use of insulin ([facility])    PAF (paroxysmal atrial fibrillation) ([facility])    Hyperlipidemia, unspecified hyperlipidemia type          Subjective:   Chief Complaint   Patient presents with    Consult       Patient ID: Oracio Richardson Jr.  is a 69 y.o. male with excess weight/obesity  "here to pursue weight managment.    Past Medical History:   Diagnosis Date    A-fib ([facility])     per pt \"happened one time\"    Abdominal hernia     Acute metabolic encephalopathy 09/04/2022    CARLTON (acute kidney injury) ([facility]) 09/04/2022    Ambulates with cane     not recently using but has in home if needed    Anesthesia     per pt \"with knee surgery(in the )was awake for operation and fell asleep when being closed up\" and than took long to wake up\"    Arthritis     Blood in urine     \"sometimes\"    Diabetes mellitus ([facility])     Type 2--sees Endocrinologist KAELYN Shaver    Lane catheter in place     History of pulmonary embolism     Implantable loop recorder present     Muscle weakness     per pt \"started with diabetes\"some muscle loss of density\" --per pt \"muscle coming back\"    Myopia of left eye     glasses for reading    Obesity     Pancreatitis 09/04/2022    Risk for falls     Sepsis ([facility]) 09/04/2022    Teeth missing     Urethral pain     \"when passing urine\"    Urinary tract infection          HPI:  Obesity/Excess Weight:  Severity: Severe  Onset:  reports gaining 50 lbs in the past year  Modifiers:  self created diets  Contributing factors: Insufficient Physical Activity  Associated symptoms: increased joint pain, decreased exercise capacity, and decreased mobility    Goals: 220-240 lbs  Highest: over 400 lbs    Hydration: water 1 gallon, SF flavoring added to water 1 quart, 1 cup coffee every 2-3 days + SF creamer, apple juice, lactaid 1 %milk  ETOH: denies  Smoking: denies  Exercise: limited by knee pain, chair exercises every other day  Occupation: retired    B: skips  S: skips  L: 3 eggs  + 2 slices of toast OR chicken + 1/3 cup rice + beans and veggies  S: almonds, cashews, 1 cup of honey bunches of oats      Colonoscopy: reports he has an upcoming appt in Oct    The following portions of the patient's history were reviewed and updated as appropriate: allergies, current medications, past family " "history, past medical history, past social history, past surgical history, and problem list.    Review of Systems   Constitutional:  Negative for chills and fever.   HENT:  Negative for sore throat.    Respiratory:  Negative for cough and shortness of breath.    Cardiovascular:  Negative for chest pain and palpitations.   Gastrointestinal:  Negative for constipation, diarrhea, nausea and vomiting.   Genitourinary:  Negative for dysuria.   Musculoskeletal:  Positive for arthralgias.   Skin:  Negative for rash.   Neurological:  Negative for dizziness and headaches.   Psychiatric/Behavioral:  Negative for dysphoric mood.        Objective:    /90 (BP Location: Right arm, Patient Position: Sitting, Cuff Size: Large)   Pulse 78   Temp 98.8 °F (37.1 °C)   Resp 20   Ht 5' 11.5\" (1.816 m)   Wt 132 kg (291 lb 12.8 oz)   SpO2 98%   BMI 40.13 kg/m²     Physical Exam  Vitals and nursing note reviewed.   Constitutional:       General: He is not in acute distress.     Appearance: He is obese. He is not ill-appearing or toxic-appearing.   HENT:      Head: Normocephalic and atraumatic.      Nose: Nose normal.      Mouth/Throat:      Mouth: Mucous membranes are moist.   Eyes:      General: No scleral icterus.  Pulmonary:      Effort: Pulmonary effort is normal. No respiratory distress.   Abdominal:      Comments: protuberant   Musculoskeletal:      Right lower leg: No edema.      Left lower leg: No edema.   Skin:     General: Skin is dry.      Coloration: Skin is not jaundiced.   Neurological:      General: No focal deficit present.      Mental Status: He is alert and oriented to person, place, and time.   Psychiatric:         Mood and Affect: Mood normal.         Behavior: Behavior normal.         Thought Content: Thought content normal.        "

## 2024-08-13 PROBLEM — E66.811 CLASS 1 OBESITY DUE TO EXCESS CALORIES WITH SERIOUS COMORBIDITY AND BODY MASS INDEX (BMI) OF 33.0 TO 33.9 IN ADULT: Status: RESOLVED | Noted: 2023-04-19 | Resolved: 2024-08-13

## 2024-08-13 PROBLEM — E66.09 CLASS 1 OBESITY DUE TO EXCESS CALORIES WITH SERIOUS COMORBIDITY AND BODY MASS INDEX (BMI) OF 33.0 TO 33.9 IN ADULT: Status: RESOLVED | Noted: 2023-04-19 | Resolved: 2024-08-13

## 2024-08-26 ENCOUNTER — OFFICE VISIT (OUTPATIENT)
Dept: ENDOCRINOLOGY | Facility: CLINIC | Age: 69
End: 2024-08-26
Payer: COMMERCIAL

## 2024-08-26 VITALS
OXYGEN SATURATION: 96 % | DIASTOLIC BLOOD PRESSURE: 76 MMHG | WEIGHT: 294.6 LBS | SYSTOLIC BLOOD PRESSURE: 142 MMHG | TEMPERATURE: 98.1 F | HEART RATE: 72 BPM | HEIGHT: 71 IN | BODY MASS INDEX: 41.24 KG/M2

## 2024-08-26 DIAGNOSIS — Z79.4 TYPE 2 DIABETES MELLITUS WITH MICROALBUMINURIA, WITH LONG-TERM CURRENT USE OF INSULIN (HCC): ICD-10-CM

## 2024-08-26 DIAGNOSIS — R80.9 TYPE 2 DIABETES MELLITUS WITH MICROALBUMINURIA, WITH LONG-TERM CURRENT USE OF INSULIN (HCC): ICD-10-CM

## 2024-08-26 DIAGNOSIS — Z79.4 TYPE 2 DIABETES MELLITUS WITH KETOACIDOSIS WITHOUT COMA, WITH LONG-TERM CURRENT USE OF INSULIN (HCC): Primary | ICD-10-CM

## 2024-08-26 DIAGNOSIS — E11.10 TYPE 2 DIABETES MELLITUS WITH KETOACIDOSIS WITHOUT COMA, WITH LONG-TERM CURRENT USE OF INSULIN (HCC): Primary | ICD-10-CM

## 2024-08-26 DIAGNOSIS — E11.65 UNCONTROLLED TYPE 2 DIABETES MELLITUS WITH HYPERGLYCEMIA (HCC): ICD-10-CM

## 2024-08-26 DIAGNOSIS — E11.29 TYPE 2 DIABETES MELLITUS WITH MICROALBUMINURIA, WITH LONG-TERM CURRENT USE OF INSULIN (HCC): Primary | ICD-10-CM

## 2024-08-26 DIAGNOSIS — E66.01 OBESITY, CLASS III, BMI 40-49.9 (MORBID OBESITY) (HCC): ICD-10-CM

## 2024-08-26 DIAGNOSIS — E55.9 VITAMIN D DEFICIENCY: ICD-10-CM

## 2024-08-26 DIAGNOSIS — R80.9 TYPE 2 DIABETES MELLITUS WITH MICROALBUMINURIA, WITH LONG-TERM CURRENT USE OF INSULIN (HCC): Primary | ICD-10-CM

## 2024-08-26 DIAGNOSIS — E78.2 MIXED HYPERLIPIDEMIA: ICD-10-CM

## 2024-08-26 DIAGNOSIS — Z79.4 TYPE 2 DIABETES MELLITUS WITH MICROALBUMINURIA, WITH LONG-TERM CURRENT USE OF INSULIN (HCC): Primary | ICD-10-CM

## 2024-08-26 DIAGNOSIS — E11.29 TYPE 2 DIABETES MELLITUS WITH MICROALBUMINURIA, WITH LONG-TERM CURRENT USE OF INSULIN (HCC): ICD-10-CM

## 2024-08-26 PROCEDURE — 99214 OFFICE O/P EST MOD 30 MIN: CPT | Performed by: STUDENT IN AN ORGANIZED HEALTH CARE EDUCATION/TRAINING PROGRAM

## 2024-08-26 RX ORDER — INSULIN GLARGINE 300 U/ML
30 INJECTION, SOLUTION SUBCUTANEOUS
Qty: 9 ML | Refills: 1 | Status: SHIPPED | OUTPATIENT
Start: 2024-08-26 | End: 2025-03-22

## 2024-08-26 RX ORDER — INSULIN LISPRO 100 [IU]/ML
10 INJECTION, SOLUTION INTRAVENOUS; SUBCUTANEOUS
Qty: 24 ML | Refills: 0 | Status: SHIPPED | OUTPATIENT
Start: 2024-08-26 | End: 2024-11-24

## 2024-08-26 NOTE — ASSESSMENT & PLAN NOTE
He has gained significant weight since started insulin as well as inactivity due to right knee arthritis.  He has seen weight management team.  Previously we tried Wegovy which was denied and Ozempic has been cost probated.  He is willing to try Mounjaro, which was ordered 2.5 mg weekly, if gets approved, we will slowly increase to higher dose as he tolerates.

## 2024-08-26 NOTE — PROGRESS NOTES
"                                                                                                                    Established patient Progress Note      Cc: diabetes    Referring Provider  No referring provider defined for this encounter.     History of Present Illness:   Oracio Richardson Jr. is a 69 y.o. male with a history of type 2 diabetes with long term use of insulin who presented for follow up.      Denies recent illness or hospitalizations.    Current regimen:   Toujeo 26 units daily  Humalog 8 units tidac        SMBG : no,       Hypoglycemic episodes:   H/o of hypoglycemia causing hospitalization or Intervention such as glucagon injection  or ambulance call : no  Has awareness: yes       Opthamology:  UTD;   Podiatry: UTD    on ACE inhibitor or ARB: no  on statin: Lipitor 10 mg daily     Thyroid disorders: no  History of pancreatitis: no    Patient Active Problem List   Diagnosis    Urinary frequency    Type 2 diabetes mellitus with microalbuminuria, with long-term current use of insulin ([facility])    Microscopic hematuria    Urge incontinence of urine    Nocturnal enuresis    Feeling of incomplete bladder emptying    Balanitis    PAF (paroxysmal atrial fibrillation) ([facility])    Urinary retention    Fungal infection of skin    Depressed mood    Pain in both lower extremities    Polyuria    Ventral Hernia    Hydroureteronephrosis    Type 2 diabetes mellitus, with long-term current use of insulin ([facility])    Vitamin D deficiency    Hyperlipidemia    History of pulmonary embolus ([facility])    Other dietary vitamin B12 deficiency anemia    Encounter for screening colonoscopy    Anemia    Unspecified mood (affective) disorder ([facility])    Other male erectile dysfunction    Osteoarthritis of both knees    Obesity, Class III, BMI 40-49.9 (morbid obesity) ([facility])    Nonrheumatic mitral valve regurgitation      Past Medical History:   Diagnosis Date    A-fib ([facility])     per pt \"happened one time\"    Abdominal hernia     Acute metabolic " "encephalopathy 09/04/2022    CARLTON (acute kidney injury) ([facility]) 09/04/2022    Ambulates with cane     not recently using but has in home if needed    Anesthesia     per pt \"with knee surgery(in the )was awake for operation and fell asleep when being closed up\" and than took long to wake up\"    Arthritis     Blood in urine     \"sometimes\"    Diabetes mellitus ([facility])     Type 2--sees Endocrinologist KAELYN Shaver    Lane catheter in place     History of pulmonary embolism     Implantable loop recorder present     Muscle weakness     per pt \"started with diabetes\"some muscle loss of density\" --per pt \"muscle coming back\"    Myopia of left eye     glasses for reading    Obesity     Pancreatitis 09/04/2022    Risk for falls     Sepsis ([facility]) 09/04/2022    Teeth missing     Urethral pain     \"when passing urine\"    Urinary tract infection       Past Surgical History:   Procedure Laterality Date    CARDIAC LOOP RECORDER      CATARACT EXTRACTION Bilateral     KNEE CARTILAGE SURGERY      KNEE SURGERY Right     MA TRURL ELECTROSURG RESCJ PROSTATE BLEED COMPLETE N/A 3/16/2023    Procedure: TRANSURETHRAL RESECTION OF PROSTATE (TURP)(possible), cystoscopy, SP tube placement, removal of bladder calculus;  Surgeon: Ludwig King MD;  Location: [facility];  Service: Urology      Family History   Problem Relation Age of Onset    Diabetes Father     Diabetes type II Father     Diabetes Mother      Social History     Tobacco Use    Smoking status: Never     Passive exposure: Never    Smokeless tobacco: Never   Substance Use Topics    Alcohol use: Never     No Known Allergies      Current Outpatient Medications:     apixaban (Eliquis) 5 mg, Take 1 tablet (5 mg total) by mouth 2 (two) times a day, Disp: 180 tablet, Rfl: 2    atorvastatin (LIPITOR) 20 mg tablet, Take 1 tablet (20 mg total) by mouth daily, Disp: 90 tablet, Rfl: 2    Blood Glucose Monitoring Suppl (OneTouch Verio Flex System) w/Device KIT, CHECK BLOOD SUGARS THREE TIMES " DAILY DX: E11.65, Disp: 1 kit, Rfl: 0    ergocalciferol (VITAMIN D2) 50,000 units, Take 1 capsule (50,000 Units total) by mouth once a week for 8 doses, Disp: 8 capsule, Rfl: 0    ferrous sulfate 324 (65 Fe) mg, TAKE 1 TABLET BY MOUTH 2 TIMES A DAY BEFORE MEALS., Disp: 180 tablet, Rfl: 1    glucose blood (OneTouch Verio) test strip, CHECK BLOOD SUGARS THREE TIMES DAILY DX: E11.65, Disp: 400 strip, Rfl: 3    Incontinence Supply Disposable (Incontinence Brief Large) MISC, Use every 4 (four) hours as needed (Urinary incontinence), Disp: 36 each, Rfl: 12    insulin glargine (Toujeo SoloStar) 300 units/mL CONCENTRATED U-300 injection pen (1-unit dial), Inject 30 Units under the skin daily at bedtime, Disp: 9 mL, Rfl: 1    insulin lispro (HumaLOG KwikPen) 100 units/mL injection pen, Inject 10 Units under the skin 3 (three) times a day with meals, Disp: 24 mL, Rfl: 0    Lancets (OneTouch Delica Plus [id number]) MISC, CHECK BLOOD SUGARS THREE TIMES DAILY DX: E11.65, Disp: 400 each, Rfl: 3    Continuous Blood Gluc  (FreeStyle Miguel Angel 2 [place]) MARCELLA, As instructed (Patient not taking: Reported on 7/8/2024), Disp: 6 each, Rfl: 2    Continuous Blood Gluc Sensor (FreeStyle Miguel Angel 2 Sensor) MISC, Use to check her blood sugars continuously and change it every 2 weeks (Patient not taking: Reported on 7/8/2024), Disp: 7 each, Rfl: 1    folic acid (FOLVITE) 1 mg tablet, TAKE 1 TABLET BY MOUTH EVERY DAY (Patient not taking: Reported on 11/9/2023), Disp: 90 tablet, Rfl: 1    gabapentin (NEURONTIN) 100 mg capsule, Take 1 capsule (100 mg total) by mouth 3 (three) times a day (Patient not taking: Reported on 11/9/2023), Disp: 90 capsule, Rfl: 3    metoprolol succinate (TOPROL-XL) 25 mg 24 hr tablet, Take 1 tablet (25 mg total) by mouth daily (Patient not taking: Reported on 5/23/2024), Disp: 90 tablet, Rfl: 2    pantoprazole (PROTONIX) 40 mg tablet, Take 1 tablet (40 mg total) by mouth daily in the early morning (Patient not taking:  "Reported on 7/8/2024), Disp: 30 tablet, Rfl: 0    tadalafil (CIALIS) 20 MG tablet, Take 1 tablet (20 mg total) by mouth daily as needed for erectile dysfunction (Patient not taking: Reported on 11/9/2023), Disp: 30 tablet, Rfl: 5    tamsulosin (FLOMAX) 0.4 mg, Take 1 capsule (0.4 mg total) by mouth daily with dinner (Patient not taking: Reported on 7/8/2024), Disp: 30 capsule, Rfl: 0    tirzepatide 2.5 MG/0.5ML, Inject 0.5 mL (2.5 mg total) under the skin every 7 days, Disp: 2 mL, Rfl: 0  Review of Systems   Constitutional:  Negative for appetite change, fatigue and unexpected weight change.   HENT:  Negative for trouble swallowing.    Eyes:  Negative for visual disturbance.   Cardiovascular:  Negative for chest pain and palpitations.   Gastrointestinal:  Negative for abdominal pain, constipation, diarrhea, nausea and vomiting.   Endocrine: Negative for polydipsia, polyphagia and polyuria.       Physical Exam:  Body mass index is 41.09 kg/m².  /76 (BP Location: Left arm, Patient Position: Sitting, Cuff Size: Large)   Pulse 72   Temp 98.1 °F (36.7 °C)   Ht 5' 11\" (1.803 m)   Wt 134 kg (294 lb 9.6 oz)   SpO2 96%   BMI 41.09 kg/m²    Wt Readings from Last 3 Encounters:   08/26/24 134 kg (294 lb 9.6 oz)   08/06/24 132 kg (291 lb 12.8 oz)   07/24/24 130 kg (287 lb 9.6 oz)       GEN: NAD  E/n/m nl facies,   Eyes: no stare or proptosis,   CV; heart reg rate s1s2 nl, no Murmur   Resp: CTAB, good effort  Ab+BS  Neuro: no tremor,   Skin: warm and dry,   Ext:  no edema bilaterally,   Psych: nl mood and affect, no gross lapses in memory      Labs:   No components found for: \"HA1C\"  No components found for: \"GLU\"    Lab Results   Component Value Date    CREATININE 0.84 08/06/2024    CREATININE 0.89 05/23/2024    CREATININE 0.82 11/07/2023    BUN 20 08/06/2024    K 4.0 08/06/2024     08/06/2024    CO2 23 08/06/2024     eGFR   Date Value Ref Range Status   08/06/2024 88 ml/min/1.73sq m Final     No components " "found for: \"MALBCRER\"    Lab Results   Component Value Date    HDL 48 08/06/2024    TRIG 70 08/06/2024       Lab Results   Component Value Date    ALT 35 08/06/2024    AST 37 08/06/2024    ALKPHOS 112 (H) 08/06/2024       Lab Results   Component Value Date    FREET4 1.25 01/19/2023     Component      Latest Ref Rng 8/6/2024   Sodium      135 - 147 mmol/L 138    Potassium      3.5 - 5.3 mmol/L 4.0    Chloride      96 - 108 mmol/L 104    Carbon Dioxide      21 - 32 mmol/L 23    ANION GAP      4 - 13 mmol/L 11    BUN      5 - 25 mg/dL 20    Creatinine      0.60 - 1.30 mg/dL 0.84    GLUCOSE, FASTING      65 - 99 mg/dL 163 (H)    Calcium      8.4 - 10.2 mg/dL 9.1    AST      13 - 39 U/L 37    ALT      7 - 52 U/L 35    ALK PHOS      34 - 104 U/L 112 (H)    Total Protein      6.4 - 8.4 g/dL 7.7    Albumin      3.5 - 5.0 g/dL 4.2    Total Bilirubin      0.20 - 1.00 mg/dL 0.79    GFR, Calculated      ml/min/1.73sq m 88    Cholesterol      See Comment mg/dL 132    Triglycerides      See Comment mg/dL 70    HDL      >=40 mg/dL 48    LDL Calculated      0 - 100 mg/dL 70    EXT Creatinine Urine      Reference range not established. mg/dL 96.4    Albumin,U,Random      <20.0 mg/L 30.5 (H)    Albumin Creat Ratio      0 - 30 mg/g creatinine 32 (H)    Hemoglobin A1C      Normal 4.0-5.6%; PreDiabetic 5.7-6.4%; Diabetic >=6.5%; Glycemic control for adults with diabetes <7.0% % 7.5 (H)    eAG, EST AVG Glucose      mg/dl 169    TSH 3RD GENERATON      0.450 - 4.500 uIU/mL 1.580       Legend:  (H) High  Impression:  1. Type 2 diabetes mellitus with ketoacidosis without coma, with long-term current use of insulin ([facility])    2. Uncontrolled type 2 diabetes mellitus with hyperglycemia ([facility])    3. Vitamin D deficiency    4. Type 2 diabetes mellitus with microalbuminuria, with long-term current use of insulin ([facility])    5. Mixed hyperlipidemia    6. Obesity, Class III, BMI 40-49.9 (morbid obesity) ([facility])           Plan:    Problem List Items " Addressed This Visit          Endocrine    Type 2 diabetes mellitus with microalbuminuria, with long-term current use of insulin ([facility])       Lab Results   Component Value Date    HGBA1C 7.5 (H) 08/06/2024     Diabetes is reasonably controlled, the goal is less than 7%, he reports weight gain since he is on insulin, he is willing to try Mounjaro if it is covered, Mounjaro 2.5 mg weekly was ordered, if gets approved, decrease Toujeo to 26 units daily and Humalog 8 units 3 times daily AC.  Ophthalmology and podiatry follow-up  Return back in 3 months.  Labs prior to next visit.         Relevant Medications    insulin glargine (Toujeo SoloStar) 300 units/mL CONCENTRATED U-300 injection pen (1-unit dial)    insulin lispro (HumaLOG KwikPen) 100 units/mL injection pen    Type 2 diabetes mellitus, with long-term current use of insulin ([facility]) - Primary    Relevant Medications    insulin glargine (Toujeo SoloStar) 300 units/mL CONCENTRATED U-300 injection pen (1-unit dial)    insulin lispro (HumaLOG KwikPen) 100 units/mL injection pen       Other    Vitamin D deficiency    Hyperlipidemia     Continue Lipitor 10 mg daily.         Obesity, Class III, BMI 40-49.9 (morbid obesity) ([facility])     He has gained significant weight since started insulin as well as inactivity due to right knee arthritis.  He has seen weight management team.  Previously we tried Wegovy which was denied and Ozempic has been cost probated.  He is willing to try Mounjaro, which was ordered 2.5 mg weekly, if gets approved, we will slowly increase to higher dose as he tolerates.          Other Visit Diagnoses       Uncontrolled type 2 diabetes mellitus with hyperglycemia ([facility])        Relevant Medications    insulin glargine (Toujeo SoloStar) 300 units/mL CONCENTRATED U-300 injection pen (1-unit dial)    insulin lispro (HumaLOG KwikPen) 100 units/mL injection pen                  Discussed with the patient and all questioned fully answered. He will call me if any  problems arise.    Counseled patient on diagnostic results, prognosis, risk and benefit of treatment options, instruction for management, importance of treatment compliance, Risk  factor reduction and impressions      Jose Luis Shaver MD

## 2024-08-26 NOTE — ASSESSMENT & PLAN NOTE
Lab Results   Component Value Date    HGBA1C 7.5 (H) 08/06/2024     Diabetes is reasonably controlled, the goal is less than 7%, he reports weight gain since he is on insulin, he is willing to try Mounjaro if it is covered, Mounjaro 2.5 mg weekly was ordered, if gets approved, decrease Toujeo to 26 units daily and Humalog 8 units 3 times daily AC.  Ophthalmology and podiatry follow-up  Return back in 3 months.  Labs prior to next visit.

## 2024-09-10 ENCOUNTER — REMOTE DEVICE CLINIC VISIT (OUTPATIENT)
Dept: CARDIOLOGY CLINIC | Facility: CLINIC | Age: 69
End: 2024-09-10
Payer: COMMERCIAL

## 2024-09-10 DIAGNOSIS — I48.0 PAF (PAROXYSMAL ATRIAL FIBRILLATION) (HCC): Primary | ICD-10-CM

## 2024-09-10 PROCEDURE — 93298 REM INTERROG DEV EVAL SCRMS: CPT | Performed by: INTERNAL MEDICINE

## 2024-09-10 NOTE — PROGRESS NOTES
"MDT LP2-ACTIVE SYSTEM IS MRI CONDITIONAL   CARELINK TRANSMISSION: LOOP RECORDER. PRESENTING RHYTHM NSR @ 62 BPM. BATTERY STATUS \"OK.\" NO PATIENT OR DEVICE ACTIVATED EPISODES. NORMAL DEVICE FUNCTION. DL   "

## 2024-09-18 ENCOUNTER — TELEPHONE (OUTPATIENT)
Age: 69
End: 2024-09-18

## 2024-09-18 NOTE — TELEPHONE ENCOUNTER
Received call from pt, states he just missed a call.  He listened to the voicemail while on the phone, states it said that his lab results were normal. No further questions at this time.

## 2024-09-27 ENCOUNTER — TELEPHONE (OUTPATIENT)
Dept: ENDOCRINOLOGY | Facility: CLINIC | Age: 69
End: 2024-09-27

## 2024-09-27 DIAGNOSIS — E11.29 TYPE 2 DIABETES MELLITUS WITH DIABETIC MICROALBUMINURIA, WITH LONG-TERM CURRENT USE OF INSULIN (HCC): Primary | ICD-10-CM

## 2024-09-27 DIAGNOSIS — R80.9 TYPE 2 DIABETES MELLITUS WITH DIABETIC MICROALBUMINURIA, WITH LONG-TERM CURRENT USE OF INSULIN (HCC): Primary | ICD-10-CM

## 2024-09-27 DIAGNOSIS — Z79.4 TYPE 2 DIABETES MELLITUS WITH DIABETIC MICROALBUMINURIA, WITH LONG-TERM CURRENT USE OF INSULIN (HCC): Primary | ICD-10-CM

## 2024-09-27 NOTE — TELEPHONE ENCOUNTER
Patient called stating that he is now completely out of the wegovy and is asking if he should still be taking this. Patient also states he had no weight change, so he's not sure if he should be increasing this or not. Please review and contact patient to discuss

## 2024-09-27 NOTE — TELEPHONE ENCOUNTER
Called patient. Patient stated that his insurance has been paying for it. He is on 1.7mg and it hasn't been doing anything. He hasn't lost any weight

## 2024-10-01 ENCOUNTER — OFFICE VISIT (OUTPATIENT)
Dept: GASTROENTEROLOGY | Facility: CLINIC | Age: 69
End: 2024-10-01
Payer: COMMERCIAL

## 2024-10-01 ENCOUNTER — TELEPHONE (OUTPATIENT)
Dept: GASTROENTEROLOGY | Facility: CLINIC | Age: 69
End: 2024-10-01

## 2024-10-01 VITALS
HEIGHT: 71 IN | OXYGEN SATURATION: 96 % | RESPIRATION RATE: 18 BRPM | WEIGHT: 300 LBS | SYSTOLIC BLOOD PRESSURE: 118 MMHG | BODY MASS INDEX: 42 KG/M2 | HEART RATE: 84 BPM | DIASTOLIC BLOOD PRESSURE: 80 MMHG

## 2024-10-01 DIAGNOSIS — R22.2 ABDOMINAL WALL MASS: ICD-10-CM

## 2024-10-01 DIAGNOSIS — Z79.01 ANTICOAGULATION ADEQUATE: ICD-10-CM

## 2024-10-01 DIAGNOSIS — K21.9 GASTROESOPHAGEAL REFLUX DISEASE, UNSPECIFIED WHETHER ESOPHAGITIS PRESENT: ICD-10-CM

## 2024-10-01 DIAGNOSIS — Z86.39 HISTORY OF IRON DEFICIENCY: ICD-10-CM

## 2024-10-01 DIAGNOSIS — K42.9 UMBILICAL HERNIA WITHOUT OBSTRUCTION AND WITHOUT GANGRENE: ICD-10-CM

## 2024-10-01 DIAGNOSIS — K31.7 GASTRIC POLYPS: ICD-10-CM

## 2024-10-01 DIAGNOSIS — Z86.0109 PERSONAL HISTORY OF OTHER COLON POLYPS: Primary | ICD-10-CM

## 2024-10-01 PROCEDURE — 99204 OFFICE O/P NEW MOD 45 MIN: CPT | Performed by: PHYSICIAN ASSISTANT

## 2024-10-01 NOTE — PATIENT INSTRUCTIONS
Due for colonoscopy.   Check EGD for history of colon polyps.     Scheduled date of EGD/colonoscopy (as of today):10/9/24  Physician performing EGD/colonoscopy:Domingo  Location of EGD/colonoscopy:[place]  Desired bowel prep reviewed with patient:starr  Instructions reviewed with patient by:Rebekah WALKER  Clearances:   none    diabetic

## 2024-10-01 NOTE — TELEPHONE ENCOUNTER
Great, I will call patient and reiterate  what you advised , he needs to contact his cardiologist       I lmom with this advice

## 2024-10-01 NOTE — TELEPHONE ENCOUNTER
During scheduling of patients colonoscopy/egd, patient indicated he is no longer taking Eliquis - said he hasn't been taking this in quite some time.    Patient was not provided any instructions on holding this medication based upon this discussion.    Please update chart to discontinue the Eliquis.

## 2024-10-01 NOTE — PROGRESS NOTES
[facility] - Outpatient Consultation  Oracio Richardson Jr. 69 y.o. male MRN: [id number]  Encounter: [id number]    ASSESSMENT AND PLAN:      1. Personal history of other colon polyps  2. Anticoagulation status    Pt shares he had a distant colonoscopy and was told he had colon polyps.   Due for colonoscopy for surveillance purposes.   No alarm features to the GI tract at this time.   Continue high fiber diet and excellent hydration.     I discussed informed consent with the patient. The risks/benefits/alternatives of the procedure were discussed with the patient. Risks included, but not limited to, infection, bleeding, perforation, injury to organs in the abdomen, missed lesion and incomplete procedure were discussed. Patient was agreeable.    Golytely bowel prep sent to pharmacy. Eliquis hold required (pt tells me he self-discontinued his Eliquis. I informed him he should review this with prescribing physician prior to making any significant changes). Diabetic instructions needed, pt said he isn't taking GLP-1 at present.     - Ambulatory Referral to Gastroenterology  - Colonoscopy; Future  - polyethylene glycol (GOLYTELY) 4000 mL solution; Take 4,000 mL by mouth once for 1 dose  Dispense: 4000 mL; Refill: 0    3. Gastric polyps  4. Gastroesophageal reflux disease, unspecified whether esophagitis present    Patient shares he did have a gastric polyps removed on his previous EGD, though the type of polyp was not clear.  He does have a history of GERD though his symptoms are currently quiescent off of medication.  We do not have the type of pathology of polyps, though for, purposes we will plan for a one-time repeat EGD to ensure no concerning intraluminal findings.  Continue small frequent meals and diet and lifestyle modifications for GERD.     - EGD; Future    5. History of iron deficiency    Patient with a history of iron deficiency and an iron supplement is on his medication list.  He  has no evidence of anemia for the past year, though this has not been checked in recent months.  No overt GI bleeding.  Plan to repeat CBC and iron panel in the fall.    - Iron Panel (Includes Ferritin, Iron Sat%, Iron, and TIBC); Future    6. Abdominal wall mass  7. umbilical hernia     Pt shows a palpable soft tissue mass in the RUQ.   It appears that this may be c/w a lipoma, and he does have umbilical hernia.   He has several soft tissue growths diffusely across his body which appears to be consistent with lipomas.  We will check an ultrasound of the soft tissue mass in the right upper quadrant to confirm no concerning pathology.    - US abdominal wall; Future    We will follow up after bidirectional endoscopic evaluation.     ______________________________________________________________________    HPI: Patient is a 69 y.o. male who presents today for a consultation regarding colonoscopy consultation. Pmhx sig for PAF on Eliquis, mitral valve regurgitation, DM2, osteoarthritis, BPH, MDD, BMI 41.    Pt is new to clinic. Here today to review colonoscopy consultation.     Pt having 2-3 formed brown stool daily. If eating a lot of veggies, can be soft. No BRBPR or melena.  No nocturnal BMs.  No abdominal pain or rectal pain related to defecation.  No family history of CRC. Notes personal hx of colon polyps.     He does have a history of GERD.  Pantoprazole is on medication list though he is not taking.  No heartburn, indigestion, nausea, vomiting, dysphagia or odynophagia.  No early satiety.    NSAIDs: none  Etoh: none  Tobacco: none  Cannabis: none    07/2023: CT A/P: Moderate diffuse urinary bladder wall thickening which may be secondary to under distention versus a UTI/acute cystitis. Moderate amount of stool within the rectum.   No free air or free fluid.  11/2023: Hb 15.6, MCV 88, Plt 202   08/2024: BUN 20, Cr 0.84, AST 37, ALT 35, , albumin 4.2, t bili 0.79    Endoscopic history:   EGD:   Colon:  "personal hx of colon polyps 10 years     Review of Systems   Otherwise Per HPI    Historical Information   Past Medical History:   Diagnosis Date    A-fib (HCC)     per pt \"happened one time\"    Abdominal hernia     Acute metabolic encephalopathy 09/04/2022    CARLTON (acute kidney injury) ([facility]) 09/04/2022    Ambulates with cane     not recently using but has in home if needed    Anesthesia     per pt \"with knee surgery(in the )was awake for operation and fell asleep when being closed up\" and than took long to wake up\"    Arthritis     Blood in urine     \"sometimes\"    Diabetes mellitus ([facility])     Type 2--sees Endocrinologist [facility] Dr MONTSE Shaver    Lane catheter in place     History of pulmonary embolism     Implantable loop recorder present     Muscle weakness     per pt \"started with diabetes\"some muscle loss of density\" --per pt \"muscle coming back\"    Myopia of left eye     glasses for reading    Obesity     Pancreatitis 09/04/2022    Risk for falls     Sepsis ([facility]) 09/04/2022    Teeth missing     Urethral pain     \"when passing urine\"    Urinary tract infection      Past Surgical History:   Procedure Laterality Date    CARDIAC LOOP RECORDER      CATARACT EXTRACTION Bilateral     KNEE CARTILAGE SURGERY      KNEE SURGERY Right     FL TRURL ELECTROSURG RESCJ PROSTATE BLEED COMPLETE N/A 3/16/2023    Procedure: TRANSURETHRAL RESECTION OF PROSTATE (TURP)(possible), cystoscopy, SP tube placement, removal of bladder calculus;  Surgeon: Ludwig King MD;  Location: [facility];  Service: Urology     Social History   Social History     Substance and Sexual Activity   Alcohol Use Never     Social History     Substance and Sexual Activity   Drug Use Never     Social History     Tobacco Use   Smoking Status Never    Passive exposure: Never   Smokeless Tobacco Never     Family History   Problem Relation Age of Onset    Diabetes Father     Diabetes type II Father     Diabetes Mother      Meds/Allergies       Current Outpatient " "Medications:     apixaban (Eliquis) 5 mg    atorvastatin (LIPITOR) 20 mg tablet    Blood Glucose Monitoring Suppl (OneTouch Verio Flex System) w/Device KIT    ferrous sulfate 324 (65 Fe) mg    glucose blood (OneTouch Verio) test strip    Incontinence Supply Disposable (Incontinence Brief Large) MISC    insulin glargine (Toujeo SoloStar) 300 units/mL CONCENTRATED U-300 injection pen (1-unit dial)    insulin lispro (HumaLOG KwikPen) 100 units/mL injection pen    Lancets (OneTouch Delica Plus [id number]) MISC    Semaglutide-Weight Management (WEGOVY) 2.4 MG/0.75ML    ergocalciferol (VITAMIN D2) 50,000 units    No Known Allergies    Objective     Blood pressure 118/80, pulse 84, resp. rate 18, height 5' 11\" (1.803 m), weight 136 kg (300 lb), SpO2 96%. Body mass index is 41.84 kg/m².    Physical Exam  Vitals and nursing note reviewed.   Constitutional:       General: He is not in acute distress.     Appearance: He is well-developed. He is obese.   HENT:      Head: Normocephalic and atraumatic.   Eyes:      General: No scleral icterus.     Conjunctiva/sclera: Conjunctivae normal.   Cardiovascular:      Rate and Rhythm: Normal rate.   Pulmonary:      Effort: Pulmonary effort is normal. No respiratory distress.   Abdominal:      General: Bowel sounds are normal. There is no distension.      Palpations: Abdomen is soft.      Tenderness: There is no abdominal tenderness. There is no guarding or rebound.      Hernia: A hernia is present.      Comments: Soft tissue mass in the RUQ    Musculoskeletal:      Right lower leg: No edema.      Left lower leg: No edema.   Skin:     General: Skin is warm and dry.      Coloration: Skin is not jaundiced.   Neurological:      General: No focal deficit present.      Mental Status: He is alert.   Psychiatric:         Mood and Affect: Mood normal.         Behavior: Behavior normal.        Lab Results:   No visits with results within 1 Day(s) from this visit.   Latest known visit with results " "is:   Appointment on 08/06/2024   Component Date Value    Creatinine, Ur 08/06/2024 96.4     Albumin,U,Random 08/06/2024 30.5 (H)     Albumin Creat Ratio 08/06/2024 32 (H)     Sodium 08/06/2024 138     Potassium 08/06/2024 4.0     Chloride 08/06/2024 104     CO2 08/06/2024 23     ANION GAP 08/06/2024 11     BUN 08/06/2024 20     Creatinine 08/06/2024 0.84     Glucose, Fasting 08/06/2024 163 (H)     Calcium 08/06/2024 9.1     AST 08/06/2024 37     ALT 08/06/2024 35     Alkaline Phosphatase 08/06/2024 112 (H)     Total Protein 08/06/2024 7.7     Albumin 08/06/2024 4.2     Total Bilirubin 08/06/2024 0.79     eGFR 08/06/2024 88     Hemoglobin A1C 08/06/2024 7.5 (H)     EAG 08/06/2024 169     Cholesterol 08/06/2024 132     Triglycerides 08/06/2024 70     HDL, Direct 08/06/2024 48     LDL Calculated 08/06/2024 70     TSH 3RD GENERATON 08/06/2024 1.580      Radiology Results:   Cardiac EP device report    Result Date: 9/10/2024  Narrative: MDT LP2-ACTIVE SYSTEM IS MRI CONDITIONAL CARELINK TRANSMISSION: LOOP RECORDER. PRESENTING RHYTHM NSR @ 62 BPM. BATTERY STATUS \"OK.\" NO PATIENT OR DEVICE ACTIVATED EPISODES. NORMAL DEVICE FUNCTION. RICCARDO Miranda PA-C    **Please note:  Dictation voice to text software may have been used in the creation of this record.  Occasional wrong word or “sound alike” substitutions may have occurred due to the inherent limitations of voice recognition software.  Read the chart carefully and recognize, using context, where substitutions have occurred.**  "

## 2024-10-08 ENCOUNTER — TELEPHONE (OUTPATIENT)
Age: 69
End: 2024-10-08

## 2024-10-09 ENCOUNTER — ANESTHESIA EVENT (OUTPATIENT)
Dept: GASTROENTEROLOGY | Facility: HOSPITAL | Age: 69
End: 2024-10-09
Payer: COMMERCIAL

## 2024-10-09 ENCOUNTER — ANESTHESIA (OUTPATIENT)
Dept: GASTROENTEROLOGY | Facility: HOSPITAL | Age: 69
End: 2024-10-09
Payer: COMMERCIAL

## 2024-10-09 ENCOUNTER — HOSPITAL ENCOUNTER (OUTPATIENT)
Dept: GASTROENTEROLOGY | Facility: HOSPITAL | Age: 69
Setting detail: OUTPATIENT SURGERY
Discharge: HOME | End: 2024-10-09
Payer: COMMERCIAL

## 2024-10-09 VITALS
OXYGEN SATURATION: 94 % | TEMPERATURE: 98.1 F | HEART RATE: 66 BPM | RESPIRATION RATE: 18 BRPM | DIASTOLIC BLOOD PRESSURE: 64 MMHG | SYSTOLIC BLOOD PRESSURE: 109 MMHG

## 2024-10-09 DIAGNOSIS — Z86.0109 PERSONAL HISTORY OF OTHER COLON POLYPS: ICD-10-CM

## 2024-10-09 DIAGNOSIS — K31.7 GASTRIC POLYPS: ICD-10-CM

## 2024-10-09 LAB — GLUCOSE SERPL-MCNC: 147 MG/DL (ref 65–140)

## 2024-10-09 PROCEDURE — 88305 TISSUE EXAM BY PATHOLOGIST: CPT | Performed by: STUDENT IN AN ORGANIZED HEALTH CARE EDUCATION/TRAINING PROGRAM

## 2024-10-09 PROCEDURE — 45380 COLONOSCOPY AND BIOPSY: CPT | Performed by: STUDENT IN AN ORGANIZED HEALTH CARE EDUCATION/TRAINING PROGRAM

## 2024-10-09 PROCEDURE — 45385 COLONOSCOPY W/LESION REMOVAL: CPT | Performed by: STUDENT IN AN ORGANIZED HEALTH CARE EDUCATION/TRAINING PROGRAM

## 2024-10-09 PROCEDURE — 82948 REAGENT STRIP/BLOOD GLUCOSE: CPT

## 2024-10-09 PROCEDURE — 43239 EGD BIOPSY SINGLE/MULTIPLE: CPT | Performed by: STUDENT IN AN ORGANIZED HEALTH CARE EDUCATION/TRAINING PROGRAM

## 2024-10-09 RX ORDER — SODIUM CHLORIDE, SODIUM LACTATE, POTASSIUM CHLORIDE, CALCIUM CHLORIDE 600; 310; 30; 20 MG/100ML; MG/100ML; MG/100ML; MG/100ML
INJECTION, SOLUTION INTRAVENOUS CONTINUOUS PRN
Status: DISCONTINUED | OUTPATIENT
Start: 2024-10-09 | End: 2024-10-09

## 2024-10-09 RX ORDER — LIDOCAINE HYDROCHLORIDE 20 MG/ML
INJECTION, SOLUTION EPIDURAL; INFILTRATION; INTRACAUDAL; PERINEURAL AS NEEDED
Status: DISCONTINUED | OUTPATIENT
Start: 2024-10-09 | End: 2024-10-09

## 2024-10-09 RX ORDER — PROPOFOL 10 MG/ML
INJECTION, EMULSION INTRAVENOUS AS NEEDED
Status: DISCONTINUED | OUTPATIENT
Start: 2024-10-09 | End: 2024-10-09

## 2024-10-09 RX ADMIN — LIDOCAINE HYDROCHLORIDE 100 MG: 20 INJECTION, SOLUTION EPIDURAL; INFILTRATION; INTRACAUDAL; PERINEURAL at 08:26

## 2024-10-09 RX ADMIN — PROPOFOL 50 MG: 10 INJECTION, EMULSION INTRAVENOUS at 08:37

## 2024-10-09 RX ADMIN — PROPOFOL 50 MG: 10 INJECTION, EMULSION INTRAVENOUS at 08:28

## 2024-10-09 RX ADMIN — PROPOFOL 150 MG: 10 INJECTION, EMULSION INTRAVENOUS at 08:26

## 2024-10-09 RX ADMIN — SODIUM CHLORIDE, SODIUM LACTATE, POTASSIUM CHLORIDE, AND CALCIUM CHLORIDE: .6; .31; .03; .02 INJECTION, SOLUTION INTRAVENOUS at 08:22

## 2024-10-09 RX ADMIN — PROPOFOL 50 MG: 10 INJECTION, EMULSION INTRAVENOUS at 08:31

## 2024-10-09 RX ADMIN — PROPOFOL 50 MG: 10 INJECTION, EMULSION INTRAVENOUS at 08:42

## 2024-10-09 RX ADMIN — PROPOFOL 50 MG: 10 INJECTION, EMULSION INTRAVENOUS at 08:33

## 2024-10-09 NOTE — ANESTHESIA POSTPROCEDURE EVALUATION
Post-Op Assessment Note    CV Status:  Stable  Pain Score: 0    Pain management: adequate    Multimodal analgesia used between 6 hours prior to anesthesia start to PACU discharge    Mental Status:  Alert   Hydration Status:  Stable   PONV Controlled:  None   Airway Patency:  Patent  Two or more mitigation strategies used for obstructive sleep apnea   Post Op Vitals Reviewed: Yes    No anethesia notable event occurred.    Staff: Anesthesiologist           Last Filed PACU Vitals:  Vitals Value Taken Time   Temp     Pulse 66 10/09/24 0902   /64 10/09/24 0902   Resp 18 10/09/24 0902   SpO2 94 % 10/09/24 0902       Modified Yaritza:  Activity: 2 (10/9/2024  9:25 AM)  Respiration: 2 (10/9/2024  9:25 AM)  Circulation: 2 (10/9/2024  9:25 AM)  Consciousness: 2 (10/9/2024  9:25 AM)  Oxygen Saturation: 2 (10/9/2024  9:25 AM)  Modified Yaritza Score: 10 (10/9/2024  9:25 AM)

## 2024-10-09 NOTE — ANESTHESIA POSTPROCEDURE EVALUATION
Post-Op Assessment Note    CV Status:  Stable  Pain Score: 0    Pain management: adequate       Mental Status:  Sleepy and arousable   Hydration Status:  Euvolemic   PONV Controlled:  None   Airway Patency:  Patent     Post Op Vitals Reviewed: Yes    No anethesia notable event occurred.    Staff: CRNA           Last Filed PACU Vitals:  Vitals Value Taken Time   Temp     Pulse 65    /62    Resp 12    SpO2 97        Modified Yaritza:  No data recorded

## 2024-10-09 NOTE — H&P
"History and Physical -  [facility]astroenterology Specialists  Oracio Richardson Jr. 69 y.o. male MRN: [id number]                  HPI: Oracio Richardson Jr. is a 69 y.o. year old male who presents for history of gastric and colon polyps      REVIEW OF SYSTEMS: Per the HPI, and otherwise unremarkable.    Historical Information   Past Medical History:   Diagnosis Date    A-fib (HCC)     per pt \"happened one time\"    Abdominal hernia     Acute metabolic encephalopathy 09/04/2022    CARLTON (acute kidney injury) ([facility]) 09/04/2022    Ambulates with cane     not recently using but has in home if needed    Anesthesia     per pt \"with knee surgery(in the )was awake for operation and fell asleep when being closed up\" and than took long to wake up\"    Arthritis     Blood in urine     \"sometimes\"    Diabetes mellitus ([facility])     Type 2--sees Endocrinologist  Dr MONTSE Shaver    Lane catheter in place     History of pulmonary embolism     Implantable loop recorder present     Muscle weakness     per pt \"started with diabetes\"some muscle loss of density\" --per pt \"muscle coming back\"    Myopia of left eye     glasses for reading    Obesity     Pancreatitis 09/04/2022    Risk for falls     Sepsis ([facility]) 09/04/2022    Teeth missing     Urethral pain     \"when passing urine\"    Urinary tract infection      Past Surgical History:   Procedure Laterality Date    CARDIAC LOOP RECORDER      CATARACT EXTRACTION Bilateral     KNEE CARTILAGE SURGERY      KNEE SURGERY Right     MD TRURL ELECTROSURG RESCJ PROSTATE BLEED COMPLETE N/A 3/16/2023    Procedure: TRANSURETHRAL RESECTION OF PROSTATE (TURP)(possible), cystoscopy, SP tube placement, removal of bladder calculus;  Surgeon: Ludwig King MD;  Location: [facility];  Service: Urology     Social History   Social History     Substance and Sexual Activity   Alcohol Use Never     Social History     Substance and Sexual Activity   Drug Use Never     Social History     Tobacco Use   Smoking Status Never    " Passive exposure: Never   Smokeless Tobacco Never     Family History   Problem Relation Age of Onset    Diabetes Father     Diabetes type II Father     Diabetes Mother        Meds/Allergies       Current Outpatient Medications:     apixaban (Eliquis) 5 mg    atorvastatin (LIPITOR) 20 mg tablet    Blood Glucose Monitoring Suppl (OneTouch Verio Flex System) w/Device KIT    ergocalciferol (VITAMIN D2) 50,000 units    ferrous sulfate 324 (65 Fe) mg    glucose blood (OneTouch Verio) test strip    Incontinence Supply Disposable (Incontinence Brief Large) MISC    insulin glargine (Toujeo SoloStar) 300 units/mL CONCENTRATED U-300 injection pen (1-unit dial)    insulin lispro (HumaLOG KwikPen) 100 units/mL injection pen    Lancets (OneTouch Delica Plus [id number]) MISC    polyethylene glycol (GOLYTELY) 4000 mL solution    No Known Allergies    Objective     There were no vitals taken for this visit.      PHYSICAL EXAM    Gen: NAD  Head: NCAT  CV: RRR  CHEST: Clear  ABD: soft, NT/ND  EXT: no edema      ASSESSMENT/PLAN:  This is a 69 y.o. year old male here for EGD and colonoscopy, and he is stable and optimized for his procedure.

## 2024-10-09 NOTE — ANESTHESIA PREPROCEDURE EVALUATION
Procedure:  EGD  COLONOSCOPY    Drank 8 oz of prep at 545 am    Last eliquis over a month ago  Glargine 30 U last night    Relevant Problems   CARDIO   (+) Hyperlipidemia   (+) Nonrheumatic mitral valve regurgitation   (+) PAF (paroxysmal atrial fibrillation) ([facility])      ENDO   (+) Type 2 diabetes mellitus, with long-term current use of insulin ([facility])      /RENAL   (+) Hydroureteronephrosis      HEMATOLOGY   (+) Anemia   (+) Other dietary vitamin B12 deficiency anemia      MUSCULOSKELETAL   (+) Osteoarthritis of both knees      Other   (+) Obesity, Class III, BMI 40-49.9 (morbid obesity) ([facility])     EKG 7/2024        Impression    sinus rhythm heart rate 75 bpm with left axis deviation low voltage QRS.           TTE 2022    This was a technically difficult study.    Left Ventricle: Left ventricular cavity size is normal. Wall thickness is normal. The left ventricular ejection fraction is 60%. Systolic function is normal. Although no diagnostic regional wall motion abnormality was identified, this possibility cannot be completely excluded on the basis of this study. Diastolic function is normal.    Mitral Valve: There is annular calcification.    IVC/SVC: The inferior vena cava is dilated.      Physical Exam    Airway    Mallampati score: III  TM Distance: >3 FB  Neck ROM: full     Dental       Cardiovascular      Pulmonary      Other Findings        Anesthesia Plan  ASA Score- 3     Anesthesia Type- IV sedation with anesthesia with ASA Monitors.         Additional Monitors:     Airway Plan:     Comment: Recent labs personally reviewed:  Lab Results       Component                Value               Date                       WBC                      5.24                11/07/2023                 HGB                      15.6                11/07/2023                 PLT                      202                 11/07/2023            Lab Results       Component                Value               Date                        K                        4.0                 08/06/2024                 BUN                      20                  08/06/2024                 CREATININE               0.84                08/06/2024                 GLUCOSE                  241 (H)             09/07/2022            Lab Results       Component                Value               Date                       PTT                      29                  11/17/2022             Lab Results       Component                Value               Date                       INR                      1.01                11/17/2022              Blood type O    I, Kenya Ford MD, have personally seen and evaluated the patient prior to anesthetic care.  I have reviewed the pre-anesthetic record, medical history, allergies, medications and any other medical records if appropriate to the anesthetic care.  If a CRNA is involved in the case, I have reviewed the CRNA assessment, if present, and agree. Patient consented for IV Sedation, general anesthesia as back up. Discussed risks of aspiration, IV infiltration, indications for conversion to general anesthesia. All questions and concerns addressed.     .       Plan Factors-Exercise tolerance (METS): >4 METS.    Chart reviewed. EKG reviewed. Imaging results reviewed. Existing labs reviewed. Patient summary reviewed.    Patient is not a current smoker.  Patient did not smoke on day of surgery.    Obstructive sleep apnea risk education given perioperatively.        Induction- intravenous.    Postoperative Plan-         Informed Consent- Anesthetic plan and risks discussed with patient.  I personally reviewed this patient with the CRNA. Discussed and agreed on the Anesthesia Plan with the CRNA..

## 2024-10-10 ENCOUNTER — TELEPHONE (OUTPATIENT)
Dept: ADMINISTRATIVE | Facility: OTHER | Age: 69
End: 2024-10-10

## 2024-10-10 NOTE — TELEPHONE ENCOUNTER
----- Message from Whitney ARIAS sent at 10/9/2024  3:53 PM EDT -----  10/09/24 3:53 PM    Hello, our patient Oracio Richardson Jr. has had CRC: Colonoscopy completed/performed. Please assist in updating the patient chart by pulling the document from the Imaging/Procedure Tab. The date of service is 10/01/2024.     Thank you,  SHERRY Juan PG   No Refill Protocol on File:    Medication/Dose: tizanidine and tramadol  Patient last seen by PCP: 08/01/2023  Next office visit with PCP: none scheduled   Last Lab:no UDS done  Last Ordered: 12/04/2023 and 12/08/2023     Above information requires contacting patient: No    PDMP needs to be reviewed by Provider    Sent to provider to approve or deny

## 2024-10-10 NOTE — TELEPHONE ENCOUNTER
Upon review of the In Basket request we were able to note that no further action is required. The patient chart is up to date as a result of a previous request.      Any additional questions or concerns should be emailed to the Practice Liaisons via the appropriate education email address, please do not reply via In Basket.    Thank you  Ginger Snider MA   [facility]

## 2024-10-15 PROCEDURE — 88305 TISSUE EXAM BY PATHOLOGIST: CPT | Performed by: STUDENT IN AN ORGANIZED HEALTH CARE EDUCATION/TRAINING PROGRAM

## 2024-10-24 ENCOUNTER — PREP FOR PROCEDURE (OUTPATIENT)
Dept: GASTROENTEROLOGY | Facility: CLINIC | Age: 69
End: 2024-10-24

## 2024-10-24 DIAGNOSIS — Z12.11 SCREENING FOR COLON CANCER: Primary | ICD-10-CM

## 2024-11-07 ENCOUNTER — OFFICE VISIT (OUTPATIENT)
Dept: BARIATRICS | Facility: CLINIC | Age: 69
End: 2024-11-07
Payer: COMMERCIAL

## 2024-11-07 VITALS
SYSTOLIC BLOOD PRESSURE: 132 MMHG | HEIGHT: 71 IN | WEIGHT: 300 LBS | TEMPERATURE: 98.2 F | HEART RATE: 89 BPM | OXYGEN SATURATION: 98 % | BODY MASS INDEX: 42 KG/M2 | DIASTOLIC BLOOD PRESSURE: 78 MMHG | RESPIRATION RATE: 18 BRPM

## 2024-11-07 DIAGNOSIS — E66.01 OBESITY, CLASS III, BMI 40-49.9 (MORBID OBESITY) (HCC): Primary | ICD-10-CM

## 2024-11-07 DIAGNOSIS — R80.9 TYPE 2 DIABETES MELLITUS WITH DIABETIC MICROALBUMINURIA, WITH LONG-TERM CURRENT USE OF INSULIN (HCC): ICD-10-CM

## 2024-11-07 DIAGNOSIS — E11.29 TYPE 2 DIABETES MELLITUS WITH DIABETIC MICROALBUMINURIA, WITH LONG-TERM CURRENT USE OF INSULIN (HCC): ICD-10-CM

## 2024-11-07 DIAGNOSIS — Z79.4 TYPE 2 DIABETES MELLITUS WITH DIABETIC MICROALBUMINURIA, WITH LONG-TERM CURRENT USE OF INSULIN (HCC): ICD-10-CM

## 2024-11-07 PROCEDURE — 99214 OFFICE O/P EST MOD 30 MIN: CPT | Performed by: PHYSICIAN ASSISTANT

## 2024-11-07 NOTE — ASSESSMENT & PLAN NOTE
Lab Results   Component Value Date    HGBA1C 7.5 (H) 08/06/2024   -Followed closely by endocrinology  -On Toujeo and Humalog  -avoid/limit refined carbohydrates

## 2024-11-07 NOTE — PROGRESS NOTES
Assessment/Plan:    Obesity, Class III, BMI 40-49.9 (morbid obesity) ([facility])  -Patient is pursuing Conservative Program  -Initial weight loss goal of 5-10% weight loss for improved health  -Screening labs: reviewed Lipid panel, HgbA1c, TSH, CMP  -dietary recall reviewed  -Not a candidate for sympathomimetics  -Not a candidate for GLP-1s due to hx pancreatitis  -Patient missed appointment with surgeon. He is still interested in bariatric surgery. Will schedule patient again    Initial:  291.8 lbs  Current: 300 lbs  Change: +8.2 lbs  Goal: 220-240 lbs    Type 2 diabetes mellitus, with long-term current use of insulin ([facility])    Lab Results   Component Value Date    HGBA1C 7.5 (H) 08/06/2024   -Followed closely by endocrinology  -On Toujeo and Humalog  -avoid/limit refined carbohydrates    Goals:    Food log (ie.) [email or web address],sparkpeople.com,loseit.[email or web address],C2cube.com,etc.   No sugary beverages. At least 64oz of water daily.  Increase physical activity by 10 minutes daily. Gradually increase physical activity to a goal of 5 days per week for 30 minutes of MODERATE intensity PLUS 2 days per week of FULL BODY resistance training      Follow up in approximately 2 weeks with Bariatric Surgeon.     Diagnoses and all orders for this visit:    Obesity, Class III, BMI 40-49.9 (morbid obesity) ([facility])    Type 2 diabetes mellitus with diabetic microalbuminuria, with long-term current use of insulin ([facility])          Subjective:   Chief Complaint   Patient presents with    Follow-up        Patient ID: Oracio Richardson Jr.  is a 69 y.o. male with excess weight/obesity here to pursue weight managment.  Patient is pursuing Conservative Program.     HPI Patient presents for [facility] follow up. He reports he missed his appointment with surgeon since he was on vacation. He would like to see the surgeon as he is still interested in baraitric surgery. Reports he was prescribed Wegovy by endocrinology, did not have weight loss    -He is unable to  "exercise due to his knee pain  Hydration: 1/2 gallon water, diet grape juice, lactaid 1% milk  ETOH: denies    B: skips  L: 3 eggs + turkey clark + 2 slices of WG toast  S: cornflakes +/- 1% lactaid milk    Wt Readings from Last 10 Encounters:   11/07/24 136 kg (300 lb)   10/01/24 136 kg (300 lb)   08/26/24 134 kg (294 lb 9.6 oz)   08/06/24 132 kg (291 lb 12.8 oz)   07/24/24 130 kg (287 lb 9.6 oz)   07/22/24 132 kg (290 lb 6.4 oz)   07/08/24 130 kg (287 lb)   05/23/24 130 kg (286 lb 3.2 oz)   03/07/24 128 kg (283 lb)   02/21/24 113 kg (250 lb)        The following portions of the patient's history were reviewed and updated as appropriate: allergies, current medications, past family history, past medical history, past social history, past surgical history, and problem list.    Review of Systems   Respiratory: Negative.     Cardiovascular: Negative.    Musculoskeletal:  Positive for arthralgias.       Objective:    /78 (BP Location: Left arm, Patient Position: Sitting, Cuff Size: Large)   Pulse 89   Temp 98.2 °F (36.8 °C) (Temporal)   Resp 18   Ht 5' 11\" (1.803 m)   Wt 136 kg (300 lb)   SpO2 98%   BMI 41.84 kg/m²      Physical Exam  Vitals and nursing note reviewed.   Constitutional:       General: He is not in acute distress.     Appearance: He is obese. He is not ill-appearing or toxic-appearing.   HENT:      Head: Normocephalic and atraumatic.      Mouth/Throat:      Mouth: Mucous membranes are moist.   Eyes:      General: No scleral icterus.  Pulmonary:      Effort: Pulmonary effort is normal.   Abdominal:      Comments: protuberant   Musculoskeletal:      Right lower leg: Edema present.      Left lower leg: Edema present.   Skin:     General: Skin is dry.      Coloration: Skin is not jaundiced.   Neurological:      General: No focal deficit present.      Mental Status: He is alert and oriented to person, place, and time.   Psychiatric:         Mood and Affect: Mood normal.         Behavior: Behavior " normal.         Thought Content: Thought content normal.         Judgment: Judgment normal.

## 2024-11-07 NOTE — ASSESSMENT & PLAN NOTE
-Patient is pursuing Conservative Program  -Initial weight loss goal of 5-10% weight loss for improved health  -Screening labs: reviewed Lipid panel, HgbA1c, TSH, CMP  -dietary recall reviewed  -Not a candidate for sympathomimetics  -Not a candidate for GLP-1s due to hx pancreatitis  -Patient missed appointment with surgeon. He is still interested in bariatric surgery. Will schedule patient again    Initial:  291.8 lbs  Current: 300 lbs  Change: +8.2 lbs  Goal: 220-240 lbs

## 2024-11-12 ENCOUNTER — TELEPHONE (OUTPATIENT)
Age: 69
End: 2024-11-12

## 2024-11-12 ENCOUNTER — OFFICE VISIT (OUTPATIENT)
Dept: FAMILY MEDICINE CLINIC | Facility: CLINIC | Age: 69
End: 2024-11-12
Payer: COMMERCIAL

## 2024-11-12 VITALS
TEMPERATURE: 98.7 F | SYSTOLIC BLOOD PRESSURE: 122 MMHG | RESPIRATION RATE: 18 BRPM | DIASTOLIC BLOOD PRESSURE: 78 MMHG | OXYGEN SATURATION: 96 % | WEIGHT: 307 LBS | BODY MASS INDEX: 42.98 KG/M2 | HEIGHT: 71 IN | HEART RATE: 77 BPM

## 2024-11-12 DIAGNOSIS — I48.0 PAF (PAROXYSMAL ATRIAL FIBRILLATION) (HCC): ICD-10-CM

## 2024-11-12 DIAGNOSIS — Z23 ENCOUNTER FOR IMMUNIZATION: Primary | ICD-10-CM

## 2024-11-12 DIAGNOSIS — M17.0 PRIMARY OSTEOARTHRITIS OF BOTH KNEES: ICD-10-CM

## 2024-11-12 DIAGNOSIS — E11.65 UNCONTROLLED TYPE 2 DIABETES MELLITUS WITH HYPERGLYCEMIA (HCC): ICD-10-CM

## 2024-11-12 DIAGNOSIS — R35.1 NOCTURIA: ICD-10-CM

## 2024-11-12 DIAGNOSIS — I34.0 NONRHEUMATIC MITRAL VALVE REGURGITATION: ICD-10-CM

## 2024-11-12 DIAGNOSIS — E78.5 HYPERLIPIDEMIA, UNSPECIFIED HYPERLIPIDEMIA TYPE: ICD-10-CM

## 2024-11-12 DIAGNOSIS — Z00.00 MEDICARE ANNUAL WELLNESS VISIT, SUBSEQUENT: ICD-10-CM

## 2024-11-12 DIAGNOSIS — Z79.4 TYPE 2 DIABETES MELLITUS WITH KETOACIDOSIS WITHOUT COMA, WITH LONG-TERM CURRENT USE OF INSULIN (HCC): ICD-10-CM

## 2024-11-12 DIAGNOSIS — E11.10 TYPE 2 DIABETES MELLITUS WITH KETOACIDOSIS WITHOUT COMA, WITH LONG-TERM CURRENT USE OF INSULIN (HCC): ICD-10-CM

## 2024-11-12 PROCEDURE — G0008 ADMIN INFLUENZA VIRUS VAC: HCPCS

## 2024-11-12 PROCEDURE — 90662 IIV NO PRSV INCREASED AG IM: CPT

## 2024-11-12 PROCEDURE — 99214 OFFICE O/P EST MOD 30 MIN: CPT | Performed by: FAMILY MEDICINE

## 2024-11-12 PROCEDURE — G0439 PPPS, SUBSEQ VISIT: HCPCS | Performed by: FAMILY MEDICINE

## 2024-11-12 RX ORDER — ATORVASTATIN CALCIUM 20 MG/1
20 TABLET, FILM COATED ORAL DAILY
Qty: 90 TABLET | Refills: 2 | Status: SHIPPED | OUTPATIENT
Start: 2024-11-12

## 2024-11-12 RX ORDER — INSULIN LISPRO 100 [IU]/ML
10 INJECTION, SOLUTION INTRAVENOUS; SUBCUTANEOUS
Qty: 24 ML | Refills: 2 | Status: SHIPPED | OUTPATIENT
Start: 2024-11-12 | End: 2025-02-10

## 2024-11-12 RX ORDER — INSULIN GLARGINE 300 U/ML
30 INJECTION, SOLUTION SUBCUTANEOUS
Qty: 9 ML | Refills: 1 | Status: SHIPPED | OUTPATIENT
Start: 2024-11-12 | End: 2025-06-08

## 2024-11-12 NOTE — ASSESSMENT & PLAN NOTE
Monitor laboratory work continue on Eliquis daily and blood pressure control with current dose of all medications unchanged
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
## 2024-11-12 NOTE — ASSESSMENT & PLAN NOTE
Continue with Lipitor atorvastatin 20 mg daily refilled at this time and follow-up for laboratory work in 4 months    Orders:    atorvastatin (LIPITOR) 20 mg tablet; Take 1 tablet (20 mg total) by mouth daily
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
## 2024-11-12 NOTE — ASSESSMENT & PLAN NOTE
Atrial fibrillation stable patient continuing on Eliquis daily all other medications unchanged reevaluate at next visit in 4 months    Orders:    apixaban (Eliquis) 5 mg; Take 1 tablet (5 mg total) by mouth in the morning    Comprehensive metabolic panel; Future    CBC and differential; Future    Hemoglobin A1C; Future    Lipid panel; Future    TSH, 3rd generation with Free T4 reflex; Future    PSA, total and free; Future

## 2024-11-12 NOTE — TELEPHONE ENCOUNTER
Pharmacy stated that Eliquis 5 mg instruction states to take 1 tablet once per day usually eliquis is taken twice per day. Pls advise pharmacy.

## 2024-12-10 ENCOUNTER — TELEPHONE (OUTPATIENT)
Age: 69
End: 2024-12-10

## 2024-12-10 NOTE — TELEPHONE ENCOUNTER
Patient called for earlier appointment time; however, he was only looking for 12/13, not an earlier date. I apologized and let patient know that is the earliest time we have available with the provider for 12/13.   
Speaking Coherently

## 2024-12-11 ENCOUNTER — REMOTE DEVICE CLINIC VISIT (OUTPATIENT)
Dept: CARDIOLOGY CLINIC | Facility: CLINIC | Age: 69
End: 2024-12-11
Payer: COMMERCIAL

## 2024-12-11 DIAGNOSIS — I48.0 PAF (PAROXYSMAL ATRIAL FIBRILLATION) (HCC): Primary | ICD-10-CM

## 2024-12-11 PROCEDURE — 93298 REM INTERROG DEV EVAL SCRMS: CPT | Performed by: INTERNAL MEDICINE

## 2024-12-11 NOTE — PROGRESS NOTES
"MDT LP2-ACTIVE SYSTEM IS MRI CONDITIONAL   CARELINK TRANSMISSION: LOOP RECORDER. PRESENTING RHYTHM ST @ 100 BPM. BATTERY STATUS \"OK.\" NO PATIENT OR DEVICE ACTIVATED EPISODES. HOWEVER THERE IS AN EGRAM SHOWING AF. HX OF PAF. PT ON ELIQUIS. NORMAL DEVICE FUNCTION. DL   "

## 2024-12-12 PROBLEM — Z00.00 MEDICARE ANNUAL WELLNESS VISIT, SUBSEQUENT: Status: RESOLVED | Noted: 2022-11-02 | Resolved: 2024-12-12

## 2024-12-13 ENCOUNTER — RESULTS FOLLOW-UP (OUTPATIENT)
Dept: CARDIOLOGY CLINIC | Facility: CLINIC | Age: 69
End: 2024-12-13

## 2024-12-13 ENCOUNTER — CONSULT (OUTPATIENT)
Dept: BARIATRICS | Facility: CLINIC | Age: 69
End: 2024-12-13
Payer: COMMERCIAL

## 2024-12-13 VITALS
WEIGHT: 310 LBS | DIASTOLIC BLOOD PRESSURE: 82 MMHG | HEART RATE: 95 BPM | BODY MASS INDEX: 43.4 KG/M2 | RESPIRATION RATE: 12 BRPM | HEIGHT: 71 IN | SYSTOLIC BLOOD PRESSURE: 130 MMHG

## 2024-12-13 DIAGNOSIS — Z01.818 ENCOUNTER FOR OTHER PREPROCEDURAL EXAMINATION: Primary | ICD-10-CM

## 2024-12-13 DIAGNOSIS — M17.0 PRIMARY OSTEOARTHRITIS OF BOTH KNEES: ICD-10-CM

## 2024-12-13 DIAGNOSIS — E78.2 MIXED HYPERLIPIDEMIA: ICD-10-CM

## 2024-12-13 DIAGNOSIS — Z86.711 HISTORY OF PULMONARY EMBOLUS (PE): ICD-10-CM

## 2024-12-13 DIAGNOSIS — R80.9 TYPE 2 DIABETES MELLITUS WITH DIABETIC MICROALBUMINURIA, WITH LONG-TERM CURRENT USE OF INSULIN (HCC): ICD-10-CM

## 2024-12-13 DIAGNOSIS — E11.29 TYPE 2 DIABETES MELLITUS WITH DIABETIC MICROALBUMINURIA, WITH LONG-TERM CURRENT USE OF INSULIN (HCC): ICD-10-CM

## 2024-12-13 DIAGNOSIS — E66.01 OBESITY, CLASS III, BMI 40-49.9 (MORBID OBESITY) (HCC): ICD-10-CM

## 2024-12-13 DIAGNOSIS — K46.9 ABDOMINAL HERNIA: ICD-10-CM

## 2024-12-13 DIAGNOSIS — E66.01 MORBID (SEVERE) OBESITY DUE TO EXCESS CALORIES (HCC): ICD-10-CM

## 2024-12-13 DIAGNOSIS — Z79.4 TYPE 2 DIABETES MELLITUS WITH DIABETIC MICROALBUMINURIA, WITH LONG-TERM CURRENT USE OF INSULIN (HCC): ICD-10-CM

## 2024-12-13 PROCEDURE — G2211 COMPLEX E/M VISIT ADD ON: HCPCS | Performed by: SURGERY

## 2024-12-13 PROCEDURE — 99204 OFFICE O/P NEW MOD 45 MIN: CPT | Performed by: SURGERY

## 2024-12-13 NOTE — PROGRESS NOTES
"    BARIATRIC INITIAL CONSULT - BARIATRIC SURGERY    Oracio Richardson Jr. 69 y.o. male MRN: [id number]  Unit/Bed#:  Encounter: [id number]      HPI:  Oracio Richardson Jr. is a 69 y.o. male who presents with a longstanding history of morbid obesity and inability to sustain a meaningful weight loss.  He is a retired .  He used to be a .  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  He desires to pursue metabolic emergent surgery to improve his health.  He states he had DKA 7 years ago and that how he was diagnosed with diabetes.  He says he needs bilateral knee replacement.  He denies NSAIDs.  Denies tobacco.  Denies GERD.  Positive history of DVT/PE.  Here today to discuss bariatric options.    Visit type: initial visit    Symptoms: excess weight    Associated Symptoms: none    Associated Conditions: glucose intolerance and hyperlipidemia  Disease Complications: diabetes, osteoarthritis, and thromboembolism  Weight Loss Interest: high    Exercise Frequency:infrequency  Types of Exercise: walking    Review of Systems    Historical Information   Past Medical History:   Diagnosis Date    A-fib (HCC)     per pt \"happened one time\"    Abdominal hernia     Acute metabolic encephalopathy 09/04/2022    [facility] (acute kidney injury) ([facility]) 09/04/2022    Ambulates with cane     not recently using but has in home if needed    Anesthesia     per pt \"with knee surgery(in the )was awake for operation and fell asleep when being closed up\" and than took long to wake up\"    Arthritis     Blood in urine     \"sometimes\"    Colon polyp     Diabetes mellitus (HCC)     Type 2--sees Endocrinologist [facility] Dr MONTSE Shaver    Lane catheter in place     History of pulmonary embolism     Implantable loop recorder present     Muscle weakness     per pt \"started with diabetes\"some muscle loss of density\" --per pt \"muscle coming back\"    Myopia of left eye     " "glasses for reading    Obesity     Pancreatitis 09/04/2022    Risk for falls     Sepsis (HCC) 09/04/2022    Teeth missing     Urethral pain     \"when passing urine\"    Urinary tract infection      Past Surgical History:   Procedure Laterality Date    CARDIAC LOOP RECORDER      CATARACT EXTRACTION Bilateral     COLONOSCOPY      KNEE CARTILAGE SURGERY      KNEE SURGERY Right     [facility] TRURL ELECTROSURG RESCJ PROSTATE BLEED COMPLETE N/A 03/16/2023    Procedure: TRANSURETHRAL RESECTION OF PROSTATE (TURP)(possible), cystoscopy, SP tube placement, removal of bladder calculus;  Surgeon: Ludwig King MD;  Location: [facility];  Service: Urology     Social History   Social History     Substance and Sexual Activity   Alcohol Use Never     Social History     Substance and Sexual Activity   Drug Use Never     Social History     Tobacco Use   Smoking Status Never    Passive exposure: Never   Smokeless Tobacco Never     Family History: Family history non-contributory    Meds/Allergies   all medications and allergies reviewed  No Known Allergies    Objective     Current Vitals:   /82 (BP Location: Right arm, Patient Position: Sitting, Cuff Size: Large)   Pulse 95   Resp 12   Ht 5' 11\" (1.803 m)   Wt (!) 141 kg (310 lb) Comment: declined to remover shoes  BMI 43.24 kg/m²     Invasive Devices       None                   Physical Exam  Constitutional:       Appearance: Normal appearance.   HENT:      Head: Atraumatic.      Nose: No rhinorrhea.   Eyes:      Extraocular Movements: Extraocular movements intact.   Cardiovascular:      Rate and Rhythm: Normal rate.   Pulmonary:      Effort: Pulmonary effort is normal. No respiratory distress.   Abdominal:      General: Abdomen is flat. There is no distension.      Palpations: Abdomen is soft.      Tenderness: There is no abdominal tenderness.      Hernia: A hernia is present.   Musculoskeletal:         General: Normal range of motion.      Cervical back: Normal range of motion. "   Skin:     General: Skin is warm and dry.   Neurological:      General: No focal deficit present.      Mental Status: He is alert and oriented to person, place, and time.   Psychiatric:         Mood and Affect: Mood normal.         Behavior: Behavior normal.         Lab Results: I have personally reviewed pertinent lab results.    Imaging: Results Review Statement: I reviewed radiology reports from this admission including: EGD report.  EKG, Pathology, and Other Studies: Results Review Statement: No pertinent imaging studies reviewed.      Assessment/PLAN:    69 y.o. yo male with a long standing h/o of obesity and inability to sustain any meaningful weight loss on his own despite several attempts.    He is interested in the Laparoscopic Alfonso-en-Y gastric bypass possible sleeve gastrectomy.    Patient has been counseled about the risk of developing gastroesophageal reflux disease (GERD), worsening of current GERD and/or silent reflux. Patient has also been counseled on the risk of developing Ryan's esophagus (18%). As a result the patient may require treatment with medications, further interventions and possibly additional surgery. Patient will require routine endoscopic surveillance to monitor for these possible complications.     As a part of his pre op evaluation, he will be referred to a cardiologist and for a sleep evaluation and consult after successfully completing an evaluation with our pre-certification/, registered dietician and licensed clinical .     He needs an EGD to evaluate the anatomy of his GI tract.    I have spent over 45 minutes with him face to face in the office today discussing his options and details of the surgery. Over 50% of this was coordinating care.    I have discussed and educated the patient with regards to the components of our multidisciplinary program and the importance of compliance and follow up in the post operative period.    He was given  the opportunity to ask questions and I have answered all of them.    The patient was also instructed with regards to the importance of behavior modification, nutritional counseling, support meeting attendance and lifestyle changes that are important to ensure success.    Although there is a great statistical chance of improvement or even resolution of most of his associated comorbidities, the results vary from patient to patient and they largely depend on his commitment and compliance.         Shaun Hyatt MD  12/13/2024  2:11 PM

## 2024-12-13 NOTE — LETTER
"December 13, 2024     Raymundo Barnes DO  [address] [place] [address]    Patient: Oracio Richardson Jr.   YOB: 1955   Date of Visit: 12/13/2024       Dear Dr. Barnes:    Thank you for referring Oracio Richardson to me for evaluation for metabolic and bariatric surgery. Below are my notes for this consultation.    If you have questions, please do not hesitate to call me. I look forward to following your patient along with you.         Sincerely,        Shaun Hyatt MD        CC: No Recipients    Shaun Hyatt MD  12/13/2024  2:14 PM  Sign when Signing Visit      BARIATRIC INITIAL CONSULT - BARIATRIC SURGERY    Oracio Richardson Jr. 69 y.o. male MRN: [id number]  Unit/Bed#:  Encounter: [id number]      HPI:  Oracio Richardson Jr. is a 69 y.o. male who presents with a longstanding history of morbid obesity and inability to sustain a meaningful weight loss.  He is a retired .  He used to be a .  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.  He desires to pursue metabolic emergent surgery to improve his health.  He states he had DKA 7 years ago and that how he was diagnosed with diabetes.  He says he needs bilateral knee replacement.  He denies NSAIDs.  Denies tobacco.  Denies GERD.  Positive history of DVT/PE.  Here today to discuss bariatric options.    Visit type: initial visit    Symptoms: excess weight    Associated Symptoms: none    Associated Conditions: glucose intolerance and hyperlipidemia  Disease Complications: diabetes, osteoarthritis, and thromboembolism  Weight Loss Interest: high    Exercise Frequency:infrequency  Types of Exercise: walking    Review of Systems    Historical Information  Past Medical History:   Diagnosis Date   • A-fib (HCC)     per pt \"happened one time\"   • Abdominal hernia    • Acute metabolic encephalopathy 09/04/2022   • CARLTON (acute kidney injury) (HCC) 09/04/2022 " "  • Ambulates with cane     not recently using but has in home if needed   • Anesthesia     per pt \"with knee surgery(in the )was awake for operation and fell asleep when being closed up\" and than took long to wake up\"   • Arthritis    • Blood in urine     \"sometimes\"   • Colon polyp    • Diabetes mellitus (HCC)     Type 2--sees Endocrinologist [facility] Dr MONTSE Shaver   • Lane catheter in place    • History of pulmonary embolism    • Implantable loop recorder present    • Muscle weakness     per pt \"started with diabetes\"some muscle loss of density\" --per pt \"muscle coming back\"   • Myopia of left eye     glasses for reading   • Obesity    • Pancreatitis 09/04/2022   • Risk for falls    • Sepsis (HCC) 09/04/2022   • Teeth missing    • Urethral pain     \"when passing urine\"   • Urinary tract infection      Past Surgical History:   Procedure Laterality Date   • CARDIAC LOOP RECORDER     • CATARACT EXTRACTION Bilateral    • COLONOSCOPY     • KNEE CARTILAGE SURGERY     • KNEE SURGERY Right    • MD TRURL ELECTROSURG RESCJ PROSTATE BLEED COMPLETE N/A 03/16/2023    Procedure: TRANSURETHRAL RESECTION OF PROSTATE (TURP)(possible), cystoscopy, SP tube placement, removal of bladder calculus;  Surgeon: Ludwig King MD;  Location: [facility];  Service: Urology     Social History  Social History     Substance and Sexual Activity   Alcohol Use Never     Social History     Substance and Sexual Activity   Drug Use Never     Social History     Tobacco Use   Smoking Status Never   • Passive exposure: Never   Smokeless Tobacco Never     Family History: Family history non-contributory    Meds/Allergies  all medications and allergies reviewed  No Known Allergies    Objective    Current Vitals:   /82 (BP Location: Right arm, Patient Position: Sitting, Cuff Size: Large)   Pulse 95   Resp 12   Ht 5' 11\" (1.803 m)   Wt (!) 141 kg (310 lb) Comment: declined to remover shoes  BMI 43.24 kg/m²     Invasive Devices       None             "       Physical Exam  Constitutional:       Appearance: Normal appearance.   HENT:      Head: Atraumatic.      Nose: No rhinorrhea.   Eyes:      Extraocular Movements: Extraocular movements intact.   Cardiovascular:      Rate and Rhythm: Normal rate.   Pulmonary:      Effort: Pulmonary effort is normal. No respiratory distress.   Abdominal:      General: Abdomen is flat. There is no distension.      Palpations: Abdomen is soft.      Tenderness: There is no abdominal tenderness.      Hernia: A hernia is present.   Musculoskeletal:         General: Normal range of motion.      Cervical back: Normal range of motion.   Skin:     General: Skin is warm and dry.   Neurological:      General: No focal deficit present.      Mental Status: He is alert and oriented to person, place, and time.   Psychiatric:         Mood and Affect: Mood normal.         Behavior: Behavior normal.         Lab Results: I have personally reviewed pertinent lab results.    Imaging: Results Review Statement: I reviewed radiology reports from this admission including: EGD report.  EKG, Pathology, and Other Studies: Results Review Statement: No pertinent imaging studies reviewed.      Assessment/PLAN:    69 y.o. yo male with a long standing h/o of obesity and inability to sustain any meaningful weight loss on his own despite several attempts.    He is interested in the Laparoscopic Alfonso-en-Y gastric bypass possible sleeve gastrectomy.    Patient has been counseled about the risk of developing gastroesophageal reflux disease (GERD), worsening of current GERD and/or silent reflux. Patient has also been counseled on the risk of developing Ryan's esophagus (18%). As a result the patient may require treatment with medications, further interventions and possibly additional surgery. Patient will require routine endoscopic surveillance to monitor for these possible complications.     As a part of his pre op evaluation, he will be referred to a cardiologist  and for a sleep evaluation and consult after successfully completing an evaluation with our pre-certification/, registered dietician and licensed clinical .     He needs an EGD to evaluate the anatomy of his GI tract.    I have spent over 45 minutes with him face to face in the office today discussing his options and details of the surgery. Over 50% of this was coordinating care.    I have discussed and educated the patient with regards to the components of our multidisciplinary program and the importance of compliance and follow up in the post operative period.    He was given the opportunity to ask questions and I have answered all of them.    The patient was also instructed with regards to the importance of behavior modification, nutritional counseling, support meeting attendance and lifestyle changes that are important to ensure success.    Although there is a great statistical chance of improvement or even resolution of most of his associated comorbidities, the results vary from patient to patient and they largely depend on his commitment and compliance.         Shaun Hyatt MD  12/13/2024  2:11 PM

## 2024-12-17 ENCOUNTER — CLINICAL SUPPORT (OUTPATIENT)
Dept: BARIATRICS | Facility: CLINIC | Age: 69
End: 2024-12-17

## 2024-12-17 DIAGNOSIS — Z98.84 BARIATRIC SURGERY STATUS: Primary | ICD-10-CM

## 2024-12-17 PROCEDURE — RECHECK

## 2024-12-23 ENCOUNTER — APPOINTMENT (OUTPATIENT)
Dept: LAB | Facility: CLINIC | Age: 69
End: 2024-12-23
Payer: COMMERCIAL

## 2024-12-23 DIAGNOSIS — I48.0 PAF (PAROXYSMAL ATRIAL FIBRILLATION) (HCC): ICD-10-CM

## 2024-12-23 DIAGNOSIS — Z79.4 TYPE 2 DIABETES MELLITUS WITH KETOACIDOSIS WITHOUT COMA, WITH LONG-TERM CURRENT USE OF INSULIN (HCC): ICD-10-CM

## 2024-12-23 DIAGNOSIS — E11.10 TYPE 2 DIABETES MELLITUS WITH KETOACIDOSIS WITHOUT COMA, WITH LONG-TERM CURRENT USE OF INSULIN (HCC): ICD-10-CM

## 2024-12-23 DIAGNOSIS — R35.1 NOCTURIA: ICD-10-CM

## 2024-12-23 LAB
ALBUMIN SERPL BCG-MCNC: 4.2 G/DL (ref 3.5–5)
ALP SERPL-CCNC: 97 U/L (ref 34–104)
ALT SERPL W P-5'-P-CCNC: 24 U/L (ref 7–52)
ANION GAP SERPL CALCULATED.3IONS-SCNC: 6 MMOL/L (ref 4–13)
AST SERPL W P-5'-P-CCNC: 36 U/L (ref 13–39)
BASOPHILS # BLD AUTO: 0.03 THOUSANDS/ÂΜL (ref 0–0.1)
BASOPHILS NFR BLD AUTO: 0 % (ref 0–1)
BILIRUB SERPL-MCNC: 1.19 MG/DL (ref 0.2–1)
BUN SERPL-MCNC: 20 MG/DL (ref 5–25)
CALCIUM SERPL-MCNC: 9.5 MG/DL (ref 8.4–10.2)
CHLORIDE SERPL-SCNC: 100 MMOL/L (ref 96–108)
CHOLEST SERPL-MCNC: 157 MG/DL (ref ?–200)
CO2 SERPL-SCNC: 29 MMOL/L (ref 21–32)
CREAT SERPL-MCNC: 0.98 MG/DL (ref 0.6–1.3)
CREAT UR-MCNC: 86.9 MG/DL
EOSINOPHIL # BLD AUTO: 0.07 THOUSAND/ÂΜL (ref 0–0.61)
EOSINOPHIL NFR BLD AUTO: 1 % (ref 0–6)
ERYTHROCYTE [DISTWIDTH] IN BLOOD BY AUTOMATED COUNT: 13.1 % (ref 11.6–15.1)
EST. AVERAGE GLUCOSE BLD GHB EST-MCNC: 200 MG/DL
GFR SERPL CREATININE-BSD FRML MDRD: 77 ML/MIN/1.73SQ M
GLUCOSE P FAST SERPL-MCNC: 194 MG/DL (ref 65–99)
HBA1C MFR BLD: 8.6 %
HCT VFR BLD AUTO: 45.3 % (ref 36.5–49.3)
HDLC SERPL-MCNC: 42 MG/DL
HGB BLD-MCNC: 14.8 G/DL (ref 12–17)
IMM GRANULOCYTES # BLD AUTO: 0.01 THOUSAND/UL (ref 0–0.2)
IMM GRANULOCYTES NFR BLD AUTO: 0 % (ref 0–2)
LDLC SERPL CALC-MCNC: 98 MG/DL (ref 0–100)
LYMPHOCYTES # BLD AUTO: 1.79 THOUSANDS/ÂΜL (ref 0.6–4.47)
LYMPHOCYTES NFR BLD AUTO: 26 % (ref 14–44)
MCH RBC QN AUTO: 28.7 PG (ref 26.8–34.3)
MCHC RBC AUTO-ENTMCNC: 32.7 G/DL (ref 31.4–37.4)
MCV RBC AUTO: 88 FL (ref 82–98)
MICROALBUMIN UR-MCNC: 23.3 MG/L
MICROALBUMIN/CREAT 24H UR: 27 MG/G CREATININE (ref 0–30)
MONOCYTES # BLD AUTO: 0.36 THOUSAND/ÂΜL (ref 0.17–1.22)
MONOCYTES NFR BLD AUTO: 5 % (ref 4–12)
NEUTROPHILS # BLD AUTO: 4.59 THOUSANDS/ÂΜL (ref 1.85–7.62)
NEUTS SEG NFR BLD AUTO: 68 % (ref 43–75)
NRBC BLD AUTO-RTO: 0 /100 WBCS
PLATELET # BLD AUTO: 150 THOUSANDS/UL (ref 149–390)
PMV BLD AUTO: 12.1 FL (ref 8.9–12.7)
POTASSIUM SERPL-SCNC: 4.6 MMOL/L (ref 3.5–5.3)
PROT SERPL-MCNC: 7.6 G/DL (ref 6.4–8.4)
PSA FREE MFR SERPL: 8.64 %
PSA FREE SERPL-MCNC: 0.4 NG/ML
PSA SERPL-MCNC: 4.67 NG/ML (ref 0–4)
RBC # BLD AUTO: 5.15 MILLION/UL (ref 3.88–5.62)
SODIUM SERPL-SCNC: 135 MMOL/L (ref 135–147)
TRIGL SERPL-MCNC: 83 MG/DL (ref ?–150)
TSH SERPL DL<=0.05 MIU/L-ACNC: 1.43 UIU/ML (ref 0.45–4.5)
WBC # BLD AUTO: 6.85 THOUSAND/UL (ref 4.31–10.16)

## 2024-12-23 PROCEDURE — 36415 COLL VENOUS BLD VENIPUNCTURE: CPT

## 2024-12-23 PROCEDURE — 80053 COMPREHEN METABOLIC PANEL: CPT

## 2024-12-23 PROCEDURE — 84154 ASSAY OF PSA FREE: CPT

## 2024-12-23 PROCEDURE — 84153 ASSAY OF PSA TOTAL: CPT

## 2024-12-23 PROCEDURE — 80061 LIPID PANEL: CPT

## 2024-12-23 PROCEDURE — 84443 ASSAY THYROID STIM HORMONE: CPT

## 2024-12-23 PROCEDURE — 83036 HEMOGLOBIN GLYCOSYLATED A1C: CPT

## 2024-12-23 PROCEDURE — 82043 UR ALBUMIN QUANTITATIVE: CPT

## 2024-12-23 PROCEDURE — 85025 COMPLETE CBC W/AUTO DIFF WBC: CPT

## 2024-12-23 PROCEDURE — 82570 ASSAY OF URINE CREATININE: CPT

## 2024-12-27 ENCOUNTER — RESULTS FOLLOW-UP (OUTPATIENT)
Dept: FAMILY MEDICINE CLINIC | Facility: CLINIC | Age: 69
End: 2024-12-27

## 2024-12-27 DIAGNOSIS — R97.20 ELEVATED PSA: Primary | ICD-10-CM

## 2024-12-27 NOTE — TELEPHONE ENCOUNTER
Relayed results to patient  as per provider message. Patient expressed understanding and did not have any further questions.       Patient aware to go for the lab work 3/10/25 or 3/11/2025 prior to his appointment 3/18/2025.
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
## 2024-12-30 NOTE — PROGRESS NOTES
"Bariatric Nutrition Assessment - Evaluation Note    Insurance: 3 required monthly weight checks     Type of surgery    Pt interested in the Laparoscopic Alfonso-en-Y gastric bypass possible sleeve gastrectomy.   Surgery Date: TBD  Surgeon: Consult with Dr. Edmar soriano 12/13/2024         Nutrition Assessment   Oracio Richardson JrMaureen  69 y.o.  male   Initial Weight at Surgeon Consult: 310#   BMI: 43.2  Height: 5'11\"  Eval Weight: 309#   BMI: 43.1  Wt with BMI of 25: 179#  Pre-Op Excess Wt: 131#  BMI to Qualify at 35 = 251#  PMH includes:  Obesity, Diabetes, Mixed HLD, PE, OA - knees, Ventral Hernia     Pt advised not to gain weight during preop process. Pt encouraged to lose weight via healthy eating and exercise. Pt may follow Liver Shrinking diet 2 weeks or more  prior to DOS   Ht 5' 11\" (1.803 m)   Wt (!) 140 kg (309 lb)   BMI 43.10 kg/m²     [place] [facility] Equation:    GHG=2509  Weight Maintenance 2625  Estimated calories for weight loss 7862-0234 ( 1-2# per wk wt loss - sedentary )  Estimated protein needs  g (1.0-1.5 gms/kg IBW )   Estimated fluid needs 81-95 oz (30-35 ml/kg IBW )      Weight History  Reason for WLS: Improve health and mobility  Onset of Obesity: Adult  Family history of obesity: Yes  Wt Loss Attempts: Self Created Diets (Portion Control, Healthy Food Choices, etc.)  Weight Loss medication: Ozempic, Monjourno  MWM - Jasmin Alfonso  Patient has tried the above for 6 months or more with insufficient weight loss or weight regain, which is why patient has requested to be evaluated for weight loss surgery today  Maximum Wt Lost: Was 420# - went into ketoacidosis (coma for 3 days) and lost 100#      Review of History and Medications   OTC: Stopped   Past Medical History:   Diagnosis Date    A-fib (HCC)     per pt \"happened one time\"    Abdominal hernia     Acute metabolic encephalopathy 09/04/2022    CARLTON (acute kidney injury) (HCC) 09/04/2022    Ambulates with cane     not recently using but has in " "home if needed    Anesthesia     per pt \"with knee surgery(in the )was awake for operation and fell asleep when being closed up\" and than took long to wake up\"    Arthritis     Blood in urine     \"sometimes\"    Colon polyp     Diabetes mellitus (HCC)     Type 2--sees Endocrinologist KAELYN Shaver    Lane catheter in place     History of pulmonary embolism     Implantable loop recorder present     Muscle weakness     per pt \"started with diabetes\"some muscle loss of density\" --per pt \"muscle coming back\"    Myopia of left eye     glasses for reading    Obesity     Pancreatitis 09/04/2022    Risk for falls     Sepsis (HCC) 09/04/2022    Teeth missing     Urethral pain     \"when passing urine\"    Urinary tract infection      Past Surgical History:   Procedure Laterality Date    CARDIAC LOOP RECORDER      CATARACT EXTRACTION Bilateral     COLONOSCOPY      KNEE CARTILAGE SURGERY      KNEE SURGERY Right     RI TRURL ELECTROSURG RESCJ PROSTATE BLEED COMPLETE N/A 03/16/2023    Procedure: TRANSURETHRAL RESECTION OF PROSTATE (TURP)(possible), cystoscopy, SP tube placement, removal of bladder calculus;  Surgeon: Ludwig King MD;  Location: [facility];  Service: Urology     Social History     Socioeconomic History    Marital status: /Civil Union     Spouse name: Not on file    Number of children: Not on file    Years of education: Not on file    Highest education level: Not on file   Occupational History    Not on file   Tobacco Use    Smoking status: Never     Passive exposure: Never    Smokeless tobacco: Never   Vaping Use    Vaping status: Never Used   Substance and Sexual Activity    Alcohol use: Never    Drug use: Never    Sexual activity: Not Currently     Comment: defer   Other Topics Concern    Not on file   Social History Narrative    Not on file     Social Drivers of Health     Financial Resource Strain: Low Risk  (11/8/2023)    Overall Financial Resource Strain (CARDIA)     Difficulty of Paying Living " Expenses: Not very hard   Food Insecurity: No Food Insecurity (11/12/2024)    Hunger Vital Sign     Worried About Running Out of Food in the Last Year: Never true     Ran Out of Food in the Last Year: Never true   Transportation Needs: No Transportation Needs (11/12/2024)    PRAPARE - Transportation     Lack of Transportation (Medical): No     Lack of Transportation (Non-Medical): No   Physical Activity: Not on file   Stress: Not on file   Social Connections: Not on file   Intimate Partner Violence: Not on file   Housing Stability: Low Risk  (11/12/2024)    Housing Stability Vital Sign     Unable to Pay for Housing in the Last Year: No     Number of Times Moved in the Last Year: 0     Homeless in the Last Year: No       Current Outpatient Medications:     apixaban (Eliquis) 5 mg, Take 1 tablet (5 mg total) by mouth in the morning, Disp: 90 tablet, Rfl: 2    atorvastatin (LIPITOR) 20 mg tablet, Take 1 tablet (20 mg total) by mouth daily, Disp: 90 tablet, Rfl: 2    Blood Glucose Monitoring Suppl (OneTouch Verio Flex System) w/Device KIT, CHECK BLOOD SUGARS THREE TIMES DAILY DX: E11.65, Disp: 1 kit, Rfl: 0    glucose blood (OneTouch Verio) test strip, CHECK BLOOD SUGARS THREE TIMES DAILY DX: E11.65, Disp: 400 strip, Rfl: 3    Incontinence Supply Disposable (Incontinence Brief Large) MISC, Use every 4 (four) hours as needed (Urinary incontinence), Disp: 36 each, Rfl: 12    insulin glargine (Toujeo SoloStar) 300 units/mL CONCENTRATED U-300 injection pen (1-unit dial), Inject 30 Units under the skin daily at bedtime, Disp: 9 mL, Rfl: 1    insulin lispro (HumaLOG KwikPen) 100 units/mL injection pen, Inject 10 Units under the skin 3 (three) times a day with meals, Disp: 24 mL, Rfl: 2    Lancets (OneTouch Delica Plus Ueltxi94Q) MISC, CHECK BLOOD SUGARS THREE TIMES DAILY DX: E11.65, Disp: 400 each, Rfl: 3  Food Intake and Lifestyle Assessment   Food Intake Assessment completed via usual diet recall  Breakfast: Skips  Drinks  "Water  10-12 Lunch:  Canned salmon, tuna - multigrain or rye bread  12-2 Dinner: 1/2 c rice 2-3 cups vegetables - 6 oz Chicken or White Fish - doesn't kaur   Snack: Water - Apple juice - 2 cookies  d/t blood sugar dropping   Beverage intake: water, Fairlife Milk- dilutes  No alcohol   Protein supplement: No  Estimated protein intake per day: 80-90 grams  Estimated fluid intake per day: 1/2 gallon  Meals eaten away from home: No - stopped   Typical meal pattern: 2 meals per day and 1-2 snacks per day  Eating Behaviors: Consumption of high calorie/ high fat foods  Food allergies or intolerances: No Known Allergies or intolerances  Cultural or Zoroastrianism considerations: no    Physical Assessment  Physical Activity  Types of exercise: Very limited with knees and hernias   Current physical limitations: OA knees    Psychosocial Assessment   Support systems: spouse   4 children was  - 1 now  son (suicide) at 18yo  Socioeconomic factors:   Retired . 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   Nutrition Diagnosis  Diagnosis: Overweight / Obesity (NC-3.3)  Related to: Physical inactivity and Excessive energy intake  As Evidenced by: BMI >25     Nutrition Prescription: Recommend the following diet  Low fat, Low sugar, and Regular    Interventions and Teaching   Discussed pre-op and post-op nutrition guidelines.       Patient educated and handouts provided.  Surgical changes to stomach / GI  Capacity of post-surgery stomach  Diet progression  Adequate hydration  Sugar and fat restriction to decrease \"dumping syndrome\"  Fat restriction to decrease steatorrhea  Expected weight loss  Weight loss plateaus/ possibility of weight regain  Exercise  Suggestions for pre-op diet  Nutrition considerations after surgery  Protein supplements  Meal planning and preparation  Appropriate carbohydrate, protein, and fat intake, and food/fluid choices to " maximize safe weight loss, nutrient intake, and tolerance   Dietary and lifestyle changes  Possible problems with poor eating habits  Intuitive eating  Techniques for self monitoring and keeping daily food journal  Potential for food intolerance after surgery, and ways to deal with them including: lactose intolerance, nausea, reflux, vomiting, diarrhea, food intolerance, appetite changes, gas  Vitamin / Mineral supplementation of Multivitamin with minerals, Calcium, Vitamin B12, Iron, Fat Soluble vitamins, and Vitamin D    Patient is not currently pregnant and doesn't desire to become pregnant a minimum of one year post-op    Education provided to: patient    Barriers to learning: No barriers identified    Readiness to change: preparation    Prior research on procedure: discussed with provider, internet and friends or family    Comprehension: needs reinforcement     Expected Compliance: fair  -  will benefit from education - has much misconception on his diabetes management    Recommendations  Pt is an appropriate candidate for surgery. Yes  Evaluation / Monitoring  Dietitian to Monitor: Eating pattern as discussed Tolerance of nutrition prescription Body weight Lab values Physical activity Bowel pattern  History of ketoacidosis - question is pt should follow Liver Shrinking Diet - On short and long acting insulin  Goals  Eliminate sugar sweetened beverages, Food journal, Exercise 30 minutes 5 times per week, Complete lession plans 1-6, and Eat 3 meals per day  Follow Pre-Surgery guidelines  > Trial Baritastic for food logging  > Establish regular meal pattern - include fruits, vegetables and whole grains  > Avoid skipping meals- Can use protein drink as meal replacement with carb as taking insulin  > Decrease portions  > Focus on protein - include lean protein at each meal and snack - Learn to eat protein first  > Limit processed foods, fast foods and dining away from home  > Include meal prepping  > Limit snacks -  healthier choices and portion; avoid grazing  > Slow pace of eating and sip fluids  - practice 30/60 minute rule  > Eliminate caffeine by day of surgery (minimal intake)  > Continue to avoid carbonation   > Maintain water intake - 64 oz  > Physical activity and exercise regimen as able  > Start multi vitamin and additional Vitamin D 2000IU  Work on skills to cope with emotional eating/mindfull eating  Pre-op weight loss not required but advised not to gain weight  Glycemic Control: HgA1c 8.6 on 12/23/2024  F/U next month with bariatric provider      Time Spent:   1 Hour 15 Minutes

## 2024-12-31 ENCOUNTER — CLINICAL SUPPORT (OUTPATIENT)
Dept: BARIATRICS | Facility: CLINIC | Age: 69
End: 2024-12-31

## 2024-12-31 VITALS — WEIGHT: 309 LBS | HEIGHT: 71 IN | BODY MASS INDEX: 43.26 KG/M2

## 2024-12-31 DIAGNOSIS — Z71.89 ENCOUNTER FOR PRE-BARIATRIC SURGERY COUNSELING AND EDUCATION: Primary | ICD-10-CM

## 2024-12-31 DIAGNOSIS — E66.01 OBESITY, CLASS III, BMI 40-49.9 (MORBID OBESITY) (HCC): Primary | ICD-10-CM

## 2024-12-31 PROCEDURE — RECHECK

## 2025-01-02 ENCOUNTER — OFFICE VISIT (OUTPATIENT)
Dept: ENDOCRINOLOGY | Facility: CLINIC | Age: 70
End: 2025-01-02
Payer: COMMERCIAL

## 2025-01-02 VITALS
SYSTOLIC BLOOD PRESSURE: 128 MMHG | TEMPERATURE: 98.2 F | BODY MASS INDEX: 42.48 KG/M2 | HEART RATE: 84 BPM | WEIGHT: 303.4 LBS | DIASTOLIC BLOOD PRESSURE: 70 MMHG | OXYGEN SATURATION: 98 % | HEIGHT: 71 IN

## 2025-01-02 DIAGNOSIS — E55.9 VITAMIN D DEFICIENCY: ICD-10-CM

## 2025-01-02 DIAGNOSIS — R80.9 TYPE 2 DIABETES MELLITUS WITH MICROALBUMINURIA, WITH LONG-TERM CURRENT USE OF INSULIN (HCC): Primary | ICD-10-CM

## 2025-01-02 DIAGNOSIS — E78.2 MIXED HYPERLIPIDEMIA: ICD-10-CM

## 2025-01-02 DIAGNOSIS — E11.29 TYPE 2 DIABETES MELLITUS WITH MICROALBUMINURIA, WITH LONG-TERM CURRENT USE OF INSULIN (HCC): Primary | ICD-10-CM

## 2025-01-02 DIAGNOSIS — Z79.4 TYPE 2 DIABETES MELLITUS WITH MICROALBUMINURIA, WITH LONG-TERM CURRENT USE OF INSULIN (HCC): Primary | ICD-10-CM

## 2025-01-02 PROCEDURE — 95251 CONT GLUC MNTR ANALYSIS I&R: CPT | Performed by: STUDENT IN AN ORGANIZED HEALTH CARE EDUCATION/TRAINING PROGRAM

## 2025-01-02 PROCEDURE — 99214 OFFICE O/P EST MOD 30 MIN: CPT | Performed by: STUDENT IN AN ORGANIZED HEALTH CARE EDUCATION/TRAINING PROGRAM

## 2025-01-02 RX ORDER — TIRZEPATIDE 5 MG/.5ML
5 INJECTION, SOLUTION SUBCUTANEOUS WEEKLY
Qty: 6 ML | Refills: 0 | Status: SHIPPED | OUTPATIENT
Start: 2025-01-02

## 2025-01-02 RX ORDER — TIRZEPATIDE 2.5 MG/.5ML
2.3 INJECTION, SOLUTION SUBCUTANEOUS WEEKLY
Qty: 2 ML | Refills: 0 | Status: SHIPPED | OUTPATIENT
Start: 2025-01-02 | End: 2025-02-01

## 2025-01-02 NOTE — ASSESSMENT & PLAN NOTE
Lab Results   Component Value Date    HGBA1C 8.6 (H) 12/23/2024     Diabetes control has worsened with A1c of 8.6% although his CGM report showed blood sugars have improved with time in range of 65%, although he has frequent hypoglycemia, (4%), I decreased Toujeo to 26 units nightly, will continue Humalog 10 units before breakfast and dinner (he stopped taking before lunch).  Hypoglycemia symptoms and treatment reviewed.  He tried Mounjaro in the past, he states that did not work (he did not lose weight) although he just received initial dose 2.5 mg weekly, he is willing to resume, 2.5 mg weekly and then 5 mg weekly and to slowly increase that he tolerates.  If you start taking Mounjaro, insulin dose will be adjusted to Toujeo 22 units nightly and Humalog to 8 units 3 times daily.  Ophthalmology and podiatry follow-up.  Return back in 4 months.  Labs prior to next visit.  Orders:    Hemoglobin A1C; Future    Comprehensive metabolic panel; Future    Tirzepatide (Mounjaro) 2.5 MG/0.5ML SOAJ; Inject 2.3 mg under the skin once a week    Tirzepatide (Mounjaro) 5 MG/0.5ML SOAJ; Inject 5 mg under the skin once a week

## 2025-01-02 NOTE — PATIENT INSTRUCTIONS
We decreased Toujeo to 26 units at bedtime  Continue Humalog 10 units before breakfast and dinner, if your blood sugars after breakfast and dinner are consistently below 80, decrease your Humalog to 8 units.    I started Mounjaro 2.5 mg weekly for 4 weeks and then 5 mg weekly, if this get through and its affordable, you need to take less insulin and, in that case decrease your Toujeo to 22 units daily and your Humalog to 8 units before breakfast and dinner.  Next

## 2025-01-02 NOTE — ASSESSMENT & PLAN NOTE
Continue Lipitor 20 mg daily.  Check lipid profile before next visit.    Orders:    Lipid Panel with Direct LDL reflex; Future

## 2025-01-02 NOTE — PROGRESS NOTES
Name: Oracio Richardson Jr.      : 1955      MRN: [id number]  Encounter Provider: Jose Luis Shaver MD  Encounter Date: 2025   Encounter department: [facility] [place]  :  Assessment & Plan  Type 2 diabetes mellitus with microalbuminuria, with long-term current use of insulin ([facility])    Lab Results   Component Value Date    HGBA1C 8.6 (H) 2024     Diabetes control has worsened with A1c of 8.6% although his CGM report showed blood sugars have improved with time in range of 65%, although he has frequent hypoglycemia, (4%), I decreased Toujeo to 26 units nightly, will continue Humalog 10 units before breakfast and dinner (he stopped taking before lunch).  Hypoglycemia symptoms and treatment reviewed.  He tried Mounjaro in the past, he states that did not work (he did not lose weight) although he just received initial dose 2.5 mg weekly, he is willing to resume, 2.5 mg weekly and then 5 mg weekly and to slowly increase that he tolerates.  If you start taking Mounjaro, insulin dose will be adjusted to Toujeo 22 units nightly and Humalog to 8 units 3 times daily.  Ophthalmology and podiatry follow-up.  Return back in 4 months.  Labs prior to next visit.  Orders:    Hemoglobin A1C; Future    Comprehensive metabolic panel; Future    Tirzepatide (Mounjaro) 2.5 MG/0.5ML SOAJ; Inject 2.3 mg under the skin once a week    Tirzepatide (Mounjaro) 5 MG/0.5ML SOAJ; Inject 5 mg under the skin once a week    Mixed hyperlipidemia  Continue Lipitor 20 mg daily.  Check lipid profile before next visit.    Orders:    Lipid Panel with Direct LDL reflex; Future    Vitamin D deficiency  Continue supplementation.             History of Present Illness   HPI  Oracio Richardson Jr. is a 69 y.o. male who presents for follow up for type 2 diabetes with long term use of insulin.        Denies recent illness or hospitalizations.    Current regimen:     Toujeo 30 units before bedtime  Humalog 10 units before  bf and dinner      Oracio Richardson Jr.   Device used, rajani 2  Home use       Indication   Type 2 Diabetes      More than 72 hours of data was reviewed. Report to be scanned to chart.     Date Range: dec 20 - January 2nd    Analysis of data:   Average Glucose: 159 mg/dl  Coefficient of Variation: 43.5%   SD : x   Time in Target Range: 655   Time Above Range: 30%   Time Below Range: 5%     Hypoglycemic episodes:   H/o of hypoglycemia causing hospitalization or Intervention such as glucagon injection  or ambulance call : no  Has awareness: yes       Opthamology:  UTD;   Podiatry: UTD    on ACE inhibitor or ARB: no   on statin: lipitor 20 mg daily         Review of Systems   Constitutional:  Positive for fatigue and unexpected weight change.   Endocrine: Positive for polyuria.          Objective   There were no vitals taken for this visit.     Physical Exam  Vitals and nursing note reviewed.   Constitutional:       General: He is not in acute distress.     Appearance: He is well-developed.   HENT:      Head: Normocephalic and atraumatic.   Eyes:      Conjunctiva/sclera: Conjunctivae normal.   Cardiovascular:      Rate and Rhythm: Normal rate and regular rhythm.      Heart sounds: No murmur heard.  Pulmonary:      Effort: Pulmonary effort is normal. No respiratory distress.      Breath sounds: Normal breath sounds.   Musculoskeletal:         General: No swelling.      Cervical back: Neck supple.   Skin:     General: Skin is warm and dry.   Neurological:      Mental Status: He is alert.   Psychiatric:         Mood and Affect: Mood normal.             Component      Latest Ref Rng 12/23/2024   Sodium      135 - 147 mmol/L 135    Potassium      3.5 - 5.3 mmol/L 4.6    Chloride      96 - 108 mmol/L 100    Carbon Dioxide      21 - 32 mmol/L 29    ANION GAP      4 - 13 mmol/L 6    BUN      5 - 25 mg/dL 20    Creatinine      0.60 - 1.30 mg/dL 0.98    GLUCOSE, FASTING      65 - 99 mg/dL 194 (H)    Calcium      8.4 - 10.2 mg/dL  9.5    AST      13 - 39 U/L 36    ALT      7 - 52 U/L 24    ALK PHOS      34 - 104 U/L 97    Total Protein      6.4 - 8.4 g/dL 7.6    Albumin      3.5 - 5.0 g/dL 4.2    Total Bilirubin      0.20 - 1.00 mg/dL 1.19 (H)    GFR, Calculated      ml/min/1.73sq m 77    Cholesterol      See Comment mg/dL 157    Triglycerides      See Comment mg/dL 83    HDL      >=40 mg/dL 42    LDL Calculated      0 - 100 mg/dL 98    EXT Creatinine Urine      Reference range not established. mg/dL 86.9    Albumin,U,Random      <20.0 mg/L 23.3 (H)    Albumin Creat Ratio      0 - 30 mg/g creatinine 27    Hemoglobin A1C      Normal 4.0-5.6%; PreDiabetic 5.7-6.4%; Diabetic >=6.5%; Glycemic control for adults with diabetes <7.0% % 8.6 (H)    eAG, EST AVG Glucose      mg/dl 200    TSH 3RD GENERATON      0.450 - 4.500 uIU/mL 1.434       Legend:  (H) High

## 2025-01-03 ENCOUNTER — TELEPHONE (OUTPATIENT)
Dept: FAMILY MEDICINE CLINIC | Facility: CLINIC | Age: 70
End: 2025-01-03

## 2025-01-06 ENCOUNTER — TELEPHONE (OUTPATIENT)
Dept: GASTROENTEROLOGY | Facility: CLINIC | Age: 70
End: 2025-01-06

## 2025-01-06 NOTE — TELEPHONE ENCOUNTER
Patient called the RX Refill Line. Message is being forwarded to the office.     Patient is requesting reschedule Endoscopy procedure on 1/27/25 would like to reschedule    Please contact patient at [phone]

## 2025-01-07 ENCOUNTER — TELEPHONE (OUTPATIENT)
Dept: GASTROENTEROLOGY | Facility: MEDICAL CENTER | Age: 70
End: 2025-01-07

## 2025-01-07 NOTE — TELEPHONE ENCOUNTER
Called and spoke to patient to reschedule procedure, patient has rescheduled with another provider. Will send [employer] message with the instructions for procedure for patient to review. Did remind the patient to hold Eliquis 2 days prior to the procedure date and to go over the diabetic medication hold I have attached to the [employer] message.     Message  Received: Yesterday  LEEANNA Carroll  P Gastroenterology [facility]  Caller: Unspecified (Yesterday,  4:33 PM)         Previous Messages    need to cancel procedure and re schedule  (Newest Message First)  View All Conversations on this Encounter  LEEANNA Carroll routed conversation to Gastroenterology [place] [id number] hours ago (4:39 PM)     Maya Hidalgo routed conversation to Gastroenterology [place] [id number] hours ago (4:34 PM)     Dylan Oraciojaved Scruggs [phone]  Maya Hidalgo16 hours ago (4:33 PM)     Maya Hidalgo16 hours ago (4:32 PM)     KM  Patient called the RX Refill Line. Message is being forwarded to the office.      Patient is requesting reschedule Endoscopy procedure on 1/27/25 would like to reschedule     Please contact patient at [phone]            Note        Recent Patient Communication     Last Update Description Specialty     Today Procedure Confirmation Gastroenterology     Pg Gastro Spclst Paz Saha Open     Today need to cancel procedure and re schedule Gastroenterology     Pg Gastro Spclst Angie Fong Closed     4 days ago Medication Prior Authorization (Please start prior auth for Freestyle Livre 2 sensor ) Family Medicine     Pg Raymundo Mosher Closed     4 weeks ago earlier appointment time Bariatrics     Pg Weight Management Silvia Philip Closed

## 2025-01-07 NOTE — TELEPHONE ENCOUNTER
Procedure Medication Hold requested  Received: Today  Paz Barnes,   Our mutual patient, Oracio Richardson Jr., is scheduled for the following  procedure: Colonoscopy  On: 02/19/2025  With: Dr. Bernadette Richardson Jr. is taking the following blood thinner: Eliquis       Can this be stopped 2 days prior to the procedure?       Thank you,  [facility]

## 2025-01-07 NOTE — TELEPHONE ENCOUNTER
RE: Procedure Medication Hold requested  Received: Today  DO Paz Garcia  Yes okay to stop the medication 2 days prior to the procedure          Previous Messages       ----- Message -----  From: Paz Armstrong  Sent: 1/7/2025   9:15 AM EST  To: Raymundo Barnes DO  Subject: Procedure Medication Hold requested              Our mutual patient, Oracio Richardson JrMaureen, is scheduled for the following  procedure: Colonoscopy  On: 02/19/2025  With: Dr. Bernadette Richardson Jr. is taking the following blood thinner: Eliquis       Can this be stopped 2 days prior to the procedure?       Thank you,  Paz CASTAÑEDA  [facility]'s Gastroenterology

## 2025-01-08 ENCOUNTER — TELEPHONE (OUTPATIENT)
Age: 70
End: 2025-01-08

## 2025-01-08 DIAGNOSIS — M79.605 PAIN IN BOTH LOWER EXTREMITIES: Primary | ICD-10-CM

## 2025-01-08 DIAGNOSIS — M79.604 PAIN IN BOTH LOWER EXTREMITIES: Primary | ICD-10-CM

## 2025-01-08 DIAGNOSIS — M17.0 PRIMARY OSTEOARTHRITIS OF BOTH KNEES: ICD-10-CM

## 2025-01-08 NOTE — TELEPHONE ENCOUNTER
Phone call from patient asking for a referral to Orthopedic doctor Dr Chino Rodriguez for his ostearthritis of his knees.  Please advise. Thank you.

## 2025-01-09 NOTE — TELEPHONE ENCOUNTER
Patient states he has an appt set up with Dr Garcia, he will followup if he has any further questions or concerns

## 2025-01-13 ENCOUNTER — OFFICE VISIT (OUTPATIENT)
Dept: OBGYN CLINIC | Facility: CLINIC | Age: 70
End: 2025-01-13
Payer: COMMERCIAL

## 2025-01-13 VITALS — HEIGHT: 71 IN | BODY MASS INDEX: 42.42 KG/M2 | WEIGHT: 303 LBS

## 2025-01-13 DIAGNOSIS — M25.561 PAIN IN BOTH KNEES, UNSPECIFIED CHRONICITY: Primary | ICD-10-CM

## 2025-01-13 DIAGNOSIS — M79.605 PAIN IN BOTH LOWER EXTREMITIES: ICD-10-CM

## 2025-01-13 DIAGNOSIS — M17.0 PRIMARY OSTEOARTHRITIS OF BOTH KNEES: ICD-10-CM

## 2025-01-13 DIAGNOSIS — M25.562 PAIN IN BOTH KNEES, UNSPECIFIED CHRONICITY: Primary | ICD-10-CM

## 2025-01-13 DIAGNOSIS — M79.604 PAIN IN BOTH LOWER EXTREMITIES: ICD-10-CM

## 2025-01-13 PROCEDURE — 99213 OFFICE O/P EST LOW 20 MIN: CPT | Performed by: ORTHOPAEDIC SURGERY

## 2025-01-13 PROCEDURE — 20610 DRAIN/INJ JOINT/BURSA W/O US: CPT | Performed by: ORTHOPAEDIC SURGERY

## 2025-01-13 RX ORDER — TRIAMCINOLONE ACETONIDE 40 MG/ML
80 INJECTION, SUSPENSION INTRA-ARTICULAR; INTRAMUSCULAR
Status: COMPLETED | OUTPATIENT
Start: 2025-01-13 | End: 2025-01-13

## 2025-01-13 RX ORDER — BUPIVACAINE HYDROCHLORIDE 2.5 MG/ML
4 INJECTION, SOLUTION INFILTRATION; PERINEURAL
Status: COMPLETED | OUTPATIENT
Start: 2025-01-13 | End: 2025-01-13

## 2025-01-13 RX ADMIN — TRIAMCINOLONE ACETONIDE 80 MG: 40 INJECTION, SUSPENSION INTRA-ARTICULAR; INTRAMUSCULAR at 10:30

## 2025-01-13 RX ADMIN — BUPIVACAINE HYDROCHLORIDE 4 ML: 2.5 INJECTION, SOLUTION INFILTRATION; PERINEURAL at 10:30

## 2025-01-13 NOTE — PROGRESS NOTES
Assessment/Plan:   Diagnoses and all orders for this visit:    Pain in both knees, unspecified chronicity  -     XR knee 3 vw right non injury; Future  -     XR knee 3 vw left non injury; Future    Pain in both lower extremities  -     Ambulatory Referral to Orthopedic Surgery    Primary osteoarthritis of both knees  -     Ambulatory Referral to Orthopedic Surgery  -     Large joint arthrocentesis: bilateral knee    The patient has a history of bilateral knee osteoarthritis. Discussed treatment options with the patient at time of visit. Unfortunately the patient is a not a surgical candidate as his BMI is greater than 40. Discussed weight loss management with the patient, he plans to undergo bariatric surgery. The patient must weigh 285lbs or lower to be a surgical candidate. He was offered and accepted an injection(s) of Kenalog and Marcaine to his Bilateral knee(s) for symptomatic relief of pain and inflammation. Patient tolerated the treatment(s) well. Ice and post injection protocol advised. Weightbearing activities as tolerated. He will be seen for follow-up in 6 months for re-evaluation and consideration for repeat injections as necessary. Patient expresses understanding and is in agreement with this treatment plan. The patient was given the opportunity to ask questions or present concerns.    The patient has advanced arthritic change of his bilateral knees.  Under aseptic technique, both knees were injected with Kenalog and Marcaine.  He tolerated procedure quite well.  The patient will have to lose approximate 27 pounds before knee replacement surgeries discussed.  He states that his possibly getting gastric bypass in March.  Will see him back in 6 months for evaluation.  He will be weighed at that time.  If his condition changes, he would not hesitate to let us know       Subjective:   Patient ID: Oracio Richardson Jr.  1955     HPI  Patient is a 69 y.o. male who presents for initial evaluation of his right  "knee. The patient has experienced chronic bilateral knee pain for several years. The patient states that his pain is worse in the right knee compared to the left. He had previous arthroscopic surgery on the right. The patient reports diffuse knee pain which continues to worsen over time. He states that he plans to undergo bariatric surgery in March for weight loss management.     The following portions of the patient's history were reviewed and updated as appropriate:  Past medical history, past surgical history, Family history, social history, current medications and allergies    Past Medical History:   Diagnosis Date    A-fib ([facility])     per pt \"happened one time\"    Abdominal hernia     Acute metabolic encephalopathy 09/04/2022    CARLTON (acute kidney injury) ([facility]) 09/04/2022    Ambulates with cane     not recently using but has in home if needed    Anesthesia     per pt \"with knee surgery(in the )was awake for operation and fell asleep when being closed up\" and than took long to wake up\"    Arthritis     Blood in urine     \"sometimes\"    Colon polyp     Diabetes mellitus ([facility])     Type 2--sees Endocrinologist [facility] Dr MONTSE Shaver    Lane catheter in place     History of pulmonary embolism     Implantable loop recorder present     Muscle weakness     per pt \"started with diabetes\"some muscle loss of density\" --per pt \"muscle coming back\"    Myopia of left eye     glasses for reading    Obesity     Pancreatitis 09/04/2022    Risk for falls     Sepsis ([facility]) 09/04/2022    Teeth missing     Urethral pain     \"when passing urine\"    Urinary tract infection        Past Surgical History:   Procedure Laterality Date    CARDIAC LOOP RECORDER      CATARACT EXTRACTION Bilateral     COLONOSCOPY      KNEE CARTILAGE SURGERY      KNEE SURGERY Right     CT TRURL ELECTROSURG RESCJ PROSTATE BLEED COMPLETE N/A 03/16/2023    Procedure: TRANSURETHRAL RESECTION OF PROSTATE (TURP)(possible), cystoscopy, SP tube placement, removal of " bladder calculus;  Surgeon: Ludwig King MD;  Location: [facility];  Service: Urology       Family History   Problem Relation Age of Onset    Diabetes Father     Diabetes type II Father     Diabetes Mother        Social History     Socioeconomic History    Marital status: /Civil Union     Spouse name: None    Number of children: None    Years of education: None    Highest education level: None   Occupational History    None   Tobacco Use    Smoking status: Never     Passive exposure: Never    Smokeless tobacco: Never   Vaping Use    Vaping status: Never Used   Substance and Sexual Activity    Alcohol use: Never    Drug use: Never    Sexual activity: Not Currently     Comment: defer   Other Topics Concern    None   Social History Narrative    None     Social Drivers of Health     Financial Resource Strain: Low Risk  (11/8/2023)    Overall Financial Resource Strain (CARDIA)     Difficulty of Paying Living Expenses: Not very hard   Food Insecurity: No Food Insecurity (11/12/2024)    Hunger Vital Sign     Worried About Running Out of Food in the Last Year: Never true     Ran Out of Food in the Last Year: Never true   Transportation Needs: No Transportation Needs (11/12/2024)    PRAPARE - Transportation     Lack of Transportation (Medical): No     Lack of Transportation (Non-Medical): No   Physical Activity: Not on file   Stress: Not on file   Social Connections: Not on file   Intimate Partner Violence: Not on file   Housing Stability: Low Risk  (11/12/2024)    Housing Stability Vital Sign     Unable to Pay for Housing in the Last Year: No     Number of Times Moved in the Last Year: 0     Homeless in the Last Year: No         Current Outpatient Medications:     apixaban (Eliquis) 5 mg, Take 1 tablet (5 mg total) by mouth in the morning, Disp: 90 tablet, Rfl: 2    atorvastatin (LIPITOR) 20 mg tablet, Take 1 tablet (20 mg total) by mouth daily, Disp: 90 tablet, Rfl: 2    Blood Glucose Monitoring Suppl (OneTouch  "Verio Flex System) w/Device KIT, CHECK BLOOD SUGARS THREE TIMES DAILY DX: E11.65, Disp: 1 kit, Rfl: 0    glucose blood (OneTouch Verio) test strip, CHECK BLOOD SUGARS THREE TIMES DAILY DX: E11.65, Disp: 400 strip, Rfl: 3    Incontinence Supply Disposable (Incontinence Brief Large) MISC, Use every 4 (four) hours as needed (Urinary incontinence), Disp: 36 each, Rfl: 12    insulin glargine (Toujeo SoloStar) 300 units/mL CONCENTRATED U-300 injection pen (1-unit dial), Inject 30 Units under the skin daily at bedtime, Disp: 9 mL, Rfl: 1    insulin lispro (HumaLOG KwikPen) 100 units/mL injection pen, Inject 10 Units under the skin 3 (three) times a day with meals, Disp: 24 mL, Rfl: 2    Lancets (OneTouch Delica Plus Lmdmie06B) MISC, CHECK BLOOD SUGARS THREE TIMES DAILY DX: E11.65, Disp: 400 each, Rfl: 3    Tirzepatide (Mounjaro) 2.5 MG/0.5ML SOAJ, Inject 2.3 mg under the skin once a week (Patient not taking: Reported on 1/13/2025), Disp: 2 mL, Rfl: 0    Tirzepatide (Mounjaro) 5 MG/0.5ML SOAJ, Inject 5 mg under the skin once a week (Patient not taking: Reported on 1/13/2025), Disp: 6 mL, Rfl: 0    No Known Allergies    Review of Systems   Constitutional:  Negative for chills and fever.   HENT:  Negative for ear pain and sore throat.    Eyes:  Negative for pain and visual disturbance.   Respiratory:  Negative for cough and shortness of breath.    Cardiovascular:  Negative for chest pain and palpitations.   Gastrointestinal:  Negative for abdominal pain and vomiting.   Genitourinary:  Negative for dysuria and hematuria.   Musculoskeletal:  Negative for arthralgias and back pain.   Skin:  Negative for color change and rash.   Neurological:  Negative for seizures and syncope.   All other systems reviewed and are negative.       Objective:  Ht 5' 11\" (1.803 m)   Wt (!) 137 kg (303 lb)   BMI 42.26 kg/m²     Ortho Exam  bilateral knee(s) -   Patient ambulates with steady gait pattern  Uses Single Point Cane assistive " device  Genu Varus anatomical deformity  Skin is warm and dry to touch with no signs of erythema, ecchymosis, or infection   Mild generalized soft tissue swelling or effusion noted  ROM (5° - 115°)   Strength: 5/5 throughout  TTP over medial joint line, TTP over lateral joint line, TTP over pes anserine bursa, no popliteal fullness appreciated on exam   Flexor and extensor mechanisms are intact   Knee is stable to varus and valgus stress  - Lachman's  - Anterior Drawer, - Posterior Drawer  - Jama's  Patella tracks centrally with palpable crepitus  Calf compartments are soft and supple  - Eneida's sign  2+ DP and PT pulses with brisk capillary refill to the toes  Sural, saphenous, tibial, superficial, and deep peroneal motor and sensory distributions intact  Sensation light touch intact distally      Physical Exam  HENT:      Head: Normocephalic and atraumatic.      Nose: Nose normal.   Eyes:      Conjunctiva/sclera: Conjunctivae normal.   Cardiovascular:      Rate and Rhythm: Normal rate.   Pulmonary:      Effort: Pulmonary effort is normal.   Musculoskeletal:      Cervical back: Neck supple.   Skin:     General: Skin is warm and dry.      Capillary Refill: Capillary refill takes less than 2 seconds.   Neurological:      Mental Status: He is alert and oriented to person, place, and time.   Psychiatric:         Mood and Affect: Mood normal.         Behavior: Behavior normal.          Diagnostic Test Review:  X-Ray of bilateral knee obtained on 1/13/2025 were reviewed and demonstrate tricompartmental osteoarthritis affecting the medial tibiofemoral compartment as evidenced by joint space narrowing, osteophyte formation and subchondral sclerosis. Right knee osteoarthritis is more progressed compared to the left.       Large joint arthrocentesis: bilateral knee  Universal Protocol:  procedure performed by consultantConsent: Verbal consent obtained.  Risks and benefits: risks, benefits and alternatives were  discussed  Consent given by: patient  Timeout called at: 1/13/2025 11:20 AM.  Patient understanding: patient states understanding of the procedure being performed  Patient consent: the patient's understanding of the procedure matches consent given  Site marked: the operative site was marked  Radiology Images displayed and confirmed. If images not available, report reviewed: imaging studies available  Patient identity confirmed: verbally with patient  Supporting Documentation  Indications: pain and joint swelling   Procedure Details  Location: knee - bilateral knee  Ultrasound guidance: no  Approach: lateral    Medications (Right): 4 mL bupivacaine 0.25 %; 80 mg triamcinolone acetonide 40 mg/mLMedications (Left): 4 mL bupivacaine 0.25 %; 80 mg triamcinolone acetonide 40 mg/mL   Patient tolerance: patient tolerated the procedure well with no immediate complications  Dressing:  Sterile dressing applied             Scribe Attestation      I,:  Anum Echavarria am acting as a scribe while in the presence of the attending physician.:       I,:  Hermann Garcia DO personally performed the services described in this documentation    as scribed in my presence.:

## 2025-01-15 ENCOUNTER — TELEPHONE (OUTPATIENT)
Age: 70
End: 2025-01-15

## 2025-01-15 DIAGNOSIS — M17.0 PRIMARY OSTEOARTHRITIS OF BOTH KNEES: Primary | ICD-10-CM

## 2025-01-15 NOTE — TELEPHONE ENCOUNTER
Patient stating that he would like to proceed with the visco injections as long as there is no out of pocket expense.  Explained process for authorization.  Patient asking if visco can be ordered.

## 2025-01-15 NOTE — TELEPHONE ENCOUNTER
Caller: Patient     Doctor: Jose    Reason for call: Patient is calling to let Dr. Garcia know that the CSI he had on 1/13/25 did not work. I asked the patient if he would like another appointment, but he stated he is just calling to notify the doctor.     Call back#: [phone]

## 2025-01-15 NOTE — TELEPHONE ENCOUNTER
CLM on VM for pt to return call to relay Dr Garcia's msg to pt and find out if he would like visco injection. Await CB.

## 2025-01-16 ENCOUNTER — TELEPHONE (OUTPATIENT)
Age: 70
End: 2025-01-16

## 2025-01-16 NOTE — TELEPHONE ENCOUNTER
Patient called stating that he has been experiencing some lower to mid back pain. Patient is requesting a referral and recommendations for a chiropractor. Patient states the back pain may be due to his weight.    Patient would like a return call.    Please advise  Thank you

## 2025-01-17 ENCOUNTER — OFFICE VISIT (OUTPATIENT)
Dept: FAMILY MEDICINE CLINIC | Facility: CLINIC | Age: 70
End: 2025-01-17
Payer: COMMERCIAL

## 2025-01-17 ENCOUNTER — TELEPHONE (OUTPATIENT)
Age: 70
End: 2025-01-17

## 2025-01-17 VITALS
RESPIRATION RATE: 20 BRPM | TEMPERATURE: 98.3 F | HEIGHT: 71 IN | BODY MASS INDEX: 41.64 KG/M2 | OXYGEN SATURATION: 97 % | HEART RATE: 87 BPM | SYSTOLIC BLOOD PRESSURE: 122 MMHG | WEIGHT: 297.4 LBS | DIASTOLIC BLOOD PRESSURE: 76 MMHG

## 2025-01-17 DIAGNOSIS — I48.0 PAF (PAROXYSMAL ATRIAL FIBRILLATION) (HCC): ICD-10-CM

## 2025-01-17 DIAGNOSIS — R39.14 FEELING OF INCOMPLETE BLADDER EMPTYING: ICD-10-CM

## 2025-01-17 DIAGNOSIS — Z79.4 TYPE 2 DIABETES MELLITUS WITH DIABETIC MICROALBUMINURIA, WITH LONG-TERM CURRENT USE OF INSULIN (HCC): ICD-10-CM

## 2025-01-17 DIAGNOSIS — F39 UNSPECIFIED MOOD (AFFECTIVE) DISORDER (HCC): ICD-10-CM

## 2025-01-17 DIAGNOSIS — E66.01 OBESITY, CLASS III, BMI 40-49.9 (MORBID OBESITY) (HCC): ICD-10-CM

## 2025-01-17 DIAGNOSIS — E11.29 TYPE 2 DIABETES MELLITUS WITH DIABETIC MICROALBUMINURIA, WITH LONG-TERM CURRENT USE OF INSULIN (HCC): ICD-10-CM

## 2025-01-17 DIAGNOSIS — R97.20 ELEVATED PSA: Primary | ICD-10-CM

## 2025-01-17 DIAGNOSIS — R80.9 TYPE 2 DIABETES MELLITUS WITH DIABETIC MICROALBUMINURIA, WITH LONG-TERM CURRENT USE OF INSULIN (HCC): ICD-10-CM

## 2025-01-17 DIAGNOSIS — R33.9 URINARY RETENTION: ICD-10-CM

## 2025-01-17 PROCEDURE — 99214 OFFICE O/P EST MOD 30 MIN: CPT | Performed by: FAMILY MEDICINE

## 2025-01-17 PROCEDURE — G2211 COMPLEX E/M VISIT ADD ON: HCPCS | Performed by: FAMILY MEDICINE

## 2025-01-17 NOTE — TELEPHONE ENCOUNTER
Oracio had office visit with PCP today.     He calls to inquire about having lipomas removed prior to his upcoming GI bypass procedure, which may take place in about 3-4 months.     He reports having 20-30 lipomas on various parts of his body.     He asks for PCP guidance on how to have the lipomas addressed.

## 2025-01-17 NOTE — TELEPHONE ENCOUNTER
Caller: Patient    Doctor: Jose    Reason for call: Sent message to Cristela PABON    Call back#: [phone]

## 2025-01-17 NOTE — TELEPHONE ENCOUNTER
Caller: Patients daughter     Doctor/Office: Jose    Call regarding :  Calling to speak with auth team about injections    Call was transferred to: auth team

## 2025-01-17 NOTE — PROGRESS NOTES
Name: Oracio Richardson Jr.      : 1955      MRN: [id number]  Encounter Provider: Raymundo Barnes DO  Encounter Date: 2025   Encounter department: [facility]  :  Assessment & Plan  Elevated PSA  Elevated PSA discussed with patient now greater than 4 previously was at 3.  Will repeat PSA level in 3 months and discuss options for MRI of the prostate as his brother had prostate cancer    Orders:    PSA, total and free; Future    Feeling of incomplete bladder emptying  Elevated PSA with lower urinary tract symptoms we will monitor and follow-up with PSA in 3 months consider urology evaluation check MRI of the prostate         Urinary retention  Follow-up evaluation elevated PSA at this time urinalysis can be done at future visit         Type 2 diabetes mellitus with diabetic microalbuminuria, with long-term current use of insulin ([facility])  Diabetes with A1c at 8.6 he is followed by endocrinology as well and is continuing on insulin dosing. Failed GLP1  Lab Results   Component Value Date    HGBA1C 8.6 (H) 2024       Orders:    Albumin / creatinine urine ratio; Standing    Comprehensive metabolic panel; Standing    Hemoglobin A1C; Standing    TSH, 3rd generation with Free T4 reflex; Standing    Lipid Panel with Direct LDL reflex; Standing    PSA, total and free; Future    Comprehensive metabolic panel; Future    CBC and differential; Future    Lipid panel; Future    TSH, 3rd generation with Free T4 reflex; Future    PAF (paroxysmal atrial fibrillation) ([facility])  Stable controlled ventricular response at this point continue medications as scheduled         Unspecified mood (affective) disorder ([facility])  Stable mood at this time patient is determined to improve his overall health and undergo bariatric surgery as he would like to regain his ability to ambulate and live a healthier lifestyle he is also being worked up for knee replacement surgery in the future.  He would like to be more  "active with his grandchildren and continue healthy lifestyle moving forward         Obesity, Class III, BMI 40-49.9 (morbid obesity) ([facility])                  History of Present Illness     Follow up evaluation and lb review.    Back Pain  Pertinent negatives include no abdominal pain, chest pain, dysuria, fever, headaches or weakness.     Review of Systems   Constitutional:  Negative for chills, fatigue and fever.   HENT:  Negative for congestion, nosebleeds, rhinorrhea, sinus pressure and sore throat.    Eyes:  Negative for discharge and redness.   Respiratory:  Negative for cough and shortness of breath.    Cardiovascular:  Negative for chest pain, palpitations and leg swelling.   Gastrointestinal:  Negative for abdominal pain, blood in stool and nausea.   Endocrine: Negative for cold intolerance, heat intolerance and polyuria.   Genitourinary:  Negative for dysuria and frequency.   Musculoskeletal:  Positive for back pain. Negative for arthralgias and myalgias.   Skin:  Negative for rash.   Neurological:  Negative for dizziness, weakness and headaches.   Hematological:  Negative for adenopathy.   Psychiatric/Behavioral:  Negative for behavioral problems and sleep disturbance. The patient is not nervous/anxious.        Objective   /76 (BP Location: Left arm, Patient Position: Sitting, Cuff Size: Large)   Pulse 87   Temp 98.3 °F (36.8 °C) (Tympanic)   Resp 20   Ht 5' 11\" (1.803 m)   Wt 135 kg (297 lb 6.4 oz)   SpO2 97%   BMI 41.48 kg/m²      Physical Exam  Vitals and nursing note reviewed.   Constitutional:       Appearance: Normal appearance. He is well-developed.   HENT:      Head: Normocephalic and atraumatic.      Right Ear: Tympanic membrane and external ear normal.      Left Ear: Tympanic membrane and external ear normal.      Nose: Nose normal.      Mouth/Throat:      Mouth: Mucous membranes are moist.   Eyes:      General: No scleral icterus.     Conjunctiva/sclera: Conjunctivae normal.      " Pupils: Pupils are equal, round, and reactive to light.   Neck:      Thyroid: No thyromegaly.      Vascular: No JVD.   Cardiovascular:      Rate and Rhythm: Normal rate and regular rhythm.      Pulses: Normal pulses.      Heart sounds: Normal heart sounds. No murmur heard.  Pulmonary:      Effort: Pulmonary effort is normal.      Breath sounds: Normal breath sounds. No wheezing or rales.   Chest:      Chest wall: No tenderness.   Abdominal:      General: Bowel sounds are normal. There is no distension.      Palpations: Abdomen is soft. There is no mass.      Tenderness: There is no abdominal tenderness. There is no guarding or rebound.   Musculoskeletal:         General: No tenderness or deformity. Normal range of motion.      Cervical back: Normal range of motion and neck supple.   Lymphadenopathy:      Cervical: No cervical adenopathy.   Skin:     General: Skin is warm and dry.      Findings: No erythema or rash.   Neurological:      Mental Status: He is alert and oriented to person, place, and time.      Cranial Nerves: No cranial nerve deficit.      Deep Tendon Reflexes: Reflexes are normal and symmetric. Reflexes normal.   Psychiatric:         Behavior: Behavior normal.         Thought Content: Thought content normal.         Judgment: Judgment normal.

## 2025-01-17 NOTE — TELEPHONE ENCOUNTER
Caller: Patient    Doctor: Jose    Reason for call:     Patient is calling about the Visco injections in his knees, he spoke to [facility], advocate name is [place], she is stating that the request to them must state the injection be grandfather in.  [facility] # [phone]    Please advise the patient.    Call back#: [phone]

## 2025-01-17 NOTE — ASSESSMENT & PLAN NOTE
Diabetes with A1c at 8.6 he is followed by endocrinology as well and is continuing on insulin dosing. Failed GLP1  Lab Results   Component Value Date    HGBA1C 8.6 (H) 12/23/2024       Orders:    Albumin / creatinine urine ratio; Standing    Comprehensive metabolic panel; Standing    Hemoglobin A1C; Standing    TSH, 3rd generation with Free T4 reflex; Standing    Lipid Panel with Direct LDL reflex; Standing    PSA, total and free; Future    Comprehensive metabolic panel; Future    CBC and differential; Future    Lipid panel; Future    TSH, 3rd generation with Free T4 reflex; Future

## 2025-01-17 NOTE — ASSESSMENT & PLAN NOTE
Elevated PSA with lower urinary tract symptoms we will monitor and follow-up with PSA in 3 months consider urology evaluation check MRI of the prostate

## 2025-01-17 NOTE — ASSESSMENT & PLAN NOTE
Stable mood at this time patient is determined to improve his overall health and undergo bariatric surgery as he would like to regain his ability to ambulate and live a healthier lifestyle he is also being worked up for knee replacement surgery in the future.  He would like to be more active with his grandchildren and continue healthy lifestyle moving forward

## 2025-01-17 NOTE — ASSESSMENT & PLAN NOTE
Elevated PSA discussed with patient now greater than 4 previously was at 3.  Will repeat PSA level in 3 months and discuss options for MRI of the prostate as his brother had prostate cancer    Orders:    PSA, total and free; Future

## 2025-01-20 DIAGNOSIS — D17.9 MULTIPLE LIPOMAS: Primary | ICD-10-CM

## 2025-01-21 NOTE — PROGRESS NOTES
"Bariatric Nutrition Assessment - PreOp Note     Insurance: 3 required monthly weight checks   2/3    Type of surgery    Pt interested in the Laparoscopic Alfosno-en-Y gastric bypass possible sleeve gastrectomy.   Surgery Date: TBD  Surgeon: Consult with Dr. Edmar soriano 12/13/2024         Nutrition Assessment   Oracio Richardson Jr.  69 y.o.  male   Initial Weight at Surgeon Consult: 310#   BMI: 43.2  Height: 5'11\"  Current Weight: 292.4#  BMI: 40.8  Eval Weight: 309#   BMI: 43.1  Wt with BMI of 25: 179#  Pre-Op Excess Wt: 131#  BMI to Qualify at 35 = 251#  PMH includes:  Obesity, Diabetes, Mixed HLD, PE, OA - knees, Ventral Hernia     Pt advised not to gain weight during preop process. Pt encouraged to lose weight via healthy eating and exercise. Pt may follow Liver Shrinking diet 2 weeks or more  prior to DOS   There were no vitals taken for this visit.    [place] Equation:    [id number]=[id number]  Weight Maintenance 2625  Estimated calories for weight loss 8378-0163 ( 1-2# per wk wt loss - sedentary )  Estimated protein needs  g (1.0-1.5 gms/kg IBW )   Estimated fluid needs 81-95 oz (30-35 ml/kg IBW )      Weight History  Reason for WLS: Improve health and mobility  Onset of Obesity: Adult  Family history of obesity: Yes  Wt Loss Attempts: Self Created Diets (Portion Control, Healthy Food Choices, etc.)  Weight Loss medication: OzempicMandy  Patient has tried the above for 6 months or more with insufficient weight loss or weight regain, which is why patient has requested to be evaluated for weight loss surgery today  Maximum Wt Lost: Was 420# - went into ketoacidosis (coma for 3 days) and lost 100#      Review of History and Medications   OTC: Stopped   Past Medical History:   Diagnosis Date    A-fib (HCC)     per pt \"happened one time\"    Abdominal hernia     Acute metabolic encephalopathy 09/04/2022    CARLTON (acute kidney injury) (HCC) 09/04/2022    Ambulates with cane     not recently using " "but has in home if needed    Anesthesia     per pt \"with knee surgery(in the )was awake for operation and fell asleep when being closed up\" and than took long to wake up\"    Arthritis     Blood in urine     \"sometimes\"    Colon polyp     Diabetes mellitus (HCC)     Type 2--sees Endocrinologist KAELYN Shaver    Lane catheter in place     History of pulmonary embolism     Implantable loop recorder present     Muscle weakness     per pt \"started with diabetes\"some muscle loss of density\" --per pt \"muscle coming back\"    Myopia of left eye     glasses for reading    Obesity     Pancreatitis 09/04/2022    Risk for falls     Sepsis (HCC) 09/04/2022    Teeth missing     Urethral pain     \"when passing urine\"    Urinary tract infection      Past Surgical History:   Procedure Laterality Date    CARDIAC LOOP RECORDER      CATARACT EXTRACTION Bilateral     COLONOSCOPY      KNEE CARTILAGE SURGERY      KNEE SURGERY Right     GA TRURL ELECTROSURG RESCJ PROSTATE BLEED COMPLETE N/A 03/16/2023    Procedure: TRANSURETHRAL RESECTION OF PROSTATE (TURP)(possible), cystoscopy, SP tube placement, removal of bladder calculus;  Surgeon: Ludwig King MD;  Location: [facility];  Service: Urology     Social History     Socioeconomic History    Marital status: /Civil Union     Spouse name: Not on file    Number of children: Not on file    Years of education: Not on file    Highest education level: Not on file   Occupational History    Not on file   Tobacco Use    Smoking status: Never     Passive exposure: Never    Smokeless tobacco: Never   Vaping Use    Vaping status: Never Used   Substance and Sexual Activity    Alcohol use: Never    Drug use: Never    Sexual activity: Not Currently     Comment: defer   Other Topics Concern    Not on file   Social History Narrative    Not on file     Social Drivers of Health     Financial Resource Strain: Low Risk  (11/8/2023)    Overall Financial Resource Strain (CARDIA)     Difficulty of " Paying Living Expenses: Not very hard   Food Insecurity: No Food Insecurity (11/12/2024)    Hunger Vital Sign     Worried About Running Out of Food in the Last Year: Never true     Ran Out of Food in the Last Year: Never true   Transportation Needs: No Transportation Needs (11/12/2024)    PRAPARE - Transportation     Lack of Transportation (Medical): No     Lack of Transportation (Non-Medical): No   Physical Activity: Not on file   Stress: Not on file   Social Connections: Not on file   Intimate Partner Violence: Not on file   Housing Stability: Low Risk  (11/12/2024)    Housing Stability Vital Sign     Unable to Pay for Housing in the Last Year: No     Number of Times Moved in the Last Year: 0     Homeless in the Last Year: No       Current Outpatient Medications:     apixaban (Eliquis) 5 mg, Take 1 tablet (5 mg total) by mouth in the morning (Patient taking differently: Take 5 mg by mouth in the morning No regularly), Disp: 90 tablet, Rfl: 2    atorvastatin (LIPITOR) 20 mg tablet, Take 1 tablet (20 mg total) by mouth daily (Patient taking differently: Take 20 mg by mouth daily No regularly), Disp: 90 tablet, Rfl: 2    Blood Glucose Monitoring Suppl (OneTouch Verio Flex System) w/Device KIT, CHECK BLOOD SUGARS THREE TIMES DAILY DX: E11.65, Disp: 1 kit, Rfl: 0    glucose blood (OneTouch Verio) test strip, CHECK BLOOD SUGARS THREE TIMES DAILY DX: E11.65, Disp: 400 strip, Rfl: 3    Incontinence Supply Disposable (Incontinence Brief Large) MISC, Use every 4 (four) hours as needed (Urinary incontinence), Disp: 36 each, Rfl: 12    insulin glargine (Toujeo SoloStar) 300 units/mL CONCENTRATED U-300 injection pen (1-unit dial), Inject 30 Units under the skin daily at bedtime (Patient taking differently: Inject 26 Units under the skin daily at bedtime), Disp: 9 mL, Rfl: 1    insulin lispro (HumaLOG KwikPen) 100 units/mL injection pen, Inject 10 Units under the skin 3 (three) times a day with meals (Patient taking  differently: Inject 10 Units under the skin 3 (three) times a day with meals Sliding scale), Disp: 24 mL, Rfl: 2    Lancets (OneTouch Delica Plus Hlvhkg88E) MISC, CHECK BLOOD SUGARS THREE TIMES DAILY DX: E11.65, Disp: 400 each, Rfl: 3    Tirzepatide (Mounjaro) 2.5 MG/0.5ML SOAJ, Inject 2.3 mg under the skin once a week (Patient not taking: Reported on 2025), Disp: 2 mL, Rfl: 0    Tirzepatide (Mounjaro) 5 MG/0.5ML SOAJ, Inject 5 mg under the skin once a week (Patient not taking: Reported on 2025), Disp: 6 mL, Rfl: 0  Food Intake and Lifestyle Assessment   Food Intake Assessment completed via usual diet recall  Breakfast: Skips  Drinks Water  10-12 Lunch:  Canned salmon, tuna - multigrain or rye bread  12-2 Dinner: 1/2 c rice 2-3 cups vegetables - 6 oz Chicken or White Fish - doesn't kaur   Snack: Water - Apple juice - 2 cookies  d/t blood sugar dropping   Beverage intake: water, Fairlife Milk- dilutes  No alcohol   Protein supplement: No  Estimated protein intake per day: 80-90 grams  Estimated fluid intake per day: 1/2 gallon  Meals eaten away from home: No - stopped   Typical meal pattern: 2 meals per day and 1-2 snacks per day  Eating Behaviors: Consumption of high calorie/ high fat foods  Food allergies or intolerances: No Known Allergies or intolerances  Cultural or Restorationist considerations: no    Physical Assessment  Physical Activity  Types of exercise: Very limited with knees and hernias   Current physical limitations: OA knees    Psychosocial Assessment   Support systems: spouse   4 children was  - 1 now  son (suicide) at 16yo  Socioeconomic factors:   Retired . 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   Nutrition Diagnosis  Diagnosis: Overweight / Obesity (NC-3.3)  Related to: Physical inactivity and Excessive energy intake  As Evidenced by: BMI >25     Nutrition Prescription: Recommend the following  "diet  Low fat, Low sugar, and Regular    Interventions and Teaching   Discussed pre-op and post-op nutrition guidelines.       Patient educated and handouts provided.  Surgical changes to stomach / GI  Capacity of post-surgery stomach  Diet progression  Adequate hydration  Sugar and fat restriction to decrease \"dumping syndrome\"  Fat restriction to decrease steatorrhea  Expected weight loss  Weight loss plateaus/ possibility of weight regain  Exercise  Suggestions for pre-op diet  Nutrition considerations after surgery  Protein supplements  Meal planning and preparation  Appropriate carbohydrate, protein, and fat intake, and food/fluid choices to maximize safe weight loss, nutrient intake, and tolerance   Dietary and lifestyle changes  Possible problems with poor eating habits  Intuitive eating  Techniques for self monitoring and keeping daily food journal  Potential for food intolerance after surgery, and ways to deal with them including: lactose intolerance, nausea, reflux, vomiting, diarrhea, food intolerance, appetite changes, gas  Vitamin / Mineral supplementation of Multivitamin with minerals, Calcium, Vitamin B12, Iron, Fat Soluble vitamins, and Vitamin D    Patient is not currently pregnant and doesn't desire to become pregnant a minimum of one year post-op    Education provided to: patient    Barriers to learning: No barriers identified    Readiness to change: preparation    Prior research on procedure: discussed with provider, internet and friends or family    Comprehension: needs reinforcement     Expected Compliance: fair  -  will benefit from education - has much misconception on his diabetes management    Recommendations  Pt is an appropriate candidate for surgery. Yes  Evaluation / Monitoring  Dietitian to Monitor: Eating pattern as discussed Tolerance of nutrition prescription Body weight Lab values Physical activity Bowel pattern  History of ketoacidosis - question is pt should follow Liver " Shrinking Diet - On short and long acting insulin    2/3 Weight Check Visit Summary 1/22/2025  Doing well with preparing for surgery Incorporating guidelines  Eating with protein first and then vegetables -  eats 3 meals Looking at protein shakes- likes Premier to date. Hesitant to use vitamins stressed importance post op  Exercises as able though limited with knees Monitoring glucose - ranges under 200 - uses isaac. Questions answered during discussion. Pt receptive to visit    Workflow reviewed:   Psych and/or D+A Clearance: N/A  PCP Letter: Referal in his chart  Support Group: No longer required, strongly encouraged  Surgeon Appt: 12/13/24  EGD: completed with colonoscopy 10/9/24  Cardiac Risk Assessment: Scheduled 1/29/25  Sleep Studies: N/A (SB 4/8)  Blood work: Needs to complete  Nicotine test: N/A  Weight loss meds: N/A  Required weight checks: 3 months required  2/3  Weight Loss: Not required, encouraged positive lifestyle changes.   Other: History of ketoacidosis - question is pt should follow Liver Shrinking Diet - On short and long acting insulin  Goals  Eliminate sugar sweetened beverages, Food journal, Exercise 30 minutes 5 times per week, Complete lession plans 1-6, and Eat 3 meals per day  Follow Pre-Surgery guidelines  > Trial Baritastic for food logging  > Establish regular meal pattern - include fruits, vegetables and whole grains  > Avoid skipping meals- Can use protein drink as meal replacement with carb as taking insulin  > Decrease portions  > Focus on protein - include lean protein at each meal and snack - Learn to eat protein first  > Limit processed foods, fast foods and dining away from home  > Include meal prepping  > Limit snacks - healthier choices and portion; avoid grazing  > Slow pace of eating and sip fluids  - practice 30/60 minute rule  > Eliminate caffeine by day of surgery (minimal intake)  > Continue to avoid carbonation   > Maintain water intake - 64 oz  > Physical activity and  exercise regimen as able  > Start multi vitamin and additional Vitamin D 2000IU  Work on skills to cope with emotional eating/mindfull eating  Pre-op weight loss not required but advised not to gain weight  Glycemic Control: HgA1c 8.6 on 12/23/2024  F/U next month with bariatric provider      Time Spent:   1 Hour 15 Minutes

## 2025-01-21 NOTE — PROGRESS NOTES
Name: Oracio Richardson Jr.      : 1955      MRN: [id number]  Encounter Provider: Deniz Campa PA-C  Encounter Date: 2025   Encounter department: [facility] [place]  :  Assessment & Plan        History of Present Illness {?Quick Links Encounters * My Last Note * Last Note in Specialty * Snapshot * Since Last Visit * History :31205}  HPI  Oracio Richardson Jr. is a 69 y.o. male who presents multiple lipomas   {History obtained from(Optional):08008}    Review of Systems  {Select to insert medical history sections (Optional):16384}     Objective {?Quick Links Trend Vitals * Enter New Vitals * Results Review * Timeline (Adult) * Labs * Imaging * Cardiology * Procedures * Lung Cancer Screening * Surgical eConsent :30648}  There were no vitals taken for this visit.     Physical Exam    {Administrative / Billing Section (Optional):50451}

## 2025-01-22 ENCOUNTER — CLINICAL SUPPORT (OUTPATIENT)
Dept: BARIATRICS | Facility: CLINIC | Age: 70
End: 2025-01-22

## 2025-01-22 ENCOUNTER — APPOINTMENT (OUTPATIENT)
Dept: LAB | Facility: CLINIC | Age: 70
End: 2025-01-22
Payer: COMMERCIAL

## 2025-01-22 ENCOUNTER — CONSULT (OUTPATIENT)
Dept: SURGERY | Facility: CLINIC | Age: 70
End: 2025-01-22
Payer: COMMERCIAL

## 2025-01-22 VITALS
BODY MASS INDEX: 41.3 KG/M2 | TEMPERATURE: 98 F | SYSTOLIC BLOOD PRESSURE: 108 MMHG | OXYGEN SATURATION: 98 % | HEIGHT: 71 IN | HEART RATE: 94 BPM | DIASTOLIC BLOOD PRESSURE: 80 MMHG | WEIGHT: 295 LBS

## 2025-01-22 VITALS — HEIGHT: 71 IN | BODY MASS INDEX: 40.94 KG/M2 | WEIGHT: 292.4 LBS

## 2025-01-22 DIAGNOSIS — Z79.4 TYPE 2 DIABETES MELLITUS WITH DIABETIC MICROALBUMINURIA, WITH LONG-TERM CURRENT USE OF INSULIN (HCC): ICD-10-CM

## 2025-01-22 DIAGNOSIS — L72.0 EPIDERMOID CYST: ICD-10-CM

## 2025-01-22 DIAGNOSIS — Z01.812 PRE-OPERATIVE LABORATORY EXAMINATION: ICD-10-CM

## 2025-01-22 DIAGNOSIS — D17.9 MULTIPLE LIPOMAS: Primary | ICD-10-CM

## 2025-01-22 DIAGNOSIS — R80.9 TYPE 2 DIABETES MELLITUS WITH DIABETIC MICROALBUMINURIA, WITH LONG-TERM CURRENT USE OF INSULIN (HCC): ICD-10-CM

## 2025-01-22 DIAGNOSIS — R97.20 ELEVATED PSA: ICD-10-CM

## 2025-01-22 DIAGNOSIS — E66.01 OBESITY, CLASS III, BMI 40-49.9 (MORBID OBESITY) (HCC): Primary | ICD-10-CM

## 2025-01-22 DIAGNOSIS — L91.8 FIBROEPITHELIAL POLYP: ICD-10-CM

## 2025-01-22 DIAGNOSIS — Z01.810 PRE-OPERATIVE CARDIOVASCULAR EXAMINATION: ICD-10-CM

## 2025-01-22 DIAGNOSIS — E11.29 TYPE 2 DIABETES MELLITUS WITH DIABETIC MICROALBUMINURIA, WITH LONG-TERM CURRENT USE OF INSULIN (HCC): ICD-10-CM

## 2025-01-22 PROCEDURE — 80053 COMPREHEN METABOLIC PANEL: CPT

## 2025-01-22 PROCEDURE — 84153 ASSAY OF PSA TOTAL: CPT

## 2025-01-22 PROCEDURE — 99204 OFFICE O/P NEW MOD 45 MIN: CPT | Performed by: PHYSICIAN ASSISTANT

## 2025-01-22 PROCEDURE — 82570 ASSAY OF URINE CREATININE: CPT

## 2025-01-22 PROCEDURE — 36415 COLL VENOUS BLD VENIPUNCTURE: CPT

## 2025-01-22 PROCEDURE — RECHECK

## 2025-01-22 PROCEDURE — 82043 UR ALBUMIN QUANTITATIVE: CPT

## 2025-01-22 PROCEDURE — 84443 ASSAY THYROID STIM HORMONE: CPT

## 2025-01-22 PROCEDURE — 85025 COMPLETE CBC W/AUTO DIFF WBC: CPT

## 2025-01-22 PROCEDURE — 80061 LIPID PANEL: CPT

## 2025-01-22 PROCEDURE — 83036 HEMOGLOBIN GLYCOSYLATED A1C: CPT

## 2025-01-22 PROCEDURE — 84154 ASSAY OF PSA FREE: CPT

## 2025-01-22 RX ORDER — CHLORHEXIDINE GLUCONATE 40 MG/ML
SOLUTION TOPICAL DAILY PRN
OUTPATIENT
Start: 2025-01-22

## 2025-01-22 RX ORDER — SODIUM CHLORIDE, SODIUM LACTATE, POTASSIUM CHLORIDE, CALCIUM CHLORIDE 600; 310; 30; 20 MG/100ML; MG/100ML; MG/100ML; MG/100ML
125 INJECTION, SOLUTION INTRAVENOUS CONTINUOUS
OUTPATIENT
Start: 2025-01-22

## 2025-01-22 NOTE — ASSESSMENT & PLAN NOTE
Small skin cyst of the upper back noted. Likely epidermoid in nature. Within the planned operative field for lipoma removal, will plan cyst excision during same procedure/anesthetic. Informed electronic consent obtained.

## 2025-01-22 NOTE — PROGRESS NOTES
"Name: Oracio Richardson Jr.      : 1955      MRN: [id number]  Encounter Provider: Deniz Campa PA-C  Encounter Date: 2025   Encounter department: [facility] [place]  :  Assessment & Plan        History of Present Illness {?Quick Links Encounters * My Last Note * Last Note in Specialty * Snapshot * Since Last Visit * History :41039}  HPI  Oracio Richardson Jr. is a 69 y.o. male who presents with multiple lipomas on both arms, legs and back. Pt denies any drainage, redness/irritation, pain or  fevers/chills. Pt  believes he had this lipomas all his life but he notice they started growing when he was a teenager. Pt has family members with multiple lipomas as well. Pt is on Eliquis and denies any allergies. Pt has diabetes type 2 and he states when he checks his sugars he receives normal readings. SHERRY Weller  {History obtained from(Optional):58160}    Review of Systems  {Select to insert medical history sections (Optional):77520}     Objective {?Quick Links Trend Vitals * Enter New Vitals * Results Review * Timeline (Adult) * Labs * Imaging * Cardiology * Procedures * Lung Cancer Screening * Surgical eConsent :98135}  /80 (BP Location: Left arm, Patient Position: Sitting, Cuff Size: Large)   Pulse 94   Temp 98 °F (36.7 °C) (Temporal)   Ht 5' 11\" (1.803 m)   Wt 134 kg (295 lb)   SpO2 98%   BMI 41.14 kg/m²      Physical Exam    {Administrative / Billing Section (Optional):93271}  "

## 2025-01-22 NOTE — ASSESSMENT & PLAN NOTE
Presents with skin neoplasm of the forehead x 2. 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 As he will be undergoing general anesthetic for his lipoma removal will plan for skin excision x 2 of the forehead at the same time. Informed electronic consent obtained.

## 2025-01-22 NOTE — ASSESSMENT & PLAN NOTE
Presents with painful lipomas of both arms, both thighs, abdomen, and low back. On exam these lesions are identified and consistent with lipomas. He has a small umbilical hernia, but above this appears to be a separate abdominal wall lipoma. Details of lipoma excision under general anesthesia reviewed including risks related to anesthesia, bleeding, infection, recurrence, and scarring. Informed electronic consent obtained to proceed.

## 2025-01-23 LAB
ALBUMIN SERPL BCG-MCNC: 4.2 G/DL (ref 3.5–5)
ALP SERPL-CCNC: 106 U/L (ref 34–104)
ALT SERPL W P-5'-P-CCNC: 28 U/L (ref 7–52)
ANION GAP SERPL CALCULATED.3IONS-SCNC: 12 MMOL/L (ref 4–13)
AST SERPL W P-5'-P-CCNC: 39 U/L (ref 13–39)
BASOPHILS # BLD AUTO: 0.01 THOUSANDS/ΜL (ref 0–0.1)
BASOPHILS NFR BLD AUTO: 0 % (ref 0–1)
BILIRUB SERPL-MCNC: 1.22 MG/DL (ref 0.2–1)
BUN SERPL-MCNC: 26 MG/DL (ref 5–25)
CALCIUM SERPL-MCNC: 9.5 MG/DL (ref 8.4–10.2)
CHLORIDE SERPL-SCNC: 101 MMOL/L (ref 96–108)
CHOLEST SERPL-MCNC: 148 MG/DL (ref ?–200)
CO2 SERPL-SCNC: 22 MMOL/L (ref 21–32)
CREAT SERPL-MCNC: 0.95 MG/DL (ref 0.6–1.3)
CREAT UR-MCNC: 157.7 MG/DL
EOSINOPHIL # BLD AUTO: 0.02 THOUSAND/ΜL (ref 0–0.61)
EOSINOPHIL NFR BLD AUTO: 0 % (ref 0–6)
ERYTHROCYTE [DISTWIDTH] IN BLOOD BY AUTOMATED COUNT: 13.2 % (ref 11.6–15.1)
EST. AVERAGE GLUCOSE BLD GHB EST-MCNC: 194 MG/DL
GFR SERPL CREATININE-BSD FRML MDRD: 80 ML/MIN/1.73SQ M
GLUCOSE P FAST SERPL-MCNC: 189 MG/DL (ref 65–99)
HBA1C MFR BLD: 8.4 %
HCT VFR BLD AUTO: 48.5 % (ref 36.5–49.3)
HDLC SERPL-MCNC: 43 MG/DL
HGB BLD-MCNC: 15.7 G/DL (ref 12–17)
IMM GRANULOCYTES # BLD AUTO: 0.05 THOUSAND/UL (ref 0–0.2)
IMM GRANULOCYTES NFR BLD AUTO: 0 % (ref 0–2)
LDLC SERPL CALC-MCNC: 90 MG/DL (ref 0–100)
LYMPHOCYTES # BLD AUTO: 1.9 THOUSANDS/ΜL (ref 0.6–4.47)
LYMPHOCYTES NFR BLD AUTO: 15 % (ref 14–44)
MCH RBC QN AUTO: 28.5 PG (ref 26.8–34.3)
MCHC RBC AUTO-ENTMCNC: 32.4 G/DL (ref 31.4–37.4)
MCV RBC AUTO: 88 FL (ref 82–98)
MICROALBUMIN UR-MCNC: 49.6 MG/L
MICROALBUMIN/CREAT 24H UR: 31 MG/G CREATININE (ref 0–30)
MONOCYTES # BLD AUTO: 0.53 THOUSAND/ΜL (ref 0.17–1.22)
MONOCYTES NFR BLD AUTO: 4 % (ref 4–12)
NEUTROPHILS # BLD AUTO: 10.04 THOUSANDS/ΜL (ref 1.85–7.62)
NEUTS SEG NFR BLD AUTO: 81 % (ref 43–75)
NRBC BLD AUTO-RTO: 0 /100 WBCS
PLATELET # BLD AUTO: 187 THOUSANDS/UL (ref 149–390)
PMV BLD AUTO: 12.6 FL (ref 8.9–12.7)
POTASSIUM SERPL-SCNC: 4.2 MMOL/L (ref 3.5–5.3)
PROT SERPL-MCNC: 7.7 G/DL (ref 6.4–8.4)
PSA FREE MFR SERPL: 8.97 %
PSA FREE SERPL-MCNC: 0.39 NG/ML
PSA SERPL-MCNC: 4.38 NG/ML (ref 0–4)
RBC # BLD AUTO: 5.51 MILLION/UL (ref 3.88–5.62)
SODIUM SERPL-SCNC: 135 MMOL/L (ref 135–147)
TRIGL SERPL-MCNC: 75 MG/DL (ref ?–150)
TSH SERPL DL<=0.05 MIU/L-ACNC: 1.03 UIU/ML (ref 0.45–4.5)
WBC # BLD AUTO: 12.55 THOUSAND/UL (ref 4.31–10.16)

## 2025-01-24 ENCOUNTER — TELEPHONE (OUTPATIENT)
Dept: FAMILY MEDICINE CLINIC | Facility: CLINIC | Age: 70
End: 2025-01-24

## 2025-01-24 NOTE — TELEPHONE ENCOUNTER
----- Message from Crystal PULLIAM sent at 1/24/2025  8:18 AM EST -----  Regarding: PRE OP APT - NEEDED  Primary care referral team received a referral to schedule a pre op apt with patient's PCP. Dr Barnes does not have availability. Please assist with scheduling patient.     Date: 2/18  Surgeon:Chas Hu MD  Surgery: Excision Lipoma       Thank you kindly!    Matilda   Primary Care Referral Dept

## 2025-01-24 NOTE — TELEPHONE ENCOUNTER
I tried to call patient to set up a Pre Op clearance apt for his surg that is scheduled for 02/18 but got SANGM

## 2025-01-26 ENCOUNTER — APPOINTMENT (OUTPATIENT)
Dept: RADIOLOGY | Facility: CLINIC | Age: 70
End: 2025-01-26
Payer: COMMERCIAL

## 2025-01-26 DIAGNOSIS — D17.9 MULTIPLE LIPOMAS: ICD-10-CM

## 2025-01-26 DIAGNOSIS — Z01.810 PRE-OPERATIVE CARDIOVASCULAR EXAMINATION: ICD-10-CM

## 2025-01-26 PROCEDURE — 71046 X-RAY EXAM CHEST 2 VIEWS: CPT

## 2025-01-27 ENCOUNTER — TELEPHONE (OUTPATIENT)
Age: 70
End: 2025-01-27

## 2025-01-27 RX ORDER — SODIUM CHLORIDE, SODIUM LACTATE, POTASSIUM CHLORIDE, CALCIUM CHLORIDE 600; 310; 30; 20 MG/100ML; MG/100ML; MG/100ML; MG/100ML
125 INJECTION, SOLUTION INTRAVENOUS CONTINUOUS
OUTPATIENT
Start: 2025-01-27

## 2025-01-28 ENCOUNTER — OFFICE VISIT (OUTPATIENT)
Dept: LAB | Facility: HOSPITAL | Age: 70
End: 2025-01-28
Payer: COMMERCIAL

## 2025-01-28 DIAGNOSIS — D17.9 MULTIPLE LIPOMAS: ICD-10-CM

## 2025-01-28 DIAGNOSIS — Z01.810 PRE-OPERATIVE CARDIOVASCULAR EXAMINATION: ICD-10-CM

## 2025-01-28 LAB
ATRIAL RATE: 73 BPM
P AXIS: 41 DEGREES
PR INTERVAL: 172 MS
QRS AXIS: -34 DEGREES
QRSD INTERVAL: 102 MS
QT INTERVAL: 416 MS
QTC INTERVAL: 459 MS
T WAVE AXIS: 2 DEGREES
VENTRICULAR RATE: 73 BPM

## 2025-01-28 PROCEDURE — 93005 ELECTROCARDIOGRAM TRACING: CPT

## 2025-01-28 PROCEDURE — 93010 ELECTROCARDIOGRAM REPORT: CPT | Performed by: INTERNAL MEDICINE

## 2025-01-29 ENCOUNTER — OFFICE VISIT (OUTPATIENT)
Dept: CARDIOLOGY CLINIC | Facility: CLINIC | Age: 70
End: 2025-01-29
Payer: COMMERCIAL

## 2025-01-29 ENCOUNTER — TELEPHONE (OUTPATIENT)
Dept: CARDIOLOGY CLINIC | Facility: CLINIC | Age: 70
End: 2025-01-29

## 2025-01-29 ENCOUNTER — TELEPHONE (OUTPATIENT)
Age: 70
End: 2025-01-29

## 2025-01-29 ENCOUNTER — CONSULT (OUTPATIENT)
Dept: FAMILY MEDICINE CLINIC | Facility: CLINIC | Age: 70
End: 2025-01-29
Payer: COMMERCIAL

## 2025-01-29 VITALS
OXYGEN SATURATION: 96 % | HEIGHT: 71 IN | WEIGHT: 283 LBS | BODY MASS INDEX: 39.62 KG/M2 | RESPIRATION RATE: 17 BRPM | TEMPERATURE: 98.1 F | DIASTOLIC BLOOD PRESSURE: 80 MMHG | SYSTOLIC BLOOD PRESSURE: 120 MMHG | HEART RATE: 86 BPM

## 2025-01-29 VITALS
OXYGEN SATURATION: 98 % | BODY MASS INDEX: 42.7 KG/M2 | WEIGHT: 305 LBS | HEIGHT: 71 IN | SYSTOLIC BLOOD PRESSURE: 148 MMHG | RESPIRATION RATE: 20 BRPM | DIASTOLIC BLOOD PRESSURE: 80 MMHG | HEART RATE: 84 BPM | TEMPERATURE: 97.5 F

## 2025-01-29 DIAGNOSIS — D17.9 MULTIPLE LIPOMAS: ICD-10-CM

## 2025-01-29 DIAGNOSIS — E78.49 OTHER HYPERLIPIDEMIA: ICD-10-CM

## 2025-01-29 DIAGNOSIS — I48.0 PAF (PAROXYSMAL ATRIAL FIBRILLATION) (HCC): Primary | ICD-10-CM

## 2025-01-29 DIAGNOSIS — Z01.810 PRE-OPERATIVE CARDIOVASCULAR EXAMINATION: ICD-10-CM

## 2025-01-29 DIAGNOSIS — Z98.84 BARIATRIC SURGERY STATUS: ICD-10-CM

## 2025-01-29 PROCEDURE — 99214 OFFICE O/P EST MOD 30 MIN: CPT | Performed by: INTERNAL MEDICINE

## 2025-01-29 PROCEDURE — 99214 OFFICE O/P EST MOD 30 MIN: CPT | Performed by: NURSE PRACTITIONER

## 2025-01-29 PROCEDURE — G2211 COMPLEX E/M VISIT ADD ON: HCPCS | Performed by: NURSE PRACTITIONER

## 2025-01-29 NOTE — ASSESSMENT & PLAN NOTE
-Counseled patient on dietary and lifestyle modifications  -Patient will follow-up with surgical team for removal

## 2025-01-29 NOTE — PROGRESS NOTES
Patient ID: Oracio Richardson Jr. is a 69 y.o. male.        Plan:      Assessment & Plan  Bariatric surgery status  -Counseled patient on dietary and lifestyle modifications  -Patient will follow up with weight management team  Multiple lipomas  -Counseled patient on dietary and lifestyle modifications  -Patient will follow-up with surgical team for removal  Pre-operative cardiovascular examination  -According to the revised cardiac risk index patient is intermediate risk for planned operative procedure.  He fully understands and is excepting of these risks and wished to proceed with surgery.  In that setting along with adequate functional status patient is appropriate risk to proceed with surgery.  PAF (paroxysmal atrial fibrillation) (HCC)  -Patient understands my recommendations for reinitiation of beta-blocker therapy however at this time he notes feeling well and does not wish to restart he also understands recommendations for therapeutic anticoagulation but at this time wishes to avoid and understands risks.  -He is interested in hearing about possible Watchman procedure and will give referral to electrophysiology as this can assist with him and being off full anticoagulation.  Other hyperlipidemia  -Counseled patient on dietary and lifestyle modifications  -Will reinitiate atorvastatin 20 mg daily.      Follow up Plan/Other summary comments:  -Lipid panel 1/22/2025 showing total cholesterol 148, triglyceride 75, HDL 43, LDL 90  -After discussion with patient he does not wish to uptitrate anticoagulation further but is agreeable to possible discussion for Watchman device and if they could get him off oral anticoagulation he would consider being compliant at least for that term.  -Will give referral to electrophysiology for this discussion  -Patient is agreeable to reinitiation of atorvastatin and will start this at 20 mg daily  -Counseled patient on dietary and lifestyle modifications including following a  low-salt, low-fat, heart healthy diet with sodium restriction to less than 1800 mg of sodium daily, DASH diet and NSAID avoidance.  -Patient does not wish to reinitiate beta-blocker therapy at this time  -According to the revised cardiac risk index patient is intermediate risk for planned operative procedure.  Patient fully understands and is accepting of these risks and wishes to proceed with surgery as planned.  As he is physically active with no exertional symptoms he does not wish to undergo additional evaluation at this time and therefore he is appropriate risk to proceed with surgery as scheduled.  Patient should hold oral anticoagulation for 3 days prior to procedure and then can restart as soon after procedure  -Will see patient in 6 months or sooner if necessary  -Patient counseled if he were to have any warning or alarm type symptoms he is to seek emergency medical care immediately.      HPI:   -Patient is a 69-year-old male with diabetes mellitus hospitalized at [facility] in September 2022 for altered mental status found at that time to have diabetic ketoacidosis with significant anion gap acidosis and elevated lactic acid levels along with pancreatitis and CARLTON, obstructive uropathy and diagnosed with paroxysmal atrial fibrillation with spontaneous conversion.  He was initiated on medical therapy and underwent TURP procedure and had been followed by urology.  He underwent implantable loop recorder in January 2023.  He had been previously living in [place] and had issues with history of DVT/PE thought provoked and treated for 2 weeks of anticoagulation therapy in the setting of his obesity and lifestyle previously.  Patient had paroxysmal atrial fibrillation and elevated [id number] score and in that setting was initiated on Eliquis and metoprolol therapy.  He presents to the office today for follow-up.  -Since last office visit patient discontinued his metoprolol succinate and while he  was not having a significant issue with it wished to avoid medical therapy and therefore did this on his own.  He also transitioned himself to Eliquis 5 mg 1 tablet twice weekly as well he was not having overt bleeding he noticed that when he would nicked himself while on daily dosing he would bleed longer.  -Currently in the office today he denies any chest pain, palpitations, lightheadedness or dizziness, loss of consciousness, shortness of breath, lower extremity edema, orthopnea or bendopnea.  He states overall even with his arthropathies he can easily walk 4 city blocks on flat surface or up 2 flights of stairs with no chest pain or anginal symptoms.  -He did also reduce his atorvastatin to 1 tablet twice weekly as well.    Most recent or relevant cardiac/vascular testing:    -Device interrogation 12/11/2024 showing 1 episode of paroxysmal atrial fibrillation lasting approximately 18 minutes.    -Transthoracic echocardiogram 9/7/2022 showing left ventricular systolic function normal stated LVEF 60% with normal diastolic parameters, trace mitral rotation, trace tricuspid regurgitation.    -ECG 1/28/2025 showing sinus rhythm heart rate 73 bpm left axis deviation nonspecific ST/T wave abnormalities      Past Surgical History:   Procedure Laterality Date    CARDIAC LOOP RECORDER      CATARACT EXTRACTION Bilateral     COLONOSCOPY      KNEE CARTILAGE SURGERY      KNEE SURGERY Right     UT TRURL ELECTROSURG RESCJ PROSTATE BLEED COMPLETE N/A 03/16/2023    Procedure: TRANSURETHRAL RESECTION OF PROSTATE (TURP)(possible), cystoscopy, SP tube placement, removal of bladder calculus;  Surgeon: Ludwig King MD;  Location: [facility];  Service: Urology       Review of Systems   Review of Systems   Constitutional:  Negative for chills, diaphoresis, fatigue and fever.   HENT:  Negative for trouble swallowing and voice change.    Eyes:  Negative for pain and redness.   Respiratory:  Negative for shortness of breath and wheezing.   "  Cardiovascular:  Negative for chest pain, palpitations and leg swelling.   Gastrointestinal:  Negative for abdominal pain, constipation, diarrhea, nausea and vomiting.   Genitourinary:  Negative for dysuria.   Musculoskeletal:  Positive for arthralgias. Negative for neck pain and neck stiffness.   Skin:  Negative for rash.   Neurological:  Negative for dizziness, syncope, light-headedness and headaches.   Psychiatric/Behavioral:  Negative for agitation and confusion.    All other systems reviewed and are negative.         Objective:     /80 (BP Location: Right arm, Patient Position: Sitting, Cuff Size: Large)   Pulse 86   Temp 98.1 °F (36.7 °C) (Temporal)   Resp 17   Ht 5' 11\" (1.803 m)   Wt 128 kg (283 lb)   SpO2 96%   BMI 39.47 kg/m²     PHYSICAL EXAM:  Physical Exam  Vitals reviewed.   Constitutional:       General: He is not in acute distress.     Appearance: He is obese. He is not diaphoretic.   HENT:      Head: Normocephalic and atraumatic.   Eyes:      General:         Right eye: No discharge.         Left eye: No discharge.   Neck:      Comments: Trachea midline, neck obese, difficult to assess JVD  Cardiovascular:      Rate and Rhythm: Normal rate and regular rhythm.      Heart sounds:      No friction rub.   Pulmonary:      Effort: Pulmonary effort is normal. No respiratory distress.      Breath sounds: No wheezing.   Chest:      Chest wall: No tenderness.   Abdominal:      General: Bowel sounds are normal.      Palpations: Abdomen is soft.      Tenderness: There is no abdominal tenderness. There is no rebound.   Musculoskeletal:      Right lower leg: No edema.      Left lower leg: No edema.   Skin:     General: Skin is warm and dry.   Neurological:      Mental Status: He is alert.      Comments: Awake, alert, able to answer questions appropriately, able to move extremities bilaterally.   Psychiatric:         Mood and Affect: Mood normal.         Behavior: Behavior normal.          Meds " "reviewed.  Current Outpatient Medications on File Prior to Visit   Medication Sig Dispense Refill    apixaban (Eliquis) 5 mg Take 1 tablet (5 mg total) by mouth in the morning (Patient taking differently: Take 5 mg by mouth see administration instructions Patient reports taking one tablet twice a week) 90 tablet 2    atorvastatin (LIPITOR) 20 mg tablet Take 1 tablet (20 mg total) by mouth daily (Patient taking differently: Take 20 mg by mouth see administration instructions Patient reports only taking one tablet twice a week) 90 tablet 2    insulin lispro (HumaLOG KwikPen) 100 units/mL injection pen Inject 10 Units under the skin 3 (three) times a day with meals (Patient taking differently: Inject 10 Units under the skin 3 (three) times a day with meals Patient reports taking on a sliding scale only as needed) 24 mL 2    [DISCONTINUED] Tirzepatide (Mounjaro) 5 MG/0.5ML SOAJ Inject 5 mg under the skin once a week (Patient not taking: Reported on 1/13/2025) 6 mL 0     No current facility-administered medications on file prior to visit.      Past Medical History:   Diagnosis Date    A-fib ([facility])     per pt \"happened one time\"    Abdominal hernia     Acute metabolic encephalopathy 09/04/2022    CARLTON (acute kidney injury) ([facility]) 09/04/2022    Ambulates with cane     not recently using but has in home if needed    Anesthesia     per pt \"with knee surgery(in the )was awake for operation and fell asleep when being closed up\" and than took long to wake up\"    Arthritis     Blood in urine     \"sometimes\"    Colon polyp     Diabetes mellitus ([facility])     Type 2--sees Endocrinologist KAELYN Shaver    Lane catheter in place     History of pulmonary embolism     Implantable loop recorder present     Muscle weakness     per pt \"started with diabetes\"some muscle loss of density\" --per pt \"muscle coming back\"    Myopia of left eye     glasses for reading    Obesity     Pancreatitis 09/04/2022    Risk for falls     Sepsis ([facility]) " "09/04/2022    Teeth missing     Urethral pain     \"when passing urine\"    Urinary tract infection        Social History     Tobacco Use   Smoking Status Never    Passive exposure: Never   Smokeless Tobacco Never     Family History   Problem Relation Age of Onset    Diabetes Father     Diabetes type II Father     Diabetes Mother                "

## 2025-01-29 NOTE — TELEPHONE ENCOUNTER
Acknowledged     Tried to call pt to confirm he receive the phone number and to answer any questions he may have. Pt did not answer left a VM.

## 2025-01-29 NOTE — PROGRESS NOTES
PRE-OPERATIVE EVALUATION  [facility]    NAME: Oracio Richardson Jr.  AGE: 69 y.o. SEX: male  : 1955     DATE: 2025    Internal Medicine Pre-Operative Evaluation      Chief Complaint: Pre-operative Evaluation     Surgery: 25  Anticipated Date of Surgery:  EXCISION LIPOMA (arms bilaterally, legs bilaterally, abdomen, low back) (Bilateral: Back)       EXCISION BIOPSY LESION FACIAL (forehead) (Face)       EXCISION CYST DERMOID (back) (Back)   Referring Provider: Deniz Campa*       History of Present Illness:     Oracio Richardson Jr. is a 69 y.o. male who presents to the office today for a preoperative consultation at the request of surgeon Dr Hu who plans on performing  EXCISION LIPOMA (arms bilaterally, legs bilaterally, abdomen, low back) (Bilateral: Back)       EXCISION BIOPSY LESION FACIAL (forehead) (Face)       EXCISION CYST DERMOID (back) (Back)  on 25 Planned anesthesia is general. Patient has a bleeding risk of: no recent abnormal bleeding. Patient does not have objections to receiving blood products if needed. Current anti-platelet/anti-coagulation medications that the patient is prescribed includes: Apixaban (Eliquis) (he is not compliant with his medication, takes twice weekly)      Assessment of Chronic Conditions:   - Diabetes Mellitus:     - hx of atrial fibrillation current EKG NSR     Assessment of Cardiac Risk:  Denies unstable or severe angina or MI in the last 6 weeks or history of stent placement in the last year   Denies decompensated heart failure (e.g. New onset heart failure, NYHA functional class IV heart failure, or worsening existing heart failure)  Denies significant arrhythmias such as high grade AV block, symptomatic ventricular arrhythmia, newly recognized ventricular tachycardia, supraventricular tachycardia with resting heart rate >100, or symptomatic bradycardia  Denies severe heart valve disease including aortic stenosis or  symptomatic mitral stenosis     Exercise Capacity:  Able to walk 4 blocks without symptoms?: Yes  Able to walk 2 flights without symptoms?: Yes, does have chronic knee pain     Prior Anesthesia Reactions: No     Personal history of venous thromboembolic disease? Yes, Right lower leg, 10-20 years ago, he cannot recall     History of steroid use for >2 weeks within last year? No    STOP-BANG Sleep Apnea Screening Questionnaire:         Review of Systems:     Review of Systems   Constitutional:  Negative for activity change, chills, fatigue and fever.   HENT:  Negative for congestion, ear discharge, ear pain, sinus pressure, sinus pain, sore throat, tinnitus and trouble swallowing.    Eyes:  Negative for photophobia, pain, discharge, itching and visual disturbance.   Respiratory:  Negative for cough, chest tightness, shortness of breath and wheezing.    Cardiovascular:  Negative for chest pain and leg swelling.   Gastrointestinal:  Negative for abdominal distention, abdominal pain, constipation, diarrhea, nausea and vomiting.   Endocrine: Negative for polydipsia, polyphagia and polyuria.   Genitourinary:  Negative for dysuria and frequency.   Musculoskeletal:  Negative for arthralgias, myalgias, neck pain and neck stiffness.   Skin:  Negative for color change.   Neurological:  Negative for dizziness, syncope, weakness, numbness and headaches.   Hematological:  Does not bruise/bleed easily.   Psychiatric/Behavioral:  Negative for behavioral problems, confusion, self-injury, sleep disturbance and suicidal ideas. The patient is not nervous/anxious.        Current Problem List:     Patient Active Problem List   Diagnosis    Urinary frequency    Type 2 diabetes mellitus with microalbuminuria, with long-term current use of insulin ([facility])    Microscopic hematuria    Urge incontinence of urine    Nocturnal enuresis    Feeling of incomplete bladder emptying    Balanitis    PAF (paroxysmal atrial fibrillation) ([facility])    Urinary  "retention    Fungal infection of skin    Depressed mood    Pain in both lower extremities    Polyuria    Ventral Hernia    Hydroureteronephrosis    Type 2 diabetes mellitus, with long-term current use of insulin ([facility])    Vitamin D deficiency    Hyperlipidemia    History of pulmonary embolus (PE)    Other dietary vitamin B12 deficiency anemia    Encounter for screening colonoscopy    Anemia    Unspecified mood (affective) disorder ([facility])    Other male erectile dysfunction    Osteoarthritis of both knees    Obesity, Class III, BMI 40-49.9 (morbid obesity) ([facility])    Nonrheumatic mitral valve regurgitation    Elevated PSA    Multiple lipomas    Fibroepithelial polyp    Epidermoid cyst       Allergies:     No Known Allergies    Physical Exam:       Current Outpatient Medications:     insulin lispro (HumaLOG KwikPen) 100 units/mL injection pen, Inject 10 Units under the skin 3 (three) times a day with meals (Patient taking differently: Inject 10 Units under the skin 3 (three) times a day with meals As needed), Disp: 24 mL, Rfl: 2    apixaban (Eliquis) 5 mg, Take 1 tablet (5 mg total) by mouth in the morning (Patient not taking: Reported on 1/29/2025), Disp: 90 tablet, Rfl: 2    atorvastatin (LIPITOR) 20 mg tablet, Take 1 tablet (20 mg total) by mouth daily (Patient not taking: Reported on 1/29/2025), Disp: 90 tablet, Rfl: 2    Tirzepatide (Mounjaro) 5 MG/0.5ML SOAJ, Inject 5 mg under the skin once a week (Patient not taking: Reported on 1/29/2025), Disp: 6 mL, Rfl: 0    Past Medical History:       Past Medical History:   Diagnosis Date    A-fib ([facility])     per pt \"happened one time\"    Abdominal hernia     Acute metabolic encephalopathy 09/04/2022    CARLTON (acute kidney injury) ([facility]) 09/04/2022    Ambulates with cane     not recently using but has in home if needed    Anesthesia     per pt \"with knee surgery(in the )was awake for operation and fell asleep when being closed up\" and than took long to wake up\"    Arthritis " "    Blood in urine     \"sometimes\"    Colon polyp     Diabetes mellitus (HCC)     Type 2--sees Endocrinologist KAELYN Shaver    Lane catheter in place     History of pulmonary embolism     Implantable loop recorder present     Muscle weakness     per pt \"started with diabetes\"some muscle loss of density\" --per pt \"muscle coming back\"    Myopia of left eye     glasses for reading    Obesity     Pancreatitis 09/04/2022    Risk for falls     Sepsis (HCC) 09/04/2022    Teeth missing     Urethral pain     \"when passing urine\"    Urinary tract infection         Past Surgical History:   Procedure Laterality Date    CARDIAC LOOP RECORDER      CATARACT EXTRACTION Bilateral     COLONOSCOPY      KNEE CARTILAGE SURGERY      KNEE SURGERY Right     VT TRURL ELECTROSURG RESCJ PROSTATE BLEED COMPLETE N/A 03/16/2023    Procedure: TRANSURETHRAL RESECTION OF PROSTATE (TURP)(possible), cystoscopy, SP tube placement, removal of bladder calculus;  Surgeon: Ludwig King MD;  Location: [facility];  Service: Urology        Family History   Problem Relation Age of Onset    Diabetes Father     Diabetes type II Father     Diabetes Mother         Social History     Socioeconomic History    Marital status: /Civil Union     Spouse name: Not on file    Number of children: Not on file    Years of education: Not on file    Highest education level: Not on file   Occupational History    Not on file   Tobacco Use    Smoking status: Never     Passive exposure: Never    Smokeless tobacco: Never   Vaping Use    Vaping status: Never Used   Substance and Sexual Activity    Alcohol use: Never    Drug use: Never    Sexual activity: Not Currently     Comment: defer   Other Topics Concern    Not on file   Social History Narrative    Not on file     Social Drivers of Health     Financial Resource Strain: Low Risk  (11/8/2023)    Overall Financial Resource Strain (CARDIA)     Difficulty of Paying Living Expenses: Not very hard   Food Insecurity: No Food " Insecurity (11/12/2024)    Hunger Vital Sign     Worried About Running Out of Food in the Last Year: Never true     Ran Out of Food in the Last Year: Never true   Transportation Needs: No Transportation Needs (11/12/2024)    PRAPARE - Transportation     Lack of Transportation (Medical): No     Lack of Transportation (Non-Medical): No   Physical Activity: Not on file   Stress: Not on file   Social Connections: Not on file   Intimate Partner Violence: Not on file   Housing Stability: Low Risk  (11/12/2024)    Housing Stability Vital Sign     Unable to Pay for Housing in the Last Year: No     Number of Times Moved in the Last Year: 0     Homeless in the Last Year: No        Physical Exam:     Physical Exam  Constitutional:       Appearance: Normal appearance.   HENT:      Head: Normocephalic and atraumatic.   Cardiovascular:      Rate and Rhythm: Normal rate and regular rhythm.      Pulses: Normal pulses.      Heart sounds: Normal heart sounds.   Pulmonary:      Effort: Pulmonary effort is normal.      Breath sounds: Normal breath sounds.   Musculoskeletal:      Cervical back: Normal range of motion.   Skin:     General: Skin is warm.      Comments: Mulitple fatty deposit to arm, back, leg, abdomen   Neurological:      General: No focal deficit present.      Mental Status: He is alert and oriented to person, place, and time.   Psychiatric:         Mood and Affect: Mood normal.         Behavior: Behavior normal.          Data:     Pre-operative work-up  CBC:   Results from last 6 Months   Lab Units 01/22/25  1140   WBC Thousand/uL 12.55*   RBC Million/uL 5.51   HEMOGLOBIN g/dL 15.7   HEMATOCRIT % 48.5   MCV fL 88   MCH pg 28.5   MCHC g/dL 32.4   RDW % 13.2   MPV fL 12.6   PLATELETS Thousands/uL 187   NRBC AUTO /100 WBCs 0   SEGS PCT % 81*   LYMPHO PCT % 15   MONO PCT % 4   EOS PCT % 0   BASOS PCT % 0   TOTAL NEUT ABS Thousands/µL 10.04*   LYMPHS ABS Thousands/µL 1.90   MONOS ABS Thousand/µL 0.53   EOS ABS Thousand/µL  0.02     Chemistry Profile:   Results from last 6 Months   Lab Units 01/22/25  1140 12/23/24  0917 11/11/24  0915   POTASSIUM mmol/L 4.2   < > 4.5   CHLORIDE mmol/L 101   < > 106   CO2 mmol/L 22   < > 26   BUN mg/dL 26*   < > 25   CREATININE mg/dL 0.95   < > 1.12   GLUCOSE FASTING mg/dL 189*   < >  --    CALCIUM mg/dL 9.5   < > 9.4   PHOSPHORUS mg/dL  --   --  3.6   AST U/L 39   < > 32   ALT U/L 28   < > 21   ALK PHOS U/L 106*   < > 95   EGFR ml/min/1.73sq m 80   < > 70    < > = values in this interval not displayed.     Coagulation Studies:   Results from last 6 Months   Lab Units 11/11/24  0915   INR  0.9     Endocrine Studies:   Results from last 6 Months   Lab Units 01/22/25  1140   HEMOGLOBIN A1C % 8.4*   TSH 3RD GENERATON uIU/mL 1.027       EKG: EKG: normal EKG, normal sinus rhythm, 1/28/25 completed. .    Chest x-ray: none on chart     Previous cardiopulmonary studies within the past year:  Echocardiogram: 2022  Cardiac Catheterization: n/a  Stress Test: n/a  Pulmonary Function Testing: n/a      Assessment & Recommendations:     1. Multiple lipomas  Ambulatory referral to Internal Medicine      2. Pre-operative cardiovascular examination  Ambulatory referral to Internal Medicine          Pre-Op Evaluation Assessment  69 y.o. male with planned surgery:  EXCISION LIPOMA (arms bilaterally, legs bilaterally, abdomen, low back) (Bilateral: Back)       EXCISION BIOPSY LESION FACIAL (forehead) (Face)       EXCISION CYST DERMOID (back) (Back)   Known risk factors for perioperative complications: Diabetes mellitus.      Cardiac Risk Estimation: per the Revised Cardiac Risk Index (Circ. 100:1043, 1999), the patient's risk factors for cardiac complications include on insulin, putting him in: RCI RISK CLASS II (1 risk factor, risk of major cardiac compl. appr. 1.3%).    Current medications which may produce withdrawal symptoms if withheld perioperatively: none.    Pre-Op Evaluation Plan  1. Further preoperative workup as  follows:   - None; no further preoperative work-up is required    2. Change in medication regimen before surgery:   - Insulin Management: recommended morning case ,check glucose upon arrival, follow hospital protocol. AIC elevated, aware of decrease healing time     3. Prophylaxis for cardiac events with perioperative beta-blockers: not indicated.    4. Patient requires further consultation with: None    Clearance  Patient is CLEARED for surgery without any additional cardiac testing.  Discussed with patient's primary PCP, Dr. Barnes.     Lynn Hogue DNP  [facility]  [address] [address]  Phone#  [phone]  Fax#  [phone]

## 2025-01-29 NOTE — ASSESSMENT & PLAN NOTE
-Counseled patient on dietary and lifestyle modifications  -Will reinitiate atorvastatin 20 mg daily.

## 2025-01-29 NOTE — TELEPHONE ENCOUNTER
Pt scheduled with surgery 2/18/25-   EXCISION LIPOMA (arms bilaterally, legs bilaterally, abdomen, low back) (Bilateral: Back)       EXCISION BIOPSY LESION FACIAL (forehead) (Face)       EXCISION CYST DERMOID (back) (Back)     Pt calling in to inquire if he is able to have colonoscopy the following day 2/19/25.    Pt does not want to cancel either appointment. Informed pt this CTS was unsure if this would be okay due to general anesthesia/sedation.    Please advise. Call back # [phone]

## 2025-01-29 NOTE — ASSESSMENT & PLAN NOTE
-Patient understands my recommendations for reinitiation of beta-blocker therapy however at this time he notes feeling well and does not wish to restart he also understands recommendations for therapeutic anticoagulation but at this time wishes to avoid and understands risks.  -He is interested in hearing about possible Watchman procedure and will give referral to electrophysiology as this can assist with him and being off full anticoagulation.

## 2025-01-29 NOTE — TELEPHONE ENCOUNTER
Pt called in to return phone call that he had to hang up during a previous call. Triage nurse read back message the office left for the pt.

## 2025-01-29 NOTE — TELEPHONE ENCOUNTER
Pt transferred to nurses line.    Pt has colonoscopy scheduled with GI on 2/19/25 but has surgery with gen surg on 2/18/25 and is asking if okay to proceed with colonoscopy the following day. I explained he would need to ask his surgeon if that is okay as I am not sure what his surgery entails. Pt would prefer to keep appt for colonoscopy as if possible. I transferred pt to gen surg nurse to assist. She will contact surgeon as well.

## 2025-01-29 NOTE — TELEPHONE ENCOUNTER
Pt called and states he was seen today and was interested in doing the DNA answers. I suggested he go on [facility] to get the info but pt states he doesn't use computers. Called the office and she provided a phone number for the pt to call.     Got back on with pt and he had another call coming in and said he will call back.     When he calls back, provide him with [phone]. He can call them and tell them he is a [facility] patient and is interested in signing up for the DNA answers. Thanks.

## 2025-01-30 ENCOUNTER — TELEPHONE (OUTPATIENT)
Dept: SURGERY | Facility: CLINIC | Age: 70
End: 2025-01-30

## 2025-01-30 NOTE — TELEPHONE ENCOUNTER
Spoke with patient and he states that the surgery is more important than his colonoscopy and his will reschedule his colonoscopy

## 2025-01-30 NOTE — TELEPHONE ENCOUNTER
Dental office calling to r/s colonoscopy 2 weeks out since the pt will be undergoing dental surgery. R/s for March 5th, BROOKE Broderick.

## 2025-01-30 NOTE — TELEPHONE ENCOUNTER
Called and left a voicemail on the patient's cell phone that it was okay from a surgical perspective for him to have lipoma excision and then a colonoscopy the next day.

## 2025-01-31 ENCOUNTER — NURSE TRIAGE (OUTPATIENT)
Dept: OTHER | Facility: OTHER | Age: 70
End: 2025-01-31

## 2025-01-31 ENCOUNTER — OFFICE VISIT (OUTPATIENT)
Dept: FAMILY MEDICINE CLINIC | Facility: CLINIC | Age: 70
End: 2025-01-31
Payer: COMMERCIAL

## 2025-01-31 VITALS
RESPIRATION RATE: 18 BRPM | HEART RATE: 91 BPM | HEIGHT: 71 IN | DIASTOLIC BLOOD PRESSURE: 80 MMHG | OXYGEN SATURATION: 98 % | WEIGHT: 290 LBS | SYSTOLIC BLOOD PRESSURE: 130 MMHG | TEMPERATURE: 96.8 F | BODY MASS INDEX: 40.6 KG/M2

## 2025-01-31 DIAGNOSIS — H10.32 ACUTE BACTERIAL CONJUNCTIVITIS OF LEFT EYE: Primary | ICD-10-CM

## 2025-01-31 PROCEDURE — 99213 OFFICE O/P EST LOW 20 MIN: CPT | Performed by: FAMILY MEDICINE

## 2025-01-31 PROCEDURE — G2211 COMPLEX E/M VISIT ADD ON: HCPCS | Performed by: FAMILY MEDICINE

## 2025-01-31 RX ORDER — GENTAMICIN SULFATE 3 MG/ML
1 SOLUTION/ DROPS OPHTHALMIC EVERY 4 HOURS
Qty: 5 ML | Refills: 1 | Status: SHIPPED | OUTPATIENT
Start: 2025-01-31

## 2025-01-31 NOTE — TELEPHONE ENCOUNTER
"Reason for Disposition   [1] Bacterial conjunctivitis suspected (e.g., white, yellow or green discharge nearly all day long; or eyelids stuck shut from discharge after sleep) AND [2] NO doctor standing order to call in antibiotic eye drops  (Exception: Yenifer; continue triage.)    Answer Assessment - Initial Assessment Questions  1. EYE DISCHARGE: \"Is the discharge in one or both eyes?\" \"What color is it?\" \"How much is there?\" \"When did the discharge start?\"       Left eye - reports green/yellow discharge     2. REDNESS OF SCLERA: \"Is there redness in the white of the eye?\" If Yes, ask: \"Is it in one or both eyes?\" \"When did the redness start?\"      Denies     3. EYELIDS: \"Are the eyelids red or swollen?\" If Yes, ask: \"How much?\"       Denies     4. VISION: \"Do you have blurred vision?\"      Denies     5. PAIN: \"Is there any pain?\" If Yes, ask: \"How bad is the pain?\" (Scale 0-10; or none, mild, moderate, severe)      Yes - \"it lasted for about 2-3 hours, felt like a tremendous migraine in that eye. But it went away at 3 o'clock\"     6. CONTACT LENS: \"Do you wear contacts?\"      Denies     7. OTHER SYMPTOMS: \"Do you have any other symptoms?\" (e.g., fever, runny nose, cough)      \"I have a little of the sniffles.\"    Protocols used: Eye - Pus or Discharge-Adult-AH    "

## 2025-01-31 NOTE — ASSESSMENT & PLAN NOTE
Patient presents for swollen red infected left eye.  He notes that he was doing a lot of work at the house and while he was standing dust got into his eye and he subsequently rubbed his eye as well to try and clear it and developed swelling redness and discomfort.  On examination he has conjunctival hyperemia with mucus exudates under the lids and along the lid margins.  I will provide him with antibiotic eyedrops now and he was instructed to contact me Monday if not improving    Orders:    gentamicin (GARAMYCIN) 0.3 % ophthalmic solution; Administer 1 drop into the left eye every 4 (four) hours

## 2025-01-31 NOTE — PROGRESS NOTES
Name: Oracio Richardson Jr.      : 1955      MRN: [id number]  Encounter Provider: Raymundo Barnes DO  Encounter Date: 2025   Encounter department: [facility]  :  Assessment & Plan  Acute bacterial conjunctivitis of left eye  Patient presents for swollen red infected left eye.  He notes that he was doing a lot of work at the house and while he was standing dust got into his eye and he subsequently rubbed his eye as well to try and clear it and developed swelling redness and discomfort.  On examination he has conjunctival hyperemia with mucus exudates under the lids and along the lid margins.  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 Pertinent negatives include no fever, nausea or weakness.   Eye Pain   Associated symptoms include an eye discharge and eye redness. Pertinent negatives include no fever, nausea or weakness.     Review of Systems   Constitutional:  Negative for chills, fatigue and fever.   HENT:  Negative for congestion, nosebleeds, rhinorrhea, sinus pressure and sore throat.    Eyes:  Positive for pain, discharge and redness.   Respiratory:  Negative for cough and shortness of breath.    Cardiovascular:  Negative for chest pain, palpitations and leg swelling.   Gastrointestinal:  Negative for abdominal pain, blood in stool and nausea.   Endocrine: Negative for cold intolerance, heat intolerance and polyuria.   Genitourinary:  Negative for dysuria and frequency.   Musculoskeletal:  Negative for arthralgias, back pain and myalgias.   Skin:   "Negative for rash.   Neurological:  Negative for dizziness, weakness and headaches.   Hematological:  Negative for adenopathy.   Psychiatric/Behavioral:  Negative for behavioral problems and sleep disturbance. The patient is not nervous/anxious.        Objective   /80 (BP Location: Left arm, Patient Position: Sitting, Cuff Size: Large)   Pulse 91   Temp (!) 96.8 °F (36 °C) (Tympanic)   Resp 18   Ht 5' 11\" (1.803 m)   Wt 132 kg (290 lb) Comment: pt reported  SpO2 98%   BMI 40.45 kg/m²      Physical Exam  Vitals and nursing note reviewed.   Constitutional:       Appearance: Normal appearance. He is well-developed. He is obese.   HENT:      Head: Normocephalic and atraumatic.      Right Ear: External ear normal.      Left Ear: External ear normal.      Nose: Nose normal.   Eyes:      General: No scleral icterus.        Left eye: Discharge present.     Conjunctiva/sclera: Conjunctivae normal.      Pupils: Pupils are equal, round, and reactive to light.      Comments: Left eye inflamed red with swollen upper lid and exudates on lid margins conjunctival hyperemia   Neck:      Thyroid: No thyromegaly.      Vascular: No JVD.   Cardiovascular:      Rate and Rhythm: Normal rate and regular rhythm.      Heart sounds: Normal heart sounds. No murmur heard.  Pulmonary:      Effort: Pulmonary effort is normal.      Breath sounds: Normal breath sounds. No wheezing or rales.   Chest:      Chest wall: No tenderness.   Abdominal:      General: Bowel sounds are normal. There is no distension.      Palpations: Abdomen is soft. There is no mass.      Tenderness: There is no abdominal tenderness. There is no guarding or rebound.   Musculoskeletal:         General: No tenderness or deformity. Normal range of motion.      Cervical back: Normal range of motion and neck supple.   Lymphadenopathy:      Cervical: No cervical adenopathy.   Skin:     General: Skin is warm and dry.      Findings: No erythema or rash.   Neurological: "      Mental Status: He is alert and oriented to person, place, and time.      Cranial Nerves: No cranial nerve deficit.      Deep Tendon Reflexes: Reflexes are normal and symmetric. Reflexes normal.   Psychiatric:         Behavior: Behavior normal.         Thought Content: Thought content normal.         Judgment: Judgment normal.

## 2025-01-31 NOTE — PATIENT INSTRUCTIONS
"Patient Education     Conjunctivitis (pink eye)   The Basics   Written by the doctors and editors at [facility]   What is pink eye? -- Pink eye is a term people use to describe an infection or irritation of the eye. The medical term for pink eye is \"conjunctivitis.\"  If you have pink eye, your eye (or eyes) might:   Turn pink or red   Weep or ooze a gooey liquid   Become itchy or burn   Get stuck shut, especially when you first wake up  Pink eye can be caused by an infection, allergies, or an unknown irritation.  Can you catch pink eye from someone else? -- Yes. When pink eye is caused by an infection, it can spread easily. Usually, people catch it from touching something that has been in contact with an infected person's eye. It can also be spread when an infected person touches someone else, and then that person touches their eye.  If someone you know has pink eye, avoid touching their pillowcases, towels, or other personal items.  When should I see a doctor or nurse? -- See your doctor or nurse if your eye hurts, or if you still have trouble seeing clearly after blinking. If you do not have these problems, but think you might have pink eye, your doctor or nurse might be able to give you advice over the phone.  Can pink eye be treated? -- Most cases of pink eye go away on their own without treatment. But some types of pink eye can be treated.  When pink eye is caused by infection, it is usually caused by a virus, so antibiotics will not help. Still, pink eye caused by a virus can last several days.   Pink eye caused by an infection with bacteria can be treated with antibiotic eye drops, gel, or ointment.   Pink eye caused by other problems can be treated with eye drops normally used to treat allergies. These drops will not cure the pink eye, but they can help with itchiness and irritation.  When using eye drops for infection, do not touch your healthy eye after touching your infected eye. Also, do not touch the " "bottle or dropper directly onto 1 eye and then use it in the other. These things can cause the infection to spread from 1 eye to the other.  If your eyelids feel swollen, it might also help to hold a cool wet cloth on the area.  What if I wear contact lenses? -- If you wear contact lenses and you have symptoms of pink eye, it is really important to have a doctor look at your eyes. In people who wear contacts, the symptoms of pink eye can be caused by \"corneal abrasion.\" Corneal abrasion is a scratch on the eye and can be a serious problem.  During treatment for eye infections, you might need to stop wearing your contacts for a short time. If your contacts are disposable, throw them away and use new ones. If your contacts are not disposable, you need to carefully clean them. You should also throw away your contact lens case and get a new one.  When can I go back to work or school? -- If you have pink eye caused by an infection, remember that it can spread very easily. The best way to avoid spreading it is to stay away from other people until you no longer have symptoms. If this is not possible, wash your hands often (figure 1). It's also important to avoid touching your eyes and sharing items that could spread the infection.  Schools and day cares usually have rules about when a child with pink eye can return. If a child has a bacterial infection, they will probably need to stay home until they have gotten antibiotic eye drops or ointment for 24 hours.  Can pink eye be prevented? -- To keep from getting or spreading pink eye caused by an infection:   Wash your hands often with soap and water.   Try not to touch your eyes.   Avoid sharing towels, bedding, or other personal items with a person who has pink eye.  If your pink eye is caused by allergies, it might help to stay inside with the windows shut as much as possible during peak allergy seasons.  What problems should I watch for? -- Call your doctor or nurse if:   " You have trouble seeing clearly after blinking.   Your eye is still red or has drainage after 3 days.   You have eye pain that is getting worse.  All topics are updated as new evidence becomes available and our peer review process is complete.  This topic retrieved from [employer] on: Feb 28, 2024.  Topic 98752 Version 20.0  Release: 32.2.4 - C32.58  © 2024 UpToDate, Inc. and/or its affiliates. All rights reserved.  figure 1: How to wash your hands     Wet your hands with clean water, and apply a small amount of soap. Lather and rub hands together for at least 20 seconds. Clean your wrists, palms, backs of your hands, between your fingers, tips of your fingers, thumbs, and under and around your nails. Rinse well, and dry your hands using a clean towel.  Graphic 712993 Version 7.0  Consumer Information Use and Disclaimer   Disclaimer: This generalized information is a limited summary of diagnosis, treatment, and/or medication information. It is not meant to be comprehensive and should be used as a tool to help the user understand and/or assess potential diagnostic and treatment options. It does NOT include all information about conditions, treatments, medications, side effects, or risks that may apply to a specific patient. It is not intended to be medical advice or a substitute for the medical advice, diagnosis, or treatment of a health care provider based on the health care provider's examination and assessment of a patient's specific and unique circumstances. Patients must speak with a health care provider for complete information about their health, medical questions, and treatment options, including any risks or benefits regarding use of medications. This information does not endorse any treatments or medications as safe, effective, or approved for treating a specific patient. UpToDate, Inc. and its affiliates disclaim any warranty or liability relating to this information or the use thereof.The use of this  information is governed by the Terms of Use, available at https://www.wolterskluwer.com/en/know/clinical-effectiveness-terms. 2024© [employer] and its affiliates and/or licensors. All rights reserved.  Copyright   © 2024 [employer] and/or its affiliates. All rights reserved.

## 2025-02-03 NOTE — H&P
H&P Exam - General Surgery   Oracio Richardson Jr. 69 y.o. male MRN: [id number]   Encounter: [id number]    Assessment & Plan    Multiple lipomas  Presents with painful lipomas of both arms, both thighs, abdomen, and low back. On exam these lesions are identified and consistent with lipomas. He has a small umbilical hernia, but above this appears to be a separate abdominal wall lipoma. Details of lipoma excision under general anesthesia reviewed including risks related to anesthesia, bleeding, infection, recurrence, and scarring. Informed electronic consent obtained to proceed.    Fibroepithelial polyp  Presents with skin neoplasm of the forehead x 2. 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 As he will be undergoing general anesthetic for his lipoma removal will plan for skin excision x 2 of the forehead at the same time. Informed electronic consent obtained.    Epidermoid cyst  Small skin cyst of the upper back noted. Likely epidermoid in nature. Within the planned operative field for lipoma removal, will plan cyst excision during same procedure/anesthetic. Informed electronic consent obtained.  Diagnoses and all orders for this visit:    Multiple lipomas  -     Ambulatory Referral to General Surgery  -     Case request operating room: EXCISION LIPOMA (arms bilaterally, legs bilaterally, abdomen, low back), EXCISION BIOPSY LESION FACIAL (forehead), EXCISION CYST DERMOID (back); Standing  -     CBC and Platelet; Future  -     Basic metabolic panel; Future  -     EKG 12 lead; Future  -     XR chest pa and lateral; Future  -     Ambulatory referral to Internal Medicine; Future  -     Case request operating room: EXCISION LIPOMA (arms bilaterally, legs bilaterally, abdomen, low back), EXCISION BIOPSY LESION FACIAL (forehead), EXCISION CYST DERMOID (back)  -     Ambulatory Referral to Cardiology; Future    Fibroepithelial polyp  -     Case request operating room: EXCISION LIPOMA (arms  "bilaterally, legs bilaterally, abdomen, low back), EXCISION BIOPSY LESION FACIAL (forehead), EXCISION CYST DERMOID (back); Standing  -     Case request operating room: EXCISION LIPOMA (arms bilaterally, legs bilaterally, abdomen, low back), EXCISION BIOPSY LESION FACIAL (forehead), EXCISION CYST DERMOID (back)    Epidermoid cyst  -     Case request operating room: EXCISION LIPOMA (arms bilaterally, legs bilaterally, abdomen, low back), EXCISION BIOPSY LESION FACIAL (forehead), EXCISION CYST DERMOID (back); Standing  -     Case request operating room: EXCISION LIPOMA (arms bilaterally, legs bilaterally, abdomen, low back), EXCISION BIOPSY LESION FACIAL (forehead), EXCISION CYST DERMOID (back)    Pre-operative cardiovascular examination  -     EKG 12 lead; Future  -     XR chest pa and lateral; Future  -     Ambulatory referral to Internal Medicine; Future  -     Ambulatory Referral to Cardiology; Future    Pre-operative laboratory examination  -     CBC and Platelet; Future  -     Basic metabolic panel; Future    Other orders  -     Diet NPO; Sips with meds; Standing  -     Apply Sequential Compression Device; Standing  -     Place sequential compression device; Standing  -     chlorhexidine gluconate (HIBICLENS) 4 % topical liquid  -     Vital signs; Standing  -     Height and weight upon arrival; Standing  -     Fingerstick Glucose (POCT); Standing  -     Insert and maintain IV line; Standing  -     Void; Standing  -     lactated ringers infusion  -     ceFAZolin (ANCEF) 3,000 mg in sodium chloride 0.9 % 50 mL IVPB        History of Present Illness   Chief Complaint   Patient presents with    Consult     multiple lipomas     HPI    Review of Systems   All other systems reviewed and are negative.      Historical Information   Past Medical History:   Diagnosis Date    A-fib (HCC)     per pt \"happened one time\"    Abdominal hernia     Acute metabolic encephalopathy 09/04/2022    CARLTON (acute kidney injury) (HCC) " "09/04/2022    Ambulates with cane     not recently using but has in home if needed    Anesthesia     per pt \"with knee surgery(in the )was awake for operation and fell asleep when being closed up\" and than took long to wake up\"    Arthritis     Blood in urine     \"sometimes\"    Colon polyp     Diabetes mellitus (HCC)     Type 2--sees Endocrinologist KAELYN Shaver    Lane catheter in place     History of pulmonary embolism     Implantable loop recorder present     Muscle weakness     per pt \"started with diabetes\"some muscle loss of density\" --per pt \"muscle coming back\"    Myopia of left eye     glasses for reading    Obesity     Pancreatitis 09/04/2022    Risk for falls     Sepsis (HCC) 09/04/2022    Teeth missing     Urethral pain     \"when passing urine\"    Urinary tract infection      Past Surgical History:   Procedure Laterality Date    CARDIAC LOOP RECORDER      CATARACT EXTRACTION Bilateral     COLONOSCOPY      KNEE CARTILAGE SURGERY      KNEE SURGERY Right     IL TRURL ELECTROSURG RESCJ PROSTATE BLEED COMPLETE N/A 03/16/2023    Procedure: TRANSURETHRAL RESECTION OF PROSTATE (TURP)(possible), cystoscopy, SP tube placement, removal of bladder calculus;  Surgeon: Ludwig King MD;  Location: [facility];  Service: Urology     Social History   Social History     Substance and Sexual Activity   Alcohol Use Never     Social History     Substance and Sexual Activity   Drug Use Never     Social History     Tobacco Use   Smoking Status Never    Passive exposure: Never   Smokeless Tobacco Never     Family History   Problem Relation Age of Onset    Diabetes Father     Diabetes type II Father     Diabetes Mother        Meds/Allergies     Current Outpatient Medications:     apixaban (Eliquis) 5 mg, Take 1 tablet (5 mg total) by mouth in the morning (Patient taking differently: Take 5 mg by mouth see administration instructions Patient reports taking one tablet twice a week), Disp: 90 tablet, Rfl: 2    atorvastatin " "(LIPITOR) 20 mg tablet, Take 1 tablet (20 mg total) by mouth daily (Patient taking differently: Take 20 mg by mouth see administration instructions Patient reports only taking one tablet twice a week), Disp: 90 tablet, Rfl: 2    insulin lispro (HumaLOG KwikPen) 100 units/mL injection pen, Inject 10 Units under the skin 3 (three) times a day with meals (Patient taking differently: Inject 10 Units under the skin 3 (three) times a day with meals Patient reports taking on a sliding scale only as needed), Disp: 24 mL, Rfl: 2    gentamicin (GARAMYCIN) 0.3 % ophthalmic solution, Administer 1 drop into the left eye every 4 (four) hours, Disp: 5 mL, Rfl: 1  No Known Allergies    The following portions of the patient's history were reviewed and updated as appropriate: allergies, current medications, past family history, past medical history, past social history, past surgical history, and problem list.    Objective   Current Vitals:   Blood pressure 108/80, pulse 94, temperature 98 °F (36.7 °C), temperature source Temporal, height 5' 11\" (1.803 m), weight 134 kg (295 lb), SpO2 98%.    Physical Exam  Constitutional:       General: He is not in acute distress.     Appearance: He is well-developed. He is not diaphoretic.   HENT:      Head: Normocephalic and atraumatic.   Eyes:      Pupils: Pupils are equal, round, and reactive to light.   Pulmonary:      Effort: No respiratory distress.   Musculoskeletal:         General: Normal range of motion.      Cervical back: Normal range of motion.   Skin:     General: Skin is warm and dry.      Comments: Fibroepithelial polyp x 2 on the forehead. Epidermoid cyst to upper back. Numerous lipomas of various sizes affecting arms bilaterally, thighs bilaterally, abdomen, and back.   Neurological:      Mental Status: He is alert and oriented to person, place, and time.         Signature:  Deniz Campa PA-C  Date: 2/3/2025 Time: 1:19 PM     "

## 2025-02-03 NOTE — H&P (VIEW-ONLY)
H&P Exam - General Surgery   Oracio Richardson Jr. 69 y.o. male MRN: [id number]   Encounter: [id number]    Assessment & Plan    Multiple lipomas  Presents with painful lipomas of both arms, both thighs, abdomen, and low back. On exam these lesions are identified and consistent with lipomas. He has a small umbilical hernia, but above this appears to be a separate abdominal wall lipoma. Details of lipoma excision under general anesthesia reviewed including risks related to anesthesia, bleeding, infection, recurrence, and scarring. Informed electronic consent obtained to proceed.    Fibroepithelial polyp  Presents with skin neoplasm of the forehead x 2. 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 As he will be undergoing general anesthetic for his lipoma removal will plan for skin excision x 2 of the forehead at the same time. Informed electronic consent obtained.    Epidermoid cyst  Small skin cyst of the upper back noted. Likely epidermoid in nature. Within the planned operative field for lipoma removal, will plan cyst excision during same procedure/anesthetic. Informed electronic consent obtained.  Diagnoses and all orders for this visit:    Multiple lipomas  -     Ambulatory Referral to General Surgery  -     Case request operating room: EXCISION LIPOMA (arms bilaterally, legs bilaterally, abdomen, low back), EXCISION BIOPSY LESION FACIAL (forehead), EXCISION CYST DERMOID (back); Standing  -     CBC and Platelet; Future  -     Basic metabolic panel; Future  -     EKG 12 lead; Future  -     XR chest pa and lateral; Future  -     Ambulatory referral to Internal Medicine; Future  -     Case request operating room: EXCISION LIPOMA (arms bilaterally, legs bilaterally, abdomen, low back), EXCISION BIOPSY LESION FACIAL (forehead), EXCISION CYST DERMOID (back)  -     Ambulatory Referral to Cardiology; Future    Fibroepithelial polyp  -     Case request operating room: EXCISION LIPOMA (arms  "bilaterally, legs bilaterally, abdomen, low back), EXCISION BIOPSY LESION FACIAL (forehead), EXCISION CYST DERMOID (back); Standing  -     Case request operating room: EXCISION LIPOMA (arms bilaterally, legs bilaterally, abdomen, low back), EXCISION BIOPSY LESION FACIAL (forehead), EXCISION CYST DERMOID (back)    Epidermoid cyst  -     Case request operating room: EXCISION LIPOMA (arms bilaterally, legs bilaterally, abdomen, low back), EXCISION BIOPSY LESION FACIAL (forehead), EXCISION CYST DERMOID (back); Standing  -     Case request operating room: EXCISION LIPOMA (arms bilaterally, legs bilaterally, abdomen, low back), EXCISION BIOPSY LESION FACIAL (forehead), EXCISION CYST DERMOID (back)    Pre-operative cardiovascular examination  -     EKG 12 lead; Future  -     XR chest pa and lateral; Future  -     Ambulatory referral to Internal Medicine; Future  -     Ambulatory Referral to Cardiology; Future    Pre-operative laboratory examination  -     CBC and Platelet; Future  -     Basic metabolic panel; Future    Other orders  -     Diet NPO; Sips with meds; Standing  -     Apply Sequential Compression Device; Standing  -     Place sequential compression device; Standing  -     chlorhexidine gluconate (HIBICLENS) 4 % topical liquid  -     Vital signs; Standing  -     Height and weight upon arrival; Standing  -     Fingerstick Glucose (POCT); Standing  -     Insert and maintain IV line; Standing  -     Void; Standing  -     lactated ringers infusion  -     ceFAZolin (ANCEF) 3,000 mg in sodium chloride 0.9 % 50 mL IVPB        History of Present Illness   Chief Complaint   Patient presents with    Consult     multiple lipomas     HPI    Review of Systems   All other systems reviewed and are negative.      Historical Information   Past Medical History:   Diagnosis Date    A-fib (HCC)     per pt \"happened one time\"    Abdominal hernia     Acute metabolic encephalopathy 09/04/2022    CARLTON (acute kidney injury) (HCC) " "09/04/2022    Ambulates with cane     not recently using but has in home if needed    Anesthesia     per pt \"with knee surgery(in the )was awake for operation and fell asleep when being closed up\" and than took long to wake up\"    Arthritis     Blood in urine     \"sometimes\"    Colon polyp     Diabetes mellitus (HCC)     Type 2--sees Endocrinologist KAELYN Shaver    Lane catheter in place     History of pulmonary embolism     Implantable loop recorder present     Muscle weakness     per pt \"started with diabetes\"some muscle loss of density\" --per pt \"muscle coming back\"    Myopia of left eye     glasses for reading    Obesity     Pancreatitis 09/04/2022    Risk for falls     Sepsis (HCC) 09/04/2022    Teeth missing     Urethral pain     \"when passing urine\"    Urinary tract infection      Past Surgical History:   Procedure Laterality Date    CARDIAC LOOP RECORDER      CATARACT EXTRACTION Bilateral     COLONOSCOPY      KNEE CARTILAGE SURGERY      KNEE SURGERY Right     ND TRURL ELECTROSURG RESCJ PROSTATE BLEED COMPLETE N/A 03/16/2023    Procedure: TRANSURETHRAL RESECTION OF PROSTATE (TURP)(possible), cystoscopy, SP tube placement, removal of bladder calculus;  Surgeon: Ludwig King MD;  Location: [facility];  Service: Urology     Social History   Social History     Substance and Sexual Activity   Alcohol Use Never     Social History     Substance and Sexual Activity   Drug Use Never     Social History     Tobacco Use   Smoking Status Never    Passive exposure: Never   Smokeless Tobacco Never     Family History   Problem Relation Age of Onset    Diabetes Father     Diabetes type II Father     Diabetes Mother        Meds/Allergies     Current Outpatient Medications:     apixaban (Eliquis) 5 mg, Take 1 tablet (5 mg total) by mouth in the morning (Patient taking differently: Take 5 mg by mouth see administration instructions Patient reports taking one tablet twice a week), Disp: 90 tablet, Rfl: 2    atorvastatin " "(LIPITOR) 20 mg tablet, Take 1 tablet (20 mg total) by mouth daily (Patient taking differently: Take 20 mg by mouth see administration instructions Patient reports only taking one tablet twice a week), Disp: 90 tablet, Rfl: 2    insulin lispro (HumaLOG KwikPen) 100 units/mL injection pen, Inject 10 Units under the skin 3 (three) times a day with meals (Patient taking differently: Inject 10 Units under the skin 3 (three) times a day with meals Patient reports taking on a sliding scale only as needed), Disp: 24 mL, Rfl: 2    gentamicin (GARAMYCIN) 0.3 % ophthalmic solution, Administer 1 drop into the left eye every 4 (four) hours, Disp: 5 mL, Rfl: 1  No Known Allergies    The following portions of the patient's history were reviewed and updated as appropriate: allergies, current medications, past family history, past medical history, past social history, past surgical history, and problem list.    Objective   Current Vitals:   Blood pressure 108/80, pulse 94, temperature 98 °F (36.7 °C), temperature source Temporal, height 5' 11\" (1.803 m), weight 134 kg (295 lb), SpO2 98%.    Physical Exam  Constitutional:       General: He is not in acute distress.     Appearance: He is well-developed. He is not diaphoretic.   HENT:      Head: Normocephalic and atraumatic.   Eyes:      Pupils: Pupils are equal, round, and reactive to light.   Pulmonary:      Effort: No respiratory distress.   Musculoskeletal:         General: Normal range of motion.      Cervical back: Normal range of motion.   Skin:     General: Skin is warm and dry.      Comments: Fibroepithelial polyp x 2 on the forehead. Epidermoid cyst to upper back. Numerous lipomas of various sizes affecting arms bilaterally, thighs bilaterally, abdomen, and back.   Neurological:      Mental Status: He is alert and oriented to person, place, and time.         Signature:  Deniz Campa PA-C  Date: 2/3/2025 Time: 1:19 PM     "

## 2025-02-11 NOTE — PRE-PROCEDURE INSTRUCTIONS
Pre-Surgery Instructions:   Medication Instructions    apixaban (Eliquis) 5 mg Instructions provided by MD LEIJA 2/14    atorvastatin (LIPITOR) 20 mg tablet Hold day of surgery.    insulin lispro (HumaLOG KwikPen) 100 units/mL injection pen Hold day of surgery.   Medication instructions for day surgery reviewed. Please use only a sip of water to take your instructed medications. Avoid all over the counter vitamins, supplements and NSAIDS for one week prior to surgery per anesthesia guidelines. Tylenol is ok to take as needed.     You will receive a call one business day prior to surgery with an arrival time and hospital directions. If your surgery is scheduled on a Monday, the hospital will be calling you on the Friday prior to your surgery. If you have not heard from anyone by 8pm, please call the hospital supervisor through the hospital  at [phone]. ([place] [phone] or [place] [phone]).    Do not eat or drink anything after midnight the night before your surgery, including candy, mints, lifesavers, or chewing gum. Do not drink alcohol 24hrs before your surgery. Try not to smoke at least 24hrs before your surgery.       Follow the pre surgery showering instructions as listed in the “My Surgical Experience Booklet” or otherwise provided by your surgeon's office. Do not use a blade to shave the surgical area 1 week before surgery. It is okay to use a clean electric clippers up to 24 hours before surgery. Do not apply any lotions, creams, including makeup, cologne, deodorant, or perfumes after showering on the day of your surgery. Do not use dry shampoo, hair spray, hair gel, or any type of hair products.     No contact lenses, eye make-up, or artificial eyelashes. Remove nail polish, including gel polish, and any artificial, gel, or acrylic nails if possible. Remove all jewelry including rings and body piercing jewelry.     Wear causal clothing that is easy to take on and off. Consider your type  of surgery.    Keep any valuables, jewelry, piercings at home. Please bring any specially ordered equipment (sling, braces) if indicated.    Arrange for a responsible person to drive you to and from the hospital on the day of your surgery. Please confirm the visitor policy for the day of your procedure when you receive your phone call with an arrival time.     Call the surgeon's office with any new illnesses, exposures, or additional questions prior to surgery.    Please reference your “My Surgical Experience Booklet” for additional information to prepare for your upcoming surgery.

## 2025-02-18 ENCOUNTER — ANESTHESIA (OUTPATIENT)
Dept: PERIOP | Facility: HOSPITAL | Age: 70
End: 2025-02-18
Payer: COMMERCIAL

## 2025-02-18 ENCOUNTER — HOSPITAL ENCOUNTER (OUTPATIENT)
Facility: HOSPITAL | Age: 70
Setting detail: OUTPATIENT SURGERY
Discharge: HOME | End: 2025-02-18
Attending: SURGERY | Admitting: SURGERY
Payer: COMMERCIAL

## 2025-02-18 ENCOUNTER — ANESTHESIA EVENT (OUTPATIENT)
Dept: PERIOP | Facility: HOSPITAL | Age: 70
End: 2025-02-18
Payer: COMMERCIAL

## 2025-02-18 VITALS
BODY MASS INDEX: 39.2 KG/M2 | SYSTOLIC BLOOD PRESSURE: 126 MMHG | HEART RATE: 73 BPM | OXYGEN SATURATION: 92 % | TEMPERATURE: 97.5 F | HEIGHT: 71 IN | RESPIRATION RATE: 15 BRPM | DIASTOLIC BLOOD PRESSURE: 63 MMHG | WEIGHT: 280 LBS

## 2025-02-18 DIAGNOSIS — D17.9 MULTIPLE LIPOMAS: ICD-10-CM

## 2025-02-18 DIAGNOSIS — L72.0 EPIDERMOID CYST: ICD-10-CM

## 2025-02-18 DIAGNOSIS — L91.8 FIBROEPITHELIAL POLYP: ICD-10-CM

## 2025-02-18 LAB — GLUCOSE SERPL-MCNC: 143 MG/DL (ref 65–140)

## 2025-02-18 PROCEDURE — 11441 EXC FACE-MM B9+MARG 0.6-1 CM: CPT | Performed by: SURGERY

## 2025-02-18 PROCEDURE — 11403 EXC TR-EXT B9+MARG 2.1-3CM: CPT | Performed by: PHYSICIAN ASSISTANT

## 2025-02-18 PROCEDURE — 11441 EXC FACE-MM B9+MARG 0.6-1 CM: CPT | Performed by: PHYSICIAN ASSISTANT

## 2025-02-18 PROCEDURE — 11402 EXC TR-EXT B9+MARG 1.1-2 CM: CPT | Performed by: PHYSICIAN ASSISTANT

## 2025-02-18 PROCEDURE — 11401 EXC TR-EXT B9+MARG 0.6-1 CM: CPT | Performed by: PHYSICIAN ASSISTANT

## 2025-02-18 PROCEDURE — 11403 EXC TR-EXT B9+MARG 2.1-3CM: CPT | Performed by: SURGERY

## 2025-02-18 PROCEDURE — 11406 EXC TR-EXT B9+MARG >4.0 CM: CPT | Performed by: PHYSICIAN ASSISTANT

## 2025-02-18 PROCEDURE — 11406 EXC TR-EXT B9+MARG >4.0 CM: CPT | Performed by: SURGERY

## 2025-02-18 PROCEDURE — 11442 EXC FACE-MM B9+MARG 1.1-2 CM: CPT | Performed by: SURGERY

## 2025-02-18 PROCEDURE — 82948 REAGENT STRIP/BLOOD GLUCOSE: CPT

## 2025-02-18 PROCEDURE — 88304 TISSUE EXAM BY PATHOLOGIST: CPT | Performed by: PATHOLOGY

## 2025-02-18 PROCEDURE — 11402 EXC TR-EXT B9+MARG 1.1-2 CM: CPT | Performed by: SURGERY

## 2025-02-18 PROCEDURE — 11442 EXC FACE-MM B9+MARG 1.1-2 CM: CPT | Performed by: PHYSICIAN ASSISTANT

## 2025-02-18 PROCEDURE — 11401 EXC TR-EXT B9+MARG 0.6-1 CM: CPT | Performed by: SURGERY

## 2025-02-18 PROCEDURE — 88305 TISSUE EXAM BY PATHOLOGIST: CPT | Performed by: PATHOLOGY

## 2025-02-18 RX ORDER — CHLORHEXIDINE GLUCONATE 40 MG/ML
SOLUTION TOPICAL DAILY PRN
Status: DISCONTINUED | OUTPATIENT
Start: 2025-02-18 | End: 2025-02-18 | Stop reason: HOSPADM

## 2025-02-18 RX ORDER — SODIUM CHLORIDE, SODIUM LACTATE, POTASSIUM CHLORIDE, CALCIUM CHLORIDE 600; 310; 30; 20 MG/100ML; MG/100ML; MG/100ML; MG/100ML
INJECTION, SOLUTION INTRAVENOUS CONTINUOUS PRN
Status: DISCONTINUED | OUTPATIENT
Start: 2025-02-18 | End: 2025-02-18

## 2025-02-18 RX ORDER — ONDANSETRON 2 MG/ML
4 INJECTION INTRAMUSCULAR; INTRAVENOUS EVERY 6 HOURS PRN
Status: DISCONTINUED | OUTPATIENT
Start: 2025-02-18 | End: 2025-02-18 | Stop reason: HOSPADM

## 2025-02-18 RX ORDER — OXYCODONE HYDROCHLORIDE 5 MG/1
7.5 TABLET ORAL EVERY 6 HOURS PRN
Refills: 0 | Status: DISCONTINUED | OUTPATIENT
Start: 2025-02-18 | End: 2025-02-18 | Stop reason: HOSPADM

## 2025-02-18 RX ORDER — HYDROMORPHONE HCL/PF 1 MG/ML
0.2 SYRINGE (ML) INJECTION
Status: DISCONTINUED | OUTPATIENT
Start: 2025-02-18 | End: 2025-02-18 | Stop reason: HOSPADM

## 2025-02-18 RX ORDER — ONDANSETRON 2 MG/ML
4 INJECTION INTRAMUSCULAR; INTRAVENOUS ONCE AS NEEDED
Status: DISCONTINUED | OUTPATIENT
Start: 2025-02-18 | End: 2025-02-18 | Stop reason: HOSPADM

## 2025-02-18 RX ORDER — OXYCODONE HYDROCHLORIDE 5 MG/1
5 TABLET ORAL EVERY 6 HOURS PRN
Refills: 0 | Status: DISCONTINUED | OUTPATIENT
Start: 2025-02-18 | End: 2025-02-18 | Stop reason: HOSPADM

## 2025-02-18 RX ORDER — SODIUM CHLORIDE, SODIUM LACTATE, POTASSIUM CHLORIDE, CALCIUM CHLORIDE 600; 310; 30; 20 MG/100ML; MG/100ML; MG/100ML; MG/100ML
125 INJECTION, SOLUTION INTRAVENOUS CONTINUOUS
Status: DISCONTINUED | OUTPATIENT
Start: 2025-02-18 | End: 2025-02-18 | Stop reason: HOSPADM

## 2025-02-18 RX ORDER — FENTANYL CITRATE 50 UG/ML
INJECTION, SOLUTION INTRAMUSCULAR; INTRAVENOUS AS NEEDED
Status: DISCONTINUED | OUTPATIENT
Start: 2025-02-18 | End: 2025-02-18

## 2025-02-18 RX ORDER — HYDROMORPHONE HCL/PF 1 MG/ML
SYRINGE (ML) INJECTION AS NEEDED
Status: DISCONTINUED | OUTPATIENT
Start: 2025-02-18 | End: 2025-02-18

## 2025-02-18 RX ORDER — LIDOCAINE HYDROCHLORIDE AND EPINEPHRINE 10; 10 MG/ML; UG/ML
INJECTION, SOLUTION INFILTRATION; PERINEURAL AS NEEDED
Status: DISCONTINUED | OUTPATIENT
Start: 2025-02-18 | End: 2025-02-18 | Stop reason: HOSPADM

## 2025-02-18 RX ORDER — ACETAMINOPHEN 325 MG/1
650 TABLET ORAL EVERY 6 HOURS PRN
Status: DISCONTINUED | OUTPATIENT
Start: 2025-02-18 | End: 2025-02-18 | Stop reason: HOSPADM

## 2025-02-18 RX ORDER — LIDOCAINE HYDROCHLORIDE 20 MG/ML
INJECTION, SOLUTION EPIDURAL; INFILTRATION; INTRACAUDAL; PERINEURAL AS NEEDED
Status: DISCONTINUED | OUTPATIENT
Start: 2025-02-18 | End: 2025-02-18

## 2025-02-18 RX ORDER — ROCURONIUM BROMIDE 10 MG/ML
INJECTION, SOLUTION INTRAVENOUS AS NEEDED
Status: DISCONTINUED | OUTPATIENT
Start: 2025-02-18 | End: 2025-02-18

## 2025-02-18 RX ORDER — DIPHENHYDRAMINE HYDROCHLORIDE 50 MG/ML
12.5 INJECTION INTRAMUSCULAR; INTRAVENOUS
Status: DISCONTINUED | OUTPATIENT
Start: 2025-02-18 | End: 2025-02-18 | Stop reason: HOSPADM

## 2025-02-18 RX ORDER — SODIUM CHLORIDE, SODIUM LACTATE, POTASSIUM CHLORIDE, CALCIUM CHLORIDE 600; 310; 30; 20 MG/100ML; MG/100ML; MG/100ML; MG/100ML
75 INJECTION, SOLUTION INTRAVENOUS CONTINUOUS
Status: DISCONTINUED | OUTPATIENT
Start: 2025-02-18 | End: 2025-02-18 | Stop reason: HOSPADM

## 2025-02-18 RX ORDER — DEXAMETHASONE SODIUM PHOSPHATE 10 MG/ML
INJECTION, SOLUTION INTRAMUSCULAR; INTRAVENOUS AS NEEDED
Status: DISCONTINUED | OUTPATIENT
Start: 2025-02-18 | End: 2025-02-18

## 2025-02-18 RX ORDER — FENTANYL CITRATE/PF 50 MCG/ML
25 SYRINGE (ML) INJECTION
Status: DISCONTINUED | OUTPATIENT
Start: 2025-02-18 | End: 2025-02-18 | Stop reason: HOSPADM

## 2025-02-18 RX ORDER — PROPOFOL 10 MG/ML
INJECTION, EMULSION INTRAVENOUS AS NEEDED
Status: DISCONTINUED | OUTPATIENT
Start: 2025-02-18 | End: 2025-02-18

## 2025-02-18 RX ORDER — HYDROMORPHONE HCL/PF 1 MG/ML
0.5 SYRINGE (ML) INJECTION
Status: DISCONTINUED | OUTPATIENT
Start: 2025-02-18 | End: 2025-02-18 | Stop reason: HOSPADM

## 2025-02-18 RX ADMIN — DEXAMETHASONE SODIUM PHOSPHATE 10 MG: 10 INJECTION, SOLUTION INTRAMUSCULAR; INTRAVENOUS at 11:14

## 2025-02-18 RX ADMIN — ROCURONIUM BROMIDE 50 MG: 10 INJECTION, SOLUTION INTRAVENOUS at 10:59

## 2025-02-18 RX ADMIN — FENTANYL CITRATE 50 MCG: 50 INJECTION, SOLUTION INTRAMUSCULAR; INTRAVENOUS at 10:58

## 2025-02-18 RX ADMIN — SUGAMMADEX 200 MG: 100 INJECTION, SOLUTION INTRAVENOUS at 12:35

## 2025-02-18 RX ADMIN — PROPOFOL 200 MG: 10 INJECTION, EMULSION INTRAVENOUS at 10:58

## 2025-02-18 RX ADMIN — SODIUM CHLORIDE, SODIUM LACTATE, POTASSIUM CHLORIDE, AND CALCIUM CHLORIDE 125 ML/HR: .6; .31; .03; .02 INJECTION, SOLUTION INTRAVENOUS at 09:23

## 2025-02-18 RX ADMIN — LIDOCAINE HYDROCHLORIDE 100 MG: 20 INJECTION, SOLUTION EPIDURAL; INFILTRATION; INTRACAUDAL at 10:58

## 2025-02-18 RX ADMIN — FENTANYL CITRATE 50 MCG: 50 INJECTION, SOLUTION INTRAMUSCULAR; INTRAVENOUS at 11:12

## 2025-02-18 RX ADMIN — SODIUM CHLORIDE, SODIUM LACTATE, POTASSIUM CHLORIDE, AND CALCIUM CHLORIDE: .6; .31; .03; .02 INJECTION, SOLUTION INTRAVENOUS at 10:54

## 2025-02-18 RX ADMIN — CEFAZOLIN 3000 MG: 1 INJECTION, POWDER, FOR SOLUTION INTRAMUSCULAR; INTRAVENOUS at 10:58

## 2025-02-18 RX ADMIN — PROPOFOL 30 MG: 10 INJECTION, EMULSION INTRAVENOUS at 12:35

## 2025-02-18 NOTE — OP NOTE
OPERATIVE REPORT  PATIENT NAME: Oracio Richardson Jr.    :  1955  MRN: [id number]  Pt Location: [facility] 01    SURGERY DATE: 2025    Surgeons and Role:     * Chas Hu MD - Primary     * Deniz Campa PA-C - Assisting  The PA was necessary to provide expert assistance; i.e. in the form of providing optimal exposure with retraction, suturing, and assistance with dissection in order to perform the most efficient operation and in order to optimize patient safety in the abscence of a qualified surgical resident.    Preop Diagnosis:  Multiple lipomas [D17.9]  Fibroepithelial polyp [L91.8]  Epidermoid cyst [L72.0]    Post-Op Diagnosis Codes:     * Multiple lipomas [D17.9]     * Fibroepithelial polyp [L91.8]     * Epidermoid cyst [L72.0]    Procedure(s):  Bilateral - EXCISION LIPOMA (arms bilaterally. legs bilaterally. abdomen. low back)  EXCISION BIOPSY LESION FACIAL (forehead)  EXCISION CYST DERMOID (back)    Specimen(s):  ID Type Source Tests Collected by Time Destination   1 : back lipoma Tissue Soft Tissue, Lipoma TISSUE EXAM Chas Hu MD 2025 1118    2 : left upper extremitiy lipomas Tissue Soft Tissue, Lipoma TISSUE EXAM Chas Hu MD 2025 1124    3 : upper back sebacious cyst Tissue Cyst TISSUE EXAM Chas Hu MD 2025 1127    4 : Right upper extremity lipomas Tissue Soft Tissue, Lipoma TISSUE EXAM Chas Hu MD 2025 1136    5 : Right leg lipoma Tissue Soft Tissue, Lipoma TISSUE EXAM Chas Hu MD 2025 1221    6 : Left leg lipoma Tissue Soft Tissue, Lipoma TISSUE EXAM Chas Hu MD 2025 1221    7 : Facial lesions Tissue Lesion TISSUE EXAM Chas Hu MD 2025 1222        Estimated Blood Loss:   Minimal    Drains:  * No LDAs found *    Anesthesia Type:   General    Operative Indications:  Multiple lipomas [D17.9]  Fibroepithelial polyp [L91.8]  Epidermoid cyst [L72.0]  The patient is a pleasant 70-year-old  male presenting with 16 skin and subcutaneous lesions for which definitive treatment by excisional biopsy is now indicated.    Operative Findings:  4 left upper extremity lipomas measuring 16 mm x 15 mm, 24 mm x 8 mm, 20 mm x 18 mm, 16 mm x 15 mm from proximal to distal    5 upper extremity lipomas measuring 55 mm x 34 mm, 8 mm x 6 mm, 30 mm x 25 mm, 20 mm x 20 mm, 15 mm x 12 mm proximal to distal    Back lipoma measuring 30 mm x 20 mm    Upper back sebaceous cyst measuring 13 mm x 11 mm    Right leg lipoma measuring 48 mm x 32 mm    Left leg lipoma measuring 12 mm x 12 mm    3 face lesions measuring 9 mm x 8 mm, 11 mm x 8 mm, 7 mm x 8 mm medial to lateral    Complications:   None    Procedure and Technique:  Patient taken to the operating room where he was properly identified monitored and anesthetized.  He was rolled into the prone position.  Skin prepped and draped.  Timeout performed.    Skin infiltrated 1% lidocaine local anesthesia with epinephrine.  Skin incised the lipoma excised and sent for permanent pathologic valuation.  Hemostasis ensured and the wound closed with subcuticular 4 Monocryl suture.    This exact same procedure was performed using this exact same technique on all 12 lipomas.    While the patient was in the prone position the upper back sebaceous cyst was infiltrated with 1% lidocaine local anesthesia with epinephrine.  Skin incised in an ellipse to include the entirety of the sebaceous cyst the specimen sent for permanent pathologic valuation and the wound closed primarily with vertical mattress sutures of 3-0 nylon.    After all possible lesions excised from the prone position the patient was rolled supine where the remaining lipomas were sharply excised using the same technique.    3 face lesions were excised sharply with a 15 blade using local anesthesia and closing all wounds with subcuticular 4 Monocryl suture.  All wounds dressed sterilely patient extubated and taken recovery in  stable condition.   I was present for the entire procedure.    Patient Disposition:  PACU     This procedure was not performed to treat primary cutaneous melanoma through wide local excision           SIGNATURE: Chas Hu MD  DATE: February 18, 2025  TIME: 12:43 PM

## 2025-02-18 NOTE — INTERVAL H&P NOTE
H&P reviewed. After examining the patient I find no changes in the patients condition since the H&P had been written.    Vitals:    02/18/25 0819   BP: 140/68   Pulse: 83   Resp: 16   Temp: 98 °F (36.7 °C)   SpO2: 98%

## 2025-02-18 NOTE — ANESTHESIA PREPROCEDURE EVALUATION
"Procedure:  EXCISION LIPOMA (arms bilaterally, legs bilaterally, abdomen, low back) (Bilateral: Back)  EXCISION BIOPSY LESION FACIAL (forehead) (Face)  EXCISION CYST DERMOID (back) (Back)    Relevant Problems   CARDIO   (+) Hyperlipidemia   (+) Nonrheumatic mitral valve regurgitation   (+) PAF (paroxysmal atrial fibrillation) (HCC)      ENDO   (+) Type 2 diabetes mellitus with microalbuminuria, with long-term current use of insulin (HCC)   (+) Type 2 diabetes mellitus, with long-term current use of insulin (HCC)      /RENAL   (+) Hydroureteronephrosis      HEMATOLOGY   (+) Anemia   (+) Other dietary vitamin B12 deficiency anemia      MUSCULOSKELETAL   (+) Osteoarthritis of both knees      Behavioral Health   (+) Depressed mood      Other   (+) History of pulmonary embolus (PE)   (+) Multiple lipomas   (+) Obesity, Class III, BMI 40-49.9 (morbid obesity) ([facility])        Physical Exam    Airway    Mallampati score: III  TM Distance: >3 FB  Neck ROM: full     Dental        Cardiovascular      Pulmonary   No wheezes    Other Findings      Anesthesia Plan  ASA Score- 3     Anesthesia Type- general with ASA Monitors.         Additional Monitors:     Airway Plan: ETT.           Plan Factors-Exercise tolerance (METS): >4 METS.    Chart reviewed. EKG reviewed. Imaging results reviewed. Existing labs reviewed. Patient summary reviewed.    Patient is not a current smoker.  Patient did not smoke on day of surgery.            Induction- intravenous.    Postoperative Plan- Plan for postoperative opioid use. Planned trial extubation        Informed Consent- Anesthetic plan and risks discussed with patient.    NPO Status:  Vitals Value Taken Time   Date of last liquid 02/17/25 02/18/25 0821   Time of last liquid 2300 02/18/25 0821   Date of last solid 02/16/25 02/18/25 0821   Time of last solid 2000 02/18/25 0821       VITALS  /68   Pulse 83   Temp 98 °F (36.7 °C) (Temporal)   Resp 16   Ht 5' 11\" (1.803 m)   Wt 127 kg " "(280 lb)   SpO2 98%   BMI 39.05 kg/m²  BP Readings from Last 3 Encounters:   02/18/25 140/68   01/31/25 130/80   01/29/25 120/80        LABS  Results from Last 12 Months   Lab Units 01/22/25  1140 12/23/24  0917   WBC Thousand/uL 12.55* 6.85   HEMOGLOBIN g/dL 15.7 14.8   HEMATOCRIT % 48.5 45.3   PLATELETS Thousands/uL 187 150     Results from Last 12 Months   Lab Units 11/11/24  0915   APTT sec 25.3   INR  0.9    Results from Last 12 Months   Lab Units 01/22/25  1140 12/23/24  0917 11/11/24  0915   SODIUM mmol/L 135 135 135   POTASSIUM mmol/L 4.2 4.6 4.5   CHLORIDE mmol/L 101 100 106   CO2 mmol/L 22 29 26   ANION GAP mmol/L 12 6 3   BUN mg/dL 26* 20 25   CREATININE mg/dL 0.95 0.98 1.12   CALCIUM mg/dL 9.5 9.5 9.4   GLUCOSE RANDOM mg/dL  --   --  220*   PHOSPHORUS mg/dL  --   --  3.6   HEMOGLOBIN A1C % 8.4* 8.6*  --         ECG      ECHOCARDIOGRAPHY OR OTHER TESTING/IMAGING  Encounter Date: 01/28/25   ECG 12 lead   Result Value    Ventricular Rate 73    Atrial Rate 73    MT Interval 172    QRSD Interval 102    QT Interval 416    QTC Interval 459    P Axis 41    QRS Axis -34    T Wave Axis 2    Narrative    Normal sinus rhythm  Left axis deviation  Nonspecific ST and T wave abnormality  Confirmed by Sarah Bullard ([id number]) on 1/28/2025 12:54:08 PM     Results for orders placed or performed in visit on 12/11/24   Cardiac EP device report    Narrative    MDT LP2-ACTIVE SYSTEM IS MRI CONDITIONAL  CARELINK TRANSMISSION: LOOP RECORDER. PRESENTING RHYTHM ST @ 100 BPM. BATTERY STATUS \"OK.\" NO PATIENT OR DEVICE ACTIVATED EPISODES. HOWEVER THERE IS AN EGRAM SHOWING AF. HX OF PAF. PT ON ELIQUIS. NORMAL DEVICE FUNCTION. DL        History    DM, DKA, Sepsis, CARLTON, A-fib     Interpretation Summary       •  This was a technically difficult study.  •  Left Ventricle: Left ventricular cavity size is normal. Wall thickness is normal. The left ventricular ejection fraction is 60%. Systolic function is normal. Although no diagnostic " regional wall motion abnormality was identified, this possibility cannot be completely excluded on the basis of this study. Diastolic function is normal.  •  Mitral Valve: There is annular calcification.  •  IVC/SVC: The inferior vena cava is dilated.     ------- ANESTHESIA RISK-BENEFIT DISCUSSION -------  BENEFITS OF A SPECIALIZED ANESTHESIA TEAM INCLUDE (NBK [id number], PMID 09300992):  (1) Reduce mortality and morbidity for major surgeries/procedures. (2) The team provides analgesia/sedation/amnesia/akinesia as safely as possible. (3) The team strives to reduce discomfort as safely as possible.    RISKS, AND PLANS TO MITIGATE RISKS, INCLUDE:    - Neurologic system: IntraOp awareness (Risk is ~1:1,000 - 1:14,000; PMID 70340201), Stroke (Risk ~<0.1-2% for most cases; PMID 26094921), nerve injury, vision loss, and POCD.     - Airway and Pulmonary system: Dental or mouth injury, throat pain, critical hypoxia, pneumothorax, prolonged intubation, post-op respiratory compromise.  Airway/Intubation risks and prior data:  Mask Ventilation: Ventilated by mask (1); Brand: LMA; Size: 5; Insertion Attempts: 1; Placement Verify: End tidal CO2  Major ARISCAT risk factors for pulmonary complications include: Other factors/Clinical gestalt: 1 pt, yielding a score of 0-1= Low risk, 1.6%.  - Cardiovascular system: Hypotension, arrhythmias, cardiac injury or arrest, blood clots, bleeding, infection, vascular injuries.  Bryan's RCRI score items: IDDM, yielding an RCRI Score of 1= 0.6% risk of MACE  Are chayo-op or intra-op beta blockers indicated? (PMID 52275701): no  - FEN/GI system: Aspiration risk (~0.5% per PMC 2107644) and PONV (10-80% per Apfel score) especially if the patient has not fasted.  ASA NPO guideline compliance?: Yes  - Medication risk assessment: Allergic reactions, excessive bleeding with anticoagulant use, overdoses, drug-drug interactions, injury to a fetus or  in pregnant or breastfeeding patients,  chayo-procedural sedation including while driving/operating machinery.  Recent relevant medications: See MAR or Med Review  Personal or family history of anesthesia complications: no  Pregnancy Status: N/A  - Estimate mortality risks associated with anesthesia based on ASA-PS (PMID 24036407): ASA-PS III: 1:3,500

## 2025-02-18 NOTE — DISCHARGE INSTR - AVS FIRST PAGE
Discharge Instructions    Can resume Eliquis tomorrow Wednesday 2/19/25.    Light activity for 2 weeks.  No heavy lifting for 2 weeks.  No driving for 5-7 days or until pain is well controlled.  Surgical glue will fall off with time.  Remove dressing from your back in 3 days then can cover with dry bandaid or leave open to air, make sure to clean the incision/sutures daily.  You may shower starting tomorrow.  Take discharge medications as prescribed.  Notify our office for nausea, vomiting, fever, diarrhea, chest pain, trouble breathing.  Follow up in our office in 2 weeks for suture removal or sooner if needed.  Call with additional questions or concerns [phone].    For pain you may take ibuprofen 600 mg every 6 hours scheduled (with food) for 3 days then as needed.  You can also take acetaminophen 650 mg every 6 hours scheduled for 3 days then as needed.  For ongoing pain you can alternate ibuprofen and acetaminophen every 3 hours.  Ice packs may be helpful, 20 min on, 20 min off alternating and monitoring skin.

## 2025-02-18 NOTE — ANESTHESIA POSTPROCEDURE EVALUATION
Post-Op Assessment Note    CV Status:  Stable    Pain management: adequate       Mental Status:  Awake and alert   Hydration Status:  Stable   PONV Controlled:  None   Airway Patency:  Patent     Post Op Vitals Reviewed: Yes    No anethesia notable event occurred.    Staff: Anesthesiologist           Last Filed PACU Vitals:  Vitals Value Taken Time   Temp 97.5 °F (36.4 °C) 02/18/25 1250   Pulse 82 02/18/25 1255   /65 02/18/25 1251   Resp 15 02/18/25 1255   SpO2 99 % 02/18/25 1255   Vitals shown include unfiled device data.    Modified Yaritza:     Vitals Value Taken Time   Activity 2 02/18/25 1310   Respiration 2 02/18/25 1310   Circulation 2 02/18/25 1310   Consciousness 2 02/18/25 1310   Oxygen Saturation 2 02/18/25 1310     Modified Yaritza Score: 10

## 2025-02-19 ENCOUNTER — ANESTHESIA (OUTPATIENT)
Dept: GASTROENTEROLOGY | Facility: HOSPITAL | Age: 70
End: 2025-02-19
Payer: COMMERCIAL

## 2025-02-19 ENCOUNTER — HOSPITAL ENCOUNTER (OUTPATIENT)
Dept: GASTROENTEROLOGY | Facility: HOSPITAL | Age: 70
Setting detail: OUTPATIENT SURGERY
Discharge: HOME | End: 2025-02-19
Attending: STUDENT IN AN ORGANIZED HEALTH CARE EDUCATION/TRAINING PROGRAM
Payer: COMMERCIAL

## 2025-02-19 ENCOUNTER — ANESTHESIA EVENT (OUTPATIENT)
Dept: GASTROENTEROLOGY | Facility: HOSPITAL | Age: 70
End: 2025-02-19
Payer: COMMERCIAL

## 2025-02-19 VITALS
BODY MASS INDEX: 39.2 KG/M2 | RESPIRATION RATE: 18 BRPM | SYSTOLIC BLOOD PRESSURE: 131 MMHG | TEMPERATURE: 98 F | WEIGHT: 280 LBS | DIASTOLIC BLOOD PRESSURE: 68 MMHG | HEART RATE: 72 BPM | HEIGHT: 71 IN | OXYGEN SATURATION: 98 %

## 2025-02-19 DIAGNOSIS — Z12.11 SCREENING FOR COLON CANCER: ICD-10-CM

## 2025-02-19 PROBLEM — E11.40 DIABETIC NEUROPATHY (HCC): Status: ACTIVE | Noted: 2025-02-19

## 2025-02-19 LAB — GLUCOSE SERPL-MCNC: 188 MG/DL (ref 65–140)

## 2025-02-19 PROCEDURE — 82948 REAGENT STRIP/BLOOD GLUCOSE: CPT

## 2025-02-19 PROCEDURE — 45385 COLONOSCOPY W/LESION REMOVAL: CPT | Performed by: INTERNAL MEDICINE

## 2025-02-19 PROCEDURE — 45380 COLONOSCOPY AND BIOPSY: CPT | Performed by: INTERNAL MEDICINE

## 2025-02-19 PROCEDURE — 88305 TISSUE EXAM BY PATHOLOGIST: CPT | Performed by: PATHOLOGY

## 2025-02-19 RX ORDER — LIDOCAINE HYDROCHLORIDE 20 MG/ML
INJECTION, SOLUTION EPIDURAL; INFILTRATION; INTRACAUDAL; PERINEURAL AS NEEDED
Status: DISCONTINUED | OUTPATIENT
Start: 2025-02-19 | End: 2025-02-19

## 2025-02-19 RX ORDER — PROPOFOL 10 MG/ML
INJECTION, EMULSION INTRAVENOUS AS NEEDED
Status: DISCONTINUED | OUTPATIENT
Start: 2025-02-19 | End: 2025-02-19

## 2025-02-19 RX ORDER — SODIUM CHLORIDE, SODIUM LACTATE, POTASSIUM CHLORIDE, CALCIUM CHLORIDE 600; 310; 30; 20 MG/100ML; MG/100ML; MG/100ML; MG/100ML
125 INJECTION, SOLUTION INTRAVENOUS CONTINUOUS
Status: DISCONTINUED | OUTPATIENT
Start: 2025-02-19 | End: 2025-02-19

## 2025-02-19 RX ADMIN — SODIUM CHLORIDE, SODIUM LACTATE, POTASSIUM CHLORIDE, AND CALCIUM CHLORIDE: .6; .31; .03; .02 INJECTION, SOLUTION INTRAVENOUS at 06:48

## 2025-02-19 RX ADMIN — LIDOCAINE HYDROCHLORIDE 60 MG: 20 INJECTION, SOLUTION EPIDURAL; INFILTRATION; INTRACAUDAL at 07:38

## 2025-02-19 RX ADMIN — SODIUM CHLORIDE, SODIUM LACTATE, POTASSIUM CHLORIDE, AND CALCIUM CHLORIDE 125 ML/HR: .6; .31; .03; .02 INJECTION, SOLUTION INTRAVENOUS at 06:58

## 2025-02-19 RX ADMIN — PROPOFOL 130 MCG/KG/MIN: 10 INJECTION, EMULSION INTRAVENOUS at 07:39

## 2025-02-19 RX ADMIN — PROPOFOL 100 MG: 10 INJECTION, EMULSION INTRAVENOUS at 07:38

## 2025-02-19 NOTE — ANESTHESIA PREPROCEDURE EVALUATION
"Procedure:  COLONOSCOPY    Relevant Problems   ANESTHESIA (within normal limits)      CARDIO  Hx of DVT and PE \"years ago\".  Still on eliquis but with concomitant diagnosis of pAfib, last dose 4 days ago   (+) Hyperlipidemia   (+) Nonrheumatic mitral valve regurgitation   (+) PAF (paroxysmal atrial fibrillation) (HCC)      ENDO   (+) Type 2 diabetes mellitus with microalbuminuria, with long-term current use of insulin (HCC)   (+) Type 2 diabetes mellitus, with long-term current use of insulin (HCC)      GI/HEPATIC  Confirmed NPO appropriate  S/p bowel prep.  Prior colonoscopy incomplete 10/2024      /RENAL   (+) Hydroureteronephrosis      HEMATOLOGY   (+) Anemia   (+) Other dietary vitamin B12 deficiency anemia      MUSCULOSKELETAL   (+) Osteoarthritis of both knees      NEURO/PSYCH   (+) Diabetic neuropathy (HCC)      PULMONARY   (-) Smoking   (-) URI (upper respiratory infection)        Physical Exam    Airway  Comment: Receding chin  Mallampati score: II  TM Distance: >3 FB  Neck ROM: full     Dental        Cardiovascular  Rhythm: regular, Rate: normal    Pulmonary   Breath sounds clear to auscultation    Other Findings        Anesthesia Plan  ASA Score- 3     Anesthesia Type- IV sedation with anesthesia with ASA Monitors.         Additional Monitors:     Airway Plan:     Comment: I discussed the risks and benefits of IV sedation anesthesia including the possibility of the need to convert to general anesthesia and the potential risk of awareness.  The patient was given the opportunity to ask questions, which were answered..       Plan Factors-Exercise tolerance (METS): >4 METS.    Chart reviewed. EKG reviewed.  Existing labs reviewed.     Patient is not a current smoker.      Obstructive sleep apnea risk education given perioperatively.        Induction- intravenous.    Postoperative Plan-         Informed Consent- Anesthetic plan and risks discussed with patient.  I personally reviewed this patient with the " CRNA. Discussed and agreed on the Anesthesia Plan with the CRNA..      NPO Status:  Vitals Value Taken Time   Date of last liquid 02/18/25 02/19/25 0655   Time of last liquid 1800 02/19/25 0655   Date of last solid 02/17/25 02/19/25 0655   Time of last solid 1400 02/19/25 0655       Lab Results   Component Value Date    HGBA1C 8.4 (H) 01/22/2025     Lab Results   Component Value Date    WBC 12.55 (H) 01/22/2025    HGB 15.7 01/22/2025    HCT 48.5 01/22/2025    MCV 88 01/22/2025     01/22/2025     Lab Results   Component Value Date    SODIUM 135 01/22/2025    K 4.2 01/22/2025     01/22/2025    CO2 22 01/22/2025    BUN 26 (H) 01/22/2025    CREATININE 0.95 01/22/2025    GLUC 220 (H) 11/11/2024    CALCIUM 9.5 01/22/2025

## 2025-02-19 NOTE — ANESTHESIA POSTPROCEDURE EVALUATION
Post-Op Assessment Note    CV Status:  Stable  Pain Score: 0    Pain management: adequate       Mental Status:  Alert   Hydration Status:  Stable   PONV Controlled:  None   Airway Patency:  Patent     Post Op Vitals Reviewed: Yes    No anethesia notable event occurred.    Staff: CRNA           Last Filed PACU Vitals:  Vitals Value Taken Time   Temp 98.8    Pulse 74    /56    Resp 16    SpO2 95%

## 2025-02-19 NOTE — H&P
"History and Physical -  [facility]  Oracio Richardson Jr. 70 y.o. male MRN: [id number]                  HPI: Oracio Richardson Jr. is a 70 y.o. year old male who presents for crc screening      REVIEW OF SYSTEMS: Per the HPI, and otherwise unremarkable.    Historical Information   Past Medical History:   Diagnosis Date    A-fib (HCC)     per pt \"happened one time\"    Abdominal hernia     Acute metabolic encephalopathy 09/04/2022    CARLTON (acute kidney injury) ([facility]) 09/04/2022    Ambulates with cane     not recently using but has in home if needed    Anesthesia     per pt \"with knee surgery(in the )was awake for operation and fell asleep when being closed up\" and than took long to wake up\"    Arthritis     Blood in urine     \"sometimes\"    Colon polyp     Diabetes mellitus ([facility])     Type 2--sees Endocrinologist  Dr MONTSE Shaver    History of pulmonary embolism     Implantable loop recorder present     Muscle weakness     per pt \"started with diabetes\"some muscle loss of density\" --per pt \"muscle coming back\"    Myopia of left eye     glasses for reading    Obesity     Pancreatitis 09/04/2022    Risk for falls     Sepsis ([facility]) 09/04/2022    Teeth missing     Urethral pain     \"when passing urine\"    Urinary tract infection      Past Surgical History:   Procedure Laterality Date    CARDIAC LOOP RECORDER      CATARACT EXTRACTION Bilateral     COLONOSCOPY      EGD      KNEE SURGERY Right     arthroscopy    OR BIOPSY SOFT TISSUE LEG/ANKLE AREA SUPERFICIAL Bilateral 2/18/2025    Procedure: EXCISION LIPOMA (arms bilaterally, legs bilaterally, abdomen, low back);  Surgeon: Chas Hu MD;  Location: [facility];  Service: General    OR BIOPSY SOFT TISSUE UPPER ARM/ELBOW SUPERFICIAL Bilateral 2/18/2025    Procedure: EXCISION LIPOMA (arms bilaterally, legs bilaterally, abdomen, low back);  Surgeon: Chas Hu MD;  [facility];  Service: General    OR EXC B9 LESION MRGN XCP SK TG T/A/L " "1.1-2.0 CM N/A 2/18/2025    Procedure: EXCISION BIOPSY LESION FACIAL (forehead);  Surgeon: Chas Hu MD;  [facility]: [facility];  Service: General    WV EXC B9 LESION MRGN XCP SK TG T/A/L 2.1-3.0 CM N/A 2/18/2025    Procedure: EXCISION CYST DERMOID (back);  Surgeon: Chas Hu MD;  [facility];  Service: General    WV EXC TUMOR SOFT TISSUE ABDOMINAL WALL SUBQ <3CM Bilateral 2/18/2025    Procedure: EXCISION LIPOMA (arms bilaterally, legs bilaterally, abdomen, low back);  Surgeon: Chas Hu MD;  [facility];  Service: General    WV TRURL ELECTROSURG RESCJ PROSTATE BLEED COMPLETE N/A 03/16/2023    Procedure: TRANSURETHRAL RESECTION OF PROSTATE (TURP)(possible), cystoscopy, SP tube placement, removal of bladder calculus;  Surgeon: Ludwig King MD;  [facility];  Service: Urology     Social History   Social History     Substance and Sexual Activity   Alcohol Use Never     Social History     Substance and Sexual Activity   Drug Use Never     Social History     Tobacco Use   Smoking Status Never    Passive exposure: Never   Smokeless Tobacco Never     Family History   Problem Relation Age of Onset    Diabetes Father     Diabetes type II Father     Diabetes Mother        Meds/Allergies       Current Outpatient Medications:     insulin lispro (HumaLOG KwikPen) 100 units/mL injection pen    apixaban (Eliquis) 5 mg    atorvastatin (LIPITOR) 20 mg tablet    gentamicin (GARAMYCIN) 0.3 % ophthalmic solution    Current Facility-Administered Medications:     lactated ringers infusion, 125 mL/hr, Intravenous, Continuous, 125 mL/hr at 02/19/25 0658    No Known Allergies    Objective     /72   Pulse 82   Temp 98.3 °F (36.8 °C) (Temporal)   Resp 18   Ht 5' 11\" (1.803 m)   Wt 127 kg (280 lb)   SpO2 98%   BMI 39.05 kg/m²       PHYSICAL EXAM    Gen: NAD  Head: NCAT  CV: RRR  CHEST: not in respiratory distress  ABD: soft, NT/ND  EXT: no edema      ASSESSMENT/PLAN:  This is a 70 " y.o. year old male here for colonoscopy, and he is stable and optimized for his procedure.

## 2025-02-24 ENCOUNTER — RESULTS FOLLOW-UP (OUTPATIENT)
Age: 70
End: 2025-02-24

## 2025-02-24 ENCOUNTER — CLINICAL SUPPORT (OUTPATIENT)
Dept: BARIATRICS | Facility: CLINIC | Age: 70
End: 2025-02-24

## 2025-02-24 ENCOUNTER — TELEPHONE (OUTPATIENT)
Dept: SURGERY | Facility: CLINIC | Age: 70
End: 2025-02-24

## 2025-02-24 DIAGNOSIS — Z71.89 ENCOUNTER FOR PRE-BARIATRIC SURGERY COUNSELING AND EDUCATION: Primary | ICD-10-CM

## 2025-02-24 PROCEDURE — 88304 TISSUE EXAM BY PATHOLOGIST: CPT | Performed by: PATHOLOGY

## 2025-02-24 PROCEDURE — 88305 TISSUE EXAM BY PATHOLOGIST: CPT | Performed by: PATHOLOGY

## 2025-02-24 PROCEDURE — RECHECK

## 2025-02-24 NOTE — PROGRESS NOTES
Pre op. Patient has his workflow complete, but he reports that he is unsure if he should have bariatric surgery first or his umbilical hernia repaired first. To discuss with Dr. Quan and coordinator. Patient had surgery 2/18 to remove 16 lipomas and then had a colonoscopy the next day. Still in pain, but getting better day by day. Patient has been focusing on eating his protein first. Has been exercising with walking around the house and light lifting. Still struggling with having 3 meals per day. Feeling full throughout the day, so not wanting to eat lunch if he has breakfast. Suggested reducing portions. Drinking 64 oz of water with sugar free add ins, occasionally chicken broth.  Workflow reviewed:   Psych and/or D+A Clearance: N/A  PCP Letter: Referal in his chart  Support Group: No longer required, strongly encouraged  Surgeon Appt: 12/13/24  EGD: completed with colonoscopy 10/9/24  Cardiac Risk Assessment: completed 1/29/25  Sleep Studies: N/A (SB 4/8)  Blood work: completed 1/22- RD to review  Nicotine test: N/A  Weight loss meds: N/A  Required weight checks: 3 months required  Weight Loss: Not required, encouraged positive lifestyle changes.   Aleyda Claros LCSW

## 2025-03-02 PROBLEM — H10.32 ACUTE BACTERIAL CONJUNCTIVITIS OF LEFT EYE: Status: RESOLVED | Noted: 2025-01-31 | Resolved: 2025-03-02

## 2025-03-13 ENCOUNTER — REMOTE DEVICE CLINIC VISIT (OUTPATIENT)
Dept: CARDIOLOGY CLINIC | Facility: CLINIC | Age: 70
End: 2025-03-13
Payer: COMMERCIAL

## 2025-03-13 ENCOUNTER — RESULTS FOLLOW-UP (OUTPATIENT)
Dept: CARDIOLOGY CLINIC | Facility: CLINIC | Age: 70
End: 2025-03-13

## 2025-03-13 DIAGNOSIS — I48.0 PAF (PAROXYSMAL ATRIAL FIBRILLATION) (HCC): Primary | ICD-10-CM

## 2025-03-13 PROCEDURE — 93298 REM INTERROG DEV EVAL SCRMS: CPT | Performed by: INTERNAL MEDICINE

## 2025-03-13 NOTE — PROGRESS NOTES
"MDT LP2-ACTIVE SYSTEM IS MRI CONDITIONAL   CARELINK TRANSMISSION: LOOP RECORDER. PRESENTING RHYTHM NSR @ 98 BPM. BATTERY STATUS \"OK.\" NO PATIENT OR DEVICE ACTIVATED EPISODES. NORMAL DEVICE FUNCTION. DL   "

## 2025-03-14 NOTE — PROGRESS NOTES
"Name: Oracio Richardson Jr.      : 1955      MRN: [id number]  Encounter Provider: Deniz Campa PA-C  Encounter Date: 3/17/2025   Encounter department: [facility] GENERAL SURGERY [place]  :  Assessment & Plan  Multiple lipomas  Overall doing well after multiple excisions in the OR. Had numerous lipomas removed from both arms, back, and legs. Had nevi removed from the face and cyst from the back. All incisions C/D/I and healing well. Copy of op/path provided and reviewed. Questions answered, will see back as needed.             History of Present Illness   HPI  Oracio Richardson Jr. is a 70 y.o. male who presents for his po after having several lipomas removed on 25 and suture removal on the incision on the upper mid back. Pt states the incision is healed and denies any drainage, redness/irritation  or fevers/chills. Amilia.O,MA  History obtained from: patient    Review of Systems   All other systems reviewed and are negative.    Past Medical History   Past Medical History:   Diagnosis Date    A-fib ([facility])     per pt \"happened one time\"    Abdominal hernia     Acute metabolic encephalopathy 2022    CARLTON (acute kidney injury) ([facility]) 2022    Ambulates with cane     not recently using but has in home if needed    Anesthesia     per pt \"with knee surgery(in the )was awake for operation and fell asleep when being closed up\" and than took long to wake up\"    Arthritis     Blood in urine     \"sometimes\"    Colon polyp     Diabetes mellitus ([facility])     Type 2--sees Endocrinologist [facility] Dr MONTSE Shaver    History of pulmonary embolism     Implantable loop recorder present     Muscle weakness     per pt \"started with diabetes\"some muscle loss of density\" --per pt \"muscle coming back\"    Myopia of left eye     glasses for reading    Obesity     Pancreatitis 2022    Risk for falls     Sepsis (HCC) 2022    Teeth missing     Urethral pain     \"when passing urine\"    Urinary tract infection  "     Past Surgical History:   Procedure Laterality Date    CARDIAC LOOP RECORDER      CATARACT EXTRACTION Bilateral     COLONOSCOPY      EGD      KNEE SURGERY Right     arthroscopy    RI BIOPSY SOFT TISSUE LEG/ANKLE AREA SUPERFICIAL Bilateral 2/18/2025    Procedure: EXCISION LIPOMA (arms bilaterally, legs bilaterally, abdomen, low back);  Surgeon: Chas Hu MD;  [facility];  Service: General    RI BIOPSY SOFT TISSUE UPPER ARM/ELBOW SUPERFICIAL Bilateral 2/18/2025    Procedure: EXCISION LIPOMA (arms bilaterally, legs bilaterally, abdomen, low back);  Surgeon: Chas Hu MD;  [facility];  Service: General    RI EXC B9 LESION MRGN XCP SK TG T/A/L 1.1-2.0 CM N/A 2/18/2025    Procedure: EXCISION BIOPSY LESION FACIAL (forehead);  Surgeon: Chas Hu MD;  [facility];  Service: General    RI EXC B9 LESION MRGN XCP SK TG T/A/L 2.1-3.0 CM N/A 2/18/2025    Procedure: EXCISION CYST DERMOID (back);  Surgeon: Chas Hu MD;  [facility];  Service: General    RI EXC TUMOR SOFT TISSUE ABDOMINAL WALL SUBQ <3CM Bilateral 2/18/2025    Procedure: EXCISION LIPOMA (arms bilaterally, legs bilaterally, abdomen, low back);  Surgeon: Chas Hu MD;  [facility];  Service: General    RI TRURL ELECTROSURG RESCJ PROSTATE BLEED COMPLETE N/A 03/16/2023    Procedure: TRANSURETHRAL RESECTION OF PROSTATE (TURP)(possible), cystoscopy, SP tube placement, removal of bladder calculus;  Surgeon: Ludwig King MD;  [facility];  Service: Urology     Family History   Problem Relation Age of Onset    Diabetes Father     Diabetes type II Father     Diabetes Mother       reports that he has never smoked. He has never been exposed to tobacco smoke. He has never used smokeless tobacco. He reports that he does not drink alcohol and does not use drugs.  Current Outpatient Medications   Medication Instructions    apixaban (ELIQUIS) 5 mg, Oral, Daily    atorvastatin (LIPITOR)  20 mg, Oral, Daily    insulin lispro (HUMALOG KWIKPEN) 10 Units, Subcutaneous, 3 times daily with meals   No Known Allergies      Objective   Temp (!) 97.2 °F (36.2 °C) (Temporal)      Physical Exam  Vitals and nursing note reviewed.   Constitutional:       General: He is not in acute distress.     Appearance: He is well-developed. He is not diaphoretic.   HENT:      Head: Normocephalic and atraumatic.   Eyes:      Conjunctiva/sclera: Conjunctivae normal.      Pupils: Pupils are equal, round, and reactive to light.   Pulmonary:      Effort: No respiratory distress.   Musculoskeletal:         General: Normal range of motion.      Cervical back: Normal range of motion.   Skin:     General: Skin is warm and dry.      Capillary Refill: Capillary refill takes less than 2 seconds.      Comments: Incisions C/D/I. Sutures removed from back excision site.   Neurological:      Mental Status: He is alert and oriented to person, place, and time.   Psychiatric:         Behavior: Behavior normal.

## 2025-03-17 ENCOUNTER — OFFICE VISIT (OUTPATIENT)
Dept: SURGERY | Facility: CLINIC | Age: 70
End: 2025-03-17

## 2025-03-17 VITALS — TEMPERATURE: 97.2 F

## 2025-03-17 DIAGNOSIS — D17.9 MULTIPLE LIPOMAS: Primary | ICD-10-CM

## 2025-03-17 PROCEDURE — 99024 POSTOP FOLLOW-UP VISIT: CPT | Performed by: PHYSICIAN ASSISTANT

## 2025-03-17 NOTE — ASSESSMENT & PLAN NOTE
Overall doing well after multiple excisions in the OR. Had numerous lipomas removed from both arms, back, and legs. Had nevi removed from the face and cyst from the back. All incisions C/D/I and healing well. Copy of op/path provided and reviewed. Questions answered, will see back as needed.

## 2025-03-18 ENCOUNTER — OFFICE VISIT (OUTPATIENT)
Dept: FAMILY MEDICINE CLINIC | Facility: CLINIC | Age: 70
End: 2025-03-18
Payer: COMMERCIAL

## 2025-03-18 ENCOUNTER — TELEPHONE (OUTPATIENT)
Dept: FAMILY MEDICINE CLINIC | Facility: CLINIC | Age: 70
End: 2025-03-18

## 2025-03-18 VITALS
DIASTOLIC BLOOD PRESSURE: 60 MMHG | TEMPERATURE: 97.8 F | WEIGHT: 275.4 LBS | OXYGEN SATURATION: 98 % | SYSTOLIC BLOOD PRESSURE: 90 MMHG | HEIGHT: 71 IN | RESPIRATION RATE: 18 BRPM | BODY MASS INDEX: 38.56 KG/M2 | HEART RATE: 103 BPM

## 2025-03-18 DIAGNOSIS — M17.0 PRIMARY OSTEOARTHRITIS OF BOTH KNEES: ICD-10-CM

## 2025-03-18 DIAGNOSIS — E66.01 OBESITY, CLASS III, BMI 40-49.9 (MORBID OBESITY) (HCC): ICD-10-CM

## 2025-03-18 DIAGNOSIS — E11.29 TYPE 2 DIABETES MELLITUS WITH DIABETIC MICROALBUMINURIA, WITH LONG-TERM CURRENT USE OF INSULIN (HCC): ICD-10-CM

## 2025-03-18 DIAGNOSIS — I48.0 PAF (PAROXYSMAL ATRIAL FIBRILLATION) (HCC): Primary | ICD-10-CM

## 2025-03-18 DIAGNOSIS — R97.20 ELEVATED PSA: ICD-10-CM

## 2025-03-18 DIAGNOSIS — Z79.4 TYPE 2 DIABETES MELLITUS WITH DIABETIC MICROALBUMINURIA, WITH LONG-TERM CURRENT USE OF INSULIN (HCC): ICD-10-CM

## 2025-03-18 DIAGNOSIS — R80.9 TYPE 2 DIABETES MELLITUS WITH DIABETIC MICROALBUMINURIA, WITH LONG-TERM CURRENT USE OF INSULIN (HCC): ICD-10-CM

## 2025-03-18 PROCEDURE — 99214 OFFICE O/P EST MOD 30 MIN: CPT | Performed by: FAMILY MEDICINE

## 2025-03-18 NOTE — TELEPHONE ENCOUNTER
Left message for patient to return call. DM eye exam last done in 2023 per [facility]. Due May, does patient see another doctor or exam can be done in office.

## 2025-03-18 NOTE — ASSESSMENT & PLAN NOTE
Severe osteoarthritis bilateral knees making it difficulty for patient to ambulate and exercise work on weight reduction now and follow-up with orthopedics

## 2025-03-18 NOTE — ASSESSMENT & PLAN NOTE
Repeat PSA at next laboratory testing no worsening changes in urinary frequency and nocturia.  Add Flomax if needed    Orders:    PSA, total and free; Future

## 2025-03-18 NOTE — PROGRESS NOTES
Name: Oracio Richardson Jr.      : 1955      MRN: [id number]  Encounter Provider: Raymundo Barnes DO  Encounter Date: 3/18/2025   Encounter department: [facility]  :  Assessment & Plan  PAF (paroxysmal atrial fibrillation) ([facility])  Able with controlled ventricular response also seen by cardiology no change in medication regimen now continuing on Eliquis at least 2 to 3 days/week due to affordability         Type 2 diabetes mellitus with diabetic microalbuminuria, with long-term current use of insulin ([facility])  Most recent A1c at 8.4 will need to repeat the laboratory work for next office visit monitoring diet closely work on weight reduction and continuing on insulin using the Humalog 3 times daily with meals  Lab Results   Component Value Date    HGBA1C 8.4 (H) 2025       Orders:    Albumin / creatinine urine ratio; Standing    Comprehensive metabolic panel; Standing    Hemoglobin A1C; Standing    TSH, 3rd generation with Free T4 reflex; Standing    Lipid Panel with Direct LDL reflex; Standing    Primary osteoarthritis of both knees  Severe osteoarthritis bilateral knees making it difficulty for patient to ambulate and exercise work on weight reduction now and follow-up with orthopedics         Elevated PSA  Repeat PSA at next laboratory testing no worsening changes in urinary frequency and nocturia.  Add Flomax if needed    Orders:    PSA, total and free; Future    Obesity, Class III, BMI 40-49.9 (morbid obesity) ([facility])    Reviewed laboratory work follow-up with me as scheduled next office visit                History of Present Illness   Follow-up evaluation today review lab work and medications and patient recently has been recovering from upper respiratory infection      Review of Systems   Constitutional:  Negative for chills, fatigue and fever.   HENT:  Negative for congestion, nosebleeds, rhinorrhea, sinus pressure and sore throat.    Eyes:  Negative for discharge and  "redness.   Respiratory:  Negative for cough and shortness of breath.    Cardiovascular:  Negative for chest pain, palpitations and leg swelling.   Gastrointestinal:  Negative for abdominal pain, blood in stool and nausea.   Endocrine: Negative for cold intolerance, heat intolerance and polyuria.   Genitourinary:  Negative for dysuria and frequency.   Musculoskeletal:  Negative for arthralgias, back pain and myalgias.   Skin:  Negative for rash.   Neurological:  Positive for weakness. Negative for dizziness and headaches.   Hematological:  Negative for adenopathy.   Psychiatric/Behavioral:  Negative for behavioral problems and sleep disturbance. The patient is not nervous/anxious.        Objective   BP 90/60 (BP Location: Right arm, Patient Position: Sitting, Cuff Size: Large)   Pulse 103   Temp 97.8 °F (36.6 °C) (Tympanic)   Resp 18   Ht 5' 11\" (1.803 m)   Wt 125 kg (275 lb 6.4 oz)   SpO2 98%   BMI 38.41 kg/m²      Physical Exam  Vitals and nursing note reviewed.   Constitutional:       Appearance: Normal appearance. He is well-developed.   HENT:      Head: Normocephalic and atraumatic.      Right Ear: Tympanic membrane and external ear normal.      Left Ear: Tympanic membrane and external ear normal.      Nose: Nose normal.      Mouth/Throat:      Mouth: Mucous membranes are moist.      Pharynx: Oropharynx is clear.   Eyes:      General: No scleral icterus.     Conjunctiva/sclera: Conjunctivae normal.      Pupils: Pupils are equal, round, and reactive to light.   Neck:      Thyroid: No thyromegaly.      Vascular: No JVD.   Cardiovascular:      Rate and Rhythm: Normal rate and regular rhythm.      Pulses: Normal pulses.      Heart sounds: Normal heart sounds. No murmur heard.  Pulmonary:      Effort: Pulmonary effort is normal.      Breath sounds: Normal breath sounds. No wheezing or rales.   Chest:      Chest wall: No tenderness.   Abdominal:      General: Bowel sounds are normal. There is no distension.     "  Palpations: Abdomen is soft. There is no mass.      Tenderness: There is no abdominal tenderness. There is no guarding or rebound.   Musculoskeletal:         General: No tenderness or deformity. Normal range of motion.      Cervical back: Normal range of motion and neck supple.   Lymphadenopathy:      Cervical: No cervical adenopathy.   Skin:     General: Skin is warm and dry.      Capillary Refill: Capillary refill takes less than 2 seconds.      Findings: No erythema or rash.   Neurological:      General: No focal deficit present.      Mental Status: He is alert and oriented to person, place, and time. Mental status is at baseline.      Cranial Nerves: No cranial nerve deficit.      Deep Tendon Reflexes: Reflexes are normal and symmetric. Reflexes normal.   Psychiatric:         Mood and Affect: Mood normal.         Behavior: Behavior normal.         Thought Content: Thought content normal.         Judgment: Judgment normal.

## 2025-03-18 NOTE — ASSESSMENT & PLAN NOTE
Most recent A1c at 8.4 will need to repeat the laboratory work for next office visit monitoring diet closely work on weight reduction and continuing on insulin using the Humalog 3 times daily with meals  Lab Results   Component Value Date    HGBA1C 8.4 (H) 01/22/2025       Orders:    Albumin / creatinine urine ratio; Standing    Comprehensive metabolic panel; Standing    Hemoglobin A1C; Standing    TSH, 3rd generation with Free T4 reflex; Standing    Lipid Panel with Direct LDL reflex; Standing

## 2025-03-18 NOTE — ASSESSMENT & PLAN NOTE
Able with controlled ventricular response also seen by cardiology no change in medication regimen now continuing on Eliquis at least 2 to 3 days/week due to affordability

## 2025-03-19 ENCOUNTER — CLINICAL SUPPORT (OUTPATIENT)
Dept: BARIATRICS | Facility: CLINIC | Age: 70
End: 2025-03-19

## 2025-03-19 VITALS — WEIGHT: 269.8 LBS | BODY MASS INDEX: 37.63 KG/M2

## 2025-03-19 DIAGNOSIS — E66.812 OBESITY, CLASS II, BMI 35-39.9: Primary | ICD-10-CM

## 2025-03-19 PROCEDURE — RECHECK

## 2025-03-19 NOTE — PROGRESS NOTES
"Bariatric Nutrition Assessment - PreOp Note     Insurance: 3 required monthly weight checks  Completed    Type of surgery    Pt interested in the Laparoscopic Alfonso-en-Y gastric bypass possible sleeve gastrectomy.   Surgery Date: TBD  Surgeon: Consult with Dr. Edmar soriano 12/13/2024         Nutrition Assessment   Oracio Richardson Jr.  70 y.o.  male   Current Weight: 269.8#  BMI: 40.8  Initial Weight at Surgeon Consult: 310#   BMI: 43.2  Height: 5'11\"  Eval Weight: 309#   BMI: 43.1  Wt with BMI of 25: 179#  Pre-Op Excess Wt: 131#  BMI to Qualify at 35 = 251#  PMH includes:  Obesity, Diabetes, Mixed HLD, PE, OA - knees, Ventral Hernia     Pt advised not to gain weight during preop process. Pt encouraged to lose weight via healthy eating and exercise. Pt may follow Liver Shrinking diet 2 weeks or more  prior to DOS   There were no vitals taken for this visit.    [place] Equation:    [id number]  Weight Maintenance 2625  Estimated calories for weight loss 4111-7836 ( 1-2# per wk wt loss - sedentary )  Estimated protein needs  g (1.0-1.5 gms/kg IBW )   Estimated fluid needs 81-95 oz (30-35 ml/kg IBW )      Weight History  Reason for WLS: Improve health and mobility  Onset of Obesity: Adult  Family history of obesity: Yes  Wt Loss Attempts: Self Created Diets (Portion Control, Healthy Food Choices, etc.)  Weight Loss medication: OzempMandy tate  Patient has tried the above for 6 months or more with insufficient weight loss or weight regain, which is why patient has requested to be evaluated for weight loss surgery today  Maximum Wt Lost: Was 420# - went into ketoacidosis (coma for 3 days) and lost 100#      Review of History and Medications   OTC: Stopped   Past Medical History:   Diagnosis Date    A-fib (HCC)     per pt \"happened one time\"    Abdominal hernia     Acute metabolic encephalopathy 09/04/2022    CARLTON (acute kidney injury) (HCC) 09/04/2022    Ambulates with cane     not recently " "using but has in home if needed    Anesthesia     per pt \"with knee surgery(in the )was awake for operation and fell asleep when being closed up\" and than took long to wake up\"    Arthritis     Blood in urine     \"sometimes\"    Colon polyp     Diabetes mellitus (HCC)     Type 2--sees Endocrinologist KAELYN Shaver    History of pulmonary embolism     Implantable loop recorder present     Muscle weakness     per pt \"started with diabetes\"some muscle loss of density\" --per pt \"muscle coming back\"    Myopia of left eye     glasses for reading    Obesity     Pancreatitis 09/04/2022    Risk for falls     Sepsis (HCC) 09/04/2022    Teeth missing     Urethral pain     \"when passing urine\"    Urinary tract infection      Past Surgical History:   Procedure Laterality Date    CARDIAC LOOP RECORDER      CATARACT EXTRACTION Bilateral     COLONOSCOPY      EGD      KNEE SURGERY Right     arthroscopy    ME BIOPSY SOFT TISSUE LEG/ANKLE AREA SUPERFICIAL Bilateral 2/18/2025    Procedure: EXCISION LIPOMA (arms bilaterally, legs bilaterally, abdomen, low back);  Surgeon: Chas Hu MD;  [facility]: [facility];  Service: General    ME BIOPSY SOFT TISSUE UPPER ARM/ELBOW SUPERFICIAL Bilateral 2/18/2025    Procedure: EXCISION LIPOMA (arms bilaterally, legs bilaterally, abdomen, low back);  Surgeon: Chas Hu MD;  [facility];  Service: General    ME EXC B9 LESION MRGN XCP SK TG T/A/L 1.1-2.0 CM N/A 2/18/2025    Procedure: EXCISION BIOPSY LESION FACIAL (forehead);  Surgeon: Chas Hu MD;  [facility];  Service: General    ME EXC B9 LESION MRGN XCP SK TG T/A/L 2.1-3.0 CM N/A 2/18/2025    Procedure: EXCISION CYST DERMOID (back);  Surgeon: Chas Hu MD;  [facility];  Service: General    ME EXC TUMOR SOFT TISSUE ABDOMINAL WALL SUBQ <3CM Bilateral 2/18/2025    Procedure: EXCISION LIPOMA (arms bilaterally, legs bilaterally, abdomen, low back);  Surgeon: Chas Hu MD;  " Location: [facility];  Service: General    [place] TRURL ELECTROSURG RESCJ PROSTATE BLEED COMPLETE N/A 03/16/2023    Procedure: TRANSURETHRAL RESECTION OF PROSTATE (TURP)(possible), cystoscopy, SP tube placement, removal of bladder calculus;  Surgeon: Ludwig King MD;  [facility]: [facility];  Service: Urology     Social History     Socioeconomic History    Marital status: /Civil Union     Spouse name: Not on file    Number of children: Not on file    Years of education: Not on file    Highest education level: Not on file   Occupational History    Not on file   Tobacco Use    Smoking status: Never     Passive exposure: Never    Smokeless tobacco: Never   Vaping Use    Vaping status: Never Used   Substance and Sexual Activity    Alcohol use: Never    Drug use: Never    Sexual activity: Yes     Partners: Female     Comment: defer   Other Topics Concern    Not on file   Social History Narrative    Not on file     Social Drivers of Health     Financial Resource Strain: Low Risk  (11/8/2023)    Overall Financial Resource Strain (CARDIA)     Difficulty of Paying Living Expenses: Not very hard   Food Insecurity: No Food Insecurity (11/12/2024)    Hunger Vital Sign     Worried About Running Out of Food in the Last Year: Never true     Ran Out of Food in the Last Year: Never true   Transportation Needs: No Transportation Needs (11/12/2024)    PRAPARE - Transportation     Lack of Transportation (Medical): No     Lack of Transportation (Non-Medical): No   Physical Activity: Not on file   Stress: Not on file   Social Connections: Not on file   Intimate Partner Violence: Not on file   Housing Stability: Low Risk  (11/12/2024)    Housing Stability Vital Sign     Unable to Pay for Housing in the Last Year: No     Number of Times Moved in the Last Year: 0     Homeless in the Last Year: No       Current Outpatient Medications:     apixaban (Eliquis) 5 mg, Take 1 tablet (5 mg total) by mouth in the morning (Patient taking  differently: Take 5 mg by mouth see administration instructions Patient reports taking one tablet twice a week), Disp: 90 tablet, Rfl: 2    atorvastatin (LIPITOR) 20 mg tablet, Take 1 tablet (20 mg total) by mouth daily (Patient taking differently: Take 20 mg by mouth see administration instructions Patient reports only taking one tablet twice a week), Disp: 90 tablet, Rfl: 2    insulin lispro (HumaLOG KwikPen) 100 units/mL injection pen, Inject 10 Units under the skin 3 (three) times a day with meals (Patient taking differently: Inject 10 Units under the skin 3 (three) times a day with meals Patient reports taking on a sliding scale only as needed), Disp: 24 mL, Rfl: 2  Food Intake and Lifestyle Assessment   Food Intake Assessment completed via usual diet recall  Breakfast: Skips  Drinks Water  10- Lunch:  Canned salmon, tuna - multigrain or rye bread  12-2 Dinner: 1/2 c rice 2-3 cups vegetables - 6 oz Chicken or White Fish - doesn't kaur   Snack: Water - Apple juice - 2 cookies  d/t blood sugar dropping   Beverage intake: water, Fairlife Milk- dilutes  No alcohol   Protein supplement: No  Estimated protein intake per day: 80-90 grams  Estimated fluid intake per day: 1/2 gallon  Meals eaten away from home: No - stopped   Typical meal pattern: 2 meals per day and 1-2 snacks per day  Eating Behaviors: Consumption of high calorie/ high fat foods  Food allergies or intolerances: No Known Allergies or intolerances  Cultural or Gnosticist considerations: no    Physical Assessment  Physical Activity  Types of exercise: Very limited with knees and hernias   Current physical limitations: OA knees    Psychosocial Assessment   Support systems: spouse   4 children was  - 1 now  son (suicide) at 16yo  Socioeconomic factors:   Retired . 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   Nutrition Diagnosis  Diagnosis: Overweight / Obesity  "(NC-3.3)  Related to: Physical inactivity and Excessive energy intake  As Evidenced by: BMI >25     Nutrition Prescription: Recommend the following diet  Low fat, Low sugar, and Regular    Interventions and Teaching   Discussed pre-op and post-op nutrition guidelines.       Patient educated and handouts provided.  Surgical changes to stomach / GI  Capacity of post-surgery stomach  Diet progression  Adequate hydration  Sugar and fat restriction to decrease \"dumping syndrome\"  Fat restriction to decrease steatorrhea  Expected weight loss  Weight loss plateaus/ possibility of weight regain  Exercise  Suggestions for pre-op diet  Nutrition considerations after surgery  Protein supplements  Meal planning and preparation  Appropriate carbohydrate, protein, and fat intake, and food/fluid choices to maximize safe weight loss, nutrient intake, and tolerance   Dietary and lifestyle changes  Possible problems with poor eating habits  Intuitive eating  Techniques for self monitoring and keeping daily food journal  Potential for food intolerance after surgery, and ways to deal with them including: lactose intolerance, nausea, reflux, vomiting, diarrhea, food intolerance, appetite changes, gas  Vitamin / Mineral supplementation of Multivitamin with minerals, Calcium, Vitamin B12, Iron, Fat Soluble vitamins, and Vitamin D    Patient is not currently pregnant and doesn't desire to become pregnant a minimum of one year post-op    Education provided to: patient    Barriers to learning: No barriers identified    Readiness to change: preparation    Prior research on procedure: discussed with provider, internet and friends or family    Comprehension: needs reinforcement     Expected Compliance: fair  -  will benefit from education - has much misconception on his diabetes management    Recommendations  Pt is an appropriate candidate for surgery. Yes  Evaluation / Monitoring  Dietitian to Monitor: Eating pattern as discussed Tolerance of " nutrition prescription Body weight Lab values Physical activity Bowel pattern    2/3 Weight Check Visit Summary 1/22/2025  Doing well with preparing for surgery Incorporating guidelines  Eating with protein first and then vegetables -  eats 3 meals Looking at protein shakes- likes Premier to date. Hesitant to use vitamins stressed importance post op  Exercises as able though limited with knees Monitoring glucose - ranges under 200 - uses isaac. Questions answered during discussion. Pt receptive to visit    Monthly PreOp Visit 3/19/2025  Completed preop process and now awaiting approval from insurance. Discussed next steps of the process. Pt had questions on the procedure options. Pt to have bypass. Uses Premier shakes. Also reviewed other option and gave samples and coupons. Discussed PreOp diet and would not recommend diet for pt  d/t history of ketoacidosis -  On short and long acting insulin. Pt verbalizes understanding and receptive to visit    Workflow reviewed: COMPLETED  Psych and/or D+A Clearance: N/A  PCP Letter: Referal in his chart  Support Group: No longer required, strongly encouraged  Surgeon Appt: 12/13/24  EGD: completed with colonoscopy 10/9/24  Cardiac Risk Assessment:  1/29/25  Sleep Studies: N/A (SB 4/8)  Blood work: Completed 1/22/25  HgA1c 8.4  Nicotine test: N/A  Weight loss meds: N/A  Required weight checks: 3 months required completed  Weight Loss: Not required, encouraged positive lifestyle changes.   Other: History of ketoacidosis - question is pt should follow Liver Shrinking Diet - On short and long acting insulin  Goals  Eliminate sugar sweetened beverages, Food journal, Exercise 30 minutes 5 times per week, Complete lession plans 1-6, and Eat 3 meals per day  Follow Pre-Surgery guidelines  > Trial Baritastic for food logging  > Establish regular meal pattern - include fruits, vegetables and whole grains  > Avoid skipping meals- Can use protein drink as meal replacement with carb as  taking insulin  > Decrease portions  > Focus on protein - include lean protein at each meal and snack - Learn to eat protein first  > Limit processed foods, fast foods and dining away from home  > Include meal prepping  > Limit snacks - healthier choices and portion; avoid grazing  > Slow pace of eating and sip fluids  - practice 30/60 minute rule  > Eliminate caffeine by day of surgery (minimal intake)  > Continue to avoid carbonation   > Maintain water intake - 64 oz  > Physical activity and exercise regimen as able  > Start multi vitamin and additional Vitamin D 2000IU  Work on skills to cope with emotional eating/mindfull eating  Pre-op weight loss not required but advised not to gain weight  Await approval and scheduling    Time Spent:   30 Minutes

## 2025-03-28 ENCOUNTER — PREP FOR PROCEDURE (OUTPATIENT)
Dept: BARIATRICS | Facility: CLINIC | Age: 70
End: 2025-03-28

## 2025-03-28 DIAGNOSIS — E11.29 TYPE II DIABETES MELLITUS WITH RENAL MANIFESTATIONS (HCC): ICD-10-CM

## 2025-03-28 DIAGNOSIS — M17.0 PRIMARY OSTEOARTHRITIS OF BOTH KNEES: ICD-10-CM

## 2025-03-28 DIAGNOSIS — E78.2 MIXED HYPERLIPIDEMIA: ICD-10-CM

## 2025-03-28 DIAGNOSIS — Z79.4 ENCOUNTER FOR LONG-TERM (CURRENT) USE OF INSULIN (HCC): ICD-10-CM

## 2025-03-28 DIAGNOSIS — R80.9 PROTEINURIA: ICD-10-CM

## 2025-03-28 DIAGNOSIS — E66.01 MORBID OBESITY (HCC): Primary | ICD-10-CM

## 2025-03-28 DIAGNOSIS — K46.9 HERNIA OF ABDOMINAL CAVITY: ICD-10-CM

## 2025-03-28 DIAGNOSIS — Z86.711 PERSONAL HISTORY OF PE (PULMONARY EMBOLISM): ICD-10-CM

## 2025-04-06 NOTE — PROGRESS NOTES
7/30/2023 laboratory data  Sodium 132  Potassium 4.4  BUN 35  Creatinine 1.06  Glucose 369  AST 19  ALT 14  Alk phos 102  Albumin 4.0  T. bili 0.58  Lipase 7    WBC 9.99 Hgb 15.5 HCT 45.2 MCV 86 platelet count 232,341      The following information was outlined in reviewing patient's medical record in preparation for office visit 10/16/2023    7/29/2023 through 8/1/2023 hospitalized for sepsis thought to be urinary related  Uncontrolled diabetes  Paroxysmal atrial fibrillation    7/29/2023 CT scan abdomen pelvis with IV contrast  Gallbladder normal  Spleen unremarkable  Diffuse fatty infiltration of the pancreas with pancreatic atrophy  Moderate amount of stool within the rectum. Duodenal diverticulum  Status post TURP  Moderate diffuse urinary bladder wall thickening  Small fat-containing umbilical hernia    3/20/7404 laboratory data  Iron 106  Ferritin 340  Iron saturation 35%  TIBC 300  Vitamin B12 340  Islet cell antibody negative  C-reactive protein less than 3  C-peptide 2.3  SPEP-normal  9/4/2022 hospitalized with DKA, reportedly pancreatitis, abnormal CAT scan, elevated lipase however abdominal pain-free. To have significant gastric distention, peripancreatic stranding lipase of 1091. GI consultation inpatient    9//2022 CT scan abdomen pelvis  Mod amount of fecal debris in the rectum. Stomach markedly distended. Periampullary diverticulum. Diverticulum noted in the third portion duodenum  Peripancreatic inflammatory stranding seen.   Thickening of the left anterior pararenal fascia  Prostate calcifications  Marked distention of the urinary bladder  Umbilical hernia containing fat  Mid right renal calculus measuring 8 mm
VIEW ALL

## 2025-04-16 PROBLEM — E66.812 OBESITY, CLASS II, BMI 35-39.9: Status: ACTIVE | Noted: 2025-04-16

## 2025-04-16 RX ORDER — CELECOXIB 200 MG/1
200 CAPSULE ORAL ONCE
OUTPATIENT
Start: 2025-04-16 | End: 2025-04-16

## 2025-04-16 RX ORDER — ACETAMINOPHEN 10 MG/ML
1000 INJECTION, SOLUTION INTRAVENOUS ONCE
OUTPATIENT
Start: 2025-04-16 | End: 2025-04-16

## 2025-04-16 RX ORDER — ENOXAPARIN SODIUM 300 MG/3ML
40 INJECTION INTRAVENOUS; SUBCUTANEOUS ONCE
OUTPATIENT
Start: 2025-04-16 | End: 2025-04-16

## 2025-04-16 NOTE — PROGRESS NOTES
"H&P Exam - Bariatric Surgery   Oracio Richardson Jr. 70 y.o. male MRN: [id number]  Unit/Bed#:  Encounter: [id number]      HPI:    70 y.o. yo male with morbid obesity found to be a good candidate to undergo a weight loss operation upon being enrolled here at the [facility].  He has been pre certified to undergo a Laparoscopic Alfonso-en-Y gastric bypass.  Here today to review his pre op test results.    Has been medically cleared for the procedure.    EGD IMPRESSION:  The esophagus appeared normal.  Moderate erythematous mucosa, consistent with gastritis in the stomach; performed cold forceps biopsy  The duodenal bulb and 2nd part of the duodenum appeared normal.       Chronic pain requiring opioids - (No)    I have discussed with him at length the risks and benefits of the operation and reiterated the components of our multidisciplinary program and the importance of compliance and follow up in the post operative period. Although there is a great statistical chance of improvement or even resolution of most of his associated comorbidities, the results vary from patient to patient and they largely depend on his commitment.     The patient was also instructed with regards to the importance of behavior modification, nutritional counseling, support meeting attendance and lifestyle changes that are important to ensure success.    He was given the opportunity to ask questions and I have answered all of them.    I have addressed with the patient the level of CODE STATUS for this hospital stay and after explaining the different options currently he wishes to be a Level I.   He understands and wishes to proceed.      Review of Systems   Musculoskeletal:  Positive for arthralgias.   All other systems reviewed and are negative.      Historical Information   Past Medical History:   Diagnosis Date    A-fib (HCC)     per pt \"happened one time\"    Abdominal hernia     Acute metabolic encephalopathy 09/04/2022    CARLTON " "(acute kidney injury) ([facility]) 09/04/2022    Ambulates with cane     not recently using but has in home if needed    Anesthesia     per pt \"with knee surgery(in the )was awake for operation and fell asleep when being closed up\" and than took long to wake up\"    Arthritis     Blood in urine     \"sometimes\"    Colon polyp     Diabetes mellitus ([facility])     Type 2--sees Endocrinologist KAELYN Shaver    History of pulmonary embolism     Implantable loop recorder present     Muscle weakness     per pt \"started with diabetes\"some muscle loss of density\" --per pt \"muscle coming back\"    Myopia of left eye     glasses for reading    Obesity     Pancreatitis 09/04/2022    Risk for falls     Sepsis ([facility]) 09/04/2022    Teeth missing     Urethral pain     \"when passing urine\"    Urinary tract infection      Past Surgical History:   Procedure Laterality Date    CARDIAC LOOP RECORDER      CATARACT EXTRACTION Bilateral     COLONOSCOPY      EGD      KNEE SURGERY Right     arthroscopy    MI BIOPSY SOFT TISSUE LEG/ANKLE AREA SUPERFICIAL Bilateral 2/18/2025    Procedure: EXCISION LIPOMA (arms bilaterally, legs bilaterally, abdomen, low back);  Surgeon: Chas Hu MD;  [facility];  Service: General    MI BIOPSY SOFT TISSUE UPPER ARM/ELBOW SUPERFICIAL Bilateral 2/18/2025    Procedure: EXCISION LIPOMA (arms bilaterally, legs bilaterally, abdomen, low back);  Surgeon: Chas Hu MD;  [facility];  Service: General    MI EXC B9 LESION MRGN XCP SK TG T/A/L 1.1-2.0 CM N/A 2/18/2025    Procedure: EXCISION BIOPSY LESION FACIAL (forehead);  Surgeon: Chas Hu MD;  [facility];  Service: General    MI EXC B9 LESION MRGN XCP SK TG T/A/L 2.1-3.0 CM N/A 2/18/2025    Procedure: EXCISION CYST DERMOID (back);  Surgeon: Chas Hu MD;  [facility];  Service: General    MI EXC TUMOR SOFT TISSUE ABDOMINAL WALL SUBQ <3CM Bilateral 2/18/2025    Procedure: EXCISION LIPOMA (arms bilaterally, " "legs bilaterally, abdomen, low back);  Surgeon: Chas Hu MD;  [facility];  Service: General    MO TRURL ELECTROSURG RESCJ PROSTATE BLEED COMPLETE N/A 03/16/2023    Procedure: TRANSURETHRAL RESECTION OF PROSTATE (TURP)(possible), cystoscopy, SP tube placement, removal of bladder calculus;  Surgeon: Ludwig King MD;  [facility];  Service: Urology     Social History   Social History     Substance and Sexual Activity   Alcohol Use Never     Social History     Substance and Sexual Activity   Drug Use Never     Social History     Tobacco Use   Smoking Status Never    Passive exposure: Never   Smokeless Tobacco Never     Family History: Family history non-contributory    Meds/Allergies   all medications and allergies reviewed  No Known Allergies    Objective     Current Vitals:   Blood Pressure: 116/86 (04/17/25 1132)  Pulse: 77 (04/17/25 1132)  Respirations: 18 (04/17/25 1132)  Height: 5' 11.5\" (181.6 cm) (04/17/25 1132)  Weight - Scale: 132 kg (291 lb) (04/17/25 1132)  SpO2: 99 % (04/17/25 1132)      Invasive Devices       None                   Physical Exam  Constitutional:       Appearance: Normal appearance.   HENT:      Head: Atraumatic.      Nose: No rhinorrhea.   Eyes:      Extraocular Movements: Extraocular movements intact.   Cardiovascular:      Rate and Rhythm: Normal rate.   Pulmonary:      Effort: Pulmonary effort is normal. No respiratory distress.      Breath sounds: Normal breath sounds.   Abdominal:      General: Abdomen is flat. There is no distension.      Palpations: Abdomen is soft.      Tenderness: There is no abdominal tenderness.   Musculoskeletal:         General: Normal range of motion.      Cervical back: Normal range of motion.   Skin:     General: Skin is warm and dry.   Neurological:      General: No focal deficit present.      Mental Status: He is alert and oriented to person, place, and time.   Psychiatric:         Mood and Affect: Mood normal.         " Behavior: Behavior normal.         Lab Results: I have personally reviewed pertinent lab results.    Imaging: Results Review Statement: I reviewed radiology reports from this admission including: EGD.  EKG, Pathology, and Other Studies: Results Review Statement: I reviewed radiology reports from this admission including: Cardiac risk stratification.    Code Status: Level 1    Assessment/Plan:    PAF (paroxysmal atrial fibrillation) ([facility])  Eliquis to be held 3 days prior to metabolic bariatric surgery  To continue after surgery  Continue care per treatment team    Type 2 diabetes mellitus, with long-term current use of insulin (HCC)  Anticipate improvement after metabolic and bariatric surgery  Currently on insulin  Continue care per treatment team  Lab Results   Component Value Date    HGBA1C 8.4 (H) 01/22/2025       Osteoarthritis of both knees  Continue outpatient follow-up with treatment team  Anticipate improvement with weight loss after metabolic surgery    Obesity, Class II, BMI 35-39.9  70 y.o. yo male with morbid obesity found to be a good candidate to undergo a weight loss operation upon being enrolled here at the [facility]. He has completed all of the preoperative process for bariatric surgery.    - Plan for laparoscopic possible open Alfonso-en-Y gastric bypass with intraoperative EGD  - consent signed  - preop orders placed               18-Sep-2019 17:32 18-Sep-2019 17:46

## 2025-04-16 NOTE — ASSESSMENT & PLAN NOTE
Anticipate improvement after metabolic and bariatric surgery  Currently on insulin  Continue care per treatment team  Lab Results   Component Value Date    HGBA1C 8.4 (H) 01/22/2025

## 2025-04-16 NOTE — ASSESSMENT & PLAN NOTE
Continue outpatient follow-up with treatment team  Anticipate improvement with weight loss after metabolic surgery

## 2025-04-16 NOTE — ASSESSMENT & PLAN NOTE
Eliquis to be held 3 days prior to metabolic bariatric surgery  To continue after surgery  Continue care per treatment team

## 2025-04-16 NOTE — ASSESSMENT & PLAN NOTE
70 y.o. yo male with morbid obesity found to be a good candidate to undergo a weight loss operation upon being enrolled here at the [facility]. He has completed all of the preoperative process for bariatric surgery.    - Plan for laparoscopic possible open Alfonso-en-Y gastric bypass with intraoperative EGD  - consent signed  - preop orders placed

## 2025-04-17 ENCOUNTER — OFFICE VISIT (OUTPATIENT)
Dept: BARIATRICS | Facility: CLINIC | Age: 70
End: 2025-04-17
Payer: COMMERCIAL

## 2025-04-17 ENCOUNTER — CLINICAL SUPPORT (OUTPATIENT)
Dept: BARIATRICS | Facility: CLINIC | Age: 70
End: 2025-04-17

## 2025-04-17 VITALS
DIASTOLIC BLOOD PRESSURE: 86 MMHG | WEIGHT: 291 LBS | HEART RATE: 77 BPM | SYSTOLIC BLOOD PRESSURE: 116 MMHG | BODY MASS INDEX: 39.42 KG/M2 | RESPIRATION RATE: 18 BRPM | HEIGHT: 72 IN | OXYGEN SATURATION: 99 %

## 2025-04-17 DIAGNOSIS — E66.812 OBESITY, CLASS II, BMI 35-39.9: ICD-10-CM

## 2025-04-17 DIAGNOSIS — E66.813 OBESITY, CLASS III, BMI 40-49.9 (MORBID OBESITY): Primary | ICD-10-CM

## 2025-04-17 DIAGNOSIS — E11.29 TYPE 2 DIABETES MELLITUS WITH DIABETIC MICROALBUMINURIA, WITH LONG-TERM CURRENT USE OF INSULIN (HCC): ICD-10-CM

## 2025-04-17 DIAGNOSIS — R80.9 TYPE 2 DIABETES MELLITUS WITH DIABETIC MICROALBUMINURIA, WITH LONG-TERM CURRENT USE OF INSULIN (HCC): ICD-10-CM

## 2025-04-17 DIAGNOSIS — M17.0 PRIMARY OSTEOARTHRITIS OF BOTH KNEES: ICD-10-CM

## 2025-04-17 DIAGNOSIS — Z01.818 OTHER SPECIFIED PRE-OPERATIVE EXAMINATION: Primary | ICD-10-CM

## 2025-04-17 DIAGNOSIS — Z79.4 TYPE 2 DIABETES MELLITUS WITH DIABETIC MICROALBUMINURIA, WITH LONG-TERM CURRENT USE OF INSULIN (HCC): ICD-10-CM

## 2025-04-17 DIAGNOSIS — I48.0 PAF (PAROXYSMAL ATRIAL FIBRILLATION) (HCC): ICD-10-CM

## 2025-04-17 PROCEDURE — RECHECK

## 2025-04-17 PROCEDURE — 99213 OFFICE O/P EST LOW 20 MIN: CPT | Performed by: SURGERY

## 2025-04-17 RX ORDER — OMEPRAZOLE 20 MG/1
20 CAPSULE, DELAYED RELEASE ORAL DAILY
Qty: 90 CAPSULE | Refills: 1 | Status: SHIPPED | OUTPATIENT
Start: 2025-04-17

## 2025-04-17 NOTE — PROGRESS NOTES
Weight Management Nutrition Class     Diagnosis: Obesity    Bariatric Surgeon: Dr. Hyatt    Surgery: Gastric Bypass Laparoscopic    Class: Pt attended pre-op education session. Standardized packet of information for bariatric surgery was provided and reviewed with pt. Importance of lifestyle change and development of regular exercise routine stressed.   Pt. educated on two-week pre operative liquid protein liver shrinking diet.  Pt understands that the diet needs to be followed for 2 weeks prior to surgery. Handout reviewed.   Emphasized the need to drink 80 ounces of fluid per day while on the diet and to contact PCP to adjust any diabetes or blood pressure medicines prior to starting the diet.  Patient will not eat any solid food for 2 days prior to surgery, including 2 cups of non starchy vegetables to ensure that the stomach will be empty day of surgery. Reviewed Ensure pre-surgery ERAS drink instructions, protein supplement suggestions, post-operative clear liquid, full liquid, and pureed diet stages, post-operative nutrition rules and facts, and post-operative bariatric multivitamin/mineral recommendations and brand comparison. Reviewed instructions for stopping or tapering anti-obesity medications prior to surgery.      Pt given the opportunity to ask questions. Questions were answered. Pt verbalized understanding though may need reinforcement throughout process.  Contact information provided for any questions/concerns.

## 2025-05-05 DIAGNOSIS — E11.65 UNCONTROLLED TYPE 2 DIABETES MELLITUS WITH HYPERGLYCEMIA (HCC): ICD-10-CM

## 2025-05-05 NOTE — TELEPHONE ENCOUNTER
Reason for call:   [x] Refill   [] Prior Auth  [] Other:     Office:   [x] PCP/Provider - Cameron  [] Specialty/Provider -     Medication: Humalog kwik pen     Dose/Frequency: 10 units tid    Quantity: 24    Pharmacy: [id number] - [place] - [address] [phone]     Local Pharmacy   Does the patient have enough for 3 days?   [x] Yes   [] No - Send as HP to POD

## 2025-05-06 RX ORDER — INSULIN LISPRO 100 [IU]/ML
10 INJECTION, SOLUTION INTRAVENOUS; SUBCUTANEOUS
Qty: 24 ML | Refills: 0 | Status: SHIPPED | OUTPATIENT
Start: 2025-05-06 | End: 2025-08-04

## 2025-05-27 ENCOUNTER — OFFICE VISIT (OUTPATIENT)
Dept: ENDOCRINOLOGY | Facility: CLINIC | Age: 70
End: 2025-05-27
Payer: COMMERCIAL

## 2025-05-27 ENCOUNTER — TELEPHONE (OUTPATIENT)
Age: 70
End: 2025-05-27

## 2025-05-27 VITALS
WEIGHT: 299 LBS | RESPIRATION RATE: 18 BRPM | SYSTOLIC BLOOD PRESSURE: 128 MMHG | BODY MASS INDEX: 40.5 KG/M2 | DIASTOLIC BLOOD PRESSURE: 70 MMHG | OXYGEN SATURATION: 98 % | TEMPERATURE: 98 F | HEART RATE: 76 BPM | HEIGHT: 72 IN

## 2025-05-27 DIAGNOSIS — Z79.4 TYPE 2 DIABETES MELLITUS WITH MICROALBUMINURIA, WITH LONG-TERM CURRENT USE OF INSULIN (HCC): ICD-10-CM

## 2025-05-27 DIAGNOSIS — E11.65 UNCONTROLLED TYPE 2 DIABETES MELLITUS WITH HYPERGLYCEMIA (HCC): ICD-10-CM

## 2025-05-27 DIAGNOSIS — R80.9 TYPE 2 DIABETES MELLITUS WITH MICROALBUMINURIA, WITH LONG-TERM CURRENT USE OF INSULIN (HCC): Primary | ICD-10-CM

## 2025-05-27 DIAGNOSIS — E55.9 VITAMIN D DEFICIENCY: ICD-10-CM

## 2025-05-27 DIAGNOSIS — E78.49 OTHER HYPERLIPIDEMIA: ICD-10-CM

## 2025-05-27 DIAGNOSIS — E66.813 OBESITY, CLASS III, BMI 40-49.9 (MORBID OBESITY): ICD-10-CM

## 2025-05-27 DIAGNOSIS — E11.29 TYPE 2 DIABETES MELLITUS WITH MICROALBUMINURIA, WITH LONG-TERM CURRENT USE OF INSULIN (HCC): Primary | ICD-10-CM

## 2025-05-27 DIAGNOSIS — E11.29 TYPE 2 DIABETES MELLITUS WITH MICROALBUMINURIA, WITH LONG-TERM CURRENT USE OF INSULIN (HCC): ICD-10-CM

## 2025-05-27 DIAGNOSIS — R80.9 TYPE 2 DIABETES MELLITUS WITH MICROALBUMINURIA, WITH LONG-TERM CURRENT USE OF INSULIN (HCC): ICD-10-CM

## 2025-05-27 DIAGNOSIS — Z79.4 TYPE 2 DIABETES MELLITUS WITH MICROALBUMINURIA, WITH LONG-TERM CURRENT USE OF INSULIN (HCC): Primary | ICD-10-CM

## 2025-05-27 LAB — SL AMB POCT HEMOGLOBIN AIC: 9.3 (ref ?–6.5)

## 2025-05-27 PROCEDURE — 99214 OFFICE O/P EST MOD 30 MIN: CPT | Performed by: STUDENT IN AN ORGANIZED HEALTH CARE EDUCATION/TRAINING PROGRAM

## 2025-05-27 PROCEDURE — 83036 HEMOGLOBIN GLYCOSYLATED A1C: CPT | Performed by: STUDENT IN AN ORGANIZED HEALTH CARE EDUCATION/TRAINING PROGRAM

## 2025-05-27 PROCEDURE — 95251 CONT GLUC MNTR ANALYSIS I&R: CPT | Performed by: STUDENT IN AN ORGANIZED HEALTH CARE EDUCATION/TRAINING PROGRAM

## 2025-05-27 RX ORDER — TIRZEPATIDE 10 MG/.5ML
10 INJECTION, SOLUTION SUBCUTANEOUS WEEKLY
Qty: 6 ML | Refills: 1 | Status: SHIPPED | OUTPATIENT
Start: 2025-07-25 | End: 2025-10-23

## 2025-05-27 RX ORDER — INSULIN GLARGINE 300 U/ML
20 INJECTION, SOLUTION SUBCUTANEOUS
Qty: 9 ML | Refills: 1 | Status: SHIPPED | OUTPATIENT
Start: 2025-05-27

## 2025-05-27 RX ORDER — INSULIN GLARGINE 300 U/ML
20 INJECTION, SOLUTION SUBCUTANEOUS
Qty: 6 ML | Refills: 1 | Status: SHIPPED | OUTPATIENT
Start: 2025-05-27 | End: 2025-05-27 | Stop reason: SDUPTHER

## 2025-05-27 RX ORDER — TIRZEPATIDE 7.5 MG/.5ML
7.5 INJECTION, SOLUTION SUBCUTANEOUS WEEKLY
Qty: 2 ML | Refills: 0 | Status: SHIPPED | OUTPATIENT
Start: 2025-06-27

## 2025-05-27 RX ORDER — INSULIN LISPRO 100 [IU]/ML
10 INJECTION, SOLUTION INTRAVENOUS; SUBCUTANEOUS
Qty: 27 ML | Refills: 0 | Status: SHIPPED | OUTPATIENT
Start: 2025-05-27 | End: 2025-08-25

## 2025-05-27 RX ORDER — INSULIN LISPRO 100 [IU]/ML
10 INJECTION, SOLUTION INTRAVENOUS; SUBCUTANEOUS
Qty: 27 ML | Refills: 0 | Status: SHIPPED | OUTPATIENT
Start: 2025-05-27 | End: 2025-05-27 | Stop reason: SDUPTHER

## 2025-05-27 RX ORDER — TIRZEPATIDE 5 MG/.5ML
5 INJECTION, SOLUTION SUBCUTANEOUS WEEKLY
Qty: 2 ML | Refills: 0 | Status: SHIPPED | OUTPATIENT
Start: 2025-05-27

## 2025-05-27 NOTE — PROGRESS NOTES
Name: Oracio Richardson Jr.      : 1955      MRN: [id number]  Encounter Provider: Jose Luis Shaver MD  Encounter Date: 2025   Encounter department: [facility] [place]    Chief Complaint   Patient presents with   • Follow-up   :  Assessment & Plan  Type 2 diabetes mellitus with microalbuminuria, with long-term current use of insulin (HCC)    Lab Results   Component Value Date    HGBA1C 9.3 (A) 2025     Diabetes is poorly controlled with A1c of 9.2%, the goal less than 7%.  His continuous glucose monitoring (CGM) report indicates suboptimal control, with only 17% of readings within the target range and an wrist which is above the goal including 53% exceeding 250.  He has stopped taking Toujeo, which he believes is not effective and did not continue Mounjaro with the same reason.  He was strongly advised to not changing insulin dose or changing his regiment on his own without informing us.  He has been advised to reduce carbohydrate intake advised against taking Ensure, and consider Glucerna as a meal supplement due to its lower sugar content compared to Ensure. He will resume Toujeo at 20 units once daily and lispro at 10 units, 10 to 15 minutes before meals. Mounjaro will be initiated at 5 mg weekly for 4 weeks, then increased to 7.5 mg weekly for 4 weeks, and subsequently to 10 mg weekly until the next office visit. Blood work has been ordered prior to the next visit.  Ophthalmology follow-up.  Return back in 4 months.  Orders:  •  POCT hemoglobin A1c  •  Tirzepatide (Mounjaro) 5 MG/0.5ML SOAJ; Inject 5 mg under the skin once a week  •  Tirzepatide (Mounjaro) 7.5 MG/0.5ML SOAJ; Inject 7.5 mg under the skin once a week Do not start before 2025.  •  Tirzepatide (Mounjaro) 10 MG/0.5ML SOAJ; Inject 10 mg under the skin once a week Do not start before 2025.  •  insulin glargine (Toujeo SoloStar) 300 units/mL CONCENTRATED U-300 injection pen (1-unit  dial); Inject 20 Units under the skin daily at bedtime States evenings occasionally when feels that he needs it  - onsliding scale - states 20-30 units at nighttime  •  Hemoglobin A1C; Future  •  Comprehensive metabolic panel; Future    Uncontrolled type 2 diabetes mellitus with hyperglycemia (HCC)  See plan for type 2 diabetes.  Lab Results   Component Value Date    HGBA1C 9.3 (A) 05/27/2025       Orders:  •  insulin lispro (HumaLOG KwikPen) 100 units/mL injection pen; Inject 10 Units under the skin 3 (three) times a day with meals    Other hyperlipidemia  Continue Lipitor 20 mg daily.         Vitamin D deficiency  Continue vitamin D supplementation.       Obesity, Class III, BMI 40-49.9 (morbid obesity)  Lifestyle modification including regular daily exercise and balanced diet encouraged,  I resume   I resumed Mounjaro, 5 mg weekly and to slowly increase as he tolerates.             Pertinent Medical History           History of Present Illness   History of Present Illness      Oracio Richardson Jr. is a 70 y.o. male who presents for follow-up for type 2 diabetes with long-term current of insulin, hyperlipidemia and obesity.      Current regimen:    He is currently on lispro insulin, administered at 15 units twice daily, which he increased due to weight gain. He expresses reluctance to further increase the insulin dosage beyond 15 units. He has discontinued Toujeo, citing its ineffectiveness, and has found Humalog to be more beneficial.    He reports a sedentary lifestyle with a diet rich in salads, meat, and fruits. He consumes carbohydrates sparingly, approximately once a week, in the form of two slices of sourdough bread or a third of a cup of rice. He is considering the use of Ensure as a meal supplement. He is currently under the care of a weight      He has previously tried Ozempic for over a month without success. He is also on Lipitor 20 mg daily.    His blood glucose levels fluctuate  "between 200 and 300, with occasional drops below the target range, particularly during sleep.  He has one box each of lispro and Toujeo remaining. He has previously been hospitalized for ketoacidosis. He underwent an eye examination over a year ago and does not have a regular ophthalmologist.      Oracio Richardson Jr.   Device used, rajani 2  Home use       Indication   Type 2 Diabetes      More than 72 hours of data was reviewed. Report to be scanned to chart.     Date Range: May 14-27    Analysis of data:   Average Glucose: 254 mg/dL  Coefficient of Variation: 27.7%  SD : x   Time in Target Range: 17%  Time Above Range: 83%  Time Below Range: 0      Review of Systems as per HPI  Medical History Reviewed by provider this encounter:     .  Medications Ordered Prior to Encounter[1]      Medical History Reviewed by provider this encounter:     .    Objective   /70 (BP Location: Left arm, Patient Position: Sitting, Cuff Size: Adult)   Pulse 76   Temp 98 °F (36.7 °C) (Temporal)   Resp 18   Ht 5' 11.5\" (1.816 m)   Wt 136 kg (299 lb)   SpO2 98%   BMI 41.12 kg/m²      Body mass index is 41.12 kg/m².  Wt Readings from Last 3 Encounters:   05/27/25 136 kg (299 lb)   04/17/25 132 kg (291 lb)   03/19/25 122 kg (269 lb 12.8 oz)     Physical Exam  Physical Exam  Vital Signs  Blood pressure is 128/70.    Results  Laboratory Studies  CGM report shows blood sugar above 200 for 53% of the time.  Labs: I have reviewed pertinent labs including:   Lab Results   Component Value Date    HGBA1C 9.3 (A) 05/27/2025    HGBA1C 8.4 (H) 01/22/2025    HGBA1C 8.6 (H) 12/23/2024      Lab Results   Component Value Date    CREATININE 0.95 01/22/2025    CREATININE 0.98 12/23/2024    CREATININE 1.12 11/11/2024    BUN 26 (H) 01/22/2025    K 4.2 01/22/2025     01/22/2025    CO2 22 01/22/2025      HDL, Direct   Date Value Ref Range Status   01/22/2025 43 >=40 mg/dL Final     Triglycerides   Date Value Ref Range Status   01/22/2025 75 See " Comment mg/dL Final     Comment:     Triglyceride:     0-9Y            <75mg/dL     10Y-17Y         <90 mg/dL       >=18Y     Normal          <150 mg/dL     Borderline High 150-199 mg/dL     High            200-499 mg/dL        Very High       >499 mg/dL    Specimen collection should occur prior to Metamizole administration due to the potential for falsely depressed results.      Lab Results   Component Value Date    [id number] 19.5 (L) 05/23/2024    [id number] 19.7 (L) 11/07/2023    [id number] 24.2 (L) 01/19/2023      Radiology Results Review : No pertinent imaging studies reviewed.  Patient Instructions   We resumed Toujeo 20 units once daily and lispro 10 units, 10 to 15 minutes before meals.  I started Mounjaro 5 mg weekly for 4 weeks then 7.5 mg weekly for 4 weeks and then 10 mg weekly until you see me in the office    Discussed with the patient and all questioned fully answered. He will call me if any problems arise.             [1]  Current Outpatient Medications on File Prior to Visit   Medication Sig Dispense Refill   • apixaban (Eliquis) 5 mg Take 1 tablet (5 mg total) by mouth in the morning 90 tablet 2   • atorvastatin (LIPITOR) 20 mg tablet Take 1 tablet (20 mg total) by mouth daily 90 tablet 2   • [DISCONTINUED] insulin lispro (HumaLOG KwikPen) 100 units/mL injection pen Inject 10 Units under the skin 3 (three) times a day with meals (Patient taking differently: Inject 15 Units under the skin in the morning and 15 Units at noon and 15 Units in the evening. Inject with meals. 15 units twice a day.) 24 mL 0   • omeprazole (PriLOSEC) 20 mg delayed release capsule Take 1 capsule (20 mg total) by mouth daily (Patient not taking: Reported on 5/27/2025) 90 capsule 1   • [DISCONTINUED] Insulin Glargine (TOUJEO SOLOSTAR SC) Inject under the skin States evenings occasionally when feels that he needs it  - onsliding scale - states 20-30 units at nighttime (Patient not taking: Reported on 5/27/2025)       No  current facility-administered medications on file prior to visit.

## 2025-05-27 NOTE — PATIENT INSTRUCTIONS
We resumed Toujeo 20 units once daily and lispro 10 units, 10 to 15 minutes before meals.  I started Mounjaro 5 mg weekly for 4 weeks then 7.5 mg weekly for 4 weeks and then 10 mg weekly until you see me in the office

## 2025-05-27 NOTE — ASSESSMENT & PLAN NOTE
Lab Results   Component Value Date    HGBA1C 9.3 (A) 05/27/2025     Diabetes is poorly controlled with A1c of 9.2%, the goal less than 7%.  His continuous glucose monitoring (CGM) report indicates suboptimal control, with only 17% of readings within the target range and an wrist which is above the goal including 53% exceeding 250.  He has stopped taking Toujeo, which he believes is not effective and did not continue Mounjaro with the same reason.  He was strongly advised to not changing insulin dose or changing his regiment on his own without informing us.  He has been advised to reduce carbohydrate intake advised against taking Ensure, and consider Glucerna as a meal supplement due to its lower sugar content compared to Ensure. He will resume Toujeo at 20 units once daily and lispro at 10 units, 10 to 15 minutes before meals. Mounjaro will be initiated at 5 mg weekly for 4 weeks, then increased to 7.5 mg weekly for 4 weeks, and subsequently to 10 mg weekly until the next office visit. Blood work has been ordered prior to the next visit.  Ophthalmology follow-up.  Return back in 4 months.  Orders:  •  POCT hemoglobin A1c  •  Tirzepatide (Mounjaro) 5 MG/0.5ML SOAJ; Inject 5 mg under the skin once a week  •  Tirzepatide (Mounjaro) 7.5 MG/0.5ML SOAJ; Inject 7.5 mg under the skin once a week Do not start before June 27, 2025.  •  Tirzepatide (Mounjaro) 10 MG/0.5ML SOAJ; Inject 10 mg under the skin once a week Do not start before July 25, 2025.  •  insulin glargine (Toujeo SoloStar) 300 units/mL CONCENTRATED U-300 injection pen (1-unit dial); Inject 20 Units under the skin daily at bedtime States evenings occasionally when feels that he needs it  - onsliding scale - states 20-30 units at nighttime  •  Hemoglobin A1C; Future  •  Comprehensive metabolic panel; Future

## 2025-05-27 NOTE — ASSESSMENT & PLAN NOTE
Lifestyle modification including regular daily exercise and balanced diet encouraged,  I resume   I resumed Mounjaro, 5 mg weekly and to slowly increase as he tolerates.

## 2025-05-27 NOTE — TELEPHONE ENCOUNTER
[facility] pharmacy calling in for patient- states script sent in today for insulin glargine (Toujeo) with a Dispense Quantity of 6mL will not cover a 90 day supply. They are asking for provider to please send in a new script for inuslin glargine  (Toujeo Solostar)  with a dispense Quantity of 9mL listed as this would cover for a 90 day supply. Please advise, thank you.

## 2025-06-03 ENCOUNTER — OFFICE VISIT (OUTPATIENT)
Dept: FAMILY MEDICINE CLINIC | Facility: CLINIC | Age: 70
End: 2025-06-03
Payer: COMMERCIAL

## 2025-06-03 VITALS
RESPIRATION RATE: 18 BRPM | BODY MASS INDEX: 40.63 KG/M2 | SYSTOLIC BLOOD PRESSURE: 120 MMHG | HEIGHT: 72 IN | OXYGEN SATURATION: 98 % | TEMPERATURE: 97.2 F | WEIGHT: 300 LBS | HEART RATE: 88 BPM | DIASTOLIC BLOOD PRESSURE: 80 MMHG

## 2025-06-03 DIAGNOSIS — E11.29 TYPE 2 DIABETES MELLITUS WITH DIABETIC MICROALBUMINURIA, WITH LONG-TERM CURRENT USE OF INSULIN (HCC): Primary | ICD-10-CM

## 2025-06-03 DIAGNOSIS — R80.9 TYPE 2 DIABETES MELLITUS WITH MICROALBUMINURIA, WITH LONG-TERM CURRENT USE OF INSULIN (HCC): ICD-10-CM

## 2025-06-03 DIAGNOSIS — E66.813 OBESITY, CLASS III, BMI 40-49.9 (MORBID OBESITY): ICD-10-CM

## 2025-06-03 DIAGNOSIS — E11.29 TYPE 2 DIABETES MELLITUS WITH MICROALBUMINURIA, WITH LONG-TERM CURRENT USE OF INSULIN (HCC): ICD-10-CM

## 2025-06-03 DIAGNOSIS — M17.0 PRIMARY OSTEOARTHRITIS OF BOTH KNEES: ICD-10-CM

## 2025-06-03 DIAGNOSIS — R80.9 TYPE 2 DIABETES MELLITUS WITH DIABETIC MICROALBUMINURIA, WITH LONG-TERM CURRENT USE OF INSULIN (HCC): Primary | ICD-10-CM

## 2025-06-03 DIAGNOSIS — I48.0 PAF (PAROXYSMAL ATRIAL FIBRILLATION) (HCC): ICD-10-CM

## 2025-06-03 DIAGNOSIS — Z79.4 TYPE 2 DIABETES MELLITUS WITH DIABETIC MICROALBUMINURIA, WITH LONG-TERM CURRENT USE OF INSULIN (HCC): Primary | ICD-10-CM

## 2025-06-03 DIAGNOSIS — Z79.4 TYPE 2 DIABETES MELLITUS WITH MICROALBUMINURIA, WITH LONG-TERM CURRENT USE OF INSULIN (HCC): ICD-10-CM

## 2025-06-03 LAB
LEFT EYE DIABETIC RETINOPATHY: NORMAL
LEFT EYE IMAGE QUALITY: NORMAL
LEFT EYE MACULAR EDEMA: NORMAL
LEFT EYE OTHER RETINOPATHY: NORMAL
RIGHT EYE DIABETIC RETINOPATHY: NORMAL
RIGHT EYE IMAGE QUALITY: NORMAL
RIGHT EYE MACULAR EDEMA: NORMAL
RIGHT EYE OTHER RETINOPATHY: NORMAL
SEVERITY (EYE EXAM): NORMAL

## 2025-06-03 PROCEDURE — 99214 OFFICE O/P EST MOD 30 MIN: CPT | Performed by: FAMILY MEDICINE

## 2025-06-03 NOTE — PROGRESS NOTES
Name: Oracio Richardson Jr.      : 1955      MRN: [id number]  Encounter Provider: Raymundo Barnes DO  Encounter Date: 6/3/2025   Encounter department: [facility]  :  Assessment & Plan  Type 2 diabetes mellitus with microalbuminuria, with long-term current use of insulin (HCC)    Lab Results   Component Value Date    HGBA1C 9.3 (A) 2025       Orders:    IR[facility] Diabetic eye exam    Hemoglobin A1C; Future    Comprehensive metabolic panel; Future    Type 2 diabetes mellitus with diabetic microalbuminuria, with long-term current use of insulin (HCC)  Stable at this time A1c is high at 9.3 but with weight reduction now and adjusting his diet as we discussed he will be eating less in the way of calories and carbohydrates with the GLP-1 medication and can benefit from the weight reduction moving forward and this will directly impact his diabetes reevaluate with me in 3 months  Lab Results   Component Value Date    HGBA1C 9.3 (A) 2025       Orders:    Hemoglobin A1C; Future    Comprehensive metabolic panel; Future    PAF (paroxysmal atrial fibrillation) (HCC)  Overall heart rate stable troll ventricular response continuing on Eliquis for stroke reduction     Orders:    Hemoglobin A1C; Future    Comprehensive metabolic panel; Future    Primary osteoarthritis of both knees  Difficulty with ambulation due to arthritis in his knees with weight reduction this should improve overall he is working to reduce weight and started on GLP-1 medication and will follow-up closely within 3 months and adjust dosing    Orders:    Hemoglobin A1C; Future    Comprehensive metabolic panel; Future    Obesity, Class III, BMI 40-49.9 (morbid obesity)    Patient working on weight reduction followed also by endocrinology reevaluate here in 3 months                History of Present Illness   HPI  Review of Systems    Objective   /80 (BP Location: Left arm, Patient Position: Sitting, Cuff Size:  "Standard)   Pulse 88   Temp (!) 97.2 °F (36.2 °C) (Tympanic)   Resp 18   Ht 5' 11.5\" (1.816 m)   Wt 136 kg (300 lb)   SpO2 98%   BMI 41.26 kg/m²      Physical Exam    "

## 2025-06-03 NOTE — ASSESSMENT & PLAN NOTE
Patient working on weight reduction followed also by endocrinology reevaluate here in 3 months

## 2025-06-03 NOTE — ASSESSMENT & PLAN NOTE
Overall heart rate stable troll ventricular response continuing on Eliquis for stroke reduction     Orders:    Hemoglobin A1C; Future    Comprehensive metabolic panel; Future

## 2025-06-03 NOTE — ASSESSMENT & PLAN NOTE
Difficulty with ambulation due to arthritis in his knees with weight reduction this should improve overall he is working to reduce weight and started on GLP-1 medication and will follow-up closely within 3 months and adjust dosing    Orders:    Hemoglobin A1C; Future    Comprehensive metabolic panel; Future

## 2025-06-03 NOTE — ASSESSMENT & PLAN NOTE
Lab Results   Component Value Date    HGBA1C 9.3 (A) 05/27/2025       Orders:    IRIS Diabetic eye exam    Hemoglobin A1C; Future    Comprehensive metabolic panel; Future

## 2025-06-03 NOTE — ASSESSMENT & PLAN NOTE
Stable at this time A1c is high at 9.3 but with weight reduction now and adjusting his diet as we discussed he will be eating less in the way of calories and carbohydrates with the GLP-1 medication and can benefit from the weight reduction moving forward and this will directly impact his diabetes reevaluate with me in 3 months  Lab Results   Component Value Date    HGBA1C 9.3 (A) 05/27/2025       Orders:    Hemoglobin A1C; Future    Comprehensive metabolic panel; Future

## 2025-06-06 ENCOUNTER — RESULTS FOLLOW-UP (OUTPATIENT)
Dept: CARDIOLOGY CLINIC | Facility: CLINIC | Age: 70
End: 2025-06-06

## 2025-06-06 ENCOUNTER — REMOTE DEVICE CLINIC VISIT (OUTPATIENT)
Dept: CARDIOLOGY CLINIC | Facility: CLINIC | Age: 70
End: 2025-06-06
Payer: COMMERCIAL

## 2025-06-06 DIAGNOSIS — I48.0 PAF (PAROXYSMAL ATRIAL FIBRILLATION) (HCC): Primary | ICD-10-CM

## 2025-06-06 PROCEDURE — 93298 REM INTERROG DEV EVAL SCRMS: CPT | Performed by: INTERNAL MEDICINE

## 2025-06-06 NOTE — PROGRESS NOTES
"MDT LP2-ACTIVE SYSTEM IS MRI CONDITIONAL   CARELINK TRANSMISSION: LOOP RECORDER. PRESENTING RHYTHM NSR @ 67 BPM. BATTERY STATUS \"OK.\" NO PATIENT OR DEVICE ACTIVATED EPISODES. NORMAL DEVICE FUNCTION. DL   "

## 2025-06-09 ENCOUNTER — CLINICAL SUPPORT (OUTPATIENT)
Dept: BARIATRICS | Facility: CLINIC | Age: 70
End: 2025-06-09

## 2025-06-09 DIAGNOSIS — E66.813 OBESITY, CLASS III, BMI 40-49.9 (MORBID OBESITY): Primary | ICD-10-CM

## 2025-06-09 PROCEDURE — RECHECK

## 2025-06-09 NOTE — PROGRESS NOTES
"Bariatric Nutrition Assessment - PreOp Note     Insurance: 3 required monthly weight checks  Completed    Type of surgery    Pt interested in the Laparoscopic Alfonso-en-Y gastric bypass possible sleeve gastrectomy.   Surgery Date: TBD  Surgeon: Consult with Dr. Edmar soriano 12/13/2024         Nutrition Assessment   Oracio Richardson Jr.  70 y.o.  male   Current Weight: 269.8#  BMI: 40.8  Initial Weight at Surgeon Consult: 310#   BMI: 43.2  Height: 5'11\"  Eval Weight: 309#   BMI: 43.1  Wt with BMI of 25: 179#  Pre-Op Excess Wt: 131#  BMI to Qualify at 35 = 251#  PMH includes:  Obesity, Diabetes, Mixed HLD, PE, OA - knees, Ventral Hernia     Pt advised not to gain weight during preop process. Pt encouraged to lose weight via healthy eating and exercise. Pt may follow Liver Shrinking diet 2 weeks or more  prior to DOS   There were no vitals taken for this visit.    [place] Equation:    QAV=1540  Weight Maintenance 2625  Estimated calories for weight loss 6978-8675 ( 1-2# per wk wt loss - sedentary )  Estimated protein needs  g (1.0-1.5 gms/kg IBW )   Estimated fluid needs 81-95 oz (30-35 ml/kg IBW )      Weight History  Reason for WLS: Improve health and mobility  Onset of Obesity: Adult  Family history of obesity: Yes  Wt Loss Attempts: Self Created Diets (Portion Control, Healthy Food Choices, etc.)  Weight Loss medication: OzempMandy tate  Patient has tried the above for 6 months or more with insufficient weight loss or weight regain, which is why patient has requested to be evaluated for weight loss surgery today  Maximum Wt Lost: Was 420# - went into ketoacidosis (coma for 3 days) and lost 100#      Review of History and Medications   OTC: Stopped   Past Medical History:   Diagnosis Date    A-fib (HCC)     per pt \"happened one time\"    Abdominal hernia     Acute metabolic encephalopathy 09/04/2022    CARLTON (acute kidney injury) (HCC) 09/04/2022    Ambulates with cane     not recently " "using but has in home if needed    Anesthesia     per pt \"with knee surgery(in the )was awake for operation and fell asleep when being closed up\" and than took long to wake up\"    Arthritis     Blood in urine     \"sometimes\"    Colon polyp     Diabetes mellitus (HCC)     Type 2--sees Endocrinologist KAELYN Shaver    History of pulmonary embolism     Implantable loop recorder present     Muscle weakness     per pt \"started with diabetes\"some muscle loss of density\" --per pt \"muscle coming back\"    Myopia of left eye     glasses for reading    Obesity     Pancreatitis 09/04/2022    Risk for falls     Sepsis (HCC) 09/04/2022    Teeth missing     Urethral pain     \"when passing urine\"    Urinary tract infection      Past Surgical History:   Procedure Laterality Date    CARDIAC LOOP RECORDER      CATARACT EXTRACTION Bilateral     COLONOSCOPY      EGD      KNEE SURGERY Right     arthroscopy    PA BIOPSY SOFT TISSUE LEG/ANKLE AREA SUPERFICIAL Bilateral 2/18/2025    Procedure: EXCISION LIPOMA (arms bilaterally, legs bilaterally, abdomen, low back);  Surgeon: Chas Hu MD;  Location: [facility];  Service: General    PA BIOPSY SOFT TISSUE UPPER ARM/ELBOW SUPERFICIAL Bilateral 2/18/2025    Procedure: EXCISION LIPOMA (arms bilaterally, legs bilaterally, abdomen, low back);  Surgeon: Chas Hu MD;  Location: [facility];  Service: General    PA EXC B9 LESION MRGN XCP SK TG T/A/L 1.1-2.0 CM N/A 2/18/2025    Procedure: EXCISION BIOPSY LESION FACIAL (forehead);  Surgeon: Chas Hu MD;  Location: [facility];  Service: General    PA EXC B9 LESION MRGN XCP SK TG T/A/L 2.1-3.0 CM N/A 2/18/2025    Procedure: EXCISION CYST DERMOID (back);  Surgeon: Chas Hu MD;  Location: [facility];  Service: General    PA EXC TUMOR SOFT TISSUE ABDOMINAL WALL SUBQ <3CM Bilateral 2/18/2025    Procedure: EXCISION LIPOMA (arms bilaterally, legs bilaterally, abdomen, low back);  Surgeon: Chas Hu MD;  " Location: [facility];  Service: General    RI TRURL ELECTROSURG RESCJ PROSTATE BLEED COMPLETE N/A 03/16/2023    Procedure: TRANSURETHRAL RESECTION OF PROSTATE (TURP)(possible), cystoscopy, SP tube placement, removal of bladder calculus;  Surgeon: Ludwig King MD;  Location: [facility];  Service: Urology     Social History     Socioeconomic History    Marital status: /Civil Union   Tobacco Use    Smoking status: Never     Passive exposure: Never    Smokeless tobacco: Never   Vaping Use    Vaping status: Never Used   Substance and Sexual Activity    Alcohol use: Never    Drug use: Never    Sexual activity: Yes     Partners: Female     Comment: defer     Social Drivers of Health     Financial Resource Strain: Low Risk  (11/8/2023)    Overall Financial Resource Strain (CARDIA)     Difficulty of Paying Living Expenses: Not very hard   Food Insecurity: No Food Insecurity (11/12/2024)    Nursing - Inadequate Food Risk Classification     Worried About Running Out of Food in the Last Year: Never true     Ran Out of Food in the Last Year: Never true   Transportation Needs: No Transportation Needs (11/12/2024)    PRAPARE - Transportation     Lack of Transportation (Medical): No     Lack of Transportation (Non-Medical): No   Housing Stability: Low Risk  (11/12/2024)    Housing Stability Vital Sign     Unable to Pay for Housing in the Last Year: No     Number of Times Moved in the Last Year: 0     Homeless in the Last Year: No       Current Outpatient Medications:     apixaban (Eliquis) 5 mg, Take 1 tablet (5 mg total) by mouth in the morning, Disp: 90 tablet, Rfl: 2    atorvastatin (LIPITOR) 20 mg tablet, Take 1 tablet (20 mg total) by mouth daily, Disp: 90 tablet, Rfl: 2    insulin glargine (Toujeo SoloStar) 300 units/mL CONCENTRATED U-300 injection pen (1-unit dial), Inject 20 Units under the skin daily at bedtime States evenings occasionally when feels that he needs it  - onsliding scale - states 20-30 units at  nighttime, Disp: 9 mL, Rfl: 1    insulin lispro (HumaLOG KwikPen) 100 units/mL injection pen, Inject 10 Units under the skin 3 (three) times a day with meals, Disp: 27 mL, Rfl: 0    omeprazole (PriLOSEC) 20 mg delayed release capsule, Take 1 capsule (20 mg total) by mouth daily, Disp: 90 capsule, Rfl: 1    [START ON 2025] Tirzepatide (Mounjaro) 10 MG/0.5ML SOAJ, Inject 10 mg under the skin once a week Do not start before 2025. (Patient not taking: Reported on 6/3/2025 Do not start before 2025.), Disp: 6 mL, Rfl: 1    Tirzepatide (Mounjaro) 5 MG/0.5ML SOAJ, Inject 5 mg under the skin once a week, Disp: 2 mL, Rfl: 0    [START ON 2025] Tirzepatide (Mounjaro) 7.5 MG/0.5ML SOAJ, Inject 7.5 mg under the skin once a week Do not start before 2025. (Patient not taking: Reported on 6/3/2025 Do not start before 2025.), Disp: 2 mL, Rfl: 0  Food Intake and Lifestyle Assessment   Food Intake Assessment completed via usual diet recall  Breakfast: Skips  Drinks Water  10-12 Lunch:  Canned salmon, tuna - multigrain or rye bread  12-2 Dinner: 1/2 c rice 2-3 cups vegetables - 6 oz Chicken or White Fish - doesn't kaur   Snack: Water - Apple juice - 2 cookies  d/t blood sugar dropping   Beverage intake: water, Fairlife Milk- dilutes  No alcohol   Protein supplement: No  Estimated protein intake per day: 80-90 grams  Estimated fluid intake per day: 1/2 gallon  Meals eaten away from home: No - stopped   Typical meal pattern: 2 meals per day and 1-2 snacks per day  Eating Behaviors: Consumption of high calorie/ high fat foods  Food allergies or intolerances: No Known Allergies or intolerances  Cultural or Holiness considerations: no    Physical Assessment  Physical Activity  Types of exercise: Very limited with knees and hernias   Current physical limitations: OA knees    Psychosocial Assessment   Support systems: spouse   4 children was  - 1 now  son (suicide) at 16yo  Socioeconomic  "factors:   Retired . 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   Nutrition Diagnosis  Diagnosis: Overweight / Obesity (NC-3.3)  Related to: Physical inactivity and Excessive energy intake  As Evidenced by: BMI >25     Nutrition Prescription: Recommend the following diet  Low fat, Low sugar, and Regular    Interventions and Teaching   Discussed pre-op and post-op nutrition guidelines.       Patient educated and handouts provided.  Surgical changes to stomach / GI  Capacity of post-surgery stomach  Diet progression  Adequate hydration  Sugar and fat restriction to decrease \"dumping syndrome\"  Fat restriction to decrease steatorrhea  Expected weight loss  Weight loss plateaus/ possibility of weight regain  Exercise  Suggestions for pre-op diet  Nutrition considerations after surgery  Protein supplements  Meal planning and preparation  Appropriate carbohydrate, protein, and fat intake, and food/fluid choices to maximize safe weight loss, nutrient intake, and tolerance   Dietary and lifestyle changes  Possible problems with poor eating habits  Intuitive eating  Techniques for self monitoring and keeping daily food journal  Potential for food intolerance after surgery, and ways to deal with them including: lactose intolerance, nausea, reflux, vomiting, diarrhea, food intolerance, appetite changes, gas  Vitamin / Mineral supplementation of Multivitamin with minerals, Calcium, Vitamin B12, Iron, Fat Soluble vitamins, and Vitamin D    Patient is not currently pregnant and doesn't desire to become pregnant a minimum of one year post-op    Education provided to: patient    Barriers to learning: No barriers identified    Readiness to change: preparation    Prior research on procedure: discussed with provider, internet and friends or family    Comprehension: needs reinforcement     Expected Compliance: fair  -  will benefit from education - " "has much misconception on his diabetes management    Recommendations  Pt is an appropriate candidate for surgery. Yes  Evaluation / Monitoring  Dietitian to Monitor: Eating pattern as discussed Tolerance of nutrition prescription Body weight Lab values Physical activity Bowel pattern  History of ketoacidosis - question is pt should follow Liver Shrinking Diet - On short and long acting insulin    2/3 Weight Check Visit Summary 1/22/2025  Doing well with preparing for surgery Incorporating guidelines  Eating with protein first and then vegetables -  eats 3 meals Looking at protein shakes- likes Premier to date. Hesitant to use vitamins stressed importance post op  Exercises as able though limited with knees Monitoring glucose - ranges under 200 - uses isaac. Questions answered during discussion. Pt receptive to visit    Monthly PreOp Visit 3/19/2025  Completed preop process and now awaiting approval from insurance. Discussed next steps of the process. Pt had questions on the procedure options. Pt to have bypass. Uses Premier shakes. Also reviewed other option and gave samples and coupons. Discussed PreOp diet and would not recommend diet for pt  d/t history of ketoacidosis -  On short and long acting insulin. Pt verbalizes understanding and receptive to visit    Monthly PreOp Visit 6/9/2025  Had surgery scheduled and attended Preop class but cancelled due to financial reasons. \"Had to pay bills\". Reports doctors trying to give other options. Ozempic stopped and taking Mounjaro. After explanation - pt decided to halt surgical process at this time. Pt understands he can restart process in future and will need to reconsult with surgeon and bariatric team.        Time Spent:   30 Minutes    "

## 2025-06-20 NOTE — TELEPHONE ENCOUNTER
LMOVM asking pt to call and reschedule 7/14 appt due to change in Dr Garcia schedule he will be out of the office that day

## 2025-06-23 ENCOUNTER — TELEPHONE (OUTPATIENT)
Age: 70
End: 2025-06-23

## 2025-06-30 DIAGNOSIS — R80.9 TYPE 2 DIABETES MELLITUS WITH MICROALBUMINURIA, WITH LONG-TERM CURRENT USE OF INSULIN (HCC): ICD-10-CM

## 2025-06-30 DIAGNOSIS — Z79.4 TYPE 2 DIABETES MELLITUS WITH MICROALBUMINURIA, WITH LONG-TERM CURRENT USE OF INSULIN (HCC): ICD-10-CM

## 2025-06-30 DIAGNOSIS — E11.29 TYPE 2 DIABETES MELLITUS WITH MICROALBUMINURIA, WITH LONG-TERM CURRENT USE OF INSULIN (HCC): ICD-10-CM

## 2025-06-30 RX ORDER — TIRZEPATIDE 7.5 MG/.5ML
7.5 INJECTION, SOLUTION SUBCUTANEOUS WEEKLY
Qty: 2 ML | Refills: 2 | Status: SHIPPED | OUTPATIENT
Start: 2025-06-30

## 2025-06-30 NOTE — TELEPHONE ENCOUNTER
Reason for call:   [x] Refill   [] Prior Auth  [x] Other: NOT A DUPLICATE. Pharm states they didn't receive it    Office:   [] PCP/Provider -   [x] Specialty/Provider - endo/Chhaya    Medication: Tirzepatide (Mounjaro) 7.5 MG/0.5ML SOAJ     Dose/Frequency:     Inject 7.5 mg under the skin once a week Do not start before June 27, 2025.       Quantity: 2 mL    [id number] [address] [phone]     Local Pharmacy   Does the patient have enough for 3 days?   [] Yes   [x] No - Send as HP to POD    Mail Away Pharmacy   Does the patient have enough for 10 days?   [] Yes   [] No - Send as HP to POD     There are no orders for this patient for the upcoming lab visit. Please order labs as needed.     Reason for visit: Upcomming Appointment.     Thank you, lab.

## 2025-07-01 NOTE — TELEPHONE ENCOUNTER
6/20,6/30 LMOVM asking pt to call and reschedule 7/14 appt due to change in Dr Garcia schedule he will be out of the office that day7/1 sent letter appt is cancelled please call to reschedule

## 2025-07-02 DIAGNOSIS — E11.29 TYPE 2 DIABETES MELLITUS WITH MICROALBUMINURIA, WITH LONG-TERM CURRENT USE OF INSULIN (HCC): ICD-10-CM

## 2025-07-02 DIAGNOSIS — Z79.4 TYPE 2 DIABETES MELLITUS WITH MICROALBUMINURIA, WITH LONG-TERM CURRENT USE OF INSULIN (HCC): ICD-10-CM

## 2025-07-02 DIAGNOSIS — R80.9 TYPE 2 DIABETES MELLITUS WITH MICROALBUMINURIA, WITH LONG-TERM CURRENT USE OF INSULIN (HCC): ICD-10-CM

## 2025-07-03 RX ORDER — TIRZEPATIDE 5 MG/.5ML
5 INJECTION, SOLUTION SUBCUTANEOUS WEEKLY
Qty: 2 ML | Refills: 5 | Status: SHIPPED | OUTPATIENT
Start: 2025-07-03

## 2025-07-04 NOTE — PLAN OF CARE
Problem: Potential for Falls  Goal: Patient will remain free of falls  Description: INTERVENTIONS:  - Educate patient/family on patient safety including physical limitations  - Instruct patient to call for assistance with activity   - Consult OT/PT to assist with strengthening/mobility   - Keep Call bell within reach  - Keep bed low and locked with side rails adjusted as appropriate  - Keep care items and personal belongings within reach  - Initiate and maintain comfort rounds  - Offer Toileting every 2 Hours, in advance of need  - Initiate/Maintain bed/chair alarm  - Apply yellow socks and bracelet for high fall risk patients  - Consider moving patient to room near nurses station  Outcome: Progressing     Problem: PAIN - ADULT  Goal: Verbalizes/displays adequate comfort level or baseline comfort level  Description: Interventions:  - Encourage patient to monitor pain and request assistance  - Assess pain using appropriate pain scale  - Administer analgesics based on type and severity of pain and evaluate response  - Implement non-pharmacological measures as appropriate and evaluate response  - Consider cultural and social influences on pain and pain management  - Notify physician/advanced practitioner if interventions unsuccessful or patient reports new pain  Outcome: Progressing     Problem: SAFETY ADULT  Goal: Patient will remain free of falls  Description: INTERVENTIONS:  - Educate patient/family on patient safety including physical limitations  - Instruct patient to call for assistance with activity   - Consult OT/PT to assist with strengthening/mobility   - Keep Call bell within reach  - Keep bed low and locked with side rails adjusted as appropriate  - Keep care items and personal belongings within reach  - Initiate and maintain comfort rounds  - Make Fall Risk Sign visible to staff  - Apply yellow socks and bracelet for high fall risk patients  - Consider moving patient to room near nurses station  Outcome: Progressing  Goal: Maintain or return to baseline ADL function  Description: INTERVENTIONS:  -  Assess patient's ability to carry out ADLs; assess patient's baseline for ADL function and identify physical deficits which impact ability to perform ADLs (bathing, care of mouth/teeth, toileting, grooming, dressing, etc )  - Assess/evaluate cause of self-care deficits   - Assess range of motion  - Assess patient's mobility; develop plan if impaired  - Assess patient's need for assistive devices and provide as appropriate  - Encourage maximum independence but intervene and supervise when necessary  - Involve family in performance of ADLs  - Assess for home care needs following discharge   - Consider OT consult to assist with ADL evaluation and planning for discharge  - Provide patient education as appropriate  Outcome: Progressing     Problem: INFECTION - ADULT  Goal: Absence or prevention of progression during hospitalization  Description: INTERVENTIONS:  - Assess and monitor for signs and symptoms of infection  - Monitor lab/diagnostic results  - Monitor all insertion sites, i e  indwelling lines, tubes, and drains  - Monitor endotracheal if appropriate and nasal secretions for changes in amount and color  - Baldwin appropriate cooling/warming therapies per order  - Administer medications as ordered  - Instruct and encourage patient and family to use good hand hygiene technique  - Identify and instruct in appropriate isolation precautions for identified infection/condition  Outcome: Progressing     Problem: Knowledge Deficit  Goal: Patient/family/caregiver demonstrates understanding of disease process, treatment plan, medications, and discharge instructions  Description: Complete learning assessment and assess knowledge base    Interventions:  - Provide teaching at level of understanding  - Provide teaching via preferred learning methods  Outcome: Progressing no

## 2025-07-28 ENCOUNTER — TELEPHONE (OUTPATIENT)
Age: 70
End: 2025-07-28

## 2025-08-08 ENCOUNTER — TELEPHONE (OUTPATIENT)
Dept: ENDOCRINOLOGY | Facility: CLINIC | Age: 70
End: 2025-08-08

## (undated) DEVICE — GLOVE INDICATOR PI UNDERGLOVE SZ 8 BLUE

## (undated) DEVICE — CATH SUPRAPUBIC PEELAWAY SET  20FR X 12CM

## (undated) DEVICE — CHLORHEXIDINE 4PCT 4 OZ

## (undated) DEVICE — GLOVE INDICATOR PI UNDERGLOVE SZ 7.5 BLUE

## (undated) DEVICE — SUT ETHILON 2-0 FS 18 IN 664H

## (undated) DEVICE — 1820 FOAM BLOCK NEEDLE COUNTER: Brand: DEVON

## (undated) DEVICE — GARMENT,MEDLINE,DVT,INT,CALF,FOAM,MED: Brand: MEDLINE

## (undated) DEVICE — BASIC SINGLE BASIN-LF: Brand: MEDLINE INDUSTRIES, INC.

## (undated) DEVICE — GLOVE SRG BIOGEL 7.5

## (undated) DEVICE — STERILE SURGICAL LUBRICANT,  TUBE: Brand: SURGILUBE

## (undated) DEVICE — AAMI LEVEL 4 POLY-REINFORCED SURGICAL GOWN, X-LARGE, STERILE, SET-IN: Brand: CONVERTORS

## (undated) DEVICE — SCD SEQUENTIAL COMPRESSION COMFORT SLEEVE MEDIUM KNEE LENGTH: Brand: KENDALL SCD

## (undated) DEVICE — SUT VICRYL 3-0 REEL 54 IN J285G

## (undated) DEVICE — 4-PORT MANIFOLD: Brand: NEPTUNE 2

## (undated) DEVICE — Device

## (undated) DEVICE — GAUZE SPONGES,16 PLY: Brand: CURITY

## (undated) DEVICE — PACK TUR

## (undated) DEVICE — 3M™ TEGADERM™ CHG DRESSING 25/CARTON 4 CARTONS/CASE 1659: Brand: TEGADERM™

## (undated) DEVICE — ELECTRODE,CUTTING,STERILE.24FR: Brand: N.A.

## (undated) DEVICE — CATH FOLEY 16FR 5ML 2WAY LUBRICATH

## (undated) DEVICE — INTENDED FOR TISSUE SEPARATION, AND OTHER PROCEDURES THAT REQUIRE A SHARP SURGICAL BLADE TO PUNCTURE OR CUT.: Brand: BARD-PARKER ® CARBON RIB-BACK BLADES

## (undated) DEVICE — Device: Brand: LEVEL 1

## (undated) DEVICE — URO CATCHER BAG STERILE 0-UC32

## (undated) DEVICE — NEEDLE 25G X 1 1/2

## (undated) DEVICE — ANTIBACTERIAL UNDYED BRAIDED (POLYGLACTIN 910), SYNTHETIC ABSORBABLE SUTURE: Brand: COATED VICRYL

## (undated) DEVICE — BETHLEHEM UNIVERSAL MINOR GEN: Brand: CARDINAL HEALTH

## (undated) DEVICE — SUT ETHILON 4-0 PS-2 18 IN 1667H

## (undated) DEVICE — CUP MEDICINE 1OZ 5000/CS 50/PLT: Brand: MEDEGEN MEDICAL PRODUCTS, LLC

## (undated) DEVICE — TUBING SUCTION 5MM X 12 FT

## (undated) DEVICE — BAG URINE DRAINAGE 4000ML CONTINUOUS IRR

## (undated) DEVICE — GLOVE SRG BIOGEL 7

## (undated) DEVICE — POOLE SUCTION HANDLE W/TUBING: Brand: CARDINAL HEALTH

## (undated) DEVICE — CATHETER PLUG WITH CAP: Brand: DOVER

## (undated) DEVICE — MICRO HVTSA, 0.5G AND HVTSA SOURCEMARK PRODUCT CODE M1206 AND M1206-01: Brand: EXOFIN MICRO HVTSA, 0.5G

## (undated) DEVICE — NEPTUNE E-SEP SMOKE EVACUATION PENCIL, COATED, 70MM BLADE, PUSH BUTTON SWITCH: Brand: NEPTUNE E-SEP

## (undated) DEVICE — SYRINGE 10ML LL

## (undated) DEVICE — SUT MONOCRYL 4-0 PS-2 27 IN Y426H

## (undated) DEVICE — EVACUATOR BLADDER ELLIK DISP STRL

## (undated) DEVICE — SPONGE LAP 18 X 18 IN STRL RFD

## (undated) DEVICE — CHLORAPREP HI-LITE 26ML ORANGE